# Patient Record
Sex: FEMALE | Race: WHITE | Employment: OTHER | URBAN - METROPOLITAN AREA
[De-identification: names, ages, dates, MRNs, and addresses within clinical notes are randomized per-mention and may not be internally consistent; named-entity substitution may affect disease eponyms.]

---

## 2017-02-24 ENCOUNTER — HOSPITAL ENCOUNTER (OUTPATIENT)
Dept: CT IMAGING | Age: 67
Discharge: HOME | End: 2017-02-24
Attending: ORTHOPAEDIC SURGERY
Payer: MEDICARE

## 2017-02-24 DIAGNOSIS — R26.2 DIFFICULTY WALKING: ICD-10-CM

## 2017-02-24 DIAGNOSIS — R26.9 GAIT DISTURBANCE: ICD-10-CM

## 2017-02-24 DIAGNOSIS — M25.561 PAIN IN RIGHT KNEE: ICD-10-CM

## 2017-02-24 PROCEDURE — 73700 CT LOWER EXTREMITY W/O DYE: CPT

## 2017-04-03 RX ORDER — TRAMADOL HYDROCHLORIDE 50 MG/1
50 TABLET ORAL 2 TIMES DAILY
Status: ON HOLD | COMMUNITY
End: 2017-04-13

## 2017-04-03 RX ORDER — HYDROCODONE BITARTRATE AND ACETAMINOPHEN 10; 325 MG/1; MG/1
1 TABLET ORAL EVERY 6 HOURS PRN
Status: ON HOLD | COMMUNITY
End: 2020-08-26

## 2017-04-03 RX ORDER — CELECOXIB 200 MG/1
200 CAPSULE ORAL DAILY
Status: ON HOLD | COMMUNITY
End: 2017-04-13

## 2017-04-03 RX ORDER — FLUOXETINE HYDROCHLORIDE 60 MG/1
1 TABLET, FILM COATED ORAL; ORAL DAILY
COMMUNITY

## 2017-04-05 ENCOUNTER — LAB ENCOUNTER (OUTPATIENT)
Dept: LAB | Age: 67
End: 2017-04-05
Attending: ORTHOPAEDIC SURGERY
Payer: MEDICARE

## 2017-04-05 DIAGNOSIS — M17.11 OSTEOARTHRITIS OF RIGHT KNEE: ICD-10-CM

## 2017-04-05 PROCEDURE — 86850 RBC ANTIBODY SCREEN: CPT

## 2017-04-05 PROCEDURE — 36415 COLL VENOUS BLD VENIPUNCTURE: CPT

## 2017-04-05 PROCEDURE — 86901 BLOOD TYPING SEROLOGIC RH(D): CPT

## 2017-04-05 PROCEDURE — 86900 BLOOD TYPING SEROLOGIC ABO: CPT

## 2017-04-09 PROBLEM — M17.11 OSTEOARTHRITIS OF RIGHT KNEE: Status: ACTIVE | Noted: 2017-04-09

## 2017-04-09 NOTE — H&P
PCP Physician: Luis Gramajo MD, [place]  Referral Source/Physician: Dr. Jaclyn Escobar       History of Present Illness    Yareli Roy is a pleasant 77year old female who presents today to schedule her knee replacement surgery.  She already met with Dr. Babs Bonilla Mallory Naik    Medication changes  Added new medication of NORCO  MG TABS (HYDROCODONE-ACETAMINOPHEN) 1 or 2 tablets by mouth every 4-6 hours as needed for pain.  not to exceed 8 tablets in 24 hours - Signed  Added new medication of ECOTRIN 325 M insomnia, confusion/memory loss, anxiety, substance abuse. Endocrine: Denies excessive thirst/urination, hormone problem. Allergic/Immunologic: Denies food allergies, aspirin allergies, antibiotic allergies. Seasonal allergies.  sulfadizine and contrast symmetric. Xray Impression:       4 views of both knees were ordered, performed, and interpreted today. The xrays reveal:  Severe lateral osteoarthritis with Joint space narrowing.  3 patella screws noted    Recommendations:    Sarah All would like

## 2017-04-10 ENCOUNTER — ANESTHESIA EVENT (OUTPATIENT)
Dept: SURGERY | Facility: HOSPITAL | Age: 67
DRG: 470 | End: 2017-04-10
Payer: MEDICARE

## 2017-04-10 ENCOUNTER — HOSPITAL ENCOUNTER (INPATIENT)
Facility: HOSPITAL | Age: 67
LOS: 3 days | Discharge: HOME HEALTH | DRG: 470 | End: 2017-04-13
Attending: ORTHOPAEDIC SURGERY | Admitting: ORTHOPAEDIC SURGERY
Payer: MEDICARE

## 2017-04-10 ENCOUNTER — SURGERY (OUTPATIENT)
Age: 67
End: 2017-04-10

## 2017-04-10 ENCOUNTER — ANESTHESIA (OUTPATIENT)
Dept: SURGERY | Facility: HOSPITAL | Age: 67
DRG: 470 | End: 2017-04-10
Payer: MEDICARE

## 2017-04-10 ENCOUNTER — APPOINTMENT (OUTPATIENT)
Dept: GENERAL RADIOLOGY | Facility: HOSPITAL | Age: 67
DRG: 470 | End: 2017-04-10
Attending: ORTHOPAEDIC SURGERY
Payer: MEDICARE

## 2017-04-10 DIAGNOSIS — M17.11 OSTEOARTHRITIS OF RIGHT KNEE: Primary | ICD-10-CM

## 2017-04-10 PROCEDURE — 36415 COLL VENOUS BLD VENIPUNCTURE: CPT | Performed by: ORTHOPAEDIC SURGERY

## 2017-04-10 PROCEDURE — C9290 INJ, BUPIVACAINE LIPOSOME: HCPCS | Performed by: ORTHOPAEDIC SURGERY

## 2017-04-10 PROCEDURE — 88305 TISSUE EXAM BY PATHOLOGIST: CPT | Performed by: ORTHOPAEDIC SURGERY

## 2017-04-10 PROCEDURE — 0SRC0J9 REPLACEMENT OF RIGHT KNEE JOINT WITH SYNTHETIC SUBSTITUTE, CEMENTED, OPEN APPROACH: ICD-10-PCS | Performed by: ORTHOPAEDIC SURGERY

## 2017-04-10 PROCEDURE — 73560 X-RAY EXAM OF KNEE 1 OR 2: CPT

## 2017-04-10 PROCEDURE — 88311 DECALCIFY TISSUE: CPT | Performed by: ORTHOPAEDIC SURGERY

## 2017-04-10 PROCEDURE — 85025 COMPLETE CBC W/AUTO DIFF WBC: CPT | Performed by: ORTHOPAEDIC SURGERY

## 2017-04-10 RX ORDER — MORPHINE SULFATE 10 MG/ML
6 INJECTION, SOLUTION INTRAMUSCULAR; INTRAVENOUS EVERY 10 MIN PRN
Status: DISCONTINUED | OUTPATIENT
Start: 2017-04-10 | End: 2017-04-10 | Stop reason: HOSPADM

## 2017-04-10 RX ORDER — METOCLOPRAMIDE 10 MG/1
10 TABLET ORAL ONCE
Status: DISCONTINUED | OUTPATIENT
Start: 2017-04-10 | End: 2017-04-10 | Stop reason: HOSPADM

## 2017-04-10 RX ORDER — ONDANSETRON 2 MG/ML
INJECTION INTRAMUSCULAR; INTRAVENOUS AS NEEDED
Status: DISCONTINUED | OUTPATIENT
Start: 2017-04-10 | End: 2017-04-10 | Stop reason: SURG

## 2017-04-10 RX ORDER — CYCLOBENZAPRINE HCL 10 MG
10 TABLET ORAL EVERY 8 HOURS PRN
Status: DISCONTINUED | OUTPATIENT
Start: 2017-04-10 | End: 2017-04-13

## 2017-04-10 RX ORDER — ACETAMINOPHEN 325 MG/1
650 TABLET ORAL ONCE
Status: DISCONTINUED | OUTPATIENT
Start: 2017-04-10 | End: 2017-04-10 | Stop reason: HOSPADM

## 2017-04-10 RX ORDER — SODIUM CHLORIDE, SODIUM LACTATE, POTASSIUM CHLORIDE, CALCIUM CHLORIDE 600; 310; 30; 20 MG/100ML; MG/100ML; MG/100ML; MG/100ML
INJECTION, SOLUTION INTRAVENOUS CONTINUOUS
Status: DISCONTINUED | OUTPATIENT
Start: 2017-04-10 | End: 2017-04-10 | Stop reason: HOSPADM

## 2017-04-10 RX ORDER — ACETAMINOPHEN 325 MG/1
650 TABLET ORAL EVERY 4 HOURS PRN
Status: DISCONTINUED | OUTPATIENT
Start: 2017-04-10 | End: 2017-04-13

## 2017-04-10 RX ORDER — EPHEDRINE SULFATE 50 MG/ML
INJECTION, SOLUTION INTRAVENOUS AS NEEDED
Status: DISCONTINUED | OUTPATIENT
Start: 2017-04-10 | End: 2017-04-10 | Stop reason: SURG

## 2017-04-10 RX ORDER — HYDROCODONE BITARTRATE AND ACETAMINOPHEN 5; 325 MG/1; MG/1
2 TABLET ORAL AS NEEDED
Status: DISCONTINUED | OUTPATIENT
Start: 2017-04-10 | End: 2017-04-10 | Stop reason: HOSPADM

## 2017-04-10 RX ORDER — CLONAZEPAM 0.5 MG/1
0.5 TABLET ORAL 3 TIMES DAILY PRN
COMMUNITY

## 2017-04-10 RX ORDER — ROCURONIUM BROMIDE 10 MG/ML
INJECTION, SOLUTION INTRAVENOUS AS NEEDED
Status: DISCONTINUED | OUTPATIENT
Start: 2017-04-10 | End: 2017-04-10 | Stop reason: SURG

## 2017-04-10 RX ORDER — HALOPERIDOL 5 MG/ML
0.25 INJECTION INTRAMUSCULAR ONCE AS NEEDED
Status: DISCONTINUED | OUTPATIENT
Start: 2017-04-10 | End: 2017-04-10 | Stop reason: HOSPADM

## 2017-04-10 RX ORDER — HYDROMORPHONE HYDROCHLORIDE 1 MG/ML
0.2 INJECTION, SOLUTION INTRAMUSCULAR; INTRAVENOUS; SUBCUTANEOUS EVERY 2 HOUR PRN
Status: DISCONTINUED | OUTPATIENT
Start: 2017-04-10 | End: 2017-04-13

## 2017-04-10 RX ORDER — TRAMADOL HYDROCHLORIDE 50 MG/1
50 TABLET ORAL 2 TIMES DAILY
Status: DISCONTINUED | OUTPATIENT
Start: 2017-04-10 | End: 2017-04-13

## 2017-04-10 RX ORDER — SODIUM CHLORIDE 0.9 % (FLUSH) 0.9 %
10 SYRINGE (ML) INJECTION AS NEEDED
Status: DISCONTINUED | OUTPATIENT
Start: 2017-04-10 | End: 2017-04-13

## 2017-04-10 RX ORDER — METOCLOPRAMIDE HYDROCHLORIDE 5 MG/ML
10 INJECTION INTRAMUSCULAR; INTRAVENOUS EVERY 6 HOURS PRN
Status: ACTIVE | OUTPATIENT
Start: 2017-04-10 | End: 2017-04-12

## 2017-04-10 RX ORDER — SODIUM CHLORIDE 9 MG/ML
INJECTION, SOLUTION INTRAVENOUS CONTINUOUS
Status: DISCONTINUED | OUTPATIENT
Start: 2017-04-10 | End: 2017-04-13

## 2017-04-10 RX ORDER — HYDROMORPHONE HYDROCHLORIDE 1 MG/ML
0.8 INJECTION, SOLUTION INTRAMUSCULAR; INTRAVENOUS; SUBCUTANEOUS EVERY 2 HOUR PRN
Status: DISCONTINUED | OUTPATIENT
Start: 2017-04-10 | End: 2017-04-13

## 2017-04-10 RX ORDER — DOCUSATE SODIUM 100 MG/1
100 CAPSULE, LIQUID FILLED ORAL 2 TIMES DAILY
Status: DISCONTINUED | OUTPATIENT
Start: 2017-04-10 | End: 2017-04-13

## 2017-04-10 RX ORDER — GLYCOPYRROLATE 0.2 MG/ML
INJECTION INTRAMUSCULAR; INTRAVENOUS AS NEEDED
Status: DISCONTINUED | OUTPATIENT
Start: 2017-04-10 | End: 2017-04-10 | Stop reason: SURG

## 2017-04-10 RX ORDER — MORPHINE SULFATE 2 MG/ML
2 INJECTION, SOLUTION INTRAMUSCULAR; INTRAVENOUS EVERY 10 MIN PRN
Status: DISCONTINUED | OUTPATIENT
Start: 2017-04-10 | End: 2017-04-10 | Stop reason: HOSPADM

## 2017-04-10 RX ORDER — LIDOCAINE HYDROCHLORIDE 10 MG/ML
INJECTION, SOLUTION EPIDURAL; INFILTRATION; INTRACAUDAL; PERINEURAL AS NEEDED
Status: DISCONTINUED | OUTPATIENT
Start: 2017-04-10 | End: 2017-04-10 | Stop reason: SURG

## 2017-04-10 RX ORDER — DEXAMETHASONE SODIUM PHOSPHATE 4 MG/ML
VIAL (ML) INJECTION AS NEEDED
Status: DISCONTINUED | OUTPATIENT
Start: 2017-04-10 | End: 2017-04-10 | Stop reason: SURG

## 2017-04-10 RX ORDER — DIPHENHYDRAMINE HYDROCHLORIDE 50 MG/ML
25 INJECTION INTRAMUSCULAR; INTRAVENOUS ONCE AS NEEDED
Status: ACTIVE | OUTPATIENT
Start: 2017-04-10 | End: 2017-04-10

## 2017-04-10 RX ORDER — HYDROCODONE BITARTRATE AND ACETAMINOPHEN 7.5; 325 MG/1; MG/1
2 TABLET ORAL EVERY 4 HOURS PRN
Status: DISCONTINUED | OUTPATIENT
Start: 2017-04-10 | End: 2017-04-12

## 2017-04-10 RX ORDER — HYDROMORPHONE HYDROCHLORIDE 1 MG/ML
0.2 INJECTION, SOLUTION INTRAMUSCULAR; INTRAVENOUS; SUBCUTANEOUS EVERY 5 MIN PRN
Status: DISCONTINUED | OUTPATIENT
Start: 2017-04-10 | End: 2017-04-10 | Stop reason: HOSPADM

## 2017-04-10 RX ORDER — NALOXONE HYDROCHLORIDE 0.4 MG/ML
80 INJECTION, SOLUTION INTRAMUSCULAR; INTRAVENOUS; SUBCUTANEOUS AS NEEDED
Status: DISCONTINUED | OUTPATIENT
Start: 2017-04-10 | End: 2017-04-10 | Stop reason: HOSPADM

## 2017-04-10 RX ORDER — HYDROMORPHONE HYDROCHLORIDE 1 MG/ML
0.4 INJECTION, SOLUTION INTRAMUSCULAR; INTRAVENOUS; SUBCUTANEOUS EVERY 5 MIN PRN
Status: DISCONTINUED | OUTPATIENT
Start: 2017-04-10 | End: 2017-04-10 | Stop reason: HOSPADM

## 2017-04-10 RX ORDER — MORPHINE SULFATE 4 MG/ML
4 INJECTION, SOLUTION INTRAMUSCULAR; INTRAVENOUS EVERY 10 MIN PRN
Status: DISCONTINUED | OUTPATIENT
Start: 2017-04-10 | End: 2017-04-10 | Stop reason: HOSPADM

## 2017-04-10 RX ORDER — HYDROMORPHONE HYDROCHLORIDE 1 MG/ML
0.6 INJECTION, SOLUTION INTRAMUSCULAR; INTRAVENOUS; SUBCUTANEOUS EVERY 5 MIN PRN
Status: DISCONTINUED | OUTPATIENT
Start: 2017-04-10 | End: 2017-04-10 | Stop reason: HOSPADM

## 2017-04-10 RX ORDER — FAMOTIDINE 20 MG/1
20 TABLET ORAL ONCE
Status: DISCONTINUED | OUTPATIENT
Start: 2017-04-10 | End: 2017-04-10 | Stop reason: HOSPADM

## 2017-04-10 RX ORDER — DIPHENHYDRAMINE HYDROCHLORIDE 50 MG/ML
12.5 INJECTION INTRAMUSCULAR; INTRAVENOUS EVERY 4 HOURS PRN
Status: DISCONTINUED | OUTPATIENT
Start: 2017-04-10 | End: 2017-04-13

## 2017-04-10 RX ORDER — POLYETHYLENE GLYCOL 3350 17 G/17G
17 POWDER, FOR SOLUTION ORAL DAILY PRN
Status: DISCONTINUED | OUTPATIENT
Start: 2017-04-10 | End: 2017-04-13

## 2017-04-10 RX ORDER — FAMOTIDINE 20 MG/1
20 TABLET ORAL 2 TIMES DAILY
Status: DISCONTINUED | OUTPATIENT
Start: 2017-04-10 | End: 2017-04-13

## 2017-04-10 RX ORDER — HYDROCODONE BITARTRATE AND ACETAMINOPHEN 5; 325 MG/1; MG/1
1 TABLET ORAL AS NEEDED
Status: DISCONTINUED | OUTPATIENT
Start: 2017-04-10 | End: 2017-04-10 | Stop reason: HOSPADM

## 2017-04-10 RX ORDER — ZOLPIDEM TARTRATE 10 MG/1
10 TABLET ORAL NIGHTLY PRN
Status: ON HOLD | COMMUNITY
End: 2020-08-26

## 2017-04-10 RX ORDER — ONDANSETRON 2 MG/ML
4 INJECTION INTRAMUSCULAR; INTRAVENOUS ONCE AS NEEDED
Status: DISCONTINUED | OUTPATIENT
Start: 2017-04-10 | End: 2017-04-10 | Stop reason: HOSPADM

## 2017-04-10 RX ORDER — SCOLOPAMINE TRANSDERMAL SYSTEM 1 MG/1
1 PATCH, EXTENDED RELEASE TRANSDERMAL ONCE
Status: COMPLETED | OUTPATIENT
Start: 2017-04-10 | End: 2017-04-13

## 2017-04-10 RX ORDER — NEOSTIGMINE METHYLSULFATE 0.5 MG/ML
INJECTION INTRAVENOUS AS NEEDED
Status: DISCONTINUED | OUTPATIENT
Start: 2017-04-10 | End: 2017-04-10 | Stop reason: SURG

## 2017-04-10 RX ORDER — FLUOXETINE HYDROCHLORIDE 20 MG/1
60 CAPSULE ORAL DAILY
Status: DISCONTINUED | OUTPATIENT
Start: 2017-04-11 | End: 2017-04-13

## 2017-04-10 RX ORDER — ONDANSETRON 2 MG/ML
4 INJECTION INTRAMUSCULAR; INTRAVENOUS EVERY 4 HOURS PRN
Status: DISCONTINUED | OUTPATIENT
Start: 2017-04-10 | End: 2017-04-13

## 2017-04-10 RX ORDER — BISACODYL 10 MG
10 SUPPOSITORY, RECTAL RECTAL
Status: DISCONTINUED | OUTPATIENT
Start: 2017-04-10 | End: 2017-04-13

## 2017-04-10 RX ORDER — SODIUM PHOSPHATE, DIBASIC AND SODIUM PHOSPHATE, MONOBASIC 7; 19 G/133ML; G/133ML
1 ENEMA RECTAL ONCE AS NEEDED
Status: ACTIVE | OUTPATIENT
Start: 2017-04-10 | End: 2017-04-10

## 2017-04-10 RX ORDER — GABAPENTIN 300 MG/1
600 CAPSULE ORAL ONCE
Status: COMPLETED | OUTPATIENT
Start: 2017-04-10 | End: 2017-04-10

## 2017-04-10 RX ORDER — ACETAMINOPHEN 500 MG
1000 TABLET ORAL ONCE
Status: COMPLETED | OUTPATIENT
Start: 2017-04-10 | End: 2017-04-10

## 2017-04-10 RX ORDER — FAMOTIDINE 10 MG/ML
20 INJECTION, SOLUTION INTRAVENOUS 2 TIMES DAILY
Status: DISCONTINUED | OUTPATIENT
Start: 2017-04-10 | End: 2017-04-13

## 2017-04-10 RX ORDER — HYDROCODONE BITARTRATE AND ACETAMINOPHEN 7.5; 325 MG/1; MG/1
1 TABLET ORAL EVERY 4 HOURS PRN
Status: DISCONTINUED | OUTPATIENT
Start: 2017-04-10 | End: 2017-04-12

## 2017-04-10 RX ORDER — HYDROMORPHONE HYDROCHLORIDE 1 MG/ML
0.4 INJECTION, SOLUTION INTRAMUSCULAR; INTRAVENOUS; SUBCUTANEOUS EVERY 2 HOUR PRN
Status: DISCONTINUED | OUTPATIENT
Start: 2017-04-10 | End: 2017-04-13

## 2017-04-10 RX ORDER — DIPHENHYDRAMINE HCL 25 MG
25 CAPSULE ORAL EVERY 4 HOURS PRN
Status: DISCONTINUED | OUTPATIENT
Start: 2017-04-10 | End: 2017-04-13

## 2017-04-10 RX ORDER — MIDAZOLAM HYDROCHLORIDE 1 MG/ML
INJECTION INTRAMUSCULAR; INTRAVENOUS AS NEEDED
Status: DISCONTINUED | OUTPATIENT
Start: 2017-04-10 | End: 2017-04-10 | Stop reason: SURG

## 2017-04-10 RX ORDER — MAGNESIUM HYDROXIDE 1200 MG/15ML
LIQUID ORAL CONTINUOUS PRN
Status: DISCONTINUED | OUTPATIENT
Start: 2017-04-10 | End: 2017-04-10

## 2017-04-10 RX ADMIN — EPHEDRINE SULFATE 10 MG: 50 INJECTION, SOLUTION INTRAVENOUS at 14:35:00

## 2017-04-10 RX ADMIN — ONDANSETRON 4 MG: 2 INJECTION INTRAMUSCULAR; INTRAVENOUS at 15:25:00

## 2017-04-10 RX ADMIN — NEOSTIGMINE METHYLSULFATE 3 MG: 0.5 INJECTION INTRAVENOUS at 15:30:00

## 2017-04-10 RX ADMIN — GLYCOPYRROLATE 0.6 MG: 0.2 INJECTION INTRAMUSCULAR; INTRAVENOUS at 15:30:00

## 2017-04-10 RX ADMIN — MIDAZOLAM HYDROCHLORIDE 2 MG: 1 INJECTION INTRAMUSCULAR; INTRAVENOUS at 14:07:00

## 2017-04-10 RX ADMIN — LIDOCAINE HYDROCHLORIDE 50 MG: 10 INJECTION, SOLUTION EPIDURAL; INFILTRATION; INTRACAUDAL; PERINEURAL at 14:11:00

## 2017-04-10 RX ADMIN — DEXAMETHASONE SODIUM PHOSPHATE 4 MG: 4 MG/ML VIAL (ML) INJECTION at 14:11:00

## 2017-04-10 RX ADMIN — ROCURONIUM BROMIDE 10 MG: 10 INJECTION, SOLUTION INTRAVENOUS at 14:45:00

## 2017-04-10 RX ADMIN — SODIUM CHLORIDE, SODIUM LACTATE, POTASSIUM CHLORIDE, CALCIUM CHLORIDE: 600; 310; 30; 20 INJECTION, SOLUTION INTRAVENOUS at 15:15:00

## 2017-04-10 RX ADMIN — ROCURONIUM BROMIDE 35 MG: 10 INJECTION, SOLUTION INTRAVENOUS at 14:11:00

## 2017-04-10 NOTE — ANESTHESIA POSTPROCEDURE EVALUATION
Patient: Jimmy Proper    Procedure Summary     Date Anesthesia Start Anesthesia Stop Room / Location    04/10/17 1407 1550 300 [facility] MAIN OR 11 / 300 [facility] MAIN OR       Procedure Diagnosis Surgeon Responsible Provider    KNEE TOTAL REPLACEMENT (Right Knee) (right kn

## 2017-04-10 NOTE — INTERVAL H&P NOTE
Pre-op Diagnosis: right knee osteoarthritis    The above referenced H&P was reviewed by Pravin Rhodes MD on 4/10/2017, the patient was examined and no significant changes have occurred in the patient's condition since the H&P was performed.   I discussed with

## 2017-04-10 NOTE — OPERATIVE REPORT
Date of Surgery:  04/10/2017  Surgeon: Tahira Kolb MD  Anesthesia Type:  General  Pre-Op Diagnosis:     (1) Unilateral primary osteoarthritis, right knee  Post-Op Diagnosis:     (1) Unilateral primary osteoarthritis, right knee  Procedure Performed  right T custom cutting guide, drilled it and pinned it in place and then resected the distal femur, sizing it to a 2. We then placed the four-in-one cutting guide and made our anterior cut, posterior cut, chamfer cut. We then turned our attention to the tibia.   Eli Asencio IMPLANTS:  DePuy knee system, size 2 femur, size 2 tibia with a 8 insert.     Post-Op Findings  OA of the right knee  Estimated Blood Loss:  50

## 2017-04-10 NOTE — ANESTHESIA PREPROCEDURE EVALUATION
Anesthesia PreOp Note    HPI:     Ishmael Montana is a 77year old female who presents for preoperative consultation requested by: Mary Ellen Carlin MD    Date of Surgery: 4/10/2017    Procedure(s):  KNEE TOTAL REPLACEMENT  Indication: right knee osteoarthritis 2 g 2 g Intravenous Once Andrew Sher MD   Povidone-Iodine 10 % 17.5 mL in sodium chloride 0.9 % 500 mL irrigation  Irrigation Once Andrew Sher MD   EPINEPHrine (ADRENALIN) 1 MG/ML 0.5 mg, ketorolac tromethamine (TORADOL) 30 MG/ML 30 mg, morphINE Sulfate Cardiovascular - negative ROS and normal exam  Exercise tolerance: good    Neuro/Psych - negative ROS     GI/Hepatic/Renal - negative ROS   (+) liver disease,     Endo/Other - negative ROS   Abdominal  - normal exam             Anesthesia Plan:   ASA:  2

## 2017-04-11 PROCEDURE — 97116 GAIT TRAINING THERAPY: CPT

## 2017-04-11 PROCEDURE — 83036 HEMOGLOBIN GLYCOSYLATED A1C: CPT | Performed by: FAMILY MEDICINE

## 2017-04-11 PROCEDURE — 85027 COMPLETE CBC AUTOMATED: CPT | Performed by: ORTHOPAEDIC SURGERY

## 2017-04-11 PROCEDURE — 80048 BASIC METABOLIC PNL TOTAL CA: CPT | Performed by: ORTHOPAEDIC SURGERY

## 2017-04-11 PROCEDURE — 97162 PT EVAL MOD COMPLEX 30 MIN: CPT

## 2017-04-11 PROCEDURE — 97165 OT EVAL LOW COMPLEX 30 MIN: CPT

## 2017-04-11 NOTE — H&P
[facility]    PATIENT'S NAME: Classie Scheuermann   ATTENDING PHYSICIAN: Clint Harrison.  Markel Joseph MD   PATIENT ACCOUNT#:   [id number]    LOCATION:  [address] [place] [address] RECORD #:   [id number]       YOB: 1950  ADMISSION DATE:       04/10/2017 reactive and round to light. Extraocular movements were intact. Mucosa was moist.  NECK:  No masses. LUNGS:  Clear to auscultation and percussion. HEART:  System regular rate and rhythm S1, S2. No murmurs. ABDOMEN:  Positive bowel sounds.   Soft, nont

## 2017-04-11 NOTE — PHYSICAL THERAPY NOTE
PHYSICAL THERAPY KNEE EVALUATION - INPATIENT     Room Number: 403/403-A  Evaluation Date: 4/11/2017  Type of Evaluation: Initial  Physician Order: PT Eval and Treat    Presenting Problem: OA right knee s/p TKA  Reason for Therapy: Mobility Dysfunction and Hematoma and contusion of liver      STATES HAS BEEN THERE FOR MANY YEARS   • Back problem      COMPRESSION FX   • History of fractured kneecap 9/2016     RIGHT   • Osteoarthritis    • Anxiety state    • Depression        Past Surgical History      Past [place] ACTIVITY TOLERANCE  No shortness of breath    AM-PAC '6-Clicks' INPATIENT SHORT FORM - BASIC MOBILITY  How much difficulty does the patient currently have. ..  -   Turning over in bed (including adjusting bedclothes, sheets and blankets)?: A Little   - assistive device at assistance level: modified independent    Goal #3   Current Status    Goal #4 Patient will negotiate 4 stairs/one curb w/ assistive device and supervision   Goal #4   Current Status    Goal #5  AROM 0 degrees extension to 95 degrees fle

## 2017-04-11 NOTE — OCCUPATIONAL THERAPY NOTE
Per post-op Xray, pt has chip fracture on Rt distal femur. RN, Makeda Stapleton, she is awaiting a call from Dr Selvin Knapp for further instructions.

## 2017-04-11 NOTE — PLAN OF CARE
Therapy reported that on x ray there is a small fracture on the Femoral cortex so try to call Dr Mills clarify about if it is ok or not to get her up and left message on his cell phone. No call back.  Try to call him again on his cell phone saying that mess

## 2017-04-11 NOTE — PLAN OF CARE
DISCHARGE PLANNING    • Discharge to home or other facility with appropriate resources Progressing    HERI PLANS TO D/C HOME WITH [facility] - ADULT    • Return mobility to safest level of function Progressing    • Maintain prop

## 2017-04-11 NOTE — CM/SW NOTE
Spoke with SW about pt wanting to go to [facility] for outpt rehab once home PT is completed. Informed pt's RN that pt will need a written prescription for outpt PT to take with her upon discharge.

## 2017-04-11 NOTE — OCCUPATIONAL THERAPY NOTE
..   OCCUPATIONAL THERAPY EVALUATION - INPATIENT      Room Number: 403/403-A  Evaluation Date: 4/11/2017  Type of Evaluation: Initial  Presenting Problem: S/P Rt TKA    Physician Order: IP Consult to Occupational Therapy  Reason for Therapy: ADL/IADL Dysfun knee      Past Medical History  Past Medical History   Diagnosis Date   • Hematoma and contusion of liver      STATES HAS BEEN THERE FOR MANY YEARS   • Back problem      COMPRESSION FX   • History of fractured kneecap 9/2016     RIGHT   • Osteoarthritis does the patient currently need…  -   Putting on and taking off regular lower body clothing?: A Little  -   Bathing (including washing, rinsing, drying)?: A Little  -   Toileting, which includes using toilet, bedpan or urinal? : A Little  -   Putting on an

## 2017-04-11 NOTE — PROGRESS NOTES
[facility]    Progress Note    Gayla White Patient Status:  Inpatient    11/3/1950 MRN [id number]   Location [facility] 4W/SW/SE Attending Hank Dorsey MD   Hosp Day # 1 PCP Sally Borrero       Subjective:   Gayla White is a

## 2017-04-11 NOTE — CM/SW NOTE
CTL met with pt and  at bedside. Prior to admission pt was independent with ADL's including driving and grocery shopping. Pt has 4 stairs to enter home and 1st floor is set up to accommodate pt post discharge.   Plan is for pt to go home with home

## 2017-04-11 NOTE — PHYSICAL THERAPY NOTE
Attempted physical therapy evaluation. Per post operative xray, patient has a chip fx on distal femur. Phone call placed to Dr. Pepe Edwards to clarify orders by therapist and by BABATUNDE Badillo. Will defer physical therapy at this time. BABATUNDE Badillo aware of attempt.

## 2017-04-11 NOTE — PHYSICAL THERAPY NOTE
Attempted physical therapy evaluation. Still awaiting weight bearing clarification per ortho sx at this time; call placed again to clarify. Will re-attempt as patient is available. RN Erin Mims aware of attempt.

## 2017-04-12 PROCEDURE — 85027 COMPLETE CBC AUTOMATED: CPT | Performed by: ORTHOPAEDIC SURGERY

## 2017-04-12 PROCEDURE — 97110 THERAPEUTIC EXERCISES: CPT

## 2017-04-12 PROCEDURE — 97116 GAIT TRAINING THERAPY: CPT

## 2017-04-12 PROCEDURE — 97535 SELF CARE MNGMENT TRAINING: CPT

## 2017-04-12 PROCEDURE — 97530 THERAPEUTIC ACTIVITIES: CPT

## 2017-04-12 RX ORDER — HYDROCODONE BITARTRATE AND ACETAMINOPHEN 10; 325 MG/1; MG/1
1 TABLET ORAL EVERY 4 HOURS PRN
Status: DISCONTINUED | OUTPATIENT
Start: 2017-04-12 | End: 2017-04-13

## 2017-04-12 RX ORDER — HYDROCODONE BITARTRATE AND ACETAMINOPHEN 10; 325 MG/1; MG/1
2 TABLET ORAL EVERY 4 HOURS PRN
Status: DISCONTINUED | OUTPATIENT
Start: 2017-04-12 | End: 2017-04-13

## 2017-04-12 NOTE — OCCUPATIONAL THERAPY NOTE
.. OCCUPATIONAL THERAPY TREATMENT NOTE - INPATIENT     Room Number: 403/403-A         Presenting Problem: S/P Rt TKA    Problem List  Active Problems:    Osteoarthritis of right knee      ASSESSMENT   Pt received supine in chair and was agreeable to partici TRANSFER ASSESSMENT  Supine to Sit :  (SBA)  Sit to Stand: Supervision    Toilet Transfer: NT  Shower Transfer: NT  Chair Transfer: NT  Car Transfer: Discussed    Bedroom Mobility: SBA      FUNCTIONAL ADL ASSESSMENT  Grooming: Set-up  Bathing: NT  Valentina Car

## 2017-04-12 NOTE — DISCHARGE PLANNING
The pt. Is agreeable to going home with [facility] then going to outpt. PT after [facility] ends. Referral made to [facility] per MD preference.       [facility] ext [phone]

## 2017-04-12 NOTE — PLAN OF CARE
DISCHARGE PLANNING    • Discharge to home or other facility with appropriate resources Donovan LIRIANO PLANS TO D/C HOME WITH HHC AND OUTPATIENT THERAPY, ORDER NEED FOR OUTPATIENT PT      [facility]LETAL - ADULT    • Return mobility to safest level of

## 2017-04-12 NOTE — PHYSICAL THERAPY NOTE
PHYSICAL THERAPY KNEE TREATMENT NOTE - INPATIENT     Room Number: 403/403-A             Presenting Problem: OA right knee s/p TKA    Problem List  Active Problems:    Osteoarthritis of right knee      ASSESSMENT   Pt seen BID. RN Kaleb vigil. Salvaodr sierra for (G-Code): CK    FUNCTIONAL ABILITY STATUS  Gait Assessment   Gait Assistance: Minimum assistance  Distance (ft): 80  Assistive Device: Rolling walker  Pattern: R Steppage  Stoop/Curb Assistance: Minimum assistance  Comment : 8 stairs    Additional Informat

## 2017-04-12 NOTE — PROGRESS NOTES
[facility] Patient Status:  Inpatient    11/3/1950 MRN [id number]   Location [facility] 4W/SW/SE Attending Derrick Gan MD   Hosp Day # 2 PCP Ed Lacey     Subjective:  Post-Operative Day: 2 Status Post right Total Kne

## 2017-04-12 NOTE — PROGRESS NOTES
[facility]    Progress Note    Gayla White Patient Status:  Inpatient    11/3/1950 MRN [id number]   Location [facility] 4W/SW/SE Attending Hank Dorsey MD   Hosp Day # 2 PCP Sally Borrero       Subjective:   Gayla White is a

## 2017-04-13 VITALS
WEIGHT: 123.81 LBS | RESPIRATION RATE: 18 BRPM | BODY MASS INDEX: 23.38 KG/M2 | OXYGEN SATURATION: 96 % | SYSTOLIC BLOOD PRESSURE: 130 MMHG | TEMPERATURE: 98 F | HEART RATE: 86 BPM | HEIGHT: 61 IN | DIASTOLIC BLOOD PRESSURE: 58 MMHG

## 2017-04-13 PROCEDURE — 97535 SELF CARE MNGMENT TRAINING: CPT

## 2017-04-13 PROCEDURE — 97116 GAIT TRAINING THERAPY: CPT

## 2017-04-13 PROCEDURE — 85027 COMPLETE CBC AUTOMATED: CPT | Performed by: ORTHOPAEDIC SURGERY

## 2017-04-13 PROCEDURE — 97110 THERAPEUTIC EXERCISES: CPT

## 2017-04-13 NOTE — PHYSICAL THERAPY NOTE
PHYSICAL THERAPY KNEE TREATMENT NOTE - INPATIENT     Room Number: 403/403-A             Presenting Problem: OA right knee s/p TKA    Problem List  Active Problems:    Osteoarthritis of right knee      ASSESSMENT   Pt seen BID. BABATUNDE Hernández approved pt parti Modifier (G-Code): CK    FUNCTIONAL ABILITY STATUS  Gait Assessment   Gait Assistance:  (CGA)  Distance (ft): 100  Assistive Device: Rolling walker  Pattern: R Steppage  Stoop/Curb Assistance: Minimum assistance  Comment : 12    Additional Information:

## 2017-04-13 NOTE — PLAN OF CARE
DISCHARGE PLANNING    • Discharge to home or other facility with appropriate resources Adequate for Discharge        MUSCULOSKELETAL - ADULT    • Return mobility to safest level of function Adequate for Discharge    • Maintain proper alignment of affected

## 2017-04-13 NOTE — OCCUPATIONAL THERAPY NOTE
.. OCCUPATIONAL THERAPY TREATMENT NOTE - INPATIENT     Room Number: 403/403-A         Presenting Problem: S/P Rt TKA    Problem List  Active Problems:    Osteoarthritis of right knee      ASSESSMENT   Pt received supine in bed and was adamant to participate ‘6-Clicks’ Inpatient Daily Activity Short Form  How much help from another person does the patient currently need…  -   Putting on and taking off regular lower body clothing?: A Little  -   Bathing (including washing, rinsing, drying)?: None  -   Toileting

## 2017-04-13 NOTE — DISCHARGE SUMMARY
[facility]    PATIENT'S NAME: Minal Bergeron   ATTENDING PHYSICIAN: Nathan Carrion.  Rebecca Belle MD   PATIENT ACCOUNT#:   [id number]    LOCATION:  [address] [place] [address] RECORD #:   [id number]       YOB: 1950  ADMISSION DATE:       04/10/2017 Fahrenheit. HEENT:  Pupils equally reactive round to light. Extraocular movements are intact. Mucosa was moist.  NECK:  No masses. LUNGS:  Clear to auscultation and percussion. HEART:  Regular rate and rhythm, S1 and S2. No murmurs.   ABDOMEN:  Positi

## 2017-04-13 NOTE — PROGRESS NOTES
[facility]    Progress Note    Bronwyn Mane Patient Status:  Inpatient    11/3/1950 MRN [id number]   Location [facility] 4W/SW/SE Attending Houston Gan MD   Hosp Day # 3 PCP Renaldo Corbin       Subjective:   Bronwyn Mane is a

## 2017-09-19 ENCOUNTER — HOSPITAL ENCOUNTER (OUTPATIENT)
Dept: MRI IMAGING | Facility: HOSPITAL | Age: 67
Discharge: HOME | End: 2017-09-19
Attending: ANESTHESIOLOGY
Payer: MEDICARE

## 2017-09-19 DIAGNOSIS — M54.16 LUMBAR RADICULOPATHY: ICD-10-CM

## 2017-09-19 PROCEDURE — 72148 MRI LUMBAR SPINE W/O DYE: CPT | Performed by: ANESTHESIOLOGY

## 2018-03-12 ENCOUNTER — HOSPITAL ENCOUNTER (OUTPATIENT)
Dept: MRI IMAGING | Facility: HOSPITAL | Age: 68
Discharge: HOME | End: 2018-03-12
Attending: PHYSICIAN ASSISTANT
Payer: MEDICARE

## 2018-03-12 DIAGNOSIS — M54.12 BRACHIAL NEURITIS: ICD-10-CM

## 2018-03-12 PROCEDURE — 72141 MRI NECK SPINE W/O DYE: CPT | Performed by: PHYSICIAN ASSISTANT

## 2018-08-07 ENCOUNTER — CHARTING TRANS (OUTPATIENT)
Dept: OTHER | Age: 68
End: 2018-08-07

## 2018-08-07 ENCOUNTER — IMAGING SERVICES (OUTPATIENT)
Dept: OTHER | Age: 68
End: 2018-08-07

## 2018-08-14 ENCOUNTER — CHARTING TRANS (OUTPATIENT)
Dept: OTHER | Age: 68
End: 2018-08-14

## 2018-08-14 ENCOUNTER — IMAGING SERVICES (OUTPATIENT)
Dept: OTHER | Age: 68
End: 2018-08-14

## 2018-10-31 VITALS
OXYGEN SATURATION: 95 % | WEIGHT: 126.99 LBS | HEART RATE: 71 BPM | DIASTOLIC BLOOD PRESSURE: 80 MMHG | SYSTOLIC BLOOD PRESSURE: 129 MMHG | RESPIRATION RATE: 14 BRPM | BODY MASS INDEX: 23.98 KG/M2 | HEIGHT: 61 IN

## 2018-11-27 VITALS
HEART RATE: 72 BPM | OXYGEN SATURATION: 97 % | HEIGHT: 61 IN | WEIGHT: 131.99 LBS | SYSTOLIC BLOOD PRESSURE: 129 MMHG | BODY MASS INDEX: 24.92 KG/M2 | DIASTOLIC BLOOD PRESSURE: 82 MMHG

## 2020-07-17 ENCOUNTER — HOSPITAL ENCOUNTER (EMERGENCY)
Age: 70
Discharge: HOME | End: 2020-07-17
Attending: EMERGENCY MEDICINE

## 2020-07-17 VITALS
TEMPERATURE: 98.3 F | RESPIRATION RATE: 16 BRPM | OXYGEN SATURATION: 98 % | SYSTOLIC BLOOD PRESSURE: 111 MMHG | WEIGHT: 104.94 LBS | HEART RATE: 74 BPM | BODY MASS INDEX: 19.83 KG/M2 | DIASTOLIC BLOOD PRESSURE: 68 MMHG

## 2020-07-17 DIAGNOSIS — F32.A ANXIETY AND DEPRESSION: Primary | ICD-10-CM

## 2020-07-17 DIAGNOSIS — F41.9 ANXIETY AND DEPRESSION: Primary | ICD-10-CM

## 2020-07-17 LAB
ALBUMIN SERPL-MCNC: 3.4 G/DL (ref 3.6–5.1)
ALP SERPL-CCNC: 80 UNITS/L (ref 45–117)
ALT SERPL-CCNC: 25 UNITS/L
AMPHETAMINES UR QL SCN>500 NG/ML: NEGATIVE
ANION GAP SERPL CALC-SCNC: 12 MMOL/L (ref 10–20)
APAP SERPL-MCNC: <2 MCG/ML (ref 10–30)
AST SERPL-CCNC: 21 UNITS/L
BARBITURATES UR QL SCN>200 NG/ML: NEGATIVE
BASOPHILS # BLD: 0 K/MCL (ref 0–0.3)
BASOPHILS NFR BLD: 0 %
BENZODIAZ UR QL SCN>200 NG/ML: NEGATIVE
BILIRUB CONJ SERPL-MCNC: <0.1 MG/DL (ref 0–0.2)
BILIRUB SERPL-MCNC: 0.2 MG/DL (ref 0.2–1)
BUN SERPL-MCNC: 15 MG/DL (ref 6–20)
BUN/CREAT SERPL: 17 (ref 7–25)
BZE UR QL SCN>150 NG/ML: NEGATIVE
CALCIUM SERPL-MCNC: 8.6 MG/DL (ref 8.4–10.2)
CANNABINOIDS UR QL SCN>50 NG/ML: POSITIVE
CHLORIDE SERPL-SCNC: 108 MMOL/L (ref 98–107)
CO2 SERPL-SCNC: 24 MMOL/L (ref 21–32)
CREAT SERPL-MCNC: 0.86 MG/DL (ref 0.51–0.95)
DIFFERENTIAL METHOD BLD: NORMAL
EOSINOPHIL # BLD: 0.1 K/MCL (ref 0.1–0.5)
EOSINOPHIL NFR BLD: 1 %
ERYTHROCYTE [DISTWIDTH] IN BLOOD: 12.9 % (ref 11–15)
ETHANOL SERPL-MCNC: NORMAL MG/DL
GLUCOSE SERPL-MCNC: 174 MG/DL (ref 65–99)
HCT VFR BLD CALC: 38.3 % (ref 36–46.5)
HGB BLD-MCNC: 12.3 G/DL (ref 12–15.5)
IMM GRANULOCYTES # BLD AUTO: 0 K/MCL (ref 0–0.2)
IMM GRANULOCYTES NFR BLD: 0 %
LYMPHOCYTES # BLD: 2.6 K/MCL (ref 1–4)
LYMPHOCYTES NFR BLD: 39 %
MCH RBC QN AUTO: 29.4 PG (ref 26–34)
MCHC RBC AUTO-ENTMCNC: 32.1 G/DL (ref 32–36.5)
MCV RBC AUTO: 91.4 FL (ref 78–100)
MONOCYTES # BLD: 0.6 K/MCL (ref 0.3–0.9)
MONOCYTES NFR BLD: 8 %
NEUTROPHILS # BLD: 3.5 K/MCL (ref 1.8–7.7)
NEUTROPHILS NFR BLD: 52 %
NRBC BLD MANUAL-RTO: 0 /100 WBC
OPIATES UR QL SCN>300 NG/ML: POSITIVE
PCP UR QL SCN>25 NG/ML: NEGATIVE
PLATELET # BLD: 233 K/MCL (ref 140–450)
POTASSIUM SERPL-SCNC: 3.6 MMOL/L (ref 3.4–5.1)
PROT SERPL-MCNC: 6.3 G/DL (ref 6.4–8.2)
RBC # BLD: 4.19 MIL/MCL (ref 4–5.2)
SALICYLATES SERPL-MCNC: <2.8 MG/DL
SARS-COV-2 RNA RESP QL NAA+PROBE: NOT DETECTED
SERVICE CMNT-IMP: NORMAL
SODIUM SERPL-SCNC: 140 MMOL/L (ref 135–145)
SPECIMEN SOURCE: NORMAL
TSH SERPL-ACNC: 0.65 MCUNITS/ML (ref 0.35–5)
WBC # BLD: 6.7 K/MCL (ref 4.2–11)

## 2020-07-17 PROCEDURE — 80320 DRUG SCREEN QUANTALCOHOLS: CPT

## 2020-07-17 PROCEDURE — C9803 HOPD COVID-19 SPEC COLLECT: HCPCS

## 2020-07-17 PROCEDURE — 80076 HEPATIC FUNCTION PANEL: CPT

## 2020-07-17 PROCEDURE — 80307 DRUG TEST PRSMV CHEM ANLYZR: CPT

## 2020-07-17 PROCEDURE — 99284 EMERGENCY DEPT VISIT MOD MDM: CPT

## 2020-07-17 PROCEDURE — 84443 ASSAY THYROID STIM HORMONE: CPT

## 2020-07-17 PROCEDURE — 85025 COMPLETE CBC W/AUTO DIFF WBC: CPT

## 2020-07-17 PROCEDURE — 80329 ANALGESICS NON-OPIOID 1 OR 2: CPT

## 2020-07-17 PROCEDURE — 87635 SARS-COV-2 COVID-19 AMP PRB: CPT

## 2020-07-17 PROCEDURE — 80048 BASIC METABOLIC PNL TOTAL CA: CPT

## 2020-07-17 PROCEDURE — 36415 COLL VENOUS BLD VENIPUNCTURE: CPT

## 2020-07-17 RX ORDER — TIZANIDINE 4 MG/1
4 TABLET ORAL EVERY 6 HOURS PRN
COMMUNITY

## 2020-07-17 RX ORDER — OXYCODONE AND ACETAMINOPHEN 10; 325 MG/1; MG/1
10 TABLET ORAL 3 TIMES DAILY
COMMUNITY
End: 2022-12-22 | Stop reason: CLARIF

## 2020-07-17 RX ORDER — ZOLPIDEM TARTRATE 10 MG/1
10 TABLET ORAL NIGHTLY PRN
Status: ON HOLD | COMMUNITY
End: 2020-08-06 | Stop reason: HOSPADM

## 2020-07-17 RX ORDER — FLUOXETINE HYDROCHLORIDE 40 MG/1
40 CAPSULE ORAL DAILY
Status: ON HOLD | COMMUNITY
End: 2020-08-06 | Stop reason: HOSPADM

## 2020-07-17 RX ORDER — LORAZEPAM 0.5 MG/1
0.5 TABLET ORAL EVERY 8 HOURS PRN
Qty: 15 TABLET | Refills: 0 | Status: ON HOLD | OUTPATIENT
Start: 2020-07-17 | End: 2020-08-06 | Stop reason: HOSPADM

## 2020-07-17 RX ORDER — PREGABALIN 100 MG/1
100 CAPSULE ORAL 2 TIMES DAILY
COMMUNITY

## 2020-07-17 RX ORDER — CELECOXIB 200 MG/1
200 CAPSULE ORAL DAILY
COMMUNITY
End: 2022-06-22 | Stop reason: CLARIF

## 2020-07-17 ASSESSMENT — LIFESTYLE VARIABLES
ALCOHOL_USE_STATUS: NO OR LOW RISK WITH VALIDATED TOOL
HOW OFTEN DO YOU HAVE A DRINK CONTAINING ALCOHOL: NEVER
HOW OFTEN DO YOU HAVE 6 OR MORE DRINKS ON ONE OCCASION: NEVER
ALCOHOL_USE: DENIES
HOW MANY STANDARD DRINKS CONTAINING ALCOHOL DO YOU HAVE ON A TYPICAL DAY: 0,1 OR 2
AUDIT-C TOTAL SCORE: 0

## 2020-07-17 ASSESSMENT — COGNITIVE AND FUNCTIONAL STATUS - GENERAL
MEMORY: INTACT
SPEECH: CLEAR/UNDERSTANDABLE
BEHAVIOR: CRYING
ORIENTATION: ORIENTED (PERSON/PLACE/TIME)
AFFECT: SAD;DEPRESSED
MOOD: DEPRESSED

## 2020-07-17 ASSESSMENT — PAIN SCALES - GENERAL: PAINLEVEL_OUTOF10: 10

## 2020-07-28 ENCOUNTER — HOSPITAL ENCOUNTER (OUTPATIENT)
Dept: BEHAVIORAL HEALTH | Age: 70
Discharge: STILL ADMITTED OR ADMINISTRATIVE | End: 2020-07-28

## 2020-07-28 ENCOUNTER — HOSPITAL ENCOUNTER (INPATIENT)
Age: 70
LOS: 9 days | Discharge: HOME | DRG: 881 | End: 2020-08-06
Attending: EMERGENCY MEDICINE | Admitting: PSYCHIATRY & NEUROLOGY

## 2020-07-28 DIAGNOSIS — F33.2 SEVERE EPISODE OF RECURRENT MAJOR DEPRESSIVE DISORDER, WITHOUT PSYCHOTIC FEATURES (CMD): Primary | ICD-10-CM

## 2020-07-28 DIAGNOSIS — F41.1 GAD (GENERALIZED ANXIETY DISORDER): ICD-10-CM

## 2020-07-28 PROBLEM — F41.9 ANXIETY AND DEPRESSION: Status: ACTIVE | Noted: 2020-07-28

## 2020-07-28 PROBLEM — M54.42 CHRONIC BILATERAL LOW BACK PAIN WITH SCIATICA: Status: ACTIVE | Noted: 2020-07-28

## 2020-07-28 PROBLEM — M54.40 CHRONIC BILATERAL LOW BACK PAIN WITH SCIATICA: Status: ACTIVE | Noted: 2020-07-28

## 2020-07-28 PROBLEM — G89.29 CHRONIC BILATERAL LOW BACK PAIN WITH SCIATICA: Status: ACTIVE | Noted: 2020-07-28

## 2020-07-28 PROBLEM — F32.A ANXIETY AND DEPRESSION: Status: ACTIVE | Noted: 2020-07-28

## 2020-07-28 PROBLEM — M54.41 CHRONIC BILATERAL LOW BACK PAIN WITH SCIATICA: Status: ACTIVE | Noted: 2020-07-28

## 2020-07-28 LAB
ALBUMIN SERPL-MCNC: 3.8 G/DL (ref 3.6–5.1)
ALP SERPL-CCNC: 81 UNITS/L (ref 45–117)
ALT SERPL-CCNC: 26 UNITS/L
AMPHETAMINES UR QL SCN>500 NG/ML: NEGATIVE
ANION GAP SERPL CALC-SCNC: 12 MMOL/L (ref 10–20)
APAP SERPL-MCNC: 5 MCG/ML (ref 10–30)
AST SERPL-CCNC: 20 UNITS/L
BARBITURATES UR QL SCN>200 NG/ML: NEGATIVE
BASOPHILS # BLD: 0 K/MCL (ref 0–0.3)
BASOPHILS NFR BLD: 0 %
BENZODIAZ UR QL SCN>200 NG/ML: NEGATIVE
BILIRUB CONJ SERPL-MCNC: <0.1 MG/DL (ref 0–0.2)
BILIRUB SERPL-MCNC: 0.2 MG/DL (ref 0.2–1)
BUN SERPL-MCNC: 11 MG/DL (ref 6–20)
BUN/CREAT SERPL: 18 (ref 7–25)
BZE UR QL SCN>150 NG/ML: NEGATIVE
CALCIUM SERPL-MCNC: 9.3 MG/DL (ref 8.4–10.2)
CANNABINOIDS UR QL SCN>50 NG/ML: NEGATIVE
CHLORIDE SERPL-SCNC: 110 MMOL/L (ref 98–107)
CO2 SERPL-SCNC: 23 MMOL/L (ref 21–32)
CREAT SERPL-MCNC: 0.62 MG/DL (ref 0.51–0.95)
DIFFERENTIAL METHOD BLD: NORMAL
EOSINOPHIL # BLD: 0.1 K/MCL (ref 0.1–0.5)
EOSINOPHIL NFR BLD: 1 %
ERYTHROCYTE [DISTWIDTH] IN BLOOD: 12.9 % (ref 11–15)
GLUCOSE SERPL-MCNC: 122 MG/DL (ref 65–99)
HCT VFR BLD CALC: 40.9 % (ref 36–46.5)
HGB BLD-MCNC: 13.2 G/DL (ref 12–15.5)
IMM GRANULOCYTES # BLD AUTO: 0 K/MCL (ref 0–0.2)
IMM GRANULOCYTES NFR BLD: 0 %
LYMPHOCYTES # BLD: 3 K/MCL (ref 1–4)
LYMPHOCYTES NFR BLD: 41 %
MAGNESIUM SERPL-MCNC: 2.5 MG/DL (ref 1.7–2.4)
MCH RBC QN AUTO: 29.9 PG (ref 26–34)
MCHC RBC AUTO-ENTMCNC: 32.3 G/DL (ref 32–36.5)
MCV RBC AUTO: 92.7 FL (ref 78–100)
MONOCYTES # BLD: 0.6 K/MCL (ref 0.3–0.9)
MONOCYTES NFR BLD: 8 %
NEUTROPHILS # BLD: 3.7 K/MCL (ref 1.8–7.7)
NEUTROPHILS NFR BLD: 50 %
NRBC BLD MANUAL-RTO: 0 /100 WBC
OPIATES UR QL SCN>300 NG/ML: POSITIVE
PCP UR QL SCN>25 NG/ML: NEGATIVE
PLATELET # BLD: 239 K/MCL (ref 140–450)
POTASSIUM SERPL-SCNC: 4.1 MMOL/L (ref 3.4–5.1)
PROT SERPL-MCNC: 6.9 G/DL (ref 6.4–8.2)
RBC # BLD: 4.41 MIL/MCL (ref 4–5.2)
SALICYLATES SERPL-MCNC: <2.8 MG/DL
SARS-COV-2 RNA RESP QL NAA+PROBE: NOT DETECTED
SERVICE CMNT-IMP: NORMAL
SODIUM SERPL-SCNC: 141 MMOL/L (ref 135–145)
SPECIMEN SOURCE: NORMAL
WBC # BLD: 7.5 K/MCL (ref 4.2–11)

## 2020-07-28 PROCEDURE — 36415 COLL VENOUS BLD VENIPUNCTURE: CPT

## 2020-07-28 PROCEDURE — 90792 PSYCH DIAG EVAL W/MED SRVCS: CPT | Performed by: NURSE PRACTITIONER

## 2020-07-28 PROCEDURE — 80329 ANALGESICS NON-OPIOID 1 OR 2: CPT

## 2020-07-28 PROCEDURE — 90839 PSYTX CRISIS INITIAL 60 MIN: CPT

## 2020-07-28 PROCEDURE — 87635 SARS-COV-2 COVID-19 AMP PRB: CPT

## 2020-07-28 PROCEDURE — 80307 DRUG TEST PRSMV CHEM ANLYZR: CPT

## 2020-07-28 PROCEDURE — 10004577 HB ROOM CHARGE PSYCH

## 2020-07-28 PROCEDURE — 99220 INITIAL OBSERVATION CARE,LEVL III: CPT | Performed by: INTERNAL MEDICINE

## 2020-07-28 PROCEDURE — 10002803 HB RX 637: Performed by: PSYCHIATRY & NEUROLOGY

## 2020-07-28 PROCEDURE — 80048 BASIC METABOLIC PNL TOTAL CA: CPT

## 2020-07-28 PROCEDURE — 80076 HEPATIC FUNCTION PANEL: CPT

## 2020-07-28 PROCEDURE — 83735 ASSAY OF MAGNESIUM: CPT

## 2020-07-28 PROCEDURE — 10002803 HB RX 637: Performed by: EMERGENCY MEDICINE

## 2020-07-28 PROCEDURE — 85025 COMPLETE CBC W/AUTO DIFF WBC: CPT

## 2020-07-28 PROCEDURE — C9803 HOPD COVID-19 SPEC COLLECT: HCPCS

## 2020-07-28 PROCEDURE — 10002803 HB RX 637: Performed by: INTERNAL MEDICINE

## 2020-07-28 PROCEDURE — 99285 EMERGENCY DEPT VISIT HI MDM: CPT

## 2020-07-28 RX ORDER — OXYCODONE AND ACETAMINOPHEN 10; 325 MG/1; MG/1
1 TABLET ORAL 3 TIMES DAILY PRN
Status: DISCONTINUED | OUTPATIENT
Start: 2020-07-28 | End: 2020-08-06 | Stop reason: HOSPADM

## 2020-07-28 RX ORDER — HYDROXYZINE HYDROCHLORIDE 25 MG/1
50 TABLET, FILM COATED ORAL EVERY 4 HOURS PRN
Status: DISCONTINUED | OUTPATIENT
Start: 2020-07-28 | End: 2020-08-03

## 2020-07-28 RX ORDER — HALOPERIDOL 5 MG/1
5 TABLET ORAL EVERY 6 HOURS PRN
Status: DISCONTINUED | OUTPATIENT
Start: 2020-07-28 | End: 2020-08-06 | Stop reason: HOSPADM

## 2020-07-28 RX ORDER — LORAZEPAM 1 MG/1
1 TABLET ORAL EVERY 6 HOURS PRN
Status: DISCONTINUED | OUTPATIENT
Start: 2020-07-28 | End: 2020-07-28 | Stop reason: SDUPTHER

## 2020-07-28 RX ORDER — FLUOXETINE HYDROCHLORIDE 20 MG/1
20 CAPSULE ORAL DAILY
Status: ON HOLD | COMMUNITY
End: 2020-08-06 | Stop reason: HOSPADM

## 2020-07-28 RX ORDER — BENZTROPINE MESYLATE 1 MG/ML
1 INJECTION INTRAMUSCULAR; INTRAVENOUS EVERY 6 HOURS PRN
Status: DISCONTINUED | OUTPATIENT
Start: 2020-07-28 | End: 2020-08-06 | Stop reason: HOSPADM

## 2020-07-28 RX ORDER — ACETAMINOPHEN 325 MG/1
650 TABLET ORAL EVERY 4 HOURS PRN
Status: DISCONTINUED | OUTPATIENT
Start: 2020-07-28 | End: 2020-08-06 | Stop reason: HOSPADM

## 2020-07-28 RX ORDER — CELECOXIB 200 MG/1
200 CAPSULE ORAL DAILY
Status: DISCONTINUED | OUTPATIENT
Start: 2020-07-29 | End: 2020-08-06 | Stop reason: HOSPADM

## 2020-07-28 RX ORDER — MAGNESIUM HYDROXIDE/ALUMINUM HYDROXICE/SIMETHICONE 120; 1200; 1200 MG/30ML; MG/30ML; MG/30ML
20 SUSPENSION ORAL EVERY 4 HOURS PRN
Status: DISCONTINUED | OUTPATIENT
Start: 2020-07-28 | End: 2020-08-06 | Stop reason: HOSPADM

## 2020-07-28 RX ORDER — BENZTROPINE MESYLATE 1 MG/1
1 TABLET ORAL EVERY 6 HOURS PRN
Status: DISCONTINUED | OUTPATIENT
Start: 2020-07-28 | End: 2020-08-06 | Stop reason: HOSPADM

## 2020-07-28 RX ORDER — HALOPERIDOL 5 MG/ML
5 INJECTION INTRAMUSCULAR EVERY 6 HOURS PRN
Status: DISCONTINUED | OUTPATIENT
Start: 2020-07-28 | End: 2020-08-06 | Stop reason: HOSPADM

## 2020-07-28 RX ORDER — LORAZEPAM 1 MG/1
1 TABLET ORAL EVERY 6 HOURS PRN
Status: DISCONTINUED | OUTPATIENT
Start: 2020-07-28 | End: 2020-08-06 | Stop reason: HOSPADM

## 2020-07-28 RX ORDER — PREGABALIN 100 MG/1
100 CAPSULE ORAL 2 TIMES DAILY
Status: DISCONTINUED | OUTPATIENT
Start: 2020-07-29 | End: 2020-07-28

## 2020-07-28 RX ORDER — FLUOXETINE HYDROCHLORIDE 20 MG/1
20 CAPSULE ORAL DAILY
Status: DISCONTINUED | OUTPATIENT
Start: 2020-07-29 | End: 2020-07-31

## 2020-07-28 RX ORDER — PREGABALIN 100 MG/1
100 CAPSULE ORAL 2 TIMES DAILY
Status: DISCONTINUED | OUTPATIENT
Start: 2020-07-28 | End: 2020-08-06 | Stop reason: HOSPADM

## 2020-07-28 RX ORDER — LORAZEPAM 2 MG/ML
1 INJECTION INTRAMUSCULAR EVERY 6 HOURS PRN
Status: DISCONTINUED | OUTPATIENT
Start: 2020-07-28 | End: 2020-08-06 | Stop reason: HOSPADM

## 2020-07-28 RX ORDER — ZOLPIDEM TARTRATE 5 MG/1
5 TABLET ORAL NIGHTLY PRN
Status: DISCONTINUED | OUTPATIENT
Start: 2020-07-28 | End: 2020-07-29

## 2020-07-28 RX ORDER — LORAZEPAM 1 MG/1
1 TABLET ORAL ONCE
Status: COMPLETED | OUTPATIENT
Start: 2020-07-28 | End: 2020-07-28

## 2020-07-28 RX ORDER — FLUOXETINE HYDROCHLORIDE 20 MG/1
40 CAPSULE ORAL DAILY
Status: DISCONTINUED | OUTPATIENT
Start: 2020-07-29 | End: 2020-07-31

## 2020-07-28 RX ORDER — TRAZODONE HYDROCHLORIDE 50 MG/1
50 TABLET ORAL NIGHTLY PRN
Status: DISCONTINUED | OUTPATIENT
Start: 2020-07-28 | End: 2020-07-28

## 2020-07-28 RX ADMIN — PREGABALIN 100 MG: 100 CAPSULE ORAL at 23:01

## 2020-07-28 RX ADMIN — LORAZEPAM 1 MG: 1 TABLET ORAL at 16:00

## 2020-07-28 RX ADMIN — ZOLPIDEM TARTRATE 5 MG: 5 TABLET, COATED ORAL at 21:09

## 2020-07-28 RX ADMIN — OXYCODONE HYDROCHLORIDE AND ACETAMINOPHEN 1 TABLET: 10; 325 TABLET ORAL at 23:00

## 2020-07-28 ASSESSMENT — COGNITIVE AND FUNCTIONAL STATUS - GENERAL
MOTOR_BEHAVIOR-AGITATION_CALCULATED: RESTLESS
SPEECH: CLEAR/UNDERSTANDABLE
MEMORY: INTACT
ATTENTION_CALCULATED: REDUCED ABILITY TO MAINTAIN ATTENTION TO STIMULI
ORIENTATION: ORIENTED (PERSON/PLACE/TIME)
BEHAVIOR: SUICIDAL/SUICIDAL IDEATION;CRYING;DIFFICULT TO CONSOLE

## 2020-07-28 ASSESSMENT — LIFESTYLE VARIABLES
TOBACCO_USE_STATUS_IN_THE_LAST_30_DAYS: NO TOBACCO USED IN THE LAST 30 DAYS
ALCOHOL_USE_STATUS: NO OR LOW RISK WITH VALIDATED TOOL
CHRONIC/CANCER PAIN PRESENT: NO
HOW OFTEN DO YOU HAVE 6 OR MORE DRINKS ON ONE OCCASION: NEVER
AMPHETAMINES/STIMULANTS USE: DENIES
HOW OFTEN DO YOU HAVE A DRINK CONTAINING ALCOHOL: NEVER
ALCOHOL_USE: DENIES
SHORT OF BREATH OR FATIGUE WITH ADLS: NO
HOW OFTEN DO YOU HAVE 6 OR MORE DRINKS ON ONE OCCASION: NEVER
HAVE YOU EVER BEEN VERBALLY, EMOTIONALLY, PHYSICALLY OR SEXUALLY ABUSED, OR ABUSED VIA SOCIAL MEDIA?: NO
RECENT DECLINE IN ADLS: NO
ALCOHOL_USE: DENIES
HOW MANY STANDARD DRINKS CONTAINING ALCOHOL DO YOU HAVE ON A TYPICAL DAY: 0,1 OR 2
ADL NEEDS ASSIST: NO
HAVE YOU EVER BEEN EXPOSED TO OTHER VERY DISTURBING, TRAUMATIC OR ANXIETY PROVOKING EVENTS IN YOUR LIFETIME?: NO
ARE YOU BLIND OR DO YOU HAVE SERIOUS DIFFICULTY SEEING, EVEN WHEN WEARING GLASSES: NO
CAFFEINE: YES
E-CIGARETTE_USE: NO E-CIGARETTES USE IN THE LAST 30 DAYS
COCAINE USE: DENIES
ADL BEFORE ADMISSION: INDEPENDENT
HOW MANY CIGARETTES HAVE YOU SMOKED PER DAY ON AVERAGE?: DENIES
VOLATILE SOLVENTS/INHALANTS USE: DENIES
OPIATES USE: DENIES
AUDIT-C TOTAL SCORE: 0
HOW MANY STANDARD DRINKS CONTAINING ALCOHOL DO YOU HAVE ON A TYPICAL DAY: 0,1 OR 2
AUDIT-C TOTAL SCORE: 0
HOW OFTEN DO YOU HAVE A DRINK CONTAINING ALCOHOL: NEVER
ALCOHOL_USE_STATUS: NO OR LOW RISK WITH VALIDATED TOOL
ARE YOU DEAF OR DO YOU HAVE SERIOUS DIFFICULTY  HEARING: NO
IS PATIENT ABLE TO COMPLETE ASSESSMENT AT THIS TIME: NO (T)

## 2020-07-28 ASSESSMENT — ENCOUNTER SYMPTOMS
POLYDIPSIA: 1
DIAPHORESIS: 1
BACK PAIN: 1
DIARRHEA: 1
LIGHT-HEADEDNESS: 1
DIZZINESS: 1
UNEXPECTED WEIGHT CHANGE: 1
NERVOUS/ANXIOUS: 1
SLEEP DISTURBANCE: 1
RESPIRATORY NEGATIVE: 1
HEMATOLOGIC/LYMPHATIC NEGATIVE: 1
ACTIVITY CHANGE: 0
EYE ITCHING: 1
CHILLS: 1
FEVER: 0
FATIGUE: 1
CONSTIPATION: 1
AGITATION: 1
RHINORRHEA: 1
ABDOMINAL PAIN: 1
APPETITE CHANGE: 1

## 2020-07-28 ASSESSMENT — ACTIVITIES OF DAILY LIVING (ADL): ADL_SCORE: 12

## 2020-07-28 ASSESSMENT — PAIN SCALES - GENERAL
PAINLEVEL_OUTOF10: 0
PAINLEVEL_OUTOF10: 10

## 2020-07-29 LAB
CHOLEST SERPL-MCNC: 241 MG/DL
CHOLEST/HDLC SERPL: 2.9 {RATIO}
HBA1C MFR BLD: 5.9 % (ref 4.5–5.6)
HDLC SERPL-MCNC: 83 MG/DL
LDLC SERPL CALC-MCNC: 135 MG/DL
NONHDLC SERPL-MCNC: 158 MG/DL
TRIGL SERPL-MCNC: 117 MG/DL

## 2020-07-29 PROCEDURE — 10002803 HB RX 637: Performed by: INTERNAL MEDICINE

## 2020-07-29 PROCEDURE — 80061 LIPID PANEL: CPT

## 2020-07-29 PROCEDURE — 36415 COLL VENOUS BLD VENIPUNCTURE: CPT

## 2020-07-29 PROCEDURE — 10004577 HB ROOM CHARGE PSYCH

## 2020-07-29 PROCEDURE — 10002803 HB RX 637: Performed by: PSYCHIATRY & NEUROLOGY

## 2020-07-29 PROCEDURE — 83036 HEMOGLOBIN GLYCOSYLATED A1C: CPT

## 2020-07-29 PROCEDURE — 90792 PSYCH DIAG EVAL W/MED SRVCS: CPT | Performed by: PSYCHIATRY & NEUROLOGY

## 2020-07-29 RX ORDER — TIZANIDINE 2 MG/1
2 TABLET ORAL EVERY 8 HOURS PRN
Status: DISCONTINUED | OUTPATIENT
Start: 2020-07-29 | End: 2020-08-06 | Stop reason: HOSPADM

## 2020-07-29 RX ORDER — BUPROPION HYDROCHLORIDE 150 MG/1
150 TABLET ORAL DAILY
Status: DISCONTINUED | OUTPATIENT
Start: 2020-07-30 | End: 2020-08-05

## 2020-07-29 RX ORDER — TRAZODONE HYDROCHLORIDE 50 MG/1
50 TABLET ORAL NIGHTLY
Status: DISCONTINUED | OUTPATIENT
Start: 2020-07-29 | End: 2020-07-30

## 2020-07-29 RX ADMIN — TRAZODONE HYDROCHLORIDE 50 MG: 50 TABLET ORAL at 21:06

## 2020-07-29 RX ADMIN — OXYCODONE HYDROCHLORIDE AND ACETAMINOPHEN 1 TABLET: 10; 325 TABLET ORAL at 06:06

## 2020-07-29 RX ADMIN — OXYCODONE HYDROCHLORIDE AND ACETAMINOPHEN 1 TABLET: 10; 325 TABLET ORAL at 17:21

## 2020-07-29 RX ADMIN — PREGABALIN 100 MG: 100 CAPSULE ORAL at 08:59

## 2020-07-29 RX ADMIN — FLUOXETINE 40 MG: 20 CAPSULE ORAL at 08:58

## 2020-07-29 RX ADMIN — PREGABALIN 100 MG: 100 CAPSULE ORAL at 21:06

## 2020-07-29 RX ADMIN — HYDROXYZINE HYDROCHLORIDE 50 MG: 25 TABLET ORAL at 15:16

## 2020-07-29 RX ADMIN — CELECOXIB 200 MG: 200 CAPSULE ORAL at 08:59

## 2020-07-29 RX ADMIN — TIZANIDINE 2 MG: 2 TABLET ORAL at 13:58

## 2020-07-29 RX ADMIN — OXYCODONE HYDROCHLORIDE AND ACETAMINOPHEN 1 TABLET: 10; 325 TABLET ORAL at 11:47

## 2020-07-29 RX ADMIN — FLUOXETINE 20 MG: 20 CAPSULE ORAL at 08:59

## 2020-07-29 ASSESSMENT — PAIN SCALES - GENERAL
PAINLEVEL_OUTOF10: 0
PAINLEVEL_OUTOF10: 0
PAINLEVEL_OUTOF10: 10
PAINLEVEL_OUTOF10: 3
PAINLEVEL_OUTOF10: 5
PAINLEVEL_OUTOF10: 0
PAINLEVEL_OUTOF10: 10
PAINLEVEL_OUTOF10: 0
PAINLEVEL_OUTOF10: 5

## 2020-07-30 PROCEDURE — 10002803 HB RX 637: Performed by: PSYCHIATRY & NEUROLOGY

## 2020-07-30 PROCEDURE — 99232 SBSQ HOSP IP/OBS MODERATE 35: CPT | Performed by: PSYCHIATRY & NEUROLOGY

## 2020-07-30 PROCEDURE — 10002803 HB RX 637: Performed by: INTERNAL MEDICINE

## 2020-07-30 PROCEDURE — 10004577 HB ROOM CHARGE PSYCH

## 2020-07-30 RX ORDER — TRAZODONE HYDROCHLORIDE 50 MG/1
25 TABLET ORAL NIGHTLY PRN
Status: DISCONTINUED | OUTPATIENT
Start: 2020-07-30 | End: 2020-08-04

## 2020-07-30 RX ORDER — TRAZODONE HYDROCHLORIDE 50 MG/1
25 TABLET ORAL NIGHTLY
Status: DISCONTINUED | OUTPATIENT
Start: 2020-07-30 | End: 2020-07-31

## 2020-07-30 RX ADMIN — TIZANIDINE 2 MG: 2 TABLET ORAL at 17:22

## 2020-07-30 RX ADMIN — TIZANIDINE 2 MG: 2 TABLET ORAL at 10:40

## 2020-07-30 RX ADMIN — FLUOXETINE 40 MG: 20 CAPSULE ORAL at 08:51

## 2020-07-30 RX ADMIN — HYDROXYZINE HYDROCHLORIDE 50 MG: 25 TABLET ORAL at 17:22

## 2020-07-30 RX ADMIN — OXYCODONE HYDROCHLORIDE AND ACETAMINOPHEN 1 TABLET: 10; 325 TABLET ORAL at 18:30

## 2020-07-30 RX ADMIN — PREGABALIN 100 MG: 100 CAPSULE ORAL at 08:51

## 2020-07-30 RX ADMIN — PREGABALIN 100 MG: 100 CAPSULE ORAL at 20:24

## 2020-07-30 RX ADMIN — TRAZODONE HYDROCHLORIDE 25 MG: 50 TABLET ORAL at 20:25

## 2020-07-30 RX ADMIN — LORAZEPAM 1 MG: 1 TABLET ORAL at 10:40

## 2020-07-30 RX ADMIN — OXYCODONE HYDROCHLORIDE AND ACETAMINOPHEN 1 TABLET: 10; 325 TABLET ORAL at 06:32

## 2020-07-30 RX ADMIN — BUPROPION HYDROCHLORIDE 150 MG: 150 TABLET, FILM COATED, EXTENDED RELEASE ORAL at 08:51

## 2020-07-30 RX ADMIN — FLUOXETINE 20 MG: 20 CAPSULE ORAL at 08:51

## 2020-07-30 RX ADMIN — CELECOXIB 200 MG: 200 CAPSULE ORAL at 08:51

## 2020-07-30 RX ADMIN — OXYCODONE HYDROCHLORIDE AND ACETAMINOPHEN 1 TABLET: 10; 325 TABLET ORAL at 12:49

## 2020-07-30 ASSESSMENT — PAIN SCALES - GENERAL
PAINLEVEL_OUTOF10: 9
PAINLEVEL_OUTOF10: 9
PAINLEVEL_OUTOF10: 8
PAINLEVEL_OUTOF10: 5
PAINLEVEL_OUTOF10: 6
PAINLEVEL_OUTOF10: 10

## 2020-07-31 PROCEDURE — 10004577 HB ROOM CHARGE PSYCH

## 2020-07-31 PROCEDURE — 99232 SBSQ HOSP IP/OBS MODERATE 35: CPT | Performed by: PSYCHIATRY & NEUROLOGY

## 2020-07-31 PROCEDURE — 10002803 HB RX 637: Performed by: INTERNAL MEDICINE

## 2020-07-31 PROCEDURE — 10002803 HB RX 637: Performed by: PSYCHIATRY & NEUROLOGY

## 2020-07-31 RX ORDER — TRAZODONE HYDROCHLORIDE 50 MG/1
50 TABLET ORAL NIGHTLY
Status: DISCONTINUED | OUTPATIENT
Start: 2020-07-31 | End: 2020-08-01

## 2020-07-31 RX ORDER — FLUOXETINE HYDROCHLORIDE 20 MG/1
80 CAPSULE ORAL DAILY
Status: DISCONTINUED | OUTPATIENT
Start: 2020-08-01 | End: 2020-08-06 | Stop reason: HOSPADM

## 2020-07-31 RX ADMIN — FLUOXETINE 40 MG: 20 CAPSULE ORAL at 07:58

## 2020-07-31 RX ADMIN — OXYCODONE HYDROCHLORIDE AND ACETAMINOPHEN 1 TABLET: 10; 325 TABLET ORAL at 14:54

## 2020-07-31 RX ADMIN — FLUOXETINE 20 MG: 20 CAPSULE ORAL at 08:00

## 2020-07-31 RX ADMIN — PREGABALIN 100 MG: 100 CAPSULE ORAL at 07:59

## 2020-07-31 RX ADMIN — ALUMINUM HYDROXIDE, MAGNESIUM HYDROXIDE, SIMETHICONE 20 ML: 400; 400; 40 SUSPENSION ORAL at 15:18

## 2020-07-31 RX ADMIN — HYDROXYZINE HYDROCHLORIDE 50 MG: 25 TABLET ORAL at 11:16

## 2020-07-31 RX ADMIN — OXYCODONE HYDROCHLORIDE AND ACETAMINOPHEN 1 TABLET: 10; 325 TABLET ORAL at 09:03

## 2020-07-31 RX ADMIN — PREGABALIN 100 MG: 100 CAPSULE ORAL at 20:25

## 2020-07-31 RX ADMIN — ALUMINUM HYDROXIDE, MAGNESIUM HYDROXIDE, SIMETHICONE 20 ML: 400; 400; 40 SUSPENSION ORAL at 09:34

## 2020-07-31 RX ADMIN — OXYCODONE HYDROCHLORIDE AND ACETAMINOPHEN 1 TABLET: 10; 325 TABLET ORAL at 02:45

## 2020-07-31 RX ADMIN — BUPROPION HYDROCHLORIDE 150 MG: 150 TABLET, FILM COATED, EXTENDED RELEASE ORAL at 08:02

## 2020-07-31 RX ADMIN — TIZANIDINE 2 MG: 2 TABLET ORAL at 13:26

## 2020-07-31 RX ADMIN — CELECOXIB 200 MG: 200 CAPSULE ORAL at 07:58

## 2020-07-31 RX ADMIN — TIZANIDINE 2 MG: 2 TABLET ORAL at 21:57

## 2020-07-31 RX ADMIN — TRAZODONE HYDROCHLORIDE 50 MG: 50 TABLET ORAL at 20:25

## 2020-07-31 ASSESSMENT — PAIN SCALES - GENERAL
PAINLEVEL_OUTOF10: 0
PAINLEVEL_OUTOF10: 0
PAINLEVEL_OUTOF10: 3
PAINLEVEL_OUTOF10: 10
PAINLEVEL_OUTOF10: 6
PAINLEVEL_OUTOF10: 0
PAINLEVEL_OUTOF10: 3
PAINLEVEL_OUTOF10: 7
PAINLEVEL_OUTOF10: 8
PAINLEVEL_OUTOF10: 7

## 2020-08-01 PROCEDURE — 10004577 HB ROOM CHARGE PSYCH

## 2020-08-01 PROCEDURE — 10002803 HB RX 637: Performed by: PSYCHIATRY & NEUROLOGY

## 2020-08-01 PROCEDURE — 10002803 HB RX 637: Performed by: INTERNAL MEDICINE

## 2020-08-01 PROCEDURE — 99232 SBSQ HOSP IP/OBS MODERATE 35: CPT | Performed by: PSYCHIATRY & NEUROLOGY

## 2020-08-01 RX ORDER — QUETIAPINE FUMARATE 25 MG/1
50 TABLET, FILM COATED ORAL NIGHTLY
Status: DISCONTINUED | OUTPATIENT
Start: 2020-08-01 | End: 2020-08-03

## 2020-08-01 RX ADMIN — QUETIAPINE 50 MG: 25 TABLET, FILM COATED ORAL at 20:19

## 2020-08-01 RX ADMIN — OXYCODONE HYDROCHLORIDE AND ACETAMINOPHEN 1 TABLET: 10; 325 TABLET ORAL at 05:49

## 2020-08-01 RX ADMIN — PREGABALIN 100 MG: 100 CAPSULE ORAL at 08:21

## 2020-08-01 RX ADMIN — PREGABALIN 100 MG: 100 CAPSULE ORAL at 20:19

## 2020-08-01 RX ADMIN — CELECOXIB 200 MG: 200 CAPSULE ORAL at 08:18

## 2020-08-01 RX ADMIN — BUPROPION HYDROCHLORIDE 150 MG: 150 TABLET, FILM COATED, EXTENDED RELEASE ORAL at 08:18

## 2020-08-01 RX ADMIN — ALUMINUM HYDROXIDE, MAGNESIUM HYDROXIDE, SIMETHICONE 20 ML: 400; 400; 40 SUSPENSION ORAL at 15:12

## 2020-08-01 RX ADMIN — BENZTROPINE MESYLATE 1 MG: 1 TABLET ORAL at 15:12

## 2020-08-01 RX ADMIN — OXYCODONE HYDROCHLORIDE AND ACETAMINOPHEN 1 TABLET: 10; 325 TABLET ORAL at 11:22

## 2020-08-01 RX ADMIN — TIZANIDINE 2 MG: 2 TABLET ORAL at 10:38

## 2020-08-01 RX ADMIN — OXYCODONE HYDROCHLORIDE AND ACETAMINOPHEN 1 TABLET: 10; 325 TABLET ORAL at 18:35

## 2020-08-01 RX ADMIN — FLUOXETINE 80 MG: 20 CAPSULE ORAL at 08:18

## 2020-08-01 RX ADMIN — TIZANIDINE 2 MG: 2 TABLET ORAL at 17:31

## 2020-08-01 ASSESSMENT — PAIN SCALES - GENERAL
PAINLEVEL_OUTOF10: 0
PAINLEVEL_OUTOF10: 10
PAINLEVEL_OUTOF10: 3
PAINLEVEL_OUTOF10: 10

## 2020-08-01 ASSESSMENT — PAIN DESCRIPTION - PAIN TYPE
TYPE: CHRONIC PAIN
TYPE: ACUTE PAIN

## 2020-08-02 PROCEDURE — 10002803 HB RX 637: Performed by: INTERNAL MEDICINE

## 2020-08-02 PROCEDURE — 99232 SBSQ HOSP IP/OBS MODERATE 35: CPT | Performed by: PSYCHIATRY & NEUROLOGY

## 2020-08-02 PROCEDURE — 10004577 HB ROOM CHARGE PSYCH

## 2020-08-02 PROCEDURE — 10002803 HB RX 637: Performed by: PSYCHIATRY & NEUROLOGY

## 2020-08-02 RX ADMIN — OXYCODONE HYDROCHLORIDE AND ACETAMINOPHEN 1 TABLET: 10; 325 TABLET ORAL at 15:32

## 2020-08-02 RX ADMIN — BUPROPION HYDROCHLORIDE 150 MG: 150 TABLET, FILM COATED, EXTENDED RELEASE ORAL at 08:58

## 2020-08-02 RX ADMIN — QUETIAPINE 50 MG: 25 TABLET, FILM COATED ORAL at 20:51

## 2020-08-02 RX ADMIN — CELECOXIB 200 MG: 200 CAPSULE ORAL at 08:58

## 2020-08-02 RX ADMIN — PREGABALIN 100 MG: 100 CAPSULE ORAL at 08:58

## 2020-08-02 RX ADMIN — FLUOXETINE 80 MG: 20 CAPSULE ORAL at 08:58

## 2020-08-02 RX ADMIN — PREGABALIN 100 MG: 100 CAPSULE ORAL at 20:51

## 2020-08-02 RX ADMIN — HYDROXYZINE HYDROCHLORIDE 50 MG: 25 TABLET ORAL at 11:26

## 2020-08-02 RX ADMIN — TIZANIDINE 2 MG: 2 TABLET ORAL at 13:33

## 2020-08-02 RX ADMIN — OXYCODONE HYDROCHLORIDE AND ACETAMINOPHEN 1 TABLET: 10; 325 TABLET ORAL at 07:20

## 2020-08-02 RX ADMIN — OXYCODONE HYDROCHLORIDE AND ACETAMINOPHEN 1 TABLET: 10; 325 TABLET ORAL at 23:37

## 2020-08-02 RX ADMIN — TIZANIDINE 2 MG: 2 TABLET ORAL at 22:32

## 2020-08-02 ASSESSMENT — PAIN SCALES - GENERAL
PAINLEVEL_OUTOF10: 7
PAINLEVEL_OUTOF10: 10
PAINLEVEL_OUTOF10: 3
PAINLEVEL_OUTOF10: 6
PAINLEVEL_OUTOF10: 10
PAINLEVEL_OUTOF10: 5
PAINLEVEL_OUTOF10: 10
PAINLEVEL_OUTOF10: 7

## 2020-08-03 PROCEDURE — 10004577 HB ROOM CHARGE PSYCH

## 2020-08-03 PROCEDURE — 10002803 HB RX 637: Performed by: INTERNAL MEDICINE

## 2020-08-03 PROCEDURE — 99232 SBSQ HOSP IP/OBS MODERATE 35: CPT | Performed by: PSYCHIATRY & NEUROLOGY

## 2020-08-03 PROCEDURE — 10002803 HB RX 637: Performed by: PSYCHIATRY & NEUROLOGY

## 2020-08-03 RX ORDER — QUETIAPINE FUMARATE 100 MG/1
100 TABLET, FILM COATED ORAL NIGHTLY
Status: DISCONTINUED | OUTPATIENT
Start: 2020-08-03 | End: 2020-08-06 | Stop reason: HOSPADM

## 2020-08-03 RX ORDER — HYDROXYZINE HYDROCHLORIDE 25 MG/1
50 TABLET, FILM COATED ORAL 3 TIMES DAILY
Status: DISCONTINUED | OUTPATIENT
Start: 2020-08-03 | End: 2020-08-06 | Stop reason: HOSPADM

## 2020-08-03 RX ADMIN — PREGABALIN 100 MG: 100 CAPSULE ORAL at 09:19

## 2020-08-03 RX ADMIN — FLUOXETINE 80 MG: 20 CAPSULE ORAL at 09:19

## 2020-08-03 RX ADMIN — TIZANIDINE 2 MG: 2 TABLET ORAL at 14:28

## 2020-08-03 RX ADMIN — OXYCODONE HYDROCHLORIDE AND ACETAMINOPHEN 1 TABLET: 10; 325 TABLET ORAL at 07:23

## 2020-08-03 RX ADMIN — OXYCODONE HYDROCHLORIDE AND ACETAMINOPHEN 1 TABLET: 10; 325 TABLET ORAL at 15:25

## 2020-08-03 RX ADMIN — BUPROPION HYDROCHLORIDE 150 MG: 150 TABLET, FILM COATED, EXTENDED RELEASE ORAL at 09:19

## 2020-08-03 RX ADMIN — PREGABALIN 100 MG: 100 CAPSULE ORAL at 21:45

## 2020-08-03 RX ADMIN — HYDROXYZINE HYDROCHLORIDE 50 MG: 25 TABLET ORAL at 15:24

## 2020-08-03 RX ADMIN — HYDROXYZINE HYDROCHLORIDE 50 MG: 25 TABLET ORAL at 21:45

## 2020-08-03 RX ADMIN — ALUMINUM HYDROXIDE, MAGNESIUM HYDROXIDE, SIMETHICONE 20 ML: 400; 400; 40 SUSPENSION ORAL at 18:52

## 2020-08-03 RX ADMIN — HYDROXYZINE HYDROCHLORIDE 50 MG: 25 TABLET ORAL at 10:14

## 2020-08-03 RX ADMIN — QUETIAPINE 100 MG: 100 TABLET, FILM COATED ORAL at 21:45

## 2020-08-03 RX ADMIN — CELECOXIB 200 MG: 200 CAPSULE ORAL at 09:19

## 2020-08-03 ASSESSMENT — PAIN SCALES - GENERAL
PAINLEVEL_OUTOF10: 8
PAINLEVEL_OUTOF10: 10
PAINLEVEL_OUTOF10: 0
PAINLEVEL_OUTOF10: 0
PAINLEVEL_OUTOF10: 8
PAINLEVEL_OUTOF10: 10
PAINLEVEL_OUTOF10: 8

## 2020-08-04 PROCEDURE — 10004577 HB ROOM CHARGE PSYCH

## 2020-08-04 PROCEDURE — 99232 SBSQ HOSP IP/OBS MODERATE 35: CPT | Performed by: PSYCHIATRY & NEUROLOGY

## 2020-08-04 PROCEDURE — 10002803 HB RX 637: Performed by: INTERNAL MEDICINE

## 2020-08-04 PROCEDURE — 10002803 HB RX 637: Performed by: PSYCHIATRY & NEUROLOGY

## 2020-08-04 RX ORDER — TRAZODONE HYDROCHLORIDE 50 MG/1
25 TABLET ORAL NIGHTLY
Status: DISCONTINUED | OUTPATIENT
Start: 2020-08-04 | End: 2020-08-06 | Stop reason: HOSPADM

## 2020-08-04 RX ADMIN — OXYCODONE HYDROCHLORIDE AND ACETAMINOPHEN 1 TABLET: 10; 325 TABLET ORAL at 15:00

## 2020-08-04 RX ADMIN — PREGABALIN 100 MG: 100 CAPSULE ORAL at 20:49

## 2020-08-04 RX ADMIN — FLUOXETINE 80 MG: 20 CAPSULE ORAL at 09:24

## 2020-08-04 RX ADMIN — OXYCODONE HYDROCHLORIDE AND ACETAMINOPHEN 1 TABLET: 10; 325 TABLET ORAL at 07:27

## 2020-08-04 RX ADMIN — TIZANIDINE 2 MG: 2 TABLET ORAL at 10:45

## 2020-08-04 RX ADMIN — TRAZODONE HYDROCHLORIDE 25 MG: 50 TABLET ORAL at 00:17

## 2020-08-04 RX ADMIN — QUETIAPINE 100 MG: 100 TABLET, FILM COATED ORAL at 20:48

## 2020-08-04 RX ADMIN — HYDROXYZINE HYDROCHLORIDE 50 MG: 25 TABLET ORAL at 13:21

## 2020-08-04 RX ADMIN — BUPROPION HYDROCHLORIDE 150 MG: 150 TABLET, FILM COATED, EXTENDED RELEASE ORAL at 09:24

## 2020-08-04 RX ADMIN — HYDROXYZINE HYDROCHLORIDE 50 MG: 25 TABLET ORAL at 09:24

## 2020-08-04 RX ADMIN — HYDROXYZINE HYDROCHLORIDE 50 MG: 25 TABLET ORAL at 20:48

## 2020-08-04 RX ADMIN — TRAZODONE HYDROCHLORIDE 25 MG: 50 TABLET ORAL at 20:48

## 2020-08-04 RX ADMIN — CELECOXIB 200 MG: 200 CAPSULE ORAL at 09:24

## 2020-08-04 RX ADMIN — PREGABALIN 100 MG: 100 CAPSULE ORAL at 09:24

## 2020-08-04 RX ADMIN — ALUMINUM HYDROXIDE, MAGNESIUM HYDROXIDE, SIMETHICONE 20 ML: 400; 400; 40 SUSPENSION ORAL at 16:25

## 2020-08-04 ASSESSMENT — PAIN SCALES - GENERAL
PAINLEVEL_OUTOF10: 0
PAINLEVEL_OUTOF10: 7
PAINLEVEL_OUTOF10: 10
PAINLEVEL_OUTOF10: 9

## 2020-08-05 PROCEDURE — 99232 SBSQ HOSP IP/OBS MODERATE 35: CPT | Performed by: PSYCHIATRY & NEUROLOGY

## 2020-08-05 PROCEDURE — 10004577 HB ROOM CHARGE PSYCH

## 2020-08-05 PROCEDURE — 10002803 HB RX 637: Performed by: PSYCHIATRY & NEUROLOGY

## 2020-08-05 PROCEDURE — 10002803 HB RX 637: Performed by: INTERNAL MEDICINE

## 2020-08-05 RX ADMIN — QUETIAPINE 100 MG: 100 TABLET, FILM COATED ORAL at 21:21

## 2020-08-05 RX ADMIN — FLUOXETINE 80 MG: 20 CAPSULE ORAL at 10:01

## 2020-08-05 RX ADMIN — CELECOXIB 200 MG: 200 CAPSULE ORAL at 10:01

## 2020-08-05 RX ADMIN — OXYCODONE HYDROCHLORIDE AND ACETAMINOPHEN 1 TABLET: 10; 325 TABLET ORAL at 16:26

## 2020-08-05 RX ADMIN — PREGABALIN 100 MG: 100 CAPSULE ORAL at 21:21

## 2020-08-05 RX ADMIN — HYDROXYZINE HYDROCHLORIDE 50 MG: 25 TABLET ORAL at 21:21

## 2020-08-05 RX ADMIN — PREGABALIN 100 MG: 100 CAPSULE ORAL at 10:01

## 2020-08-05 RX ADMIN — OXYCODONE HYDROCHLORIDE AND ACETAMINOPHEN 1 TABLET: 10; 325 TABLET ORAL at 05:47

## 2020-08-05 RX ADMIN — TRAZODONE HYDROCHLORIDE 25 MG: 50 TABLET ORAL at 21:21

## 2020-08-05 RX ADMIN — LORAZEPAM 1 MG: 1 TABLET ORAL at 11:31

## 2020-08-05 RX ADMIN — BUPROPION HYDROCHLORIDE 150 MG: 150 TABLET, FILM COATED, EXTENDED RELEASE ORAL at 10:01

## 2020-08-05 RX ADMIN — HYDROXYZINE HYDROCHLORIDE 50 MG: 25 TABLET ORAL at 10:05

## 2020-08-05 RX ADMIN — HYDROXYZINE HYDROCHLORIDE 50 MG: 25 TABLET ORAL at 16:26

## 2020-08-05 ASSESSMENT — PAIN SCALES - GENERAL
PAINLEVEL_OUTOF10: 0
PAINLEVEL_OUTOF10: 10
PAINLEVEL_OUTOF10: 0
PAINLEVEL_OUTOF10: 0
PAINLEVEL_OUTOF10: 10

## 2020-08-06 VITALS
HEIGHT: 60 IN | WEIGHT: 123.24 LBS | TEMPERATURE: 98.2 F | OXYGEN SATURATION: 92 % | RESPIRATION RATE: 16 BRPM | BODY MASS INDEX: 24.19 KG/M2 | SYSTOLIC BLOOD PRESSURE: 103 MMHG | DIASTOLIC BLOOD PRESSURE: 64 MMHG | HEART RATE: 77 BPM

## 2020-08-06 PROCEDURE — 99239 HOSP IP/OBS DSCHRG MGMT >30: CPT | Performed by: PSYCHIATRY & NEUROLOGY

## 2020-08-06 PROCEDURE — 10002803 HB RX 637: Performed by: PSYCHIATRY & NEUROLOGY

## 2020-08-06 PROCEDURE — 10002803 HB RX 637: Performed by: INTERNAL MEDICINE

## 2020-08-06 RX ORDER — TRAZODONE HYDROCHLORIDE 50 MG/1
25 TABLET ORAL NIGHTLY
Qty: 15 TABLET | Refills: 0 | Status: SHIPPED | OUTPATIENT
Start: 2020-08-06 | End: 2020-10-14

## 2020-08-06 RX ORDER — FLUOXETINE HYDROCHLORIDE 40 MG/1
80 CAPSULE ORAL DAILY
Qty: 60 CAPSULE | Refills: 0 | Status: SHIPPED | OUTPATIENT
Start: 2020-08-07 | End: 2020-10-14 | Stop reason: SDUPTHER

## 2020-08-06 RX ORDER — QUETIAPINE FUMARATE 100 MG/1
100 TABLET, FILM COATED ORAL NIGHTLY
Qty: 30 TABLET | Refills: 0 | Status: SHIPPED | OUTPATIENT
Start: 2020-08-06 | End: 2020-10-14

## 2020-08-06 RX ORDER — HYDROXYZINE 50 MG/1
50 TABLET, FILM COATED ORAL 3 TIMES DAILY
Qty: 90 TABLET | Refills: 0 | Status: SHIPPED | OUTPATIENT
Start: 2020-08-06 | End: 2020-09-28 | Stop reason: SDUPTHER

## 2020-08-06 RX ADMIN — LORAZEPAM 1 MG: 1 TABLET ORAL at 11:47

## 2020-08-06 RX ADMIN — HYDROXYZINE HYDROCHLORIDE 50 MG: 25 TABLET ORAL at 13:09

## 2020-08-06 RX ADMIN — TIZANIDINE 2 MG: 2 TABLET ORAL at 13:08

## 2020-08-06 RX ADMIN — OXYCODONE HYDROCHLORIDE AND ACETAMINOPHEN 1 TABLET: 10; 325 TABLET ORAL at 15:55

## 2020-08-06 RX ADMIN — OXYCODONE HYDROCHLORIDE AND ACETAMINOPHEN 1 TABLET: 10; 325 TABLET ORAL at 06:15

## 2020-08-06 RX ADMIN — FLUOXETINE 80 MG: 20 CAPSULE ORAL at 09:51

## 2020-08-06 RX ADMIN — CELECOXIB 200 MG: 200 CAPSULE ORAL at 09:50

## 2020-08-06 RX ADMIN — HYDROXYZINE HYDROCHLORIDE 50 MG: 25 TABLET ORAL at 09:51

## 2020-08-06 RX ADMIN — PREGABALIN 100 MG: 100 CAPSULE ORAL at 09:51

## 2020-08-06 ASSESSMENT — PAIN SCALES - GENERAL
PAINLEVEL_OUTOF10: 4
PAINLEVEL_OUTOF10: 10
PAINLEVEL_OUTOF10: 0
PAINLEVEL_OUTOF10: 7
PAINLEVEL_OUTOF10: 0
PAINLEVEL_OUTOF10: 9

## 2020-08-07 ENCOUNTER — HOSPITAL ENCOUNTER (OUTPATIENT)
Dept: BEHAVIORAL HEALTH | Age: 70
Discharge: STILL ADMITTED OR ADMINISTRATIVE | End: 2020-08-07
Attending: PSYCHIATRY & NEUROLOGY

## 2020-08-10 ENCOUNTER — HOSPITAL ENCOUNTER (OUTPATIENT)
Dept: BEHAVIORAL HEALTH | Age: 70
Discharge: STILL ADMITTED OR ADMINISTRATIVE | End: 2020-08-10
Attending: PSYCHIATRY & NEUROLOGY

## 2020-08-10 PROCEDURE — 90853 GROUP PSYCHOTHERAPY: CPT

## 2020-08-10 PROCEDURE — G0177 OPPS/PHP; TRAIN & EDUC SERV: HCPCS

## 2020-08-10 PROCEDURE — G0410 GRP PSYCH PARTIAL HOSP 45-50: HCPCS

## 2020-08-10 ASSESSMENT — PATIENT HEALTH QUESTIONNAIRE - PHQ9
CLINICAL INTERPRETATION OF PHQ9 SCORE: NO FURTHER SCREENING NEEDED
7. TROUBLE CONCENTRATING ON THINGS, SUCH AS READING THE NEWSPAPER OR WATCHING TELEVISION: NOT AT ALL
9. THOUGHTS THAT YOU WOULD BE BETTER OFF DEAD, OR OF HURTING YOURSELF: NOT AT ALL
SUM OF ALL RESPONSES TO PHQ9 QUESTIONS 1 AND 2: 0
SUM OF ALL RESPONSES TO PHQ9 QUESTIONS 1 TO 9: 6
2. FEELING DOWN, DEPRESSED OR HOPELESS: NOT AT ALL
8. MOVING OR SPEAKING SO SLOWLY THAT OTHER PEOPLE COULD HAVE NOTICED. OR THE OPPOSITE, BEING SO FIGETY OR RESTLESS THAT YOU HAVE BEEN MOVING AROUND A LOT MORE THAN USUAL: SEVERAL DAYS
5. POOR APPETITE OR OVEREATING: NOT AT ALL
4. FEELING TIRED OR HAVING LITTLE ENERGY: NEARLY EVERY DAY
SUM OF ALL RESPONSES TO PHQ9 QUESTIONS 1 AND 2: 0
CLINICAL INTERPRETATION OF PHQ2 SCORE: NO FURTHER SCREENING NEEDED
1. LITTLE INTEREST OR PLEASURE IN DOING THINGS: NOT AT ALL
6. FEELING BAD ABOUT YOURSELF - OR THAT YOU ARE A FAILURE OR HAVE LET YOURSELF OR YOUR FAMILY DOWN: MORE THAN HALF THE DAYS
3. TROUBLE FALLING OR STAYING ASLEEP OR SLEEPING TOO MUCH: NOT AT ALL
SUM OF ALL RESPONSES TO PHQ QUESTIONS 1-9: 6
CLINICAL INTERPRETATION OF PHQ9 SCORE: MILD DEPRESSION
CLINICAL INTERPRETATION OF PHQ2 SCORE: MILD DEPRESSION

## 2020-08-11 ENCOUNTER — HOSPITAL ENCOUNTER (OUTPATIENT)
Dept: BEHAVIORAL HEALTH | Age: 70
Discharge: STILL ADMITTED OR ADMINISTRATIVE | End: 2020-08-11
Attending: PSYCHIATRY & NEUROLOGY

## 2020-08-11 PROCEDURE — 90792 PSYCH DIAG EVAL W/MED SRVCS: CPT | Performed by: PSYCHIATRY & NEUROLOGY

## 2020-08-11 PROCEDURE — 90853 GROUP PSYCHOTHERAPY: CPT

## 2020-08-11 PROCEDURE — G0177 OPPS/PHP; TRAIN & EDUC SERV: HCPCS

## 2020-08-11 PROCEDURE — G0410 GRP PSYCH PARTIAL HOSP 45-50: HCPCS

## 2020-08-12 ENCOUNTER — APPOINTMENT (OUTPATIENT)
Dept: BEHAVIORAL HEALTH | Age: 70
End: 2020-08-12
Attending: PSYCHIATRY & NEUROLOGY

## 2020-08-13 ENCOUNTER — HOSPITAL ENCOUNTER (OUTPATIENT)
Dept: BEHAVIORAL HEALTH | Age: 70
Discharge: STILL ADMITTED OR ADMINISTRATIVE | End: 2020-08-13
Attending: PSYCHIATRY & NEUROLOGY

## 2020-08-13 PROCEDURE — 90853 GROUP PSYCHOTHERAPY: CPT

## 2020-08-13 PROCEDURE — G0177 OPPS/PHP; TRAIN & EDUC SERV: HCPCS

## 2020-08-13 PROCEDURE — G0410 GRP PSYCH PARTIAL HOSP 45-50: HCPCS

## 2020-08-13 PROCEDURE — G0176 OPPS/PHP;ACTIVITY THERAPY: HCPCS

## 2020-08-14 ENCOUNTER — HOSPITAL ENCOUNTER (OUTPATIENT)
Dept: BEHAVIORAL HEALTH | Age: 70
Discharge: STILL ADMITTED OR ADMINISTRATIVE | End: 2020-08-14
Attending: PSYCHIATRY & NEUROLOGY

## 2020-08-14 PROCEDURE — G0176 OPPS/PHP;ACTIVITY THERAPY: HCPCS

## 2020-08-14 PROCEDURE — G0410 GRP PSYCH PARTIAL HOSP 45-50: HCPCS

## 2020-08-14 PROCEDURE — G0177 OPPS/PHP; TRAIN & EDUC SERV: HCPCS

## 2020-08-14 PROCEDURE — 99231 SBSQ HOSP IP/OBS SF/LOW 25: CPT | Performed by: PSYCHIATRY & NEUROLOGY

## 2020-08-14 PROCEDURE — 90853 GROUP PSYCHOTHERAPY: CPT

## 2020-08-17 ENCOUNTER — HOSPITAL ENCOUNTER (OUTPATIENT)
Dept: BEHAVIORAL HEALTH | Age: 70
Discharge: STILL ADMITTED OR ADMINISTRATIVE | End: 2020-08-17
Attending: PSYCHIATRY & NEUROLOGY

## 2020-08-17 PROCEDURE — G0410 GRP PSYCH PARTIAL HOSP 45-50: HCPCS

## 2020-08-17 PROCEDURE — G0177 OPPS/PHP; TRAIN & EDUC SERV: HCPCS

## 2020-08-17 PROCEDURE — 90853 GROUP PSYCHOTHERAPY: CPT

## 2020-08-18 ENCOUNTER — HOSPITAL ENCOUNTER (OUTPATIENT)
Dept: BEHAVIORAL HEALTH | Age: 70
Discharge: STILL ADMITTED OR ADMINISTRATIVE | End: 2020-08-18
Attending: PSYCHIATRY & NEUROLOGY

## 2020-08-18 PROCEDURE — 90853 GROUP PSYCHOTHERAPY: CPT

## 2020-08-18 PROCEDURE — G0177 OPPS/PHP; TRAIN & EDUC SERV: HCPCS

## 2020-08-18 PROCEDURE — G0410 GRP PSYCH PARTIAL HOSP 45-50: HCPCS

## 2020-08-19 ENCOUNTER — HOSPITAL ENCOUNTER (OUTPATIENT)
Dept: BEHAVIORAL HEALTH | Age: 70
Discharge: STILL ADMITTED OR ADMINISTRATIVE | End: 2020-08-19
Attending: PSYCHIATRY & NEUROLOGY

## 2020-08-19 PROCEDURE — G0410 GRP PSYCH PARTIAL HOSP 45-50: HCPCS

## 2020-08-19 PROCEDURE — G0177 OPPS/PHP; TRAIN & EDUC SERV: HCPCS

## 2020-08-19 PROCEDURE — 90853 GROUP PSYCHOTHERAPY: CPT

## 2020-08-20 ENCOUNTER — HOSPITAL ENCOUNTER (OUTPATIENT)
Dept: BEHAVIORAL HEALTH | Age: 70
Discharge: STILL ADMITTED OR ADMINISTRATIVE | End: 2020-08-20
Attending: PSYCHIATRY & NEUROLOGY

## 2020-08-20 PROCEDURE — G0177 OPPS/PHP; TRAIN & EDUC SERV: HCPCS

## 2020-08-20 PROCEDURE — G0410 GRP PSYCH PARTIAL HOSP 45-50: HCPCS

## 2020-08-20 PROCEDURE — 90853 GROUP PSYCHOTHERAPY: CPT

## 2020-08-20 PROCEDURE — G0176 OPPS/PHP;ACTIVITY THERAPY: HCPCS

## 2020-08-21 ENCOUNTER — HOSPITAL ENCOUNTER (OUTPATIENT)
Dept: BEHAVIORAL HEALTH | Age: 70
Discharge: STILL ADMITTED OR ADMINISTRATIVE | End: 2020-08-21
Attending: PSYCHIATRY & NEUROLOGY

## 2020-08-21 PROCEDURE — 99232 SBSQ HOSP IP/OBS MODERATE 35: CPT | Performed by: PSYCHIATRY & NEUROLOGY

## 2020-08-21 PROCEDURE — G0177 OPPS/PHP; TRAIN & EDUC SERV: HCPCS

## 2020-08-21 PROCEDURE — G0410 GRP PSYCH PARTIAL HOSP 45-50: HCPCS

## 2020-08-21 PROCEDURE — 90853 GROUP PSYCHOTHERAPY: CPT

## 2020-08-21 PROCEDURE — G0176 OPPS/PHP;ACTIVITY THERAPY: HCPCS

## 2020-08-23 ENCOUNTER — TELEPHONE (OUTPATIENT)
Dept: SCHEDULING | Age: 70
End: 2020-08-23

## 2020-08-24 ENCOUNTER — HOSPITAL ENCOUNTER (OUTPATIENT)
Dept: BEHAVIORAL HEALTH | Age: 70
Discharge: STILL ADMITTED OR ADMINISTRATIVE | End: 2020-08-24
Attending: PSYCHIATRY & NEUROLOGY

## 2020-08-24 PROCEDURE — G0177 OPPS/PHP; TRAIN & EDUC SERV: HCPCS

## 2020-08-24 PROCEDURE — 90853 GROUP PSYCHOTHERAPY: CPT

## 2020-08-24 PROCEDURE — G0410 GRP PSYCH PARTIAL HOSP 45-50: HCPCS

## 2020-08-24 RX ORDER — TRAZODONE HYDROCHLORIDE 50 MG/1
50 TABLET ORAL NIGHTLY
Qty: 30 TABLET | Refills: 0 | Status: SHIPPED | OUTPATIENT
Start: 2020-08-24 | End: 2020-09-28

## 2020-08-24 RX ORDER — OLANZAPINE 5 MG/1
5 TABLET ORAL NIGHTLY
Qty: 30 TABLET | Refills: 0 | Status: SHIPPED | OUTPATIENT
Start: 2020-08-24 | End: 2020-09-28

## 2020-08-25 ENCOUNTER — HOSPITAL ENCOUNTER (OUTPATIENT)
Dept: BEHAVIORAL HEALTH | Age: 70
Discharge: STILL ADMITTED OR ADMINISTRATIVE | End: 2020-08-25

## 2020-08-25 PROCEDURE — 90853 GROUP PSYCHOTHERAPY: CPT

## 2020-08-25 PROCEDURE — G0177 OPPS/PHP; TRAIN & EDUC SERV: HCPCS

## 2020-08-25 PROCEDURE — 99225 SUBSEQUENT OBSERVATION CARE LEVEL II: CPT | Performed by: PSYCHIATRY & NEUROLOGY

## 2020-08-26 ENCOUNTER — APPOINTMENT (OUTPATIENT)
Dept: GENERAL RADIOLOGY | Facility: HOSPITAL | Age: 70
DRG: 193 | End: 2020-08-26
Attending: EMERGENCY MEDICINE
Payer: MEDICARE

## 2020-08-26 ENCOUNTER — HOSPITAL ENCOUNTER (OUTPATIENT)
Age: 70
Discharge: ANOTHER HOSPITAL | DRG: 193 | End: 2020-08-26
Attending: EMERGENCY MEDICINE
Payer: MEDICARE

## 2020-08-26 ENCOUNTER — APPOINTMENT (OUTPATIENT)
Dept: GENERAL RADIOLOGY | Age: 70
DRG: 193 | End: 2020-08-26
Attending: EMERGENCY MEDICINE
Payer: MEDICARE

## 2020-08-26 ENCOUNTER — HOSPITAL ENCOUNTER (INPATIENT)
Facility: HOSPITAL | Age: 70
LOS: 12 days | Discharge: HOME | DRG: 193 | End: 2020-09-07
Attending: EMERGENCY MEDICINE | Admitting: HOSPITALIST
Payer: MEDICARE

## 2020-08-26 VITALS
DIASTOLIC BLOOD PRESSURE: 72 MMHG | OXYGEN SATURATION: 89 % | HEART RATE: 81 BPM | SYSTOLIC BLOOD PRESSURE: 96 MMHG | TEMPERATURE: 99 F | RESPIRATION RATE: 20 BRPM

## 2020-08-26 DIAGNOSIS — R50.9 FEBRILE ILLNESS, ACUTE: ICD-10-CM

## 2020-08-26 DIAGNOSIS — R09.02 HYPOXIA: Primary | ICD-10-CM

## 2020-08-26 LAB
ANION GAP SERPL CALC-SCNC: 6 MMOL/L (ref 0–18)
BASOPHILS # BLD AUTO: 0.05 X10(3) UL (ref 0–0.2)
BASOPHILS NFR BLD AUTO: 0.5 %
BUN BLD-MCNC: 13 MG/DL (ref 7–18)
BUN/CREAT SERPL: 13.3 (ref 10–20)
CALCIUM BLD-MCNC: 8.9 MG/DL (ref 8.5–10.1)
CHLORIDE SERPL-SCNC: 110 MMOL/L (ref 98–112)
CO2 SERPL-SCNC: 26 MMOL/L (ref 21–32)
CREAT BLD-MCNC: 0.98 MG/DL (ref 0.55–1.02)
D DIMER PPP FEU-MCNC: 0.65 UG/ML FEU (ref ?–0.69)
DEPRECATED RDW RBC AUTO: 41 FL (ref 35.1–46.3)
EOSINOPHIL # BLD AUTO: 0.56 X10(3) UL (ref 0–0.7)
EOSINOPHIL NFR BLD AUTO: 5.4 %
ERYTHROCYTE [DISTWIDTH] IN BLOOD BY AUTOMATED COUNT: 12.2 % (ref 11–15)
GLUCOSE BLD-MCNC: 87 MG/DL (ref 70–99)
HCT VFR BLD AUTO: 39.7 % (ref 35–48)
HGB BLD-MCNC: 13.1 G/DL (ref 12–16)
IMM GRANULOCYTES # BLD AUTO: 0.02 X10(3) UL (ref 0–1)
IMM GRANULOCYTES NFR BLD: 0.2 %
LACTATE SERPL-SCNC: 0.5 MMOL/L (ref 0.4–2)
LDH SERPL L TO P-CCNC: 196 U/L
LYMPHOCYTES # BLD AUTO: 1.83 X10(3) UL (ref 1–4)
LYMPHOCYTES NFR BLD AUTO: 17.5 %
MCH RBC QN AUTO: 29.9 PG (ref 26–34)
MCHC RBC AUTO-ENTMCNC: 33 G/DL (ref 31–37)
MCV RBC AUTO: 90.6 FL (ref 80–100)
MONOCYTES # BLD AUTO: 1.4 X10(3) UL (ref 0.1–1)
MONOCYTES NFR BLD AUTO: 13.4 %
NEUTROPHILS # BLD AUTO: 6.58 X10 (3) UL (ref 1.5–7.7)
NEUTROPHILS # BLD AUTO: 6.58 X10(3) UL (ref 1.5–7.7)
NEUTROPHILS NFR BLD AUTO: 63 %
OSMOLALITY SERPL CALC.SUM OF ELEC: 293 MOSM/KG (ref 275–295)
PLATELET # BLD AUTO: 209 10(3)UL (ref 150–450)
POTASSIUM SERPL-SCNC: 4.2 MMOL/L (ref 3.5–5.1)
RBC # BLD AUTO: 4.38 X10(6)UL (ref 3.8–5.3)
SARS-COV-2 RNA RESP QL NAA+PROBE: NOT DETECTED
SODIUM SERPL-SCNC: 142 MMOL/L (ref 136–145)
TROPONIN I SERPL-MCNC: <0.045 NG/ML (ref ?–0.04)
WBC # BLD AUTO: 10.4 X10(3) UL (ref 4–11)

## 2020-08-26 PROCEDURE — 85379 FIBRIN DEGRADATION QUANT: CPT | Performed by: EMERGENCY MEDICINE

## 2020-08-26 PROCEDURE — 83605 ASSAY OF LACTIC ACID: CPT | Performed by: EMERGENCY MEDICINE

## 2020-08-26 PROCEDURE — 85025 COMPLETE CBC W/AUTO DIFF WBC: CPT | Performed by: EMERGENCY MEDICINE

## 2020-08-26 PROCEDURE — 96365 THER/PROPH/DIAG IV INF INIT: CPT

## 2020-08-26 PROCEDURE — 71046 X-RAY EXAM CHEST 2 VIEWS: CPT | Performed by: EMERGENCY MEDICINE

## 2020-08-26 PROCEDURE — 99203 OFFICE O/P NEW LOW 30 MIN: CPT

## 2020-08-26 PROCEDURE — 80048 BASIC METABOLIC PNL TOTAL CA: CPT | Performed by: EMERGENCY MEDICINE

## 2020-08-26 PROCEDURE — 99213 OFFICE O/P EST LOW 20 MIN: CPT

## 2020-08-26 PROCEDURE — 93010 ELECTROCARDIOGRAM REPORT: CPT | Performed by: EMERGENCY MEDICINE

## 2020-08-26 PROCEDURE — 84484 ASSAY OF TROPONIN QUANT: CPT | Performed by: EMERGENCY MEDICINE

## 2020-08-26 PROCEDURE — 93005 ELECTROCARDIOGRAM TRACING: CPT

## 2020-08-26 PROCEDURE — 99285 EMERGENCY DEPT VISIT HI MDM: CPT

## 2020-08-26 PROCEDURE — 83615 LACTATE (LD) (LDH) ENZYME: CPT | Performed by: EMERGENCY MEDICINE

## 2020-08-26 RX ORDER — BISACODYL 10 MG
10 SUPPOSITORY, RECTAL RECTAL
Status: DISCONTINUED | OUTPATIENT
Start: 2020-08-26 | End: 2020-09-07

## 2020-08-26 RX ORDER — CELECOXIB 50 MG/1
100 CAPSULE ORAL DAILY
COMMUNITY

## 2020-08-26 RX ORDER — PREGABALIN 25 MG/1
100 CAPSULE ORAL 2 TIMES DAILY
COMMUNITY

## 2020-08-26 RX ORDER — ZOLPIDEM TARTRATE 5 MG/1
5 TABLET ORAL NIGHTLY PRN
Status: DISCONTINUED | OUTPATIENT
Start: 2020-08-26 | End: 2020-09-07

## 2020-08-26 RX ORDER — FLUOXETINE HYDROCHLORIDE 20 MG/1
80 CAPSULE ORAL DAILY
Status: DISCONTINUED | OUTPATIENT
Start: 2020-08-27 | End: 2020-09-07

## 2020-08-26 RX ORDER — OLANZAPINE 5 MG/1
5 TABLET ORAL NIGHTLY
Status: DISCONTINUED | OUTPATIENT
Start: 2020-08-26 | End: 2020-09-07

## 2020-08-26 RX ORDER — ACETAMINOPHEN 325 MG/1
650 TABLET ORAL EVERY 6 HOURS PRN
Status: DISCONTINUED | OUTPATIENT
Start: 2020-08-26 | End: 2020-09-07

## 2020-08-26 RX ORDER — TRAZODONE HYDROCHLORIDE 50 MG/1
50 TABLET ORAL NIGHTLY
Status: DISCONTINUED | OUTPATIENT
Start: 2020-08-26 | End: 2020-09-07

## 2020-08-26 RX ORDER — ONDANSETRON 2 MG/ML
4 INJECTION INTRAMUSCULAR; INTRAVENOUS EVERY 6 HOURS PRN
Status: DISCONTINUED | OUTPATIENT
Start: 2020-08-26 | End: 2020-09-07

## 2020-08-26 RX ORDER — OXYCODONE AND ACETAMINOPHEN 10; 325 MG/1; MG/1
1 TABLET ORAL 3 TIMES DAILY PRN
Status: DISCONTINUED | OUTPATIENT
Start: 2020-08-26 | End: 2020-08-29

## 2020-08-26 RX ORDER — POLYETHYLENE GLYCOL 3350 17 G/17G
17 POWDER, FOR SOLUTION ORAL DAILY PRN
Status: DISCONTINUED | OUTPATIENT
Start: 2020-08-26 | End: 2020-09-07

## 2020-08-26 RX ORDER — OLANZAPINE 5 MG/1
5 TABLET ORAL NIGHTLY
COMMUNITY

## 2020-08-26 RX ORDER — HYDROXYZINE HYDROCHLORIDE 25 MG/1
50 TABLET, FILM COATED ORAL 3 TIMES DAILY
Status: DISCONTINUED | OUTPATIENT
Start: 2020-08-27 | End: 2020-09-07

## 2020-08-26 RX ORDER — SODIUM CHLORIDE 9 MG/ML
INJECTION, SOLUTION INTRAVENOUS CONTINUOUS
Status: DISCONTINUED | OUTPATIENT
Start: 2020-08-26 | End: 2020-08-27

## 2020-08-26 RX ORDER — METOCLOPRAMIDE HYDROCHLORIDE 5 MG/ML
5 INJECTION INTRAMUSCULAR; INTRAVENOUS EVERY 8 HOURS PRN
Status: DISCONTINUED | OUTPATIENT
Start: 2020-08-26 | End: 2020-09-07

## 2020-08-26 RX ORDER — SODIUM CHLORIDE 0.9 % (FLUSH) 0.9 %
3 SYRINGE (ML) INJECTION AS NEEDED
Status: DISCONTINUED | OUTPATIENT
Start: 2020-08-26 | End: 2020-09-07

## 2020-08-26 RX ORDER — FLUOXETINE HYDROCHLORIDE 20 MG/1
60 CAPSULE ORAL DAILY
Status: DISCONTINUED | OUTPATIENT
Start: 2020-08-27 | End: 2020-08-26

## 2020-08-26 RX ORDER — PREGABALIN 50 MG/1
100 CAPSULE ORAL 2 TIMES DAILY
Status: DISCONTINUED | OUTPATIENT
Start: 2020-08-26 | End: 2020-09-07

## 2020-08-26 RX ORDER — TIZANIDINE 4 MG/1
4 TABLET ORAL 2 TIMES DAILY
COMMUNITY

## 2020-08-26 RX ORDER — ENOXAPARIN SODIUM 100 MG/ML
40 INJECTION SUBCUTANEOUS NIGHTLY
Status: DISCONTINUED | OUTPATIENT
Start: 2020-08-26 | End: 2020-09-07

## 2020-08-26 RX ORDER — CLONAZEPAM 0.5 MG/1
0.5 TABLET ORAL 2 TIMES DAILY PRN
Status: DISCONTINUED | OUTPATIENT
Start: 2020-08-26 | End: 2020-09-07

## 2020-08-26 RX ORDER — HYDROCODONE BITARTRATE AND ACETAMINOPHEN 10; 325 MG/1; MG/1
1 TABLET ORAL EVERY 6 HOURS PRN
Status: DISCONTINUED | OUTPATIENT
Start: 2020-08-26 | End: 2020-08-26

## 2020-08-26 RX ORDER — OXYCODONE AND ACETAMINOPHEN 10; 325 MG/1; MG/1
1 TABLET ORAL 3 TIMES DAILY PRN
COMMUNITY

## 2020-08-26 RX ORDER — TRAZODONE HYDROCHLORIDE 50 MG/1
50 TABLET ORAL NIGHTLY
COMMUNITY

## 2020-08-26 RX ORDER — TIZANIDINE 4 MG/1
4 TABLET ORAL EVERY 12 HOURS PRN
Status: DISCONTINUED | OUTPATIENT
Start: 2020-08-26 | End: 2020-09-07

## 2020-08-26 RX ORDER — HYDROXYZINE 50 MG/1
50 TABLET, FILM COATED ORAL 3 TIMES DAILY
COMMUNITY

## 2020-08-26 RX ORDER — METOCLOPRAMIDE HYDROCHLORIDE 5 MG/ML
10 INJECTION INTRAMUSCULAR; INTRAVENOUS EVERY 8 HOURS PRN
Status: DISCONTINUED | OUTPATIENT
Start: 2020-08-26 | End: 2020-08-26

## 2020-08-26 NOTE — ED PROVIDER NOTES
Patient Seen in: [facility]      History   Patient presents with:  Fever  Body ache and/or chills    Stated Complaint: covid test    HPI    The patient is a 78-year-old female who presents with 2 days of fevers shaking ch Normal appearance. She is well-developed. She is ill-appearing. HENT:      Head: Normocephalic and atraumatic. Nose: Congestion present. Mouth/Throat:      Mouth: Mucous membranes are moist.      Pharynx: Oropharynx is clear.    Eyes:      Conju (cpt=[id number])    Result Date: 8/26/2020  CONCLUSION:  1. No acute findings.     Dictated by (CST): Nataly Parker MD on 8/26/2020 at 2:28 PM     Finalized by (CST): Nataly Parker MD on 8/26/2020 at 2:29 PM            Radiology exams  Viewed

## 2020-08-26 NOTE — H&P
[facility] Hospitalist H&P     CC: Patient presents with:  Fever  Dyspnea SPENCER SOB  Fall       PCP: Jone Moore Kenzie is a 71year old female with PMH sig for anxiety and depression who presented with fevers and SOB.   C CORRECT BUNION,SIMPLE      BILAT.     • DILATION/CURETTAGE,DIAGNOSTIC      FOR \"MOLE PREGNANCY\"   • KNEE TOTAL REPLACEMENT Right 4/10/2017    Performed by Roberth Garduno MD at [address] [place] [address] MAIN OR   • OTHER Right     ORIF RIGHT ANKLE        ALL:    Radiology Contras views.   FINDINGS: CARDIAC/VASC: Normal.  No cardiac silhouette abnormality or cardiomegaly. Unremarkable pulmonary vasculature. MEDIAST/FORTINO: No visible mass or adenopathy. LUNGS/PLEURA: There is some mild interstitial prominence.   No definite pulmonary

## 2020-08-26 NOTE — ED NOTES
Pt resting in bed, respirating well on 2L by NC. No acute distress noted, pt requests a cup of water, given a cup of water per MD allowance. Will continue to monitor.

## 2020-08-26 NOTE — ED PROVIDER NOTES
Patient Seen in: [facility] Emergency Department      History   Patient presents with:  Fever  Dyspnea SPENCER SOB  Fall    Stated Complaint: Flu Sym    HPI  Patient is a 42-year-old female history of depression with recent psych admission presenting Review of Systems    Positive for stated complaint: Flu Sym  Other systems are as noted in HPI. Constitutional and vital signs reviewed. All other systems reviewed and negative except as noted above.     Physical Exam     ED Triage Vitals [08/26 at baseline.    Psychiatric:         Mood and Affect: Mood normal.           ED Course     Labs Reviewed   BASIC METABOLIC PANEL (8) - Abnormal; Notable for the following components:       Result Value    GFR, Non- 59 (*)     All other compo recent psych admission presenting with fever and shortness of breath. Patient arrived in no acute distress. Vital signs with mild tachypnea w/hypoxia. Patient 88% on room air. No tachycardia, afebrile. BP mildly hypotensive.    Patient nontoxic-appear

## 2020-08-26 NOTE — ED INITIAL ASSESSMENT (HPI)
PATIENT C/O CHILLS AND BODY ACHES STARTING YESTERDAY EVENING. T  YESTERDAY EVENING AS WELL. PATIENT DENIES COUGH. DENIES RECENT ILL CONTACTS.   UPON ARRIVAL TO THE ROOM SPO2 89%, PATIENT ENCOURAGED TO BREATHE IN THROUGH HER NOSE AND OUT THROUGH HE

## 2020-08-26 NOTE — ED NOTES
Orders for admission, patient is aware of plan and ready to go upstairs. Any questions, please call ED RN Johnathon Muñoz  at [address]. Pt respirating well on 2L by NC. Pt ambulates with ease.  Pt unable to urinate while in ER, will need urine specimen col

## 2020-08-26 NOTE — ED INITIAL ASSESSMENT (HPI)
Fever, body aches, sob onset yesterday. Denies taking medications for fever today. No fever noted in triage. Sent from [address] [place] [address] in [place] due to low o2 sats. o2 sats 88% on room air. No resp distress, speaking in full sentences.  Also states she fell out of bed

## 2020-08-27 ENCOUNTER — APPOINTMENT (OUTPATIENT)
Dept: CT IMAGING | Facility: HOSPITAL | Age: 70
DRG: 193 | End: 2020-08-27
Attending: INTERNAL MEDICINE
Payer: MEDICARE

## 2020-08-27 ENCOUNTER — HOSPITAL ENCOUNTER (OUTPATIENT)
Dept: BEHAVIORAL HEALTH | Age: 70
Discharge: STILL ADMITTED OR ADMINISTRATIVE | End: 2020-08-27

## 2020-08-27 LAB
ALBUMIN SERPL-MCNC: 2.6 G/DL (ref 3.4–5)
ALBUMIN/GLOB SERPL: 0.8 {RATIO} (ref 1–2)
ALP LIVER SERPL-CCNC: 71 U/L (ref 55–142)
ALT SERPL-CCNC: 28 U/L (ref 13–56)
ANION GAP SERPL CALC-SCNC: 2 MMOL/L (ref 0–18)
AST SERPL-CCNC: 23 U/L (ref 15–37)
BASOPHILS # BLD AUTO: 0.04 X10(3) UL (ref 0–0.2)
BASOPHILS NFR BLD AUTO: 0.5 %
BILIRUB SERPL-MCNC: 0.3 MG/DL (ref 0.1–2)
BILIRUB UR QL: NEGATIVE
BUN BLD-MCNC: 11 MG/DL (ref 7–18)
BUN/CREAT SERPL: 18.6 (ref 10–20)
CALCIUM BLD-MCNC: 8.4 MG/DL (ref 8.5–10.1)
CHLORIDE SERPL-SCNC: 114 MMOL/L (ref 98–112)
CLARITY UR: CLEAR
CO2 SERPL-SCNC: 27 MMOL/L (ref 21–32)
COLOR UR: YELLOW
CREAT BLD-MCNC: 0.59 MG/DL (ref 0.55–1.02)
DEPRECATED RDW RBC AUTO: 42.2 FL (ref 35.1–46.3)
EOSINOPHIL # BLD AUTO: 0.56 X10(3) UL (ref 0–0.7)
EOSINOPHIL NFR BLD AUTO: 6.3 %
ERYTHROCYTE [DISTWIDTH] IN BLOOD BY AUTOMATED COUNT: 12.2 % (ref 11–15)
GLOBULIN PLAS-MCNC: 3.2 G/DL (ref 2.8–4.4)
GLUCOSE BLD-MCNC: 78 MG/DL (ref 70–99)
GLUCOSE UR-MCNC: NEGATIVE MG/DL
HCT VFR BLD AUTO: 37.6 % (ref 35–48)
HGB BLD-MCNC: 12 G/DL (ref 12–16)
HGB UR QL STRIP.AUTO: NEGATIVE
IMM GRANULOCYTES # BLD AUTO: 0.02 X10(3) UL (ref 0–1)
IMM GRANULOCYTES NFR BLD: 0.2 %
KETONES UR-MCNC: 20 MG/DL
LEUKOCYTE ESTERASE UR QL STRIP.AUTO: NEGATIVE
LYMPHOCYTES # BLD AUTO: 1.78 X10(3) UL (ref 1–4)
LYMPHOCYTES NFR BLD AUTO: 20.2 %
M PROTEIN MFR SERPL ELPH: 5.8 G/DL (ref 6.4–8.2)
MCH RBC QN AUTO: 29.9 PG (ref 26–34)
MCHC RBC AUTO-ENTMCNC: 31.9 G/DL (ref 31–37)
MCV RBC AUTO: 93.5 FL (ref 80–100)
MONOCYTES # BLD AUTO: 1.36 X10(3) UL (ref 0.1–1)
MONOCYTES NFR BLD AUTO: 15.4 %
NEUTROPHILS # BLD AUTO: 5.07 X10 (3) UL (ref 1.5–7.7)
NEUTROPHILS # BLD AUTO: 5.07 X10(3) UL (ref 1.5–7.7)
NEUTROPHILS NFR BLD AUTO: 57.4 %
NITRITE UR QL STRIP.AUTO: NEGATIVE
NT-PROBNP SERPL-MCNC: 447 PG/ML (ref ?–125)
OSMOLALITY SERPL CALC.SUM OF ELEC: 294 MOSM/KG (ref 275–295)
PH UR: 6 [PH] (ref 5–8)
PLATELET # BLD AUTO: 196 10(3)UL (ref 150–450)
POTASSIUM SERPL-SCNC: 4.2 MMOL/L (ref 3.5–5.1)
PROCALCITONIN SERPL-MCNC: 0.16 NG/ML (ref ?–0.16)
PROT UR-MCNC: NEGATIVE MG/DL
RBC # BLD AUTO: 4.02 X10(6)UL (ref 3.8–5.3)
SODIUM SERPL-SCNC: 143 MMOL/L (ref 136–145)
SP GR UR STRIP: 1.02 (ref 1–1.03)
UROBILINOGEN UR STRIP-ACNC: <2
WBC # BLD AUTO: 8.8 X10(3) UL (ref 4–11)

## 2020-08-27 PROCEDURE — 84145 PROCALCITONIN (PCT): CPT | Performed by: HOSPITALIST

## 2020-08-27 PROCEDURE — 71250 CT THORAX DX C-: CPT | Performed by: INTERNAL MEDICINE

## 2020-08-27 PROCEDURE — 85025 COMPLETE CBC W/AUTO DIFF WBC: CPT | Performed by: HOSPITALIST

## 2020-08-27 PROCEDURE — 83880 ASSAY OF NATRIURETIC PEPTIDE: CPT | Performed by: HOSPITALIST

## 2020-08-27 PROCEDURE — 81003 URINALYSIS AUTO W/O SCOPE: CPT | Performed by: EMERGENCY MEDICINE

## 2020-08-27 PROCEDURE — 80053 COMPREHEN METABOLIC PANEL: CPT | Performed by: HOSPITALIST

## 2020-08-27 NOTE — PROGRESS NOTES
[facility] Pharmacy Note:  Age Based Dose Adjustment    Mary Lee has been prescribed zolpidem (AMBIEN) 10 mg PO hs prn. Patient is >71 years old therefore the dose has been adjusted to 5 mg, may repeat x1 dose if needed.       Thank you,  Donna Marin, Ph

## 2020-08-27 NOTE — CONSULTS
Pulmonary H&P/Consult     NAME: Melany Johnson - ROOM: [address] [place] [address] - MRN: [id number] - Age: 71year old - :  11/3/1950    Date of Admission: 2020  3:15 PM  Admission Diagnosis: Hypoxia [R09.02]    Assessment/Plan:  1.  Acute hypoxic respiratory failu Family History   Problem Relation Age of Onset   • Heart Disorder Father    • Cancer Mother         ESOPHAGUS   Social History    Tobacco Use      Smoking status: Former Smoker        Packs/day: 2.00        Years: 15.00        Pack years: 27      Smoke 196.0     Recent Labs   Lab 08/26/20  1548 08/27/20  0532   GLU 87 78   BUN 13 11   CREATSERUM 0.98 0.59   GFRAA 68 108   GFRNAA 59* 94   CA 8.9 8.4*   ALB  --  2.6*    143   K 4.2 4.2    114*   CO2 26.0 27.0   ALKPHO  --  71   AST  --  23   AL

## 2020-08-27 NOTE — PLAN OF CARE
Problem: Patient Centered Care  Goal: Patient preferences are identified and integrated in the patient's plan of care  Description  Interventions:  - What would you like us to know as we care for you? From home with  and dogs.  Rod Garcia out of bed 8/26 Manage/alleviate anxiety  - Monitor for signs/symptoms of CO2 retention  Outcome: Not Progressing   New admission for hypoxia.  Patient on 2 L O2 via NC, states only slight shortness of breathe with ambulation to bathroom, and slightly SOB at rest, patient

## 2020-08-27 NOTE — PROGRESS NOTES
[facility] Pharmacy Note:  Renal Dose Adjustment for Metoclopramide (REGLAN)    Quyen Greer has been prescribed Metoclopramide (REGLAN) 10 mg every 8 hours as needed for n/v.    Estimated Creatinine Clearance: 38.9 mL/min (based on SCr of 0.98 mg/dL).     Calc

## 2020-08-27 NOTE — PAYOR COMM NOTE
--------------  Appropriate for inpatient status per guidelines for SOB and fever due to atypical PNA with hypoxia.       ADMISSION REVIEW     Payor: [address] [place] [address] #:  [id number]  Authorization Number: [id number]       ED Provider No Review of Systems    Positive for stated complaint: Flu Sym  Other systems are as noted in HPI. Constitutional and vital signs reviewed. All other systems reviewed and negative except as noted above.     Physical Exam     ED Triage Vitals [08/26 is at baseline.    Psychiatric:         Mood and Affect: Mood normal.           ED Course     Labs Reviewed   BASIC METABOLIC PANEL (8) - Abnormal; Notable for the following components:       Result Value    GFR, Non- 59 (*)     All other co diagnosis)    Disposition:  Admit  8/26/2020  5:54 pm                 H&P - H&P Note        [facility] Hospitalist H&P     CC: Patient presents with:  Fever  Dyspnea SPENCER SOB  Fall       PCP: Delia Negrete      Assessment and Plan     Ajith Bread is a 71 yea x3  Neck Supple, no JVD  Pulm: Lungs clear, normal respiratory effort, No wheezing or crackles  CV: Heart with regular rate and rhythm, No murmurs, rubs, gallops  Abd: Abdomen soft, nontender, nondistended, no organomegaly, bowel sounds present  MSK:   no HGB 13.1 12.0   MCV 90.6 93.5   .0 196.0              Recent Labs   Lab 08/26/20  1548 08/27/20  0532    143   K 4.2 4.2    114*   CO2 26.0 27.0   BUN 13 11   CREATSERUM 0.98 0.59   CA 8.9 8.4*   GLU 87 78         Recent Labs   Lab 08/

## 2020-08-27 NOTE — PROGRESS NOTES
[facility] Hospitalist Progress Note     PCP: Annette Gaytan    CC: Follow up       Assessment/Plan:     Mary Lee is a 71year old female with PMH sig for anxiety and depression who presented with fevers and SOB. CXR without PNA.    COVID neg.       Hyp cyanosis.   No Lower extremity edema  Skin: no rashes or lesions, well perfused  Psych: mood stable, cooperative  Neuro: no focal deficits    Medications     • cefTRIAXone  1 g Intravenous Q24H   • azithromycin  500 mg Intravenous Q24H   • enoxaparin  40 mg 2. Mild dependent atelectasis in the lower lobes. 3. Small pericardial effusion, most likely physiologic, and coronary artery calcifications.  4. Small lymph nodes throughout the bilateral axillae and mediastinum that are not pathologic by CT size criteria

## 2020-08-27 NOTE — PLAN OF CARE
Iv abx. PO pain meds Prn. 2L of O2. CT chest negative for PE. IVF dc'd. Plan for echo tomorrow.  Continue isolation per md.     Problem: Patient Centered Care  Goal: Patient preferences are identified and integrated in the patient's plan of care  Descriptio needed  - Assess and instruct to report SOB or any respiratory difficulty  - Respiratory Therapy support as indicated  - Manage/alleviate anxiety  - Monitor for signs/symptoms of CO2 retention  Outcome: Progressing

## 2020-08-27 NOTE — CM/SW NOTE
Case Management/ Progression of Care    MDO received for discharge planning, patient presented to urgent care for chills and fever and was noted to by Hypoxic with Oxygen  Saturation of 88% . Patient is currently on 2 liter of Oxygen no Home O2.    Prior t

## 2020-08-28 ENCOUNTER — APPOINTMENT (OUTPATIENT)
Dept: BEHAVIORAL HEALTH | Age: 70
End: 2020-08-28

## 2020-08-28 ENCOUNTER — APPOINTMENT (OUTPATIENT)
Dept: CV DIAGNOSTICS | Facility: HOSPITAL | Age: 70
DRG: 193 | End: 2020-08-28
Attending: INTERNAL MEDICINE
Payer: MEDICARE

## 2020-08-28 ENCOUNTER — APPOINTMENT (OUTPATIENT)
Dept: ULTRASOUND IMAGING | Facility: HOSPITAL | Age: 70
DRG: 193 | End: 2020-08-28
Attending: INTERNAL MEDICINE
Payer: MEDICARE

## 2020-08-28 ENCOUNTER — APPOINTMENT (OUTPATIENT)
Dept: NUCLEAR MEDICINE | Facility: HOSPITAL | Age: 70
DRG: 193 | End: 2020-08-28
Attending: INTERNAL MEDICINE
Payer: MEDICARE

## 2020-08-28 LAB
ADENOVIRUS PCR:: NEGATIVE
ANION GAP SERPL CALC-SCNC: 4 MMOL/L (ref 0–18)
B PERT DNA SPEC QL NAA+PROBE: NEGATIVE
BASOPHILS # BLD AUTO: 0.04 X10(3) UL (ref 0–0.2)
BASOPHILS NFR BLD AUTO: 0.4 %
BUN BLD-MCNC: 7 MG/DL (ref 7–18)
BUN/CREAT SERPL: 12.3 (ref 10–20)
C PNEUM DNA SPEC QL NAA+PROBE: NEGATIVE
CALCIUM BLD-MCNC: 8.6 MG/DL (ref 8.5–10.1)
CHLORIDE SERPL-SCNC: 111 MMOL/L (ref 98–112)
CO2 SERPL-SCNC: 26 MMOL/L (ref 21–32)
CORONAVIRUS 229E PCR:: NEGATIVE
CORONAVIRUS HKU1 PCR:: NEGATIVE
CORONAVIRUS NL63 PCR:: NEGATIVE
CORONAVIRUS OC43 PCR:: NEGATIVE
CREAT BLD-MCNC: 0.57 MG/DL (ref 0.55–1.02)
DEPRECATED RDW RBC AUTO: 38.3 FL (ref 35.1–46.3)
EOSINOPHIL # BLD AUTO: 0.42 X10(3) UL (ref 0–0.7)
EOSINOPHIL NFR BLD AUTO: 4.7 %
ERYTHROCYTE [DISTWIDTH] IN BLOOD BY AUTOMATED COUNT: 11.9 % (ref 11–15)
FLUAV RNA SPEC QL NAA+PROBE: NEGATIVE
FLUBV RNA SPEC QL NAA+PROBE: NEGATIVE
GLUCOSE BLD-MCNC: 120 MG/DL (ref 70–99)
HCT VFR BLD AUTO: 33.5 % (ref 35–48)
HGB BLD-MCNC: 11.3 G/DL (ref 12–16)
IMM GRANULOCYTES # BLD AUTO: 0.02 X10(3) UL (ref 0–1)
IMM GRANULOCYTES NFR BLD: 0.2 %
LYMPHOCYTES # BLD AUTO: 1.48 X10(3) UL (ref 1–4)
LYMPHOCYTES NFR BLD AUTO: 16.5 %
MCH RBC QN AUTO: 30.1 PG (ref 26–34)
MCHC RBC AUTO-ENTMCNC: 33.7 G/DL (ref 31–37)
MCV RBC AUTO: 89.1 FL (ref 80–100)
METAPNEUMOVIRUS PCR:: NEGATIVE
MONOCYTES # BLD AUTO: 1.09 X10(3) UL (ref 0.1–1)
MONOCYTES NFR BLD AUTO: 12.2 %
MYCOPLASMA PNEUMONIA PCR:: NEGATIVE
NEUTROPHILS # BLD AUTO: 5.92 X10 (3) UL (ref 1.5–7.7)
NEUTROPHILS # BLD AUTO: 5.92 X10(3) UL (ref 1.5–7.7)
NEUTROPHILS NFR BLD AUTO: 66 %
OSMOLALITY SERPL CALC.SUM OF ELEC: 291 MOSM/KG (ref 275–295)
PARAINFLUENZA 1 PCR:: NEGATIVE
PARAINFLUENZA 2 PCR:: NEGATIVE
PARAINFLUENZA 3 PCR:: NEGATIVE
PARAINFLUENZA 4 PCR:: NEGATIVE
PLATELET # BLD AUTO: 190 10(3)UL (ref 150–450)
POTASSIUM SERPL-SCNC: 3.7 MMOL/L (ref 3.5–5.1)
RBC # BLD AUTO: 3.76 X10(6)UL (ref 3.8–5.3)
RHINOVIRUS/ENTERO PCR:: NEGATIVE
RSV RNA SPEC QL NAA+PROBE: NEGATIVE
SODIUM SERPL-SCNC: 141 MMOL/L (ref 136–145)
WBC # BLD AUTO: 9 X10(3) UL (ref 4–11)

## 2020-08-28 PROCEDURE — 78580 LUNG PERFUSION IMAGING: CPT | Performed by: INTERNAL MEDICINE

## 2020-08-28 PROCEDURE — 93306 TTE W/DOPPLER COMPLETE: CPT | Performed by: INTERNAL MEDICINE

## 2020-08-28 PROCEDURE — 93970 EXTREMITY STUDY: CPT | Performed by: INTERNAL MEDICINE

## 2020-08-28 PROCEDURE — 85025 COMPLETE CBC W/AUTO DIFF WBC: CPT | Performed by: HOSPITALIST

## 2020-08-28 PROCEDURE — 87633 RESP VIRUS 12-25 TARGETS: CPT | Performed by: HOSPITALIST

## 2020-08-28 PROCEDURE — 87581 M.PNEUMON DNA AMP PROBE: CPT | Performed by: HOSPITALIST

## 2020-08-28 PROCEDURE — 87486 CHLMYD PNEUM DNA AMP PROBE: CPT | Performed by: HOSPITALIST

## 2020-08-28 PROCEDURE — 87798 DETECT AGENT NOS DNA AMP: CPT | Performed by: HOSPITALIST

## 2020-08-28 PROCEDURE — 80048 BASIC METABOLIC PNL TOTAL CA: CPT | Performed by: HOSPITALIST

## 2020-08-28 RX ORDER — POTASSIUM CHLORIDE 20 MEQ/1
40 TABLET, EXTENDED RELEASE ORAL ONCE
Status: COMPLETED | OUTPATIENT
Start: 2020-08-28 | End: 2020-08-28

## 2020-08-28 NOTE — PROGRESS NOTES
[facility] Hospitalist Progress Note     PCP: Kentrell Bean    CC: Follow up       Assessment/Plan:     Andrés Osorio is a 71year old female with PMH sig for anxiety and depression who presented with fevers and SOB. CXR without PNA. COVID rapid neg.   CT respiratory effort, No wheezing or crackles  CV: Heart with regular  Rhythm, mildly tachy , No murmurs, rubs, gallops  Abd: Abdomen soft, nontender, nondistended, no organomegaly, bowel sounds present  MSK:  no clubbing, no cyanosis.   No Lower extremity ed septal thickening is also seen at both lung bases with trace bilateral pleural effusions.   These findings may relate to pulmonary edema in the setting of mild CHF/fluid overload, although an atypical infectious/inflammatory process could have a similar nat

## 2020-08-28 NOTE — PLAN OF CARE
Aox4, chronic back pain controlled with PRN medication. Continues on 3L NC. 2D echo completed this morning. IV abx. Bed locked lowest position, nonskid socks in place, call light within reach.  Patient encouraged to use call light for assistance    Problem: specific interventions   Outcome: Progressing  Goal: Patient/Family Short Term Goal  Description  Patient's Short Term Goal: no more supplemental oxygen needs    Interventions:   - IV antibiotics  - wean oxygen as tolerated.  (2 L O2 NC, no home oxygen)

## 2020-08-28 NOTE — CM/SW NOTE
HOME OXYGEN EVALUATION  From Nursing Note:     OXYGEN SATURATION ON ROOM AIR AT REST:   XX%  OXYGEN SATURATION  WALKING ON ROOM AIR  XX%  OXYGEN SATURATION  WALKING ON  Denisse Drop     Referral sent to: [address] [place] [address] [facility]  Ph: [phone]

## 2020-08-28 NOTE — PLAN OF CARE
Patient complained of chronic lower back pain that is being controlled with percocet. Patient found with O2 tubing disconnected from the wall and SpO2 of 84% on RA; currently 94-96% on 3 L. T max of 102.2 F that was controlled with Tylenol.  Plan for echo t Encourage broncho-pulmonary hygiene including cough, deep breathe, Incentive Spirometry  - Assess the need for suctioning and perform as needed  - Assess and instruct to report SOB or any respiratory difficulty  - Respiratory Therapy support as indicated

## 2020-08-28 NOTE — PROGRESS NOTES
Pulmonary Progress Note     Assessment / Plan:  1. Acute hypoxic respiratory failure - likely due to PNA. CT chest with mild GGOs. Rapid SARS-CoV-2 negative. PCT negative.  R/o PE given concerns for hypoxia out of proportion to CT chest findings, however th

## 2020-08-28 NOTE — PROGRESS NOTES
HOME OXYGEN EVALUATION  From Nursing Note:      OXYGEN SATURATION ON ROOM AIR AT REST:   90%  OXYGEN SATURATION  WALKING ON ROOM AIR  86%  OXYGEN SATURATION  WALKING ON  3 Liters      94%

## 2020-08-29 LAB
ANION GAP SERPL CALC-SCNC: 8 MMOL/L (ref 0–18)
BUN BLD-MCNC: 5 MG/DL (ref 7–18)
BUN/CREAT SERPL: 7.1 (ref 10–20)
CALCIUM BLD-MCNC: 9.5 MG/DL (ref 8.5–10.1)
CHLORIDE SERPL-SCNC: 110 MMOL/L (ref 98–112)
CO2 SERPL-SCNC: 25 MMOL/L (ref 21–32)
CREAT BLD-MCNC: 0.7 MG/DL (ref 0.55–1.02)
GLUCOSE BLD-MCNC: 116 MG/DL (ref 70–99)
OSMOLALITY SERPL CALC.SUM OF ELEC: 294 MOSM/KG (ref 275–295)
POTASSIUM SERPL-SCNC: 4.1 MMOL/L (ref 3.5–5.1)
POTASSIUM SERPL-SCNC: 4.2 MMOL/L (ref 3.5–5.1)
PROCALCITONIN SERPL-MCNC: 0.13 NG/ML (ref ?–0.16)
SARS-COV-2 RNA RESP QL NAA+PROBE: NOT DETECTED
SODIUM SERPL-SCNC: 143 MMOL/L (ref 136–145)

## 2020-08-29 PROCEDURE — 84132 ASSAY OF SERUM POTASSIUM: CPT | Performed by: HOSPITALIST

## 2020-08-29 PROCEDURE — 84145 PROCALCITONIN (PCT): CPT | Performed by: HOSPITALIST

## 2020-08-29 PROCEDURE — 80048 BASIC METABOLIC PNL TOTAL CA: CPT | Performed by: HOSPITALIST

## 2020-08-29 RX ORDER — FUROSEMIDE 10 MG/ML
20 INJECTION INTRAMUSCULAR; INTRAVENOUS ONCE
Status: DISCONTINUED | OUTPATIENT
Start: 2020-08-29 | End: 2020-08-29

## 2020-08-29 RX ORDER — OXYCODONE AND ACETAMINOPHEN 10; 325 MG/1; MG/1
1 TABLET ORAL EVERY 6 HOURS PRN
Status: DISCONTINUED | OUTPATIENT
Start: 2020-08-29 | End: 2020-09-07

## 2020-08-29 NOTE — PLAN OF CARE
Patient resting in bed. Alert & orientedx4. On 3L O2 per nasal cannula. Shortness of breath with activity. Encouraging deep breathing and frequent repositioning. Complaining of headache, administering PRN pain meds as ordered.  Plan to continue IV antibioti Provide Smoking Cessation handout, if applicable  - Encourage broncho-pulmonary hygiene including cough, deep breathe, Incentive Spirometry  - Assess the need for suctioning and perform as needed  - Assess and instruct to report SOB or any respiratory diff

## 2020-08-29 NOTE — PLAN OF CARE
Patient complained of back pain; scheduled percocet given, Unable to wean O2; patient failed walking O2 evaluation may need home O2.                                                 Problem: Patient Centered Care  Goal: Patient preferences are identified and applicable  - Encourage broncho-pulmonary hygiene including cough, deep breathe, Incentive Spirometry  - Assess the need for suctioning and perform as needed  - Assess and instruct to report SOB or any respiratory difficulty  - Respiratory Therapy support

## 2020-08-29 NOTE — PROGRESS NOTES
Pulmonary Progress Note        NAME: Raffaele Contreras - ROOM: 299/110-F - MRN: [id number] - Age: 71year old - : 11/3/1950        Last 24hrs: Febrile overnight, still requiring O2, notes throat pain when she takes a deep breath    OBJECTIVE:   20 spikes again will broaden abx  -IS to bedside  2. PPx  - lovenox  3.  Dispo  - will follow     Katharyn Leyden McElligott  [facility] Pulmonary and Critical Care

## 2020-08-29 NOTE — PROGRESS NOTES
[facility] Hospitalist Progress Note     PCP: Justo Moscoso    CC: Follow up       Assessment/Plan:     Melanie Murphy is a 71year old female with PMH sig for anxiety and depression who presented with fevers and SOB. CXR without PNA. COVID rapid neg.   CT Abdomen soft, nontender, nondistended   MSK:  no clubbing, no cyanosis   Skin: no rashes or lesions, well perfused  Psych: mood stable, cooperative  Neuro: no focal deficits    Medications     • cefTRIAXone  1 g Intravenous Q24H   • azithromycin  500 mg In to pulmonary edema in the setting of mild CHF/fluid overload, although an atypical infectious/inflammatory process could have a similar appearance. 2. Mild dependent atelectasis in the lower lobes.  3. Small pericardial effusion, most likely physiologic, an

## 2020-08-30 LAB
ALBUMIN SERPL-MCNC: 2.6 G/DL (ref 3.4–5)
ALBUMIN/GLOB SERPL: 0.8 {RATIO} (ref 1–2)
ALP LIVER SERPL-CCNC: 68 U/L (ref 55–142)
ALT SERPL-CCNC: 17 U/L (ref 13–56)
ANION GAP SERPL CALC-SCNC: 4 MMOL/L (ref 0–18)
AST SERPL-CCNC: 9 U/L (ref 15–37)
BASOPHILS # BLD AUTO: 0.05 X10(3) UL (ref 0–0.2)
BASOPHILS NFR BLD AUTO: 0.5 %
BILIRUB SERPL-MCNC: 0.3 MG/DL (ref 0.1–2)
BUN BLD-MCNC: 8 MG/DL (ref 7–18)
BUN/CREAT SERPL: 13.1 (ref 10–20)
CALCIUM BLD-MCNC: 8.6 MG/DL (ref 8.5–10.1)
CHLORIDE SERPL-SCNC: 110 MMOL/L (ref 98–112)
CO2 SERPL-SCNC: 28 MMOL/L (ref 21–32)
CREAT BLD-MCNC: 0.61 MG/DL (ref 0.55–1.02)
CRP SERPL-MCNC: 16.9 MG/DL (ref ?–0.3)
DEPRECATED HBV CORE AB SER IA-ACNC: 109.7 NG/ML (ref 18–340)
DEPRECATED RDW RBC AUTO: 39.6 FL (ref 35.1–46.3)
EOSINOPHIL # BLD AUTO: 0.54 X10(3) UL (ref 0–0.7)
EOSINOPHIL NFR BLD AUTO: 5.3 %
ERYTHROCYTE [DISTWIDTH] IN BLOOD BY AUTOMATED COUNT: 11.9 % (ref 11–15)
GLOBULIN PLAS-MCNC: 3.3 G/DL (ref 2.8–4.4)
GLUCOSE BLD-MCNC: 107 MG/DL (ref 70–99)
HAV IGM SER QL: 2 MG/DL (ref 1.6–2.6)
HCT VFR BLD AUTO: 35 % (ref 35–48)
HGB BLD-MCNC: 11.5 G/DL (ref 12–16)
IMM GRANULOCYTES # BLD AUTO: 0.03 X10(3) UL (ref 0–1)
IMM GRANULOCYTES NFR BLD: 0.3 %
L PNEUMO AG UR QL: NEGATIVE
LYMPHOCYTES # BLD AUTO: 1.51 X10(3) UL (ref 1–4)
LYMPHOCYTES NFR BLD AUTO: 14.8 %
M PROTEIN MFR SERPL ELPH: 5.9 G/DL (ref 6.4–8.2)
MCH RBC QN AUTO: 29.9 PG (ref 26–34)
MCHC RBC AUTO-ENTMCNC: 32.9 G/DL (ref 31–37)
MCV RBC AUTO: 90.9 FL (ref 80–100)
MONOCYTES # BLD AUTO: 1.25 X10(3) UL (ref 0.1–1)
MONOCYTES NFR BLD AUTO: 12.2 %
NEUTROPHILS # BLD AUTO: 6.83 X10 (3) UL (ref 1.5–7.7)
NEUTROPHILS # BLD AUTO: 6.83 X10(3) UL (ref 1.5–7.7)
NEUTROPHILS NFR BLD AUTO: 66.9 %
NT-PROBNP SERPL-MCNC: 448 PG/ML (ref ?–125)
OSMOLALITY SERPL CALC.SUM OF ELEC: 293 MOSM/KG (ref 275–295)
PLATELET # BLD AUTO: 224 10(3)UL (ref 150–450)
POTASSIUM SERPL-SCNC: 3.8 MMOL/L (ref 3.5–5.1)
RBC # BLD AUTO: 3.85 X10(6)UL (ref 3.8–5.3)
SODIUM SERPL-SCNC: 142 MMOL/L (ref 136–145)
WBC # BLD AUTO: 10.2 X10(3) UL (ref 4–11)

## 2020-08-30 PROCEDURE — 85025 COMPLETE CBC W/AUTO DIFF WBC: CPT | Performed by: HOSPITALIST

## 2020-08-30 PROCEDURE — 86606 ASPERGILLUS ANTIBODY: CPT | Performed by: INTERNAL MEDICINE

## 2020-08-30 PROCEDURE — 86038 ANTINUCLEAR ANTIBODIES: CPT | Performed by: INTERNAL MEDICINE

## 2020-08-30 PROCEDURE — 86612 BLASTOMYCES ANTIBODY: CPT | Performed by: INTERNAL MEDICINE

## 2020-08-30 PROCEDURE — 80053 COMPREHEN METABOLIC PANEL: CPT | Performed by: HOSPITALIST

## 2020-08-30 PROCEDURE — 83735 ASSAY OF MAGNESIUM: CPT | Performed by: HOSPITALIST

## 2020-08-30 PROCEDURE — 86635 COCCIDIOIDES ANTIBODY: CPT | Performed by: INTERNAL MEDICINE

## 2020-08-30 PROCEDURE — 83876 ASSAY MYELOPEROXIDASE: CPT | Performed by: INTERNAL MEDICINE

## 2020-08-30 PROCEDURE — 86255 FLUORESCENT ANTIBODY SCREEN: CPT | Performed by: INTERNAL MEDICINE

## 2020-08-30 PROCEDURE — 87449 NOS EACH ORGANISM AG IA: CPT | Performed by: INTERNAL MEDICINE

## 2020-08-30 PROCEDURE — 86641 CRYPTOCOCCUS ANTIBODY: CPT | Performed by: INTERNAL MEDICINE

## 2020-08-30 PROCEDURE — 83880 ASSAY OF NATRIURETIC PEPTIDE: CPT | Performed by: HOSPITALIST

## 2020-08-30 PROCEDURE — 83516 IMMUNOASSAY NONANTIBODY: CPT | Performed by: INTERNAL MEDICINE

## 2020-08-30 PROCEDURE — 82728 ASSAY OF FERRITIN: CPT | Performed by: HOSPITALIST

## 2020-08-30 PROCEDURE — 86140 C-REACTIVE PROTEIN: CPT | Performed by: HOSPITALIST

## 2020-08-30 PROCEDURE — 86698 HISTOPLASMA ANTIBODY: CPT | Performed by: INTERNAL MEDICINE

## 2020-08-30 RX ORDER — AZITHROMYCIN 250 MG/1
500 TABLET, FILM COATED ORAL DAILY
Status: DISCONTINUED | OUTPATIENT
Start: 2020-08-30 | End: 2020-08-30

## 2020-08-30 RX ORDER — AZITHROMYCIN 250 MG/1
500 TABLET, FILM COATED ORAL DAILY
Status: COMPLETED | OUTPATIENT
Start: 2020-08-30 | End: 2020-09-03

## 2020-08-30 RX ORDER — POTASSIUM CHLORIDE 20 MEQ/1
40 TABLET, EXTENDED RELEASE ORAL ONCE
Status: COMPLETED | OUTPATIENT
Start: 2020-08-30 | End: 2020-08-30

## 2020-08-30 RX ORDER — GUAIFENESIN 100 MG/5ML
100 SOLUTION ORAL EVERY 4 HOURS PRN
Status: DISCONTINUED | OUTPATIENT
Start: 2020-08-30 | End: 2020-09-07

## 2020-08-30 NOTE — PROGRESS NOTES
Pulmonary Progress Note        NAME: Radha Gerardo - ROOM: 900/688-N - MRN: [id number] - Age: 71year old - : 11/3/1950        Last 24hrs: febrile again overnight, COVID negative, still needing O2    OBJECTIVE:   20  0141 20 pt today who is hopeful to avoid this  -check OMAR/ANCA and fungal ab survey  - IS at bedside- encouraged use  2. PPx  - lovenox  3.  Dispo  - will follow     Shayla Zamora  [facility] Pulmonary and Critical Care

## 2020-08-30 NOTE — PLAN OF CARE
Patient resting in bed. Alert & orientedx4. Short of breath with activity. Desaturating at rest, O2 increased to 5L per nasal cannula. Plan to continue IV antibiotics. ID consulted. Fever this AM- improved with tylenol. Continues to complain of headache.  I ABGs  - Provide Smoking Cessation handout, if applicable  - Encourage broncho-pulmonary hygiene including cough, deep breathe, Incentive Spirometry  - Assess the need for suctioning and perform as needed  - Assess and instruct to report SOB or any respirat

## 2020-08-30 NOTE — PROGRESS NOTES
[facility] Hospitalist Progress Note     PCP: Maira Reyna    CC: Follow up       Assessment/Plan:     Radha Gerardo is a 71year old female with PMH sig for anxiety and depression who presented with fevers and SOB. CXR without PNA. COVID rapid neg.   CT 9.6 oz (52.4 kg)  08/28/20 0455 : 123 lb 8 oz (56 kg)  08/26/20 1934 : 121 lb 1.6 oz (54.9 kg)  08/26/20 1511 : 118 lb (53.5 kg)  04/10/17 1248 : 123 lb 12.8 oz (56.2 kg)  04/03/17 1505 : 124 lb (56.2 kg)      Exam  Gen: No acute distress, alert and orient findings.     Dictated by (CST): Raffaele Parker MD on 8/26/2020 at 2:28 PM     Finalized by (CST): Raffaele Parker MD on 8/26/2020 at 2:29 PM          Ct Chest (wur=[id number])    Result Date: 8/27/2020  CONCLUSION:  1. Scattered patchy ground-g

## 2020-08-30 NOTE — CONSULTS
Tera Mathews [age] Patient Status:  Inpatient    11/3/1950 MRN [id number]   Location [facility] 3W/SW Attending Raquel Saleh MD   Hosp Day # 4 PCP Kentrell Bean     Reason for Consultation:   To he Right     ORIF RIGHT ANKLE   • SPINE SURGERY PROCEDURE UNLISTED      cervical spine february 2020   • TOTAL KNEE REPLACEMENT       Family History   Problem Relation Age of Onset   • Heart Disorder Father    • Cancer Mother         ESOPHAGUS      reports th HCl (DESYREL) tab 50 mg, 50 mg, Oral, Nightly  •  FLUoxetine HCl (PROZAC) cap 80 mg, 80 mg, Oral, Daily    Review of Systems:   Constitutional: Negative for anorexia, chills, fatigue, fevers, malaise, night sweats and weight loss.   Eyes: Negative for visua 1511 113/89 99.4 °F (37.4 °C) Oral 99 18 92 % —       Physical Exam:   General: alert, cooperative, oriented. No respiratory distress. Head: Normocephalic, without obvious abnormality, atraumatic. Eyes: Conjunctivae/corneas clear. No scleral icterus. of PE.   - No leukocytosis, No Neutrophilia,   - SARS COV 2 Rapid( [facility]bot ID Now) and PCR negative,   - RVP is negative,   - Procal 0.16 and then 0.13,   -CRP 16.90 today,   - Recent Flu Vaccine,   - Urine with no RBC, WBC,  - 2 D echo noted with no vegetat

## 2020-08-30 NOTE — PLAN OF CARE
Patient complained of a persistent headache over night that was relieved by percocet and minimally by Tylenol. Tmax of 101.1 and normalized after Tylenol.      Problem: Patient Centered Care  Goal: Patient preferences are identified and integrated in the pa suctioning and perform as needed  - Assess and instruct to report SOB or any respiratory difficulty  - Respiratory Therapy support as indicated  - Manage/alleviate anxiety  - Monitor for signs/symptoms of CO2 retention  Outcome: Progressing     Problem: PA

## 2020-08-31 ENCOUNTER — APPOINTMENT (OUTPATIENT)
Dept: GENERAL RADIOLOGY | Facility: HOSPITAL | Age: 70
DRG: 193 | End: 2020-08-31
Attending: INTERNAL MEDICINE
Payer: MEDICARE

## 2020-08-31 ENCOUNTER — APPOINTMENT (OUTPATIENT)
Dept: BEHAVIORAL HEALTH | Age: 70
End: 2020-08-31

## 2020-08-31 LAB
ALBUMIN SERPL-MCNC: 2.6 G/DL (ref 3.4–5)
ALBUMIN/GLOB SERPL: 0.7 {RATIO} (ref 1–2)
ALP LIVER SERPL-CCNC: 75 U/L (ref 55–142)
ALT SERPL-CCNC: 16 U/L (ref 13–56)
ANION GAP SERPL CALC-SCNC: 1 MMOL/L (ref 0–18)
AST SERPL-CCNC: 11 U/L (ref 15–37)
BASOPHILS # BLD AUTO: 0.06 X10(3) UL (ref 0–0.2)
BASOPHILS NFR BLD AUTO: 0.5 %
BILIRUB SERPL-MCNC: 0.3 MG/DL (ref 0.1–2)
BUN BLD-MCNC: 7 MG/DL (ref 7–18)
BUN/CREAT SERPL: 9.9 (ref 10–20)
CALCIUM BLD-MCNC: 9.1 MG/DL (ref 8.5–10.1)
CHLORIDE SERPL-SCNC: 110 MMOL/L (ref 98–112)
CO2 SERPL-SCNC: 30 MMOL/L (ref 21–32)
CREAT BLD-MCNC: 0.71 MG/DL (ref 0.55–1.02)
CRP SERPL-MCNC: 22.7 MG/DL (ref ?–0.3)
DEPRECATED HBV CORE AB SER IA-ACNC: 126.9 NG/ML (ref 18–340)
DEPRECATED RDW RBC AUTO: 39.9 FL (ref 35.1–46.3)
EBV NA IGG SER QL IA: POSITIVE
EBV VCA IGG SER QL IA: POSITIVE
EBV VCA IGM SER QL IA: NEGATIVE
EOSINOPHIL # BLD AUTO: 0.59 X10(3) UL (ref 0–0.7)
EOSINOPHIL NFR BLD AUTO: 5.4 %
ERYTHROCYTE [DISTWIDTH] IN BLOOD BY AUTOMATED COUNT: 11.8 % (ref 11–15)
GLOBULIN PLAS-MCNC: 4 G/DL (ref 2.8–4.4)
GLUCOSE BLD-MCNC: 125 MG/DL (ref 70–99)
HAV IGM SER QL: 2.3 MG/DL (ref 1.6–2.6)
HCT VFR BLD AUTO: 35.9 % (ref 35–48)
HGB BLD-MCNC: 11.7 G/DL (ref 12–16)
IMM GRANULOCYTES # BLD AUTO: 0.03 X10(3) UL (ref 0–1)
IMM GRANULOCYTES NFR BLD: 0.3 %
LDH SERPL L TO P-CCNC: 190 U/L
LYMPHOCYTES # BLD AUTO: 1.66 X10(3) UL (ref 1–4)
LYMPHOCYTES NFR BLD AUTO: 15.1 %
M PROTEIN MFR SERPL ELPH: 6.6 G/DL (ref 6.4–8.2)
MCH RBC QN AUTO: 29.7 PG (ref 26–34)
MCHC RBC AUTO-ENTMCNC: 32.6 G/DL (ref 31–37)
MCV RBC AUTO: 91.1 FL (ref 80–100)
MONOCYTES # BLD AUTO: 1.24 X10(3) UL (ref 0.1–1)
MONOCYTES NFR BLD AUTO: 11.3 %
NEUTROPHILS # BLD AUTO: 7.4 X10 (3) UL (ref 1.5–7.7)
NEUTROPHILS # BLD AUTO: 7.4 X10(3) UL (ref 1.5–7.7)
NEUTROPHILS NFR BLD AUTO: 67.4 %
NUCLEAR IGG TITR SER IF: NEGATIVE {TITER}
OSMOLALITY SERPL CALC.SUM OF ELEC: 291 MOSM/KG (ref 275–295)
PLATELET # BLD AUTO: 260 10(3)UL (ref 150–450)
POTASSIUM SERPL-SCNC: 4.6 MMOL/L (ref 3.5–5.1)
RBC # BLD AUTO: 3.94 X10(6)UL (ref 3.8–5.3)
SARS-COV-2 IGG SERPLBLD QL IA.RAPID: NEGATIVE
SODIUM SERPL-SCNC: 141 MMOL/L (ref 136–145)
WBC # BLD AUTO: 11 X10(3) UL (ref 4–11)

## 2020-08-31 PROCEDURE — 86645 CMV ANTIBODY IGM: CPT | Performed by: INTERNAL MEDICINE

## 2020-08-31 PROCEDURE — 86769 SARS-COV-2 COVID-19 ANTIBODY: CPT | Performed by: INTERNAL MEDICINE

## 2020-08-31 PROCEDURE — 85025 COMPLETE CBC W/AUTO DIFF WBC: CPT | Performed by: HOSPITALIST

## 2020-08-31 PROCEDURE — 86140 C-REACTIVE PROTEIN: CPT | Performed by: HOSPITALIST

## 2020-08-31 PROCEDURE — 83876 ASSAY MYELOPEROXIDASE: CPT | Performed by: INTERNAL MEDICINE

## 2020-08-31 PROCEDURE — 83516 IMMUNOASSAY NONANTIBODY: CPT | Performed by: INTERNAL MEDICINE

## 2020-08-31 PROCEDURE — 83735 ASSAY OF MAGNESIUM: CPT | Performed by: HOSPITALIST

## 2020-08-31 PROCEDURE — 86665 EPSTEIN-BARR CAPSID VCA: CPT | Performed by: INTERNAL MEDICINE

## 2020-08-31 PROCEDURE — 86664 EPSTEIN-BARR NUCLEAR ANTIGEN: CPT | Performed by: INTERNAL MEDICINE

## 2020-08-31 PROCEDURE — 86255 FLUORESCENT ANTIBODY SCREEN: CPT | Performed by: INTERNAL MEDICINE

## 2020-08-31 PROCEDURE — 71045 X-RAY EXAM CHEST 1 VIEW: CPT | Performed by: INTERNAL MEDICINE

## 2020-08-31 PROCEDURE — 86644 CMV ANTIBODY: CPT | Performed by: INTERNAL MEDICINE

## 2020-08-31 PROCEDURE — 83615 LACTATE (LD) (LDH) ENZYME: CPT | Performed by: INTERNAL MEDICINE

## 2020-08-31 PROCEDURE — 87798 DETECT AGENT NOS DNA AMP: CPT | Performed by: INTERNAL MEDICINE

## 2020-08-31 PROCEDURE — 82728 ASSAY OF FERRITIN: CPT | Performed by: HOSPITALIST

## 2020-08-31 PROCEDURE — 80053 COMPREHEN METABOLIC PANEL: CPT | Performed by: HOSPITALIST

## 2020-08-31 NOTE — PROGRESS NOTES
[facility] AND [facility] Infectious Disease  Progress Note    Melany Johnson Patient Status:  Inpatient    11/3/1950 MRN [id number]   Location [facility] 3W/SW Attending Adelita Davidson MD   Hosp Day # 5 PCP Luis Velarde     Subjective:  Jesús Bowman These findings may relate to pulmonary edema in the setting of mild CHF/fluid overload, although an atypical infectious/inflammatory process could have a similar appearance. 2. Mild dependent atelectasis in the lower lobes.   3. Small pericardial effusion,

## 2020-08-31 NOTE — PROGRESS NOTES
[facility] Hospitalist Progress Note     PCP: Kentrell Bean    CC: Follow up       Assessment/Plan:     Andrés Osorio is a 71year old female with PMH sig for anxiety and depression who presented with fevers and SOB. CXR without PNA. COVID rapid neg.   CT Last 3 Weights  08/31/20 0536 : 117 lb 8 oz (53.3 kg)  08/30/20 0500 : 111 lb 11.2 oz (50.7 kg)  08/29/20 0518 : 115 lb 9.6 oz (52.4 kg)  08/28/20 0455 : 123 lb 8 oz (56 kg)  08/26/20 1934 : 121 lb 1.6 oz (54.9 kg)  08/26/20 1511 : 118 lb (53.5 kg) results for input(s): PGLU in the last 168 hours. Recent Labs   Lab 08/26/20  1612   TROP <0.045       Imaging:Xr Chest Pa + Lat Chest (cpt=71046)    Result Date: 8/26/2020  CONCLUSION:  1. No acute findings.     Dictated by (CST): Nohemy Parker (cpt=71045)    Result Date: 8/31/2020  CONCLUSION:  1. There is now prominence of the pulmonary interstitium bilaterally which may suggest mild interstitial edema developing. Correlate clinically and follow-up studies are advised.   Mild blunting of both c

## 2020-08-31 NOTE — PROGRESS NOTES
Pulmonary Progress Note     Assessment / Plan:  1. Acute hypoxic respiratory failure - possibly due to PNA. CT chest with mild GGOs. Rapid and  SARS-CoV-2 PCR negative. PCT negative. Respiratory panel negative. OMAR negative.  V/Q and LE dopplers negati

## 2020-08-31 NOTE — PLAN OF CARE
Problem: Patient Centered Care  Goal: Patient preferences are identified and integrated in the patient's plan of care  Description  Interventions:  - What would you like us to know as we care for you? From home with  and dogs.  Sarthak Caba out of bed 8/26 Manage/alleviate anxiety  - Monitor for signs/symptoms of CO2 retention  Outcome: Progressing     Problem: PAIN - ADULT  Goal: Verbalizes/displays adequate comfort level or patient's stated pain goal  Description  INTERVENTIONS:  - Encourage pt to monitor

## 2020-09-01 ENCOUNTER — APPOINTMENT (OUTPATIENT)
Dept: CT IMAGING | Facility: HOSPITAL | Age: 70
DRG: 193 | End: 2020-09-01
Attending: HOSPITALIST
Payer: MEDICARE

## 2020-09-01 LAB
ANION GAP SERPL CALC-SCNC: 4 MMOL/L (ref 0–18)
BUN BLD-MCNC: 8 MG/DL (ref 7–18)
BUN/CREAT SERPL: 13.6 (ref 10–20)
CALCIUM BLD-MCNC: 8.8 MG/DL (ref 8.5–10.1)
CHLORIDE SERPL-SCNC: 109 MMOL/L (ref 98–112)
CO2 SERPL-SCNC: 28 MMOL/L (ref 21–32)
CREAT BLD-MCNC: 0.59 MG/DL (ref 0.55–1.02)
CRP SERPL-MCNC: 18.7 MG/DL (ref ?–0.3)
DEPRECATED RDW RBC AUTO: 39.6 FL (ref 35.1–46.3)
ERYTHROCYTE [DISTWIDTH] IN BLOOD BY AUTOMATED COUNT: 11.8 % (ref 11–15)
GLUCOSE BLD-MCNC: 107 MG/DL (ref 70–99)
HCT VFR BLD AUTO: 33.6 % (ref 35–48)
HGB BLD-MCNC: 11.1 G/DL (ref 12–16)
MCH RBC QN AUTO: 30.1 PG (ref 26–34)
MCHC RBC AUTO-ENTMCNC: 33 G/DL (ref 31–37)
MCV RBC AUTO: 91.1 FL (ref 80–100)
OSMOLALITY SERPL CALC.SUM OF ELEC: 291 MOSM/KG (ref 275–295)
PLATELET # BLD AUTO: 276 10(3)UL (ref 150–450)
POTASSIUM SERPL-SCNC: 4 MMOL/L (ref 3.5–5.1)
RBC # BLD AUTO: 3.69 X10(6)UL (ref 3.8–5.3)
SODIUM SERPL-SCNC: 141 MMOL/L (ref 136–145)
WBC # BLD AUTO: 11.2 X10(3) UL (ref 4–11)

## 2020-09-01 PROCEDURE — 85027 COMPLETE CBC AUTOMATED: CPT | Performed by: HOSPITALIST

## 2020-09-01 PROCEDURE — 70450 CT HEAD/BRAIN W/O DYE: CPT | Performed by: HOSPITALIST

## 2020-09-01 PROCEDURE — 86140 C-REACTIVE PROTEIN: CPT | Performed by: HOSPITALIST

## 2020-09-01 PROCEDURE — 80048 BASIC METABOLIC PNL TOTAL CA: CPT | Performed by: HOSPITALIST

## 2020-09-01 RX ORDER — BUTALBITAL, ACETAMINOPHEN AND CAFFEINE 50; 325; 40 MG/1; MG/1; MG/1
1 TABLET ORAL EVERY 4 HOURS PRN
Status: DISCONTINUED | OUTPATIENT
Start: 2020-09-01 | End: 2020-09-07

## 2020-09-01 RX ORDER — MAGNESIUM HYDROXIDE/ALUMINUM HYDROXICE/SIMETHICONE 120; 1200; 1200 MG/30ML; MG/30ML; MG/30ML
30 SUSPENSION ORAL 4 TIMES DAILY PRN
Status: DISCONTINUED | OUTPATIENT
Start: 2020-09-01 | End: 2020-09-07

## 2020-09-01 NOTE — PROGRESS NOTES
Mally Hernandez is a 71year old female. Patient presents with:  Fever  Dyspnea SPENCER SOB  Fall      HPI:    Still needs o2 at rest but has started to feel better    REVIEW OF SYSTEMS:   A comprehensive 11 point review of systems was completed.   Pertinent RRR O8/A6, no rubs, clicks, heaves, or murmurs. GI:  Soft NT/ND, BS present, No masses , rebound, no HSM. EXTREMITIES:  No edema, no clubbing, no cyanosis, phlebitis or cellulitis. NEURO:  No focal neurologic deficits. DERM:  Warm, dry, no rashes.   IV encounter of 09/17/13   BASIC METABOLIC PANEL (8)   Result Value Ref Range    Glucose 87 70 - 99 mg/dL    Sodium 142 136 - 145 mmol/L    Potassium 4.2 3.5 - 5.1 mmol/L    Chloride 110 98 - 112 mmol/L    CO2 26.0 21.0 - 32.0 mmol/L    Anion Gap 6 0 - 18 mmo Phosphatase 71 55 - 142 U/L    Bilirubin, Total 0.3 0.1 - 2.0 mg/dL    Total Protein 5.8 (L) 6.4 - 8.2 g/dL    Albumin 2.6 (L) 3.4 - 5.0 g/dL    Globulin  3.2 2.8 - 4.4 g/dL    A/G Ratio 0.8 (L) 1.0 - 2.0   PRO BETA NATRIURETIC PEPTIDE   Result Value Ref R ALT 17 13 - 56 U/L    AST 9 (L) 15 - 37 U/L    Alkaline Phosphatase 68 55 - 142 U/L    Bilirubin, Total 0.3 0.1 - 2.0 mg/dL    Total Protein 5.9 (L) 6.4 - 8.2 g/dL    Albumin 2.6 (L) 3.4 - 5.0 g/dL    Globulin  3.3 2.8 - 4.4 g/dL    A/G Ratio 0.8 (L) 1. AB IgM Negative Negative    Nia-Barr Virus AB IgG Positive (A) Negative    Nia-Barr Nuclear AG ABS Positive (A) Negative    Nia-Tyson Interpretation       Results suggest past infection.     In most populations, at least 90% of the adult populati SARS-CoV-2 (COVID-19) Not Detected Not Detected   RESPIRATORY PANEL FLU EXPANDED   Result Value Ref Range    Adenovirus PCR: Negative Negative    Coronavirus 229E PCR: Negative Negative    Coronavirus Hku1 PCR: Negative Negative    Coronavirus Nl63 PCR: Ne 93.5 80.0 - 100.0 fL    MCH 29.9 26.0 - 34.0 pg    MCHC 31.9 31.0 - 37.0 g/dL    RDW-SD 42.2 35.1 - 46.3 fL    RDW 12.2 11.0 - 15.0 %    .0 150.0 - 450.0 10(3)uL    Neutrophil Absolute Prelim 5.07 1.50 - 7.70 x10 (3) uL    Neutrophil Absolute 5.07 1 Neutrophil Absolute 6.83 1.50 - 7.70 x10(3) uL    Lymphocyte Absolute 1.51 1.00 - 4.00 x10(3) uL    Monocyte Absolute 1.25 (H) 0.10 - 1.00 x10(3) uL    Eosinophil Absolute 0.54 0.00 - 0.70 x10(3) uL    Basophil Absolute 0.05 0.00 - 0.20 x10(3) uL    Imm

## 2020-09-01 NOTE — PROGRESS NOTES
[facility] Hospitalist Progress Note     PCP: Darren Cohn    CC: Follow up       Assessment/Plan:     Jimmy Blanca is a 71year old female with PMH sig for anxiety and depression who presented with fevers and SOB. CXR without PNA. COVID rapid neg.   CT lb 11.2 oz (50.7 kg)  08/29/20 0518 : 115 lb 9.6 oz (52.4 kg)  08/28/20 0455 : 123 lb 8 oz (56 kg)  08/26/20 1934 : 121 lb 1.6 oz (54.9 kg)  08/26/20 1511 : 118 lb (53.5 kg)  04/10/17 1248 : 123 lb 12.8 oz (56.2 kg)  04/03/17 1505 : 124 lb (56.2 kg)      E 08/26/20  1612   TROP <0.045       Imaging:Xr Chest Pa + Lat Chest (cpt=71046)    Result Date: 8/26/2020  CONCLUSION:  1. No acute findings.     Dictated by (CST): Crispin Parker MD on 8/26/2020 at 2:28 PM     Finalized by (CST): Johanna Parker prominence of the pulmonary interstitium bilaterally which may suggest mild interstitial edema developing. Correlate clinically and follow-up studies are advised. Mild blunting of both costophrenic angles.   Findings could suggest early cardiac failure, f

## 2020-09-01 NOTE — PROGRESS NOTES
Pulmonary Progress Note     Assessment / Plan:  1. Acute hypoxic respiratory failure - possibly due to PNA. CT chest with mild GGOs. Rapid and  SARS-CoV-2 PCR negative. Antibody testing negative as well. PCT negative. Respiratory panel negative.  OMAR n

## 2020-09-01 NOTE — PLAN OF CARE
Pt vss overnight. C/o headache- PRN pain meds given. Negative COVID PCR and rapid. Antibody test negative. O2 @ 5L HFNC. On IV zosyn and PO azithromycin. Can go to med onc bed is available- remote tele.   Problem: Patient Centered Care  Goal: Patient prefer Incentive Spirometry  - Assess the need for suctioning and perform as needed  - Assess and instruct to report SOB or any respiratory difficulty  - Respiratory Therapy support as indicated  - Manage/alleviate anxiety  - Monitor for signs/symptoms of CO2 ret

## 2020-09-02 ENCOUNTER — APPOINTMENT (OUTPATIENT)
Dept: GENERAL RADIOLOGY | Facility: HOSPITAL | Age: 70
DRG: 193 | End: 2020-09-02
Attending: INTERNAL MEDICINE
Payer: MEDICARE

## 2020-09-02 LAB
ANION GAP SERPL CALC-SCNC: 4 MMOL/L (ref 0–18)
BUN BLD-MCNC: 7 MG/DL (ref 7–18)
BUN/CREAT SERPL: 10.9 (ref 10–20)
CALCIUM BLD-MCNC: 9.3 MG/DL (ref 8.5–10.1)
CHLORIDE SERPL-SCNC: 108 MMOL/L (ref 98–112)
CMV ANTIBODY IGG: <0.2 U/ML
CMV ANTIBODY IGM: <8 AU/ML
CO2 SERPL-SCNC: 28 MMOL/L (ref 21–32)
CREAT BLD-MCNC: 0.64 MG/DL (ref 0.55–1.02)
CRP SERPL-MCNC: 18.7 MG/DL (ref ?–0.3)
DEPRECATED RDW RBC AUTO: 37.3 FL (ref 35.1–46.3)
ERYTHROCYTE [DISTWIDTH] IN BLOOD BY AUTOMATED COUNT: 11.6 % (ref 11–15)
GLUCOSE BLD-MCNC: 110 MG/DL (ref 70–99)
HCT VFR BLD AUTO: 32.9 % (ref 35–48)
HGB BLD-MCNC: 11.1 G/DL (ref 12–16)
MCH RBC QN AUTO: 29.8 PG (ref 26–34)
MCHC RBC AUTO-ENTMCNC: 33.7 G/DL (ref 31–37)
MCV RBC AUTO: 88.2 FL (ref 80–100)
MYELOPEROX ANTIBODIES, IGG: 1 AU/ML
MYELOPEROX ANTIBODIES, IGG: 1 AU/ML
NT-PROBNP SERPL-MCNC: 117 PG/ML (ref ?–125)
OSMOLALITY SERPL CALC.SUM OF ELEC: 289 MOSM/KG (ref 275–295)
PLATELET # BLD AUTO: 321 10(3)UL (ref 150–450)
POTASSIUM SERPL-SCNC: 3.9 MMOL/L (ref 3.5–5.1)
RBC # BLD AUTO: 3.73 X10(6)UL (ref 3.8–5.3)
SERINE PROTEASE3, IGG: 1 AU/ML
SERINE PROTEASE3, IGG: 2 AU/ML
SODIUM SERPL-SCNC: 140 MMOL/L (ref 136–145)
WBC # BLD AUTO: 10.8 X10(3) UL (ref 4–11)

## 2020-09-02 PROCEDURE — 71045 X-RAY EXAM CHEST 1 VIEW: CPT | Performed by: INTERNAL MEDICINE

## 2020-09-02 PROCEDURE — 85027 COMPLETE CBC AUTOMATED: CPT | Performed by: HOSPITALIST

## 2020-09-02 PROCEDURE — 86140 C-REACTIVE PROTEIN: CPT | Performed by: HOSPITALIST

## 2020-09-02 PROCEDURE — 83880 ASSAY OF NATRIURETIC PEPTIDE: CPT | Performed by: INTERNAL MEDICINE

## 2020-09-02 PROCEDURE — 80048 BASIC METABOLIC PNL TOTAL CA: CPT | Performed by: HOSPITALIST

## 2020-09-02 NOTE — PLAN OF CARE
Problem: Patient Centered Care  Goal: Patient preferences are identified and integrated in the patient's plan of care  Description  Interventions:  - What would you like us to know as we care for you? From home with  and dogs.  Shelley Awkward out of bed 8/26 Manage/alleviate anxiety  - Monitor for signs/symptoms of CO2 retention  Outcome: Progressing  Note:   O2 sats 93% on 3L nc. Pt denies cp/sob. IV zosyn and PO zithro continued.       Problem: PAIN - ADULT  Goal: Verbalizes/displays adequate comfort level or

## 2020-09-02 NOTE — PLAN OF CARE
Slight fever overnight and now on 4L hiflow NC (sats 88%on 3L). Prn pain medication given for back pain, headache improved.    Problem: Patient Centered Care  Goal: Patient preferences are identified and integrated in the patient's plan of care  Description needed  - Assess and instruct to report SOB or any respiratory difficulty  - Respiratory Therapy support as indicated  - Manage/alleviate anxiety  - Monitor for signs/symptoms of CO2 retention  Outcome: Progressing     Problem: PAIN - ADULT  Goal: Serene Plum

## 2020-09-02 NOTE — PROGRESS NOTES
Shanta Nuñez is a 71year old female.    Patient presents with:  Fever  Dyspnea SPENCER SOB  Fall      HPI:    Had n.c. o2 off earlier and was not sob but was not exerting herself    REVIEW OF SYSTEMS:   A comprehensive 11 point review of systems was complet wheezes. CARDIO: RRR J2/W0, no rubs, clicks, heaves, or murmurs. GI:  Soft NT/ND, BS present, No masses , rebound, no HSM. EXTREMITIES:  No edema, no clubbing, no cyanosis, phlebitis or cellulitis. NEURO:  No focal neurologic deficits.   DERM:  Warm,  process           Results for orders placed or performed during the hospital encounter of 66/93/49   BASIC METABOLIC PANEL (8)   Result Value Ref Range    Glucose 87 70 - 99 mg/dL    Sodium 142 136 - 145 mmol/L    Potassium 4.2 3.5 - 5.1 mmol/L    Chloride -American 108 >=60    ALT 28 13 - 56 U/L    AST 23 15 - 37 U/L    Alkaline Phosphatase 71 55 - 142 U/L    Bilirubin, Total 0.3 0.1 - 2.0 mg/dL    Total Protein 5.8 (L) 6.4 - 8.2 g/dL    Albumin 2.6 (L) 3.4 - 5.0 g/dL    Globulin  3.2 2.8 - 4.4 g/dL 295 mOsm/kg    GFR, Non- 93 >=60    GFR, -American 107 >=60    ALT 17 13 - 56 U/L    AST 9 (L) 15 - 37 U/L    Alkaline Phosphatase 68 55 - 142 U/L    Bilirubin, Total 0.3 0.1 - 2.0 mg/dL    Total Protein 5.9 (L) 6.4 - 8.2 g/dL    Alb U/L   CMV ABS IGG/IGM   Result Value Ref Range    CMV Antibody IgG <0.20 U/mL    CMV Antibody IgM <8.0 <=29.9 AU/mL   EBV, CHRONIC/ACTIVE INFECTION   Result Value Ref Range    Nia-Barr Virus AB IgM Negative Negative    Nia-Barr Virus AB IgG Positiv 0. 64 0.55 - 1.02 mg/dL    BUN/CREA Ratio 10.9 10.0 - 20.0    Calcium, Total 9.3 8.5 - 10.1 mg/dL    Calculated Osmolality 289 275 - 295 mOsm/kg    GFR, Non- 91 >=60    GFR, -American 105 >=60   CBC, PLATELET; NO DIFFERENTIAL   Result Pertussis PCR Negative Negative    Chlamydia pneumonia PCR: Negative Negative    Mycoplasma pneumonia PCR: Negative Negative   SARS-COV-2 IGG ANTIBODY   Result Value Ref Range    SARS-CoV-2 IgG Antibody Negative Negative   CBC W/ DIFFERENTIAL   Result Valu Result Value Ref Range    WBC 9.0 4.0 - 11.0 x10(3) uL    RBC 3.76 (L) 3.80 - 5.30 x10(6)uL    HGB 11.3 (L) 12.0 - 16.0 g/dL    HCT 33.5 (L) 35.0 - 48.0 %    MCV 89.1 80.0 - 100.0 fL    MCH 30.1 26.0 - 34.0 pg    MCHC 33.7 31.0 - 37.0 g/dL    RDW-SD 38. 3 37.0 g/dL    RDW-SD 39.9 35.1 - 46.3 fL    RDW 11.8 11.0 - 15.0 %    .0 150.0 - 450.0 10(3)uL    Neutrophil Absolute Prelim 7.40 1.50 - 7.70 x10 (3) uL    Neutrophil Absolute 7.40 1.50 - 7.70 x10(3) uL    Lymphocyte Absolute 1.66 1.00 - 4.00 x10(3)

## 2020-09-02 NOTE — PLAN OF CARE
This morning patient is comfortable, requests Percocet every 6 hours exactly, and continues to receive IV zosyn and PO Zithromax. Plan is for possible bronchoscopy pending results of Chest x-ray and fevers.  PCR has been completed and sent, chest x-ray has Position to facilitate oxygenation and minimize respiratory effort  - Oxygen supplementation based on oxygen saturation or ABGs  - Provide Smoking Cessation handout, if applicable  - Encourage broncho-pulmonary hygiene including cough, deep breathe, Incent

## 2020-09-03 LAB
ANION GAP SERPL CALC-SCNC: 4 MMOL/L (ref 0–18)
BLASTOMYCES ANTIBODY BY EIA, SER: 0.1 IV
BUN BLD-MCNC: 9 MG/DL (ref 7–18)
BUN/CREAT SERPL: 12.5 (ref 10–20)
CALCIUM BLD-MCNC: 9.5 MG/DL (ref 8.5–10.1)
CHLORIDE SERPL-SCNC: 110 MMOL/L (ref 98–112)
CO2 SERPL-SCNC: 29 MMOL/L (ref 21–32)
CREAT BLD-MCNC: 0.72 MG/DL (ref 0.55–1.02)
CRP SERPL-MCNC: 12.5 MG/DL (ref ?–0.3)
DEPRECATED RDW RBC AUTO: 38.5 FL (ref 35.1–46.3)
EPSTEIN BARR VIRUS BY PCR: NOT DETECTED
ERYTHROCYTE [DISTWIDTH] IN BLOOD BY AUTOMATED COUNT: 11.7 % (ref 11–15)
GLUCOSE BLD-MCNC: 105 MG/DL (ref 70–99)
HCT VFR BLD AUTO: 36.3 % (ref 35–48)
HGB BLD-MCNC: 12 G/DL (ref 12–16)
MCH RBC QN AUTO: 29.7 PG (ref 26–34)
MCHC RBC AUTO-ENTMCNC: 33.1 G/DL (ref 31–37)
MCV RBC AUTO: 89.9 FL (ref 80–100)
OSMOLALITY SERPL CALC.SUM OF ELEC: 295 MOSM/KG (ref 275–295)
PLATELET # BLD AUTO: 381 10(3)UL (ref 150–450)
POTASSIUM SERPL-SCNC: 4.4 MMOL/L (ref 3.5–5.1)
RBC # BLD AUTO: 4.04 X10(6)UL (ref 3.8–5.3)
SARS-COV-2 RNA RESP QL NAA+PROBE: NOT DETECTED
SODIUM SERPL-SCNC: 143 MMOL/L (ref 136–145)
WBC # BLD AUTO: 11.7 X10(3) UL (ref 4–11)

## 2020-09-03 PROCEDURE — 85027 COMPLETE CBC AUTOMATED: CPT | Performed by: HOSPITALIST

## 2020-09-03 PROCEDURE — 80048 BASIC METABOLIC PNL TOTAL CA: CPT | Performed by: HOSPITALIST

## 2020-09-03 PROCEDURE — 86140 C-REACTIVE PROTEIN: CPT | Performed by: HOSPITALIST

## 2020-09-03 NOTE — PROGRESS NOTES
Pulmonary Progress Note     Assessment / Plan:  1. Acute hypoxic respiratory failure - possibly due to PNA. CT chest with mild GGOs. Rapid and  x2 SARS-CoV-2 PCR negative. Antibody testing negative as well. PCT negative. Respiratory panel negative.  AN

## 2020-09-03 NOTE — CM/SW NOTE
Care Progression Note:  Active Acute Medical Issue:   72 y/o female with fever,sob, and hypoxia, COVID-19 negative x2, RSV negative, ? PNA, weaning oxygen as tolerated  Autoimmune w/u negative  Fungal serologies pending    Other Contributing Medical Factors

## 2020-09-03 NOTE — PLAN OF CARE
Problem: Patient Centered Care  Goal: Patient preferences are identified and integrated in the patient's plan of care  Description  Interventions:  - What would you like us to know as we care for you? From home with  and dogs.  Elizabeth Badillo out of bed 8/26 Manage/alleviate anxiety  - Monitor for signs/symptoms of CO2 retention  Outcome: Progressing     Problem: PAIN - ADULT  Goal: Verbalizes/displays adequate comfort level or patient's stated pain goal  Description  INTERVENTIONS:  - Encourage pt to monitor

## 2020-09-03 NOTE — PROGRESS NOTES
[facility] Hospitalist Progress Note     PCP: Cady Medina    CC: Follow up       Assessment/Plan:     Albert Marx is a 71year old female with PMH sig for anxiety and depression who presented with fevers and SOB. CXR without PNA. COVID rapid neg.   CT 0519 : 117 lb 1.6 oz (53.1 kg)  08/31/20 0536 : 117 lb 8 oz (53.3 kg)  08/30/20 0500 : 111 lb 11.2 oz (50.7 kg)  08/29/20 0518 : 115 lb 9.6 oz (52.4 kg)  08/28/20 0455 : 123 lb 8 oz (56 kg)  08/26/20 1934 : 121 lb 1.6 oz (54.9 kg)  08/26/20 1511 : 118 lb ( results for input(s): PGLU in the last 168 hours. No results for input(s): TROP in the last 168 hours. Imaging:Xr Chest Pa + Lat Chest (cpt=71046)    Result Date: 8/26/2020  CONCLUSION:  1. No acute findings.     Dictated by (CST): Hunter Haynes Ian Blair MD on 8/28/2020 at 2:20 PM          Nm Lung Perfusion Scan Only  (cpt=78580)    Result Date: 8/28/2020  CONCLUSION: Perfusion lung scan demonstrates no evidence for pulmonary embolism    Dictated by (CST): Norm Head MD on 8/28/20

## 2020-09-03 NOTE — PROGRESS NOTES
[facility] AND [facility] Infectious Disease Progress Note    Artie Fajardo Patient Status:  Inpatient    11/3/1950 MRN [id number]   Location [facility] 5SW/SE Attending Milly Avila MD   Hosp Day # 8 PCP Jaime Purcell     Subjective:  Pt Oral, TID  •  OLANZapine (ZYPREXA) tab 5 mg, 5 mg, Oral, Nightly  •  pregabalin (LYRICA) cap 100 mg, 100 mg, Oral, BID  •  tiZANidine HCl (ZANAFLEX) tab 4 mg, 4 mg, Oral, Q12H PRN  •  traZODone HCl (DESYREL) tab 50 mg, 50 mg, Oral, Nightly  •  FLUoxetine H negative x 2, antibodies negative, RVP negative  -legionella ag negative  -PCT is WNL  -autoimmune workup negative  -fungal serologies pending  -significantly elevated CRP, trending down at 12.5  -leukocytosis, mildly elevated  -on IV zosyn and azithromyci

## 2020-09-03 NOTE — PLAN OF CARE
Problem: Patient Centered Care  Goal: Patient preferences are identified and integrated in the patient's plan of care  Description  Interventions:  - What would you like us to know as we care for you? From home with  and dogs.  Juliette Hanna out of bed 8/26 Manage/alleviate anxiety  - Monitor for signs/symptoms of CO2 retention  Outcome: Progressing     Problem: PAIN - ADULT  Goal: Verbalizes/displays adequate comfort level or patient's stated pain goal  Description  INTERVENTIONS:  - Encourage pt to monitor

## 2020-09-04 LAB
ANION GAP SERPL CALC-SCNC: 5 MMOL/L (ref 0–18)
BUN BLD-MCNC: 9 MG/DL (ref 7–18)
BUN/CREAT SERPL: 13.2 (ref 10–20)
CALCIUM BLD-MCNC: 9.6 MG/DL (ref 8.5–10.1)
CHLORIDE SERPL-SCNC: 109 MMOL/L (ref 98–112)
CO2 SERPL-SCNC: 28 MMOL/L (ref 21–32)
CREAT BLD-MCNC: 0.68 MG/DL (ref 0.55–1.02)
CRP SERPL-MCNC: 8.33 MG/DL (ref ?–0.3)
DEPRECATED RDW RBC AUTO: 39.1 FL (ref 35.1–46.3)
ERYTHROCYTE [DISTWIDTH] IN BLOOD BY AUTOMATED COUNT: 11.9 % (ref 11–15)
GLUCOSE BLD-MCNC: 103 MG/DL (ref 70–99)
HCT VFR BLD AUTO: 35.6 % (ref 35–48)
HGB BLD-MCNC: 11.7 G/DL (ref 12–16)
MCH RBC QN AUTO: 29.5 PG (ref 26–34)
MCHC RBC AUTO-ENTMCNC: 32.9 G/DL (ref 31–37)
MCV RBC AUTO: 89.9 FL (ref 80–100)
OSMOLALITY SERPL CALC.SUM OF ELEC: 293 MOSM/KG (ref 275–295)
PLATELET # BLD AUTO: 451 10(3)UL (ref 150–450)
POTASSIUM SERPL-SCNC: 4.1 MMOL/L (ref 3.5–5.1)
RBC # BLD AUTO: 3.96 X10(6)UL (ref 3.8–5.3)
SODIUM SERPL-SCNC: 142 MMOL/L (ref 136–145)
WBC # BLD AUTO: 12 X10(3) UL (ref 4–11)

## 2020-09-04 PROCEDURE — 86140 C-REACTIVE PROTEIN: CPT | Performed by: HOSPITALIST

## 2020-09-04 PROCEDURE — 80048 BASIC METABOLIC PNL TOTAL CA: CPT | Performed by: HOSPITALIST

## 2020-09-04 PROCEDURE — 85027 COMPLETE CBC AUTOMATED: CPT | Performed by: HOSPITALIST

## 2020-09-04 RX ORDER — AMOXICILLIN AND CLAVULANATE POTASSIUM 875; 125 MG/1; MG/1
1 TABLET, FILM COATED ORAL EVERY 12 HOURS SCHEDULED
Status: DISCONTINUED | OUTPATIENT
Start: 2020-09-04 | End: 2020-09-07

## 2020-09-04 RX ORDER — LOPERAMIDE HYDROCHLORIDE 2 MG/1
2 CAPSULE ORAL 4 TIMES DAILY PRN
Status: DISCONTINUED | OUTPATIENT
Start: 2020-09-04 | End: 2020-09-07

## 2020-09-04 NOTE — PLAN OF CARE
Problem: Patient Centered Care  Goal: Patient preferences are identified and integrated in the patient's plan of care  Description  Interventions:  - What would you like us to know as we care for you? From home with  and dogs.  Luan Clark out of bed 8/26 Manage/alleviate anxiety  - Monitor for signs/symptoms of CO2 retention  Outcome: Progressing     Problem: PAIN - ADULT  Goal: Verbalizes/displays adequate comfort level or patient's stated pain goal  Description  INTERVENTIONS:  - Encourage pt to monitor

## 2020-09-04 NOTE — CM/SW NOTE
[facility] self referral for O2 needs. Pt is currently on 4LO2, not at home. Pt does not have a qualifying dx for O2. Out of pocket costs are $125.00/month. If pt does require home O2, MD would need to sign HME order and RN to document pt sats.     Documentation

## 2020-09-04 NOTE — PROGRESS NOTES
[facility] Hospitalist Progress Note     PCP: Saritha Marina     Assessment/Plan:     Bobo Dumont is a 71year old female with PMH sig for anxiety and depression who presented with fevers and SOB. CXR without PNA. COVID rapid neg.  CT with GGO (mild) vs mi mg Subcutaneous Nightly   • hydrOXYzine HCl  50 mg Oral TID   • OLANZapine  5 mg Oral Nightly   • pregabalin  100 mg Oral BID   • traZODone HCl  50 mg Oral Nightly   • FLUoxetine HCl  80 mg Oral Daily       Butalbital-APAP-Caffeine, Alum & Mag Hydroxide-Si in the setting of mild CHF/fluid overload, although an atypical infectious/inflammatory process could have a similar appearance. 2. Mild dependent atelectasis in the lower lobes.  3. Small pericardial effusion, most likely physiologic, and coronary artery c fracture. Dictated by (CST): Lorrie Jaramillo MD on 9/02/2020 at 10:58 AM     Finalized by (CST): Lorrie Jaramillo MD on 9/02/2020 at 11:04 AM          Xr Chest Ap Portable  (cpt=71045)    Result Date: 8/31/2020  CONCLUSION:  1.  There is now prominence of t

## 2020-09-04 NOTE — PROGRESS NOTES
[facility] AND [facility] Infectious Disease  Progress Note    Francesco Green Patient Status:  Inpatient    11/3/1950 MRN [id number]   Location [facility] 5SW/SE Attending Renetta Minaya, DO   Hosp Day # 9 PCP Rosa Meehan     Subjective:  Patient calcifications. 4. Small lymph nodes throughout the bilateral axillae and mediastinum that are not pathologic by CT size criteria and are most likely reactive in nature.   5. Severe compression fracture in the T12 vertebral body is unchanged from 09/19/201

## 2020-09-04 NOTE — PLAN OF CARE
Problem: Patient Centered Care  Goal: Patient preferences are identified and integrated in the patient's plan of care  Description  Interventions:  - What would you like us to know as we care for you? From home with  and dogs.  Dexter Do out of bed 8/26 Manage/alleviate anxiety  - Monitor for signs/symptoms of CO2 retention  Outcome: Progressing     Problem: PAIN - ADULT  Goal: Verbalizes/displays adequate comfort level or patient's stated pain goal  Description  INTERVENTIONS:  - Encourage pt to monitor

## 2020-09-05 LAB
ANION GAP SERPL CALC-SCNC: 6 MMOL/L (ref 0–18)
BUN BLD-MCNC: 8 MG/DL (ref 7–18)
BUN/CREAT SERPL: 11.4 (ref 10–20)
CALCIUM BLD-MCNC: 9.5 MG/DL (ref 8.5–10.1)
CHLORIDE SERPL-SCNC: 110 MMOL/L (ref 98–112)
CMV QUANT BY PCR, CPY/ML: <390 CPY/ML
CMV QUANT BY PCR, INTERP: NOT DETECTED
CMV QUANT BY PCR, IU/ML: <227 IU/ML
CMV QUANT BY PCR, LOG CPY/ML: <2.6 LOG CPY/ML
CMV QUANT BY PCR, LOG IU/ML: <2.4 LOG IU/ML
CO2 SERPL-SCNC: 29 MMOL/L (ref 21–32)
CREAT BLD-MCNC: 0.7 MG/DL (ref 0.55–1.02)
DEPRECATED RDW RBC AUTO: 38.6 FL (ref 35.1–46.3)
ERYTHROCYTE [DISTWIDTH] IN BLOOD BY AUTOMATED COUNT: 11.9 % (ref 11–15)
GLUCOSE BLD-MCNC: 121 MG/DL (ref 70–99)
HCT VFR BLD AUTO: 35.8 % (ref 35–48)
HGB BLD-MCNC: 11.9 G/DL (ref 12–16)
MCH RBC QN AUTO: 29.8 PG (ref 26–34)
MCHC RBC AUTO-ENTMCNC: 33.2 G/DL (ref 31–37)
MCV RBC AUTO: 89.7 FL (ref 80–100)
NT-PROBNP SERPL-MCNC: 82 PG/ML (ref ?–125)
OSMOLALITY SERPL CALC.SUM OF ELEC: 300 MOSM/KG (ref 275–295)
PLATELET # BLD AUTO: 485 10(3)UL (ref 150–450)
POTASSIUM SERPL-SCNC: 4.4 MMOL/L (ref 3.5–5.1)
RBC # BLD AUTO: 3.99 X10(6)UL (ref 3.8–5.3)
SODIUM SERPL-SCNC: 145 MMOL/L (ref 136–145)
WBC # BLD AUTO: 11.7 X10(3) UL (ref 4–11)

## 2020-09-05 PROCEDURE — 80048 BASIC METABOLIC PNL TOTAL CA: CPT | Performed by: HOSPITALIST

## 2020-09-05 PROCEDURE — 85027 COMPLETE CBC AUTOMATED: CPT | Performed by: HOSPITALIST

## 2020-09-05 PROCEDURE — 83880 ASSAY OF NATRIURETIC PEPTIDE: CPT | Performed by: INTERNAL MEDICINE

## 2020-09-05 NOTE — PROGRESS NOTES
[facility] Hospitalist Progress Note     PCP: Annette Gaytan     Assessment/Plan:     Mary Lee is a 71year old female with PMH sig for anxiety and depression who presented with fevers and SOB. CXR without PNA. COVID rapid neg.  CT with GGO (mild) vs mi warm, dry  EXT: no edema      Medications     • Amoxicillin-Pot Clavulanate  1 tablet Oral 2 times per day   • enoxaparin  40 mg Subcutaneous Nightly   • hydrOXYzine HCl  50 mg Oral TID   • OLANZapine  5 mg Oral Nightly   • pregabalin  100 mg Oral BID   • greatest involvement of the lower lobes. Smooth interlobular septal thickening is also seen at both lung bases with trace bilateral pleural effusions.   These findings may relate to pulmonary edema in the setting of mild CHF/fluid overload, although an aty Linear regions of atelectasis and or scar in both lower lungs. No evidence of pneumonia. 3. Stable mild enlargement of cardiac silhouette with normal pulmonary vascularity. 4. L1 compression fracture.     Dictated by (CST): Nestor Wallace MD on 9/02/2020 a

## 2020-09-05 NOTE — PLAN OF CARE
Problem: Patient Centered Care  Goal: Patient preferences are identified and integrated in the patient's plan of care  Description  Interventions:  - What would you like us to know as we care for you? From home with  and dogs.  Katelin Zuniga out of bed 8/26 Manage/alleviate anxiety  - Monitor for signs/symptoms of CO2 retention  Outcome: Progressing     Problem: PAIN - ADULT  Goal: Verbalizes/displays adequate comfort level or patient's stated pain goal  Description  INTERVENTIONS:  - Encourage pt to monitor

## 2020-09-05 NOTE — PLAN OF CARE
Problem: Patient Centered Care  Goal: Patient preferences are identified and integrated in the patient's plan of care  Description  Interventions:  - What would you like us to know as we care for you? From home with  and dogs.  Atif Caruso out of bed 8/26 Manage/alleviate anxiety  - Monitor for signs/symptoms of CO2 retention  Outcome: Progressing     Problem: PAIN - ADULT  Goal: Verbalizes/displays adequate comfort level or patient's stated pain goal  Description  INTERVENTIONS:  - Encourage pt to monitor

## 2020-09-05 NOTE — PROGRESS NOTES
[facility] AND [facility] Infectious Disease  Progress Note    Juany Meza Patient Status:  Inpatient    11/3/1950 MRN [id number]   Location [facility] 5SW/SE Attending Carlee Sandoval, DO   Hosp Day # 10 PCP Marita Found Mary Crimes     Subjective:  Omayra and coronary artery calcifications. 4. Small lymph nodes throughout the bilateral axillae and mediastinum that are not pathologic by CT size criteria and are most likely reactive in nature.   5. Severe compression fracture in the T12 vertebral body is [place]

## 2020-09-06 LAB
ANION GAP SERPL CALC-SCNC: 7 MMOL/L (ref 0–18)
BUN BLD-MCNC: 10 MG/DL (ref 7–18)
BUN/CREAT SERPL: 15.6 (ref 10–20)
CALCIUM BLD-MCNC: 9.4 MG/DL (ref 8.5–10.1)
CHLORIDE SERPL-SCNC: 109 MMOL/L (ref 98–112)
CO2 SERPL-SCNC: 28 MMOL/L (ref 21–32)
CREAT BLD-MCNC: 0.64 MG/DL (ref 0.55–1.02)
DEPRECATED RDW RBC AUTO: 39.6 FL (ref 35.1–46.3)
ERYTHROCYTE [DISTWIDTH] IN BLOOD BY AUTOMATED COUNT: 11.9 % (ref 11–15)
GLUCOSE BLD-MCNC: 115 MG/DL (ref 70–99)
HCT VFR BLD AUTO: 39.1 % (ref 35–48)
HGB BLD-MCNC: 12.6 G/DL (ref 12–16)
MCH RBC QN AUTO: 29.3 PG (ref 26–34)
MCHC RBC AUTO-ENTMCNC: 32.2 G/DL (ref 31–37)
MCV RBC AUTO: 90.9 FL (ref 80–100)
OSMOLALITY SERPL CALC.SUM OF ELEC: 298 MOSM/KG (ref 275–295)
PLATELET # BLD AUTO: 566 10(3)UL (ref 150–450)
POTASSIUM SERPL-SCNC: 4 MMOL/L (ref 3.5–5.1)
RBC # BLD AUTO: 4.3 X10(6)UL (ref 3.8–5.3)
SODIUM SERPL-SCNC: 144 MMOL/L (ref 136–145)
WBC # BLD AUTO: 10.3 X10(3) UL (ref 4–11)

## 2020-09-06 PROCEDURE — 80048 BASIC METABOLIC PNL TOTAL CA: CPT | Performed by: HOSPITALIST

## 2020-09-06 PROCEDURE — 85027 COMPLETE CBC AUTOMATED: CPT | Performed by: HOSPITALIST

## 2020-09-06 NOTE — PLAN OF CARE
Problem: Patient Centered Care  Goal: Patient preferences are identified and integrated in the patient's plan of care  Description  Interventions:  - What would you like us to know as we care for you? From home with  and dogs.  Myke Andres out of bed 8/26 Manage/alleviate anxiety  - Monitor for signs/symptoms of CO2 retention  Outcome: Progressing     Problem: PAIN - ADULT  Goal: Verbalizes/displays adequate comfort level or patient's stated pain goal  Description  INTERVENTIONS:  - Encourage pt to monitor

## 2020-09-06 NOTE — PLAN OF CARE
Problem: Patient Centered Care  Goal: Patient preferences are identified and integrated in the patient's plan of care  Description  Interventions:  - What would you like us to know as we care for you? From home with  and dogs.  Dudley Vasquez out of bed 8/26 Manage/alleviate anxiety  - Monitor for signs/symptoms of CO2 retention  Outcome: Progressing     Problem: PAIN - ADULT  Goal: Verbalizes/displays adequate comfort level or patient's stated pain goal  Description  INTERVENTIONS:  - Encourage pt to monitor

## 2020-09-06 NOTE — PROGRESS NOTES
[facility] AND CLINICS  [facility] Infectious Disease  Progress Note    Ajith Bread Patient Status:  Inpatient    11/3/1950 MRN [id number]   Location [facility] 5SW/SE Attending Ta Jim,    Hosp Day # 11 PCP Reji Joseph     Subjective:  Omayra physiologic, and coronary artery calcifications. 4. Small lymph nodes throughout the bilateral axillae and mediastinum that are not pathologic by CT size criteria and are most likely reactive in nature.   5. Severe compression fracture in the T12 vertebral

## 2020-09-06 NOTE — PROGRESS NOTES
[facility] Hospitalist Progress Note     PCP: Aida Alfred     Assessment/Plan:     Philly Officer is a 71year old female with PMH sig for anxiety and depression who presented with fevers and SOB. CXR without PNA. COVID rapid neg.  CT with GGO (mild) vs mi OLANZapine  5 mg Oral Nightly   • pregabalin  100 mg Oral BID   • traZODone HCl  50 mg Oral Nightly   • FLUoxetine HCl  80 mg Oral Daily       Loperamide HCl, Butalbital-APAP-Caffeine, Alum & Mag Hydroxide-Simeth, guaiFENesin, benzocaine-menthol, oxyCODONE relate to pulmonary edema in the setting of mild CHF/fluid overload, although an atypical infectious/inflammatory process could have a similar appearance. 2. Mild dependent atelectasis in the lower lobes.  3. Small pericardial effusion, most likely physiolo vascularity. 4. L1 compression fracture.     Dictated by (CST): Bebe Najera MD on 9/02/2020 at 10:58 AM     Finalized by (CST): Bebe Najera MD on 9/02/2020 at 11:04 AM          Xr Chest Ap Portable  (cpt=71045)    Result Date: 8/31/2020  CONCLUSION:

## 2020-09-07 VITALS
DIASTOLIC BLOOD PRESSURE: 63 MMHG | HEIGHT: 61 IN | BODY MASS INDEX: 21.68 KG/M2 | RESPIRATION RATE: 18 BRPM | OXYGEN SATURATION: 91 % | TEMPERATURE: 98 F | HEART RATE: 84 BPM | SYSTOLIC BLOOD PRESSURE: 115 MMHG | WEIGHT: 114.81 LBS

## 2020-09-07 RX ORDER — AMOXICILLIN AND CLAVULANATE POTASSIUM 875; 125 MG/1; MG/1
1 TABLET, FILM COATED ORAL 2 TIMES DAILY
Qty: 10 TABLET | Refills: 0 | Status: SHIPPED | OUTPATIENT
Start: 2020-09-07 | End: 2020-09-12

## 2020-09-07 NOTE — DISCHARGE SUMMARY
General Medicine Discharge Summary     Patient ID:  Andrés Osorio  71year old  11/3/1950    Admit date: 8/26/2020    Discharge date and time: 9/7/20    Attending Physician: DO Mic Tom Tab  Take 1 tablet by mouth 3 (three) times daily as needed for Pain. traZODone HCl 50 MG Oral Tab  Take 50 mg by mouth nightly. pregabalin 25 MG Oral Cap  Take 100 mg by mouth 2 (two) times daily.       celecoxib 50 MG Oral Cap  Take 100 mg by mouth

## 2020-09-07 NOTE — PLAN OF CARE
Problem: Patient Centered Care  Goal: Patient preferences are identified and integrated in the patient's plan of care  Description  Interventions:  - What would you like us to know as we care for you? From home with  and dogs.  Atif Caruso out of bed 8/26 anxiety  - Monitor for signs/symptoms of CO2 retention  Outcome: Completed     Problem: PAIN - ADULT  Goal: Verbalizes/displays adequate comfort level or patient's stated pain goal  Description  INTERVENTIONS:  - Encourage pt to monitor pain and request as

## 2020-09-07 NOTE — PLAN OF CARE
Pt denies sob, chills, fever, n/v. Pain controled with medication. Medication reviewed. Safety maintained. Call light in reach. We will continue to monitor.   Problem: Patient Centered Care  Goal: Patient preferences are identified and integrated in the pat suctioning and perform as needed  - Assess and instruct to report SOB or any respiratory difficulty  - Respiratory Therapy support as indicated  - Manage/alleviate anxiety  - Monitor for signs/symptoms of CO2 retention  Outcome: Progressing     Problem: PA

## 2020-09-07 NOTE — PROGRESS NOTES
[facility] Infectious Disease Progress Note    Jasmeet Hankins Patient Status:  Inpatient    11/3/1950 MRN [id number]   Location [facility] 5SW/SE Attending Olvin Borrero, DO   Hosp Day # 12 PCP Porter Henderson Camera     Subjective:  Chart r (AMBIEN) tab 5 mg, 5 mg, Oral, Nightly PRN  •  Metoclopramide HCl (REGLAN) injection 5 mg, 5 mg, Intravenous, Q8H PRN  •  hydrOXYzine HCl (ATARAX) tab 50 mg, 50 mg, Oral, TID  •  OLANZapine (ZYPREXA) tab 5 mg, 5 mg, Oral, Nightly  •  pregabalin (LYRICA) ca due to above  -at 10.3 yesterday < 11.7  -no diarrhea, afebrile    3. Depression and anxiety  -recently admitted to [facility] AT [facility] for this  -is on medication and seeing outside treatment    DISPO:  Stable from ID standpoint for DC when cleared by other services.    C

## 2020-09-08 NOTE — PAYOR COMM NOTE
--------------  DISCHARGE REVIEW    Payor: [facility] [place] #:  [id number]  Authorization Number: [id number]    Admit date: 8/26/20  Admit time:  1935  Discharge Date: 9/7/2020  3:00 PM     Admitting Physician: Telma Cadet MD  At CONSULT TO PULMONOLOGY  IP CONSULT TO INFECTIOUS DISEASE  IP CONSULT TO SOCIAL WORK    Patient instructions:      Current Discharge Medication List    START taking these medications    Amoxicillin-Pot Clavulanate 875-125 MG Oral Tab  Take 1 tablet by mouth cyanosis. Skin: Normal texture and turgor. Neurologic: Cranial nerves are grossly intact. Motor strength and sensory examination is grossly normal.  No focal neurologic deficit.     Total time coordinating care for discharge: Greater than 30 minutes

## 2020-09-09 ENCOUNTER — APPOINTMENT (OUTPATIENT)
Dept: BEHAVIORAL HEALTH | Age: 70
End: 2020-09-09

## 2020-09-22 ENCOUNTER — E-ADVICE (OUTPATIENT)
Dept: BEHAVIORAL HEALTH | Age: 70
End: 2020-09-22

## 2020-09-28 ENCOUNTER — TELEPHONE (OUTPATIENT)
Dept: BEHAVIORAL HEALTH | Age: 70
End: 2020-09-28

## 2020-09-28 ENCOUNTER — V-VISIT (OUTPATIENT)
Dept: BEHAVIORAL HEALTH | Age: 70
End: 2020-09-28

## 2020-09-28 DIAGNOSIS — F33.2 SEVERE EPISODE OF RECURRENT MAJOR DEPRESSIVE DISORDER, WITHOUT PSYCHOTIC FEATURES (CMD): Primary | ICD-10-CM

## 2020-09-28 DIAGNOSIS — F41.1 GAD (GENERALIZED ANXIETY DISORDER): ICD-10-CM

## 2020-09-28 PROCEDURE — 90791 PSYCH DIAGNOSTIC EVALUATION: CPT | Performed by: SOCIAL WORKER

## 2020-09-28 RX ORDER — OLANZAPINE 5 MG/1
5 TABLET ORAL NIGHTLY
Qty: 30 TABLET | Refills: 0 | Status: SHIPPED | OUTPATIENT
Start: 2020-09-28 | End: 2020-10-14 | Stop reason: SDUPTHER

## 2020-09-28 RX ORDER — TRAZODONE HYDROCHLORIDE 50 MG/1
50 TABLET ORAL NIGHTLY
Qty: 30 TABLET | Refills: 0 | Status: SHIPPED | OUTPATIENT
Start: 2020-09-28 | End: 2020-10-14 | Stop reason: SDUPTHER

## 2020-09-28 RX ORDER — HYDROXYZINE 50 MG/1
50 TABLET, FILM COATED ORAL 3 TIMES DAILY
Qty: 90 TABLET | Refills: 0 | Status: SHIPPED | OUTPATIENT
Start: 2020-09-28 | End: 2020-10-25 | Stop reason: SDUPTHER

## 2020-09-28 RX ORDER — FLUOXETINE HYDROCHLORIDE 40 MG/1
80 CAPSULE ORAL DAILY
Qty: 60 CAPSULE | Refills: 0 | Status: SHIPPED | OUTPATIENT
Start: 2020-09-28 | End: 2020-10-21 | Stop reason: SDUPTHER

## 2020-09-28 RX ORDER — HYDROXYZINE HYDROCHLORIDE 25 MG/1
25 TABLET, FILM COATED ORAL 3 TIMES DAILY PRN
Qty: 90 TABLET | Refills: 0 | Status: SHIPPED | OUTPATIENT
Start: 2020-09-28 | End: 2020-10-14 | Stop reason: SDUPTHER

## 2020-09-28 RX ORDER — TRAZODONE HYDROCHLORIDE 50 MG/1
TABLET ORAL
Qty: 30 TABLET | Refills: 0 | Status: SHIPPED | OUTPATIENT
Start: 2020-09-28 | End: 2020-10-14 | Stop reason: SDUPTHER

## 2020-09-28 ASSESSMENT — ENCOUNTER SYMPTOMS
NERVOUS/ANXIOUS: 1
SLEEP DISTURBANCE: 1

## 2020-10-12 ENCOUNTER — TELEPHONIC VISIT (OUTPATIENT)
Dept: BEHAVIORAL HEALTH | Age: 70
End: 2020-10-12

## 2020-10-12 DIAGNOSIS — F41.1 GAD (GENERALIZED ANXIETY DISORDER): ICD-10-CM

## 2020-10-12 DIAGNOSIS — F33.2 SEVERE EPISODE OF RECURRENT MAJOR DEPRESSIVE DISORDER, WITHOUT PSYCHOTIC FEATURES (CMD): Primary | ICD-10-CM

## 2020-10-12 PROCEDURE — 90832 PSYTX W PT 30 MINUTES: CPT | Performed by: SOCIAL WORKER

## 2020-10-12 ASSESSMENT — ENCOUNTER SYMPTOMS
NERVOUS/ANXIOUS: 1
SLEEP DISTURBANCE: 1

## 2020-10-14 ENCOUNTER — V-VISIT (OUTPATIENT)
Dept: BEHAVIORAL HEALTH | Age: 70
End: 2020-10-14

## 2020-10-14 DIAGNOSIS — F41.1 GAD (GENERALIZED ANXIETY DISORDER): ICD-10-CM

## 2020-10-14 DIAGNOSIS — F33.2 SEVERE EPISODE OF RECURRENT MAJOR DEPRESSIVE DISORDER, WITHOUT PSYCHOTIC FEATURES (CMD): Primary | ICD-10-CM

## 2020-10-14 PROCEDURE — 90792 PSYCH DIAG EVAL W/MED SRVCS: CPT | Performed by: PSYCHIATRY & NEUROLOGY

## 2020-10-14 RX ORDER — TRAZODONE HYDROCHLORIDE 100 MG/1
50 TABLET ORAL NIGHTLY
Qty: 30 TABLET | Refills: 1 | Status: SHIPPED | OUTPATIENT
Start: 2020-10-14 | End: 2020-11-12 | Stop reason: SDUPTHER

## 2020-10-14 RX ORDER — OLANZAPINE 5 MG/1
5 TABLET ORAL NIGHTLY
Qty: 30 TABLET | Refills: 1 | Status: SHIPPED | OUTPATIENT
Start: 2020-10-14 | End: 2020-10-21 | Stop reason: SDUPTHER

## 2020-10-21 RX ORDER — OLANZAPINE 5 MG/1
5 TABLET ORAL NIGHTLY
Qty: 30 TABLET | Refills: 1 | Status: SHIPPED | OUTPATIENT
Start: 2020-10-21 | End: 2020-12-13 | Stop reason: SDUPTHER

## 2020-10-21 RX ORDER — FLUOXETINE HYDROCHLORIDE 40 MG/1
80 CAPSULE ORAL DAILY
Qty: 60 CAPSULE | Refills: 0 | Status: SHIPPED | OUTPATIENT
Start: 2020-10-21 | End: 2020-11-23

## 2020-10-25 ENCOUNTER — TELEPHONE (OUTPATIENT)
Dept: SCHEDULING | Age: 70
End: 2020-10-25

## 2020-10-26 RX ORDER — HYDROXYZINE 50 MG/1
50 TABLET, FILM COATED ORAL 3 TIMES DAILY
Qty: 90 TABLET | Refills: 0 | Status: SHIPPED | OUTPATIENT
Start: 2020-10-26 | End: 2020-12-13 | Stop reason: SDUPTHER

## 2020-10-26 RX ORDER — HYDROXYZINE 50 MG/1
50 TABLET, FILM COATED ORAL 3 TIMES DAILY
Qty: 90 TABLET | Refills: 0 | Status: SHIPPED | OUTPATIENT
Start: 2020-10-26 | End: 2021-02-02 | Stop reason: SDUPTHER

## 2020-10-27 ENCOUNTER — V-VISIT (OUTPATIENT)
Dept: BEHAVIORAL HEALTH | Age: 70
End: 2020-10-27

## 2020-10-27 DIAGNOSIS — F41.9 ANXIETY AND DEPRESSION: ICD-10-CM

## 2020-10-27 DIAGNOSIS — F32.A ANXIETY AND DEPRESSION: ICD-10-CM

## 2020-10-27 PROCEDURE — 90834 PSYTX W PT 45 MINUTES: CPT | Performed by: SOCIAL WORKER

## 2020-10-27 ASSESSMENT — ENCOUNTER SYMPTOMS: SLEEP DISTURBANCE: 1

## 2020-11-09 ENCOUNTER — TELEPHONIC VISIT (OUTPATIENT)
Dept: BEHAVIORAL HEALTH | Age: 70
End: 2020-11-09

## 2020-11-09 DIAGNOSIS — F41.9 ANXIETY AND DEPRESSION: Primary | ICD-10-CM

## 2020-11-09 DIAGNOSIS — F32.A ANXIETY AND DEPRESSION: Primary | ICD-10-CM

## 2020-11-09 PROCEDURE — 90832 PSYTX W PT 30 MINUTES: CPT | Performed by: SOCIAL WORKER

## 2020-11-09 ASSESSMENT — ENCOUNTER SYMPTOMS
SLEEP DISTURBANCE: 1
NERVOUS/ANXIOUS: 1

## 2020-11-12 RX ORDER — TRAZODONE HYDROCHLORIDE 100 MG/1
100 TABLET ORAL NIGHTLY
Qty: 30 TABLET | Refills: 1 | Status: SHIPPED | OUTPATIENT
Start: 2020-11-12 | End: 2021-02-02

## 2020-11-23 RX ORDER — FLUOXETINE HYDROCHLORIDE 40 MG/1
80 CAPSULE ORAL DAILY
Qty: 60 CAPSULE | Refills: 0 | Status: SHIPPED | OUTPATIENT
Start: 2020-11-23 | End: 2020-12-30 | Stop reason: SDUPTHER

## 2020-12-08 ENCOUNTER — V-VISIT (OUTPATIENT)
Dept: BEHAVIORAL HEALTH | Age: 70
End: 2020-12-08

## 2020-12-08 DIAGNOSIS — F33.2 SEVERE EPISODE OF RECURRENT MAJOR DEPRESSIVE DISORDER, WITHOUT PSYCHOTIC FEATURES (CMD): Primary | ICD-10-CM

## 2020-12-08 DIAGNOSIS — F41.1 GAD (GENERALIZED ANXIETY DISORDER): ICD-10-CM

## 2020-12-08 PROCEDURE — 90834 PSYTX W PT 45 MINUTES: CPT | Performed by: SOCIAL WORKER

## 2020-12-08 ASSESSMENT — ENCOUNTER SYMPTOMS: SLEEP DISTURBANCE: 1

## 2020-12-14 RX ORDER — OLANZAPINE 5 MG/1
5 TABLET ORAL NIGHTLY
Qty: 30 TABLET | Refills: 1 | Status: SHIPPED | OUTPATIENT
Start: 2020-12-14 | End: 2021-01-08 | Stop reason: SDUPTHER

## 2020-12-14 RX ORDER — HYDROXYZINE 50 MG/1
50 TABLET, FILM COATED ORAL 3 TIMES DAILY
Qty: 90 TABLET | Refills: 0 | Status: SHIPPED | OUTPATIENT
Start: 2020-12-14 | End: 2021-02-02 | Stop reason: SDUPTHER

## 2020-12-30 RX ORDER — FLUOXETINE HYDROCHLORIDE 40 MG/1
80 CAPSULE ORAL DAILY
Qty: 60 CAPSULE | Refills: 0 | Status: SHIPPED | OUTPATIENT
Start: 2020-12-30 | End: 2021-02-02 | Stop reason: SDUPTHER

## 2021-01-11 ENCOUNTER — TELEPHONIC VISIT (OUTPATIENT)
Dept: BEHAVIORAL HEALTH | Age: 71
End: 2021-01-11

## 2021-01-11 DIAGNOSIS — F33.2 SEVERE EPISODE OF RECURRENT MAJOR DEPRESSIVE DISORDER, WITHOUT PSYCHOTIC FEATURES (CMD): Primary | ICD-10-CM

## 2021-01-11 DIAGNOSIS — F41.1 GAD (GENERALIZED ANXIETY DISORDER): ICD-10-CM

## 2021-01-11 PROCEDURE — 90832 PSYTX W PT 30 MINUTES: CPT | Performed by: SOCIAL WORKER

## 2021-01-11 RX ORDER — OLANZAPINE 5 MG/1
5 TABLET ORAL NIGHTLY
Qty: 30 TABLET | Refills: 1 | Status: SHIPPED | OUTPATIENT
Start: 2021-01-11 | End: 2021-02-02 | Stop reason: SDUPTHER

## 2021-01-11 ASSESSMENT — ENCOUNTER SYMPTOMS
SLEEP DISTURBANCE: 1
NERVOUS/ANXIOUS: 1

## 2021-01-31 RX ORDER — FLUOXETINE HYDROCHLORIDE 40 MG/1
80 CAPSULE ORAL DAILY
Qty: 60 CAPSULE | Refills: 0 | Status: CANCELLED | OUTPATIENT
Start: 2021-01-31 | End: 2021-03-02

## 2021-02-01 ENCOUNTER — APPOINTMENT (OUTPATIENT)
Dept: BEHAVIORAL HEALTH | Age: 71
End: 2021-02-01

## 2021-02-01 ENCOUNTER — TELEPHONE (OUTPATIENT)
Dept: BEHAVIORAL HEALTH | Age: 71
End: 2021-02-01

## 2021-02-02 ENCOUNTER — V-VISIT (OUTPATIENT)
Dept: BEHAVIORAL HEALTH | Age: 71
End: 2021-02-02

## 2021-02-02 DIAGNOSIS — F33.2 SEVERE EPISODE OF RECURRENT MAJOR DEPRESSIVE DISORDER, WITHOUT PSYCHOTIC FEATURES (CMD): Primary | ICD-10-CM

## 2021-02-02 DIAGNOSIS — F41.1 GAD (GENERALIZED ANXIETY DISORDER): ICD-10-CM

## 2021-02-02 PROCEDURE — 99213 OFFICE O/P EST LOW 20 MIN: CPT | Performed by: PSYCHIATRY & NEUROLOGY

## 2021-02-02 RX ORDER — HYDROXYZINE 50 MG/1
50 TABLET, FILM COATED ORAL 3 TIMES DAILY
Qty: 90 TABLET | Refills: 0 | Status: SHIPPED | OUTPATIENT
Start: 2021-02-02 | End: 2021-04-13 | Stop reason: SDUPTHER

## 2021-02-02 RX ORDER — HYDROXYZINE 50 MG/1
50 TABLET, FILM COATED ORAL 3 TIMES DAILY
Qty: 90 TABLET | Refills: 0 | OUTPATIENT
Start: 2021-02-02 | End: 2021-03-04

## 2021-02-02 RX ORDER — BUPROPION HYDROCHLORIDE 150 MG/1
150 TABLET ORAL DAILY
Qty: 30 TABLET | Refills: 0 | Status: SHIPPED | OUTPATIENT
Start: 2021-02-02 | End: 2021-03-02 | Stop reason: SDUPTHER

## 2021-02-02 RX ORDER — FLUOXETINE HYDROCHLORIDE 40 MG/1
80 CAPSULE ORAL DAILY
Qty: 60 CAPSULE | Refills: 0 | Status: SHIPPED | OUTPATIENT
Start: 2021-02-02 | End: 2021-02-26 | Stop reason: SDUPTHER

## 2021-02-02 RX ORDER — OLANZAPINE 5 MG/1
5 TABLET ORAL NIGHTLY
Qty: 30 TABLET | Refills: 1 | Status: SHIPPED | OUTPATIENT
Start: 2021-02-02 | End: 2021-03-23 | Stop reason: SDUPTHER

## 2021-02-02 RX ORDER — MIRTAZAPINE 15 MG/1
7.5 TABLET, FILM COATED ORAL NIGHTLY
Qty: 15 TABLET | Refills: 0 | Status: SHIPPED | OUTPATIENT
Start: 2021-02-02 | End: 2021-02-26 | Stop reason: SDUPTHER

## 2021-02-15 ENCOUNTER — V-VISIT (OUTPATIENT)
Dept: BEHAVIORAL HEALTH | Age: 71
End: 2021-02-15

## 2021-02-15 DIAGNOSIS — F41.9 ANXIETY AND DEPRESSION: ICD-10-CM

## 2021-02-15 DIAGNOSIS — F32.A ANXIETY AND DEPRESSION: ICD-10-CM

## 2021-02-15 PROCEDURE — 90832 PSYTX W PT 30 MINUTES: CPT | Performed by: SOCIAL WORKER

## 2021-02-15 ASSESSMENT — ENCOUNTER SYMPTOMS: SLEEP DISTURBANCE: 1

## 2021-03-01 RX ORDER — MIRTAZAPINE 15 MG/1
7.5 TABLET, FILM COATED ORAL NIGHTLY
Qty: 15 TABLET | Refills: 0 | Status: SHIPPED | OUTPATIENT
Start: 2021-03-01 | End: 2021-03-23 | Stop reason: SDUPTHER

## 2021-03-01 RX ORDER — FLUOXETINE HYDROCHLORIDE 40 MG/1
80 CAPSULE ORAL DAILY
Qty: 60 CAPSULE | Refills: 0 | Status: SHIPPED | OUTPATIENT
Start: 2021-03-01 | End: 2021-03-23 | Stop reason: SDUPTHER

## 2021-03-02 RX ORDER — BUPROPION HYDROCHLORIDE 150 MG/1
150 TABLET ORAL DAILY
Qty: 30 TABLET | Refills: 0 | Status: SHIPPED | OUTPATIENT
Start: 2021-03-02 | End: 2021-03-23 | Stop reason: SDUPTHER

## 2021-03-16 ENCOUNTER — V-VISIT (OUTPATIENT)
Dept: BEHAVIORAL HEALTH | Age: 71
End: 2021-03-16

## 2021-03-16 DIAGNOSIS — F41.1 GAD (GENERALIZED ANXIETY DISORDER): ICD-10-CM

## 2021-03-16 DIAGNOSIS — F33.2 SEVERE EPISODE OF RECURRENT MAJOR DEPRESSIVE DISORDER, WITHOUT PSYCHOTIC FEATURES (CMD): Primary | ICD-10-CM

## 2021-03-16 PROCEDURE — 90834 PSYTX W PT 45 MINUTES: CPT | Performed by: SOCIAL WORKER

## 2021-03-18 ENCOUNTER — APPOINTMENT (OUTPATIENT)
Dept: BEHAVIORAL HEALTH | Age: 71
End: 2021-03-18

## 2021-03-18 ENCOUNTER — TELEPHONE (OUTPATIENT)
Dept: BEHAVIORAL HEALTH | Age: 71
End: 2021-03-18

## 2021-03-23 ENCOUNTER — V-VISIT (OUTPATIENT)
Dept: BEHAVIORAL HEALTH | Age: 71
End: 2021-03-23

## 2021-03-23 DIAGNOSIS — F41.1 GAD (GENERALIZED ANXIETY DISORDER): ICD-10-CM

## 2021-03-23 DIAGNOSIS — G47.9 SLEEP DIFFICULTIES: ICD-10-CM

## 2021-03-23 DIAGNOSIS — F33.2 SEVERE EPISODE OF RECURRENT MAJOR DEPRESSIVE DISORDER, WITHOUT PSYCHOTIC FEATURES (CMD): Primary | ICD-10-CM

## 2021-03-23 PROCEDURE — 99214 OFFICE O/P EST MOD 30 MIN: CPT | Performed by: NURSE PRACTITIONER

## 2021-03-23 RX ORDER — MIRTAZAPINE 15 MG/1
7.5 TABLET, FILM COATED ORAL NIGHTLY
Qty: 15 TABLET | Refills: 0 | Status: SHIPPED | OUTPATIENT
Start: 2021-03-23 | End: 2021-05-17 | Stop reason: SDUPTHER

## 2021-03-23 RX ORDER — BUPROPION HYDROCHLORIDE 300 MG/1
300 TABLET ORAL DAILY
Qty: 30 TABLET | Refills: 0 | Status: SHIPPED | OUTPATIENT
Start: 2021-03-23 | End: 2021-04-20 | Stop reason: SDUPTHER

## 2021-03-23 RX ORDER — FLUOXETINE HYDROCHLORIDE 20 MG/1
20 CAPSULE ORAL DAILY
Qty: 30 CAPSULE | Refills: 0 | Status: SHIPPED | OUTPATIENT
Start: 2021-03-23 | End: 2021-04-13 | Stop reason: SDUPTHER

## 2021-03-23 RX ORDER — FLUOXETINE HYDROCHLORIDE 40 MG/1
40 CAPSULE ORAL DAILY
Qty: 30 CAPSULE | Refills: 0 | Status: SHIPPED | OUTPATIENT
Start: 2021-03-23 | End: 2021-06-03

## 2021-03-23 RX ORDER — OLANZAPINE 5 MG/1
5 TABLET ORAL NIGHTLY
Qty: 30 TABLET | Refills: 0 | Status: SHIPPED | OUTPATIENT
Start: 2021-03-23 | End: 2021-04-07 | Stop reason: SDUPTHER

## 2021-03-23 ASSESSMENT — GERIATRIC DEPRESSION SCALE SHORT VERSION (GDS-SV)
GDS TOTAL SCORE: 11
DO YOU FEEL THAT YOUR LIFE IS EMPTY: YES
DO YOU THINK IT IS WONDERFUL TO BE ALIVE NOW: NO
IS IT HARD FOR YOU TO GET STARTED ON NEW PROJECTS?: YES
ARE YOU IN GOOD SPIRITS MOST OF THE TIME: NO
DO YOU FEEL YOU HAVE MORE PROBLEMS WITH MEMORY THAN MOST: NO
HAVE YOU DROPPED MANY OF YOUR ACTIVITIES AND INTERESTS?: YES
ARE YOU AFRAID THAT SOMETHING BAD IS GOING TO HAPPEN TO YOU: NO
DO YOU OFTEN FEEL HELPLESS: NO
DO YOU FEEL FULL OF ENERGY: NO
DO YOU FEEL PRETTY WORTHLESS THE WAY YOU ARE NOW: NO
DO YOU OFTEN GET BORED: YES
DO YOU FIND LIFE VERY EXCITING: NO
ARE YOU BASICALLY SATISFIED WITH YOUR LIFE: NO
DO YOU PREFER TO STAY AT HOME, RATHER THAN GOING OUT AND DOING NEW THINGS: YES
DO YOU FEEL HAPPY MOST OF THE TIME: NO

## 2021-03-23 ASSESSMENT — ANXIETY QUESTIONNAIRES
6. BECOMING EASILY ANNOYED OR IRRITABLE: SEVERAL DAYS
2. NOT BEING ABLE TO STOP OR CONTROL WORRYING: MORE THAN HALF THE DAYS
1. FEELING NERVOUS, ANXIOUS, OR ON EDGE: 2
7. FEELING AFRAID AS IF SOMETHING AWFUL MIGHT HAPPEN: SEVERAL DAYS
7. FEELING AFRAID AS IF SOMETHING AWFUL MIGHT HAPPEN: 1
3. WORRYING TOO MUCH ABOUT DIFFERENT THINGS: 2
3. WORRYING TOO MUCH ABOUT DIFFERENT THINGS: MORE THAN HALF THE DAYS
6. BECOMING EASILY ANNOYED OR IRRITABLE: 1
1. FEELING NERVOUS, ANXIOUS, OR ON EDGE: MORE THAN HALF THE DAYS
2. NOT BEING ABLE TO STOP OR CONTROL WORRYING: 2
IF YOU CHECKED OFF ANY PROBLEMS ON THIS QUESTIONNAIRE, HOW DIFFICULT HAVE THESE PROBLEMS MADE IT FOR YOU TO DO YOUR WORK, TAKE CARE OF THINGS AT HOME, OR GET ALONG WITH OTHER PEOPLE: VERY DIFFICULT
4. TROUBLE RELAXING: NEARLY EVERY DAY
4. TROUBLE RELAXING: 3
GAD7 TOTAL SCORE: 14
5. BEING SO RESTLESS THAT IT IS HARD TO SIT STILL: NEARLY EVERY DAY
5. BEING SO RESTLESS THAT IT IS HARD TO SIT STILL: 3

## 2021-03-23 ASSESSMENT — ENCOUNTER SYMPTOMS
LIGHT-HEADEDNESS: 0
COUGH: 0
SHORTNESS OF BREATH: 0
SEIZURES: 0
APPETITE CHANGE: 0
HEADACHES: 0
TREMORS: 0
ABDOMINAL PAIN: 0
FEVER: 0
DIZZINESS: 0

## 2021-03-26 RX ORDER — MIRTAZAPINE 15 MG/1
7.5 TABLET, FILM COATED ORAL NIGHTLY
Qty: 15 TABLET | Refills: 0 | OUTPATIENT
Start: 2021-03-26 | End: 2021-04-25

## 2021-04-07 RX ORDER — OLANZAPINE 5 MG/1
5 TABLET ORAL NIGHTLY
Qty: 30 TABLET | Refills: 0 | Status: SHIPPED | OUTPATIENT
Start: 2021-04-07 | End: 2021-05-06 | Stop reason: SDUPTHER

## 2021-04-13 RX ORDER — FLUOXETINE HYDROCHLORIDE 20 MG/1
20 CAPSULE ORAL DAILY
Qty: 30 CAPSULE | Refills: 0 | Status: SHIPPED | OUTPATIENT
Start: 2021-04-13 | End: 2021-06-03

## 2021-04-13 RX ORDER — HYDROXYZINE 50 MG/1
50 TABLET, FILM COATED ORAL 3 TIMES DAILY
Qty: 90 TABLET | Refills: 0 | Status: SHIPPED | OUTPATIENT
Start: 2021-04-13 | End: 2021-06-03 | Stop reason: SDUPTHER

## 2021-04-15 ENCOUNTER — TELEPHONE (OUTPATIENT)
Dept: BEHAVIORAL HEALTH | Age: 71
End: 2021-04-15

## 2021-04-21 RX ORDER — BUPROPION HYDROCHLORIDE 300 MG/1
300 TABLET ORAL DAILY
Qty: 30 TABLET | Refills: 0 | Status: SHIPPED | OUTPATIENT
Start: 2021-04-21 | End: 2021-05-24 | Stop reason: SDUPTHER

## 2021-04-27 ENCOUNTER — V-VISIT (OUTPATIENT)
Dept: BEHAVIORAL HEALTH | Age: 71
End: 2021-04-27

## 2021-04-27 ENCOUNTER — TELEPHONE (OUTPATIENT)
Dept: BEHAVIORAL HEALTH | Age: 71
End: 2021-04-27

## 2021-04-27 DIAGNOSIS — F33.2 SEVERE EPISODE OF RECURRENT MAJOR DEPRESSIVE DISORDER, WITHOUT PSYCHOTIC FEATURES (CMD): Primary | ICD-10-CM

## 2021-04-27 DIAGNOSIS — G47.9 SLEEP DIFFICULTIES: ICD-10-CM

## 2021-04-27 DIAGNOSIS — F41.1 GAD (GENERALIZED ANXIETY DISORDER): ICD-10-CM

## 2021-04-27 PROCEDURE — 99213 OFFICE O/P EST LOW 20 MIN: CPT | Performed by: PSYCHIATRY & NEUROLOGY

## 2021-05-03 ENCOUNTER — APPOINTMENT (OUTPATIENT)
Dept: BEHAVIORAL HEALTH | Age: 71
End: 2021-05-03

## 2021-05-06 RX ORDER — OLANZAPINE 5 MG/1
5 TABLET ORAL NIGHTLY
Qty: 30 TABLET | Refills: 0 | Status: SHIPPED | OUTPATIENT
Start: 2021-05-06 | End: 2021-06-03 | Stop reason: SDUPTHER

## 2021-05-11 ENCOUNTER — V-VISIT (OUTPATIENT)
Dept: BEHAVIORAL HEALTH | Age: 71
End: 2021-05-11

## 2021-05-11 DIAGNOSIS — F33.2 SEVERE EPISODE OF RECURRENT MAJOR DEPRESSIVE DISORDER, WITHOUT PSYCHOTIC FEATURES (CMD): Primary | ICD-10-CM

## 2021-05-11 DIAGNOSIS — F41.1 GAD (GENERALIZED ANXIETY DISORDER): ICD-10-CM

## 2021-05-11 DIAGNOSIS — G47.9 SLEEP DIFFICULTIES: ICD-10-CM

## 2021-05-11 PROCEDURE — 90834 PSYTX W PT 45 MINUTES: CPT | Performed by: SOCIAL WORKER

## 2021-05-17 RX ORDER — MIRTAZAPINE 15 MG/1
7.5 TABLET, FILM COATED ORAL NIGHTLY
Qty: 15 TABLET | Refills: 0 | Status: SHIPPED | OUTPATIENT
Start: 2021-05-17 | End: 2021-06-03 | Stop reason: SDUPTHER

## 2021-05-24 RX ORDER — BUPROPION HYDROCHLORIDE 300 MG/1
300 TABLET ORAL DAILY
Qty: 30 TABLET | Refills: 0 | Status: SHIPPED | OUTPATIENT
Start: 2021-05-24 | End: 2021-06-03 | Stop reason: SDUPTHER

## 2021-06-03 ENCOUNTER — TELEPHONE (OUTPATIENT)
Dept: BEHAVIORAL HEALTH | Age: 71
End: 2021-06-03

## 2021-06-03 ENCOUNTER — V-VISIT (OUTPATIENT)
Dept: BEHAVIORAL HEALTH | Age: 71
End: 2021-06-03

## 2021-06-03 DIAGNOSIS — F33.2 SEVERE EPISODE OF RECURRENT MAJOR DEPRESSIVE DISORDER, WITHOUT PSYCHOTIC FEATURES (CMD): Primary | ICD-10-CM

## 2021-06-03 DIAGNOSIS — G47.9 SLEEP DIFFICULTIES: ICD-10-CM

## 2021-06-03 DIAGNOSIS — F41.1 GAD (GENERALIZED ANXIETY DISORDER): ICD-10-CM

## 2021-06-03 PROCEDURE — 99212 OFFICE O/P EST SF 10 MIN: CPT | Performed by: PSYCHIATRY & NEUROLOGY

## 2021-06-03 RX ORDER — HYDROXYZINE 50 MG/1
50 TABLET, FILM COATED ORAL 3 TIMES DAILY
Qty: 90 TABLET | Refills: 2 | Status: SHIPPED | OUTPATIENT
Start: 2021-06-03 | End: 2021-08-26 | Stop reason: SDUPTHER

## 2021-06-03 RX ORDER — BUPROPION HYDROCHLORIDE 300 MG/1
300 TABLET ORAL DAILY
Qty: 30 TABLET | Refills: 0 | Status: SHIPPED | OUTPATIENT
Start: 2021-06-03 | End: 2021-07-26

## 2021-06-03 RX ORDER — OLANZAPINE 5 MG/1
5 TABLET ORAL NIGHTLY
Qty: 30 TABLET | Refills: 2 | Status: SHIPPED | OUTPATIENT
Start: 2021-06-03 | End: 2021-09-01 | Stop reason: SDUPTHER

## 2021-06-03 RX ORDER — MIRTAZAPINE 15 MG/1
7.5 TABLET, FILM COATED ORAL NIGHTLY
Qty: 15 TABLET | Refills: 2 | Status: SHIPPED | OUTPATIENT
Start: 2021-06-03 | End: 2021-09-01 | Stop reason: SDUPTHER

## 2021-06-03 RX ORDER — ESCITALOPRAM OXALATE 10 MG/1
10 TABLET ORAL DAILY
Qty: 30 TABLET | Refills: 1 | Status: SHIPPED | OUTPATIENT
Start: 2021-06-03 | End: 2021-07-29

## 2021-06-07 RX ORDER — MIRTAZAPINE 15 MG/1
7.5 TABLET, FILM COATED ORAL NIGHTLY
Qty: 15 TABLET | Refills: 2 | OUTPATIENT
Start: 2021-06-07 | End: 2021-07-07

## 2021-06-08 ENCOUNTER — V-VISIT (OUTPATIENT)
Dept: BEHAVIORAL HEALTH | Age: 71
End: 2021-06-08

## 2021-06-08 DIAGNOSIS — F33.2 SEVERE EPISODE OF RECURRENT MAJOR DEPRESSIVE DISORDER, WITHOUT PSYCHOTIC FEATURES (CMD): Primary | ICD-10-CM

## 2021-06-08 DIAGNOSIS — F41.1 GAD (GENERALIZED ANXIETY DISORDER): ICD-10-CM

## 2021-06-08 DIAGNOSIS — G47.9 SLEEP DIFFICULTIES: ICD-10-CM

## 2021-06-08 PROCEDURE — 90834 PSYTX W PT 45 MINUTES: CPT | Performed by: SOCIAL WORKER

## 2021-06-22 RX ORDER — ESCITALOPRAM OXALATE 10 MG/1
10 TABLET ORAL DAILY
Qty: 30 TABLET | Refills: 1 | OUTPATIENT
Start: 2021-06-22 | End: 2022-06-17

## 2021-06-22 RX ORDER — BUPROPION HYDROCHLORIDE 300 MG/1
300 TABLET ORAL DAILY
Qty: 30 TABLET | Refills: 0 | OUTPATIENT
Start: 2021-06-22 | End: 2021-07-22

## 2021-07-20 ENCOUNTER — APPOINTMENT (OUTPATIENT)
Dept: BEHAVIORAL HEALTH | Age: 71
End: 2021-07-20

## 2021-07-20 RX ORDER — ESCITALOPRAM OXALATE 10 MG/1
10 TABLET ORAL DAILY
Qty: 30 TABLET | Refills: 1 | OUTPATIENT
Start: 2021-07-20 | End: 2022-07-15

## 2021-07-20 RX ORDER — MIRTAZAPINE 15 MG/1
7.5 TABLET, FILM COATED ORAL NIGHTLY
Qty: 15 TABLET | Refills: 2 | OUTPATIENT
Start: 2021-07-20 | End: 2021-08-19

## 2021-07-20 RX ORDER — BUPROPION HYDROCHLORIDE 300 MG/1
300 TABLET ORAL DAILY
Qty: 30 TABLET | Refills: 0 | OUTPATIENT
Start: 2021-07-20 | End: 2021-08-19

## 2021-07-26 RX ORDER — BUPROPION HYDROCHLORIDE 300 MG/1
TABLET ORAL
Qty: 30 TABLET | Refills: 0 | Status: SHIPPED | OUTPATIENT
Start: 2021-07-26 | End: 2021-08-24 | Stop reason: SDUPTHER

## 2021-07-29 ENCOUNTER — APPOINTMENT (OUTPATIENT)
Dept: BEHAVIORAL HEALTH | Age: 71
End: 2021-07-29

## 2021-07-29 RX ORDER — ESCITALOPRAM OXALATE 10 MG/1
TABLET ORAL
Qty: 30 TABLET | Refills: 0 | Status: SHIPPED | OUTPATIENT
Start: 2021-07-29 | End: 2021-08-24 | Stop reason: SDUPTHER

## 2021-08-23 RX ORDER — ESCITALOPRAM OXALATE 10 MG/1
TABLET ORAL
Qty: 30 TABLET | Refills: 0 | OUTPATIENT
Start: 2021-08-23

## 2021-08-23 RX ORDER — MIRTAZAPINE 15 MG/1
TABLET, FILM COATED ORAL
Qty: 15 TABLET | Refills: 0 | OUTPATIENT
Start: 2021-08-23

## 2021-08-23 RX ORDER — BUPROPION HYDROCHLORIDE 300 MG/1
300 TABLET ORAL DAILY
Qty: 30 TABLET | Refills: 0 | OUTPATIENT
Start: 2021-08-23

## 2021-08-23 RX ORDER — ESCITALOPRAM OXALATE 10 MG/1
10 TABLET ORAL DAILY
Qty: 30 TABLET | Refills: 0 | OUTPATIENT
Start: 2021-08-23

## 2021-08-23 RX ORDER — MIRTAZAPINE 15 MG/1
7.5 TABLET, FILM COATED ORAL NIGHTLY
Qty: 15 TABLET | Refills: 2 | OUTPATIENT
Start: 2021-08-23 | End: 2021-09-22

## 2021-08-23 RX ORDER — BUPROPION HYDROCHLORIDE 300 MG/1
TABLET ORAL
Qty: 30 TABLET | Refills: 0 | OUTPATIENT
Start: 2021-08-23

## 2021-08-24 RX ORDER — BUPROPION HYDROCHLORIDE 300 MG/1
300 TABLET ORAL DAILY
Qty: 30 TABLET | Refills: 0 | Status: SHIPPED | OUTPATIENT
Start: 2021-08-24 | End: 2021-09-17 | Stop reason: SDUPTHER

## 2021-08-24 RX ORDER — ESCITALOPRAM OXALATE 10 MG/1
10 TABLET ORAL DAILY
Qty: 30 TABLET | Refills: 0 | Status: SHIPPED | OUTPATIENT
Start: 2021-08-24 | End: 2021-08-26 | Stop reason: SDUPTHER

## 2021-08-26 ENCOUNTER — V-VISIT (OUTPATIENT)
Dept: BEHAVIORAL HEALTH | Age: 71
End: 2021-08-26

## 2021-08-26 DIAGNOSIS — F33.2 SEVERE EPISODE OF RECURRENT MAJOR DEPRESSIVE DISORDER, WITHOUT PSYCHOTIC FEATURES (CMD): Primary | ICD-10-CM

## 2021-08-26 DIAGNOSIS — F41.1 GAD (GENERALIZED ANXIETY DISORDER): ICD-10-CM

## 2021-08-26 PROCEDURE — 99212 OFFICE O/P EST SF 10 MIN: CPT | Performed by: PSYCHIATRY & NEUROLOGY

## 2021-08-26 RX ORDER — ESCITALOPRAM OXALATE 20 MG/1
20 TABLET ORAL DAILY
Qty: 30 TABLET | Refills: 1 | Status: SHIPPED | OUTPATIENT
Start: 2021-08-26 | End: 2021-11-04 | Stop reason: SDUPTHER

## 2021-08-26 RX ORDER — HYDROXYZINE 50 MG/1
50 TABLET, FILM COATED ORAL 3 TIMES DAILY
Qty: 90 TABLET | Refills: 2 | Status: SHIPPED | OUTPATIENT
Start: 2021-08-26 | End: 2022-02-08 | Stop reason: SDUPTHER

## 2021-09-01 RX ORDER — OLANZAPINE 5 MG/1
5 TABLET ORAL NIGHTLY
Qty: 90 TABLET | Refills: 0 | Status: SHIPPED | OUTPATIENT
Start: 2021-09-01 | End: 2021-11-22

## 2021-09-01 RX ORDER — MIRTAZAPINE 15 MG/1
7.5 TABLET, FILM COATED ORAL NIGHTLY
Qty: 15 TABLET | Refills: 2 | Status: SHIPPED | OUTPATIENT
Start: 2021-09-01 | End: 2021-11-15 | Stop reason: SDUPTHER

## 2021-09-17 RX ORDER — BUPROPION HYDROCHLORIDE 300 MG/1
300 TABLET ORAL DAILY
Qty: 30 TABLET | Refills: 2 | Status: SHIPPED | OUTPATIENT
Start: 2021-09-17 | End: 2021-12-19 | Stop reason: SDUPTHER

## 2021-11-04 RX ORDER — ESCITALOPRAM OXALATE 20 MG/1
20 TABLET ORAL DAILY
Qty: 30 TABLET | Refills: 1 | Status: SHIPPED | OUTPATIENT
Start: 2021-11-04 | End: 2022-01-04 | Stop reason: SDUPTHER

## 2021-11-15 RX ORDER — MIRTAZAPINE 15 MG/1
7.5 TABLET, FILM COATED ORAL NIGHTLY
Qty: 15 TABLET | Refills: 2 | Status: SHIPPED | OUTPATIENT
Start: 2021-11-15 | End: 2022-01-31 | Stop reason: SDUPTHER

## 2021-11-22 ENCOUNTER — BEHAVIORAL HEALTH (OUTPATIENT)
Dept: BEHAVIORAL HEALTH | Age: 71
End: 2021-11-22

## 2021-11-22 DIAGNOSIS — F33.2 SEVERE EPISODE OF RECURRENT MAJOR DEPRESSIVE DISORDER, WITHOUT PSYCHOTIC FEATURES (CMD): Primary | ICD-10-CM

## 2021-11-22 DIAGNOSIS — G47.9 SLEEP DIFFICULTIES: ICD-10-CM

## 2021-11-22 DIAGNOSIS — F41.1 GAD (GENERALIZED ANXIETY DISORDER): ICD-10-CM

## 2021-11-22 PROCEDURE — 99212 OFFICE O/P EST SF 10 MIN: CPT | Performed by: PSYCHIATRY & NEUROLOGY

## 2021-11-22 RX ORDER — RISPERIDONE 1 MG/1
1 TABLET ORAL 2 TIMES DAILY
Qty: 60 TABLET | Refills: 2 | Status: SHIPPED | OUTPATIENT
Start: 2021-11-22 | End: 2022-01-31 | Stop reason: SDUPTHER

## 2021-11-30 ENCOUNTER — HOSPITAL ENCOUNTER (OUTPATIENT)
Age: 71
Discharge: HOME | End: 2021-11-30
Payer: MEDICARE

## 2021-11-30 VITALS
RESPIRATION RATE: 18 BRPM | DIASTOLIC BLOOD PRESSURE: 73 MMHG | TEMPERATURE: 98 F | HEART RATE: 80 BPM | SYSTOLIC BLOOD PRESSURE: 135 MMHG | OXYGEN SATURATION: 96 %

## 2021-11-30 DIAGNOSIS — R19.7 DIARRHEA, UNSPECIFIED TYPE: ICD-10-CM

## 2021-11-30 DIAGNOSIS — R53.83 FATIGUE, UNSPECIFIED TYPE: ICD-10-CM

## 2021-11-30 DIAGNOSIS — R11.0 NAUSEA: Primary | ICD-10-CM

## 2021-11-30 PROCEDURE — 99213 OFFICE O/P EST LOW 20 MIN: CPT

## 2021-11-30 RX ORDER — ONDANSETRON 4 MG/1
4 TABLET, ORALLY DISINTEGRATING ORAL EVERY 4 HOURS PRN
Qty: 10 TABLET | Refills: 0 | Status: SHIPPED | OUTPATIENT
Start: 2021-11-30

## 2021-11-30 NOTE — ED PROVIDER NOTES
Patient Seen in: [facility]      History   Patient presents with:  Fatigue    Stated Complaint: nausea diarrhea fatigue    Subjective:   HPI    Yareli Marin is a 70year old female here for nausea diarrhea and fatigue since fri.  She odom 96 %   O2 Device None (Room air)       Current:/73   Pulse 80   Temp 98 °F (36.7 °C) (Temporal)   Resp 18   SpO2 96%         Physical Exam  Vitals and nursing note reviewed. Constitutional:       Appearance: Normal appearance.  She is not ill-appear medications such as Dayquil and Theraflu have acetaminophen (Tylenol). .     Medical Record Review/reassessment: I independently  reviewed available prior medical records for any recent pertinent discharge summaries/testing.  This includes but not limited to

## 2021-12-20 ENCOUNTER — TELEPHONE (OUTPATIENT)
Dept: BEHAVIORAL HEALTH | Age: 71
End: 2021-12-20

## 2021-12-20 RX ORDER — BUPROPION HYDROCHLORIDE 300 MG/1
300 TABLET ORAL DAILY
Qty: 30 TABLET | Refills: 2 | Status: SHIPPED | OUTPATIENT
Start: 2021-12-20 | End: 2022-01-31 | Stop reason: SDUPTHER

## 2022-01-04 ENCOUNTER — TELEPHONE (OUTPATIENT)
Dept: BEHAVIORAL HEALTH | Age: 72
End: 2022-01-04

## 2022-01-04 RX ORDER — ESCITALOPRAM OXALATE 20 MG/1
20 TABLET ORAL DAILY
Qty: 30 TABLET | Refills: 0 | Status: SHIPPED | OUTPATIENT
Start: 2022-01-04 | End: 2022-01-31 | Stop reason: SDUPTHER

## 2022-01-19 ENCOUNTER — HOSPITAL ENCOUNTER (OUTPATIENT)
Age: 72
Discharge: HOME | End: 2022-01-19
Attending: EMERGENCY MEDICINE
Payer: MEDICARE

## 2022-01-19 ENCOUNTER — APPOINTMENT (OUTPATIENT)
Dept: CT IMAGING | Age: 72
End: 2022-01-19
Attending: EMERGENCY MEDICINE
Payer: MEDICARE

## 2022-01-19 VITALS
TEMPERATURE: 99 F | HEART RATE: 74 BPM | RESPIRATION RATE: 18 BRPM | DIASTOLIC BLOOD PRESSURE: 89 MMHG | OXYGEN SATURATION: 96 % | SYSTOLIC BLOOD PRESSURE: 138 MMHG

## 2022-01-19 DIAGNOSIS — R10.13 ABDOMINAL DISCOMFORT, EPIGASTRIC: Primary | ICD-10-CM

## 2022-01-19 LAB
ALBUMIN SERPL-MCNC: 3.6 G/DL (ref 3.4–5)
ALP LIVER SERPL-CCNC: 50 U/L
ALT SERPL-CCNC: 23 U/L
AST SERPL-CCNC: 21 U/L (ref 15–37)
BASOPHILS # BLD AUTO: 0.03 X10(3) UL (ref 0–0.2)
BASOPHILS NFR BLD AUTO: 0.3 %
BILIRUB DIRECT SERPL-MCNC: 0.1 MG/DL (ref 0–0.2)
BILIRUB SERPL-MCNC: 0.4 MG/DL (ref 0.1–2)
BILIRUB UR QL STRIP: NEGATIVE
BUN BLD-MCNC: 9 MG/DL (ref 7–18)
CHLORIDE BLD-SCNC: 103 MMOL/L (ref 98–112)
CO2 BLD-SCNC: 27 MMOL/L (ref 21–32)
COLOR UR: YELLOW
CREAT BLD-MCNC: 0.6 MG/DL
DEPRECATED RDW RBC AUTO: 40.3 FL (ref 35.1–46.3)
EOSINOPHIL # BLD AUTO: 0.02 X10(3) UL (ref 0–0.7)
EOSINOPHIL NFR BLD AUTO: 0.2 %
ERYTHROCYTE [DISTWIDTH] IN BLOOD BY AUTOMATED COUNT: 12.9 % (ref 11–15)
GLUCOSE BLD-MCNC: 135 MG/DL (ref 70–99)
GLUCOSE UR STRIP-MCNC: NEGATIVE MG/DL
HCT VFR BLD AUTO: 40.4 %
HCT VFR BLD AUTO: 40.5 %
HCT VFR BLD CALC: 44 %
HGB BLD-MCNC: 13.4 G/DL
HGB BLD-MCNC: 13.6 G/DL
HGB UR QL STRIP: NEGATIVE
IMM GRANULOCYTES # BLD AUTO: 0.03 X10(3) UL (ref 0–1)
IMM GRANULOCYTES NFR BLD: 0.3 %
ISTAT IONIZED CALCIUM FOR CHEM 8: 1.01 MMOL/L (ref 1.12–1.32)
KETONES UR STRIP-MCNC: NEGATIVE MG/DL
LEUKOCYTE ESTERASE UR QL STRIP: NEGATIVE
LIPASE SERPL-CCNC: 119 U/L (ref 73–393)
LYMPHOCYTES # BLD AUTO: 3.57 X10(3) UL (ref 1–4)
LYMPHOCYTES NFR BLD AUTO: 39 %
MCH RBC QN AUTO: 29.2 PG (ref 26–34)
MCH RBC QN AUTO: 29.3 PG (ref 26–34)
MCHC RBC AUTO-ENTMCNC: 33.1 G/DL (ref 31–37)
MCHC RBC AUTO-ENTMCNC: 33.7 G/DL (ref 31–37)
MCV RBC AUTO: 86.7 FL
MCV RBC AUTO: 88.4 FL (ref 80–100)
MONOCYTES # BLD AUTO: 0.7 X10(3) UL (ref 0.1–1)
MONOCYTES NFR BLD AUTO: 7.7 %
NEUTROPHILS # BLD AUTO: 4.8 X10 (3) UL (ref 1.5–7.7)
NEUTROPHILS # BLD AUTO: 4.8 X10(3) UL (ref 1.5–7.7)
NEUTROPHILS NFR BLD AUTO: 52.5 %
NITRITE UR QL STRIP: NEGATIVE
PH UR STRIP: 6.5 [PH]
PLATELET # BLD AUTO: 288 10(3)UL (ref 150–450)
PLATELET # BLD AUTO: 296 X10ˆ3/UL (ref 150–450)
POTASSIUM BLD-SCNC: 4.1 MMOL/L (ref 3.6–5.1)
PROT SERPL-MCNC: 6.6 G/DL (ref 6.4–8.2)
PROT UR STRIP-MCNC: NEGATIVE MG/DL
RBC # BLD AUTO: 4.58 X10ˆ6/UL
RBC # BLD AUTO: 4.66 X10(6)UL
SARS-COV-2 RNA RESP QL NAA+PROBE: NOT DETECTED
SODIUM BLD-SCNC: 138 MMOL/L (ref 136–145)
SP GR UR STRIP: 1.01
TROPONIN I BLD-MCNC: <0.02 NG/ML
UROBILINOGEN UR STRIP-ACNC: <2 MG/DL
WBC # BLD AUTO: 9.2 X10(3) UL (ref 4–11)
WBC # BLD AUTO: 9.2 X10ˆ3/UL (ref 4–11)

## 2022-01-19 PROCEDURE — 96361 HYDRATE IV INFUSION ADD-ON: CPT

## 2022-01-19 PROCEDURE — 83690 ASSAY OF LIPASE: CPT | Performed by: EMERGENCY MEDICINE

## 2022-01-19 PROCEDURE — 74176 CT ABD & PELVIS W/O CONTRAST: CPT | Performed by: EMERGENCY MEDICINE

## 2022-01-19 PROCEDURE — 84484 ASSAY OF TROPONIN QUANT: CPT

## 2022-01-19 PROCEDURE — 93010 ELECTROCARDIOGRAM REPORT: CPT | Performed by: EMERGENCY MEDICINE

## 2022-01-19 PROCEDURE — 85025 COMPLETE CBC W/AUTO DIFF WBC: CPT | Performed by: EMERGENCY MEDICINE

## 2022-01-19 PROCEDURE — 99215 OFFICE O/P EST HI 40 MIN: CPT

## 2022-01-19 PROCEDURE — 80076 HEPATIC FUNCTION PANEL: CPT | Performed by: EMERGENCY MEDICINE

## 2022-01-19 PROCEDURE — 93010 ELECTROCARDIOGRAM REPORT: CPT

## 2022-01-19 PROCEDURE — 93005 ELECTROCARDIOGRAM TRACING: CPT

## 2022-01-19 PROCEDURE — 80047 BASIC METABLC PNL IONIZED CA: CPT

## 2022-01-19 PROCEDURE — 96360 HYDRATION IV INFUSION INIT: CPT

## 2022-01-19 PROCEDURE — 81002 URINALYSIS NONAUTO W/O SCOPE: CPT

## 2022-01-19 PROCEDURE — 99214 OFFICE O/P EST MOD 30 MIN: CPT

## 2022-01-19 RX ORDER — ONDANSETRON 4 MG/1
4 TABLET, ORALLY DISINTEGRATING ORAL EVERY 4 HOURS PRN
Qty: 10 TABLET | Refills: 0 | Status: SHIPPED | OUTPATIENT
Start: 2022-01-19 | End: 2022-01-26

## 2022-01-19 RX ORDER — SODIUM CHLORIDE 9 MG/ML
1000 INJECTION, SOLUTION INTRAVENOUS ONCE
Status: COMPLETED | OUTPATIENT
Start: 2022-01-19 | End: 2022-01-19

## 2022-01-19 NOTE — ED INITIAL ASSESSMENT (HPI)
Patient reports having decreased appetite, stomach pain, diarrhea and nausea since Sunday. Patient has a PMH of IBS and says she has been taking her probiotic and Immodium PRN.

## 2022-01-19 NOTE — ED PROVIDER NOTES
Patient Seen in: [facility]      History   Patient presents with:  Stomach Pain  Nausea/Vomiting/Diarrhea    Stated Complaint: covid/flu like sympt(4th day)    Subjective:   HPI    Patient is a 29-year-old female with past history of depressi Device None (Room air)       Current:/89   Pulse 74   Temp 98.7 °F (37.1 °C) (Temporal)   Resp 18   SpO2 96%         Physical Exam    Constitutional: Well-developed well-nourished in no acute distress  Head: Normocephalic, no swelling or tenderness the patient. Patient's EKG and comparison to prior EKG with negative troponin was also discussed. Probable viral etiology of this patient's stomach upset. Will provide with Zofran at home for any recurrent nausea.     MDM       abdominal pain including bu

## 2022-01-31 RX ORDER — RISPERIDONE 1 MG/1
1 TABLET ORAL 2 TIMES DAILY
Qty: 60 TABLET | Refills: 2 | Status: SHIPPED | OUTPATIENT
Start: 2022-01-31 | End: 2022-04-29 | Stop reason: SDUPTHER

## 2022-01-31 RX ORDER — MIRTAZAPINE 15 MG/1
7.5 TABLET, FILM COATED ORAL NIGHTLY
Qty: 15 TABLET | Refills: 2 | Status: SHIPPED | OUTPATIENT
Start: 2022-01-31 | End: 2022-04-20 | Stop reason: SDUPTHER

## 2022-01-31 RX ORDER — BUPROPION HYDROCHLORIDE 300 MG/1
300 TABLET ORAL DAILY
Qty: 30 TABLET | Refills: 2 | Status: SHIPPED | OUTPATIENT
Start: 2022-01-31 | End: 2022-03-27 | Stop reason: SDUPTHER

## 2022-01-31 RX ORDER — ESCITALOPRAM OXALATE 20 MG/1
20 TABLET ORAL DAILY
Qty: 30 TABLET | Refills: 0 | Status: SHIPPED | OUTPATIENT
Start: 2022-01-31 | End: 2022-02-08 | Stop reason: SDUPTHER

## 2022-02-08 ENCOUNTER — BEHAVIORAL HEALTH (OUTPATIENT)
Dept: BEHAVIORAL HEALTH | Age: 72
End: 2022-02-08

## 2022-02-08 DIAGNOSIS — F41.1 GAD (GENERALIZED ANXIETY DISORDER): ICD-10-CM

## 2022-02-08 DIAGNOSIS — G47.9 SLEEP DIFFICULTIES: ICD-10-CM

## 2022-02-08 DIAGNOSIS — F33.2 SEVERE EPISODE OF RECURRENT MAJOR DEPRESSIVE DISORDER, WITHOUT PSYCHOTIC FEATURES (CMD): Primary | ICD-10-CM

## 2022-02-08 PROCEDURE — 99212 OFFICE O/P EST SF 10 MIN: CPT | Performed by: PSYCHIATRY & NEUROLOGY

## 2022-02-08 RX ORDER — ESCITALOPRAM OXALATE 20 MG/1
30 TABLET ORAL DAILY
Qty: 45 TABLET | Refills: 2 | Status: SHIPPED | OUTPATIENT
Start: 2022-02-08 | End: 2022-05-22 | Stop reason: SDUPTHER

## 2022-02-08 RX ORDER — HYDROXYZINE 50 MG/1
50 TABLET, FILM COATED ORAL 3 TIMES DAILY
Qty: 90 TABLET | Refills: 2 | Status: SHIPPED | OUTPATIENT
Start: 2022-02-08 | End: 2022-07-05 | Stop reason: SDUPTHER

## 2022-03-15 RX ORDER — BUPROPION HYDROCHLORIDE 300 MG/1
300 TABLET ORAL DAILY
Qty: 30 TABLET | Refills: 2 | OUTPATIENT
Start: 2022-03-15

## 2022-03-15 RX ORDER — ESCITALOPRAM OXALATE 20 MG/1
30 TABLET ORAL DAILY
Qty: 45 TABLET | Refills: 2 | OUTPATIENT
Start: 2022-03-15 | End: 2022-04-14

## 2022-03-28 RX ORDER — BUPROPION HYDROCHLORIDE 300 MG/1
300 TABLET ORAL DAILY
Qty: 30 TABLET | Refills: 2 | Status: SHIPPED | OUTPATIENT
Start: 2022-03-28 | End: 2022-06-20 | Stop reason: SDUPTHER

## 2022-04-20 ENCOUNTER — TELEPHONE (OUTPATIENT)
Dept: BEHAVIORAL HEALTH | Age: 72
End: 2022-04-20

## 2022-04-20 RX ORDER — MIRTAZAPINE 15 MG/1
7.5 TABLET, FILM COATED ORAL NIGHTLY
Qty: 15 TABLET | Refills: 2 | Status: SHIPPED | OUTPATIENT
Start: 2022-04-20 | End: 2022-07-05 | Stop reason: SDUPTHER

## 2022-04-29 RX ORDER — RISPERIDONE 1 MG/1
1 TABLET ORAL 2 TIMES DAILY
Qty: 60 TABLET | Refills: 2 | Status: SHIPPED | OUTPATIENT
Start: 2022-04-29 | End: 2022-08-04 | Stop reason: SDUPTHER

## 2022-05-03 ENCOUNTER — APPOINTMENT (OUTPATIENT)
Dept: BEHAVIORAL HEALTH | Age: 72
End: 2022-05-03

## 2022-05-23 RX ORDER — ESCITALOPRAM OXALATE 20 MG/1
30 TABLET ORAL DAILY
Qty: 45 TABLET | Refills: 2 | Status: SHIPPED | OUTPATIENT
Start: 2022-05-23 | End: 2022-08-28 | Stop reason: SDUPTHER

## 2022-06-20 RX ORDER — BUPROPION HYDROCHLORIDE 300 MG/1
300 TABLET ORAL DAILY
Qty: 30 TABLET | Refills: 2 | Status: SHIPPED | OUTPATIENT
Start: 2022-06-20 | End: 2022-08-28 | Stop reason: SDUPTHER

## 2022-06-22 ENCOUNTER — BEHAVIORAL HEALTH (OUTPATIENT)
Dept: BEHAVIORAL HEALTH | Age: 72
End: 2022-06-22

## 2022-06-22 DIAGNOSIS — F41.1 GAD (GENERALIZED ANXIETY DISORDER): ICD-10-CM

## 2022-06-22 DIAGNOSIS — F33.2 SEVERE EPISODE OF RECURRENT MAJOR DEPRESSIVE DISORDER, WITHOUT PSYCHOTIC FEATURES (CMD): Primary | ICD-10-CM

## 2022-06-22 PROCEDURE — 99212 OFFICE O/P EST SF 10 MIN: CPT | Performed by: PSYCHIATRY & NEUROLOGY

## 2022-07-05 RX ORDER — MIRTAZAPINE 15 MG/1
7.5 TABLET, FILM COATED ORAL NIGHTLY
Qty: 15 TABLET | Refills: 2 | Status: SHIPPED | OUTPATIENT
Start: 2022-07-05 | End: 2022-09-12 | Stop reason: SDUPTHER

## 2022-07-05 RX ORDER — HYDROXYZINE 50 MG/1
50 TABLET, FILM COATED ORAL 3 TIMES DAILY
Qty: 90 TABLET | Refills: 2 | Status: SHIPPED | OUTPATIENT
Start: 2022-07-05 | End: 2022-11-10 | Stop reason: SDUPTHER

## 2022-08-04 RX ORDER — RISPERIDONE 1 MG/1
1 TABLET ORAL 2 TIMES DAILY
Qty: 60 TABLET | Refills: 0 | Status: SHIPPED | OUTPATIENT
Start: 2022-08-04 | End: 2022-09-09 | Stop reason: SDUPTHER

## 2022-08-09 ENCOUNTER — HOSPITAL ENCOUNTER (OUTPATIENT)
Age: 72
Discharge: HOME | End: 2022-08-09
Attending: EMERGENCY MEDICINE
Payer: MEDICARE

## 2022-08-09 ENCOUNTER — APPOINTMENT (OUTPATIENT)
Dept: CT IMAGING | Age: 72
End: 2022-08-09
Attending: EMERGENCY MEDICINE
Payer: MEDICARE

## 2022-08-09 VITALS
DIASTOLIC BLOOD PRESSURE: 79 MMHG | TEMPERATURE: 98 F | OXYGEN SATURATION: 95 % | RESPIRATION RATE: 18 BRPM | HEART RATE: 72 BPM | SYSTOLIC BLOOD PRESSURE: 131 MMHG

## 2022-08-09 DIAGNOSIS — S00.83XA FACIAL CONTUSION, INITIAL ENCOUNTER: Primary | ICD-10-CM

## 2022-08-09 PROCEDURE — 70450 CT HEAD/BRAIN W/O DYE: CPT | Performed by: EMERGENCY MEDICINE

## 2022-08-09 PROCEDURE — 99214 OFFICE O/P EST MOD 30 MIN: CPT

## 2022-08-09 PROCEDURE — 99213 OFFICE O/P EST LOW 20 MIN: CPT

## 2022-08-09 PROCEDURE — 70486 CT MAXILLOFACIAL W/O DYE: CPT | Performed by: EMERGENCY MEDICINE

## 2022-08-09 NOTE — ED INITIAL ASSESSMENT (HPI)
Pt presents with left eye and left side of face swelling due to a fall x 2 hours ago. States she fell [place] [employer] [place], denies LOC, denies vision change or dizziness at the moment.

## 2022-08-17 ENCOUNTER — TELEPHONE (OUTPATIENT)
Dept: BEHAVIORAL HEALTH | Age: 72
End: 2022-08-17

## 2022-08-17 ENCOUNTER — APPOINTMENT (OUTPATIENT)
Dept: BEHAVIORAL HEALTH | Age: 72
End: 2022-08-17

## 2022-08-18 ENCOUNTER — HOSPITAL ENCOUNTER (OUTPATIENT)
Age: 72
Discharge: HOME | End: 2022-08-18
Attending: EMERGENCY MEDICINE
Payer: MEDICARE

## 2022-08-18 VITALS
DIASTOLIC BLOOD PRESSURE: 63 MMHG | OXYGEN SATURATION: 97 % | RESPIRATION RATE: 18 BRPM | TEMPERATURE: 98 F | SYSTOLIC BLOOD PRESSURE: 115 MMHG | HEART RATE: 65 BPM

## 2022-08-18 DIAGNOSIS — U07.1 COVID-19: Primary | ICD-10-CM

## 2022-08-18 LAB
S PYO AG THROAT QL: NEGATIVE
SARS-COV-2 RNA RESP QL NAA+PROBE: DETECTED

## 2022-08-18 PROCEDURE — 87880 STREP A ASSAY W/OPTIC: CPT

## 2022-08-18 PROCEDURE — 99214 OFFICE O/P EST MOD 30 MIN: CPT

## 2022-08-18 RX ORDER — BEBTELOVIMAB 87.5 MG/ML
175 INJECTION, SOLUTION INTRAVENOUS ONCE
Status: COMPLETED | OUTPATIENT
Start: 2022-08-18 | End: 2022-08-18

## 2022-08-18 NOTE — ED INITIAL ASSESSMENT (HPI)
Sorethroat, headache, body aches, chills started last night, mild cough, no sob, h/o fall 8/9 ,was seen and evaluated in the ED, bruising with subconjunctival hemorrhage to left side of face

## 2022-08-29 RX ORDER — BUPROPION HYDROCHLORIDE 300 MG/1
300 TABLET ORAL DAILY
Qty: 30 TABLET | Refills: 2 | Status: SHIPPED | OUTPATIENT
Start: 2022-08-29 | End: 2023-02-27 | Stop reason: SDUPTHER

## 2022-08-29 RX ORDER — ESCITALOPRAM OXALATE 20 MG/1
30 TABLET ORAL DAILY
Qty: 45 TABLET | Refills: 2 | Status: SHIPPED | OUTPATIENT
Start: 2022-08-29 | End: 2022-11-28 | Stop reason: SDUPTHER

## 2022-09-09 RX ORDER — RISPERIDONE 1 MG/1
1 TABLET ORAL 2 TIMES DAILY
Qty: 60 TABLET | Refills: 0 | Status: SHIPPED | OUTPATIENT
Start: 2022-09-09 | End: 2022-11-10 | Stop reason: SDUPTHER

## 2022-09-12 RX ORDER — MIRTAZAPINE 15 MG/1
7.5 TABLET, FILM COATED ORAL NIGHTLY
Qty: 15 TABLET | Refills: 2 | Status: SHIPPED | OUTPATIENT
Start: 2022-09-12 | End: 2022-12-09 | Stop reason: SDUPTHER

## 2022-09-16 RX ORDER — BUPROPION HYDROCHLORIDE 300 MG/1
300 TABLET ORAL DAILY
Qty: 30 TABLET | Refills: 2 | OUTPATIENT
Start: 2022-09-16

## 2022-09-20 ENCOUNTER — APPOINTMENT (OUTPATIENT)
Dept: BEHAVIORAL HEALTH | Age: 72
End: 2022-09-20

## 2022-09-22 ENCOUNTER — BEHAVIORAL HEALTH (OUTPATIENT)
Dept: BEHAVIORAL HEALTH | Age: 72
End: 2022-09-22

## 2022-09-22 ENCOUNTER — APPOINTMENT (OUTPATIENT)
Dept: BEHAVIORAL HEALTH | Age: 72
End: 2022-09-22

## 2022-09-22 ENCOUNTER — TELEPHONE (OUTPATIENT)
Dept: BEHAVIORAL HEALTH | Age: 72
End: 2022-09-22

## 2022-09-22 DIAGNOSIS — F33.2 SEVERE EPISODE OF RECURRENT MAJOR DEPRESSIVE DISORDER, WITHOUT PSYCHOTIC FEATURES (CMD): Primary | ICD-10-CM

## 2022-09-22 DIAGNOSIS — G47.9 SLEEP DIFFICULTIES: ICD-10-CM

## 2022-09-22 DIAGNOSIS — F11.10 OPIOID ABUSE, CONTINUOUS (CMD): ICD-10-CM

## 2022-09-22 DIAGNOSIS — F41.1 GAD (GENERALIZED ANXIETY DISORDER): ICD-10-CM

## 2022-09-22 PROCEDURE — 99442 TELEPHONE E&M BY PHYSICIAN EST PT NOT ORIG PREV 7 DAYS 11-20 MIN: CPT | Performed by: PSYCHIATRY & NEUROLOGY

## 2022-09-26 RX ORDER — MIRTAZAPINE 15 MG/1
7.5 TABLET, FILM COATED ORAL NIGHTLY
Qty: 15 TABLET | Refills: 2 | OUTPATIENT
Start: 2022-09-26

## 2022-10-06 ENCOUNTER — TELEPHONE (OUTPATIENT)
Dept: BEHAVIORAL HEALTH | Age: 72
End: 2022-10-06

## 2022-11-10 RX ORDER — HYDROXYZINE 50 MG/1
50 TABLET, FILM COATED ORAL 3 TIMES DAILY
Qty: 90 TABLET | Refills: 1 | Status: SHIPPED | OUTPATIENT
Start: 2022-11-10 | End: 2023-01-16

## 2022-11-10 RX ORDER — RISPERIDONE 1 MG/1
1 TABLET ORAL 2 TIMES DAILY
Qty: 60 TABLET | Refills: 1 | Status: SHIPPED | OUTPATIENT
Start: 2022-11-10 | End: 2022-12-22

## 2022-11-29 RX ORDER — ESCITALOPRAM OXALATE 20 MG/1
30 TABLET ORAL DAILY
Qty: 45 TABLET | Refills: 0 | Status: SHIPPED | OUTPATIENT
Start: 2022-11-29 | End: 2022-12-28

## 2022-12-12 RX ORDER — MIRTAZAPINE 15 MG/1
7.5 TABLET, FILM COATED ORAL NIGHTLY
Qty: 15 TABLET | Refills: 2 | Status: SHIPPED | OUTPATIENT
Start: 2022-12-12 | End: 2023-02-14 | Stop reason: SDUPTHER

## 2022-12-12 RX ORDER — MIRTAZAPINE 15 MG/1
TABLET, FILM COATED ORAL
Qty: 15 TABLET | Refills: 0 | OUTPATIENT
Start: 2022-12-12

## 2022-12-22 ENCOUNTER — BEHAVIORAL HEALTH (OUTPATIENT)
Dept: BEHAVIORAL HEALTH | Age: 72
End: 2022-12-22

## 2022-12-22 DIAGNOSIS — F11.10 OPIOID ABUSE, CONTINUOUS (CMD): ICD-10-CM

## 2022-12-22 DIAGNOSIS — F41.1 GAD (GENERALIZED ANXIETY DISORDER): ICD-10-CM

## 2022-12-22 DIAGNOSIS — F33.2 SEVERE EPISODE OF RECURRENT MAJOR DEPRESSIVE DISORDER, WITHOUT PSYCHOTIC FEATURES (CMD): Primary | ICD-10-CM

## 2022-12-22 PROCEDURE — 99442 TELEPHONE E&M BY PHYSICIAN EST PT NOT ORIG PREV 7 DAYS 11-20 MIN: CPT | Performed by: PSYCHIATRY & NEUROLOGY

## 2022-12-28 RX ORDER — ESCITALOPRAM OXALATE 20 MG/1
TABLET ORAL
Qty: 45 TABLET | Refills: 0 | Status: SHIPPED | OUTPATIENT
Start: 2022-12-28 | End: 2023-01-29 | Stop reason: SDUPTHER

## 2023-01-11 ENCOUNTER — TELEPHONE (OUTPATIENT)
Dept: BEHAVIORAL HEALTH | Age: 73
End: 2023-01-11

## 2023-01-11 RX ORDER — ARIPIPRAZOLE 2 MG/1
2 TABLET ORAL
Qty: 30 TABLET | Refills: 2 | Status: SHIPPED | OUTPATIENT
Start: 2023-01-11 | End: 2023-03-28 | Stop reason: SDUPTHER

## 2023-01-12 ENCOUNTER — APPOINTMENT (OUTPATIENT)
Dept: GENERAL RADIOLOGY | Facility: HOSPITAL | Age: 73
End: 2023-01-12
Attending: EMERGENCY MEDICINE
Payer: MEDICARE

## 2023-01-12 ENCOUNTER — HOSPITAL ENCOUNTER (EMERGENCY)
Facility: HOSPITAL | Age: 73
Discharge: HOME | End: 2023-01-12
Attending: EMERGENCY MEDICINE
Payer: MEDICARE

## 2023-01-12 VITALS
HEART RATE: 99 BPM | OXYGEN SATURATION: 96 % | BODY MASS INDEX: 22.58 KG/M2 | SYSTOLIC BLOOD PRESSURE: 109 MMHG | RESPIRATION RATE: 12 BRPM | HEIGHT: 60 IN | WEIGHT: 115 LBS | DIASTOLIC BLOOD PRESSURE: 81 MMHG | TEMPERATURE: 99 F

## 2023-01-12 DIAGNOSIS — R00.2 PALPITATIONS: Primary | ICD-10-CM

## 2023-01-12 LAB
ALBUMIN SERPL-MCNC: 3.6 G/DL (ref 3.4–5)
ALBUMIN/GLOB SERPL: 1.2 {RATIO} (ref 1–2)
ALP LIVER SERPL-CCNC: 67 U/L
ALT SERPL-CCNC: 26 U/L
ANION GAP SERPL CALC-SCNC: 9 MMOL/L (ref 0–18)
AST SERPL-CCNC: 14 U/L (ref 15–37)
ATRIAL RATE: 87 BPM
BASOPHILS # BLD AUTO: 0.04 X10(3) UL (ref 0–0.2)
BASOPHILS NFR BLD AUTO: 0.3 %
BILIRUB SERPL-MCNC: 0.5 MG/DL (ref 0.1–2)
BUN BLD-MCNC: 15 MG/DL (ref 7–18)
BUN/CREAT SERPL: 14.6 (ref 10–20)
CALCIUM BLD-MCNC: 9.2 MG/DL (ref 8.5–10.1)
CHLORIDE SERPL-SCNC: 108 MMOL/L (ref 98–112)
CO2 SERPL-SCNC: 23 MMOL/L (ref 21–32)
CREAT BLD-MCNC: 1.03 MG/DL
D DIMER PPP FEU-MCNC: <0.27 UG/ML FEU (ref ?–0.72)
DEPRECATED RDW RBC AUTO: 38 FL (ref 35.1–46.3)
EOSINOPHIL # BLD AUTO: 0.01 X10(3) UL (ref 0–0.7)
EOSINOPHIL NFR BLD AUTO: 0.1 %
ERYTHROCYTE [DISTWIDTH] IN BLOOD BY AUTOMATED COUNT: 11.8 % (ref 11–15)
FLUAV + FLUBV RNA SPEC NAA+PROBE: NEGATIVE
FLUAV + FLUBV RNA SPEC NAA+PROBE: NEGATIVE
GFR SERPLBLD BASED ON 1.73 SQ M-ARVRAT: 58 ML/MIN/1.73M2 (ref 60–?)
GLOBULIN PLAS-MCNC: 3 G/DL (ref 2.8–4.4)
GLUCOSE BLD-MCNC: 250 MG/DL (ref 70–99)
HCT VFR BLD AUTO: 45.6 %
HGB BLD-MCNC: 15.2 G/DL
IMM GRANULOCYTES # BLD AUTO: 0.04 X10(3) UL (ref 0–1)
IMM GRANULOCYTES NFR BLD: 0.3 %
LYMPHOCYTES # BLD AUTO: 4.92 X10(3) UL (ref 1–4)
LYMPHOCYTES NFR BLD AUTO: 41 %
MCH RBC QN AUTO: 29.3 PG (ref 26–34)
MCHC RBC AUTO-ENTMCNC: 33.3 G/DL (ref 31–37)
MCV RBC AUTO: 87.9 FL
MONOCYTES # BLD AUTO: 0.57 X10(3) UL (ref 0.1–1)
MONOCYTES NFR BLD AUTO: 4.7 %
NEUTROPHILS # BLD AUTO: 6.43 X10 (3) UL (ref 1.5–7.7)
NEUTROPHILS # BLD AUTO: 6.43 X10(3) UL (ref 1.5–7.7)
NEUTROPHILS NFR BLD AUTO: 53.6 %
OSMOLALITY SERPL CALC.SUM OF ELEC: 299 MOSM/KG (ref 275–295)
P AXIS: 71 DEGREES
P-R INTERVAL: 136 MS
PLATELET # BLD AUTO: 290 10(3)UL (ref 150–450)
POTASSIUM SERPL-SCNC: 4 MMOL/L (ref 3.5–5.1)
PROT SERPL-MCNC: 6.6 G/DL (ref 6.4–8.2)
Q-T INTERVAL: 270 MS
QRS DURATION: 56 MS
QTC CALCULATION (BEZET): 324 MS
R AXIS: 21 DEGREES
RBC # BLD AUTO: 5.19 X10(6)UL
RSV RNA SPEC NAA+PROBE: NEGATIVE
SARS-COV-2 RNA RESP QL NAA+PROBE: NOT DETECTED
SODIUM SERPL-SCNC: 140 MMOL/L (ref 136–145)
T AXIS: 72 DEGREES
TROPONIN I HIGH SENSITIVITY: 5 NG/L
VENTRICULAR RATE: 87 BPM
WBC # BLD AUTO: 12 X10(3) UL (ref 4–11)

## 2023-01-12 PROCEDURE — 96374 THER/PROPH/DIAG INJ IV PUSH: CPT

## 2023-01-12 PROCEDURE — 80053 COMPREHEN METABOLIC PANEL: CPT | Performed by: EMERGENCY MEDICINE

## 2023-01-12 PROCEDURE — 0241U SARS-COV-2/FLU A AND B/RSV BY PCR (GENEXPERT): CPT | Performed by: EMERGENCY MEDICINE

## 2023-01-12 PROCEDURE — 84484 ASSAY OF TROPONIN QUANT: CPT

## 2023-01-12 PROCEDURE — 84484 ASSAY OF TROPONIN QUANT: CPT | Performed by: EMERGENCY MEDICINE

## 2023-01-12 PROCEDURE — 85025 COMPLETE CBC W/AUTO DIFF WBC: CPT | Performed by: EMERGENCY MEDICINE

## 2023-01-12 PROCEDURE — 93005 ELECTROCARDIOGRAM TRACING: CPT

## 2023-01-12 PROCEDURE — 93010 ELECTROCARDIOGRAM REPORT: CPT

## 2023-01-12 PROCEDURE — 85025 COMPLETE CBC W/AUTO DIFF WBC: CPT

## 2023-01-12 PROCEDURE — 99284 EMERGENCY DEPT VISIT MOD MDM: CPT

## 2023-01-12 PROCEDURE — 80053 COMPREHEN METABOLIC PANEL: CPT

## 2023-01-12 PROCEDURE — 71045 X-RAY EXAM CHEST 1 VIEW: CPT | Performed by: EMERGENCY MEDICINE

## 2023-01-12 PROCEDURE — 85379 FIBRIN DEGRADATION QUANT: CPT | Performed by: EMERGENCY MEDICINE

## 2023-01-12 RX ORDER — LORAZEPAM 2 MG/ML
0.5 INJECTION INTRAMUSCULAR ONCE
Status: COMPLETED | OUTPATIENT
Start: 2023-01-12 | End: 2023-01-12

## 2023-01-12 NOTE — ED QUICK NOTES
Pt. Came into the ED with palpitations and hot flashes. Anxious about what's going on with her. No CP. Per  tremors have been present for a long time. Feels SOB.

## 2023-01-12 NOTE — ED INITIAL ASSESSMENT (HPI)
Patient ambulatory to triage, c/o palpitations with tremors and SOB that started yesterday. Patient stated palpitations has been intermittent.  Patient stated she has a hx of mitral prolapse

## 2023-01-13 NOTE — DISCHARGE INSTRUCTIONS
See your primary care doctor for follow-up. Return to the ER if you develop worsening symptoms, chest pain or pressure, difficulty breathing, fainting, or any emergent concerns.

## 2023-01-16 RX ORDER — HYDROXYZINE 50 MG/1
50 TABLET, FILM COATED ORAL 3 TIMES DAILY
Qty: 90 TABLET | Refills: 0 | Status: SHIPPED | OUTPATIENT
Start: 2023-01-16 | End: 2023-02-14 | Stop reason: SDUPTHER

## 2023-01-17 ENCOUNTER — TELEPHONE (OUTPATIENT)
Dept: BEHAVIORAL HEALTH | Age: 73
End: 2023-01-17

## 2023-01-30 RX ORDER — ESCITALOPRAM OXALATE 20 MG/1
30 TABLET ORAL DAILY
Qty: 45 TABLET | Refills: 1 | Status: SHIPPED | OUTPATIENT
Start: 2023-01-30 | End: 2023-03-28 | Stop reason: SDUPTHER

## 2023-01-31 ENCOUNTER — TELEPHONE (OUTPATIENT)
Dept: BEHAVIORAL HEALTH | Age: 73
End: 2023-01-31

## 2023-02-14 ENCOUNTER — TELEPHONE (OUTPATIENT)
Dept: BEHAVIORAL HEALTH | Age: 73
End: 2023-02-14

## 2023-02-14 RX ORDER — MIRTAZAPINE 15 MG/1
7.5 TABLET, FILM COATED ORAL NIGHTLY
Qty: 15 TABLET | Refills: 2 | Status: SHIPPED | OUTPATIENT
Start: 2023-02-14 | End: 2023-03-28 | Stop reason: SDUPTHER

## 2023-02-14 RX ORDER — HYDROXYZINE 50 MG/1
50 TABLET, FILM COATED ORAL 3 TIMES DAILY
Qty: 90 TABLET | Refills: 0 | Status: SHIPPED | OUTPATIENT
Start: 2023-02-14 | End: 2023-03-13

## 2023-02-27 RX ORDER — BUPROPION HYDROCHLORIDE 300 MG/1
300 TABLET ORAL DAILY
Qty: 30 TABLET | Refills: 0 | Status: SHIPPED | OUTPATIENT
Start: 2023-02-27 | End: 2023-03-28 | Stop reason: SDUPTHER

## 2023-03-13 RX ORDER — HYDROXYZINE 50 MG/1
50 TABLET, FILM COATED ORAL 3 TIMES DAILY
Qty: 90 TABLET | Refills: 0 | Status: SHIPPED | OUTPATIENT
Start: 2023-03-13 | End: 2023-03-28 | Stop reason: SDUPTHER

## 2023-03-14 ENCOUNTER — TELEPHONE (OUTPATIENT)
Dept: BEHAVIORAL HEALTH | Age: 73
End: 2023-03-14

## 2023-03-28 ENCOUNTER — BEHAVIORAL HEALTH (OUTPATIENT)
Dept: BEHAVIORAL HEALTH | Age: 73
End: 2023-03-28

## 2023-03-28 DIAGNOSIS — F33.2 SEVERE EPISODE OF RECURRENT MAJOR DEPRESSIVE DISORDER, WITHOUT PSYCHOTIC FEATURES (CMD): Primary | ICD-10-CM

## 2023-03-28 DIAGNOSIS — F41.1 GAD (GENERALIZED ANXIETY DISORDER): ICD-10-CM

## 2023-03-28 DIAGNOSIS — F11.10 OPIOID ABUSE, CONTINUOUS (CMD): ICD-10-CM

## 2023-03-28 DIAGNOSIS — G47.9 SLEEP DIFFICULTIES: ICD-10-CM

## 2023-03-28 PROCEDURE — 99212 OFFICE O/P EST SF 10 MIN: CPT | Performed by: PSYCHIATRY & NEUROLOGY

## 2023-03-28 RX ORDER — HYDROXYZINE 50 MG/1
50 TABLET, FILM COATED ORAL 3 TIMES DAILY
Qty: 90 TABLET | Refills: 2 | Status: SHIPPED | OUTPATIENT
Start: 2023-03-28 | End: 2023-06-05 | Stop reason: SDUPTHER

## 2023-03-28 RX ORDER — ESCITALOPRAM OXALATE 20 MG/1
30 TABLET ORAL DAILY
Qty: 45 TABLET | Refills: 2 | Status: SHIPPED | OUTPATIENT
Start: 2023-03-28 | End: 2023-06-05 | Stop reason: SDUPTHER

## 2023-03-28 RX ORDER — MIRTAZAPINE 15 MG/1
7.5 TABLET, FILM COATED ORAL NIGHTLY
Qty: 15 TABLET | Refills: 2 | Status: SHIPPED | OUTPATIENT
Start: 2023-03-28 | End: 2023-06-05 | Stop reason: SDUPTHER

## 2023-03-28 RX ORDER — BUPROPION HYDROCHLORIDE 300 MG/1
300 TABLET ORAL DAILY
Qty: 30 TABLET | Refills: 2 | Status: SHIPPED | OUTPATIENT
Start: 2023-03-28 | End: 2023-06-05 | Stop reason: SDUPTHER

## 2023-03-28 RX ORDER — PROPRANOLOL HYDROCHLORIDE 10 MG/1
10 TABLET ORAL 2 TIMES DAILY
Qty: 60 TABLET | Refills: 2 | Status: SHIPPED | OUTPATIENT
Start: 2023-03-28 | End: 2023-06-05 | Stop reason: SDUPTHER

## 2023-03-28 RX ORDER — ARIPIPRAZOLE 2 MG/1
2 TABLET ORAL
Qty: 30 TABLET | Refills: 2 | Status: SHIPPED | OUTPATIENT
Start: 2023-03-28 | End: 2023-06-05 | Stop reason: SDUPTHER

## 2023-06-05 ENCOUNTER — BEHAVIORAL HEALTH (OUTPATIENT)
Dept: BEHAVIORAL HEALTH | Age: 73
End: 2023-06-05

## 2023-06-05 DIAGNOSIS — F11.10 OPIOID ABUSE, CONTINUOUS (CMD): ICD-10-CM

## 2023-06-05 DIAGNOSIS — G47.9 SLEEP DIFFICULTIES: ICD-10-CM

## 2023-06-05 DIAGNOSIS — F41.1 GAD (GENERALIZED ANXIETY DISORDER): ICD-10-CM

## 2023-06-05 DIAGNOSIS — F33.2 SEVERE EPISODE OF RECURRENT MAJOR DEPRESSIVE DISORDER, WITHOUT PSYCHOTIC FEATURES (CMD): Primary | ICD-10-CM

## 2023-06-05 PROCEDURE — 99212 OFFICE O/P EST SF 10 MIN: CPT | Performed by: PSYCHIATRY & NEUROLOGY

## 2023-06-05 RX ORDER — ARIPIPRAZOLE 5 MG/1
5 TABLET ORAL
Qty: 30 TABLET | Refills: 2 | Status: SHIPPED | OUTPATIENT
Start: 2023-06-05 | End: 2023-09-05

## 2023-06-05 RX ORDER — PROPRANOLOL HYDROCHLORIDE 10 MG/1
10 TABLET ORAL 2 TIMES DAILY
Qty: 60 TABLET | Refills: 2 | Status: SHIPPED | OUTPATIENT
Start: 2023-06-05 | End: 2023-09-18

## 2023-06-05 RX ORDER — BUPROPION HYDROCHLORIDE 300 MG/1
300 TABLET ORAL DAILY
Qty: 30 TABLET | Refills: 2 | Status: SHIPPED | OUTPATIENT
Start: 2023-06-05 | End: 2023-09-27

## 2023-06-05 RX ORDER — HYDROXYZINE 50 MG/1
50 TABLET, FILM COATED ORAL 3 TIMES DAILY
Qty: 90 TABLET | Refills: 2 | Status: SHIPPED | OUTPATIENT
Start: 2023-06-05 | End: 2023-10-05

## 2023-06-05 RX ORDER — ESCITALOPRAM OXALATE 20 MG/1
30 TABLET ORAL DAILY
Qty: 45 TABLET | Refills: 2 | Status: SHIPPED | OUTPATIENT
Start: 2023-06-05 | End: 2023-09-13 | Stop reason: SDUPTHER

## 2023-06-05 RX ORDER — MIRTAZAPINE 15 MG/1
7.5 TABLET, FILM COATED ORAL NIGHTLY
Qty: 15 TABLET | Refills: 2 | Status: SHIPPED | OUTPATIENT
Start: 2023-06-05 | End: 2023-08-23

## 2023-06-22 RX ORDER — MIRTAZAPINE 15 MG/1
7.5 TABLET, FILM COATED ORAL NIGHTLY
Qty: 15 TABLET | Refills: 2 | OUTPATIENT
Start: 2023-06-22

## 2023-06-22 RX ORDER — ESCITALOPRAM OXALATE 20 MG/1
30 TABLET ORAL DAILY
Qty: 45 TABLET | Refills: 2 | OUTPATIENT
Start: 2023-06-22

## 2023-08-23 RX ORDER — MIRTAZAPINE 15 MG/1
7.5 TABLET, FILM COATED ORAL NIGHTLY
Qty: 15 TABLET | Refills: 0 | Status: SHIPPED | OUTPATIENT
Start: 2023-08-23 | End: 2023-09-13 | Stop reason: SDUPTHER

## 2023-09-05 RX ORDER — ARIPIPRAZOLE 5 MG/1
5 TABLET ORAL
Qty: 30 TABLET | Refills: 0 | Status: SHIPPED | OUTPATIENT
Start: 2023-09-05 | End: 2023-10-05

## 2023-09-12 ENCOUNTER — BEHAVIORAL HEALTH (OUTPATIENT)
Dept: BEHAVIORAL HEALTH | Age: 73
End: 2023-09-12

## 2023-09-12 DIAGNOSIS — F33.2 SEVERE EPISODE OF RECURRENT MAJOR DEPRESSIVE DISORDER, WITHOUT PSYCHOTIC FEATURES (CMD): Primary | ICD-10-CM

## 2023-09-12 DIAGNOSIS — F11.10 OPIOID ABUSE, CONTINUOUS (CMD): ICD-10-CM

## 2023-09-12 DIAGNOSIS — G47.9 SLEEP DIFFICULTIES: ICD-10-CM

## 2023-09-12 DIAGNOSIS — F41.1 GAD (GENERALIZED ANXIETY DISORDER): ICD-10-CM

## 2023-09-12 PROCEDURE — 99212 OFFICE O/P EST SF 10 MIN: CPT | Performed by: PSYCHIATRY & NEUROLOGY

## 2023-09-13 RX ORDER — ESCITALOPRAM OXALATE 20 MG/1
30 TABLET ORAL DAILY
Qty: 45 TABLET | Refills: 0 | Status: SHIPPED | OUTPATIENT
Start: 2023-09-13 | End: 2023-10-13

## 2023-09-13 RX ORDER — MIRTAZAPINE 15 MG/1
7.5 TABLET, FILM COATED ORAL NIGHTLY
Qty: 45 TABLET | Refills: 0 | Status: SHIPPED | OUTPATIENT
Start: 2023-09-13

## 2023-09-18 RX ORDER — PROPRANOLOL HYDROCHLORIDE 10 MG/1
10 TABLET ORAL 2 TIMES DAILY
Qty: 60 TABLET | Refills: 0 | Status: SHIPPED | OUTPATIENT
Start: 2023-09-18 | End: 2023-10-29 | Stop reason: SDUPTHER

## 2023-09-20 RX ORDER — MIRTAZAPINE 15 MG/1
TABLET, FILM COATED ORAL
Qty: 15 TABLET | Refills: 0 | OUTPATIENT
Start: 2023-09-20

## 2023-09-27 RX ORDER — BUPROPION HYDROCHLORIDE 300 MG/1
300 TABLET ORAL DAILY
Qty: 30 TABLET | Refills: 0 | Status: SHIPPED | OUTPATIENT
Start: 2023-09-27 | End: 2023-10-29 | Stop reason: SDUPTHER

## 2023-10-02 RX ORDER — BUPROPION HYDROCHLORIDE 300 MG/1
300 TABLET ORAL DAILY
Qty: 30 TABLET | Refills: 0 | OUTPATIENT
Start: 2023-10-02

## 2023-10-05 RX ORDER — HYDROXYZINE 50 MG/1
50 TABLET, FILM COATED ORAL 3 TIMES DAILY
Qty: 90 TABLET | Refills: 2 | OUTPATIENT
Start: 2023-10-05

## 2023-10-05 RX ORDER — ARIPIPRAZOLE 5 MG/1
5 TABLET ORAL
Qty: 30 TABLET | Refills: 0 | OUTPATIENT
Start: 2023-10-05 | End: 2023-11-04

## 2023-10-05 RX ORDER — ARIPIPRAZOLE 5 MG/1
5 TABLET ORAL
Qty: 30 TABLET | Refills: 0 | Status: SHIPPED | OUTPATIENT
Start: 2023-10-05 | End: 2023-11-07 | Stop reason: SDUPTHER

## 2023-10-05 RX ORDER — HYDROXYZINE 50 MG/1
50 TABLET, FILM COATED ORAL 3 TIMES DAILY
Qty: 90 TABLET | Refills: 0 | Status: SHIPPED | OUTPATIENT
Start: 2023-10-05 | End: 2023-11-07 | Stop reason: SDUPTHER

## 2023-10-13 RX ORDER — ESCITALOPRAM OXALATE 20 MG/1
TABLET ORAL
Qty: 45 TABLET | Refills: 2 | Status: SHIPPED | OUTPATIENT
Start: 2023-10-13 | End: 2023-10-29 | Stop reason: SDUPTHER

## 2023-10-30 RX ORDER — ESCITALOPRAM OXALATE 20 MG/1
TABLET ORAL
Qty: 45 TABLET | Refills: 0 | Status: SHIPPED | OUTPATIENT
Start: 2023-10-30 | End: 2023-12-13 | Stop reason: SDUPTHER

## 2023-10-30 RX ORDER — PROPRANOLOL HYDROCHLORIDE 10 MG/1
10 TABLET ORAL 2 TIMES DAILY
Qty: 60 TABLET | Refills: 0 | Status: SHIPPED | OUTPATIENT
Start: 2023-10-30 | End: 2023-11-29 | Stop reason: SDUPTHER

## 2023-10-30 RX ORDER — BUPROPION HYDROCHLORIDE 300 MG/1
300 TABLET ORAL DAILY
Qty: 30 TABLET | Refills: 0 | Status: SHIPPED | OUTPATIENT
Start: 2023-10-30 | End: 2023-12-04

## 2023-11-07 RX ORDER — ARIPIPRAZOLE 5 MG/1
5 TABLET ORAL
Qty: 30 TABLET | Refills: 0 | Status: SHIPPED | OUTPATIENT
Start: 2023-11-07 | End: 2023-12-07

## 2023-11-07 RX ORDER — HYDROXYZINE 50 MG/1
50 TABLET, FILM COATED ORAL 3 TIMES DAILY
Qty: 90 TABLET | Refills: 0 | Status: SHIPPED | OUTPATIENT
Start: 2023-11-07

## 2023-11-29 RX ORDER — PROPRANOLOL HYDROCHLORIDE 10 MG/1
10 TABLET ORAL 2 TIMES DAILY
Qty: 60 TABLET | Refills: 0 | Status: SHIPPED | OUTPATIENT
Start: 2023-11-29 | End: 2023-12-29

## 2023-11-29 RX ORDER — MIRTAZAPINE 15 MG/1
7.5 TABLET, FILM COATED ORAL NIGHTLY
Qty: 45 TABLET | Refills: 0 | Status: SHIPPED | OUTPATIENT
Start: 2023-11-29

## 2023-12-04 RX ORDER — BUPROPION HYDROCHLORIDE 300 MG/1
300 TABLET ORAL DAILY
Qty: 30 TABLET | Refills: 0 | OUTPATIENT
Start: 2023-12-04

## 2023-12-04 RX ORDER — ARIPIPRAZOLE 5 MG/1
5 TABLET ORAL
Qty: 30 TABLET | Refills: 0 | OUTPATIENT
Start: 2023-12-04 | End: 2024-01-03

## 2023-12-04 RX ORDER — BUPROPION HYDROCHLORIDE 300 MG/1
300 TABLET ORAL DAILY
Qty: 30 TABLET | Refills: 0 | Status: SHIPPED | OUTPATIENT
Start: 2023-12-04

## 2023-12-06 RX ORDER — ARIPIPRAZOLE 5 MG/1
5 TABLET ORAL
Qty: 30 TABLET | Refills: 0 | OUTPATIENT
Start: 2023-12-06 | End: 2024-01-05

## 2023-12-06 RX ORDER — HYDROXYZINE 50 MG/1
50 TABLET, FILM COATED ORAL 3 TIMES DAILY
Qty: 90 TABLET | Refills: 0 | Status: SHIPPED | OUTPATIENT
Start: 2023-12-06

## 2023-12-13 ENCOUNTER — APPOINTMENT (OUTPATIENT)
Dept: BEHAVIORAL HEALTH | Age: 73
End: 2023-12-13

## 2023-12-13 DIAGNOSIS — F41.1 GAD (GENERALIZED ANXIETY DISORDER): ICD-10-CM

## 2023-12-13 DIAGNOSIS — F11.10 OPIOID ABUSE, CONTINUOUS (CMD): ICD-10-CM

## 2023-12-13 DIAGNOSIS — G47.9 SLEEP DIFFICULTIES: ICD-10-CM

## 2023-12-13 DIAGNOSIS — F33.2 SEVERE EPISODE OF RECURRENT MAJOR DEPRESSIVE DISORDER, WITHOUT PSYCHOTIC FEATURES (CMD): Primary | ICD-10-CM

## 2023-12-13 PROCEDURE — 99214 OFFICE O/P EST MOD 30 MIN: CPT | Performed by: PSYCHIATRY & NEUROLOGY

## 2023-12-13 RX ORDER — MIRTAZAPINE 15 MG/1
7.5 TABLET, FILM COATED ORAL NIGHTLY
Qty: 45 TABLET | Refills: 1 | Status: SHIPPED | OUTPATIENT
Start: 2023-12-13

## 2023-12-13 RX ORDER — ESCITALOPRAM OXALATE 20 MG/1
TABLET ORAL
Qty: 45 TABLET | Refills: 2 | Status: SHIPPED | OUTPATIENT
Start: 2023-12-13

## 2023-12-13 RX ORDER — HYDROXYZINE 50 MG/1
50 TABLET, FILM COATED ORAL 3 TIMES DAILY
Qty: 90 TABLET | Refills: 1 | Status: SHIPPED | OUTPATIENT
Start: 2023-12-13

## 2023-12-13 RX ORDER — PROPRANOLOL HYDROCHLORIDE 10 MG/1
10 TABLET ORAL 2 TIMES DAILY
Qty: 60 TABLET | Refills: 2 | Status: SHIPPED | OUTPATIENT
Start: 2023-12-13 | End: 2024-01-12

## 2023-12-13 RX ORDER — BUPROPION HYDROCHLORIDE 300 MG/1
300 TABLET ORAL DAILY
Qty: 30 TABLET | Refills: 1 | Status: SHIPPED | OUTPATIENT
Start: 2023-12-13

## 2023-12-20 RX ORDER — ESCITALOPRAM OXALATE 20 MG/1
TABLET ORAL
Qty: 45 TABLET | Refills: 2 | OUTPATIENT
Start: 2023-12-20

## 2024-01-02 ENCOUNTER — TELEPHONE (OUTPATIENT)
Dept: BEHAVIORAL HEALTH | Age: 74
End: 2024-01-02

## 2024-01-02 RX ORDER — ARIPIPRAZOLE 5 MG/1
5 TABLET ORAL DAILY
COMMUNITY
End: 2024-01-02 | Stop reason: SDUPTHER

## 2024-01-02 RX ORDER — ARIPIPRAZOLE 5 MG/1
5 TABLET ORAL DAILY
Qty: 30 TABLET | Refills: 0 | Status: SHIPPED | OUTPATIENT
Start: 2024-01-02

## 2024-01-30 ENCOUNTER — HOSPITAL ENCOUNTER (OUTPATIENT)
Dept: PHYSICAL MEDICINE AND REHAB | Age: 74
Discharge: STILL ADMITTED OR ADMINISTRATIVE | End: 2024-01-31

## 2024-01-30 PROCEDURE — 97161 PT EVAL LOW COMPLEX 20 MIN: CPT | Performed by: PHYSICAL THERAPIST

## 2024-01-30 PROCEDURE — 97110 THERAPEUTIC EXERCISES: CPT | Performed by: PHYSICAL THERAPIST

## 2024-01-30 RX ORDER — ARIPIPRAZOLE 5 MG/1
5 TABLET ORAL DAILY
Qty: 30 TABLET | Refills: 1 | Status: SHIPPED | OUTPATIENT
Start: 2024-01-30 | End: 2024-02-01 | Stop reason: ALTCHOICE

## 2024-01-31 ENCOUNTER — TELEPHONE (OUTPATIENT)
Dept: BEHAVIORAL HEALTH | Age: 74
End: 2024-01-31

## 2024-01-31 ASSESSMENT — ENCOUNTER SYMPTOMS
PAIN SCALE AT HIGHEST: 2
QUALITY: DISCOMFORT
QUALITY: ACHE
PAIN SEVERITY NOW: 5
ALLEVIATING FACTORS: CHANGE IN POSITION
QUALITY: SHARP
ALLEVIATING FACTORS: AVOIDING MOVEMENT IN INVOLVED AREA
PAIN SCALE AT LOWEST: 9

## 2024-02-05 RX ORDER — HYDROXYZINE 50 MG/1
50 TABLET, FILM COATED ORAL 3 TIMES DAILY
Qty: 90 TABLET | Refills: 0 | Status: SHIPPED | OUTPATIENT
Start: 2024-02-05

## 2024-02-05 RX ORDER — PROPRANOLOL HYDROCHLORIDE 10 MG/1
10 TABLET ORAL 2 TIMES DAILY
Qty: 60 TABLET | Refills: 0 | Status: SHIPPED | OUTPATIENT
Start: 2024-02-05 | End: 2024-03-06

## 2024-02-08 ENCOUNTER — HOSPITAL ENCOUNTER (OUTPATIENT)
Dept: PHYSICAL MEDICINE AND REHAB | Age: 74
Discharge: STILL ADMITTED OR ADMINISTRATIVE | End: 2024-02-08

## 2024-02-08 DIAGNOSIS — M54.16 LUMBAR RADICULITIS: Primary | ICD-10-CM

## 2024-02-08 PROCEDURE — 97110 THERAPEUTIC EXERCISES: CPT | Performed by: PHYSICAL THERAPIST

## 2024-02-08 PROCEDURE — 97140 MANUAL THERAPY 1/> REGIONS: CPT | Performed by: PHYSICAL THERAPIST

## 2024-02-15 ENCOUNTER — APPOINTMENT (OUTPATIENT)
Dept: PHYSICAL MEDICINE AND REHAB | Age: 74
End: 2024-02-15

## 2024-02-22 ENCOUNTER — APPOINTMENT (OUTPATIENT)
Dept: PHYSICAL MEDICINE AND REHAB | Age: 74
End: 2024-02-22

## 2024-02-23 ENCOUNTER — PATIENT OUTREACH (OUTPATIENT)
Dept: INTERNAL MEDICINE CLINIC | Facility: CLINIC | Age: 74
End: 2024-02-23

## 2024-02-23 ENCOUNTER — TELEPHONE (OUTPATIENT)
Dept: BEHAVIORAL HEALTH | Age: 74
End: 2024-02-23

## 2024-02-23 NOTE — PROGRESS NOTES
Returned call to pt and provided the correct contact information for Central Scheduling for scheduling an outpatient test.    No further assistance needed.    Closing encounter

## 2024-02-29 ENCOUNTER — HOSPITAL ENCOUNTER (OUTPATIENT)
Dept: PHYSICAL MEDICINE AND REHAB | Age: 74
Discharge: STILL ADMITTED OR ADMINISTRATIVE | End: 2024-02-29

## 2024-02-29 PROCEDURE — 97140 MANUAL THERAPY 1/> REGIONS: CPT | Performed by: PHYSICAL THERAPIST

## 2024-02-29 PROCEDURE — 97110 THERAPEUTIC EXERCISES: CPT | Performed by: PHYSICAL THERAPIST

## 2024-03-01 RX ORDER — BUPROPION HYDROCHLORIDE 300 MG/1
300 TABLET ORAL DAILY
Qty: 30 TABLET | Refills: 0 | Status: SHIPPED | OUTPATIENT
Start: 2024-03-01

## 2024-03-04 RX ORDER — HYDROXYZINE 50 MG/1
50 TABLET, FILM COATED ORAL 3 TIMES DAILY
Qty: 90 TABLET | Refills: 0 | Status: SHIPPED | OUTPATIENT
Start: 2024-03-04

## 2024-03-04 RX ORDER — HYDROXYZINE 50 MG/1
50 TABLET, FILM COATED ORAL 3 TIMES DAILY
Qty: 90 TABLET | Refills: 0 | OUTPATIENT
Start: 2024-03-04

## 2024-03-04 RX ORDER — MIRTAZAPINE 15 MG/1
7.5 TABLET, FILM COATED ORAL NIGHTLY
Qty: 45 TABLET | Refills: 1 | Status: SHIPPED | OUTPATIENT
Start: 2024-03-04

## 2024-03-04 RX ORDER — MIRTAZAPINE 15 MG/1
7.5 TABLET, FILM COATED ORAL NIGHTLY
Qty: 45 TABLET | Refills: 1 | OUTPATIENT
Start: 2024-03-04

## 2024-03-05 ENCOUNTER — APPOINTMENT (OUTPATIENT)
Dept: BEHAVIORAL HEALTH | Age: 74
End: 2024-03-05

## 2024-03-06 ENCOUNTER — TELEPHONE (OUTPATIENT)
Dept: BEHAVIORAL HEALTH | Age: 74
End: 2024-03-06

## 2024-03-07 ENCOUNTER — HOSPITAL ENCOUNTER (OUTPATIENT)
Dept: PHYSICAL MEDICINE AND REHAB | Age: 74
Discharge: STILL ADMITTED OR ADMINISTRATIVE | End: 2024-03-07

## 2024-03-07 PROCEDURE — 97110 THERAPEUTIC EXERCISES: CPT | Performed by: PHYSICAL THERAPIST

## 2024-03-07 PROCEDURE — 97140 MANUAL THERAPY 1/> REGIONS: CPT | Performed by: PHYSICAL THERAPIST

## 2024-03-18 RX ORDER — ESCITALOPRAM OXALATE 20 MG/1
TABLET ORAL
Qty: 45 TABLET | Refills: 0 | Status: SHIPPED | OUTPATIENT
Start: 2024-03-18

## 2024-03-23 ENCOUNTER — HOSPITAL ENCOUNTER (OUTPATIENT)
Age: 74
Discharge: HOME | End: 2024-03-23
Payer: MEDICARE

## 2024-03-23 ENCOUNTER — APPOINTMENT (OUTPATIENT)
Dept: GENERAL RADIOLOGY | Age: 74
End: 2024-03-23
Attending: EMERGENCY MEDICINE
Payer: MEDICARE

## 2024-03-23 VITALS
OXYGEN SATURATION: 97 % | TEMPERATURE: 98 F | HEART RATE: 83 BPM | DIASTOLIC BLOOD PRESSURE: 65 MMHG | SYSTOLIC BLOOD PRESSURE: 140 MMHG | RESPIRATION RATE: 20 BRPM

## 2024-03-23 DIAGNOSIS — J40 BRONCHITIS: Primary | ICD-10-CM

## 2024-03-23 PROCEDURE — 99213 OFFICE O/P EST LOW 20 MIN: CPT

## 2024-03-23 PROCEDURE — 71046 X-RAY EXAM CHEST 2 VIEWS: CPT | Performed by: EMERGENCY MEDICINE

## 2024-03-23 RX ORDER — ALBUTEROL SULFATE 90 UG/1
2 AEROSOL, METERED RESPIRATORY (INHALATION) EVERY 4 HOURS PRN
Qty: 1 EACH | Refills: 0 | Status: SHIPPED | OUTPATIENT
Start: 2024-03-23 | End: 2024-04-02

## 2024-03-23 RX ORDER — PREDNISONE 20 MG/1
40 TABLET ORAL DAILY
Qty: 10 TABLET | Refills: 0 | Status: SHIPPED | OUTPATIENT
Start: 2024-03-23 | End: 2024-03-28

## 2024-03-23 RX ORDER — BENZONATATE 100 MG/1
100 CAPSULE ORAL 3 TIMES DAILY PRN
Qty: 21 CAPSULE | Refills: 0 | Status: SHIPPED | OUTPATIENT
Start: 2024-03-23 | End: 2024-03-30

## 2024-03-23 NOTE — ED PROVIDER NOTES
Chief Complaint   Patient presents with    Cough/URI       HPI:     Meli Antonio is a 73 year old female who presents for evaluation of chest congestion cough over the last 7 days.  Denies associated fever or antipyretic use, notes previous history of pneumonia few years ago with hospitalization without conclusive etiology based on patient's objective narrative.  Non-smoker nondiabetic by history provided.  Notes mild intermittent diarrhea.  Denies associated headache dizziness ear pain sore throat dysphagia neck pain chest pain shortness of breath abdominal pain vomiting dysuria or rash.  Tolerating p.o. well.      PFSH    PFSH asessment screens reviewed and agree.  Nurses notes reviewed I agree with documentation.    Family History   Problem Relation Age of Onset    Heart Disorder Father     Cancer Mother         ESOPHAGUS     Family history reviewed with patient/caregiver and is not pertinent to presenting problem.  Social History     Socioeconomic History    Marital status:      Spouse name: Not on file    Number of children: Not on file    Years of education: Not on file    Highest education level: Not on file   Occupational History    Not on file   Tobacco Use    Smoking status: Former     Packs/day: 2.00     Years: 15.00     Additional pack years: 0.00     Total pack years: 30.00     Types: Cigarettes    Smokeless tobacco: Never    Tobacco comments:     QUIT IN 1983   Vaping Use    Vaping Use: Never used   Substance and Sexual Activity    Alcohol use: No    Drug use: No    Sexual activity: Not on file   Other Topics Concern    Not on file   Social History Narrative    Not on file     Social Determinants of Health     Financial Resource Strain: Not on file   Food Insecurity: Not on file   Transportation Needs: Not on file   Physical Activity: Not on file   Stress: Not on file   Social Connections: Not on file   Housing Stability: Not on file         ROS:   Positive for stated complaint: Cough  congestion diarrhea.  All other systems reviewed and negative except as noted above.  Constitutional and Vital Signs Reviewed.      Physical Exam:     Findings:    /65   Pulse 83   Temp 98 °F (36.7 °C) (Temporal)   Resp 20   SpO2 97%   GENERAL: well developed, well nourished, well hydrated, no distress  SKIN: good skin turgor, no obvious rashes  NECK: No JVD or emphysema.  Supple, no adenopathy  CARDIO: RRR without murmur  EXTREMITIES: no cyanosis or edema. BENITEZ without difficulty  GI: No left lower quadrant tenderness.  No rebound.  Soft, non-tender, normal bowel sounds  HEAD: normocephalic, atraumatic  EYES: sclera non icteric bilateral, conjunctiva clear  EARS: TMs clear bilaterally. Canals clear.  NOSE: nasal turbinates: pink, normal mucosa  THROAT: clear, without exudates, uvula midline, and airway patent  LUNGS: Coarse lung sounds mid lung with expiratory wheeze right base.  No retractions. no rales, rhonchI  NEURO: No focal deficits  PSYCH: Alert and oriented x3.  Answering questions appropriately.  Mood appropriate.    MDM/Assessment/Plan:   Orders for this encounter:    Orders Placed This Encounter    XR CHEST PA + LAT CHEST (LGE=08066)     Order Specific Question:   What is the Relevant Clinical Indication / Reason for Exam?     Answer:   congestion     Order Specific Question:   Release to patient     Answer:   Immediate    predniSONE 20 MG Oral Tab     Sig: Take 2 tablets (40 mg total) by mouth daily for 5 days.     Dispense:  10 tablet     Refill:  0    albuterol 108 (90 Base) MCG/ACT Inhalation Aero Soln     Sig: Inhale 2 puffs into the lungs every 4 (four) hours as needed for Wheezing.     Dispense:  1 each     Refill:  0    benzonatate 100 MG Oral Cap     Sig: Take 1 capsule (100 mg total) by mouth 3 (three) times daily as needed for cough.     Dispense:  21 capsule     Refill:  0       Labs performed this visit:  No results found for this or any previous visit (from the past 10  hour(s)).    MDM:  Patient x-ray without infiltrate, patient not tachypneic saturating over 95% during my evaluation.  No distress.  Instructed on precautions for home as well as indications to go to the ER, recommending pulse oximeter for regular check at home including inhaler and corticosteroid by recommendation in combination with cough suppressant.  Agrees to readdress outpatient over the days ahead if lingering symptoms otherwise.  Alert nontoxic    Diagnosis:    ICD-10-CM    1. Bronchitis  J40           All results reviewed and discussed with patient.  See AVS for detailed discharge instructions for your condition today.    Follow Up with:  Birdie Valverde MD  [address] [address] [phone]    Schedule an appointment as soon as possible for a visit in 3 days  FOLLOW UP; GO TO ER FOR BREAKTHROUGH CHANGES AS INSTRUCTED.

## 2024-03-23 NOTE — DISCHARGE INSTRUCTIONS
Recommend purchasing a pulse oximeter by recommendation and to reevaluate sooner including the emergency department for breakthrough chest pain or shortness of breath or oxygen levels falling below 90% by recommendation.

## 2024-03-23 NOTE — ED INITIAL ASSESSMENT (HPI)
Patient reports nasal congestion, chest congestion and cough x 1 week.  States cough is dry in nature.  Denies fevers, chills.  Taking robitussin and benadryl at home without relief in symptoms.

## 2024-03-27 ENCOUNTER — APPOINTMENT (OUTPATIENT)
Dept: BEHAVIORAL HEALTH | Age: 74
End: 2024-03-27

## 2024-03-29 ENCOUNTER — HOSPITAL ENCOUNTER (OUTPATIENT)
Age: 74
Discharge: HOME | End: 2024-03-29
Payer: MEDICARE

## 2024-03-29 VITALS
DIASTOLIC BLOOD PRESSURE: 68 MMHG | OXYGEN SATURATION: 97 % | SYSTOLIC BLOOD PRESSURE: 112 MMHG | TEMPERATURE: 98 F | HEART RATE: 88 BPM | RESPIRATION RATE: 16 BRPM

## 2024-03-29 DIAGNOSIS — J40 BRONCHITIS: Primary | ICD-10-CM

## 2024-03-29 PROCEDURE — 99213 OFFICE O/P EST LOW 20 MIN: CPT

## 2024-03-29 RX ORDER — CODEINE PHOSPHATE AND GUAIFENESIN 10; 100 MG/5ML; MG/5ML
5 SOLUTION ORAL EVERY 6 HOURS PRN
Qty: 100 ML | Refills: 0 | Status: SHIPPED | OUTPATIENT
Start: 2024-03-29 | End: 2024-04-03

## 2024-03-29 NOTE — ED PROVIDER NOTES
Chief Complaint   Patient presents with    Cough/URI       HPI:     Meli Antonio is a 73 year old female who presents for evaluation of 2 weeks of nasal congestion developing the chest congestion over the last 10 days.  Was seen through our facility 6 days ago with normal radiograph diagnosed with bronchitis put on bronchodilators cough suppressant and corticosteroid.  Patient states mild improvement in congestion with ongoing cough.  Patient denies associated fever or antipyretic use last few days.  Denies previous history of underlying lung disease including COPD asthma or pneumonia.  Denies recent antibiotics or travel history, denies self or family history of ACS or hypercoagulable state.  Denies associated headache ear pain dysphagia neck pain chest pain shortness of breath vomiting diarrhea dysuria or rash.      PFSH    PFSH asessment screens reviewed and agree.  Nurses notes reviewed I agree with documentation.    Family History   Problem Relation Age of Onset    Heart Disorder Father     Cancer Mother         ESOPHAGUS     Family history reviewed with patient/caregiver and is not pertinent to presenting problem.  Social History     Socioeconomic History    Marital status:      Spouse name: Not on file    Number of children: Not on file    Years of education: Not on file    Highest education level: Not on file   Occupational History    Not on file   Tobacco Use    Smoking status: Former     Packs/day: 2.00     Years: 15.00     Additional pack years: 0.00     Total pack years: 30.00     Types: Cigarettes    Smokeless tobacco: Never    Tobacco comments:     QUIT IN 1983   Vaping Use    Vaping Use: Never used   Substance and Sexual Activity    Alcohol use: No    Drug use: No    Sexual activity: Not on file   Other Topics Concern    Not on file   Social History Narrative    Not on file     Social Determinants of Health     Financial Resource Strain: Not on file   Food Insecurity: Not on file    Transportation Needs: Not on file   Physical Activity: Not on file   Stress: Not on file   Social Connections: Not on file   Housing Stability: Not on file         ROS:   Positive for stated complaint: Nasal congestion chest congestion cough  All other systems reviewed and negative except as noted above.  Constitutional and Vital Signs Reviewed.      Physical Exam:     Findings:    /68   Pulse 88   Temp 98 °F (36.7 °C) (Temporal)   Resp 16   SpO2 97%   GENERAL: well developed, well nourished, well hydrated, no distress  SKIN: good skin turgor, no obvious rashes  NECK: No JVD.  Supple, no adenopathy  CARDIO: RRR without murmur  EXTREMITIES: no cyanosis or edema. BENITEZ without difficulty  GI: soft, non-tender, normal bowel sounds  HEAD: normocephalic, atraumatic  EYES: sclera non icteric bilateral, conjunctiva clear  EARS: TMs clear bilaterally. Canals clear.  NOSE: Positive rhinorrhea.  MMM.  Nasal turbinates: pink, normal mucosa  THROAT: clear, without exudates, uvula midline, and airway patent  LUNGS: Decreased lung sounds bases.  No retractions.; no rales, rhonchi, or wheezes  NEURO: No focal deficits  PSYCH: Alert and oriented x3.  Answering questions appropriately.  Mood appropriate.    MDM/Assessment/Plan:   Orders for this encounter:    Orders Placed This Encounter    guaiFENesin-codeine 100-10 MG/5ML Oral Solution     Sig: Take 5 mL by mouth every 6 (six) hours as needed for cough. DO NOT TAKE WITH ORAL NARCOTICS as INSTRUCTED.     Dispense:  100 mL     Refill:  0       Labs performed this visit:  No results found for this or any previous visit (from the past 10 hour(s)).    MDM:  Patient exam and history reflective of bronchitis as previously manage, patient requesting further cough suppressant alternatively will provide Robitussin codeine yet instructed patient as well as family member to avoid oral narcotic for baseline chronic pain in conjunction with medication for concerns of respiratory  failure.  Agrees with plan and will hold on oral medications as discussed.  Will provide empiric antibiotics without further radiographs from previous encounter 6 days ago based on exam and vital signs, patient will be covered empirically for CAP based on patient's concerns as well will readdress primary in the days ahead versus go to the ER for breakthrough changes.  Patient not tachypneic hypoxic or tachycardic during encounter.    Diagnosis:    ICD-10-CM    1. Bronchitis  J40 guaiFENesin-codeine 100-10 MG/5ML Oral Solution          All results reviewed and discussed with patient.  See AVS for detailed discharge instructions for your condition today.    Follow Up with:  Birdie Valverde MD  [address] [place] [address] [phone]    Schedule an appointment as soon as possible for a visit in 3 days  Follow-up as recommended.  Go to the ER for breakthrough chest pain or shortness of breath as instructed.  Check regular pulse oximeter as advised

## 2024-04-01 ENCOUNTER — HOSPITAL ENCOUNTER (OUTPATIENT)
Age: 74
Discharge: HOME | End: 2024-04-01
Attending: EMERGENCY MEDICINE
Payer: MEDICARE

## 2024-04-01 VITALS
TEMPERATURE: 97 F | HEART RATE: 103 BPM | DIASTOLIC BLOOD PRESSURE: 76 MMHG | OXYGEN SATURATION: 96 % | SYSTOLIC BLOOD PRESSURE: 121 MMHG | RESPIRATION RATE: 18 BRPM

## 2024-04-01 DIAGNOSIS — J41.8 MIXED SIMPLE AND MUCOPURULENT CHRONIC BRONCHITIS (HCC): Primary | ICD-10-CM

## 2024-04-01 PROCEDURE — 99213 OFFICE O/P EST LOW 20 MIN: CPT

## 2024-04-01 PROCEDURE — 99214 OFFICE O/P EST MOD 30 MIN: CPT

## 2024-04-01 RX ORDER — AZITHROMYCIN 250 MG/1
TABLET, FILM COATED ORAL
Qty: 6 TABLET | Refills: 0 | Status: SHIPPED | OUTPATIENT
Start: 2024-04-01 | End: 2024-04-06

## 2024-04-01 NOTE — ED PROVIDER NOTES
Patient Seen in: [facility]      History     Chief Complaint   Patient presents with    Cough/URI     Stated Complaint: sore throat    Subjective:   HPI    This is a 73-year-old female who presents to the immediate care with reported cough for over 2 weeks.  Patient has 2 prior visits to the immediate care on  and .  Patient had a unremarkable chest x-ray on the .  She was reevaluated in  for persistent cough and subjective feeling of feeling short of breath.  Patient was given guaifenesin with codeine for cough suppressant at that time.  Patient continues to feel unwell.  Patient does report a history of chronic depression and low back pain.    Objective:   Past Medical History:   Diagnosis Date    Anxiety state     Back problem     COMPRESSION FX    Cataract     Depression     Esophageal reflux     Hematoma and contusion of liver     STATES HAS BEEN THERE FOR MANY YEARS    History of fractured kneecap 2016    RIGHT    IBS (irritable bowel syndrome)     Mitral prolapse     Osteoarthritis               Past Surgical History:   Procedure Laterality Date          CORRECT BUNION,OTHR METHODS      CORRECT BUNION,SIMPLE      BILAT.     DILATION/CURETTAGE,DIAGNOSTIC      FOR \"MOLE PREGNANCY\"    OTHER Right     ORIF RIGHT ANKLE    SPINE SURGERY PROCEDURE UNLISTED      cervical spine 2020    TOTAL KNEE REPLACEMENT                  No pertinent social history.            Review of Systems   Constitutional: Negative.    Respiratory:  Positive for cough, shortness of breath and wheezing.    Cardiovascular: Negative.    Gastrointestinal: Negative.    Skin: Negative.    Neurological: Negative.    All other systems reviewed and are negative.      Positive for stated complaint: sore throat  Other systems are as noted in HPI.  Constitutional and vital signs reviewed.      All other systems reviewed and negative except as noted above.    Physical Exam     ED Triage Vitals  [04/01/24 1119]   /76   Pulse 103   Resp 18   Temp 97.2 °F (36.2 °C)   Temp src Temporal   SpO2 96 %   O2 Device None (Room air)       Current:/76   Pulse 103   Temp 97.2 °F (36.2 °C) (Temporal)   Resp 18   SpO2 96%         Physical Exam  Constitutional:       General: She is not in acute distress.     Appearance: Normal appearance. She is normal weight. She is not ill-appearing, toxic-appearing or diaphoretic.   HENT:      Head: Normocephalic and atraumatic.      Mouth/Throat:      Mouth: Mucous membranes are moist.   Cardiovascular:      Rate and Rhythm: Normal rate and regular rhythm.      Pulses: Normal pulses.      Heart sounds: Normal heart sounds.   Pulmonary:      Effort: Pulmonary effort is normal.      Breath sounds: Normal breath sounds.   Abdominal:      General: Abdomen is flat.      Palpations: Abdomen is soft.   Musculoskeletal:         General: Normal range of motion.      Cervical back: Normal range of motion.   Skin:     General: Skin is warm.   Neurological:      General: No focal deficit present.      Mental Status: She is alert and oriented to person, place, and time. Mental status is at baseline.   Psychiatric:         Mood and Affect: Mood normal.         Behavior: Behavior normal.         Thought Content: Thought content normal.         Judgment: Judgment normal.                    MDM                                 Medical Decision Making  Medical decision making  Multiple diagnoses considered in the evaluation of this patient.  Vital signs reviewed and are stable. Differential diagnosis considered include, but not limited to:     Chronic bronchitis    Diagnosis: Azithromycin      Treatment Plan: Continue previously prescribed cough suppressant elixir    Previous records were reviewed        X-ray from 3/23/2024 was reviewed with no radiographic evidence of acute cardiopulmonary process  Disposition and Plan     Clinical Impression:  1. Mixed simple and mucopurulent chronic  bronchitis (HCC)         Disposition:  There is no disposition on file for this visit.  There is no disposition time on file for this visit.    Follow-up:  Birdie Valverde MD  [address] [place] [id number] [phone]    In 1 week  As needed, If symptoms worsen          Medications Prescribed:  Current Discharge Medication List        START taking these medications    Details   azithromycin (ZITHROMAX Z-MARCOS) 250 MG Oral Tab 500 mg once followed by 250 mg daily x 4 days  Qty: 6 tablet, Refills: 0

## 2024-04-01 NOTE — ED INITIAL ASSESSMENT (HPI)
Pt c/o cough x 2 weeks, seen here before, negative chest xray, taken benzonatate, prednisone, guaifenesin-codeine without relief.

## 2024-04-02 RX ORDER — BUPROPION HYDROCHLORIDE 300 MG/1
300 TABLET ORAL DAILY
Qty: 30 TABLET | Refills: 0 | Status: SHIPPED | OUTPATIENT
Start: 2024-04-02 | End: 2024-04-03 | Stop reason: SDUPTHER

## 2024-04-03 RX ORDER — BUPROPION HYDROCHLORIDE 300 MG/1
300 TABLET ORAL DAILY
Qty: 30 TABLET | Refills: 0 | Status: SHIPPED | OUTPATIENT
Start: 2024-04-03

## 2024-04-08 ENCOUNTER — APPOINTMENT (OUTPATIENT)
Dept: CT IMAGING | Facility: HOSPITAL | Age: 74
End: 2024-04-08
Attending: STUDENT IN AN ORGANIZED HEALTH CARE EDUCATION/TRAINING PROGRAM
Payer: MEDICARE

## 2024-04-08 ENCOUNTER — APPOINTMENT (OUTPATIENT)
Dept: GENERAL RADIOLOGY | Facility: HOSPITAL | Age: 74
End: 2024-04-08
Attending: STUDENT IN AN ORGANIZED HEALTH CARE EDUCATION/TRAINING PROGRAM
Payer: MEDICARE

## 2024-04-08 ENCOUNTER — HOSPITAL ENCOUNTER (EMERGENCY)
Facility: HOSPITAL | Age: 74
Discharge: HOME | End: 2024-04-08
Attending: STUDENT IN AN ORGANIZED HEALTH CARE EDUCATION/TRAINING PROGRAM
Payer: MEDICARE

## 2024-04-08 VITALS
SYSTOLIC BLOOD PRESSURE: 154 MMHG | WEIGHT: 122 LBS | DIASTOLIC BLOOD PRESSURE: 92 MMHG | TEMPERATURE: 97 F | OXYGEN SATURATION: 94 % | HEART RATE: 95 BPM | RESPIRATION RATE: 24 BRPM | BODY MASS INDEX: 24 KG/M2

## 2024-04-08 DIAGNOSIS — R05.2 SUBACUTE COUGH: Primary | ICD-10-CM

## 2024-04-08 DIAGNOSIS — J40 BRONCHITIS: ICD-10-CM

## 2024-04-08 LAB
ANION GAP SERPL CALC-SCNC: 10 MMOL/L (ref 0–18)
ATRIAL RATE: 74 BPM
BASOPHILS # BLD AUTO: 0.02 X10(3) UL (ref 0–0.2)
BASOPHILS NFR BLD AUTO: 0.3 %
BUN BLD-MCNC: 18 MG/DL (ref 9–23)
BUN/CREAT SERPL: 18.8 (ref 10–20)
CALCIUM BLD-MCNC: 9.7 MG/DL (ref 8.7–10.4)
CHLORIDE SERPL-SCNC: 113 MMOL/L (ref 98–112)
CO2 SERPL-SCNC: 20 MMOL/L (ref 21–32)
CREAT BLD-MCNC: 0.96 MG/DL
DEPRECATED RDW RBC AUTO: 39.3 FL (ref 35.1–46.3)
EGFRCR SERPLBLD CKD-EPI 2021: 62 ML/MIN/1.73M2 (ref 60–?)
EOSINOPHIL # BLD AUTO: 0 X10(3) UL (ref 0–0.7)
EOSINOPHIL NFR BLD AUTO: 0 %
ERYTHROCYTE [DISTWIDTH] IN BLOOD BY AUTOMATED COUNT: 13.2 % (ref 11–15)
GLUCOSE BLD-MCNC: 155 MG/DL (ref 70–99)
HCT VFR BLD AUTO: 40.8 %
HGB BLD-MCNC: 14.2 G/DL
IMM GRANULOCYTES # BLD AUTO: 0.02 X10(3) UL (ref 0–1)
IMM GRANULOCYTES NFR BLD: 0.3 %
LYMPHOCYTES # BLD AUTO: 2.24 X10(3) UL (ref 1–4)
LYMPHOCYTES NFR BLD AUTO: 28.1 %
MCH RBC QN AUTO: 28.4 PG (ref 26–34)
MCHC RBC AUTO-ENTMCNC: 34.8 G/DL (ref 31–37)
MCV RBC AUTO: 81.6 FL
MONOCYTES # BLD AUTO: 0.51 X10(3) UL (ref 0.1–1)
MONOCYTES NFR BLD AUTO: 6.4 %
NEUTROPHILS # BLD AUTO: 5.17 X10 (3) UL (ref 1.5–7.7)
NEUTROPHILS # BLD AUTO: 5.17 X10(3) UL (ref 1.5–7.7)
NEUTROPHILS NFR BLD AUTO: 64.9 %
OSMOLALITY SERPL CALC.SUM OF ELEC: 301 MOSM/KG (ref 275–295)
P AXIS: 75 DEGREES
P-R INTERVAL: 150 MS
PLATELET # BLD AUTO: 246 10(3)UL (ref 150–450)
POTASSIUM SERPL-SCNC: 3.7 MMOL/L (ref 3.5–5.1)
Q-T INTERVAL: 428 MS
QRS DURATION: 62 MS
QTC CALCULATION (BEZET): 475 MS
R AXIS: 36 DEGREES
RBC # BLD AUTO: 5 X10(6)UL
SODIUM SERPL-SCNC: 143 MMOL/L (ref 136–145)
T AXIS: 52 DEGREES
TROPONIN I SERPL HS-MCNC: 6 NG/L
TSI SER-ACNC: 0.68 MIU/ML (ref 0.55–4.78)
VENTRICULAR RATE: 74 BPM
WBC # BLD AUTO: 8 X10(3) UL (ref 4–11)

## 2024-04-08 PROCEDURE — 36415 COLL VENOUS BLD VENIPUNCTURE: CPT

## 2024-04-08 PROCEDURE — 93010 ELECTROCARDIOGRAM REPORT: CPT

## 2024-04-08 PROCEDURE — 99285 EMERGENCY DEPT VISIT HI MDM: CPT

## 2024-04-08 PROCEDURE — 71045 X-RAY EXAM CHEST 1 VIEW: CPT | Performed by: STUDENT IN AN ORGANIZED HEALTH CARE EDUCATION/TRAINING PROGRAM

## 2024-04-08 PROCEDURE — 85025 COMPLETE CBC W/AUTO DIFF WBC: CPT | Performed by: STUDENT IN AN ORGANIZED HEALTH CARE EDUCATION/TRAINING PROGRAM

## 2024-04-08 PROCEDURE — 70450 CT HEAD/BRAIN W/O DYE: CPT | Performed by: STUDENT IN AN ORGANIZED HEALTH CARE EDUCATION/TRAINING PROGRAM

## 2024-04-08 PROCEDURE — 84484 ASSAY OF TROPONIN QUANT: CPT | Performed by: STUDENT IN AN ORGANIZED HEALTH CARE EDUCATION/TRAINING PROGRAM

## 2024-04-08 PROCEDURE — 84443 ASSAY THYROID STIM HORMONE: CPT | Performed by: STUDENT IN AN ORGANIZED HEALTH CARE EDUCATION/TRAINING PROGRAM

## 2024-04-08 PROCEDURE — 93005 ELECTROCARDIOGRAM TRACING: CPT

## 2024-04-08 PROCEDURE — 80048 BASIC METABOLIC PNL TOTAL CA: CPT | Performed by: STUDENT IN AN ORGANIZED HEALTH CARE EDUCATION/TRAINING PROGRAM

## 2024-04-08 RX ORDER — ALPRAZOLAM 0.25 MG/1
0.25 TABLET ORAL ONCE
Status: COMPLETED | OUTPATIENT
Start: 2024-04-08 | End: 2024-04-08

## 2024-04-08 RX ORDER — GUAIFENESIN 600 MG/1
1200 TABLET, EXTENDED RELEASE ORAL 2 TIMES DAILY
Qty: 28 TABLET | Refills: 0 | Status: ON HOLD | OUTPATIENT
Start: 2024-04-08 | End: 2024-04-11

## 2024-04-08 RX ORDER — PREDNISONE 50 MG/1
50 TABLET ORAL DAILY
Qty: 5 TABLET | Refills: 0 | Status: ON HOLD | OUTPATIENT
Start: 2024-04-08 | End: 2024-04-11

## 2024-04-08 NOTE — ED PROVIDER NOTES
[facility] Note  Patient: Meli Antonio Age: 73 year old Sex: female      MRN: [id number]  : 11/3/1950    Patient Seen in: [facility] Emergency Department    History     Chief Complaint   Patient presents with    Cough     Stated Complaint: cough    History obtained from: Patient    Patient 73-year-old female with past medical history of anxiety, depression, IBS presents today for evaluation of cough.  Patient states that she has been having a productive cough over the past 2 weeks.  States that she feels mildly short of breath.  No chest pain.  States that she feels significantly anxious over her symptoms.  She states that starting last night, she began taking cough medication with codeine.  States that she woke up this morning confused and feeling fatigued.    Review of Systems:  Review of Systems  Positive for stated complaint: cough. Constitutional and vital signs reviewed. All other systems reviewed and negative except as noted above.    Patient History:  Past Medical History:   Diagnosis Date    Anxiety state     Back problem     COMPRESSION FX    Cataract     Depression     Esophageal reflux     Hematoma and contusion of liver     STATES HAS BEEN THERE FOR MANY YEARS    History of fractured kneecap 2016    RIGHT    IBS (irritable bowel syndrome)     Mitral prolapse     Osteoarthritis        Past Surgical History:   Procedure Laterality Date          CORRECT BUNION,OTHR METHODS      CORRECT BUNION,SIMPLE      BILAT.     DILATION/CURETTAGE,DIAGNOSTIC      FOR \"MOLE PREGNANCY\"    OTHER Right     ORIF RIGHT ANKLE    SPINE SURGERY PROCEDURE UNLISTED      cervical spine 2020    TOTAL KNEE REPLACEMENT          Family History   Problem Relation Age of Onset    Heart Disorder Father     Cancer Mother         ESOPHAGUS       Specific Social Determinants of Health:   Social History     Socioeconomic History    Marital status:    Tobacco Use    Smoking status:  Former     Packs/day: 2.00     Years: 15.00     Additional pack years: 0.00     Total pack years: 30.00     Types: Cigarettes    Smokeless tobacco: Never    Tobacco comments:     QUIT IN 1983   Vaping Use    Vaping Use: Never used   Substance and Sexual Activity    Alcohol use: No    Drug use: No           PSFH elements reviewed from today and agreed except as otherwise stated in HPI.    Physical Exam     ED Triage Vitals [04/08/24 1116]   BP (!) 154/92   Pulse 82   Resp (!) 28   Temp 97 °F (36.1 °C)   Temp src Temporal   SpO2 97 %   O2 Device None (Room air)       Current:BP (!) 154/92   Pulse 95   Temp 97 °F (36.1 °C) (Temporal)   Resp 24   Wt 55.3 kg   SpO2 94%   BMI 23.83 kg/m²         Physical Exam  Constitutional:       General: She is not in acute distress.  HENT:      Head: Normocephalic and atraumatic.      Mouth/Throat:      Mouth: Mucous membranes are moist.   Eyes:      Extraocular Movements: Extraocular movements intact.   Cardiovascular:      Rate and Rhythm: Normal rate and regular rhythm.      Heart sounds: Normal heart sounds.   Pulmonary:      Effort: No respiratory distress.      Breath sounds: Normal breath sounds. No stridor. No wheezing, rhonchi or rales.   Chest:      Chest wall: No tenderness.   Abdominal:      Palpations: Abdomen is soft.      Tenderness: There is no abdominal tenderness.   Skin:     General: Skin is warm and dry.      Capillary Refill: Capillary refill takes less than 2 seconds.      Findings: No rash.   Neurological:      General: No focal deficit present.      Mental Status: She is alert and oriented to person, place, and time.   Psychiatric:         Mood and Affect: Mood normal.         Behavior: Behavior normal.      Comments: Significantly anxious, hyperventilating         ED Course   Labs:   Labs Reviewed   BASIC METABOLIC PANEL (8) - Abnormal; Notable for the following components:       Result Value    Glucose 155 (*)     Chloride 113 (*)     CO2 20.0 (*)      Calculated Osmolality 301 (*)     All other components within normal limits   TROPONIN I HIGH SENSITIVITY - Normal   TSH W REFLEX TO FREE T4 - Normal   CBC WITH DIFFERENTIAL WITH PLATELET    Narrative:     The following orders were created for panel order CBC With Differential With Platelet.  Procedure                               Abnormality         Status                     ---------                               -----------         ------                     CBC W/ DIFFERENTIAL[[id number]]                              Final result                 Please view results for these tests on the individual orders.   CBC W/ DIFFERENTIAL     Radiology findings:  I personally reviewed the images.   CT BRAIN OR HEAD (11993)    Result Date: 4/8/2024  CONCLUSION:  1. No acute intracranial finding. 2. Mild changes of chronic small vessel disease in cerebral white matter. 3. Large vessel intracranial atherosclerosis.    Dictated by (CST): Piotr Delgado MD on 4/08/2024 at 1:02 PM     Finalized by (CST): Piotr Delgado MD on 4/08/2024 at 1:04 PM          XR CHEST AP PORTABLE  (CPT=71045)    Result Date: 4/8/2024  CONCLUSION:  1. No acute disease in the chest.    Dictated by (CST): Sy Gauthier MD on 4/08/2024 at 12:52 PM     Finalized by (CST): Sy Gauthier MD on 4/08/2024 at 12:53 PM           EKG as interpreted by me: Ventricular rate 74, normal sinus rhythm, normal axis, no parable prolongation, narrow QRS, QTc 475 ms, no ST segment elevations or depressions, no abnormal T wave inversions  Cardiac Monitor: Interpreted by me.   Pulse Readings from Last 1 Encounters:   04/08/24 95   , sinus,     External non-ED records reviewed independently by me: Chest x-ray from 3/23/2024 reviewed showing no evidence of acute pulmonary abnormality.    MDM   73-year-old female with a past medical history of anxiety, depression, IBS presenting for evaluation of cough over the past 2 weeks.  Now with confusion after taking cough medication  with codeine.  Upon arrival to emergency department, she is oriented x 3.  Tachypneic but hyperventilating and significantly anxious.  Otherwise lungs clear to auscultation bilaterally.  Saturating well on room air.    Differential diagnoses considered includes, but is not limited to: Anxiety, bronchitis, pneumonia, pleural effusions, ACS, hyperthyroidism, electrolyte malic derangement, adverse side effect of Medication with opiate meds    Will obtain the following tests: CBC, BMP, troponin, TSH, chest x-ray, CT brain.  Please see ED course for my independent review of these tests/imaging results.    Initial Medications/Therapeutics administered: Xanax    Chronic conditions affecting care: Anxiety, depression, IBS    Workup and medications considered but not ordered: None    Social Determinants of Health that impacted care: None     ED course: Labs reassuring with negative troponin.  TSH within normal limits.  I independently reviewed the chest x-ray images that show no evidence of focal opacity to suggest pneumonia.  Agree with radiology read above.  CT brain also with no evidence of acute intracranial abnormality.  Upon reevaluation, notes of have improvement of her breathing.  Will trial course of Xanax for likely component of anxiety.  Discussed avoiding cough medications with codeine given likely adverse side effect of cough medication.  Instead switch to Mucinex.  Will also trial course of prednisone for bronchitis.  Patient stable and eager for discharge home.  Return precautions were discussed and all questions answered.  Patient and patient's  expressed understanding and agreement with this plan.      Disposition and Plan     Clinical Impression:  1. Subacute cough    2. Bronchitis        Disposition:  Discharge    Follow-up:  Birdie Valverde MD  [address] [address] [phone]    Schedule an appointment as soon as possible for a visit in 2 day(s)  As needed,  If symptoms worsen      Medications Prescribed:  Discharge Medication List as of 4/8/2024  2:30 PM        START taking these medications    Details   guaiFENesin  MG Oral Tablet 12 Hr Take 2 tablets (1,200 mg total) by mouth 2 (two) times daily for 7 days., Normal, Disp-28 tablet, R-0               This note may have been created using voice dictation technology and may include inadvertent errors.      Sandy Chopra MD  Emergency Medicine

## 2024-04-08 NOTE — ED INITIAL ASSESSMENT (HPI)
Patient complains of cough for about a fortnight, hyperventilating mid-triage, complains of headache

## 2024-04-09 ENCOUNTER — APPOINTMENT (OUTPATIENT)
Dept: BEHAVIORAL HEALTH | Age: 74
End: 2024-04-09

## 2024-04-10 ENCOUNTER — TELEPHONE (OUTPATIENT)
Dept: BEHAVIORAL HEALTH | Age: 74
End: 2024-04-10

## 2024-04-10 ENCOUNTER — HOSPITAL ENCOUNTER (INPATIENT)
Facility: HOSPITAL | Age: 74
LOS: 8 days | Discharge: NURSING, REHAB OR LONG-TERM CARE | End: 2024-04-18
Attending: EMERGENCY MEDICINE | Admitting: HOSPITALIST
Payer: MEDICARE

## 2024-04-10 DIAGNOSIS — F33.2 MAJOR DEPRESSIVE DISORDER, RECURRENT EPISODE, SEVERE WITH CATATONIA (HCC): ICD-10-CM

## 2024-04-10 DIAGNOSIS — R41.82 ALTERED MENTAL STATUS, UNSPECIFIED ALTERED MENTAL STATUS TYPE: Primary | ICD-10-CM

## 2024-04-10 DIAGNOSIS — F06.1 MAJOR DEPRESSIVE DISORDER, RECURRENT EPISODE, SEVERE WITH CATATONIA (HCC): ICD-10-CM

## 2024-04-10 DIAGNOSIS — F06.1 CATATONIC EXCITED TYPE: ICD-10-CM

## 2024-04-10 DIAGNOSIS — F32.A DEPRESSION, UNSPECIFIED DEPRESSION TYPE: ICD-10-CM

## 2024-04-10 LAB
ALBUMIN SERPL-MCNC: 4.8 G/DL (ref 3.2–4.8)
ALBUMIN/GLOB SERPL: 1.7 {RATIO} (ref 1–2)
ALP LIVER SERPL-CCNC: 91 U/L
ALT SERPL-CCNC: 68 U/L
AMPHET UR QL SCN: NEGATIVE
ANION GAP SERPL CALC-SCNC: 13 MMOL/L (ref 0–18)
APAP SERPL-MCNC: <2 UG/ML (ref 10–20)
AST SERPL-CCNC: 93 U/L (ref ?–34)
BASOPHILS # BLD AUTO: 0.03 X10(3) UL (ref 0–0.2)
BASOPHILS NFR BLD AUTO: 0.2 %
BENZODIAZ UR QL SCN: NEGATIVE
BILIRUB SERPL-MCNC: 0.5 MG/DL (ref 0.2–1.1)
BILIRUB UR QL: NEGATIVE
BUN BLD-MCNC: 33 MG/DL (ref 9–23)
BUN/CREAT SERPL: 32 (ref 10–20)
CALCIUM BLD-MCNC: 10.2 MG/DL (ref 8.7–10.4)
CHLORIDE SERPL-SCNC: 106 MMOL/L (ref 98–112)
CLARITY UR: CLEAR
CO2 SERPL-SCNC: 19 MMOL/L (ref 21–32)
COCAINE UR QL: NEGATIVE
CREAT BLD-MCNC: 1.03 MG/DL
CREAT UR-SCNC: 129.7 MG/DL
DEPRECATED RDW RBC AUTO: 40 FL (ref 35.1–46.3)
EGFRCR SERPLBLD CKD-EPI 2021: 57 ML/MIN/1.73M2 (ref 60–?)
EOSINOPHIL # BLD AUTO: 0 X10(3) UL (ref 0–0.7)
EOSINOPHIL NFR BLD AUTO: 0 %
ERYTHROCYTE [DISTWIDTH] IN BLOOD BY AUTOMATED COUNT: 13.2 % (ref 11–15)
ETHANOL SERPL-MCNC: <3 MG/DL (ref ?–3)
GLOBULIN PLAS-MCNC: 2.8 G/DL (ref 2.8–4.4)
GLUCOSE BLD-MCNC: 124 MG/DL (ref 70–99)
GLUCOSE UR-MCNC: NORMAL MG/DL
HCT VFR BLD AUTO: 42.1 %
HGB BLD-MCNC: 13.8 G/DL
IMM GRANULOCYTES # BLD AUTO: 0.11 X10(3) UL (ref 0–1)
IMM GRANULOCYTES NFR BLD: 0.7 %
KETONES UR-MCNC: 80 MG/DL
LEUKOCYTE ESTERASE UR QL STRIP.AUTO: NEGATIVE
LYMPHOCYTES # BLD AUTO: 5.12 X10(3) UL (ref 1–4)
LYMPHOCYTES NFR BLD AUTO: 31.2 %
MCH RBC QN AUTO: 27.2 PG (ref 26–34)
MCHC RBC AUTO-ENTMCNC: 32.8 G/DL (ref 31–37)
MCV RBC AUTO: 82.9 FL
MONOCYTES # BLD AUTO: 1.37 X10(3) UL (ref 0.1–1)
MONOCYTES NFR BLD AUTO: 8.3 %
NEUTROPHILS # BLD AUTO: 9.79 X10 (3) UL (ref 1.5–7.7)
NEUTROPHILS # BLD AUTO: 9.79 X10(3) UL (ref 1.5–7.7)
NEUTROPHILS NFR BLD AUTO: 59.6 %
NITRITE UR QL STRIP.AUTO: NEGATIVE
OPIATES UR QL SCN: NEGATIVE
OSMOLALITY SERPL CALC.SUM OF ELEC: 295 MOSM/KG (ref 275–295)
OXYCODONE UR QL SCN: NEGATIVE
PH UR: 5.5 [PH] (ref 5–8)
PLATELET # BLD AUTO: 334 10(3)UL (ref 150–450)
POTASSIUM SERPL-SCNC: 3.9 MMOL/L (ref 3.5–5.1)
PROT SERPL-MCNC: 7.6 G/DL (ref 5.7–8.2)
PROT UR-MCNC: 70 MG/DL
RBC # BLD AUTO: 5.08 X10(6)UL
SALICYLATES SERPL-MCNC: <3 MG/DL (ref 3–20)
SODIUM SERPL-SCNC: 138 MMOL/L (ref 136–145)
SP GR UR STRIP: 1.03 (ref 1–1.03)
UROBILINOGEN UR STRIP-ACNC: NORMAL
WBC # BLD AUTO: 16.4 X10(3) UL (ref 4–11)

## 2024-04-10 RX ORDER — DROPERIDOL 2.5 MG/ML
2.5 INJECTION, SOLUTION INTRAMUSCULAR; INTRAVENOUS ONCE
Status: COMPLETED | OUTPATIENT
Start: 2024-04-10 | End: 2024-04-10

## 2024-04-10 RX ORDER — ARIPIPRAZOLE 5 MG/1
10 TABLET ORAL ONCE
Status: COMPLETED | OUTPATIENT
Start: 2024-04-10 | End: 2024-04-10

## 2024-04-10 NOTE — ED INITIAL ASSESSMENT (HPI)
Pt presents to the Ed with .  reports since Tuesday morning wife has been agitated, and confused.  is concerned that she may be having a reaction to new medications she was prescribed on Monday. Pt not answering questions in triage +agitated. Pt keeps stating \"I can't do this.\"

## 2024-04-11 PROBLEM — F06.1: Status: ACTIVE | Noted: 2024-04-11

## 2024-04-11 PROBLEM — G90.81 SEROTONIN SYNDROME: Status: ACTIVE | Noted: 2024-04-11

## 2024-04-11 PROBLEM — G25.79 SEROTONIN SYNDROME: Status: ACTIVE | Noted: 2024-04-11

## 2024-04-11 PROBLEM — F33.2 SEVERE EPISODE OF RECURRENT MAJOR DEPRESSIVE DISORDER, WITHOUT PSYCHOTIC FEATURES (HCC): Status: ACTIVE | Noted: 2024-04-11

## 2024-04-11 LAB
ATRIAL RATE: 84 BPM
P AXIS: 67 DEGREES
P-R INTERVAL: 144 MS
Q-T INTERVAL: 398 MS
QRS DURATION: 62 MS
QTC CALCULATION (BEZET): 470 MS
R AXIS: 44 DEGREES
T AXIS: 55 DEGREES
VENTRICULAR RATE: 84 BPM

## 2024-04-11 PROCEDURE — 90792 PSYCH DIAG EVAL W/MED SRVCS: CPT | Performed by: OTHER

## 2024-04-11 RX ORDER — LORAZEPAM 1 MG/1
1 TABLET ORAL ONCE
Status: COMPLETED | OUTPATIENT
Start: 2024-04-11 | End: 2024-04-11

## 2024-04-11 RX ORDER — MELATONIN
1000 DAILY
Status: DISCONTINUED | OUTPATIENT
Start: 2024-04-11 | End: 2024-04-18

## 2024-04-11 RX ORDER — ENEMA 19; 7 G/133ML; G/133ML
1 ENEMA RECTAL ONCE AS NEEDED
Status: DISCONTINUED | OUTPATIENT
Start: 2024-04-11 | End: 2024-04-18

## 2024-04-11 RX ORDER — BUPROPION HYDROCHLORIDE 300 MG/1
300 TABLET ORAL DAILY
Status: ON HOLD | COMMUNITY
End: 2024-04-11

## 2024-04-11 RX ORDER — ONDANSETRON 2 MG/ML
4 INJECTION INTRAMUSCULAR; INTRAVENOUS EVERY 6 HOURS PRN
Status: DISCONTINUED | OUTPATIENT
Start: 2024-04-11 | End: 2024-04-18

## 2024-04-11 RX ORDER — DICLOFENAC SODIUM 75 MG/1
75 TABLET, DELAYED RELEASE ORAL 2 TIMES DAILY
Status: ON HOLD | COMMUNITY
End: 2024-04-11

## 2024-04-11 RX ORDER — BISACODYL 10 MG
10 SUPPOSITORY, RECTAL RECTAL
Status: DISCONTINUED | OUTPATIENT
Start: 2024-04-11 | End: 2024-04-18

## 2024-04-11 RX ORDER — CETIRIZINE HYDROCHLORIDE 5 MG/1
5 TABLET ORAL DAILY
Status: DISCONTINUED | OUTPATIENT
Start: 2024-04-11 | End: 2024-04-15

## 2024-04-11 RX ORDER — LORAZEPAM 2 MG/ML
2 INJECTION INTRAMUSCULAR EVERY 6 HOURS PRN
Status: DISCONTINUED | OUTPATIENT
Start: 2024-04-11 | End: 2024-04-18

## 2024-04-11 RX ORDER — MELATONIN
3 NIGHTLY PRN
Status: DISCONTINUED | OUTPATIENT
Start: 2024-04-11 | End: 2024-04-18

## 2024-04-11 RX ORDER — POLYETHYLENE GLYCOL 3350 17 G/17G
17 POWDER, FOR SOLUTION ORAL DAILY PRN
Status: DISCONTINUED | OUTPATIENT
Start: 2024-04-11 | End: 2024-04-18

## 2024-04-11 RX ORDER — MELOXICAM 15 MG/1
7.5 TABLET ORAL 2 TIMES DAILY
Status: ON HOLD | COMMUNITY
End: 2024-04-11

## 2024-04-11 RX ORDER — MIRTAZAPINE 15 MG/1
7.5 TABLET, FILM COATED ORAL NIGHTLY
Status: ON HOLD | COMMUNITY
End: 2024-04-11

## 2024-04-11 RX ORDER — ARIPIPRAZOLE 2 MG/1
2 TABLET ORAL NIGHTLY
Status: ON HOLD | COMMUNITY
End: 2024-04-11

## 2024-04-11 RX ORDER — CLONAZEPAM 0.5 MG/1
0.5 TABLET ORAL 2 TIMES DAILY
Status: DISCONTINUED | OUTPATIENT
Start: 2024-04-11 | End: 2024-04-14

## 2024-04-11 RX ORDER — METOCLOPRAMIDE HYDROCHLORIDE 5 MG/ML
5 INJECTION INTRAMUSCULAR; INTRAVENOUS EVERY 8 HOURS PRN
Status: DISCONTINUED | OUTPATIENT
Start: 2024-04-11 | End: 2024-04-17

## 2024-04-11 RX ORDER — ESCITALOPRAM OXALATE 10 MG/1
10 TABLET ORAL NIGHTLY
Status: DISCONTINUED | OUTPATIENT
Start: 2024-04-11 | End: 2024-04-16

## 2024-04-11 RX ORDER — FLUTICASONE PROPIONATE 50 MCG
1 SPRAY, SUSPENSION (ML) NASAL DAILY
Status: DISCONTINUED | OUTPATIENT
Start: 2024-04-11 | End: 2024-04-18

## 2024-04-11 RX ORDER — ACETAMINOPHEN 500 MG
500 TABLET ORAL EVERY 4 HOURS PRN
Status: DISCONTINUED | OUTPATIENT
Start: 2024-04-11 | End: 2024-04-13

## 2024-04-11 RX ORDER — ENOXAPARIN SODIUM 100 MG/ML
30 INJECTION SUBCUTANEOUS DAILY
Status: DISCONTINUED | OUTPATIENT
Start: 2024-04-11 | End: 2024-04-15

## 2024-04-11 RX ORDER — ESCITALOPRAM OXALATE 10 MG/1
10 TABLET ORAL DAILY
Status: ON HOLD | COMMUNITY
End: 2024-04-11

## 2024-04-11 RX ORDER — SENNOSIDES 8.6 MG
17.2 TABLET ORAL NIGHTLY PRN
Status: DISCONTINUED | OUTPATIENT
Start: 2024-04-11 | End: 2024-04-18

## 2024-04-11 RX ORDER — ALBUTEROL SULFATE 90 UG/1
2 AEROSOL, METERED RESPIRATORY (INHALATION) EVERY 4 HOURS PRN
Status: DISCONTINUED | OUTPATIENT
Start: 2024-04-11 | End: 2024-04-18

## 2024-04-11 RX ORDER — PREGABALIN 100 MG/1
100 CAPSULE ORAL 3 TIMES DAILY
Status: ON HOLD | COMMUNITY
End: 2024-04-11

## 2024-04-11 RX ORDER — LORAZEPAM 1 MG/1
1 TABLET ORAL EVERY 6 HOURS PRN
Status: DISCONTINUED | OUTPATIENT
Start: 2024-04-11 | End: 2024-04-18

## 2024-04-11 RX ORDER — HALOPERIDOL 5 MG/ML
2 INJECTION INTRAMUSCULAR EVERY 6 HOURS PRN
Status: DISCONTINUED | OUTPATIENT
Start: 2024-04-11 | End: 2024-04-18

## 2024-04-11 NOTE — ED NOTES
SCANNED DOCUMENTS PET/CERT:    Met with pt to review clt rights statement, process of admission and to explain psychiatry will see her in the morning on the medical floor to determine final disposition.  Advised , who says he is POA to bring the paperwork tomorrow so that he could be updated on her care. Pt was unable to sign the paperwork due to her significant impairment in mental status. Copies of the petition, the CERT and the client rights statement were given to .    Originals given to RN to take to the medical floor.    Scanned the following documents into client chart:    Petition/CERT  Client rights statement

## 2024-04-11 NOTE — ED QUICK NOTES
Orders for admission, patient is aware of plan and ready to go upstairs. Any questions, please call ED RN Devika at extension [phone].     Patient Covid vaccination status: Unvaccinated     COVID Test Ordered in ED: None    COVID Suspicion at Admission: N/A    Running Infusions:  None    Mental Status/LOC at time of transport: A&Ox1    Other pertinent information:   CIWA score: N/A   NIH score:  N/A

## 2024-04-11 NOTE — PLAN OF CARE
Patient with sitter at bedside, given ativan and haldol per Dr. Mckenzie and patient became much more calm, sleeping most of shift. Up ambulating to bathroom with staff assist. MRI brain ordered, not yet complete. Call light within reach.   Problem: Patient Centered Care  Goal: Patient preferences are identified and integrated in the patient's plan of care  Description: Interventions:  - What would you like us to know as we care for you? From home with spouse    - Provide timely, complete, and accurate information to patient/family  - Incorporate patient and family knowledge, values, beliefs, and cultural backgrounds into the planning and delivery of care  - Encourage patient/family to participate in care and decision-making at the level they choose  - Honor patient and family perspectives and choices  Outcome: Progressing     Problem: Patient/Family Goals  Goal: Patient/Family Long Term Goal  Description: Patient's Long Term Goal: to go home     Interventions:  -   - See additional Care Plan goals for specific interventions  Outcome: Progressing  Goal: Patient/Family Short Term Goal  Description: Patient's Short Term Goal: feel better     Interventions:   - See additional Care Plan goals for specific interventions  Outcome: Progressing     Problem: SAFETY ADULT - FALL  Goal: Free from fall injury  Description: INTERVENTIONS:  - Assess pt frequently for physical needs  - Identify cognitive and physical deficits and behaviors that affect risk of falls.  - [place] fall precautions as indicated by assessment.  - Educate pt/family on patient safety including physical limitations  - Instruct pt to call for assistance with activity based on assessment  - Modify environment to reduce risk of injury  - Provide assistive devices as appropriate  - Consider OT/PT consult to assist with strengthening/mobility  - Encourage toileting schedule  Outcome: Progressing     Problem: RESPIRATORY - ADULT  Goal: Achieves optimal  ventilation and oxygenation  Description: INTERVENTIONS:  - Assess for changes in respiratory status  - Assess for changes in mentation and behavior  - Position to facilitate oxygenation and minimize respiratory effort  - Oxygen supplementation based on oxygen saturation or ABGs  - Provide Smoking Cessation handout, if applicable  - Encourage broncho-pulmonary hygiene including cough, deep breathe, Incentive Spirometry  - Assess the need for suctioning and perform as needed  - Assess and instruct to report SOB or any respiratory difficulty  - Respiratory Therapy support as indicated  - Manage/alleviate anxiety  - Monitor for signs/symptoms of CO2 retention  Outcome: Progressing     Problem: NEUROLOGICAL - ADULT  Goal: Achieves stable or improved neurological status  Description: INTERVENTIONS  - Assess for and report changes in neurological status  - Initiate measures to prevent increased intracranial pressure  - Maintain blood pressure and fluid volume within ordered parameters to optimize cerebral perfusion and minimize risk of hemorrhage  - Monitor temperature, glucose, and sodium. Initiate appropriate interventions as ordered  Outcome: Progressing     Problem: Impaired Cognition  Goal: Patient will exhibit improved attention, thought processing and/or memory  Description: Interventions:    Outcome: Progressing
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
## 2024-04-11 NOTE — CDS QUERY
Altered Mental Status/Mental Status Change Without History of Injury  CLINICAL DOCUMENTATION CLARIFICATION FORM  Dear Doctor Fabiana:  Clinical information (provided below) indicates altered mental status and/or mental status change without history of injury.   PLEASE (X) ALL DIAGNOSES THAT APPLY.    (  )  Metabolic Encephalopathy  (  )  Toxic Metabolic Encephalopathy (please specify toxic substance):   ( x )  Other (please specify) mental breakdown       Clinical Indicators:   4/10 ED Provider note: Patient presents with altered mental status   Patient's  states she was “acting different”   Confusion  Behavioral changes with nervous breakdown   WBC 16.4    Risk Factors:  Recent medication Change  Urine Positive for cannabis and ecstasy  Depression    Treatments:    CT-brain  MRI -Brain  Psych consult          If you have any questions, please contact Clinical  : Ammy Ayala RN CDS  at [email or web address] [phone]    Thank You!    THIS FORM IS A PERMANENT PART OF THE MEDICAL RECORD

## 2024-04-11 NOTE — ED PROVIDER NOTES
Patient Seen in: [facility] Emergency Department    History     Chief Complaint   Patient presents with    Altered Mental Status       HPI    73-year-old female with a history of depression who has had a cough over the last week but was brought by the  given that he has been concerned about her acting differently which he attributes to her chronic depression but is concerned about possibly a medication reaction.  The patient denies any worsening of her cough or dyspnea for which she presented 2 days ago to the emergency department, she denies any suicidal ideation or any homicidal ideation or any visual or auditory hallucinations.  She is on multiple antidepressant medications.     History reviewed.   Past Medical History:    Anxiety state    Back problem    COMPRESSION FX    Cataract    Depression    Esophageal reflux    Hematoma and contusion of liver    STATES HAS BEEN THERE FOR MANY YEARS    History of fractured kneecap    RIGHT    IBS (irritable bowel syndrome)    Mitral prolapse    Osteoarthritis       History reviewed.   Past Surgical History:   Procedure Laterality Date          Correct bunion,othr methods      Correct bunion,simple      BILAT.     Dilation/curettage,diagnostic      FOR \"MOLE PREGNANCY\"    Other Right     ORIF RIGHT ANKLE    Spine surgery procedure unlisted      cervical spine 2020    Total knee replacement           Medications :  Medications Prior to Admission   Medication Sig Dispense Refill Last Dose    cholecalciferol 1000 UNITS Oral Cap Take 1 capsule (1,000 Units total) by mouth daily.       VITAMIN E COMPLEX OR Take 1 capsule by mouth daily.       predniSONE 50 MG Oral Tab Take 1 tablet (50 mg total) by mouth daily for 5 days. 5 tablet 0     guaiFENesin  MG Oral Tablet 12 Hr Take 2 tablets (1,200 mg total) by mouth 2 (two) times daily for 7 days. 28 tablet 0     ondansetron 4 MG Oral Tablet Dispersible Take 1 tablet (4 mg total) by mouth every  4 (four) hours as needed for Nausea. 10 tablet 0     oxyCODONE-acetaminophen  MG Oral Tab Take 1 tablet by mouth 3 (three) times daily as needed for Pain.       traZODone HCl 50 MG Oral Tab Take 50 mg by mouth nightly.       pregabalin 25 MG Oral Cap Take 100 mg by mouth 2 (two) times daily.         celecoxib 50 MG Oral Cap Take 100 mg by mouth daily. *does not know dosage         tiZANidine HCl 4 MG Oral Tab Take 4 mg by mouth 2 (two) times a day.       OLANZapine 5 MG Oral Tab Take 5 mg by mouth nightly. New medication       hydrOXYzine HCl 50 MG Oral Tab Take 50 mg by mouth 3 (three) times daily.       ClonazePAM 0.5 MG Oral Tab Take 0.5 mg by mouth 3 (three) times daily as needed for Anxiety.         FLUoxetine HCl 60 MG Oral Tab Take 1 tablet by mouth daily.           Family History   Problem Relation Age of Onset    Heart Disorder Father     Cancer Mother         ESOPHAGUS       Smoking Status:   Social History     Socioeconomic History    Marital status:    Tobacco Use    Smoking status: Former     Current packs/day: 2.00     Average packs/day: 2.0 packs/day for 15.0 years (30.0 ttl pk-yrs)     Types: Cigarettes    Smokeless tobacco: Never    Tobacco comments:     QUIT IN 1983   Vaping Use    Vaping status: Never Used   Substance and Sexual Activity    Alcohol use: No    Drug use: No       Constitutional and vital signs reviewed.      Social History and Family History elements reviewed from today, pertinent positives to the presenting problem noted.    Physical Exam     ED Triage Vitals [04/10/24 1710]   BP (!) 150/96   Pulse 98   Resp 18   Temp 97.7 °F (36.5 °C)   Temp src Temporal   SpO2 97 %   O2 Device None (Room air)       All measures to prevent infection transmission during my interaction with the patient were taken. The patient was already wearing a droplet mask on my arrival to the room. Personal protective equipment was worn throughout the duration of the exam.  Handwashing was  performed prior to and after the exam.  Stethoscope and any equipment used during my examination was cleaned with super sani-cloth germicidal wipes following the exam.     Physical Exam    General: Unable to lie still, but no severe distress  Head: Normocephalic and atraumatic.  Eyes: Conjunctivae and EOM are normal.   Neck: Normal range of motion. Supple.   Cardiovascular: Normal rate, regular rhythm, normal heart sounds.  Respiratory/Chest: Clear and equal bilaterally. Exhibits no tenderness.  Gastrointestinal: Soft, non-tender, non-distended. Bowel sounds are normal.   Musculoskeletal:No swelling or deformity.   Neurological: Alert and appropriate to self and place, not cooperative with portions of most of the exam, sensation in tact to light touch throughout the upper and lower extremities. No focal deficits.   Skin: Skin is warm and dry. No pallor.  Psychiatric: Flat affect, cooperative      ED Course        Labs Reviewed   COMP METABOLIC PANEL (14) - Abnormal; Notable for the following components:       Result Value    Glucose 124 (*)     CO2 19.0 (*)     BUN 33 (*)     Creatinine 1.03 (*)     BUN/CREA Ratio 32.0 (*)     eGFR-Cr 57 (*)     ALT 68 (*)     AST 93 (*)     All other components within normal limits   URINALYSIS WITH CULTURE REFLEX - Abnormal; Notable for the following components:    Ketones Urine 80 (*)     Blood Urine 2+ (*)     Protein Urine 70 (*)     All other components within normal limits   DRUG SCREEN 7 W/OUT CONFIRMATION, URINE - Abnormal; Notable for the following components:    Cannabinoid Urine Presumed Positive (*)     Ecstasy Urine Presumed Positive (*)     All other components within normal limits   ACETAMINOPHEN (TYLENOL), S - Abnormal; Notable for the following components:    Acetaminophen <2.0 (*)     All other components within normal limits   SALICYLATE, SERUM - Abnormal; Notable for the following components:    Salicylate <3.0 (*)     All other components within normal limits    CBC W/ DIFFERENTIAL - Abnormal; Notable for the following components:    WBC 16.4 (*)     Neutrophil Absolute Prelim 9.79 (*)     Neutrophil Absolute 9.79 (*)     Lymphocyte Absolute 5.12 (*)     Monocyte Absolute 1.37 (*)     All other components within normal limits   ETHYL ALCOHOL - Normal   CBC WITH DIFFERENTIAL WITH PLATELET    Narrative:     The following orders were created for panel order CBC With Differential With Platelet.                  Procedure                               Abnormality         Status                                     ---------                               -----------         ------                                     CBC W/ DIFFERENTIAL[[id number]]          Abnormal            Final result                                                 Please view results for these tests on the individual orders.   SCAN SLIDE   RAINBOW DRAW LAVENDER   RAINBOW DRAW LIGHT GREEN   RAINBOW DRAW BLUE   RAINBOW DRAW GOLD     EKG    Rate, intervals and axes as noted on EKG Report.  Rate: 84  Rhythm: Sinus Rhythm  Reading: No STEMI.  This is my interpretation.           As Interpreted by me    Imaging Results Available and Reviewed while in ED: No results found.  ED Medications Administered:   Medications   droperidol (Inapsine) 2.5 mg/mL injection 2.5 mg (2.5 mg Intravenous Given 4/10/24 1807)   ARIPiprazole (Abilify) tab 10 mg (10 mg Oral Given 4/10/24 2048)         MDM     Vitals:    04/10/24 1710 04/10/24 1900 04/10/24 2257   BP: (!) 150/96 152/90 142/89   BP Location:   Right arm   Pulse: 98 90 95   Resp: 18  24   Temp: 97.7 °F (36.5 °C)  98.2 °F (36.8 °C)   TempSrc: Temporal  Axillary   SpO2: 97% 94% 95%     *I personally reviewed and interpreted all ED vitals.    Pulse Ox: 94%, Room air, Normal     Monitor Interpretation:   normal sinus rhythm as interpreted by me.  The cardiac monitor was ordered given concern for electrolyte derangements.      Medical Decision Making      Differential Diagnosis/  Diagnostic Considerations: Depression, steroid-induced psychosis, electrolyte derangements, UTI    Complicating Factors: The patient already has does not have any pertinent problems on file. to contribute to the complexity of this ED evaluation.    I reviewed prior chart records including emergency department visit note from 2 days ago when patient was prescribed prednisone and since then her already existing anxiety and depression had worsened.  The  does not appear comfortable having the patient return home and she is currently unable to care for herself, psychiatric certificate completed.  Possibility of depression versus steroid-induced psychosis versus anxiety contributing to her symptoms.  I discussed with psychiatric liaison in place psychiatric inpatient order.  Admitted to and discussed with  given patient's admission for altered mental status.    Admitted in stable condition.  Patient is comfortable with the plan.    Disposition and Plan     Clinical Impression:  1. Altered mental status, unspecified altered mental status type    2. Depression, unspecified depression type        Disposition:  Admit    Follow-up:  No follow-up provider specified.    Medications Prescribed:  Current Discharge Medication List          Hospital Problems       Present on Admission  Date Reviewed: 4/1/2024            ICD-10-CM Noted POA    * (Principal) Altered mental status, unspecified altered mental status type R41.82 4/10/2024 Unknown    Altered mental status R41.82 4/10/2024 Unknown    Depression, unspecified depression type F32.A 4/10/2024 Unknown

## 2024-04-11 NOTE — PROGRESS NOTES
2nd certificate sent to [facility] [place] per protocol.    Meeta ALBARRAN, KHUSHBOO  [id number]

## 2024-04-11 NOTE — CERTIFICATION
Ref: [facility] 5/3-403, 5/3-602, 5/3-607, 5/3-610    5/3-702, 5/3-813, 5/4-306, 5/4-402, 5/4-403    5/4-405, 5/4-501, 5/4-611, 5/4-705   Inpatient Certificate  Re: Meli Antonio    (name)     I personally informed the above-named individual of the purpose of this examination and that he or she did not have to speak to me, and that any statements made might be related in court as to the individual's clinical condition or need for services.  Additionally, if this examination was for the purpose of determining that the above-named individual is developmentally disabled and dangerous, I informed the individual of his or her right to speak with a relative, friend or  before the examination, and of his or her right to have an  appointed for him or her if he or she so desired.     Electronically signed by Jason Mckenzie MD    Signature of Examiner     On 4/11 , 2024 , at 11: 30  [x] a.m. [] p.m.,  I personally examined the    (date)  (year)  (time)    above-named individual. The examination was conducted in person at [facility].  Or   [] Via Interactive Communication System (telepsychiatry)      Based on the foregoing examination, it is my opinion that he or she is:  []  A person with mental illness who, because of his or her illness is reasonably expected, unless treated on an inpatient basis, to engage in conduct placing such person or another in physical harm or in reasonable expectation of being physically harmed;   [x]  A person with mental illness who, because of his or her illness is unable to provide for his or her basic physical needs so as to guard himself or herself from serious harm, without the assistance of family or others, unless treated on an inpatient basis;   []  A person with mental illness who: refuses treatment or is not adhering adequately to prescribed treatment; because of the nature of his or her illness is unable to understand his or her need for treatment; and if not  treated on an inpatient basis, is reasonably expected based on his or her behavioral history, to suffer mental or emotional deterioration and is reasonably expected, after such deterioration, to meet the criteria of either paragraph one or paragraph two above;   []  An individual who is developmentally disabled and unless treated on an in-patient basis is reasonably expected to inflict serious physical harm upon himself or herself or others in the near future, and/or   [x]  Is in need of immediate hospitalization for the prevention of such harm.   I base my opinion on the following (including clinical observations, factual information):  The patient with history of depression and anxiety who has been demonstrating nervous breakdown with increased psychotic behavior, delusional ideation and bizarre change in her demeanor.  Patient demonstrating inability to care for self indicating the need for inpatient admission for safety and stabilization  I believe that the individual is subject to: []  Involuntary inpatient admission and is not in need of immediate hospitalization   (check one) [x]  Involuntary inpatient admission and is in need of immediate hospitalization    []  Judicial inpatient admission and is not in need of immediate hospitalization    []  Judicial inpatient admission and is in need of immediate hospitalization     Date: 4/11/2024 Signature: Electronically signed by Jason Mckenzie MD   Title: MD Printed Name: Jason Mckenzie MD     -2006 (R-12-22) Inpatient Certificate  Printed by Authority of the [facility] -0- Copies Page 1 of 1

## 2024-04-11 NOTE — ED NOTES
[facility] NOTIFICATION:    Facesheet, petition/CERT and client rights statement were emailed to NUPUR.

## 2024-04-11 NOTE — H&P
[facility] Hospitalist H&P       CC:   Chief Complaint   Patient presents with    Altered Mental Status        PCP: SIRI TABOR    History of Present Illness: Patient is a 73 year old female with PMH sig for MVP, EDEL/MDD, GERD, IBS, who presents with AMS.  Patient's  who is the POA brought her in because she was acting different.  She has a history of depression and mood changes but this seemed a little more concerning to him.  2 days ago patient did have a cough which seems to be better at this time.  Patient was put on abx and steroids.   at bedside states one of their adopted sons  moved away and another son is going through a divorce and this has put a lot of stress on her.  She had some lose stools 1-2 times.  Patient denies CP, SOB, abd pain, n/v, f/c, dysuria.       In the ER she was afebrile and normotensive.  Leukocytosis with bicarb 19, elevated AST ALT  Urine positive for cannabis and ecstasy    PMH  Past Medical History:    Anxiety state    Back problem    COMPRESSION FX    Cataract    Depression    Esophageal reflux    Hematoma and contusion of liver    STATES HAS BEEN THERE FOR MANY YEARS    History of fractured kneecap    RIGHT    IBS (irritable bowel syndrome)    Mitral prolapse    Osteoarthritis        PSH  Past Surgical History:   Procedure Laterality Date          Correct bunion,othr methods      Correct bunion,simple      BILAT.     Dilation/curettage,diagnostic      FOR \"MOLE PREGNANCY\"    Other Right     ORIF RIGHT ANKLE    Spine surgery procedure unlisted      cervical spine 2020    Total knee replacement          ALL:  Allergies   Allergen Reactions    Radiology Contrast Iodinated Dyes HIVES     HIVES AND THROAT TIGHTENED WHILE HAVING AN MRI    Sulfa Antibiotics HIVES     \"got sicker\"    Seroquel [Quetiapine] UNKNOWN    Cinnamon RASH        Home Medications:  No outpatient medications have been marked as taking for the 4/10/24 encounter (Hospital Encounter).          Soc Hx  Social History     Tobacco Use    Smoking status: Former     Current packs/day: 2.00     Average packs/day: 2.0 packs/day for 15.0 years (30.0 ttl pk-yrs)     Types: Cigarettes    Smokeless tobacco: Never    Tobacco comments:     QUIT IN 1983   Substance Use Topics    Alcohol use: No        Fam Hx  Family History   Problem Relation Age of Onset    Heart Disorder Father     Cancer Mother         ESOPHAGUS       Review of Systems  Comprehensive ROS reviewed and negative except for what's stated above.     OBJECTIVE:  BP (!) 124/104 (BP Location: Right arm)   Pulse 109   Temp 97.5 °F (36.4 °C) (Axillary)   Resp 20   Ht 5' (1.524 m)   Wt 108 lb 3.2 oz (49.1 kg)   SpO2 94%   BMI 21.13 kg/m²   General: Alert, no acute distress, restless   HEENT: oral mucosa normal   Neck: non tender, no adenopathy   Lungs: clear to ausculation bilaterally  Heart: Regular rate and rhythm  Abdomen: soft, non tender, non distended   Extremities: No edema  Skin: no new rash, normal color  Neuro: 5/5 strength in bilateral extremities, normal sensations  Psych: appropriate affect   Diagnostic Data:    CBC/Chem  Recent Labs   Lab 04/08/24  1217 04/10/24  1726   WBC 8.0 16.4*   HGB 14.2 13.8   MCV 81.6 82.9   .0 334.0       Recent Labs   Lab 04/08/24  1217 04/10/24  1726    138   K 3.7 3.9   * 106   CO2 20.0* 19.0*   BUN 18 33*   CREATSERUM 0.96 1.03*   * 124*   CA 9.7 10.2       Recent Labs   Lab 04/10/24  1726   ALT 68*   AST 93*   ALB 4.8       No results for input(s): \"TROP\" in the last 168 hours.    Additional Diagnostics: ECG: NSR    Radiology: CT BRAIN OR HEAD (31439)    Result Date: 4/8/2024  CONCLUSION:  1. No acute intracranial finding. 2. Mild changes of chronic small vessel disease in cerebral white matter. 3. Large vessel intracranial atherosclerosis.    Dictated by (CST): Piotr Delgado MD on 4/08/2024 at 1:02 PM     Finalized by (CST): Piotr Delgado MD on 4/08/2024 at 1:04 PM           XR CHEST AP PORTABLE  (CPT=71045)    Result Date: 4/8/2024  CONCLUSION:  1. No acute disease in the chest.    Dictated by (CST): Sy Gauthier MD on 4/08/2024 at 12:52 PM     Finalized by (CST): Sy Gauthier MD on 4/08/2024 at 12:53 PM             ASSESSMENT / PLAN:   Patient is a 73 year old female with PMH sig for MVP, EDEL/MDD, GERD, IBS, who presents with AMS.    AMS  EDEL/MDD  Behavioral changes with nervous breakdown   - afebrile and normotensive  - Leukocytosis with bicarb 19, elevated AST ALT  - Urine positive for cannabis and ecstasy  - will check b12, Folate  - Imaging was done 2 days ago for ER visit due to cough  - CT head with no acute process, chest x-ray with no acute process  - Recent steroids use for URI symptoms   - will check MRI brain   - Psych consulted   - polypharmacy  - will try to reset medication   - Certification for inpatient psych admit     FN:  - IVF: none  - Diet: general     DVT Prophy: SCDs lovenox   Atrophy: Ambulate   Lines: Piv    Dispo: pending clinical course    Outpatient records or previous hospital records reviewed.     Further recommendations pending patient's clinical course.  [facility] hospitalist to continue to follow patient while in house    Patient and/or patient's family given opportunity to ask questions and note understanding and agreeing with therapeutic plan as outlined    Thank You,  Antonino Jung MD    [facility] Hospitalist  Answering Service number: [phone]

## 2024-04-11 NOTE — PLAN OF CARE
Pt admitted from ED, confused, very anxious and restless, MD notified. PRN ativan given, Sitter at bedside. Pt's spouse contacted to help with med rec and admissions , spouse not available overnight. Voicemail left. Safety precuations in place, call light within reach  Problem: Patient Centered Care  Goal: Patient preferences are identified and integrated in the patient's plan of care  Description: Interventions:  - What would you like us to know as we care for you? From home with spouse    - Provide timely, complete, and accurate information to patient/family  - Incorporate patient and family knowledge, values, beliefs, and cultural backgrounds into the planning and delivery of care  - Encourage patient/family to participate in care and decision-making at the level they choose  - Honor patient and family perspectives and choices  Outcome: Progressing     Problem: Patient/Family Goals  Goal: Patient/Family Long Term Goal  Description: Patient's Long Term Goal:     Interventions:  -   - See additional Care Plan goals for specific interventions  Outcome: Progressing  Goal: Patient/Family Short Term Goal  Description: Patient's Short Term Goal:     Interventions:   -   - See additional Care Plan goals for specific interventions  Outcome: Progressing     Problem: SAFETY ADULT - FALL  Goal: Free from fall injury  Description: INTERVENTIONS:  - Assess pt frequently for physical needs  - Identify cognitive and physical deficits and behaviors that affect risk of falls.  - [place] fall precautions as indicated by assessment.  - Educate pt/family on patient safety including physical limitations  - Instruct pt to call for assistance with activity based on assessment  - Modify environment to reduce risk of injury  - Provide assistive devices as appropriate  - Consider OT/PT consult to assist with strengthening/mobility  - Encourage toileting schedule  Outcome: Progressing     Problem: RESPIRATORY - ADULT  Goal: Achieves  optimal ventilation and oxygenation  Description: INTERVENTIONS:  - Assess for changes in respiratory status  - Assess for changes in mentation and behavior  - Position to facilitate oxygenation and minimize respiratory effort  - Oxygen supplementation based on oxygen saturation or ABGs  - Provide Smoking Cessation handout, if applicable  - Encourage broncho-pulmonary hygiene including cough, deep breathe, Incentive Spirometry  - Assess the need for suctioning and perform as needed  - Assess and instruct to report SOB or any respiratory difficulty  - Respiratory Therapy support as indicated  - Manage/alleviate anxiety  - Monitor for signs/symptoms of CO2 retention  Outcome: Progressing     Problem: NEUROLOGICAL - ADULT  Goal: Achieves stable or improved neurological status  Description: INTERVENTIONS  - Assess for and report changes in neurological status  - Initiate measures to prevent increased intracranial pressure  - Maintain blood pressure and fluid volume within ordered parameters to optimize cerebral perfusion and minimize risk of hemorrhage  - Monitor temperature, glucose, and sodium. Initiate appropriate interventions as ordered  Outcome: Progressing     Problem: Impaired Cognition  Goal: Patient will exhibit improved attention, thought processing and/or memory  Description: Interventions:    Outcome: Progressing

## 2024-04-11 NOTE — CONSULTS
[facility]  part of [facility]    Report of Consultation    Meli Antonio Patient Status:  Inpatient    11/3/1950 MRN [id number]   Location [facility] 5SW/SE Attending Antonino Jung MD   Hosp Day # 1 PCP SIRI TABOR     Date of Admission:  4/10/2024  Date of Consult:  2024   Reason for Consultation:   Patient presented with increased confusion, restlessness, agitation, Antonino Merritt MD requested psychiatric consult for evaluation and advice.    Consult Duration     The patient seen for initial psychiatric consult evaluation.   Record reviewed, communication with attending, communication with RN and patient seen face to face evaluation.    History of Present Illness:   Patient is a 73 year old , , female with past medical history of anxiety, depression, GERD and back pain who was admitted to the hospital for altered mental status and confusion. The patient has been demonstrating confusion, restlessness, agitation. Patient indicated for psych consult for evaluation and advise.    Per chart review, the patient presented to the hospital with  who reported that since Tuesday the patient has been agitated and confused.     Labs and imaging reviewed: BAL negative and UDS positive for cannabis and ecstasy. TSH 0.683,   CT head demonstrated     The patient seen today laying in hospital bed. Patient presents restless and anxious.  Patient is awake and alert otherwise very restless and having difficulty to communicate.  Patient presented with expressive aphasia otherwise demonstrating significant response to internal stimuli.       at bedside reporting that patient with a chronic history of depression and anxiety but she is a private about her treatment.  He reported that she has been taking multiple medication.  Has been reporting that patient has been exhibiting some cough recently and take cough medicine with codeine.  He indicated that she  has been acting bizarre sitting up and laying back down and addressing herself and has been very restless and disheveled.    Has been reporting that patient never had such an episode before.    Otherwise looking at her medication indicated the patient on multiple psychiatric medication including Wellbutrin, Abilify, Prozac, Lexapro, lorazepam and Klonopin.    The patient is alert and oriented to person. The patient has difficulty answering questions and engaging in appropriate conversation due to confused and disorganized thought process.    Patient is not able to cooperate with interview to due confusion, restlessness, agitation and response to internal stimuli.     Report of suicidal or homicidal ideation.    The patient has been demonstrating alternation in mood and cognition with episodes of  increased confusion, restlessness, agitation and response to internal stimuli.     Collateral obtained from psychiatric liaison from patients .     reports that patient has had uncontrollable movements, is taking her clothes off, has been confused and does not know the names of anybody.     says that since Friday she has had these difficulties and it seemed to have gotten worse the last 2 days where now, in addition to those difficulties, she also cannot control her bowels.  She says she has been soiling her clothes and her bed.   said he wonders if it is could be attributed to the recent prescribing of meds with codeine that were meant to manage a cough that she had.   also reports that patient has \"been in bed over a year\".  He says that she does not say much and sleeps a lot.  He says she is grieving the loss of 2 dogs that  5 years ago as well as grieving the loss of their adopted son, who 7 yrs ago, at 33 y/o walked out of their lives.   He said that pt has friends and she does occasionally go to lunch with them and does attend AA meetings once a week.  He said that she will eat  the food that he cooks for her.    states that he is power of  for his wife.  Counselor advised him to bring copies of that paperwork to the hospital so that staff can scan it into the clinic record.      Past Psychiatric/Medication History:  1. Prior diagnoses: anxiety, depression  2. Past psychiatric inpatient: Denied any  3. Past outpatient history: Patient seen by psychiatrist.  4. Past suicide history: Denied any  5. Medication history: Abilify, bupropion, Lexapro, hydroxyzine.    Social History:   Patient lives with her  and adult son. She is a retired teacher.    Patient has been sober from alcohol for 9 years. Patient uses marijuana gummies at night.     Family History:  Unknown family history  Medical History:   Past Medical History  Past Medical History:    Anxiety state    Back problem    COMPRESSION FX    Cataract    Depression    Esophageal reflux    Hematoma and contusion of liver    STATES HAS BEEN THERE FOR MANY YEARS    History of fractured kneecap    RIGHT    IBS (irritable bowel syndrome)    Mitral prolapse    Osteoarthritis       Past Surgical History  Past Surgical History:   Procedure Laterality Date          Correct bunion,othr methods      Correct bunion,simple      BILAT.     Dilation/curettage,diagnostic      FOR \"MOLE PREGNANCY\"    Other Right     ORIF RIGHT ANKLE    Spine surgery procedure unlisted      cervical spine 2020    Total knee replacement         Family History  Family History   Problem Relation Age of Onset    Heart Disorder Father     Cancer Mother         ESOPHAGUS       Social History  Social History     Socioeconomic History    Marital status:    Tobacco Use    Smoking status: Former     Current packs/day: 2.00     Average packs/day: 2.0 packs/day for 15.0 years (30.0 ttl pk-yrs)     Types: Cigarettes    Smokeless tobacco: Never    Tobacco comments:     QUIT IN    Vaping Use    Vaping status: Never Used   Substance and  Sexual Activity    Alcohol use: No    Drug use: No     Social Determinants of Health     Financial Resource Strain: Low Risk  (12/12/2022)    [facility]    Overall Financial Resource Strain (CARDIA)     Difficulty of Paying Living Expenses: Not hard at all   Food Insecurity: No Food Insecurity (4/11/2024)    Food Insecurity     Food Insecurity: Never true   Transportation Needs: No Transportation Needs (4/11/2024)    Transportation Needs     Lack of Transportation: No    [facility] from [facility]    Social Connections   Housing Stability: Low Risk  (4/11/2024)    Housing Stability     Housing Instability: No           Current Medications:  Current Facility-Administered Medications   Medication Dose Route Frequency    enoxaparin (Lovenox) 30 MG/0.3ML SUBQ injection 30 mg  30 mg Subcutaneous Daily    acetaminophen (Tylenol Extra Strength) tab 500 mg  500 mg Oral Q4H PRN    melatonin tab 3 mg  3 mg Oral Nightly PRN    ondansetron (Zofran) 4 MG/2ML injection 4 mg  4 mg Intravenous Q6H PRN    metoclopramide (Reglan) 5 mg/mL injection 5 mg  5 mg Intravenous Q8H PRN    polyethylene glycol (PEG 3350) (Miralax) 17 g oral packet 17 g  17 g Oral Daily PRN    sennosides (Senokot) tab 17.2 mg  17.2 mg Oral Nightly PRN    bisacodyl (Dulcolax) 10 MG rectal suppository 10 mg  10 mg Rectal Daily PRN    fleet enema (Fleet) 7-19 GM/118ML rectal enema 133 mL  1 enema Rectal Once PRN    LORazepam (Ativan) tab 1 mg  1 mg Oral Q6H PRN    fluticasone propionate (Flonase) 50 MCG/ACT nasal suspension 1 spray  1 spray Each Nare Daily    cetirizine (ZyrTEC) tab 5 mg  5 mg Oral Daily    albuterol (Ventolin HFA) 108 (90 Base) MCG/ACT inhaler 2 puff  2 puff Inhalation Q4H PRN    cholecalciferol (Vitamin D3) tab 1,000 Units  1,000 Units Oral Daily     Medications Prior to Admission   Medication Sig    ARIPiprazole 2 MG Oral Tab Take 1 tablet (2 mg total) by  mouth at bedtime.    buPROPion  MG Oral Tablet 24 Hr Take 1 tablet (300 mg total) by mouth daily.    diclofenac 75 MG Oral Tab EC Take 1 tablet (75 mg total) by mouth 2 (two) times daily.    escitalopram 10 MG Oral Tab Take 1 tablet (10 mg total) by mouth daily.    pregabalin 100 MG Oral Cap Take 1 capsule (100 mg total) by mouth in the morning, at noon, and at bedtime.    Meloxicam 15 MG Oral Tab Take 0.5 tablets (7.5 mg total) by mouth in the morning and 0.5 tablets (7.5 mg total) before bedtime.    mirtazapine 15 MG Oral Tab Take 0.5 tablets (7.5 mg total) by mouth nightly.    cholecalciferol 1000 UNITS Oral Cap Take 1 capsule (1,000 Units total) by mouth daily.    guaiFENesin  MG Oral Tablet 12 Hr Take 2 tablets (1,200 mg total) by mouth 2 (two) times daily for 7 days.    oxyCODONE-acetaminophen  MG Oral Tab Take 1 tablet by mouth 3 (three) times daily as needed for Pain.    tiZANidine HCl 4 MG Oral Tab Take 1 tablet (4 mg total) by mouth in the morning and 1 tablet (4 mg total) before bedtime.    hydrOXYzine HCl 50 MG Oral Tab Take 1 tablet (50 mg total) by mouth 3 (three) times daily.    traZODone HCl 50 MG Oral Tab Take 50 mg by mouth nightly. (Patient not taking: Reported on 4/11/2024)    pregabalin 25 MG Oral Cap Take 100 mg by mouth 2 (two) times daily.   (Patient not taking: Reported on 4/11/2024)    celecoxib 50 MG Oral Cap Take 100 mg by mouth daily. *does not know dosage   (Patient not taking: Reported on 4/11/2024)    OLANZapine 5 MG Oral Tab Take 5 mg by mouth nightly. New medication (Patient not taking: Reported on 4/11/2024)    ClonazePAM 0.5 MG Oral Tab Take 0.5 mg by mouth 3 (three) times daily as needed for Anxiety.   (Patient not taking: Reported on 4/11/2024)    FLUoxetine HCl 60 MG Oral Tab Take 1 tablet by mouth daily. (Patient not taking: Reported on 4/11/2024)       Allergies  Allergies   Allergen Reactions    Radiology Contrast Iodinated Dyes HIVES     HIVES AND THROAT  TIGHTENED WHILE HAVING AN MRI    Sulfa Antibiotics HIVES     \"got sicker\"    Seroquel [Quetiapine] UNKNOWN    Cinnamon RASH       Review of Systems:   As by Admitting/Attending    Results:   Laboratory Data:  Lab Results   Component Value Date    WBC 16.4 (H) 04/10/2024    HGB 13.8 04/10/2024    HCT 42.1 04/10/2024    .0 04/10/2024    CREATSERUM 1.03 (H) 04/10/2024    BUN 33 (H) 04/10/2024     04/10/2024    K 3.9 04/10/2024     04/10/2024    CO2 19.0 (L) 04/10/2024     (H) 04/10/2024    CA 10.2 04/10/2024    ALB 4.8 04/10/2024    ALKPHO 91 04/10/2024    TP 7.6 04/10/2024    AST 93 (H) 04/10/2024    ALT 68 (H) 04/10/2024    TSH 0.683 04/08/2024     01/19/2022    DDIMER <0.27 01/12/2023    CRP 8.33 (H) 09/04/2020    MG 2.3 08/31/2020    TROP <0.045 08/26/2020    ETOH <3 04/10/2024         Imaging:  CT BRAIN OR HEAD (22149)    Result Date: 4/8/2024  CONCLUSION:  1. No acute intracranial finding. 2. Mild changes of chronic small vessel disease in cerebral white matter. 3. Large vessel intracranial atherosclerosis.    Dictated by (CST): Piotr Delgado MD on 4/08/2024 at 1:02 PM     Finalized by (CST): Piotr Delgado MD on 4/08/2024 at 1:04 PM          XR CHEST AP PORTABLE  (CPT=71045)    Result Date: 4/8/2024  CONCLUSION:  1. No acute disease in the chest.    Dictated by (CST): Sy Gauthier MD on 4/08/2024 at 12:52 PM     Finalized by (CST): Sy Gauthier MD on 4/08/2024 at 12:53 PM           Vital Signs:   Blood pressure (!) 125/100, pulse 81, temperature 99.1 °F (37.3 °C), temperature source Oral, resp. rate 20, height 60\", weight 49.1 kg (108 lb 3.2 oz), SpO2 93%.    Mental Status Exam:   Appearance: Stated age female, in hospital gown, laying down in hospital bed.  Psychomotor: Patient has been demonstrating restlessness and agitation.  Orientation: Alert and oriented to person. Patient confused  Gait: Not evaluated.  Attitude/Coorperation: limited cooperation due to confusion,  restlessness, agitation  Behavior: episodes of restlessness and agitation.  Speech: Patient demonstrating expressive aphasia and difficulty expressing her emotion  Mood: Apathy  Affect: Irritable and restless affect  Thought process: Confused, disorganized  Thought content: No reports of  suicidal or homicidal ideation.  Perceptions: Patient has been demonstrating response to internal stimuli.  Concentration: Grossly impaired  Memory: Grossly impaired  Intellect: Average.  Judgment and Insight: Questionable.     Impression:     Catatonic excitable type.  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  Delirium due to another medical condition.        Patient is a 73 year old , , female with past medical history of anxiety, depression who was admitted to the hospital for Altered mental status, unspecified altered mental status type: The patient has been demonstrating confusion, restlessness, agitation.    The patient is restless, irritable, unable to communicate, keep moving back-and-forth and aimless movement with inability to express her emotion and not communicating verbally.  Patient has been taking multiple antidepressant medication with recent cough medicine with codeine indicate the high possibility for serotonin syndrome versus excited catatonia.    Discussed risk and benefit, acknowledging the current symptom and severity.  At this point, I would recommend the following approach:     Focus on safety  Focus on education and support.  Focus on insight orientation helping the patient understand diagnosis and treatment plan.  Utilize 2 mg of Haldol with 2 mg of Ativan IM.  Discontinue Wellbutrin and Prozac.  Continue Abilify 15 mg daily.  Continue Lexapro 10 mg nightly.  Restart the Klonopin 0.5 mg twice daily.  Utilize lorazepam 1 mg IV every 6 hours as needed for agitation and anxiety.  Processed with patient at length, the initiation of the above  psychotropic medications I advised the patient of the risks, benefits, alternatives and potential side effects. The patient consents to administration of the medications and understands the right to refuse medications at any time. The patient verbalized understanding.   Coordinate plan with team    Orders This Visit:  Orders Placed This Encounter   Procedures    CBC With Differential With Platelet    Comp Metabolic Panel (14)    Urinalysis with Culture Reflex    Scan slide    Drug screen 7 w/out confirmation, urine    Ethyl Alcohol    Acetaminophen (Tylenol), S    Salicylate, Serum    CBC With Differential With Platelet    Comp Metabolic Panel (14)    Magnesium       Meds This Visit:  Requested Prescriptions      No prescriptions requested or ordered in this encounter       Jason Mckenzie MD  4/11/2024    Note to Patient: The 21st Century Cures Act makes medical notes like these available to patients in the interest of transparency. However, be advised this is a medical document. It is intended as peer to peer communication. It is written in medical language and may contain abbreviations or verbiage that are unfamiliar. It may appear blunt or direct. Medical documents are intended to carry relevant information, facts as evident, and the clinical opinion of the practitioner. This note may have been transcribed using a voice dictation system. Voice recognition errors may occur. This should not be taken to alter the content or meaning of this note.

## 2024-04-12 LAB
ALBUMIN SERPL-MCNC: 4.1 G/DL (ref 3.2–4.8)
ALBUMIN/GLOB SERPL: 1.7 {RATIO} (ref 1–2)
ALP LIVER SERPL-CCNC: 78 U/L
ALT SERPL-CCNC: 53 U/L
ANION GAP SERPL CALC-SCNC: 6 MMOL/L (ref 0–18)
AST SERPL-CCNC: 35 U/L (ref ?–34)
BASOPHILS # BLD AUTO: 0.03 X10(3) UL (ref 0–0.2)
BASOPHILS NFR BLD AUTO: 0.2 %
BILIRUB SERPL-MCNC: 0.7 MG/DL (ref 0.2–1.1)
BUN BLD-MCNC: 22 MG/DL (ref 9–23)
BUN/CREAT SERPL: 25.6 (ref 10–20)
CALCIUM BLD-MCNC: 10 MG/DL (ref 8.7–10.4)
CHLORIDE SERPL-SCNC: 108 MMOL/L (ref 98–112)
CO2 SERPL-SCNC: 24 MMOL/L (ref 21–32)
CREAT BLD-MCNC: 0.86 MG/DL
DEPRECATED RDW RBC AUTO: 38.1 FL (ref 35.1–46.3)
EGFRCR SERPLBLD CKD-EPI 2021: 71 ML/MIN/1.73M2 (ref 60–?)
EOSINOPHIL # BLD AUTO: 0.01 X10(3) UL (ref 0–0.7)
EOSINOPHIL NFR BLD AUTO: 0.1 %
ERYTHROCYTE [DISTWIDTH] IN BLOOD BY AUTOMATED COUNT: 12.8 % (ref 11–15)
GLOBULIN PLAS-MCNC: 2.4 G/DL (ref 2.8–4.4)
GLUCOSE BLD-MCNC: 150 MG/DL (ref 70–99)
HCT VFR BLD AUTO: 38.7 %
HGB BLD-MCNC: 13 G/DL
IMM GRANULOCYTES # BLD AUTO: 0.08 X10(3) UL (ref 0–1)
IMM GRANULOCYTES NFR BLD: 0.4 %
LYMPHOCYTES # BLD AUTO: 4.4 X10(3) UL (ref 1–4)
LYMPHOCYTES NFR BLD AUTO: 24.3 %
MAGNESIUM SERPL-MCNC: 2.4 MG/DL (ref 1.6–2.6)
MCH RBC QN AUTO: 27.5 PG (ref 26–34)
MCHC RBC AUTO-ENTMCNC: 33.6 G/DL (ref 31–37)
MCV RBC AUTO: 81.8 FL
MONOCYTES # BLD AUTO: 1.66 X10(3) UL (ref 0.1–1)
MONOCYTES NFR BLD AUTO: 9.2 %
NEUTROPHILS # BLD AUTO: 11.95 X10 (3) UL (ref 1.5–7.7)
NEUTROPHILS # BLD AUTO: 11.95 X10(3) UL (ref 1.5–7.7)
NEUTROPHILS NFR BLD AUTO: 65.8 %
OSMOLALITY SERPL CALC.SUM OF ELEC: 292 MOSM/KG (ref 275–295)
PLATELET # BLD AUTO: 275 10(3)UL (ref 150–450)
POTASSIUM SERPL-SCNC: 3.4 MMOL/L (ref 3.5–5.1)
PROT SERPL-MCNC: 6.5 G/DL (ref 5.7–8.2)
RBC # BLD AUTO: 4.73 X10(6)UL
SODIUM SERPL-SCNC: 138 MMOL/L (ref 136–145)
T3FREE SERPL-MCNC: 2.42 PG/ML (ref 2.4–4.2)
T4 FREE SERPL-MCNC: 1.7 NG/DL (ref 0.8–1.7)
TSI SER-ACNC: 0.53 MIU/ML (ref 0.55–4.78)
VIT B12 SERPL-MCNC: 484 PG/ML (ref 211–911)
WBC # BLD AUTO: 18.1 X10(3) UL (ref 4–11)

## 2024-04-12 PROCEDURE — 99233 SBSQ HOSP IP/OBS HIGH 50: CPT | Performed by: OTHER

## 2024-04-12 RX ORDER — POTASSIUM CHLORIDE 20 MEQ/1
40 TABLET, EXTENDED RELEASE ORAL ONCE
Status: COMPLETED | OUTPATIENT
Start: 2024-04-12 | End: 2024-04-12

## 2024-04-12 NOTE — PLAN OF CARE
Sitter discontinued per Dr. Mckenzie, call light within reach. No acute changes.   Problem: Patient Centered Care  Goal: Patient preferences are identified and integrated in the patient's plan of care  Description: Interventions:  - What would you like us to know as we care for you? From home with spouse    - Provide timely, complete, and accurate information to patient/family  - Incorporate patient and family knowledge, values, beliefs, and cultural backgrounds into the planning and delivery of care  - Encourage patient/family to participate in care and decision-making at the level they choose  - Honor patient and family perspectives and choices  Outcome: Progressing     Problem: Patient/Family Goals  Goal: Patient/Family Long Term Goal  Description: Patient's Long Term Goal: to go home     Interventions:  -   - See additional Care Plan goals for specific interventions  Outcome: Progressing  Goal: Patient/Family Short Term Goal  Description: Patient's Short Term Goal: feel better     Interventions:   - See additional Care Plan goals for specific interventions  Outcome: Progressing     Problem: SAFETY ADULT - FALL  Goal: Free from fall injury  Description: INTERVENTIONS:  - Assess pt frequently for physical needs  - Identify cognitive and physical deficits and behaviors that affect risk of falls.  - [place] fall precautions as indicated by assessment.  - Educate pt/family on patient safety including physical limitations  - Instruct pt to call for assistance with activity based on assessment  - Modify environment to reduce risk of injury  - Provide assistive devices as appropriate  - Consider OT/PT consult to assist with strengthening/mobility  - Encourage toileting schedule  Outcome: Progressing     Problem: RESPIRATORY - ADULT  Goal: Achieves optimal ventilation and oxygenation  Description: INTERVENTIONS:  - Assess for changes in respiratory status  - Assess for changes in mentation and behavior  - Position to  facilitate oxygenation and minimize respiratory effort  - Oxygen supplementation based on oxygen saturation or ABGs  - Provide Smoking Cessation handout, if applicable  - Encourage broncho-pulmonary hygiene including cough, deep breathe, Incentive Spirometry  - Assess the need for suctioning and perform as needed  - Assess and instruct to report SOB or any respiratory difficulty  - Respiratory Therapy support as indicated  - Manage/alleviate anxiety  - Monitor for signs/symptoms of CO2 retention  Outcome: Progressing     Problem: NEUROLOGICAL - ADULT  Goal: Achieves stable or improved neurological status  Description: INTERVENTIONS  - Assess for and report changes in neurological status  - Initiate measures to prevent increased intracranial pressure  - Maintain blood pressure and fluid volume within ordered parameters to optimize cerebral perfusion and minimize risk of hemorrhage  - Monitor temperature, glucose, and sodium. Initiate appropriate interventions as ordered  Outcome: Progressing     Problem: Impaired Cognition  Goal: Patient will exhibit improved attention, thought processing and/or memory  Description: Interventions:    Outcome: Progressing

## 2024-04-12 NOTE — PLAN OF CARE
No acute changes overnight, Pt slept throughout the night after taking her PM meds. 1:1 sitter. Safety precautions in place.    Problem: Patient Centered Care  Goal: Patient preferences are identified and integrated in the patient's plan of care  Description: Interventions:  - What would you like us to know as we care for you? From home with spouse    - Provide timely, complete, and accurate information to patient/family  - Incorporate patient and family knowledge, values, beliefs, and cultural backgrounds into the planning and delivery of care  - Encourage patient/family to participate in care and decision-making at the level they choose  - Honor patient and family perspectives and choices  Outcome: Progressing     Problem: Patient/Family Goals  Goal: Patient/Family Long Term Goal  Description: Patient's Long Term Goal:    Interventions:  -   - See additional Care Plan goals for specific interventions  Outcome: Progressing  Goal: Patient/Family Short Term Goal  Description: Patient's Short Term Goal:     Interventions:   -   - See additional Care Plan goals for specific interventions  Outcome: Progressing     Problem: SAFETY ADULT - FALL  Goal: Free from fall injury  Description: INTERVENTIONS:  - Assess pt frequently for physical needs  - Identify cognitive and physical deficits and behaviors that affect risk of falls.  - [place] fall precautions as indicated by assessment.  - Educate pt/family on patient safety including physical limitations  - Instruct pt to call for assistance with activity based on assessment  - Modify environment to reduce risk of injury  - Provide assistive devices as appropriate  - Consider OT/PT consult to assist with strengthening/mobility  - Encourage toileting schedule  Outcome: Progressing     Problem: RESPIRATORY - ADULT  Goal: Achieves optimal ventilation and oxygenation  Description: INTERVENTIONS:  - Assess for changes in respiratory status  - Assess for changes in mentation and  behavior  - Position to facilitate oxygenation and minimize respiratory effort  - Oxygen supplementation based on oxygen saturation or ABGs  - Provide Smoking Cessation handout, if applicable  - Encourage broncho-pulmonary hygiene including cough, deep breathe, Incentive Spirometry  - Assess the need for suctioning and perform as needed  - Assess and instruct to report SOB or any respiratory difficulty  - Respiratory Therapy support as indicated  - Manage/alleviate anxiety  - Monitor for signs/symptoms of CO2 retention  Outcome: Progressing     Problem: NEUROLOGICAL - ADULT  Goal: Achieves stable or improved neurological status  Description: INTERVENTIONS  - Assess for and report changes in neurological status  - Initiate measures to prevent increased intracranial pressure  - Maintain blood pressure and fluid volume within ordered parameters to optimize cerebral perfusion and minimize risk of hemorrhage  - Monitor temperature, glucose, and sodium. Initiate appropriate interventions as ordered  Outcome: Progressing     Problem: Impaired Cognition  Goal: Patient will exhibit improved attention, thought processing and/or memory  Description: Interventions:  Outcome: Progressing

## 2024-04-12 NOTE — PROGRESS NOTES
[facility] Hospitalist Progress Note     CC: Hospital Follow up    PCP: SIRI TABOR       Assessment/Plan:     Principal Problem:    Altered mental status, unspecified altered mental status type  Active Problems:    Altered mental status    Depression, unspecified depression type    Catatonic excited type    Serotonin syndrome    Severe episode of recurrent major depressive disorder, without psychotic features (HCC)    Patient is a 73 year old female with PMH sig for MVP, EDEL/MDD, GERD, IBS, who presents with AMS.     AMS  EDEL/MDD  Behavioral changes with nervous breakdown   - afebrile and normotensive  - Leukocytosis with bicarb 19, elevated AST ALT  - Urine positive for cannabis and ecstasy  - b12 normal, TSH borderline low   - Imaging was done 2 days ago for ER visit due to cough  - CT head with no acute process, chest x-ray with no acute process  - Recent steroids use for URI symptoms   - will check MRI brain   - Psych consulted   - polypharmacy  - will try to reset medication   - Certification for inpatient psych admit      FN:  - IVF: none  - Diet: general      DVT Prophy: SCDs lovenox   Atrophy: Ambulate   Lines: Piv     Dispo: pending clinical course     Outpatient records or previous hospital records reviewed.      Further recommendations pending patient's clinical course.  [facility] hospitalist to continue to follow patient while in house     Patient and/or patient's family given opportunity to ask questions and note understanding and agreeing with therapeutic plan as outlined     Thank You,  Antonino Jung MD     [facility] Hospitalist  Answering Service number: [phone]     Subjective:     Sleepy but more calm.  Answers questions with on complaints.    at bedside.     OBJECTIVE:    Blood pressure 125/86, pulse 100, temperature 97.6 °F (36.4 °C), temperature source Oral, resp. rate 18, height 5' (1.524 m), weight 108 lb 3.2 oz (49.1 kg), SpO2 93%.    Temp:  [97.6 °F (36.4 °C)-98.8 °F (37.1  °C)] 97.6 °F (36.4 °C)  Pulse:  [] 100  Resp:  [18-20] 18  BP: (114-126)/(84-86) 125/86  SpO2:  [92 %-96 %] 93 %      Intake/Output:    Intake/Output Summary (Last 24 hours) at 4/12/2024 1258  Last data filed at 4/12/2024 0815  Gross per 24 hour   Intake 240 ml   Output --   Net 240 ml       Last 3 Weights   04/11/24 0110 108 lb 3.2 oz (49.1 kg)   04/08/24 1116 122 lb (55.3 kg)   01/12/23 1210 115 lb (52.2 kg)       Exam   General: Alert, no acute distress, restless   HEENT: oral mucosa normal   Neck: non tender, no adenopathy   Lungs: clear to ausculation bilaterally  Heart: Regular rate and rhythm  Abdomen: soft, non tender, non distended   Extremities: No edema  Skin: no new rash, normal color  Neuro: 5/5 strength in bilateral extremities, normal sensations  Psych: appropriate affect     Data Review:       Labs:     Recent Labs   Lab 04/08/24  1217 04/10/24  1726 04/12/24  0621   RBC 5.00 5.08 4.73   HGB 14.2 13.8 13.0   HCT 40.8 42.1 38.7   MCV 81.6 82.9 81.8   MCH 28.4 27.2 27.5   MCHC 34.8 32.8 33.6   RDW 13.2 13.2 12.8   NEPRELIM 5.17 9.79* 11.95*   WBC 8.0 16.4* 18.1*   .0 334.0 275.0         Recent Labs   Lab 04/08/24  1217 04/10/24  1726 04/12/24  0621   * 124* 150*   BUN 18 33* 22   CREATSERUM 0.96 1.03* 0.86   EGFRCR 62 57* 71   CA 9.7 10.2 10.0    138 138   K 3.7 3.9 3.4*   * 106 108   CO2 20.0* 19.0* 24.0       Recent Labs   Lab 04/10/24  1726 04/12/24  0621   ALT 68* 53*   AST 93* 35*   ALB 4.8 4.1         Imaging:  No results found.      Meds:      enoxaparin  30 mg Subcutaneous Daily    fluticasone propionate  1 spray Each Nare Daily    cetirizine  5 mg Oral Daily    cholecalciferol  1,000 Units Oral Daily    escitalopram  10 mg Oral Nightly    clonazePAM  0.5 mg Oral BID    ARIPiprazole  15 mg Oral Daily         acetaminophen    melatonin    ondansetron    metoclopramide    polyethylene glycol (PEG 3350)    sennosides    bisacodyl    fleet enema    LORazepam     albuterol    LORazepam    haloperidol lactate

## 2024-04-12 NOTE — PAYOR COMM NOTE
--------------  ADMISSION REVIEW     Payor: [employer]  Subscriber #:  [id number]  Authorization Number: [id number]    Admit date: 4/10/24  Admit time:  9:59 PM       REVIEW DOCUMENTATION:     ED Provider Notes          HPI    73-year-old female with a history of depression who has had a cough over the last week but was brought by the  given that he has been concerned about her acting differently which he attributes to her chronic depression but is concerned about possibly a medication reaction.  The patient denies any worsening of her cough or dyspnea for which she presented 2 days ago to the emergency department, she denies any suicidal ideation or any homicidal ideation or any visual or auditory hallucinations.  She is on multiple antidepressant medications.       Physical Exam     ED Triage Vitals [04/10/24 1710]   BP (!) 150/96   Pulse 98   Resp 18   Temp 97.7 °F (36.5 °C)   Temp src Temporal   SpO2 97 %   O2 Device None (Room air)       General: Unable to lie still, but no severe distress  Head: Normocephalic and atraumatic.  Eyes: Conjunctivae and EOM are normal.   Neck: Normal range of motion. Supple.   Cardiovascular: Normal rate, regular rhythm, normal heart sounds.  Respiratory/Chest: Clear and equal bilaterally. Exhibits no tenderness.  Gastrointestinal: Soft, non-tender, non-distended. Bowel sounds are normal.   Musculoskeletal:No swelling or deformity.   Neurological: Alert and appropriate to self and place, not cooperative with portions of most of the exam, sensation in tact to light touch throughout the upper and lower extremities. No focal deficits.   Skin: Skin is warm and dry. No pallor.  Psychiatric: Flat affect, cooperative      ED Course        Labs Reviewed   COMP METABOLIC PANEL (14) - Abnormal; Notable for the following components:       Result Value    Glucose 124 (*)     CO2 19.0 (*)     BUN 33 (*)     Creatinine 1.03 (*)     BUN/CREA Ratio 32.0 (*)     eGFR-Cr 57 (*)      ALT 68 (*)     AST 93 (*)     All other components within normal limits   URINALYSIS WITH CULTURE REFLEX - Abnormal; Notable for the following components:    Ketones Urine 80 (*)     Blood Urine 2+ (*)     Protein Urine 70 (*)     All other components within normal limits   DRUG SCREEN 7 W/OUT CONFIRMATION, URINE - Abnormal; Notable for the following components:    Cannabinoid Urine Presumed Positive (*)     Ecstasy Urine Presumed Positive (*)     All other components within normal limits   ACETAMINOPHEN (TYLENOL), S - Abnormal; Notable for the following components:    Acetaminophen <2.0 (*)     All other components within normal limits   SALICYLATE, SERUM - Abnormal; Notable for the following components:    Salicylate <3.0 (*)     All other components within normal limits   CBC W/ DIFFERENTIAL - Abnormal; Notable for the following components:    WBC 16.4 (*)     Neutrophil Absolute Prelim 9.79 (*)     Neutrophil Absolute 9.79 (*)     Lymphocyte Absolute 5.12 (*)     Monocyte Absolute 1.37 (*)     All other components within normal limits   ETHYL ALCOHOL - Normal   CBC WITH DIFFERENTIAL WITH PLATELET   EKG    Rate, intervals and axes as noted on EKG Report.  Rate: 84  Rhythm: Sinus Rhythm  Reading: No STEMI.  This is my interpretation.            MDM     Vitals:    04/10/24 1710 04/10/24 1900 04/10/24 2257   BP: (!) 150/96 152/90 142/89   BP Location:   Right arm   Pulse: 98 90 95   Resp: 18  24   Temp: 97.7 °F (36.5 °C)  98.2 °F (36.8 °C)   TempSrc: Temporal  Axillary   SpO2: 97% 94% 95%     *I personally reviewed and interpreted all ED vitals.    Pulse Ox: 94%, Room air, Normal     Monitor Interpretation:   normal sinus rhythm as interpreted by me.  The cardiac monitor was ordered given concern for electrolyte derangements.      Medical Decision Making      Differential Diagnosis/ Diagnostic Considerations: Depression, steroid-induced psychosis, electrolyte derangements, UTI    Complicating Factors: The patient  already has does not have any pertinent problems on file. to contribute to the complexity of this ED evaluation.      Disposition and Plan     Clinical Impression:  1. Altered mental status, unspecified altered mental status type    2. Depression, unspecified depression type        Disposition:  Admit        [facility] Hospitalist H&P         CC:       Chief Complaint   Patient presents with    Altered Mental Status         PCP: SIRI TABOR     History of Present Illness: Patient is a 73 year old female with PMH sig for MVP, EDEL/MDD, GERD, IBS, who presents with AMS.  Patient's  who is the POA brought her in because she was acting different.  She has a history of depression and mood changes but this seemed a little more concerning to him.  2 days ago patient did have a cough which seems to be better at this time.  Patient was put on abx and steroids.   at bedside states one of their adopted sons  moved away and another son is going through a divorce and this has put a lot of stress on her.  She had some lose stools 1-2 times.  Patient denies CP, SOB, abd pain, n/v, f/c, dysuria.        In the ER she was afebrile and normotensive.  Leukocytosis with bicarb 19, elevated AST ALT  Urine positive for cannabis and ecstasy    ASSESSMENT / PLAN:   Patient is a 73 year old female with PMH sig for MVP, EDEL/MDD, GERD, IBS, who presents with AMS.     AMS  EDEL/MDD  Behavioral changes with nervous breakdown   - afebrile and normotensive  - Leukocytosis with bicarb 19, elevated AST ALT  - Urine positive for cannabis and ecstasy  - will check b12, Folate  - Imaging was done 2 days ago for ER visit due to cough  - CT head with no acute process, chest x-ray with no acute process  - Recent steroids use for URI symptoms   - will check MRI brain   - Psych consulted   - polypharmacy  - will try to reset medication   - Certification for inpatient psych admit      FN:  - IVF: none  - Diet: general      DVT Prophy: SCDs lovenox    Atrophy: Ambulate   Lines: Piv     Dispo: pending clinical course     Outpatient records or previous hospital records reviewed.      Further recommendations pending patient's clinical course.  [facility] hospitalist to continue to follow patient while in house     Patient and/or patient's family given opportunity to ask questions and note understanding and agreeing with therapeutic plan as outlined     Thank You,  Antonino Jung MD     [facility] Hospitalist  Answering Service number: [phone]      4/11 PSYCHIATRY:    Date of Admission:  4/10/2024  Date of Consult:  04/11/2024   Reason for Consultation:   Patient presented with increased confusion, restlessness, agitation, Antonino Merritt MD requested psychiatric consult for evaluation and advice.     Consult Duration      The patient seen for initial psychiatric consult evaluation.   Record reviewed, communication with attending, communication with RN and patient seen face to face evaluation.     History of Present Illness:   Patient is a 73 year old , , female with past medical history of anxiety, depression, GERD and back pain who was admitted to the hospital for altered mental status and confusion. The patient has been demonstrating confusion, restlessness, agitation. Patient indicated for psych consult for evaluation and advise.     Per chart review, the patient presented to the hospital with  who reported that since Tuesday the patient has been agitated and confused.      Labs and imaging reviewed: BAL negative and UDS positive for cannabis and ecstasy. TSH 0.683,   CT head demonstrated      The patient seen today laying in hospital bed. Patient presents restless and anxious.  Patient is awake and alert otherwise very restless and having difficulty to communicate.  Patient presented with expressive aphasia otherwise demonstrating significant response to internal stimuli.         at bedside reporting that patient with  a chronic history of depression and anxiety but she is a private about her treatment.  He reported that she has been taking multiple medication.  Has been reporting that patient has been exhibiting some cough recently and take cough medicine with codeine.  He indicated that she has been acting bizarre sitting up and laying back down and addressing herself and has been very restless and disheveled.     Has been reporting that patient never had such an episode before.     Otherwise looking at her medication indicated the patient on multiple psychiatric medication including Wellbutrin, Abilify, Prozac, Lexapro, lorazepam and Klonopin.     The patient is alert and oriented to person. The patient has difficulty answering questions and engaging in appropriate conversation due to confused and disorganized thought process.     Patient is not able to cooperate with interview to due confusion, restlessness, agitation and response to internal stimuli.      Report of suicidal or homicidal ideation.     The patient has been demonstrating alternation in mood and cognition with episodes of  increased confusion, restlessness, agitation and response to internal stimuli.      Collateral obtained from psychiatric liaison from patients .      reports that patient has had uncontrollable movements, is taking her clothes off, has been confused and does not know the names of anybody.     says that since Friday she has had these difficulties and it seemed to have gotten worse the last 2 days where now, in addition to those difficulties, she also cannot control her bowels.  She says she has been soiling her clothes and her bed.   said he wonders if it is could be attributed to the recent prescribing of meds with codeine that were meant to manage a cough that she had.   also reports that patient has \"been in bed over a year\".  He says that she does not say much and sleeps a lot.  He says she is grieving the  loss of 2 dogs that  5 years ago as well as grieving the loss of their adopted son, who 7 yrs ago, at 33 y/o walked out of their lives.   He said that pt has friends and she does occasionally go to lunch with them and does attend AA meetings once a week.  He said that she will eat the food that he cooks for her.    states that he is power of  for his wife.  Counselor advised him to bring copies of that paperwork to the hospital so that staff can scan it into the clinic record.        Past Psychiatric/Medication History:  1. Prior diagnoses: anxiety, depression  2. Past psychiatric inpatient: Denied any  3. Past outpatient history: Patient seen by psychiatrist.  4. Past suicide history: Denied any  5. Medication history: Abilify, bupropion, Lexapro, hydroxyzine.     Social History:   Patient lives with her  and adult son. She is a retired teacher.     Patient has been sober from alcohol for 9 years. Patient uses marijuana gummies at night.          Impression:      Catatonic excitable type.  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  Delirium due to another medical condition.           Patient is a 73 year old , , female with past medical history of anxiety, depression who was admitted to the hospital for Altered mental status, unspecified altered mental status type: The patient has been demonstrating confusion, restlessness, agitation.     The patient is restless, irritable, unable to communicate, keep moving back-and-forth and aimless movement with inability to express her emotion and not communicating verbally.  Patient has been taking multiple antidepressant medication with recent cough medicine with codeine indicate the high possibility for serotonin syndrome versus excited catatonia.     Discussed risk and benefit, acknowledging the current symptom and severity.  At this point, I would recommend the following approach:       Focus on safety  Focus on education and support.  Focus on insight orientation helping the patient understand diagnosis and treatment plan.  Utilize 2 mg of Haldol with 2 mg of Ativan IM.  Discontinue Wellbutrin and Prozac.  Continue Abilify 15 mg daily.  Continue Lexapro 10 mg nightly.  Restart the Klonopin 0.5 mg twice daily.  Utilize lorazepam 1 mg IV every 6 hours as needed for agitation and anxiety.  Processed with patient at length, the initiation of the above psychotropic medications I advised the patient of the risks, benefits, alternatives and potential side effects. The patient consents to administration of the medications and understands the right to refuse medications at any time. The patient verbalized understanding.   Coordinate plan with team     Orders This Visit:      Orders Placed This Encounter   Procedures    CBC With Differential With Platelet    Comp Metabolic Panel (14)    Urinalysis with Culture Reflex    Scan slide    Drug screen 7 w/out confirmation, urine    Ethyl Alcohol    Acetaminophen (Tylenol), S    Salicylate, Serum    CBC With Differential With Platelet    Comp Metabolic Panel (14)    Magnesium        Jason Mckenzie MD            MEDICATIONS ADMINISTERED IN LAST 1 DAY:  cetirizine (ZyrTEC) tab 5 mg       Date Action Dose Route User    4/12/2024 0818 Given 5 mg Oral Donna Swartz RN          clonazePAM (KlonoPIN) tab 0.5 mg       Date Action Dose Route User    4/12/2024 0818 Given 0.5 mg Oral Donna Swartz RN    4/11/2024 2152 Given 0.5 mg Oral Gretchen Lopez RN    4/11/2024 1156 Given 0.5 mg Oral Donna Swartz RN          enoxaparin (Lovenox) 30 MG/0.3ML SUBQ injection 30 mg       Date Action Dose Route User    4/12/2024 0818 Given 30 mg Subcutaneous (Left Lower Abdomen) Donna Swartz RN          escitalopram (Lexapro) tab 10 mg       Date Action Dose Route User    4/11/2024 2152 Given 10 mg Oral Gretchen Lopez RN          fluticasone propionate (Flonase) 50  MCG/ACT nasal suspension 1 spray       Date Action Dose Route User    4/12/2024 0827 Given 1 spray Each Nare Donna Swartz RN    4/11/2024 1047 Given 1 spray Each Nare Donna Swartz RN          haloperidol lactate (Haldol) 5 MG/ML injection 2 mg       Date Action Dose Route User    4/11/2024 1248 Given 2 mg Intramuscular (Right Deltoid) Donna Swartz RN          LORazepam (Ativan) 2 mg/mL injection 2 mg       Date Action Dose Route User    4/11/2024 1248 Given 2 mg Intramuscular (Right Deltoid) Donna Swartz RN          potassium chloride (K-Dur) tab 40 mEq       Date Action Dose Route User    4/12/2024 0818 Given 40 mEq Oral Donna Swartz RN          cholecalciferol (Vitamin D3) tab 1,000 Units       Date Action Dose Route User    4/12/2024 0818 Given 1,000 Units Oral Donna Swartz RN          ARIPiprazole (Abilify) tab 15 mg       Date Action Dose Route User    4/11/2024 1155 Given 15 mg Oral Donna Swartz RN          ARIPiprazole (Abilify) tab 15 mg       Date Action Dose Route User    4/12/2024 0818 Given 15 mg Oral Donna Swartz RN            Vitals (last day)       Date/Time Temp Pulse Resp BP SpO2 Weight O2 Device O2 Flow Rate (L/min) [facility]    04/12/24 0815 97.6 °F (36.4 °C) 100 18 125/86 93 % -- None (Room air) -- JN    04/11/24 2154 98.8 °F (37.1 °C) 98 20 114/84 96 % -- None (Room air) -- PK    04/11/24 1554 98.5 °F (36.9 °C) 96 18 126/84 92 % -- None (Room air) -- JN    04/11/24 0951 99.1 °F (37.3 °C) 81 20 125/100 93 % -- None (Room air) -- JN    04/11/24 0504 97.5 °F (36.4 °C) 109 20 124/104 94 % -- -- -- DJ    04/11/24 0110 -- -- -- -- -- 108 lb 3.2 oz -- -- PK          CIWA Scores (since admission)       None

## 2024-04-12 NOTE — PROGRESS NOTES
Patient is a 73 year old , , female with past medical history of anxiety, depression, GERD and back pain who was admitted to the hospital for altered mental status and confusion. The patient has been demonstrating confusion, restlessness, agitation. Patient indicated for psych consult for evaluation and advise.  Consult Duration   The patient seen for over 50-minute, follow-up evaluation, over 50% counseling and coordinating care addressing  confusion, restlessness, agitation.    Record reviewed, communication with attending, communication with RN and patient seen face to face evaluation.  History of Present Illness:     Communicating with the team, no issues reported overnight.      Labs and imaging reviewed: Glucose 150, potassium 3.4, ASt 35, ALT 53    The patient seen today laying in recliner and  at bedside.  Patient able to communicate and the patient seen individually.    The patient today continue demonstrating some restricted affect and questionable assertiveness otherwise reporting that she has been very depressed recently and feeling her medication her not been working.    The patient repeatedly denying any overdose or any suicidal ideation recently.  Patient reporting that she is taking cough medicine and she has been compliant with medication.    Patient reported that she is taking too many medication mentioning Lexapro and Prozac.    Patient presents somewhat restless and anxious but no tremors and able to communicate.  Patient reported that she slept fairly yesterday.    Patient admitted that she has been feeling hopeless and helpless recently with limited elaboration.  Patient was not able to elaborate on her major stresses in her life currently.     seen outside the room who was thankful for improving her condition.  Otherwise, he asking to change her psychiatrist?  Has been reporting that patient history of admission to [facility] for 2-week many years ago.  He also  affirms that he does not believe there is any suicidal or misuse of medication.      Collateral obtained from psychiatric liaison from patients .     reports that patient has had uncontrollable movements, is taking her clothes off, has been confused and does not know the names of anybody.     says that since Friday she has had these difficulties and it seemed to have gotten worse the last 2 days where now, in addition to those difficulties, she also cannot control her bowels.  She says she has been soiling her clothes and her bed.   said he wonders if it is could be attributed to the recent prescribing of meds with codeine that were meant to manage a cough that she had.   also reports that patient has \"been in bed over a year\".  He says that she does not say much and sleeps a lot.  He says she is grieving the loss of 2 dogs that  5 years ago as well as grieving the loss of their adopted son, who 7 yrs ago, at 31 y/o walked out of their lives.   He said that pt has friends and she does occasionally go to lunch with them and does attend AA meetings once a week.  He said that she will eat the food that he cooks for her.    states that he is power of  for his wife.  Counselor advised him to bring copies of that paperwork to the hospital so that staff can scan it into the clinic record.      Past Psychiatric/Medication History:  1. Prior diagnoses: anxiety, depression  2. Past psychiatric inpatient: 1 psychiatric admission for severe depression few years ago with [facility].  3. Past outpatient history: Patient seen by psychiatrist.  4. Past suicide history: Denied any  5. Medication history: Abilify, bupropion, Lexapro, hydroxyzine.    Social History:   Patient lives with her  and adult son. She is a retired teacher.    Patient has been sober from alcohol for 9 years. Patient uses marijuana gummies at night.     Family History:  Unknown family  history  Medical History:   Past Medical History  Past Medical History:    Anxiety state    Back problem    COMPRESSION FX    Cataract    Depression    Esophageal reflux    Hematoma and contusion of liver    STATES HAS BEEN THERE FOR MANY YEARS    History of fractured kneecap    RIGHT    IBS (irritable bowel syndrome)    Mitral prolapse    Osteoarthritis       Past Surgical History  Past Surgical History:   Procedure Laterality Date          Correct bunion,othr methods      Correct bunion,simple      BILAT.     Dilation/curettage,diagnostic      FOR \"MOLE PREGNANCY\"    Other Right     ORIF RIGHT ANKLE    Spine surgery procedure unlisted      cervical spine 2020    Total knee replacement         Family History  Family History   Problem Relation Age of Onset    Heart Disorder Father     Cancer Mother         ESOPHAGUS       Social History  Social History     Socioeconomic History    Marital status:    Tobacco Use    Smoking status: Former     Current packs/day: 2.00     Average packs/day: 2.0 packs/day for 15.0 years (30.0 ttl pk-yrs)     Types: Cigarettes    Smokeless tobacco: Never    Tobacco comments:     QUIT IN    Vaping Use    Vaping status: Never Used   Substance and Sexual Activity    Alcohol use: No    Drug use: No     Social Determinants of Health     Financial Resource Strain: Low Risk  (2022)    [facility]    Overall Financial Resource Strain (CARDIA)     Difficulty of Paying Living Expenses: Not hard at all   Food Insecurity: No Food Insecurity (2024)    Food Insecurity     Food Insecurity: Never true   Transportation Needs: No Transportation Needs (2024)    Transportation Needs     Lack of Transportation: No    Received from [facility]    Social Connections   Housing Stability: Low Risk  (2024)    Housing Stability     Housing Instability: No           Current  Medications:  Current Facility-Administered Medications   Medication Dose Route Frequency    enoxaparin (Lovenox) 30 MG/0.3ML SUBQ injection 30 mg  30 mg Subcutaneous Daily    acetaminophen (Tylenol Extra Strength) tab 500 mg  500 mg Oral Q4H PRN    melatonin tab 3 mg  3 mg Oral Nightly PRN    ondansetron (Zofran) 4 MG/2ML injection 4 mg  4 mg Intravenous Q6H PRN    metoclopramide (Reglan) 5 mg/mL injection 5 mg  5 mg Intravenous Q8H PRN    polyethylene glycol (PEG 3350) (Miralax) 17 g oral packet 17 g  17 g Oral Daily PRN    sennosides (Senokot) tab 17.2 mg  17.2 mg Oral Nightly PRN    bisacodyl (Dulcolax) 10 MG rectal suppository 10 mg  10 mg Rectal Daily PRN    fleet enema (Fleet) 7-19 GM/118ML rectal enema 133 mL  1 enema Rectal Once PRN    LORazepam (Ativan) tab 1 mg  1 mg Oral Q6H PRN    fluticasone propionate (Flonase) 50 MCG/ACT nasal suspension 1 spray  1 spray Each Nare Daily    cetirizine (ZyrTEC) tab 5 mg  5 mg Oral Daily    albuterol (Ventolin HFA) 108 (90 Base) MCG/ACT inhaler 2 puff  2 puff Inhalation Q4H PRN    cholecalciferol (Vitamin D3) tab 1,000 Units  1,000 Units Oral Daily    escitalopram (Lexapro) tab 10 mg  10 mg Oral Nightly    clonazePAM (KlonoPIN) tab 0.5 mg  0.5 mg Oral BID    LORazepam (Ativan) 2 mg/mL injection 2 mg  2 mg Intramuscular Q6H PRN    haloperidol lactate (Haldol) 5 MG/ML injection 2 mg  2 mg Intramuscular Q6H PRN    ARIPiprazole (Abilify) tab 15 mg  15 mg Oral Daily     Medications Prior to Admission   Medication Sig    cholecalciferol 1000 UNITS Oral Cap Take 1 capsule (1,000 Units total) by mouth daily.       Allergies  Allergies   Allergen Reactions    Radiology Contrast Iodinated Dyes HIVES     HIVES AND THROAT TIGHTENED WHILE HAVING AN MRI    Sulfa Antibiotics HIVES     \"got sicker\"    Seroquel [Quetiapine] UNKNOWN    Cinnamon RASH       Review of Systems:   As by Admitting/Attending    Results:   Laboratory Data:  Lab Results   Component Value Date    WBC 18.1 (H)  04/12/2024    HGB 13.0 04/12/2024    HCT 38.7 04/12/2024    .0 04/12/2024    CREATSERUM 0.86 04/12/2024    BUN 22 04/12/2024     04/12/2024    K 3.4 (L) 04/12/2024     04/12/2024    CO2 24.0 04/12/2024     (H) 04/12/2024    CA 10.0 04/12/2024    ALB 4.1 04/12/2024    ALKPHO 78 04/12/2024    TP 6.5 04/12/2024    AST 35 (H) 04/12/2024    ALT 53 (H) 04/12/2024    T4F 1.7 04/12/2024    TSH 0.528 (L) 04/12/2024     01/19/2022    DDIMER <0.27 01/12/2023    CRP 8.33 (H) 09/04/2020    MG 2.4 04/12/2024    TROP <0.045 08/26/2020    B12 484 04/12/2024    ETOH <3 04/10/2024         Imaging:  No results found.    Vital Signs:   Blood pressure 125/86, pulse 100, temperature 97.6 °F (36.4 °C), temperature source Oral, resp. rate 18, height 60\", weight 49.1 kg (108 lb 3.2 oz), SpO2 93%.    Mental Status Exam:   Appearance: Stated age female, in hospital gown, laying down in her recliner  Psychomotor: Patient has been demonstrating improvement in her restlessness and no agitation.  Orientation: Alert and oriented to self, date and location but not to exact condition.  Gait: Not evaluated.  Attitude/Coorperation: Patient made an effort to be cooperative with some restriction.  Behavior: Guarded behavior  Speech: Clear speech with no dysarthria.  Mood: Patient hesitantly admitting feeling severely depressed and hopeless  Affect: Dysphoric affect, restricted congruent with mood.  Thought process: Blocking thought process.  Thought content: Patient repeatedly denied any suicidal or homicidal ideation.  Perceptions: Patient denying any auditory visual hallucination  Concentration: Grossly distracted  Memory: Grossly limited  Intellect: Average.  Judgment and Insight: Questionable.     Impression:     Catatonic excitable type.  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  Delirium due to another medical condition.        Patient is a 73 year old ,  , female with past medical history of anxiety, depression who was admitted to the hospital for Altered mental status, unspecified altered mental status type: The patient has been demonstrating confusion, restlessness, agitation.    The patient is restless, irritable, unable to communicate, keep moving back-and-forth and aimless movement with inability to express her emotion and not communicating verbally.  Patient has been taking multiple antidepressant medication with recent cough medicine with codeine indicate the high possibility for serotonin syndrome versus excited catatonia.    4/12/2024: The patient who presented with catatonic anxiety type mixed with serotonin syndrome due to excessive antidepressant medication and mixed with codeine.  The patient has been demonstrating improvement in her cognition, ability to communicate verbally otherwise demonstrating severe depression with no safety concern and no agitation.      Discussed risk and benefit, acknowledging the current symptom and severity.  At this point, I would recommend the following approach:     Focus on safety.  Discontinue certificate, petition and sitter  Focus on education and support.  Focus on insight orientation helping the patient understand diagnosis and treatment plan.  Continue Abilify 15 mg daily.  Continue Lexapro 10 mg nightly.  Continue Klonopin 0.5 mg twice daily.  Utilize lorazepam 1 mg IV every 6 hours as needed for agitation and anxiety.  Coordinate plan with team    Orders This Visit:  Orders Placed This Encounter   Procedures    CBC With Differential With Platelet    Comp Metabolic Panel (14)    Urinalysis with Culture Reflex    Scan slide    Drug screen 7 w/out confirmation, urine    Ethyl Alcohol    Acetaminophen (Tylenol), S    Salicylate, Serum    CBC With Differential With Platelet    Comp Metabolic Panel (14)    Magnesium    Vitamin B12    TSH W Reflex To Free T4    T4, FREE (S)    Potassium    Free T3  (Triiodothryronine)       Meds This Visit:  Requested Prescriptions      No prescriptions requested or ordered in this encounter       Jason Mckenzie MD  4/12/2024    Note to Patient: The 21st Century Cures Act makes medical notes like these available to patients in the interest of transparency. However, be advised this is a medical document. It is intended as peer to peer communication. It is written in medical language and may contain abbreviations or verbiage that are unfamiliar. It may appear blunt or direct. Medical documents are intended to carry relevant information, facts as evident, and the clinical opinion of the practitioner. This note may have been transcribed using a voice dictation system. Voice recognition errors may occur. This should not be taken to alter the content or meaning of this note.

## 2024-04-13 LAB
ALBUMIN SERPL-MCNC: 4.1 G/DL (ref 3.2–4.8)
ALBUMIN/GLOB SERPL: 1.8 {RATIO} (ref 1–2)
ALP LIVER SERPL-CCNC: 78 U/L
ALT SERPL-CCNC: 45 U/L
ANION GAP SERPL CALC-SCNC: 6 MMOL/L (ref 0–18)
AST SERPL-CCNC: 22 U/L (ref ?–34)
BASOPHILS # BLD AUTO: 0.04 X10(3) UL (ref 0–0.2)
BASOPHILS NFR BLD AUTO: 0.3 %
BILIRUB SERPL-MCNC: 0.5 MG/DL (ref 0.2–1.1)
BUN BLD-MCNC: 18 MG/DL (ref 9–23)
BUN/CREAT SERPL: 22.8 (ref 10–20)
CALCIUM BLD-MCNC: 9.6 MG/DL (ref 8.7–10.4)
CHLORIDE SERPL-SCNC: 109 MMOL/L (ref 98–112)
CO2 SERPL-SCNC: 23 MMOL/L (ref 21–32)
CREAT BLD-MCNC: 0.79 MG/DL
DEPRECATED RDW RBC AUTO: 38.4 FL (ref 35.1–46.3)
EGFRCR SERPLBLD CKD-EPI 2021: 79 ML/MIN/1.73M2 (ref 60–?)
EOSINOPHIL # BLD AUTO: 0.11 X10(3) UL (ref 0–0.7)
EOSINOPHIL NFR BLD AUTO: 0.9 %
ERYTHROCYTE [DISTWIDTH] IN BLOOD BY AUTOMATED COUNT: 12.9 % (ref 11–15)
GLOBULIN PLAS-MCNC: 2.3 G/DL (ref 2.8–4.4)
GLUCOSE BLD-MCNC: 142 MG/DL (ref 70–99)
HCT VFR BLD AUTO: 40.6 %
HGB BLD-MCNC: 13.7 G/DL
IMM GRANULOCYTES # BLD AUTO: 0.1 X10(3) UL (ref 0–1)
IMM GRANULOCYTES NFR BLD: 0.8 %
LYMPHOCYTES # BLD AUTO: 4.05 X10(3) UL (ref 1–4)
LYMPHOCYTES NFR BLD AUTO: 33.6 %
MAGNESIUM SERPL-MCNC: 2.3 MG/DL (ref 1.6–2.6)
MCH RBC QN AUTO: 27.7 PG (ref 26–34)
MCHC RBC AUTO-ENTMCNC: 33.7 G/DL (ref 31–37)
MCV RBC AUTO: 82 FL
MONOCYTES # BLD AUTO: 1.27 X10(3) UL (ref 0.1–1)
MONOCYTES NFR BLD AUTO: 10.5 %
NEUTROPHILS # BLD AUTO: 6.49 X10 (3) UL (ref 1.5–7.7)
NEUTROPHILS # BLD AUTO: 6.49 X10(3) UL (ref 1.5–7.7)
NEUTROPHILS NFR BLD AUTO: 53.9 %
OSMOLALITY SERPL CALC.SUM OF ELEC: 290 MOSM/KG (ref 275–295)
PLATELET # BLD AUTO: 319 10(3)UL (ref 150–450)
POTASSIUM SERPL-SCNC: 3.3 MMOL/L (ref 3.5–5.1)
POTASSIUM SERPL-SCNC: 3.3 MMOL/L (ref 3.5–5.1)
PROT SERPL-MCNC: 6.4 G/DL (ref 5.7–8.2)
RBC # BLD AUTO: 4.95 X10(6)UL
SODIUM SERPL-SCNC: 138 MMOL/L (ref 136–145)
WBC # BLD AUTO: 12.1 X10(3) UL (ref 4–11)

## 2024-04-13 PROCEDURE — 99232 SBSQ HOSP IP/OBS MODERATE 35: CPT | Performed by: NURSE PRACTITIONER

## 2024-04-13 RX ORDER — POTASSIUM CHLORIDE 1.5 G/1.58G
40 POWDER, FOR SOLUTION ORAL ONCE
Status: COMPLETED | OUTPATIENT
Start: 2024-04-13 | End: 2024-04-13

## 2024-04-13 RX ORDER — ACETAMINOPHEN 500 MG
500 TABLET ORAL EVERY 4 HOURS PRN
Status: DISCONTINUED | OUTPATIENT
Start: 2024-04-13 | End: 2024-04-18

## 2024-04-13 NOTE — PLAN OF CARE
Pt confused, restless and anxious, tachypnic and crying in the evening, wants to leave.  a&o x1. pt pulled off her depends while in bed and defecated on the side of the bed. Pt repeatedly  asks to go home, pt reoriented.  PRN ativan and haldol given for agitation and anxiety.    Problem: Patient Centered Care  Goal: Patient preferences are identified and integrated in the patient's plan of care  Description: Interventions:  - What would you like us to know as we care for you? From home with spouse    - Provide timely, complete, and accurate information to patient/family  - Incorporate patient and family knowledge, values, beliefs, and cultural backgrounds into the planning and delivery of care  - Encourage patient/family to participate in care and decision-making at the level they choose  - Honor patient and family perspectives and choices  Outcome: Progressing     Problem: Patient/Family Goals  Goal: Patient/Family Long Term Goal  Description: Patient's Long Term Goal:     Interventions:  -   - See additional Care Plan goals for specific interventions  Outcome: Progressing  Goal: Patient/Family Short Term Goal  Description: Patient's Short Term Goal:     Interventions:   -   - See additional Care Plan goals for specific interventions  Outcome: Progressing     Problem: SAFETY ADULT - FALL  Goal: Free from fall injury  Description: INTERVENTIONS:  - Assess pt frequently for physical needs  - Identify cognitive and physical deficits and behaviors that affect risk of falls.  - [place] fall precautions as indicated by assessment.  - Educate pt/family on patient safety including physical limitations  - Instruct pt to call for assistance with activity based on assessment  - Modify environment to reduce risk of injury  - Provide assistive devices as appropriate  - Consider OT/PT consult to assist with strengthening/mobility  - Encourage toileting schedule  Outcome: Progressing     Problem: RESPIRATORY - ADULT  Goal:  Achieves optimal ventilation and oxygenation  Description: INTERVENTIONS:  - Assess for changes in respiratory status  - Assess for changes in mentation and behavior  - Position to facilitate oxygenation and minimize respiratory effort  - Oxygen supplementation based on oxygen saturation or ABGs  - Provide Smoking Cessation handout, if applicable  - Encourage broncho-pulmonary hygiene including cough, deep breathe, Incentive Spirometry  - Assess the need for suctioning and perform as needed  - Assess and instruct to report SOB or any respiratory difficulty  - Respiratory Therapy support as indicated  - Manage/alleviate anxiety  - Monitor for signs/symptoms of CO2 retention  Outcome: Progressing     Problem: NEUROLOGICAL - ADULT  Goal: Achieves stable or improved neurological status  Description: INTERVENTIONS  - Assess for and report changes in neurological status  - Initiate measures to prevent increased intracranial pressure  - Maintain blood pressure and fluid volume within ordered parameters to optimize cerebral perfusion and minimize risk of hemorrhage  - Monitor temperature, glucose, and sodium. Initiate appropriate interventions as ordered  Outcome: Not Progressing     Problem: Impaired Cognition  Goal: Patient will exhibit improved attention, thought processing and/or memory  Description: Interventions:    Outcome: Not Progressing

## 2024-04-13 NOTE — PLAN OF CARE
Problem: Patient Centered Care  Goal: Patient preferences are identified and integrated in the patient's plan of care  Description: Interventions:  - What would you like us to know as we care for you? From home with spouse    - Provide timely, complete, and accurate information to patient/family  - Incorporate patient and family knowledge, values, beliefs, and cultural backgrounds into the planning and delivery of care  - Encourage patient/family to participate in care and decision-making at the level they choose  - Honor patient and family perspectives and choices  Outcome: Progressing       Problem: SAFETY ADULT - FALL  Goal: Free from fall injury  Description: INTERVENTIONS:  - Assess pt frequently for physical needs  - Identify cognitive and physical deficits and behaviors that affect risk of falls.  - [place] fall precautions as indicated by assessment.  - Educate pt/family on patient safety including physical limitations  - Instruct pt to call for assistance with activity based on assessment  - Modify environment to reduce risk of injury  - Provide assistive devices as appropriate  - Consider OT/PT consult to assist with strengthening/mobility  - Encourage toileting schedule  Outcome: Progressing     Problem: RESPIRATORY - ADULT  Goal: Achieves optimal ventilation and oxygenation  Description: INTERVENTIONS:  - Assess for changes in respiratory status  - Assess for changes in mentation and behavior  - Position to facilitate oxygenation and minimize respiratory effort  - Oxygen supplementation based on oxygen saturation or ABGs  - Provide Smoking Cessation handout, if applicable  - Encourage broncho-pulmonary hygiene including cough, deep breathe, Incentive Spirometry  - Assess the need for suctioning and perform as needed  - Assess and instruct to report SOB or any respiratory difficulty  - Respiratory Therapy support as indicated  - Manage/alleviate anxiety  - Monitor for signs/symptoms of CO2  retention  Outcome: Progressing     Problem: NEUROLOGICAL - ADULT  Goal: Achieves stable or improved neurological status  Description: INTERVENTIONS  - Assess for and report changes in neurological status  - Initiate measures to prevent increased intracranial pressure  - Maintain blood pressure and fluid volume within ordered parameters to optimize cerebral perfusion and minimize risk of hemorrhage  - Monitor temperature, glucose, and sodium. Initiate appropriate interventions as ordered  Outcome: Progressing     Problem: Impaired Cognition  Goal: Patient will exhibit improved attention, thought processing and/or memory  Description: Interventions:  - Minimize distractions in the room when full attention is required  Outcome: Progressing       Patient follows commands.  visited, updated on plan. Safety precautions in place, calls appropriately.

## 2024-04-13 NOTE — PROGRESS NOTES
[facility] Hospitalist Progress Note     CC: Hospital Follow up    PCP: SIRI TABOR       Assessment/Plan:     Principal Problem:    Altered mental status, unspecified altered mental status type  Active Problems:    Altered mental status    Depression, unspecified depression type    Catatonic excited type    Serotonin syndrome    Severe episode of recurrent major depressive disorder, without psychotic features (HCC)    Patient is a 73 year old female with PMH sig for MVP, EDEL/MDD, GERD, IBS, who presents with AMS.     AMS  EEDL/MDD  Behavioral changes with nervous breakdown   - afebrile and normotensive  - Leukocytosis with bicarb 19, elevated AST ALT  - Urine positive for cannabis and ecstasy  - b12 normal, TSH borderline low   - Imaging was done 2 days ago for ER visit due to cough  - CT head with no acute process, chest x-ray with no acute process  - Recent steroids use for URI symptoms   - will check MRI brain   - Psych consulted   - polypharmacy  - will try to reset medication   - Certificate, petition, sitter dc'd      FN:  - IVF: none  - Diet: general      DVT Prophy: SCDs lovenox   Atrophy: Ambulate   Lines: Piv     Dispo: pending clinical course     Outpatient records or previous hospital records reviewed.      Further recommendations pending patient's clinical course.  Critical access hospital hospitalist to continue to follow patient while in house     Patient and/or patient's family given opportunity to ask questions and note understanding and agreeing with therapeutic plan as outlined     Thank You,  Antonino Jung MD     [facility] Hospitalist  Answering Service number: [phone]     Subjective:   Awake pleasant and calm   Answers questions with on complaints.     OBJECTIVE:    Blood pressure (!) 113/100, pulse 106, temperature 97.5 °F (36.4 °C), temperature source Axillary, resp. rate 24, height 5' (1.524 m), weight 108 lb 3.2 oz (49.1 kg), SpO2 95%.    Temp:  [97.5 °F (36.4 °C)-98.4 °F (36.9 °C)] 97.5 °F  (36.4 °C)  Pulse:  [106-108] 106  Resp:  [16-24] 24  BP: (113-132)/() 113/100  SpO2:  [92 %-96 %] 95 %      Intake/Output:    Intake/Output Summary (Last 24 hours) at 4/13/2024 1154  Last data filed at 4/12/2024 1435  Gross per 24 hour   Intake 120 ml   Output --   Net 120 ml       Last 3 Weights   04/11/24 0110 108 lb 3.2 oz (49.1 kg)   04/08/24 1116 122 lb (55.3 kg)   01/12/23 1210 115 lb (52.2 kg)       Exam   General: Alert, no acute distress, restless   HEENT: oral mucosa normal   Neck: non tender, no adenopathy   Lungs: clear to ausculation bilaterally  Heart: Regular rate and rhythm  Abdomen: soft, non tender, non distended   Extremities: No edema  Skin: no new rash, normal color  Neuro: 5/5 strength in bilateral extremities, normal sensations  Psych: appropriate affect     Data Review:       Labs:     Recent Labs   Lab 04/10/24  1726 04/12/24  0621 04/13/24  0604   RBC 5.08 4.73 4.95   HGB 13.8 13.0 13.7   HCT 42.1 38.7 40.6   MCV 82.9 81.8 82.0   MCH 27.2 27.5 27.7   MCHC 32.8 33.6 33.7   RDW 13.2 12.8 12.9   NEPRELIM 9.79* 11.95* 6.49   WBC 16.4* 18.1* 12.1*   .0 275.0 319.0         Recent Labs   Lab 04/10/24  1726 04/12/24  0621 04/13/24  0604   * 150* 142*   BUN 33* 22 18   CREATSERUM 1.03* 0.86 0.79   EGFRCR 57* 71 79   CA 10.2 10.0 9.6    138 138   K 3.9 3.4* 3.3*  3.3*    108 109   CO2 19.0* 24.0 23.0       Recent Labs   Lab 04/10/24  1726 04/12/24  0621 04/13/24  0604   ALT 68* 53* 45   AST 93* 35* 22   ALB 4.8 4.1 4.1         Imaging:  No results found.      Meds:      enoxaparin  30 mg Subcutaneous Daily    fluticasone propionate  1 spray Each Nare Daily    cetirizine  5 mg Oral Daily    cholecalciferol  1,000 Units Oral Daily    escitalopram  10 mg Oral Nightly    clonazePAM  0.5 mg Oral BID    ARIPiprazole  15 mg Oral Daily         acetaminophen    melatonin    ondansetron    metoclopramide    polyethylene glycol (PEG 3350)    sennosides    bisacodyl    fleet  enema    LORazepam    albuterol    LORazepam    haloperidol lactate

## 2024-04-13 NOTE — PROGRESS NOTES
Patient is a 73 year old , , female with past medical history of anxiety, depression, GERD and back pain who was admitted to the hospital for altered mental status and confusion. The patient has been demonstrating confusion, restlessness, agitation. Patient indicated for psych consult for evaluation and advise.  Consult Duration   The patient seen for over 50-minute, follow-up evaluation, over 50% counseling and coordinating care addressing  confusion, restlessness, agitation.    Record reviewed, communication with attending, communication with RN and patient seen face to face evaluation.  History of Present Illness:   Communicating with the team, patient continues to demonstrate alternation in mood and cognition. Patient confused and restless overnight.   Labs and imaging reviewed    Patient receiving Abilify 15 mg, klonopin 0.5 mg, lexapro 10 mg, patient received PRN ativan and haldol last night.     The patient seen today laying in hospital bed. The patient is seen in the presence of her .    The patient presents drowsy and somewhat lethargic this morning. Patient opens her eyes to the sound of her name otherwise communication and interaction is limited.    The patient shaking her head to some \"yes\" and \"no\" questions.      notes that patient has been answering minimal questions this morning.     The patient otherwise denies being in pain. She denies feeling sad. She reports she slept well last night.     She denies suicidal ideations    Patient continues to demonstrate alternation in her mood and cognition with episodes of increased anxiety and restlessness.    Collateral obtained from psychiatric liaison from patients .     reports that patient has had uncontrollable movements, is taking her clothes off, has been confused and does not know the names of anybody.     says that since Friday she has had these difficulties and it seemed to have gotten worse the last  2 days where now, in addition to those difficulties, she also cannot control her bowels.  She says she has been soiling her clothes and her bed.   said he wonders if it is could be attributed to the recent prescribing of meds with codeine that were meant to manage a cough that she had.   also reports that patient has \"been in bed over a year\".  He says that she does not say much and sleeps a lot.  He says she is grieving the loss of 2 dogs that  5 years ago as well as grieving the loss of their adopted son, who 7 yrs ago, at 33 y/o walked out of their lives.   He said that pt has friends and she does occasionally go to lunch with them and does attend AA meetings once a week.  He said that she will eat the food that he cooks for her.    states that he is power of  for his wife.  Counselor advised him to bring copies of that paperwork to the hospital so that staff can scan it into the clinic record.      Past Psychiatric/Medication History:  1. Prior diagnoses: anxiety, depression  2. Past psychiatric inpatient: 1 psychiatric admission for severe depression few years ago with [facility].  3. Past outpatient history: Patient seen by psychiatrist.  4. Past suicide history: Denied any  5. Medication history: Abilify, bupropion, Lexapro, hydroxyzine.    Social History:   Patient lives with her  and adult son. She is a retired teacher.    Patient has been sober from alcohol for 9 years. Patient uses marijuana gummies at night.     Family History:  Unknown family history  Medical History:   Past Medical History  Past Medical History:    Anxiety state    Back problem    COMPRESSION FX    Cataract    Depression    Esophageal reflux    Hematoma and contusion of liver    STATES HAS BEEN THERE FOR MANY YEARS    History of fractured kneecap    RIGHT    IBS (irritable bowel syndrome)    Mitral prolapse    Osteoarthritis       Past Surgical History  Past Surgical History:   Procedure  Laterality Date          Correct bunion,othr methods      Correct bunion,simple      BILAT.     Dilation/curettage,diagnostic      FOR \"MOLE PREGNANCY\"    Other Right     ORIF RIGHT ANKLE    Spine surgery procedure unlisted      cervical spine 2020    Total knee replacement         Family History  Family History   Problem Relation Age of Onset    Heart Disorder Father     Cancer Mother         ESOPHAGUS       Social History  Social History     Socioeconomic History    Marital status:    Tobacco Use    Smoking status: Former     Current packs/day: 2.00     Average packs/day: 2.0 packs/day for 15.0 years (30.0 ttl pk-yrs)     Types: Cigarettes    Smokeless tobacco: Never    Tobacco comments:     QUIT IN    Vaping Use    Vaping status: Never Used   Substance and Sexual Activity    Alcohol use: No    Drug use: No     Social Determinants of Health     Financial Resource Strain: Low Risk  (2022)    [facility]    Overall Financial Resource Strain (CARDIA)     Difficulty of Paying Living Expenses: Not hard at all   Food Insecurity: No Food Insecurity (2024)    Food Insecurity     Food Insecurity: Never true   Transportation Needs: No Transportation Needs (2024)    Transportation Needs     Lack of Transportation: No    [facility] from [facility]    Social Connections   Housing Stability: Low Risk  (2024)    Housing Stability     Housing Instability: No           Current Medications:  Current Facility-Administered Medications   Medication Dose Route Frequency    enoxaparin (Lovenox) 30 MG/0.3ML SUBQ injection 30 mg  30 mg Subcutaneous Daily    acetaminophen (Tylenol Extra Strength) tab 500 mg  500 mg Oral Q4H PRN    melatonin tab 3 mg  3 mg Oral Nightly PRN    ondansetron (Zofran) 4 MG/2ML injection 4 mg  4 mg Intravenous Q6H PRN    metoclopramide (Reglan) 5 mg/mL injection 5 mg  5 mg  Intravenous Q8H PRN    polyethylene glycol (PEG 3350) (Miralax) 17 g oral packet 17 g  17 g Oral Daily PRN    sennosides (Senokot) tab 17.2 mg  17.2 mg Oral Nightly PRN    bisacodyl (Dulcolax) 10 MG rectal suppository 10 mg  10 mg Rectal Daily PRN    fleet enema (Fleet) 7-19 GM/118ML rectal enema 133 mL  1 enema Rectal Once PRN    LORazepam (Ativan) tab 1 mg  1 mg Oral Q6H PRN    fluticasone propionate (Flonase) 50 MCG/ACT nasal suspension 1 spray  1 spray Each Nare Daily    cetirizine (ZyrTEC) tab 5 mg  5 mg Oral Daily    albuterol (Ventolin HFA) 108 (90 Base) MCG/ACT inhaler 2 puff  2 puff Inhalation Q4H PRN    cholecalciferol (Vitamin D3) tab 1,000 Units  1,000 Units Oral Daily    escitalopram (Lexapro) tab 10 mg  10 mg Oral Nightly    clonazePAM (KlonoPIN) tab 0.5 mg  0.5 mg Oral BID    LORazepam (Ativan) 2 mg/mL injection 2 mg  2 mg Intramuscular Q6H PRN    haloperidol lactate (Haldol) 5 MG/ML injection 2 mg  2 mg Intramuscular Q6H PRN    ARIPiprazole (Abilify) tab 15 mg  15 mg Oral Daily     Medications Prior to Admission   Medication Sig    cholecalciferol 1000 UNITS Oral Cap Take 1 capsule (1,000 Units total) by mouth daily.       Allergies  Allergies   Allergen Reactions    Radiology Contrast Iodinated Dyes HIVES     HIVES AND THROAT TIGHTENED WHILE HAVING AN MRI    Sulfa Antibiotics HIVES     \"got sicker\"    Seroquel [Quetiapine] UNKNOWN    Cinnamon RASH       Review of Systems:   As by Admitting/Attending    Results:   Laboratory Data:  Lab Results   Component Value Date    WBC 12.1 (H) 04/13/2024    HGB 13.7 04/13/2024    HCT 40.6 04/13/2024    .0 04/13/2024    CREATSERUM 0.79 04/13/2024    BUN 18 04/13/2024     04/13/2024    K 3.3 (L) 04/13/2024    K 3.3 (L) 04/13/2024     04/13/2024    CO2 23.0 04/13/2024     (H) 04/13/2024    CA 9.6 04/13/2024    ALB 4.1 04/13/2024    ALKPHO 78 04/13/2024    TP 6.4 04/13/2024    AST 22 04/13/2024    ALT 45 04/13/2024    T4F 1.7 04/12/2024     TSH 0.528 (L) 04/12/2024     01/19/2022    DDIMER <0.27 01/12/2023    CRP 8.33 (H) 09/04/2020    MG 2.3 04/13/2024    TROP <0.045 08/26/2020    B12 484 04/12/2024    ETOH <3 04/10/2024         Imaging:  No results found.    Vital Signs:   Blood pressure (!) 113/100, pulse 106, temperature 97.5 °F (36.4 °C), temperature source Axillary, resp. rate 24, height 5' (1.524 m), weight 49.1 kg (108 lb 3.2 oz), SpO2 95%.    Mental Status Exam:   Appearance: Stated age female, in hospital gown, laying down in her recliner  Psychomotor: Patient has been demonstrating improvement in her restlessness and no agitation.  Orientation: Alert and oriented to self, date and location but not to exact condition.  Gait: Not evaluated.  Attitude/Coorperation: Patient made an effort to be cooperative with some restriction.  Behavior: Guarded behavior  Speech: Clear speech with no dysarthria.  Mood: Patient hesitantly admitting feeling severely depressed and hopeless  Affect: Dysphoric affect, restricted congruent with mood.  Thought process: Blocking thought process.  Thought content: Patient repeatedly denied any suicidal or homicidal ideation.  Perceptions: Patient denying any auditory visual hallucination  Concentration: Grossly distracted  Memory: Grossly limited  Intellect: Average.  Judgment and Insight: Questionable.     Impression:     Catatonic excitable type.  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  Delirium due to another medical condition.        Patient is a 73 year old , , female with past medical history of anxiety, depression who was admitted to the hospital for Altered mental status, unspecified altered mental status type: The patient has been demonstrating confusion, restlessness, agitation.    The patient is restless, irritable, unable to communicate, keep moving back-and-forth and aimless movement with inability to express her emotion and not  communicating verbally.  Patient has been taking multiple antidepressant medication with recent cough medicine with codeine indicate the high possibility for serotonin syndrome versus excited catatonia.    4/12/2024: The patient who presented with catatonic anxiety type mixed with serotonin syndrome due to excessive antidepressant medication and mixed with codeine.  The patient has been demonstrating improvement in her cognition, ability to communicate verbally otherwise demonstrating severe depression with no safety concern and no agitation.    4/12/2024: Patient continues to demonstrate alternation in her mood and cognition with episodes of increased anxiety and restlessness.    Discussed risk and benefit, acknowledging the current symptom and severity.  At this point, I would recommend the following approach:     Focus on safety.  Discontinue certificate, petition and sitter  Focus on education and support.  Focus on insight orientation helping the patient understand diagnosis and treatment plan.  Continue Abilify 15 mg daily.  Continue Lexapro 10 mg nightly.  Continue Klonopin 0.5 mg twice daily.  Utilize lorazepam 1 mg IV every 6 hours as needed for agitation and anxiety.  Coordinate plan with team    Orders This Visit:  Orders Placed This Encounter   Procedures    CBC With Differential With Platelet    Comp Metabolic Panel (14)    Urinalysis with Culture Reflex    Scan slide    Drug screen 7 w/out confirmation, urine    Ethyl Alcohol    Acetaminophen (Tylenol), S    Salicylate, Serum    Magnesium    Vitamin B12    TSH W Reflex To Free T4    T4, FREE (S)    Potassium    Free T3 (Triiodothryronine)    Potassium    Basic Metabolic Panel (8)       Meds This Visit:  Requested Prescriptions      No prescriptions requested or ordered in this encounter       ROXIE Gray  4/13/2024    Note to Patient: The 21st Century Cures Act makes medical notes like these available to patients in the interest of transparency.  However, be advised this is a medical document. It is intended as peer to peer communication. It is written in medical language and may contain abbreviations or verbiage that are unfamiliar. It may appear blunt or direct. Medical documents are intended to carry relevant information, facts as evident, and the clinical opinion of the practitioner. This note may have been transcribed using a voice dictation system. Voice recognition errors may occur. This should not be taken to alter the content or meaning of this note.

## 2024-04-14 ENCOUNTER — APPOINTMENT (OUTPATIENT)
Dept: MRI IMAGING | Facility: HOSPITAL | Age: 74
End: 2024-04-14
Attending: Other
Payer: MEDICARE

## 2024-04-14 LAB
ANION GAP SERPL CALC-SCNC: 9 MMOL/L (ref 0–18)
BUN BLD-MCNC: 16 MG/DL (ref 9–23)
BUN/CREAT SERPL: 21.3 (ref 10–20)
CALCIUM BLD-MCNC: 9.4 MG/DL (ref 8.7–10.4)
CHLORIDE SERPL-SCNC: 107 MMOL/L (ref 98–112)
CO2 SERPL-SCNC: 22 MMOL/L (ref 21–32)
CREAT BLD-MCNC: 0.75 MG/DL
EGFRCR SERPLBLD CKD-EPI 2021: 84 ML/MIN/1.73M2 (ref 60–?)
GLUCOSE BLD-MCNC: 141 MG/DL (ref 70–99)
MAGNESIUM SERPL-MCNC: 2.2 MG/DL (ref 1.6–2.6)
OSMOLALITY SERPL CALC.SUM OF ELEC: 290 MOSM/KG (ref 275–295)
POTASSIUM SERPL-SCNC: 3.8 MMOL/L (ref 3.5–5.1)
POTASSIUM SERPL-SCNC: 3.8 MMOL/L (ref 3.5–5.1)
SODIUM SERPL-SCNC: 138 MMOL/L (ref 136–145)

## 2024-04-14 RX ORDER — CLONAZEPAM 0.5 MG/1
0.5 TABLET ORAL 3 TIMES DAILY
Status: DISCONTINUED | OUTPATIENT
Start: 2024-04-14 | End: 2024-04-16

## 2024-04-14 RX ORDER — ARIPIPRAZOLE 10 MG/1
10 TABLET ORAL DAILY
Status: DISCONTINUED | OUTPATIENT
Start: 2024-04-15 | End: 2024-04-16

## 2024-04-14 NOTE — PLAN OF CARE
Agitated and anxious overnight. PO/IM meds given.Pt  Wants to leave and go home, AMS, A&Ox 0-1. When reorienting pt, she get frustrated and more anxious, continues to defecate on the floor overnight. Safety precautionsin place  Problem: Patient Centered Care  Goal: Patient preferences are identified and integrated in the patient's plan of care  Description: Interventions:  - What would you like us to know as we care for you? From home with spouse    - Provide timely, complete, and accurate information to patient/family  - Incorporate patient and family knowledge, values, beliefs, and cultural backgrounds into the planning and delivery of care  - Encourage patient/family to participate in care and decision-making at the level they choose  - Honor patient and family perspectives and choices  Outcome: Progressing     Problem: Patient/Family Goals  Goal: Patient/Family Long Term Goal  Description: Patient's Long Term Goal:     Interventions:  -   - See additional Care Plan goals for specific interventions  Outcome: Progressing  Goal: Patient/Family Short Term Goal  Description: Patient's Short Term Goal:     Interventions:   -   - See additional Care Plan goals for specific interventions  Outcome: Progressing     Problem: SAFETY ADULT - FALL  Goal: Free from fall injury  Description: INTERVENTIONS:  - Assess pt frequently for physical needs  - Identify cognitive and physical deficits and behaviors that affect risk of falls.  - [place] fall precautions as indicated by assessment.  - Educate pt/family on patient safety including physical limitations  - Instruct pt to call for assistance with activity based on assessment  - Modify environment to reduce risk of injury  - Provide assistive devices as appropriate  - Consider OT/PT consult to assist with strengthening/mobility  - Encourage toileting schedule  Outcome: Progressing     Problem: RESPIRATORY - ADULT  Goal: Achieves optimal ventilation and  oxygenation  Description: INTERVENTIONS:  - Assess for changes in respiratory status  - Assess for changes in mentation and behavior  - Position to facilitate oxygenation and minimize respiratory effort  - Oxygen supplementation based on oxygen saturation or ABGs  - Provide Smoking Cessation handout, if applicable  - Encourage broncho-pulmonary hygiene including cough, deep breathe, Incentive Spirometry  - Assess the need for suctioning and perform as needed  - Assess and instruct to report SOB or any respiratory difficulty  - Respiratory Therapy support as indicated  - Manage/alleviate anxiety  - Monitor for signs/symptoms of CO2 retention  Outcome: Progressing     Problem: NEUROLOGICAL - ADULT  Goal: Achieves stable or improved neurological status  Description: INTERVENTIONS  - Assess for and report changes in neurological status  - Initiate measures to prevent increased intracranial pressure  - Maintain blood pressure and fluid volume within ordered parameters to optimize cerebral perfusion and minimize risk of hemorrhage  - Monitor temperature, glucose, and sodium. Initiate appropriate interventions as ordered  Outcome: Progressing     Problem: Impaired Cognition  Goal: Patient will exhibit improved attention, thought processing and/or memory  Description: Interventions:    Outcome: Progressing

## 2024-04-14 NOTE — PROGRESS NOTES
[facility] Hospitalist Progress Note     CC: Hospital Follow up    PCP: SIRI TABOR       Assessment/Plan:     Principal Problem:    Altered mental status, unspecified altered mental status type  Active Problems:    Altered mental status    Depression, unspecified depression type    Catatonic excited type    Serotonin syndrome    Severe episode of recurrent major depressive disorder, without psychotic features (HCC)    Patient is a 73 year old female with PMH sig for MVP, EDEL/MDD, GERD, IBS, who presents with AMS.     AMS  EDEL/MDD  Behavioral changes with nervous breakdown   - afebrile and normotensive  - Leukocytosis with bicarb 19, elevated AST ALT  - Urine positive for cannabis and ecstasy  - b12 normal, TSH borderline low   - Imaging was done 2 days ago for ER visit due to cough  - CT head with no acute process, chest x-ray with no acute process  - Recent steroids use for URI symptoms   - Psych consulted   - polypharmacy  - will try to reset medication   - Certificate, petition, sitter dc'd   - failed MRI brain attempt on 4/14/24, may attempt again      FN:  - IVF: none  - Diet: general      DVT Prophy: SCDs lovenox   Atrophy: Ambulate   Lines: Piv     Dispo: pending clinical course     Outpatient records or previous hospital records reviewed.      Further recommendations pending patient's clinical course.  [facility] hospitalist to continue to follow patient while in house     Patient and/or patient's family given opportunity to ask questions and note understanding and agreeing with therapeutic plan as outlined     Thank You,  Antonino Jung MD     [facility] Hospitalist  Answering Service number: [phone]     Subjective:   Awake pleasant and calm.   Answers questions with on complaints.   Has not eaten her meal.   in the [place].     OBJECTIVE:    Blood pressure 119/81, pulse 106, temperature 98.4 °F (36.9 °C), temperature source Oral, resp. rate 20, height 5' (1.524 m), weight 108 lb 3.2 oz (49.1  kg), SpO2 94%.    Temp:  [98.4 °F (36.9 °C)-98.5 °F (36.9 °C)] 98.4 °F (36.9 °C)  Pulse:  [100-106] 106  Resp:  [20-24] 20  BP: (119-131)/(72-85) 119/81  SpO2:  [94 %-98 %] 94 %      Intake/Output:    Intake/Output Summary (Last 24 hours) at 4/14/2024 1150  Last data filed at 4/13/2024 1500  Gross per 24 hour   Intake 120 ml   Output --   Net 120 ml       Last 3 Weights   04/11/24 0110 108 lb 3.2 oz (49.1 kg)   04/08/24 1116 122 lb (55.3 kg)   01/12/23 1210 115 lb (52.2 kg)       Exam   General: Alert, no acute distress, restless   HEENT: oral mucosa normal   Neck: non tender, no adenopathy   Lungs: clear to ausculation bilaterally  Heart: Regular rate and rhythm  Abdomen: soft, non tender, non distended   Extremities: No edema  Skin: no new rash, normal color  Neuro: 5/5 strength in bilateral extremities, normal sensations  Psych: appropriate affect     Data Review:       Labs:     Recent Labs   Lab 04/10/24  1726 04/12/24  0621 04/13/24  0604   RBC 5.08 4.73 4.95   HGB 13.8 13.0 13.7   HCT 42.1 38.7 40.6   MCV 82.9 81.8 82.0   MCH 27.2 27.5 27.7   MCHC 32.8 33.6 33.7   RDW 13.2 12.8 12.9   NEPRELIM 9.79* 11.95* 6.49   WBC 16.4* 18.1* 12.1*   .0 275.0 319.0         Recent Labs   Lab 04/12/24  0621 04/13/24  0604 04/14/24  0710   * 142* 141*   BUN 22 18 16   CREATSERUM 0.86 0.79 0.75   EGFRCR 71 79 84   CA 10.0 9.6 9.4    138 138   K 3.4* 3.3*  3.3* 3.8  3.8    109 107   CO2 24.0 23.0 22.0       Recent Labs   Lab 04/10/24  1726 04/12/24  0621 04/13/24  0604   ALT 68* 53* 45   AST 93* 35* 22   ALB 4.8 4.1 4.1         Imaging:  No results found.      Meds:      enoxaparin  30 mg Subcutaneous Daily    fluticasone propionate  1 spray Each Nare Daily    cetirizine  5 mg Oral Daily    cholecalciferol  1,000 Units Oral Daily    escitalopram  10 mg Oral Nightly    clonazePAM  0.5 mg Oral BID    ARIPiprazole  15 mg Oral Daily         acetaminophen    melatonin    ondansetron    metoclopramide     polyethylene glycol (PEG 3350)    sennosides    bisacodyl    fleet enema    LORazepam    albuterol    LORazepam    haloperidol lactate

## 2024-04-15 LAB
ANION GAP SERPL CALC-SCNC: 6 MMOL/L (ref 0–18)
BUN BLD-MCNC: 15 MG/DL (ref 9–23)
BUN/CREAT SERPL: 18.3 (ref 10–20)
CALCIUM BLD-MCNC: 9.7 MG/DL (ref 8.7–10.4)
CHLORIDE SERPL-SCNC: 109 MMOL/L (ref 98–112)
CO2 SERPL-SCNC: 24 MMOL/L (ref 21–32)
CREAT BLD-MCNC: 0.82 MG/DL
EGFRCR SERPLBLD CKD-EPI 2021: 75 ML/MIN/1.73M2 (ref 60–?)
GLUCOSE BLD-MCNC: 148 MG/DL (ref 70–99)
MAGNESIUM SERPL-MCNC: 2.2 MG/DL (ref 1.6–2.6)
OSMOLALITY SERPL CALC.SUM OF ELEC: 292 MOSM/KG (ref 275–295)
POTASSIUM SERPL-SCNC: 3.6 MMOL/L (ref 3.5–5.1)
SODIUM SERPL-SCNC: 139 MMOL/L (ref 136–145)

## 2024-04-15 PROCEDURE — 99233 SBSQ HOSP IP/OBS HIGH 50: CPT | Performed by: OTHER

## 2024-04-15 RX ORDER — CETIRIZINE HYDROCHLORIDE 5 MG/1
5 TABLET ORAL NIGHTLY
Status: DISCONTINUED | OUTPATIENT
Start: 2024-04-15 | End: 2024-04-18

## 2024-04-15 RX ORDER — ASCORBIC ACID 500 MG
500 TABLET ORAL DAILY
Status: DISCONTINUED | OUTPATIENT
Start: 2024-04-15 | End: 2024-04-18

## 2024-04-15 RX ORDER — ENOXAPARIN SODIUM 100 MG/ML
40 INJECTION SUBCUTANEOUS DAILY
Status: DISCONTINUED | OUTPATIENT
Start: 2024-04-16 | End: 2024-04-18

## 2024-04-15 NOTE — PLAN OF CARE
Problem: Patient Centered Care  Goal: Patient preferences are identified and integrated in the patient's plan of care  Description: Interventions:  - What would you like us to know as we care for you? From home with spouse    - Provide timely, complete, and accurate information to patient/family  - Incorporate patient and family knowledge, values, beliefs, and cultural backgrounds into the planning and delivery of care  - Encourage patient/family to participate in care and decision-making at the level they choose  - Honor patient and family perspectives and choices  Outcome: Progressing     Problem: Patient/Family Goals  Goal: Patient/Family Long Term Goal  Description: Patient's Long Term Goal: Home/rehab  Interventions:  - See additional Care Plan goals for specific interventions  Outcome: Progressing  Goal: Patient/Family Short Term Goal  Description: Patient's Short Term Goal:     Interventions:   - See additional Care Plan goals for specific interventions  Outcome: Progressing     Problem: SAFETY ADULT - FALL  Goal: Free from fall injury  Description: INTERVENTIONS:  - Assess pt frequently for physical needs  - Identify cognitive and physical deficits and behaviors that affect risk of falls.  - [place] fall precautions as indicated by assessment.  - Educate pt/family on patient safety including physical limitations  - Instruct pt to call for assistance with activity based on assessment  - Modify environment to reduce risk of injury  - Provide assistive devices as appropriate  - Consider OT/PT consult to assist with strengthening/mobility  - Encourage toileting schedule  Outcome: Progressing     Problem: RESPIRATORY - ADULT  Goal: Achieves optimal ventilation and oxygenation  Description: INTERVENTIONS:  - Assess for changes in respiratory status  - Assess for changes in mentation and behavior  - Position to facilitate oxygenation and minimize respiratory effort  - Oxygen supplementation based on oxygen  saturation or ABGs  - Provide Smoking Cessation handout, if applicable  - Encourage broncho-pulmonary hygiene including cough, deep breathe, Incentive Spirometry  - Assess the need for suctioning and perform as needed  - Assess and instruct to report SOB or any respiratory difficulty  - Respiratory Therapy support as indicated  - Manage/alleviate anxiety  - Monitor for signs/symptoms of CO2 retention  Outcome: Progressing     Problem: NEUROLOGICAL - ADULT  Goal: Achieves stable or improved neurological status  Description: INTERVENTIONS  - Assess for and report changes in neurological status  - Initiate measures to prevent increased intracranial pressure  - Maintain blood pressure and fluid volume within ordered parameters to optimize cerebral perfusion and minimize risk of hemorrhage  - Monitor temperature, glucose, and sodium. Initiate appropriate interventions as ordered  Outcome: Progressing     Problem: Impaired Cognition  Goal: Patient will exhibit improved attention, thought processing and/or memory  Description: Interventions:    Outcome: Progressing     Problem: METABOLIC/FLUID AND ELECTROLYTES - ADULT  Goal: Glucose maintained within prescribed range  Description: INTERVENTIONS:  - Monitor Blood Glucose as ordered  - Assess for signs and symptoms of hyperglycemia and hypoglycemia  - Administer ordered medications to maintain glucose within target range  - Assess barriers to adequate nutritional intake and initiate nutrition consult as needed  - Instruct patient on self management of diabetes  Outcome: Progressing  Goal: Electrolytes maintained within normal limits  Description: INTERVENTIONS:  - Monitor labs and rhythm and assess patient for signs and symptoms of electrolyte imbalances  - Administer electrolyte replacement as ordered  - Monitor response to electrolyte replacements, including rhythm and repeat lab results as appropriate  - Fluid restriction as ordered  - Instruct patient on fluid and  nutrition restrictions as appropriate  Outcome: Progressing  Goal: Hemodynamic stability and optimal renal function maintained  Description: INTERVENTIONS:  - Monitor labs and assess for signs and symptoms of volume excess or deficit  - Monitor intake, output and patient weight  - Monitor urine specific gravity, serum osmolarity and serum sodium as indicated or ordered  - Monitor response to interventions for patient's volume status, including labs, urine output, blood pressure (other measures as available)  - Encourage oral intake as appropriate  - Instruct patient on fluid and nutrition restrictions as appropriate  Outcome: Progressing     Problem: PAIN - ADULT  Goal: Verbalizes/displays adequate comfort level or patient's stated pain goal  Description: INTERVENTIONS:  - Encourage pt to monitor pain and request assistance  - Assess pain using appropriate pain scale  - Administer analgesics based on type and severity of pain and evaluate response  - Implement non-pharmacological measures as appropriate and evaluate response  - Consider cultural and social influences on pain and pain management  - Manage/alleviate anxiety  - Utilize distraction and/or relaxation techniques  - Monitor for opioid side effects  - Notify MD/LIP if interventions unsuccessful or patient reports new pain  - Anticipate increased pain with activity and pre-medicate as appropriate  Outcome: Progressing     Problem: DISCHARGE PLANNING  Goal: Discharge to home or other facility with appropriate resources  Description: INTERVENTIONS:  - Identify barriers to discharge w/pt and caregiver  - Include patient/family/discharge partner in discharge planning  - Arrange for needed discharge resources and transportation as appropriate  - Identify discharge learning needs (meds, wound care, etc)  - Arrange for interpreters to assist at discharge as needed  - Consider post-discharge preferences of patient/family/discharge partner  - Complete POLST form as  appropriate  - Assess patient's ability to be responsible for managing their own health  - Refer to Case Management Department for coordinating discharge planning if the patient needs post-hospital services based on physician/LIP order or complex needs related to functional status, cognitive ability or social support system  Outcome: Progressing

## 2024-04-15 NOTE — PROGRESS NOTES
[facility] Hospitalist Progress Note     CC: Hospital Follow up    PCP: SIRI TABOR       Assessment/Plan:     Principal Problem:    Altered mental status, unspecified altered mental status type  Active Problems:    Altered mental status    Depression, unspecified depression type    Catatonic excited type    Serotonin syndrome    Severe episode of recurrent major depressive disorder, without psychotic features (HCC)    Patient is a 73 year old female with PMH sig for MVP, EDEL/MDD, GERD, IBS, who presents with AMS.     AMS  EDEL/MDD  Behavioral changes with nervous breakdown   - afebrile and normotensive  - Leukocytosis with bicarb 19, elevated AST ALT  - Urine positive for cannabis and ecstasy  - b12 normal, TSH borderline low   - Imaging was done 2 days before admit at ER visit due to cough  - CT head with no acute process, chest x-ray with no acute process  - Recent steroids use for URI symptoms   - Psych consulted   - polypharmacy  - will try to reset medication   - Certificate, petition, sitter dc'd   - failed MRI brain attempt on 4/14/24, may attempt again if able       FN:  - IVF: none  - Diet: general      DVT Prophy: SCDs lovenox   Atrophy: Ambulate   Lines: Piv     Dispo: pending clinical course     Outpatient records or previous hospital records reviewed.      Further recommendations pending patient's clinical course.  [facility] hospitalist to continue to follow patient while in house     Patient and/or patient's family given opportunity to ask questions and note understanding and agreeing with therapeutic plan as outlined     Thank You,  Antonino Jung MD     [facility] Hospitalist  Answering Service number: [phone]     Subjective:   Awake pleasant and calm.   Answers questions with on complaints.   Chooses which questions she wants to answer.     OBJECTIVE:    Blood pressure 111/81, pulse 86, temperature 97.9 °F (36.6 °C), temperature source Oral, resp. rate 20, height 5' (1.524 m), weight 108 lb  3.2 oz (49.1 kg), SpO2 92%.    Temp:  [97.9 °F (36.6 °C)-98.2 °F (36.8 °C)] 97.9 °F (36.6 °C)  Pulse:  [86-95] 86  Resp:  [18-20] 20  BP: (111-136)/(75-96) 111/81  SpO2:  [92 %-97 %] 92 %      Intake/Output:    Intake/Output Summary (Last 24 hours) at 4/15/2024 1126  Last data filed at 4/15/2024 0908  Gross per 24 hour   Intake 420 ml   Output --   Net 420 ml       Last 3 Weights   04/11/24 0110 108 lb 3.2 oz (49.1 kg)   04/08/24 1116 122 lb (55.3 kg)   01/12/23 1210 115 lb (52.2 kg)       Exam   General: Alert, no acute distress, calm   HEENT: oral mucosa normal   Neck: non tender, no adenopathy   Lungs: clear to ausculation bilaterally  Heart: Regular rate and rhythm  Abdomen: soft, non tender, non distended   Extremities: No edema  Skin: no new rash, normal color  Neuro: 5/5 strength in bilateral extremities, normal sensations  Psych: appropriate affect     Data Review:       Labs:     Recent Labs   Lab 04/10/24  1726 04/12/24  0621 04/13/24  0604   RBC 5.08 4.73 4.95   HGB 13.8 13.0 13.7   HCT 42.1 38.7 40.6   MCV 82.9 81.8 82.0   MCH 27.2 27.5 27.7   MCHC 32.8 33.6 33.7   RDW 13.2 12.8 12.9   NEPRELIM 9.79* 11.95* 6.49   WBC 16.4* 18.1* 12.1*   .0 275.0 319.0         Recent Labs   Lab 04/13/24  0604 04/14/24  0710 04/15/24  0632   * 141* 148*   BUN 18 16 15   CREATSERUM 0.79 0.75 0.82   EGFRCR 79 84 75   CA 9.6 9.4 9.7    138 139   K 3.3*  3.3* 3.8  3.8 3.6    107 109   CO2 23.0 22.0 24.0       Recent Labs   Lab 04/10/24  1726 04/12/24  0621 04/13/24  0604   ALT 68* 53* 45   AST 93* 35* 22   ALB 4.8 4.1 4.1         Imaging:  No results found.      Meds:      [START ON 4/16/2024] enoxaparin  40 mg Subcutaneous Daily    ascorbic acid  500 mg Oral Daily    cetirizine  5 mg Oral Nightly    clonazePAM  0.5 mg Oral TID    ARIPiprazole  10 mg Oral Daily    fluticasone propionate  1 spray Each Nare Daily    cholecalciferol  1,000 Units Oral Daily    escitalopram  10 mg Oral Nightly          acetaminophen    melatonin    ondansetron    metoclopramide    polyethylene glycol (PEG 3350)    sennosides    bisacodyl    fleet enema    LORazepam    albuterol    LORazepam    haloperidol lactate

## 2024-04-15 NOTE — PROGRESS NOTES
Patient is a 73 year old , , female with past medical history of anxiety, depression, GERD and back pain who was admitted to the hospital for altered mental status and confusion. The patient has been demonstrating confusion, restlessness, agitation. Patient indicated for psych consult for evaluation and advise.  Consult Duration   The patient seen for over 50-minute, follow-up evaluation, over 50% counseling and coordinating care addressing  confusion, restlessness, agitation.    Record reviewed, communication with attending, communication with RN and patient seen face to face evaluation.  History of Present Illness:   Communicating with the team, indicating the patient has been laying down in her bed for the most time.  No tremors or restlessness has been observed or reported.    Patient receiving Abilify 15 mg, klonopin 0.5 mg, lexapro 10 mg,    The patient seen today laying in hospital bed.   left the room to provide some confidentiality.    The patient laying down in bed with notice tiredness.  Patient will open her eyes for low both and to close them again.  Patient acknowledging that she has been going through depressive stage.  Otherwise the patient has not been communicating verbally but nodding her head at time.    The patient according to the  has not been communicating much with him.  Otherwise he seen her much calm than before and he is happy with the progress.    Concerned that the patient depression has becoming melancholic with intense social isolation, disconnecting and hopelessness.    The patient presents drowsy and somewhat lethargic today. Patient opens her eyes to the sound of her name otherwise communication and interaction is limited.    The patient otherwise denies being in pain. She reports she slept well last night.     She denies suicidal ideations    Patient continues to demonstrate alternation in her mood and cognition with episodes of increased anxiety and  restlessness.    Collateral obtained from psychiatric liaison from patients .     reports that patient has had uncontrollable movements, is taking her clothes off, has been confused and does not know the names of anybody.     says that since Friday she has had these difficulties and it seemed to have gotten worse the last 2 days where now, in addition to those difficulties, she also cannot control her bowels.  She says she has been soiling her clothes and her bed.   said he wonders if it is could be attributed to the recent prescribing of meds with codeine that were meant to manage a cough that she had.   also reports that patient has \"been in bed over a year\".  He says that she does not say much and sleeps a lot.  He says she is grieving the loss of 2 dogs that  5 years ago as well as grieving the loss of their adopted son, who 7 yrs ago, at 33 y/o walked out of their lives.   He said that pt has friends and she does occasionally go to lunch with them and does attend AA meetings once a week.  He said that she will eat the food that he cooks for her.    states that he is power of  for his wife.  Counselor advised him to bring copies of that paperwork to the hospital so that staff can scan it into the clinic record.      Past Psychiatric/Medication History:  1. Prior diagnoses: anxiety, depression  2. Past psychiatric inpatient: 1 psychiatric admission for severe depression few years ago with [facility].  3. Past outpatient history: Patient seen by psychiatrist.  4. Past suicide history: Denied any  5. Medication history: Abilify, bupropion, Lexapro, hydroxyzine.    Social History:   Patient lives with her  and adult son. She is a retired teacher.    Patient has been sober from alcohol for 9 years. Patient uses marijuana gummies at night.     Family History:  Unknown family history  Medical History:   Past Medical History  Past Medical History:    Anxiety  state    Back problem    COMPRESSION FX    Cataract    Depression    Esophageal reflux    Hematoma and contusion of liver    STATES HAS BEEN THERE FOR MANY YEARS    History of fractured kneecap    RIGHT    IBS (irritable bowel syndrome)    Mitral prolapse    Osteoarthritis       Past Surgical History  Past Surgical History:   Procedure Laterality Date          Correct bunion,othr methods      Correct bunion,simple      BILAT.     Dilation/curettage,diagnostic      FOR \"MOLE PREGNANCY\"    Other Right     ORIF RIGHT ANKLE    Spine surgery procedure unlisted      cervical spine 2020    Total knee replacement         Family History  Family History   Problem Relation Age of Onset    Heart Disorder Father     Cancer Mother         ESOPHAGUS       Social History  Social History     Socioeconomic History    Marital status:    Tobacco Use    Smoking status: Former     Current packs/day: 2.00     Average packs/day: 2.0 packs/day for 15.0 years (30.0 ttl pk-yrs)     Types: Cigarettes    Smokeless tobacco: Never    Tobacco comments:     QUIT IN    Vaping Use    Vaping status: Never Used   Substance and Sexual Activity    Alcohol use: No    Drug use: No     Social Determinants of Health     Financial Resource Strain: Low Risk  (2022)    [facility]    Overall Financial Resource Strain (CARDIA)     Difficulty of Paying Living Expenses: Not hard at all   Food Insecurity: No Food Insecurity (2024)    Food Insecurity     Food Insecurity: Never true   Transportation Needs: No Transportation Needs (2024)    Transportation Needs     Lack of Transportation: No    [facility]    Social Connections   Housing Stability: Low Risk  (2024)    Housing Stability     Housing Instability: No           Current Medications:  Current Facility-Administered Medications    Medication Dose Route Frequency    clonazePAM (KlonoPIN) tab 0.5 mg  0.5 mg Oral TID    ARIPiprazole (Abilify) tab 10 mg  10 mg Oral Daily    acetaminophen (Tylenol Extra Strength) tab 500 mg  500 mg Oral Q4H PRN    enoxaparin (Lovenox) 30 MG/0.3ML SUBQ injection 30 mg  30 mg Subcutaneous Daily    melatonin tab 3 mg  3 mg Oral Nightly PRN    ondansetron (Zofran) 4 MG/2ML injection 4 mg  4 mg Intravenous Q6H PRN    metoclopramide (Reglan) 5 mg/mL injection 5 mg  5 mg Intravenous Q8H PRN    polyethylene glycol (PEG 3350) (Miralax) 17 g oral packet 17 g  17 g Oral Daily PRN    sennosides (Senokot) tab 17.2 mg  17.2 mg Oral Nightly PRN    bisacodyl (Dulcolax) 10 MG rectal suppository 10 mg  10 mg Rectal Daily PRN    fleet enema (Fleet) 7-19 GM/118ML rectal enema 133 mL  1 enema Rectal Once PRN    LORazepam (Ativan) tab 1 mg  1 mg Oral Q6H PRN    fluticasone propionate (Flonase) 50 MCG/ACT nasal suspension 1 spray  1 spray Each Nare Daily    cetirizine (ZyrTEC) tab 5 mg  5 mg Oral Daily    albuterol (Ventolin HFA) 108 (90 Base) MCG/ACT inhaler 2 puff  2 puff Inhalation Q4H PRN    cholecalciferol (Vitamin D3) tab 1,000 Units  1,000 Units Oral Daily    escitalopram (Lexapro) tab 10 mg  10 mg Oral Nightly    LORazepam (Ativan) 2 mg/mL injection 2 mg  2 mg Intramuscular Q6H PRN    haloperidol lactate (Haldol) 5 MG/ML injection 2 mg  2 mg Intramuscular Q6H PRN     Medications Prior to Admission   Medication Sig    cholecalciferol 1000 UNITS Oral Cap Take 1 capsule (1,000 Units total) by mouth daily.       Allergies  Allergies   Allergen Reactions    Radiology Contrast Iodinated Dyes HIVES     HIVES AND THROAT TIGHTENED WHILE HAVING AN MRI    Sulfa Antibiotics HIVES     \"got sicker\"    Seroquel [Quetiapine] UNKNOWN    Cinnamon RASH       Review of Systems:   As by Admitting/Attending    Results:   Laboratory Data:  Lab Results   Component Value Date    WBC 12.1 (H) 04/13/2024    HGB 13.7 04/13/2024    HCT 40.6 04/13/2024     .0 04/13/2024    CREATSERUM 0.82 04/15/2024    BUN 15 04/15/2024     04/15/2024    K 3.6 04/15/2024     04/15/2024    CO2 24.0 04/15/2024     (H) 04/15/2024    CA 9.7 04/15/2024    ALB 4.1 04/13/2024    ALKPHO 78 04/13/2024    TP 6.4 04/13/2024    AST 22 04/13/2024    ALT 45 04/13/2024    T4F 1.7 04/12/2024    TSH 0.528 (L) 04/12/2024     01/19/2022    DDIMER <0.27 01/12/2023    CRP 8.33 (H) 09/04/2020    MG 2.2 04/15/2024    TROP <0.045 08/26/2020    B12 484 04/12/2024    ETOH <3 04/10/2024         Imaging:  No results found.    Vital Signs:   Blood pressure 111/81, pulse 86, temperature 97.9 °F (36.6 °C), temperature source Oral, resp. rate 20, height 60\", weight 49.1 kg (108 lb 3.2 oz), SpO2 92%.    Mental Status Exam:   Appearance: Stated age female, in hospital gown, laying down in her recliner  Psychomotor: Patient not been demonstrating any restlessness or agitation but mostly sleepy, calm and withdrawn.  Orientation: Alert and oriented to self, date and location but not to exact condition.  Gait: Not evaluated.  Attitude/Coorperation: Patient has limited cooperation and attentiveness today due to her lethargy.  Behavior: Guarded behavior  Speech: Speech today.  Mood: Patient hesitantly admitting feeling severely depressed and hopeless  Affect: Dysphoric but restricted affect.  Thought process: Blocking thought process.  Thought content: Patient repeatedly denied any suicidal or homicidal ideation.  Perceptions: Patient denying any auditory visual hallucination  Concentration: Grossly distracted  Memory: Grossly limited  Intellect: Average.  Judgment and Insight: Questionable.     Impression:     Catatonic excitable type.  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  Delirium due to another medical condition.        Patient is a 73 year old , , female with past medical history of anxiety, depression who was  admitted to the hospital for Altered mental status, unspecified altered mental status type: The patient has been demonstrating confusion, restlessness, agitation.    The patient is restless, irritable, unable to communicate, keep moving back-and-forth and aimless movement with inability to express her emotion and not communicating verbally.  Patient has been taking multiple antidepressant medication with recent cough medicine with codeine indicate the high possibility for serotonin syndrome versus excited catatonia.    4/12/2024: The patient who presented with catatonic anxiety type mixed with serotonin syndrome due to excessive antidepressant medication and mixed with codeine.  The patient has been demonstrating improvement in her cognition, ability to communicate verbally otherwise demonstrating severe depression with no safety concern and no agitation.    4/12/2024: Patient continues to demonstrate alternation in her mood and cognition with episodes of increased anxiety and restlessness.    4/15/2024: The patient continued demonstrating depression process and found withdrawn.  No restlessness or agitation has been observed or reported.    Discussed risk and benefit, acknowledging the current symptom and severity.  At this point, I would recommend the following approach:     Focus on safety.    Focus on education and support.  Encourage patient to be in her recliner.  Continue Abilify 15 mg daily.  Continue Lexapro 10 mg nightly.  Continue Klonopin 0.5 mg twice daily.  Utilize lorazepam 1 mg IV every 6 hours as needed for agitation and anxiety.  Coordinate plan with team    Orders This Visit:  Orders Placed This Encounter   Procedures    CBC With Differential With Platelet    Comp Metabolic Panel (14)    Urinalysis with Culture Reflex    Scan slide    Drug screen 7 w/out confirmation, urine    Ethyl Alcohol    Acetaminophen (Tylenol), S    Salicylate, Serum    Magnesium    Vitamin B12    TSH W Reflex To Free T4     T4, FREE (S)    Potassium    Free T3 (Triiodothryronine)    Potassium    Basic Metabolic Panel (8)       Meds This Visit:  Requested Prescriptions      No prescriptions requested or ordered in this encounter     Jason Mckenzie MD  4/15/2024    Note to Patient: The 21st Century Cures Act makes medical notes like these available to patients in the interest of transparency. However, be advised this is a medical document. It is intended as peer to peer communication. It is written in medical language and may contain abbreviations or verbiage that are unfamiliar. It may appear blunt or direct. Medical documents are intended to carry relevant information, facts as evident, and the clinical opinion of the practitioner. This note may have been transcribed using a voice dictation system. Voice recognition errors may occur. This should not be taken to alter the content or meaning of this note.

## 2024-04-15 NOTE — PLAN OF CARE
Problem: Patient Centered Care  Goal: Patient preferences are identified and integrated in the patient's plan of care  Description: Interventions:  - What would you like us to know as we care for you? From home with spouse  - Provide timely, complete, and accurate information to patient/family  - Incorporate patient and family knowledge, values, beliefs, and cultural backgrounds into the planning and delivery of care  - Encourage patient/family to participate in care and decision-making at the level they choose  - Honor patient and family perspectives and choices  Outcome: Progressing     Problem: SAFETY ADULT - FALL  Goal: Free from fall injury  Description: INTERVENTIONS:  - Assess pt frequently for physical needs  - Identify cognitive and physical deficits and behaviors that affect risk of falls.  - [place] fall precautions as indicated by assessment.  - Educate pt/family on patient safety including physical limitations  - Instruct pt to call for assistance with activity based on assessment  - Modify environment to reduce risk of injury  - Provide assistive devices as appropriate  - Consider OT/PT consult to assist with strengthening/mobility  - Encourage toileting schedule  Outcome: Progressing     Problem: RESPIRATORY - ADULT  Goal: Achieves optimal ventilation and oxygenation  Description: INTERVENTIONS:  - Assess for changes in respiratory status  - Assess for changes in mentation and behavior  - Position to facilitate oxygenation and minimize respiratory effort  - Oxygen supplementation based on oxygen saturation or ABGs  - Provide Smoking Cessation handout, if applicable  - Encourage broncho-pulmonary hygiene including cough, deep breathe, Incentive Spirometry  - Assess the need for suctioning and perform as needed  - Assess and instruct to report SOB or any respiratory difficulty  - Respiratory Therapy support as indicated  - Manage/alleviate anxiety  - Monitor for signs/symptoms of CO2  retention  Outcome: Progressing     Problem: NEUROLOGICAL - ADULT  Goal: Achieves stable or improved neurological status  Description: INTERVENTIONS  - Assess for and report changes in neurological status  - Initiate measures to prevent increased intracranial pressure  - Maintain blood pressure and fluid volume within ordered parameters to optimize cerebral perfusion and minimize risk of hemorrhage  - Monitor temperature, glucose, and sodium. Initiate appropriate interventions as ordered  Outcome: Progressing     Problem: Impaired Cognition  Goal: Patient will exhibit improved attention, thought processing and/or memory  Description: Interventions:  - Minimize distractions in the room when full attention is required  Outcome: Progressing     Problem: METABOLIC/FLUID AND ELECTROLYTES - ADULT  Goal: Glucose maintained within prescribed range  Description: INTERVENTIONS:  - Monitor Blood Glucose as ordered  - Assess for signs and symptoms of hyperglycemia and hypoglycemia  - Administer ordered medications to maintain glucose within target range  - Assess barriers to adequate nutritional intake and initiate nutrition consult as needed  - Instruct patient on self management of diabetes  Outcome: Progressing  Goal: Electrolytes maintained within normal limits  Description: INTERVENTIONS:  - Monitor labs and rhythm and assess patient for signs and symptoms of electrolyte imbalances  - Administer electrolyte replacement as ordered  - Monitor response to electrolyte replacements, including rhythm and repeat lab results as appropriate  - Fluid restriction as ordered  - Instruct patient on fluid and nutrition restrictions as appropriate  Outcome: Progressing  Goal: Hemodynamic stability and optimal renal function maintained  Description: INTERVENTIONS:  - Monitor labs and assess for signs and symptoms of volume excess or deficit  - Monitor intake, output and patient weight  - Monitor urine specific gravity, serum osmolarity  and serum sodium as indicated or ordered  - Monitor response to interventions for patient's volume status, including labs, urine output, blood pressure (other measures as available)  - Encourage oral intake as appropriate  - Instruct patient on fluid and nutrition restrictions as appropriate  Outcome: Progressing     Problem: PAIN - ADULT  Goal: Verbalizes/displays adequate comfort level or patient's stated pain goal  Description: INTERVENTIONS:  - Encourage pt to monitor pain and request assistance  - Assess pain using appropriate pain scale  - Administer analgesics based on type and severity of pain and evaluate response  - Implement non-pharmacological measures as appropriate and evaluate response  - Consider cultural and social influences on pain and pain management  - Manage/alleviate anxiety  - Utilize distraction and/or relaxation techniques  - Monitor for opioid side effects  - Notify MD/LIP if interventions unsuccessful or patient reports new pain  - Anticipate increased pain with activity and pre-medicate as appropriate  Outcome: Progressing     Problem: DISCHARGE PLANNING  Goal: Discharge to home or other facility with appropriate resources  Description: INTERVENTIONS:  - Identify barriers to discharge w/pt and caregiver  - Include patient/family/discharge partner in discharge planning  - Arrange for needed discharge resources and transportation as appropriate  - Identify discharge learning needs (meds, wound care, etc)  - Arrange for interpreters to assist at discharge as needed  - Consider post-discharge preferences of patient/family/discharge partner  - Complete POLST form as appropriate  - Assess patient's ability to be responsible for managing their own health  - Refer to Case Management Department for coordinating discharge planning if the patient needs post-hospital services based on physician/LIP order or complex needs related to functional status, cognitive ability or social support  system  Outcome: Progressing

## 2024-04-16 LAB
ANION GAP SERPL CALC-SCNC: 7 MMOL/L (ref 0–18)
BUN BLD-MCNC: 16 MG/DL (ref 9–23)
BUN/CREAT SERPL: 20.3 (ref 10–20)
CALCIUM BLD-MCNC: 9.7 MG/DL (ref 8.7–10.4)
CHLORIDE SERPL-SCNC: 109 MMOL/L (ref 98–112)
CO2 SERPL-SCNC: 24 MMOL/L (ref 21–32)
CREAT BLD-MCNC: 0.79 MG/DL
EGFRCR SERPLBLD CKD-EPI 2021: 79 ML/MIN/1.73M2 (ref 60–?)
GLUCOSE BLD-MCNC: 153 MG/DL (ref 70–99)
MAGNESIUM SERPL-MCNC: 2.2 MG/DL (ref 1.6–2.6)
OSMOLALITY SERPL CALC.SUM OF ELEC: 294 MOSM/KG (ref 275–295)
POTASSIUM SERPL-SCNC: 3.6 MMOL/L (ref 3.5–5.1)
SODIUM SERPL-SCNC: 140 MMOL/L (ref 136–145)

## 2024-04-16 PROCEDURE — 99233 SBSQ HOSP IP/OBS HIGH 50: CPT | Performed by: OTHER

## 2024-04-16 RX ORDER — ARIPIPRAZOLE 5 MG/1
5 TABLET ORAL NIGHTLY
Status: DISCONTINUED | OUTPATIENT
Start: 2024-04-17 | End: 2024-04-18

## 2024-04-16 RX ORDER — LORAZEPAM 2 MG/ML
0.5 INJECTION INTRAMUSCULAR ONCE
Status: COMPLETED | OUTPATIENT
Start: 2024-04-16 | End: 2024-04-16

## 2024-04-16 RX ORDER — CLONAZEPAM 0.5 MG/1
0.5 TABLET ORAL NIGHTLY
Status: DISCONTINUED | OUTPATIENT
Start: 2024-04-17 | End: 2024-04-17

## 2024-04-16 NOTE — PROGRESS NOTES
[facility] Hospitalist Progress Note     CC: Hospital Follow up    PCP: SIRI TABOR       Assessment/Plan:     Principal Problem:    Altered mental status, unspecified altered mental status type  Active Problems:    Altered mental status    Depression, unspecified depression type    Catatonic excited type    Serotonin syndrome    Severe episode of recurrent major depressive disorder, without psychotic features (HCC)    Patient is a 73 year old female with PMH sig for MVP, EDEL/MDD, GERD, IBS, who presents with AMS from depressive disorder.      AMS  EDEL/MDD  Behavioral changes with nervous breakdown   Possible Serotonin Syndrome?   - afebrile and normotensive  - Leukocytosis with bicarb 19, elevated AST ALT  - Urine positive for cannabis and ecstasy  - b12 normal, TSH borderline low   - Imaging was done 2 days before admit at ER visit due to cough  - CT head with no acute process, chest x-ray with no acute process  - Recent steroids use for URI symptoms   - Psych consulted   - polypharmacy  - will try to reset medication   - Certificate, petition, sitter dc'd   - failed MRI brain attempt on 4/14/24, may attempt again if able  - currently tolerating medications        FN:  - IVF: none  - Diet: general      DVT Prophy: SCDs lovenox   Atrophy: Ambulate   Lines: Piv     Dispo: pending clinical course     Outpatient records or previous hospital records reviewed.      Further recommendations pending patient's clinical course.  [facility] hospitalist to continue to follow patient while in house     Patient and/or patient's family given opportunity to ask questions and note understanding and agreeing with therapeutic plan as outlined     Thank You,  Antonino Jung MD     [facility] Hospitalist  Answering Service number: [phone]     Subjective:   Awake pleasant and calm.   Answers questions somewhat with on complaints.   Chooses which questions she wants to answer.     OBJECTIVE:    Blood pressure 114/78, pulse 109,  temperature 98.2 °F (36.8 °C), temperature source Axillary, resp. rate 22, height 5' (1.524 m), weight 108 lb 3.2 oz (49.1 kg), SpO2 93%.    Temp:  [97.5 °F (36.4 °C)-98.2 °F (36.8 °C)] 98.2 °F (36.8 °C)  Pulse:  [] 109  Resp:  [16-22] 22  BP: (114-138)/(78-97) 114/78  SpO2:  [93 %-96 %] 93 %      Intake/Output:    Intake/Output Summary (Last 24 hours) at 4/16/2024 1136  Last data filed at 4/16/2024 0911  Gross per 24 hour   Intake 910 ml   Output --   Net 910 ml       Last 3 Weights   04/11/24 0110 108 lb 3.2 oz (49.1 kg)   04/08/24 1116 122 lb (55.3 kg)   01/12/23 1210 115 lb (52.2 kg)       Exam   General: Alert, no acute distress, calm   HEENT: oral mucosa normal   Neck: non tender, no adenopathy   Lungs: clear to ausculation bilaterally  Heart: Regular rate and rhythm  Abdomen: soft, non tender, non distended   Extremities: No edema  Skin: no new rash, normal color  Neuro: 5/5 strength in bilateral extremities, normal sensations  Psych: appropriate affect     Data Review:       Labs:     Recent Labs   Lab 04/10/24  1726 04/12/24  0621 04/13/24  0604   RBC 5.08 4.73 4.95   HGB 13.8 13.0 13.7   HCT 42.1 38.7 40.6   MCV 82.9 81.8 82.0   MCH 27.2 27.5 27.7   MCHC 32.8 33.6 33.7   RDW 13.2 12.8 12.9   NEPRELIM 9.79* 11.95* 6.49   WBC 16.4* 18.1* 12.1*   .0 275.0 319.0         Recent Labs   Lab 04/14/24  0710 04/15/24  0632 04/16/24  0741   * 148* 153*   BUN 16 15 16   CREATSERUM 0.75 0.82 0.79   EGFRCR 84 75 79   CA 9.4 9.7 9.7    139 140   K 3.8  3.8 3.6 3.6    109 109   CO2 22.0 24.0 24.0       Recent Labs   Lab 04/10/24  1726 04/12/24  0621 04/13/24  0604   ALT 68* 53* 45   AST 93* 35* 22   ALB 4.8 4.1 4.1         Imaging:  No results found.      Meds:      enoxaparin  40 mg Subcutaneous Daily    ascorbic acid  500 mg Oral Daily    cetirizine  5 mg Oral Nightly    clonazePAM  0.5 mg Oral TID    ARIPiprazole  10 mg Oral Daily    fluticasone propionate  1 spray Each Nare Daily     cholecalciferol  1,000 Units Oral Daily    escitalopram  10 mg Oral Nightly         acetaminophen    melatonin    ondansetron    metoclopramide    polyethylene glycol (PEG 3350)    sennosides    bisacodyl    fleet enema    LORazepam    albuterol    LORazepam    haloperidol lactate

## 2024-04-16 NOTE — PLAN OF CARE
Problem: Patient Centered Care  Goal: Patient preferences are identified and integrated in the patient's plan of care  Description: Interventions:  - What would you like us to know as we care for you? From home with spouse  - Provide timely, complete, and accurate information to patient/family  - Incorporate patient and family knowledge, values, beliefs, and cultural backgrounds into the planning and delivery of care  - Encourage patient/family to participate in care and decision-making at the level they choose  - Honor patient and family perspectives and choices  Outcome: Progressing     Problem: SAFETY ADULT - FALL  Goal: Free from fall injury  Description: INTERVENTIONS:  - Assess pt frequently for physical needs  - Identify cognitive and physical deficits and behaviors that affect risk of falls.  - [place] fall precautions as indicated by assessment.  - Educate pt/family on patient safety including physical limitations  - Instruct pt to call for assistance with activity based on assessment  - Modify environment to reduce risk of injury  - Provide assistive devices as appropriate  - Consider OT/PT consult to assist with strengthening/mobility  - Encourage toileting schedule  Outcome: Progressing     Problem: RESPIRATORY - ADULT  Goal: Achieves optimal ventilation and oxygenation  Description: INTERVENTIONS:  - Assess for changes in respiratory status  - Assess for changes in mentation and behavior  - Position to facilitate oxygenation and minimize respiratory effort  - Oxygen supplementation based on oxygen saturation or ABGs  - Provide Smoking Cessation handout, if applicable  - Encourage broncho-pulmonary hygiene including cough, deep breathe, Incentive Spirometry  - Assess the need for suctioning and perform as needed  - Assess and instruct to report SOB or any respiratory difficulty  - Respiratory Therapy support as indicated  - Manage/alleviate anxiety  - Monitor for signs/symptoms of CO2  retention  Outcome: Progressing     Problem: NEUROLOGICAL - ADULT  Goal: Achieves stable or improved neurological status  Description: INTERVENTIONS  - Assess for and report changes in neurological status  - Initiate measures to prevent increased intracranial pressure  - Maintain blood pressure and fluid volume within ordered parameters to optimize cerebral perfusion and minimize risk of hemorrhage  - Monitor temperature, glucose, and sodium. Initiate appropriate interventions as ordered  Outcome: Progressing     Problem: Impaired Cognition  Goal: Patient will exhibit improved attention, thought processing and/or memory  Description: Interventions:  - Allow additional time for processing after asking questions or providing instructions  Outcome: Progressing     Problem: METABOLIC/FLUID AND ELECTROLYTES - ADULT  Goal: Glucose maintained within prescribed range  Description: INTERVENTIONS:  - Monitor Blood Glucose as ordered  - Assess for signs and symptoms of hyperglycemia and hypoglycemia  - Administer ordered medications to maintain glucose within target range  - Assess barriers to adequate nutritional intake and initiate nutrition consult as needed  - Instruct patient on self management of diabetes  Outcome: Progressing  Goal: Electrolytes maintained within normal limits  Description: INTERVENTIONS:  - Monitor labs and rhythm and assess patient for signs and symptoms of electrolyte imbalances  - Administer electrolyte replacement as ordered  - Monitor response to electrolyte replacements, including rhythm and repeat lab results as appropriate  - Fluid restriction as ordered  - Instruct patient on fluid and nutrition restrictions as appropriate  Outcome: Progressing  Goal: Hemodynamic stability and optimal renal function maintained  Description: INTERVENTIONS:  - Monitor labs and assess for signs and symptoms of volume excess or deficit  - Monitor intake, output and patient weight  - Monitor urine specific gravity,  serum osmolarity and serum sodium as indicated or ordered  - Monitor response to interventions for patient's volume status, including labs, urine output, blood pressure (other measures as available)  - Encourage oral intake as appropriate  - Instruct patient on fluid and nutrition restrictions as appropriate  Outcome: Progressing     Problem: PAIN - ADULT  Goal: Verbalizes/displays adequate comfort level or patient's stated pain goal  Description: INTERVENTIONS:  - Encourage pt to monitor pain and request assistance  - Assess pain using appropriate pain scale  - Administer analgesics based on type and severity of pain and evaluate response  - Implement non-pharmacological measures as appropriate and evaluate response  - Consider cultural and social influences on pain and pain management  - Manage/alleviate anxiety  - Utilize distraction and/or relaxation techniques  - Monitor for opioid side effects  - Notify MD/LIP if interventions unsuccessful or patient reports new pain  - Anticipate increased pain with activity and pre-medicate as appropriate  Outcome: Progressing     Problem: DISCHARGE PLANNING  Goal: Discharge to home or other facility with appropriate resources  Description: INTERVENTIONS:  - Identify barriers to discharge w/pt and caregiver  - Include patient/family/discharge partner in discharge planning  - Arrange for needed discharge resources and transportation as appropriate  - Identify discharge learning needs (meds, wound care, etc)  - Arrange for interpreters to assist at discharge as needed  - Consider post-discharge preferences of patient/family/discharge partner  - Complete POLST form as appropriate  - Assess patient's ability to be responsible for managing their own health  - Refer to Case Management Department for coordinating discharge planning if the patient needs post-hospital services based on physician/LIP order or complex needs related to functional status, cognitive ability or social  support system  Outcome: Progressing

## 2024-04-16 NOTE — PHYSICAL THERAPY NOTE
PHYSICAL THERAPY EVALUATION - INPATIENT     Room Number: 546/546-A  Evaluation Date: 4/16/2024  Type of Evaluation: Initial   Physician Order: PT Eval and Treat    Presenting Problem: AMS  Co-Morbidities : serotonin syndrome vs catatonia per psych  Reason for Therapy: Mobility Dysfunction and Discharge Planning    PHYSICAL THERAPY ASSESSMENT   Patient is a 73 year old female admitted 4/10/2024 for AMS, suspected serotonin syndrome vs catatonia per psych.  Prior to admission, patient's baseline is independent, lives with spouse in a two story house.  Patient is currently functioning below baseline with bed mobility, transfers, and gait.  Patient is requiring minimal assist as a result of the following impairments: decreased functional strength, decreased endurance/aerobic capacity, medical status, and decreased compliance/participation.  Physical Therapy will continue to follow for duration of hospitalization.    Patient will benefit from continued skilled PT Services to promote return to prior level of function and safety with continuous assistance and gradual rehabilitative therapy  pending progress - limited by mentation this date as pt with severely flat affect and inconsistent participation.     PLAN  PT Treatment Plan: Bed mobility;Body mechanics;Endurance;Energy conservation;Family education;Gait training;Range of motion;Stoop training;Stair training;Transfer training;Balance training  Rehab Potential : Good  Frequency (Obs): 5x/week    PHYSICAL THERAPY MEDICAL/SOCIAL HISTORY   History related to current admission: AMS     Problem List  Principal Problem:    Altered mental status, unspecified altered mental status type  Active Problems:    Altered mental status    Depression, unspecified depression type    Catatonic excited type    Serotonin syndrome    Severe episode of recurrent major depressive disorder, without psychotic features (HCC)      HOME SITUATION  Home Situation  Type of Home: House  Home Layout:  Two level  Stairs to Enter : 3  Railing: Yes  Lives With: Spouse     Prior Level of [place]: independent     SUBJECTIVE  \"Yes.\" - only stating yes/no very intermittently     PHYSICAL THERAPY EXAMINATION   OBJECTIVE  Precautions: Bed/chair alarm  Fall Risk: High fall risk    WEIGHT BEARING RESTRICTION  Weight Bearing Restriction: None                PAIN ASSESSMENT     Location: no complaints of pain       COGNITION  Flat affect, inconsistent command following, unable to answer any orientation questions    BALANCE  Static Sitting: Fair +  Dynamic Sitting: Fair  Static Standing: Fair  Dynamic Standing: Fair -    AM-PAC '6-Clicks' INPATIENT SHORT FORM - BASIC MOBILITY  How much difficulty does the patient currently have...  Patient Difficulty: Turning over in bed (including adjusting bedclothes, sheets and blankets)?: A Little   Patient Difficulty: Sitting down on and standing up from a chair with arms (e.g., wheelchair, bedside commode, etc.): A Little   Patient Difficulty: Moving from lying on back to sitting on the side of the bed?: A Lot   How much help from another person does the patient currently need...   Help from Another: Moving to and from a bed to a chair (including a wheelchair)?: A Little   Help from Another: Need to walk in hospital room?: A Lot   Help from Another: Climbing 3-5 steps with a railing?: Total     AM-PAC Score:  Raw Score: 14   Approx Degree of Impairment: 61.29%   Standardized Score (AM-PAC Scale): 38.1   CMS Modifier (G-Code): CL    FUNCTIONAL ABILITY STATUS  Functional Mobility/Gait Assessment  Gait Assistance: Minimum assistance (HHA)  Distance (ft): 3'  Pattern:  (shuffle)  Rolling: minimal assist  Supine to Sit: minimal assist  Sit to Supine: minimal assist  Sit to Stand: minimal assist    Exercise/Education Provided:  Bed mobility  Body mechanics  Functional activity tolerated  Gait training  Transfer training    The patient's Approx Degree of Impairment: 61.29% has been  calculated based on documentation in the [facility] '6 clicks' Inpatient Basic Mobility Short Form.  Research supports that patients with this level of impairment may benefit from TANK.  Final disposition will be made by interdisciplinary medical team.    Patient End of Session: Needs met;Call light within reach;RN aware of session/findings;Up in chair;With  staff;Alarm set    CURRENT GOALS  Goals to be met by: 5/1  Patient Goal Patient's self-stated goal is: unstated    Goal #1 Patient is able to demonstrate supine - sit EOB @ level: independent     Goal #1   Current Status    Goal #2 Patient is able to demonstrate transfers Sit to/from Stand at assistance level: independent with none     Goal #2  Current Status    Goal #3 Patient is able to ambulate 150 feet with assist device: none at assistance level: independent   Goal #3   Current Status    Goal #4 Patient will negotiate 3 stairs/one curb w/ assistive device and supervision   Goal #4   Current Status    Goal #5 Patient to demonstrate independence with home activity/exercise instructions provided to patient in preparation for discharge.   Goal #5   Current Status    Goal #6    Goal #6  Current Status      Patient Evaluation Complexity Level:  History Moderate - 1 or 2 personal factors and/or co-morbidities   Examination of body systems Moderate - addressing a total of 3 or more elements   Clinical Presentation  Moderate - Evolving   Clinical Decision Making  Moderate Complexity     Therapeutic Activity:  12 minutes

## 2024-04-16 NOTE — DISCHARGE INSTRUCTIONS
At this time, it is recommended that you follow up with the following referrals for psychiatry and therapy. Please call 911 or go to the nearest emergency room if symptoms persist or worsen.    Dr. Jason Mckenzie MD:  [address], [place] [address]  [facility]: [address] [place] [address]  Phone: [phone]    [facility]: [address] [place] [address]  Phone: [phone]    Dr. Christiana Madison MD: [address] [place] [address]  Phone: [phone]    [facility]: [address] [place] [address]  Phone: [phone]    Dr. Alma La MD: [address] [place] [address]  Phone: [phone]    [facility] - therapy and psychiatry: [address] [place] [address]  Phone: [phone]    Diet Instruction: Use of  Ensure plus  or equivalent Oral Nutrition Supplement 1 can  /day to maximize nutrition intake.

## 2024-04-16 NOTE — PLAN OF CARE
Problem: Patient Centered Care  Goal: Patient preferences are identified and integrated in the patient's plan of care  Description: Interventions:  - What would you like us to know as we care for you? From home with spouse    - Provide timely, complete, and accurate information to patient/family  - Incorporate patient and family knowledge, values, beliefs, and cultural backgrounds into the planning and delivery of care  - Encourage patient/family to participate in care and decision-making at the level they choose  - Honor patient and family perspectives and choices  Outcome: Progressing     Problem: Patient/Family Goals  Goal: Patient/Family Long Term Goal  Description: Patient's Long Term Goal: Discharge home    Interventions:  - Medications as ordered  - Tests and procedures as ordered  - PT/OT as ordered  - See additional Care Plan goals for specific interventions  Outcome: Progressing  Goal: Patient/Family Short Term Goal  Description: Patient's Short Term Goal: Increased strength and mobility    Interventions:   - Medications as ordered  - Tests and procedures as ordered  - PT/OT as ordered   - See additional Care Plan goals for specific interventions  Outcome: Progressing     Problem: SAFETY ADULT - FALL  Goal: Free from fall injury  Description: INTERVENTIONS:  - Assess pt frequently for physical needs  - Identify cognitive and physical deficits and behaviors that affect risk of falls.  - [place] fall precautions as indicated by assessment.  - Educate pt/family on patient safety including physical limitations  - Instruct pt to call for assistance with activity based on assessment  - Modify environment to reduce risk of injury  - Provide assistive devices as appropriate  - Consider OT/PT consult to assist with strengthening/mobility  - Encourage toileting schedule  Outcome: Progressing     Problem: RESPIRATORY - ADULT  Goal: Achieves optimal ventilation and oxygenation  Description: INTERVENTIONS:  -  Assess for changes in respiratory status  - Assess for changes in mentation and behavior  - Position to facilitate oxygenation and minimize respiratory effort  - Oxygen supplementation based on oxygen saturation or ABGs  - Provide Smoking Cessation handout, if applicable  - Encourage broncho-pulmonary hygiene including cough, deep breathe, Incentive Spirometry  - Assess the need for suctioning and perform as needed  - Assess and instruct to report SOB or any respiratory difficulty  - Respiratory Therapy support as indicated  - Manage/alleviate anxiety  - Monitor for signs/symptoms of CO2 retention  Outcome: Progressing     Problem: NEUROLOGICAL - ADULT  Goal: Achieves stable or improved neurological status  Description: INTERVENTIONS  - Assess for and report changes in neurological status  - Initiate measures to prevent increased intracranial pressure  - Maintain blood pressure and fluid volume within ordered parameters to optimize cerebral perfusion and minimize risk of hemorrhage  - Monitor temperature, glucose, and sodium. Initiate appropriate interventions as ordered  Outcome: Progressing     Problem: Impaired Cognition  Goal: Patient will exhibit improved attention, thought processing and/or memory  Description: Interventions:  - Minimize distractions in the room when full attention is required  - Allow additional time for processing after asking questions or providing instructions  Outcome: Progressing     Problem: METABOLIC/FLUID AND ELECTROLYTES - ADULT  Goal: Glucose maintained within prescribed range  Description: INTERVENTIONS:  - Monitor Blood Glucose as ordered  - Assess for signs and symptoms of hyperglycemia and hypoglycemia  - Administer ordered medications to maintain glucose within target range  - Assess barriers to adequate nutritional intake and initiate nutrition consult as needed  - Instruct patient on self management of diabetes  Outcome: Progressing  Goal: Electrolytes maintained within  normal limits  Description: INTERVENTIONS:  - Monitor labs and rhythm and assess patient for signs and symptoms of electrolyte imbalances  - Administer electrolyte replacement as ordered  - Monitor response to electrolyte replacements, including rhythm and repeat lab results as appropriate  - Fluid restriction as ordered  - Instruct patient on fluid and nutrition restrictions as appropriate  Outcome: Progressing  Goal: Hemodynamic stability and optimal renal function maintained  Description: INTERVENTIONS:  - Monitor labs and assess for signs and symptoms of volume excess or deficit  - Monitor intake, output and patient weight  - Monitor urine specific gravity, serum osmolarity and serum sodium as indicated or ordered  - Monitor response to interventions for patient's volume status, including labs, urine output, blood pressure (other measures as available)  - Encourage oral intake as appropriate  - Instruct patient on fluid and nutrition restrictions as appropriate  Outcome: Progressing     Problem: PAIN - ADULT  Goal: Verbalizes/displays adequate comfort level or patient's stated pain goal  Description: INTERVENTIONS:  - Encourage pt to monitor pain and request assistance  - Assess pain using appropriate pain scale  - Administer analgesics based on type and severity of pain and evaluate response  - Implement non-pharmacological measures as appropriate and evaluate response  - Consider cultural and social influences on pain and pain management  - Manage/alleviate anxiety  - Utilize distraction and/or relaxation techniques  - Monitor for opioid side effects  - Notify MD/LIP if interventions unsuccessful or patient reports new pain  - Anticipate increased pain with activity and pre-medicate as appropriate  Outcome: Progressing     Problem: DISCHARGE PLANNING  Goal: Discharge to home or other facility with appropriate resources  Description: INTERVENTIONS:  - Identify barriers to discharge w/pt and caregiver  - Include  patient/family/discharge partner in discharge planning  - Arrange for needed discharge resources and transportation as appropriate  - Identify discharge learning needs (meds, wound care, etc)  - Arrange for interpreters to assist at discharge as needed  - Consider post-discharge preferences of patient/family/discharge partner  - Complete POLST form as appropriate  - Assess patient's ability to be responsible for managing their own health  - Refer to Case Management Department for coordinating discharge planning if the patient needs post-hospital services based on physician/LIP order or complex needs related to functional status, cognitive ability or social support system  Outcome: Progressing

## 2024-04-17 ENCOUNTER — APPOINTMENT (OUTPATIENT)
Dept: MRI IMAGING | Facility: HOSPITAL | Age: 74
End: 2024-04-17
Attending: Other
Payer: MEDICARE

## 2024-04-17 PROBLEM — F06.1 MAJOR DEPRESSIVE DISORDER, RECURRENT EPISODE, SEVERE WITH CATATONIA (HCC): Status: ACTIVE | Noted: 2024-04-11

## 2024-04-17 PROBLEM — F33.2 MAJOR DEPRESSIVE DISORDER, RECURRENT EPISODE, SEVERE WITH CATATONIA (HCC): Status: ACTIVE | Noted: 2024-04-11

## 2024-04-17 LAB
ANION GAP SERPL CALC-SCNC: 9 MMOL/L (ref 0–18)
BUN BLD-MCNC: 13 MG/DL (ref 9–23)
BUN/CREAT SERPL: 16.7 (ref 10–20)
CALCIUM BLD-MCNC: 9.8 MG/DL (ref 8.7–10.4)
CHLORIDE SERPL-SCNC: 109 MMOL/L (ref 98–112)
CO2 SERPL-SCNC: 24 MMOL/L (ref 21–32)
CREAT BLD-MCNC: 0.78 MG/DL
EGFRCR SERPLBLD CKD-EPI 2021: 80 ML/MIN/1.73M2 (ref 60–?)
GLUCOSE BLD-MCNC: 154 MG/DL (ref 70–99)
MAGNESIUM SERPL-MCNC: 2.2 MG/DL (ref 1.6–2.6)
OSMOLALITY SERPL CALC.SUM OF ELEC: 297 MOSM/KG (ref 275–295)
POTASSIUM SERPL-SCNC: 3.6 MMOL/L (ref 3.5–5.1)
SODIUM SERPL-SCNC: 142 MMOL/L (ref 136–145)

## 2024-04-17 PROCEDURE — 99233 SBSQ HOSP IP/OBS HIGH 50: CPT | Performed by: OTHER

## 2024-04-17 RX ORDER — LITHIUM CARBONATE 150 MG/1
150 CAPSULE ORAL 2 TIMES DAILY WITH MEALS
Status: DISCONTINUED | OUTPATIENT
Start: 2024-04-17 | End: 2024-04-18

## 2024-04-17 RX ORDER — HALOPERIDOL 5 MG/ML
5 INJECTION INTRAMUSCULAR
Status: COMPLETED | OUTPATIENT
Start: 2024-04-17 | End: 2024-04-17

## 2024-04-17 RX ORDER — LORAZEPAM 0.5 MG/1
0.5 TABLET ORAL 3 TIMES DAILY
Status: DISCONTINUED | OUTPATIENT
Start: 2024-04-17 | End: 2024-04-18

## 2024-04-17 NOTE — PROGRESS NOTES
Patient is a 73 year old , , female with past medical history of anxiety, depression, GERD and back pain who was admitted to the hospital for altered mental status and confusion. The patient has been demonstrating confusion, restlessness, agitation. Patient indicated for psych consult for evaluation and advise.  Consult Duration   The patient seen for over 50-minute, follow-up evaluation, over 50% counseling and coordinating care addressing  confusion, restlessness, agitation.    Record reviewed, communication with attending, communication with RN and patient seen face to face evaluation.  History of Present Illness:   Communicating with the team, no issues reported overnight.   Otherwise the patient has not been communicating verbally.    Patient receiving Abilify 5 mg, klonopin 0.5 mg TID, lexapro 10 mg,    The patient seen today laying in hospital bed with  at bedside. The patient is still not communicating verbally and all information was provided by her . He noticed patient's eyes were wide open today and then she eventually fell asleep. He brought family pictures and points them out to her to see if she will respond.     According to patient's , patient has severe depression and trauma from significant life changes. They were living in [place] and patient enjoyed the nature and animals but moved back to the area 7 years ago.  Has been reporting that it was hard for him to continue working remotely.  Additionally he indicated that there [place] adopted son, Zenon, used to be very attentive to the family decided a year ago to separate himself from the family, stating he was tired of being with \"whites\" and that he would never speak to them again if they tried to find him. Patient also had 2 dogs that  from cancer.     Has been indicated that for the last year the patient was barely functioning sink in bed most of the time and her medication continue increasing by  virtual visit with a psychiatrist.    Has been indicating her depression is severely melancholic and did not respond to the medication.  He indicated that the reason he brought her here is for alcohol behavior and disorganized.    Patient has not been demonstrating any focal neurological symptoms otherwise seem mostly severely depressed with catatonic presentation.    Concerned that the patient depression has becoming resistant to the treatment and patient may be indicating the need for inpatient admission with ECT.      Otherwise the patient denies any suicidal ideation.    The patient today informed about the possibility for admission to inpatient psych facility and ECT treatment.    Otherwise I agree with patient and  the need for MRI to eliminate any other neurological causes.      Collateral obtained from psychiatric liaison from patients .     reports that patient has had uncontrollable movements, is taking her clothes off, has been confused and does not know the names of anybody.     says that since Friday she has had these difficulties and it seemed to have gotten worse the last 2 days where now, in addition to those difficulties, she also cannot control her bowels.  She says she has been soiling her clothes and her bed.   said he wonders if it is could be attributed to the recent prescribing of meds with codeine that were meant to manage a cough that she had.   also reports that patient has \"been in bed over a year\".  He says that she does not say much and sleeps a lot.  He says she is grieving the loss of 2 dogs that  5 years ago as well as grieving the loss of their adopted son, who 7 yrs ago, at 33 y/o walked out of their lives.   He said that pt has friends and she does occasionally go to lunch with them and does attend AA meetings once a week.  He said that she will eat the food that he cooks for her.    states that he is power of  for his  wife.  Counselor advised him to bring copies of that paperwork to the hospital so that staff can scan it into the clinic record.      Past Psychiatric/Medication History:  1. Prior diagnoses: anxiety, depression  2. Past psychiatric inpatient: 1 psychiatric admission for severe depression few years ago with [facility].  3. Past outpatient history: Patient seen by psychiatrist.  4. Past suicide history: Denied any  5. Medication history: Abilify, bupropion, Lexapro, hydroxyzine.    Social History:   Patient lives with her  and adult son (41 years old). She is a retired teacher.    Patient had an adopted son (34 year old) who left the family 7 years ago.     Patient has been sober from alcohol for 9 years. Patient uses marijuana gummies at night.     Family History:  Unknown family history  Medical History:   Past Medical History  Past Medical History:    Anxiety state    Back problem    COMPRESSION FX    Cataract    Depression    Esophageal reflux    Hematoma and contusion of liver    STATES HAS BEEN THERE FOR MANY YEARS    History of fractured kneecap    RIGHT    IBS (irritable bowel syndrome)    Mitral prolapse    Osteoarthritis       Past Surgical History  Past Surgical History:   Procedure Laterality Date          Correct bunion,othr methods      Correct bunion,simple      BILAT.     Dilation/curettage,diagnostic      FOR \"MOLE PREGNANCY\"    Other Right     ORIF RIGHT ANKLE    Spine surgery procedure unlisted      cervical spine 2020    Total knee replacement         Family History  Family History   Problem Relation Age of Onset    Heart Disorder Father     Cancer Mother         ESOPHAGUS       Social History  Social History     Socioeconomic History    Marital status:    Tobacco Use    Smoking status: Former     Current packs/day: 2.00     Average packs/day: 2.0 packs/day for 15.0 years (30.0 ttl pk-yrs)     Types: Cigarettes    Smokeless tobacco: Never    Tobacco comments:      QUIT IN 1983   Vaping Use    Vaping status: Never Used   Substance and Sexual Activity    Alcohol use: No    Drug use: No     Social Determinants of Health     Financial Resource Strain: Low Risk  (12/12/2022)    [facility]    Overall Financial Resource Strain (CARDIA)     Difficulty of Paying Living Expenses: Not hard at all   Food Insecurity: No Food Insecurity (4/11/2024)    Food Insecurity     Food Insecurity: Never true   Transportation Needs: No Transportation Needs (4/11/2024)    Transportation Needs     Lack of Transportation: No    [facility]    Social Connections   Housing Stability: Low Risk  (4/11/2024)    Housing Stability     Housing Instability: No           Current Medications:  Current Facility-Administered Medications   Medication Dose Route Frequency    ARIPiprazole (Abilify) tab 5 mg  5 mg Oral Nightly    clonazePAM (KlonoPIN) tab 0.5 mg  0.5 mg Oral Nightly    escitalopram (Lexapro) tab 15 mg  15 mg Oral Nightly    enoxaparin (Lovenox) 40 MG/0.4ML SUBQ injection 40 mg  40 mg Subcutaneous Daily    ascorbic acid (Vitamin C) tab 500 mg  500 mg Oral Daily    cetirizine (ZyrTEC) tab 5 mg  5 mg Oral Nightly    acetaminophen (Tylenol Extra Strength) tab 500 mg  500 mg Oral Q4H PRN    melatonin tab 3 mg  3 mg Oral Nightly PRN    ondansetron (Zofran) 4 MG/2ML injection 4 mg  4 mg Intravenous Q6H PRN    metoclopramide (Reglan) 5 mg/mL injection 5 mg  5 mg Intravenous Q8H PRN    polyethylene glycol (PEG 3350) (Miralax) 17 g oral packet 17 g  17 g Oral Daily PRN    sennosides (Senokot) tab 17.2 mg  17.2 mg Oral Nightly PRN    bisacodyl (Dulcolax) 10 MG rectal suppository 10 mg  10 mg Rectal Daily PRN    fleet enema (Fleet) 7-19 GM/118ML rectal enema 133 mL  1 enema Rectal Once PRN    LORazepam (Ativan) tab 1 mg  1 mg Oral Q6H PRN    fluticasone propionate (Flonase) 50  MCG/ACT nasal suspension 1 spray  1 spray Each Nare Daily    albuterol (Ventolin HFA) 108 (90 Base) MCG/ACT inhaler 2 puff  2 puff Inhalation Q4H PRN    cholecalciferol (Vitamin D3) tab 1,000 Units  1,000 Units Oral Daily    LORazepam (Ativan) 2 mg/mL injection 2 mg  2 mg Intramuscular Q6H PRN    haloperidol lactate (Haldol) 5 MG/ML injection 2 mg  2 mg Intramuscular Q6H PRN     Medications Prior to Admission   Medication Sig    cholecalciferol 1000 UNITS Oral Cap Take 1 capsule (1,000 Units total) by mouth daily.       Allergies  Allergies   Allergen Reactions    Radiology Contrast Iodinated Dyes HIVES     HIVES AND THROAT TIGHTENED WHILE HAVING AN MRI    Sulfa Antibiotics HIVES     \"got sicker\"    Seroquel [Quetiapine] UNKNOWN    Cinnamon RASH       Review of Systems:   As by Admitting/Attending    Results:   Laboratory Data:  Lab Results   Component Value Date    WBC 12.1 (H) 04/13/2024    HGB 13.7 04/13/2024    HCT 40.6 04/13/2024    .0 04/13/2024    CREATSERUM 0.78 04/17/2024    BUN 13 04/17/2024     04/17/2024    K 3.6 04/17/2024     04/17/2024    CO2 24.0 04/17/2024     (H) 04/17/2024    CA 9.8 04/17/2024    ALB 4.1 04/13/2024    ALKPHO 78 04/13/2024    TP 6.4 04/13/2024    AST 22 04/13/2024    ALT 45 04/13/2024    T4F 1.7 04/12/2024    TSH 0.528 (L) 04/12/2024     01/19/2022    DDIMER <0.27 01/12/2023    CRP 8.33 (H) 09/04/2020    MG 2.2 04/17/2024    TROP <0.045 08/26/2020    B12 484 04/12/2024    ETOH <3 04/10/2024         Imaging:  No results found.    Vital Signs:   Blood pressure 117/82, pulse 110, temperature 97.7 °F (36.5 °C), temperature source Axillary, resp. rate 16, height 60\", weight 49.1 kg (108 lb 3.2 oz), SpO2 96%.    Mental Status Exam:   Appearance: Stated age female, in hospital gown, laying down in hospital bed.  Psychomotor: The patient demonstrating slight restlessness with repositioning herself frequently, otherwise no agitation but easily falling  asleep.  Orientation: Alert and oriented to self, date and location but not to exact condition.  Gait: Not evaluated.  Attitude/Coorperation: Patient has limited cooperation due to her distractibility.  Behavior: Patient would look at me and not answering the questions.  Speech: Speech was very limited today.  Mood: Patient demonstrated apathy with lack of ability to express emotion.  Affect: Dysphoric but restricted affect.  Thought process: Blocking thought process.  Thought content: Patient repeatedly denied any suicidal or homicidal ideation.  Perceptions: Patient denying any auditory visual hallucination  Concentration: Grossly distracted  Memory: Grossly limited  Intellect: Average.  Judgment and Insight: Questionable.     Impression:     Major depressive disorder, recurrent episode, severe with catatonia  Delirium due to another medical condition.    Patient is a 73 year old , , female with past medical history of anxiety, depression who was admitted to the hospital for Altered mental status, unspecified altered mental status type: The patient has been demonstrating confusion, restlessness, agitation.    The patient is restless, irritable, unable to communicate, keep moving back-and-forth and aimless movement with inability to express her emotion and not communicating verbally.  Patient has been taking multiple antidepressant medication with recent cough medicine with codeine indicate the high possibility for serotonin syndrome versus excited catatonia.    4/12/2024: The patient who presented with catatonic anxiety type mixed with serotonin syndrome due to excessive antidepressant medication and mixed with codeine.  The patient has been demonstrating improvement in her cognition, ability to communicate verbally otherwise demonstrating severe depression with no safety concern and no agitation.    4/12/2024: Patient continues to demonstrate alternation in her mood and cognition with episodes of  increased anxiety and restlessness.    4/15/2024: The patient continued demonstrating depression process and found withdrawn.  No restlessness or agitation has been observed or reported.    4/16/2024: The patient who has been demonstrating catatonic presentation with limited verbal communication continue demonstrate severe depressive episode would like for ability to care for self.    4/17/2024: The patient continues to demonstrate severe depression with catatonia, restlessness, and no verbal communication. Will order MRI brain and if negative transfer to inpatient psychiatric hospital for ECT.     Discussed risk and benefit, acknowledging the current symptom and severity.  At this point, I would recommend the following approach:     Focus on safety.    Focus on education and support.  Encourage patient to be in her recliner.  Continue Abilify 5 mg nightly.  Continue Lexapro 15 mg nightly.  Discontinue Klonopin.  Start Ativan 0.5 mg 3 times daily.  Start lithium 150 mg twice daily.  Order MRI and administer haldol 5 mg and ativan 2 mg in same syringe 30 minutes prior.  Coordinate plan with team    Orders This Visit:  Orders Placed This Encounter   Procedures    CBC With Differential With Platelet    Comp Metabolic Panel (14)    Urinalysis with Culture Reflex    Scan slide    Drug screen 7 w/out confirmation, urine    Ethyl Alcohol    Acetaminophen (Tylenol), S    Salicylate, Serum    Magnesium    Vitamin B12    TSH W Reflex To Free T4    T4, FREE (S)    Potassium    Free T3 (Triiodothryronine)    Potassium    Basic Metabolic Panel (8)       Meds This Visit:  Requested Prescriptions      No prescriptions requested or ordered in this encounter     Jason Mckenzie MD  4/17/2024    Note to Patient: The 21st Century Cures Act makes medical notes like these available to patients in the interest of transparency. However, be advised this is a medical document. It is intended as peer to peer communication. It is written in  medical language and may contain abbreviations or verbiage that are unfamiliar. It may appear blunt or direct. Medical documents are intended to carry relevant information, facts as evident, and the clinical opinion of the practitioner. This note may have been transcribed using a voice dictation system. Voice recognition errors may occur. This should not be taken to alter the content or meaning of this note.

## 2024-04-17 NOTE — PLAN OF CARE
Problem: Patient Centered Care  Goal: Patient preferences are identified and integrated in the patient's plan of care  Description: Interventions:  - What would you like us to know as we care for you? From home with spouse    - Provide timely, complete, and accurate information to patient/family  - Incorporate patient and family knowledge, values, beliefs, and cultural backgrounds into the planning and delivery of care  - Encourage patient/family to participate in care and decision-making at the level they choose  - Honor patient and family perspectives and choices  Outcome: Progressing    Problem: SAFETY ADULT - FALL  Goal: Free from fall injury  Description: INTERVENTIONS:  - Assess pt frequently for physical needs  - Identify cognitive and physical deficits and behaviors that affect risk of falls.  - [place] fall precautions as indicated by assessment.  - Educate pt/family on patient safety including physical limitations  - Instruct pt to call for assistance with activity based on assessment  - Modify environment to reduce risk of injury  - Provide assistive devices as appropriate  - Consider OT/PT consult to assist with strengthening/mobility  - Encourage toileting schedule  Outcome: Progressing     Problem: RESPIRATORY - ADULT  Goal: Achieves optimal ventilation and oxygenation  Description: INTERVENTIONS:  - Assess for changes in respiratory status  - Assess for changes in mentation and behavior  - Position to facilitate oxygenation and minimize respiratory effort  - Oxygen supplementation based on oxygen saturation or ABGs  - Provide Smoking Cessation handout, if applicable  - Encourage broncho-pulmonary hygiene including cough, deep breathe, Incentive Spirometry  - Assess the need for suctioning and perform as needed  - Assess and instruct to report SOB or any respiratory difficulty  - Respiratory Therapy support as indicated  - Manage/alleviate anxiety  - Monitor for signs/symptoms of CO2  retention  Outcome: Progressing     Problem: NEUROLOGICAL - ADULT  Goal: Achieves stable or improved neurological status  Description: INTERVENTIONS  - Assess for and report changes in neurological status  - Initiate measures to prevent increased intracranial pressure  - Maintain blood pressure and fluid volume within ordered parameters to optimize cerebral perfusion and minimize risk of hemorrhage  - Monitor temperature, glucose, and sodium. Initiate appropriate interventions as ordered  Outcome: Progressing     Problem: Impaired Cognition  Goal: Patient will exhibit improved attention, thought processing and/or memory  Description: Interventions:  - Minimize distractions in the room when full attention is required  Outcome: Progressing     Problem: PAIN - ADULT  Goal: Verbalizes/displays adequate comfort level or patient's stated pain goal  Description: INTERVENTIONS:  - Encourage pt to monitor pain and request assistance  - Assess pain using appropriate pain scale  - Administer analgesics based on type and severity of pain and evaluate response  - Implement non-pharmacological measures as appropriate and evaluate response  - Consider cultural and social influences on pain and pain management  - Manage/alleviate anxiety  - Utilize distraction and/or relaxation techniques  - Monitor for opioid side effects  - Notify MD/LIP if interventions unsuccessful or patient reports new pain  - Anticipate increased pain with activity and pre-medicate as appropriate  Outcome: Progressing     Problem: DISCHARGE PLANNING  Goal: Discharge to home or other facility with appropriate resources  Description: INTERVENTIONS:  - Identify barriers to discharge w/pt and caregiver  - Include patient/family/discharge partner in discharge planning  - Arrange for needed discharge resources and transportation as appropriate  - Identify discharge learning needs (meds, wound care, etc)  - Arrange for interpreters to assist at discharge as  needed  - Consider post-discharge preferences of patient/family/discharge partner  - Complete POLST form as appropriate  - Assess patient's ability to be responsible for managing their own health  - Refer to Case Management Department for coordinating discharge planning if the patient needs post-hospital services based on physician/LIP order or complex needs related to functional status, cognitive ability or social support system  Outcome: Progressing     Problem: METABOLIC/FLUID AND ELECTROLYTES - ADULT  Goal: Glucose maintained within prescribed range  Description: INTERVENTIONS:  - Monitor Blood Glucose as ordered  - Assess for signs and symptoms of hyperglycemia and hypoglycemia  - Administer ordered medications to maintain glucose within target range  - Assess barriers to adequate nutritional intake and initiate nutrition consult as needed  - Instruct patient on self management of diabetes  Outcome: Progressing  Goal: Electrolytes maintained within normal limits  Description: INTERVENTIONS:  - Monitor labs and rhythm and assess patient for signs and symptoms of electrolyte imbalances  - Administer electrolyte replacement as ordered  - Monitor response to electrolyte replacements, including rhythm and repeat lab results as appropriate  - Fluid restriction as ordered  - Instruct patient on fluid and nutrition restrictions as appropriate  Outcome: Progressing  Goal: Hemodynamic stability and optimal renal function maintained  Description: INTERVENTIONS:  - Monitor labs and assess for signs and symptoms of volume excess or deficit  - Monitor intake, output and patient weight  - Monitor urine specific gravity, serum osmolarity and serum sodium as indicated or ordered  - Monitor response to interventions for patient's volume status, including labs, urine output, blood pressure (other measures as available)  - Encourage oral intake as appropriate  - Instruct patient on fluid and nutrition restrictions as  appropriate  Outcome: Progressing      Reattempt MRI of brain per MD, give Haldol 5mg/Ativan 2mg IM prior to test. Patient was able to walk to bathroom. No other changes, safety precautions in place, no other changes.

## 2024-04-17 NOTE — PROGRESS NOTES
[facility] Hospitalist Progress Note     CC: Hospital Follow up    PCP: SIRI TABOR       Assessment/Plan:     Principal Problem:    Altered mental status, unspecified altered mental status type  Active Problems:    Altered mental status    Depression, unspecified depression type    Catatonic excited type    Serotonin syndrome    Severe episode of recurrent major depressive disorder, without psychotic features (HCC)    Ms. Antonio is a 73 year old female with PMH sig for MVP, EDEL/MDD, GERD, IBS, who presents with AMS from depressive disorder.      AMS  EDEL/MDD  Behavioral changes with nervous breakdown   Possible Serotonin Syndrome?   - afebrile and normotensive  - Leukocytosis with bicarb 19, elevated AST ALT  - Urine positive for cannabis and ecstasy  - b12 normal, TSH borderline low   - Imaging was done 2 days before admit at ER visit due to cough  - CT head with no acute process, chest x-ray with no acute process  - Recent steroids use for URI symptoms   - Psych consulted   - polypharmacy  - will try to reset medication   - Certificate, petition, sitter dc'd   - failed MRI brain attempt on 4/14/24, will attempt with higher dose of IM haldol and IM ativan per psych recs  - currently tolerating medications        FN:  - IVF: none  - Diet: general      DVT Prophy: SCDs lovenox   Atrophy: Ambulate   Lines: Piv     Dispo: pending clinical course, may need inpatient psych pending course     Outpatient records or previous hospital records reviewed.      Further recommendations pending patient's clinical course.  [facility] hospitalist to continue to follow patient while in house     Patient and/or patient's family given opportunity to ask questions and note understanding and agreeing with therapeutic plan as outlined     Drea Yeboah MD  [facility] Hospitalist  Answering Service number: [phone]     Subjective:     Opens eyes to voice, not responding verbally.     OBJECTIVE:    Blood pressure 117/82, pulse  110, temperature 97.7 °F (36.5 °C), temperature source Axillary, resp. rate 16, height 5' (1.524 m), weight 108 lb 3.2 oz (49.1 kg), SpO2 96%.    Temp:  [97.6 °F (36.4 °C)-98 °F (36.7 °C)] 97.7 °F (36.5 °C)  Pulse:  [107-116] 110  Resp:  [16-20] 16  BP: (117-137)/() 117/82  SpO2:  [92 %-96 %] 96 %      Intake/Output:    Intake/Output Summary (Last 24 hours) at 4/17/2024 1435  Last data filed at 4/17/2024 0919  Gross per 24 hour   Intake 100 ml   Output --   Net 100 ml       Last 3 Weights   04/11/24 0110 108 lb 3.2 oz (49.1 kg)   04/08/24 1116 122 lb (55.3 kg)   01/12/23 1210 115 lb (52.2 kg)     Exam   GEN: elderly female in NAD, opens eyes to voice, not responding verbally  HEENT: EOMI  Pulm: CTAB, no crackles or wheezes  CV: tachycardic, regular rhythm, no murmurs  ABD: Soft, non-tender, non-distended, +BS  SKIN: warm, dry  EXT: no edema    Data Review:       Labs:     Recent Labs   Lab 04/10/24  1726 04/12/24  0621 04/13/24  0604   RBC 5.08 4.73 4.95   HGB 13.8 13.0 13.7   HCT 42.1 38.7 40.6   MCV 82.9 81.8 82.0   MCH 27.2 27.5 27.7   MCHC 32.8 33.6 33.7   RDW 13.2 12.8 12.9   NEPRELIM 9.79* 11.95* 6.49   WBC 16.4* 18.1* 12.1*   .0 275.0 319.0         Recent Labs   Lab 04/15/24  0632 04/16/24  0741 04/17/24  0541   * 153* 154*   BUN 15 16 13   CREATSERUM 0.82 0.79 0.78   EGFRCR 75 79 80   CA 9.7 9.7 9.8    140 142   K 3.6 3.6 3.6    109 109   CO2 24.0 24.0 24.0       Recent Labs   Lab 04/10/24  1726 04/12/24  0621 04/13/24  0604   ALT 68* 53* 45   AST 93* 35* 22   ALB 4.8 4.1 4.1         Imaging:  No results found.      Meds:      ARIPiprazole  5 mg Oral Nightly    clonazePAM  0.5 mg Oral Nightly    escitalopram  15 mg Oral Nightly    enoxaparin  40 mg Subcutaneous Daily    ascorbic acid  500 mg Oral Daily    cetirizine  5 mg Oral Nightly    fluticasone propionate  1 spray Each Nare Daily    cholecalciferol  1,000 Units Oral Daily         haloperidol lactate    acetaminophen     melatonin    ondansetron    polyethylene glycol (PEG 3350)    sennosides    bisacodyl    fleet enema    LORazepam    albuterol    LORazepam    haloperidol lactate

## 2024-04-17 NOTE — SPIRITUAL CARE NOTE
Spiritual Care Visit Note    Patient Name: Meli Antonio Date of Spiritual Care Visit: 24   : 11/3/1950 Primary Dx: Altered mental status, unspecified altered mental status type       Referred By: Referral From:     Spiritual Care Taxonomy:    Intended Effects: Establish rapport and connectedness    Methods: Offer support    Interventions: Silent prayer    Visit Type/Summary:     - Spiritual Care: Attempted visit. Patient is unavailable. Left a Spiritual Care contact information card.    Spiritual Care support can be requested via an Epic consult. For urgent/immediate needs, please contact the On Call  at: [place]: ext [phone]    MAYA Valentin CAMII   [phone]

## 2024-04-18 ENCOUNTER — APPOINTMENT (OUTPATIENT)
Dept: MRI IMAGING | Facility: HOSPITAL | Age: 74
End: 2024-04-18
Attending: Other
Payer: MEDICARE

## 2024-04-18 VITALS
SYSTOLIC BLOOD PRESSURE: 133 MMHG | HEIGHT: 60 IN | BODY MASS INDEX: 21.24 KG/M2 | RESPIRATION RATE: 18 BRPM | WEIGHT: 108.19 LBS | OXYGEN SATURATION: 92 % | HEART RATE: 99 BPM | DIASTOLIC BLOOD PRESSURE: 82 MMHG | TEMPERATURE: 98 F

## 2024-04-18 PROBLEM — F32.9 MDD (MAJOR DEPRESSIVE DISORDER): Status: ACTIVE | Noted: 2024-04-18

## 2024-04-18 LAB
ANION GAP SERPL CALC-SCNC: 7 MMOL/L (ref 0–18)
BUN BLD-MCNC: 13 MG/DL (ref 9–23)
CALCIUM BLD-MCNC: 9.7 MG/DL (ref 8.7–10.4)
CHLORIDE SERPL-SCNC: 108 MMOL/L (ref 98–112)
CO2 SERPL-SCNC: 26 MMOL/L (ref 21–32)
CREAT BLD-MCNC: 0.84 MG/DL
DEPRECATED RDW RBC AUTO: 41.1 FL (ref 35.1–46.3)
EGFRCR SERPLBLD CKD-EPI 2021: 73 ML/MIN/1.73M2 (ref 60–?)
ERYTHROCYTE [DISTWIDTH] IN BLOOD BY AUTOMATED COUNT: 13.5 % (ref 11–15)
GLUCOSE BLD-MCNC: 152 MG/DL (ref 70–99)
HCT VFR BLD AUTO: 42.3 %
HGB BLD-MCNC: 13.8 G/DL
MAGNESIUM SERPL-MCNC: 2.3 MG/DL (ref 1.6–2.6)
MCH RBC QN AUTO: 27.8 PG (ref 26–34)
MCHC RBC AUTO-ENTMCNC: 32.6 G/DL (ref 31–37)
MCV RBC AUTO: 85.1 FL
PLATELET # BLD AUTO: 562 10(3)UL (ref 150–450)
POTASSIUM SERPL-SCNC: 4.4 MMOL/L (ref 3.5–5.1)
RBC # BLD AUTO: 4.97 X10(6)UL
SARS-COV-2 RNA RESP QL NAA+PROBE: NOT DETECTED
SODIUM SERPL-SCNC: 141 MMOL/L (ref 136–145)
T PALLIDUM AB SER QL IA: NONREACTIVE
WBC # BLD AUTO: 10.6 X10(3) UL (ref 4–11)

## 2024-04-18 PROCEDURE — 99233 SBSQ HOSP IP/OBS HIGH 50: CPT | Performed by: OTHER

## 2024-04-18 PROCEDURE — 70551 MRI BRAIN STEM W/O DYE: CPT | Performed by: OTHER

## 2024-04-18 RX ORDER — ARIPIPRAZOLE 5 MG/1
5 TABLET ORAL NIGHTLY
Status: ON HOLD | COMMUNITY
Start: 2024-04-18

## 2024-04-18 RX ORDER — VITAMIN E 268 MG
400 CAPSULE ORAL DAILY
Status: DISCONTINUED | OUTPATIENT
Start: 2024-04-18 | End: 2024-04-18

## 2024-04-18 RX ORDER — LORAZEPAM 0.5 MG/1
0.5 TABLET ORAL 3 TIMES DAILY
Status: ON HOLD | COMMUNITY
Start: 2024-04-18

## 2024-04-18 RX ORDER — LITHIUM CARBONATE 150 MG/1
150 CAPSULE ORAL 2 TIMES DAILY WITH MEALS
Status: ON HOLD | COMMUNITY
Start: 2024-04-18

## 2024-04-18 RX ORDER — ESCITALOPRAM OXALATE 5 MG/1
15 TABLET ORAL NIGHTLY
Status: ON HOLD | COMMUNITY
Start: 2024-04-18

## 2024-04-18 RX ORDER — ALBUTEROL SULFATE 90 UG/1
2 AEROSOL, METERED RESPIRATORY (INHALATION) EVERY 4 HOURS PRN
Status: ON HOLD | COMMUNITY
Start: 2024-04-18 | End: 2024-04-18

## 2024-04-18 NOTE — CM/SW NOTE
04/18/24 1600   Discharge disposition   Expected discharge disposition Psychiatric   Post Acute Care Provider   ([facility])   Discharge transportation [facility]      confirmed with BABATUNDE Villalpando that pt is medically ready for discharge today/accepted at [facility],   scheduled [facility] for 7:00 PM psych transport.  BABATUNDE Villalpando is aware of discharge time and location and will inform patient/ family.     PLAN: DC to [facility] via [facility] at 8:00 PM.  PCS completed.    Raquel Phillips MSW, LSW [phone]

## 2024-04-18 NOTE — DISCHARGE SUMMARY
General Medicine Discharge Summary     Patient ID:  Meli Antonio  73 year old  11/3/1950    Admit date: 4/10/2024    Discharge date and time: 04/18/24    Attending Physician: Drea Yeboah MD     Primary Care Physician: SIRI TABOR     Reason for admission: AMS    Discharge Diagnoses: Altered mental status, unspecified altered mental status type [R41.82]  Depression, unspecified depression type [F32.A]    Discharged Condition: stable    Disposition:  inpatient psych    Consults:   Consultants         Provider   Role Specialty     Jason Mckenzie MD      Consulting Physician Psychiatry              HPI:  Per Dr. Jung    Patient is a 73 year old female with PMH sig for MVP, EDEL/MDD, GERD, IBS, who presents with AMS.  Patient's  who is the POA brought her in because she was acting different.  She has a history of depression and mood changes but this seemed a little more concerning to him.  2 days ago patient did have a cough which seems to be better at this time.  Patient was put on abx and steroids.   at bedside states one of their adopted sons  moved away and another son is going through a divorce and this has put a lot of stress on her.  She had some lose stools 1-2 times.  Patient denies CP, SOB, abd pain, n/v, f/c, dysuria.        In the ER she was afebrile and normotensive.  Leukocytosis with bicarb 19, elevated AST ALT  Urine positive for cannabis and ecstasy    Hospital Course:       Ms. Antonio is a 73 year old female with PMH sig for MVP, EDEL/MDD, GERD, IBS, who presents with AMS from depressive disorder. Psych consulted. Less likely 2/2 serotonin syndrome. MRI brain without acute findings. Plan for inpatient psych on discharge.      AMS  EDEL/MDD  Behavioral changes with nervous breakdown   Rule out Serotonin Syndrome?   - afebrile and normotensive  - Leukocytosis with bicarb 19, elevated AST ALT  - Urine positive for cannabis and ecstasy  - b12 normal, TSH borderline low   -  Imaging was done 2 days before admit at ER visit due to cough  - CT head with no acute process, chest x-ray with no acute process  - Recent steroids use for URI symptoms   - Psych consulted   - polypharmacy  - will try to reset medication   - becky Link sitter dc'd   - MRI brain without acute findings  - plan for inpatient psych on discharge     Exam   GEN: elderly female in NAD, opens eyes to voice, says no to some questions  HEENT: EOMI  Pulm: CTAB, no crackles or wheezes  CV: tachycardic, regular rhythm, no murmurs  ABD: Soft, non-tender, non-distended, +BS  SKIN: warm, dry  EXT: no edema    Operative Procedures:      Imaging: MRI BRAIN (CPT=70551)    Result Date: 4/18/2024  CONCLUSION:  1. No acute intracranial process. 2. 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 3. Mild chronic microangiopathic ischemic changes.   A preliminary report was issued by the [facility] teleradiology service. There are no major discrepancies.  [facility]  Dictated by (CST): Boris Rodriguez MD on 4/18/2024 at 7:11 AM     Finalized by (CST): Boris Rodriguez MD on 4/18/2024 at 7:21 AM               Home Medication Changes:     I reconciled current and discharge medications on day of discharge. These medication changes have been made as below         Medication List        START taking these medications      ARIPiprazole 5 MG Tabs  Commonly known as: Abilify     escitalopram 5 MG Tabs  Commonly known as: Lexapro     lithium carbonate 150 MG Caps     LORazepam 0.5 MG Tabs  Commonly known as: Ativan            CONTINUE taking these medications      albuterol 108 (90 Base) MCG/ACT Aers  Commonly known as: Ventolin HFA     cholecalciferol 1000 UNITS Caps  Commonly known as: Vitamin D3            STOP taking these medications      azithromycin 250 MG Tabs  Commonly known as: Zithromax Z-Alok     benzonatate 100 MG Caps  Commonly known as: Tessalon     guaiFENesin-codeine 100-10  MG/5ML Soln  Commonly known as: Robitussin AC     predniSONE 20 MG Tabs  Commonly known as: Deltasone              Activity: activity as tolerated  Diet: regular diet  Wound Care: none needed  Code Status: Full Code  O2: n/a    Follow-up with:    PCP   Specialist psych       FU      DC instructions:      Other Discharge Instructions:         At this time, it is recommended that you follow up with the following referrals for psychiatry and therapy. Please call 911 or go to the nearest emergency room if symptoms persist or worsen.    Dr. Jason Mckenzie MD:  [address], [place] [address]  [facility]: [address] [place] [address]  Phone: [phone]    [facility]: [address] [place] [address]. [address] [place] [address]  Phone: [phone]    Dr. Christiana Madison MD: [address] [facility] [place] [address]  Phone: [phone]    [facility]: [address] [place] [address]  Phone: [phone]    Dr. Alma La MD: [address] [place] [address]  Phone: [phone]    [facility] - therapy and [facility]ry: [address] [place] [address]  Phone: [phone]          Follow-up with labs: none    Total Time Coordinating Care: 31 minutes    Patient had opportunity to ask questions and state understand and agree with therapeutic plan as outlined      Drea Yeboah MD  [facility] Hospitalist

## 2024-04-18 NOTE — BH PROGRESS NOTE
Dr. Santiago accepted pt to [facility]. Writer obtained DV consent for inpatient from pt's POA. Writer gave SBAR to Sherice and provided pt's RN Irasema with N2N # to call for report and set up transport.

## 2024-04-18 NOTE — PLAN OF CARE
Pt alert not orientated. Unable to follow commands. PRN ativan and haldol IM given per Dr. Mckenzie prior to MRI, MRI completed. Bed locked and in lowest condition. Frequent nursing rounding done. All safety measures maintained.     Problem: SAFETY ADULT - FALL  Goal: Free from fall injury  Description: INTERVENTIONS:  - Assess pt frequently for physical needs  - Identify cognitive and physical deficits and behaviors that affect risk of falls.  - [place] fall precautions as indicated by assessment.  - Educate pt/family on patient safety including physical limitations  - Instruct pt to call for assistance with activity based on assessment  - Modify environment to reduce risk of injury  - Provide assistive devices as appropriate  - Consider OT/PT consult to assist with strengthening/mobility  - Encourage toileting schedule  Outcome: Progressing     Problem: RESPIRATORY - ADULT  Goal: Achieves optimal ventilation and oxygenation  Description: INTERVENTIONS:  - Assess for changes in respiratory status  - Assess for changes in mentation and behavior  - Position to facilitate oxygenation and minimize respiratory effort  - Oxygen supplementation based on oxygen saturation or ABGs  - Provide Smoking Cessation handout, if applicable  - Encourage broncho-pulmonary hygiene including cough, deep breathe, Incentive Spirometry  - Assess the need for suctioning and perform as needed  - Assess and instruct to report SOB or any respiratory difficulty  - Respiratory Therapy support as indicated  - Manage/alleviate anxiety  - Monitor for signs/symptoms of CO2 retention  Outcome: Progressing     Problem: PAIN - ADULT  Goal: Verbalizes/displays adequate comfort level or patient's stated pain goal  Description: INTERVENTIONS:  - Encourage pt to monitor pain and request assistance  - Assess pain using appropriate pain scale  - Administer analgesics based on type and severity of pain and evaluate response  - Implement non-pharmacological  measures as appropriate and evaluate response  - Consider cultural and social influences on pain and pain management  - Manage/alleviate anxiety  - Utilize distraction and/or relaxation techniques  - Monitor for opioid side effects  - Notify MD/LIP if interventions unsuccessful or patient reports new pain  - Anticipate increased pain with activity and pre-medicate as appropriate  Outcome: Progressing     Problem: METABOLIC/FLUID AND ELECTROLYTES - ADULT  Goal: Glucose maintained within prescribed range  Description: INTERVENTIONS:  - Monitor Blood Glucose as ordered  - Assess for signs and symptoms of hyperglycemia and hypoglycemia  - Administer ordered medications to maintain glucose within target range  - Assess barriers to adequate nutritional intake and initiate nutrition consult as needed  - Instruct patient on self management of diabetes  Outcome: Progressing  Goal: Electrolytes maintained within normal limits  Description: INTERVENTIONS:  - Monitor labs and rhythm and assess patient for signs and symptoms of electrolyte imbalances  - Administer electrolyte replacement as ordered  - Monitor response to electrolyte replacements, including rhythm and repeat lab results as appropriate  - Fluid restriction as ordered  - Instruct patient on fluid and nutrition restrictions as appropriate  Outcome: Progressing

## 2024-04-18 NOTE — CERTIFICATION
Ref: [facility] 5/3-403, 5/3-602, 5/3-607, 5/3-610    5/3-702, 5/3-813, 5/4-306, 5/4-402, 5/4-403    5/4-405, 5/4-501, 5/4-611, 5/4-705   Inpatient Certificate  Re: Meli Antonio    (name)     I personally informed the above-named individual of the purpose of this examination and that he or she did not have to speak to me, and that any statements made might be related in court as to the individual's clinical condition or need for services.  Additionally, if this examination was for the purpose of determining that the above-named individual is developmentally disabled and dangerous, I informed the individual of his or her right to speak with a relative, friend or  before the examination, and of his or her right to have an  appointed for him or her if he or she so desired.     Electronically signed by Jason Mckenzie MD    Signature of Examiner     On 4/18 , 2024 , at 1: 15  [] a.m. [x] p.m.,  I personally examined the    (date)  (year)  (time)    above-named individual. The examination was conducted in person at [facility].  Or   [] Via Interactive Communication System (telepsychiatry)      Based on the foregoing examination, it is my opinion that he or she is:  []  A person with mental illness who, because of his or her illness is reasonably expected, unless treated on an inpatient basis, to engage in conduct placing such person or another in physical harm or in reasonable expectation of being physically harmed;   [x]  A person with mental illness who, because of his or her illness is unable to provide for his or her basic physical needs so as to guard himself or herself from serious harm, without the assistance of family or others, unless treated on an inpatient basis;   []  A person with mental illness who: refuses treatment or is not adhering adequately to prescribed treatment; because of the nature of his or her illness is unable to understand his or her need for treatment; and if not  treated on an inpatient basis, is reasonably expected based on his or her behavioral history, to suffer mental or emotional deterioration and is reasonably expected, after such deterioration, to meet the criteria of either paragraph one or paragraph two above;   []  An individual who is developmentally disabled and unless treated on an in-patient basis is reasonably expected to inflict serious physical harm upon himself or herself or others in the near future, and/or   [x]  Is in need of immediate hospitalization for the prevention of such harm.   I base my opinion on the following (including clinical observations, factual information):  The patient with history of depression who presented to the hospital with severe catatonic presentation, melancholic feeling, lack of energy, hopelessness and helplessness demonstrating severe depression with inability to care for self indicating the need for inpatient admission for safety and stabilization.  I believe that the individual is subject to: []  Involuntary inpatient admission and is not in need of immediate hospitalization   (check one) [x]  Involuntary inpatient admission and is in need of immediate hospitalization    []  Judicial inpatient admission and is not in need of immediate hospitalization    []  Judicial inpatient admission and is in need of immediate hospitalization     Date: 4/18/2024 Signature: Electronically signed by Jason Mckenzie MD   Title: MD Printed Name: Jason Mckenzie MD     -2006 (R-12-22) Inpatient Certificate  Printed by Authority of the [facility] -0- Copies Page 1 of 1

## 2024-04-18 NOTE — PROGRESS NOTES
POA paperwork scanned to pt chart.     [facility] contacted pt /POA [place] to discuss recommendation for inpatient psych treatment. Reviewed the rights of individuals receiving mental health and developmental disability services with POA. POA was agreeable to signing the voluntary and expressed understanding and consent. Crisis worker Nikole OSEGUERA signed as witness.     Signed voluntary was scanned into pt chart and sent to [facility] per protocol.         KHUSHBOO Shaw  [id number]

## 2024-04-18 NOTE — DIETARY NOTE
ADULT NUTRITION INITIAL ASSESSMENT      RECOMMENDATIONS TO MD: See Nutrition Intervention    Pt is at moderate nutrition risk.  Pt does not meet malnutrition criteria.       ADMITTING DIAGNOSIS:  Altered mental status, unspecified altered mental status type [R41.82]Depression, unspecified depression type [F32.A]     PERTINENT PAST MEDICAL HISTORY:    Past Medical History:    Anxiety state    Back problem    COMPRESSION FX    Cataract    Depression    Esophageal reflux    Hematoma and contusion of liver    STATES HAS BEEN THERE FOR MANY YEARS    History of fractured kneecap    RIGHT    IBS (irritable bowel syndrome)    Mitral prolapse    Osteoarthritis    has a past surgical history that includes ; dilation/curettage,diagnostic; other (Right); correct bunion,simple; correct bunion,othr methods; spine surgery procedure unlisted; and total knee replacement.     PATIENT STATUS: Initial 24: Patient (pt) identified at Nutrition risk due to poor po and significant wt loss  after the Re-screening process.  Diagnosis & PMH above.  Medical findings:  Catatonic excited type, possible serotonin syndrome, severe depression.   Upon visit, pt poor historian, no meaningful conversation. Noted variable po intake recorded, from 10-75%. Average meal intake of 33% in 2-3 meals in the past 7 days here ( inadequate). Diet hx/eval:    unknown, will reach out to family when time allows on follow up. Wt eval:    10.7% or 13# significant wt loss in the past 4 months based on 121# on dec 2023 per Emr wt history, compared to current wt of 108#.  Agree with current diet and need to initiate Oral Nutrition Supplement ( ONS ) to  maximize po.     FOOD/NUTRITION INTAKE ANALYSIS:    Current Appetite: Poor to fair  Current Intake:  poor to marginal po intake.   Current Intake Meeting Needs: No, but oral nutrition supplements (ONS) to maximize  Percent Meals Eaten (last 6 days)       Date/Time Percent Meals Eaten (%)    24 3865  50 %    04/12/24 1435 20 %    04/13/24 1000 50 %    04/13/24 1500 15 %    04/14/24 1030 75 %    04/14/24 1628 10 %    04/14/24 1900 25 %    04/15/24 0908 30 %    04/15/24 1512 --     Percent Meals Eaten (%): pt refused lunch at 04/15/24 1512    04/15/24 1841 40 %    04/16/24 0911 20 %    04/16/24 1400 30 %    04/17/24 0919 20 %    04/17/24 1500 10 %    04/18/24 1028 25 %    04/18/24 1300 0 %     Percent Meals Eaten (%): refused at 04/18/24 1300           Food Allergies:  cinnamon  Cultural/Ethnic/Taoist Preferences: Not Obtained    GASTROINTESTINAL: +BM 4/16 Soft brown large stool x 1      MEDICATIONS: reviewed      lithium carbonate  150 mg Oral BID with meals    LORazepam  0.5 mg Oral TID    ARIPiprazole  5 mg Oral Nightly    escitalopram  15 mg Oral Nightly    enoxaparin  40 mg Subcutaneous Daily    ascorbic acid  500 mg Oral Daily    cetirizine  5 mg Oral Nightly    fluticasone propionate  1 spray Each Nare Daily    cholecalciferol  1,000 Units Oral Daily       LABS: reviewed   Last A1c value was 5.8% done 4/11/2017.    Recent Labs     04/15/24  0632 04/16/24  0741 04/17/24  0541 04/18/24  0546 04/18/24  0726   * 153* 154* 152*  --    BUN 15 16 13  --  13   CREATSERUM 0.82 0.79 0.78 0.84  --    CA 9.7 9.7 9.8 9.7  --    MG 2.2 2.2 2.2 2.3  --     140 142 141  --    K 3.6 3.6 3.6  --  4.4    109 109 108  --    CO2 24.0 24.0 24.0 26.0  --    OSMOCALC 292 294 297*  --   --        NUTRITION RELATED PHYSICAL FINDINGS:  - Nutrition Focused Physical Exam (NFPE): no wasting noted   - Fluid Accumulation: none  see RN documentation for details  - Skin Integrity: at risk see RN documentation for details    ANTHROPOMETRICS:  HT: 152.4 cm (5')  WT: 49.1 kg (108 lb 3.2 oz)   BMI: Body mass index is 21.13 kg/m².  BMI CLASSIFICATION: <22 considered underweight for advanced age  IBW: 100 lbs        108% IBW  Usual Body Wt: 121 lbs based on emr wt hx on dec 2023      89% UBW  WEIGHT HISTORY:    Patient  Weight(s) for the past 336 hrs:   Weight   04/11/24 0110 49.1 kg (108 lb 3.2 oz)     Wt Readings from Last 10 Encounters:   04/11/24 49.1 kg (108 lb 3.2 oz)   04/08/24 55.3 kg (122 lb)   01/12/23 52.2 kg (115 lb)   10/06/20 53.5 kg (118 lb)   09/07/20 52.1 kg (114 lb 12.8 oz)   04/10/17 56.2 kg (123 lb 12.8 oz)       NUTRITION DIAGNOSIS/PROBLEM:    Inadequate oral intake related to Decreased ability to consume sufficient energy in the setting of psychological deficit as evidenced by po intake <50% over the course admission and notable 10.7% significant wt loss in the past 4 months based on EMR wt review.     NUTRITION INTERVENTION:     NUTRITION PRESCRIPTION:   Estimated Nutrition needs: Dosing wt of 49 kg --wt taken on 4/11 .  Calories: 8455-0017 calories/day (30-32 calories per kg Dosing wt)  Protein: 59 g protein/day (1.2 g protein/kg  Dosing wt)    - Diet:       Procedures    Regular/General diet Is Patient on Accuchecks? No   - Meals and snacks: Encouraged small frequent meals and Encouraged increased PO intake  - Medical Food Supplements- Ensure Enlive (350 calories/ 20 g protein each) Daily Vanilla--added by RD,  Rational/Benefits discussed.  - Vitamin and mineral supplements: none  - Feeding assistance: meal set up  - Nutrition education: not appropriate at this time   - Coordination of nutrition care: collaboration with other providers  - Discharge and transfer of nutrition care to new setting or provider: monitor plans. Plan discharge to Inpatient psych.       MONITOR AND EVALUATE/NUTRITION GOALS:  - Food and Nutrient Intake:    Monitor: adequacy of PO intake and adequacy of supplement intake  - Food and Nutrient Administration:    Monitor: N/A  - Anthropometric Measurement:    Monitor weight  - Nutrition Goals:    halt wt loss, regain wt as able, PO and supplement greater than 75% of needs, prevent skin breakdown, and promote healing      RD will follow up.    Padmini Trevino, RD, LDN, [facility]  Clinical  Dietitian  [phone]

## 2024-04-18 NOTE — PROGRESS NOTES
[facility] Hospitalist Progress Note     CC: Hospital Follow up    PCP: SIRI TABOR       Assessment/Plan:     Principal Problem:    Altered mental status, unspecified altered mental status type  Active Problems:    Altered mental status    Depression, unspecified depression type    Catatonic excited type    Serotonin syndrome    Major depressive disorder, recurrent episode, severe with catatonia (HCC)    Ms. Antonio is a 73 year old female with PMH sig for MVP, EDEL/MDD, GERD, IBS, who presents with AMS from depressive disorder.      AMS  EDEL/MDD  Behavioral changes with nervous breakdown   Possible Serotonin Syndrome?   - afebrile and normotensive  - Leukocytosis with bicarb 19, elevated AST ALT  - Urine positive for cannabis and ecstasy  - b12 normal, TSH borderline low   - Imaging was done 2 days before admit at ER visit due to cough  - CT head with no acute process, chest x-ray with no acute process  - Recent steroids use for URI symptoms   - Psych consulted   - polypharmacy  - will try to reset medication   - Certificate, petition, sitter dc'd   - MRI brain without acute findings  - plan for inpatient psych on discharge     FN:  - IVF: none  - Diet: general      DVT Prophy: SCDs lovenox   Atrophy: Ambulate   Lines: Piv     Dispo: medically clear for discharge, will need inpatient psych      Outpatient records or previous hospital records reviewed.      Further recommendations pending patient's clinical course.  [facility] hospitalist to continue to follow patient while in house     Patient and/or patient's family given opportunity to ask questions and note understanding and agreeing with therapeutic plan as outlined     Drea Yeboah MD  [facility] Hospitalist  Answering Service number: [phone]     Subjective:     Responds no to some questions, no complaints    OBJECTIVE:    Blood pressure 132/84, pulse 103, temperature 97.7 °F (36.5 °C), temperature source Axillary, resp. rate 18, height 5' (1.524  m), weight 108 lb 3.2 oz (49.1 kg), SpO2 94%.    Temp:  [97.7 °F (36.5 °C)-98.5 °F (36.9 °C)] 97.7 °F (36.5 °C)  Pulse:  [] 103  Resp:  [16-18] 18  BP: (118-132)/(80-92) 132/84  SpO2:  [90 %-96 %] 94 %      Intake/Output:    Intake/Output Summary (Last 24 hours) at 4/18/2024 1407  Last data filed at 4/18/2024 1300  Gross per 24 hour   Intake 140 ml   Output --   Net 140 ml       Last 3 Weights   04/11/24 0110 108 lb 3.2 oz (49.1 kg)   04/08/24 1116 122 lb (55.3 kg)   01/12/23 1210 115 lb (52.2 kg)     Exam   GEN: elderly female in NAD, opens eyes to voice, says no to some questions  HEENT: EOMI  Pulm: CTAB, no crackles or wheezes  CV: tachycardic, regular rhythm, no murmurs  ABD: Soft, non-tender, non-distended, +BS  SKIN: warm, dry  EXT: no edema    Data Review:       Labs:     Recent Labs   Lab 04/12/24  0621 04/13/24  0604 04/18/24  0546   RBC 4.73 4.95 4.97   HGB 13.0 13.7 13.8   HCT 38.7 40.6 42.3   MCV 81.8 82.0 85.1   MCH 27.5 27.7 27.8   MCHC 33.6 33.7 32.6   RDW 12.8 12.9 13.5   NEPRELIM 11.95* 6.49  --    WBC 18.1* 12.1* 10.6   .0 319.0 562.0*         Recent Labs   Lab 04/16/24  0741 04/17/24  0541 04/18/24  0546 04/18/24  0726   * 154* 152*  --    BUN 16 13  --  13   CREATSERUM 0.79 0.78 0.84  --    EGFRCR 79 80 73  --    CA 9.7 9.8 9.7  --     142 141  --    K 3.6 3.6  --  4.4    109 108  --    CO2 24.0 24.0 26.0  --        Recent Labs   Lab 04/12/24  0621 04/13/24  0604   ALT 53* 45   AST 35* 22   ALB 4.1 4.1         Imaging:  MRI BRAIN (CPT=70551)    Result Date: 4/18/2024  CONCLUSION:  1. No acute intracranial process. 2. 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 3. Mild chronic microangiopathic ischemic changes.   A preliminary report was issued by the Pending sale to [facility] teleradiology service. There are no major discrepancies.  [employer]  Dictated by (CST): Boris Rodriguez MD on 4/18/2024 at 7:11 AM     Finalized by (CST):  Boris Rodriguez MD on 4/18/2024 at 7:21 AM             Meds:      lithium carbonate  150 mg Oral BID with meals    LORazepam  0.5 mg Oral TID    ARIPiprazole  5 mg Oral Nightly    escitalopram  15 mg Oral Nightly    enoxaparin  40 mg Subcutaneous Daily    ascorbic acid  500 mg Oral Daily    cetirizine  5 mg Oral Nightly    fluticasone propionate  1 spray Each Nare Daily    cholecalciferol  1,000 Units Oral Daily         acetaminophen    melatonin    ondansetron    polyethylene glycol (PEG 3350)    sennosides    bisacodyl    fleet enema    LORazepam    albuterol    LORazepam    haloperidol lactate

## 2024-04-18 NOTE — PLAN OF CARE
No acute changes. Up standby with walker to bathroom.  at bedside updated on plan of care. Pt sliding in chair and unable to follow commands so sit safely, pt put back in bed with bed alarm on.     Report given to Lukasz at [facility]. Pt set to discharge tonight at 8pm.  aware    Problem: Patient Centered Care  Goal: Patient preferences are identified and integrated in the patient's plan of care  Description: Interventions:  - What would you like us to know as we care for you? From home with spouse    - Provide timely, complete, and accurate information to patient/family  - Incorporate patient and family knowledge, values, beliefs, and cultural backgrounds into the planning and delivery of care  - Encourage patient/family to participate in care and decision-making at the level they choose  - Honor patient and family perspectives and choices  Outcome: Progressing     Problem: SAFETY ADULT - FALL  Goal: Free from fall injury  Description: INTERVENTIONS:  - Assess pt frequently for physical needs  - Identify cognitive and physical deficits and behaviors that affect risk of falls.  - [place] fall precautions as indicated by assessment.  - Educate pt/family on patient safety including physical limitations  - Instruct pt to call for assistance with activity based on assessment  - Modify environment to reduce risk of injury  - Provide assistive devices as appropriate  - Consider OT/PT consult to assist with strengthening/mobility  - Encourage toileting schedule  Outcome: Progressing     Problem: RESPIRATORY - ADULT  Goal: Achieves optimal ventilation and oxygenation  Description: INTERVENTIONS:  - Assess for changes in respiratory status  - Assess for changes in mentation and behavior  - Position to facilitate oxygenation and minimize respiratory effort  - Oxygen supplementation based on oxygen saturation or ABGs  - Provide Smoking Cessation handout, if applicable  - Encourage broncho-pulmonary hygiene including  cough, deep breathe, Incentive Spirometry  - Assess the need for suctioning and perform as needed  - Assess and instruct to report SOB or any respiratory difficulty  - Respiratory Therapy support as indicated  - Manage/alleviate anxiety  - Monitor for signs/symptoms of CO2 retention  Outcome: Progressing     Problem: NEUROLOGICAL - ADULT  Goal: Achieves stable or improved neurological status  Description: INTERVENTIONS  - Assess for and report changes in neurological status  - Initiate measures to prevent increased intracranial pressure  - Maintain blood pressure and fluid volume within ordered parameters to optimize cerebral perfusion and minimize risk of hemorrhage  - Monitor temperature, glucose, and sodium. Initiate appropriate interventions as ordered  Outcome: Progressing     Problem: Impaired Cognition  Goal: Patient will exhibit improved attention, thought processing and/or memory  Description: Interventions:    Outcome: Progressing     Problem: PAIN - ADULT  Goal: Verbalizes/displays adequate comfort level or patient's stated pain goal  Description: INTERVENTIONS:  - Encourage pt to monitor pain and request assistance  - Assess pain using appropriate pain scale  - Administer analgesics based on type and severity of pain and evaluate response  - Implement non-pharmacological measures as appropriate and evaluate response  - Consider cultural and social influences on pain and pain management  - Manage/alleviate anxiety  - Utilize distraction and/or relaxation techniques  - Monitor for opioid side effects  - Notify MD/LIP if interventions unsuccessful or patient reports new pain  - Anticipate increased pain with activity and pre-medicate as appropriate  Outcome: Progressing     Problem: DISCHARGE PLANNING  Goal: Discharge to home or other facility with appropriate resources  Description: INTERVENTIONS:  - Identify barriers to discharge w/pt and caregiver  - Include patient/family/discharge partner in discharge  planning  - Arrange for needed discharge resources and transportation as appropriate  - Identify discharge learning needs (meds, wound care, etc)  - Arrange for interpreters to assist at discharge as needed  - Consider post-discharge preferences of patient/family/discharge partner  - Complete POLST form as appropriate  - Assess patient's ability to be responsible for managing their own health  - Refer to Case Management Department for coordinating discharge planning if the patient needs post-hospital services based on physician/LIP order or complex needs related to functional status, cognitive ability or social support system  Outcome: Progressing     Problem: METABOLIC/FLUID AND ELECTROLYTES - ADULT  Goal: Glucose maintained within prescribed range  Description: INTERVENTIONS:  - Monitor Blood Glucose as ordered  - Assess for signs and symptoms of hyperglycemia and hypoglycemia  - Administer ordered medications to maintain glucose within target range  - Assess barriers to adequate nutritional intake and initiate nutrition consult as needed  - Instruct patient on self management of diabetes  Outcome: Progressing  Goal: Electrolytes maintained within normal limits  Description: INTERVENTIONS:  - Monitor labs and rhythm and assess patient for signs and symptoms of electrolyte imbalances  - Administer electrolyte replacement as ordered  - Monitor response to electrolyte replacements, including rhythm and repeat lab results as appropriate  - Fluid restriction as ordered  - Instruct patient on fluid and nutrition restrictions as appropriate  Outcome: Progressing  Goal: Hemodynamic stability and optimal renal function maintained  Description: INTERVENTIONS:  - Monitor labs and assess for signs and symptoms of volume excess or deficit  - Monitor intake, output and patient weight  - Monitor urine specific gravity, serum osmolarity and serum sodium as indicated or ordered  - Monitor response to interventions for patient's  volume status, including labs, urine output, blood pressure (other measures as available)  - Encourage oral intake as appropriate  - Instruct patient on fluid and nutrition restrictions as appropriate  Outcome: Progressing

## 2024-04-19 ENCOUNTER — TELEPHONE (OUTPATIENT)
Dept: BEHAVIORAL HEALTH | Age: 74
End: 2024-04-19

## 2024-04-19 NOTE — PAYOR COMM NOTE
--------------  DISCHARGE REVIEW    Payor: [employer]  Subscriber #:  [id number]  Authorization Number: [id number]    Admit date: 4/10/24  Admit time:   9:59 PM  Discharge Date: 4/18/2024  8:39 PM     Admitting Physician: Elizabeth Borden MD  Attending Physician:  No att. providers found  Primary Care Physician: Siri Tabor MD          Discharge Summary Notes        Discharge Summary signed by Drea Yeboah MD at 4/18/2024  5:05 PM       Author: Drea Yeboah MD Specialty: HOSPITALIST Author Type: Physician    Filed: 4/18/2024  5:05 PM Date of Service: 4/18/2024  5:02 PM Status: Signed    : Drea Yeboah MD (Physician)           General Medicine Discharge Summary     Patient ID:  Meli Antonio  73 year old  11/3/1950    Admit date: 4/10/2024    Discharge date and time: 04/18/24    Attending Physician: Drea Yeboah MD     Primary Care Physician: SIRI TABOR     Reason for admission: AMS    Discharge Diagnoses: Altered mental status, unspecified altered mental status type [R41.82]  Depression, unspecified depression type [F32.A]    Discharged Condition: stable    Disposition:  inpatient psych    Consults:   Consultants         Provider   Role Specialty     Jason Mckenzie MD      Consulting Physician Psychiatry              HPI:  Per Dr. Jung    Patient is a 73 year old female with PMH sig for MVP, EDEL/MDD, GERD, IBS, who presents with AMS.  Patient's  who is the POA brought her in because she was acting different.  She has a history of depression and mood changes but this seemed a little more concerning to him.  2 days ago patient did have a cough which seems to be better at this time.  Patient was put on abx and steroids.   at bedside states one of their adopted sons  moved away and another son is going through a divorce and this has put a lot of stress on her.  She had some lose stools 1-2 times.  Patient denies CP, SOB, abd pain, n/v, f/c, dysuria.         In the ER she was afebrile and normotensive.  Leukocytosis with bicarb 19, elevated AST ALT  Urine positive for cannabis and ecstasy    Hospital Course:       Ms. Antonio is a 73 year old female with PMH sig for MVP, EDEL/MDD, GERD, IBS, who presents with AMS from depressive disorder. Psych consulted. Less likely 2/2 serotonin syndrome. MRI brain without acute findings. Plan for inpatient psych on discharge.      AMS  EDEL/MDD  Behavioral changes with nervous breakdown   Rule out Serotonin Syndrome?   - afebrile and normotensive  - Leukocytosis with bicarb 19, elevated AST ALT  - Urine positive for cannabis and ecstasy  - b12 normal, TSH borderline low   - Imaging was done 2 days before admit at ER visit due to cough  - CT head with no acute process, chest x-ray with no acute process  - Recent steroids use for URI symptoms   - Psych consulted   - polypharmacy  - will try to reset medication   - Certificate, petition, sitter dc'd   - MRI brain without acute findings  - plan for inpatient psych on discharge     Exam   GEN: elderly female in NAD, opens eyes to voice, says no to some questions  HEENT: EOMI  Pulm: CTAB, no crackles or wheezes  CV: tachycardic, regular rhythm, no murmurs  ABD: Soft, non-tender, non-distended, +BS  SKIN: warm, dry  EXT: no edema    Operative Procedures:      Imaging: MRI BRAIN (CPT=70551)    Result Date: 4/18/2024  CONCLUSION:  1. No acute intracranial process. 2. 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 3. Mild chronic microangiopathic ischemic changes.   A preliminary report was issued by the [facility] teleradiology service. There are no major discrepancies.  [facility]  Dictated by (CST): Boris Rodriguez MD on 4/18/2024 at 7:11 AM     Finalized by (CST): Borsi Rodriguez MD on 4/18/2024 at 7:21 AM               Home Medication Changes:     I reconciled current and discharge medications on day of discharge. These medication changes  have been made as below         Medication List        START taking these medications      ARIPiprazole 5 MG Tabs  Commonly known as: Abilify     escitalopram 5 MG Tabs  Commonly known as: Lexapro     lithium carbonate 150 MG Caps     LORazepam 0.5 MG Tabs  Commonly known as: Ativan            CONTINUE taking these medications      albuterol 108 (90 Base) MCG/ACT Aers  Commonly known as: Ventolin HFA     cholecalciferol 1000 UNITS Caps  Commonly known as: Vitamin D3            STOP taking these medications      azithromycin 250 MG Tabs  Commonly known as: Zithromax Z-Alok     benzonatate 100 MG Caps  Commonly known as: Tessalon     guaiFENesin-codeine 100-10 MG/5ML Soln  Commonly known as: Robitussin AC     predniSONE 20 MG Tabs  Commonly known as: Deltasone              Activity: activity as tolerated  Diet: regular diet  Wound Care: none needed  Code Status: Full Code  O2: n/a    Follow-up with:    PCP   Specialist psych       FU      DC instructions:      Other Discharge Instructions:         At this time, it is recommended that you follow up with the following referrals for psychiatry and therapy. Please call 911 or go to the nearest emergency room if symptoms persist or worsen.    Dr. Jason Mckenzie MD:  [address], [place] [address]  [facility]: [address] [place] [address]  Phone: [phone]    [facility]: [address] [place] [address]  Phone: [phone]    Dr. Christiana Madison MD: [address] [place] [address]  Phone: [phone]    [facility]: [address] [place] [address]  Phone: [phone]    Dr. Alma La MD: [address] [place] [address]  Phone: [phone]    [facility]: [address] [place] [address]  Phone: [phone]          Follow-up with labs: none    Total Time  Coordinating Care: 31 minutes    Patient had opportunity to ask questions and state understand and agree with therapeutic plan as outlined      Drea Yeboah MD  [facility] Hospitalist            Electronically signed by Drea Yeboah MD on 4/18/2024  5:05 PM         REVIEWER COMMENTS

## 2024-04-19 NOTE — PROGRESS NOTES
Patient with no IV, [facility]. Belongings with . PM meds given prior to discharge.

## 2024-04-21 ENCOUNTER — HOSPITAL ENCOUNTER (INPATIENT)
Facility: HOSPITAL | Age: 74
LOS: 12 days | Discharge: NURSING, REHAB OR LONG-TERM CARE | DRG: 640 | End: 2024-05-03
Attending: EMERGENCY MEDICINE | Admitting: INTERNAL MEDICINE
Payer: MEDICARE

## 2024-04-21 ENCOUNTER — APPOINTMENT (OUTPATIENT)
Dept: GENERAL RADIOLOGY | Facility: HOSPITAL | Age: 74
DRG: 640 | End: 2024-04-21
Attending: EMERGENCY MEDICINE
Payer: MEDICARE

## 2024-04-21 ENCOUNTER — APPOINTMENT (OUTPATIENT)
Dept: CT IMAGING | Facility: HOSPITAL | Age: 74
DRG: 640 | End: 2024-04-21
Attending: EMERGENCY MEDICINE
Payer: MEDICARE

## 2024-04-21 DIAGNOSIS — N17.9 AKI (ACUTE KIDNEY INJURY) (HCC): ICD-10-CM

## 2024-04-21 DIAGNOSIS — F06.1 CATATONIC EXCITED TYPE: ICD-10-CM

## 2024-04-21 DIAGNOSIS — E87.0 HYPERNATREMIA: ICD-10-CM

## 2024-04-21 DIAGNOSIS — E86.0 DEHYDRATION: ICD-10-CM

## 2024-04-21 DIAGNOSIS — F33.2 MAJOR DEPRESSIVE DISORDER, RECURRENT EPISODE, SEVERE WITH CATATONIA (HCC): ICD-10-CM

## 2024-04-21 DIAGNOSIS — F06.1 MAJOR DEPRESSIVE DISORDER, RECURRENT EPISODE, SEVERE WITH CATATONIA (HCC): ICD-10-CM

## 2024-04-21 DIAGNOSIS — R50.9 FEVER, UNKNOWN ORIGIN: Primary | ICD-10-CM

## 2024-04-21 DIAGNOSIS — D72.829 LEUKOCYTOSIS, UNSPECIFIED TYPE: ICD-10-CM

## 2024-04-21 DIAGNOSIS — R05.1 ACUTE COUGH: ICD-10-CM

## 2024-04-21 DIAGNOSIS — G89.29 OTHER CHRONIC PAIN: ICD-10-CM

## 2024-04-21 PROBLEM — R73.9 HYPERGLYCEMIA: Status: ACTIVE | Noted: 2024-04-21

## 2024-04-21 LAB
ALBUMIN SERPL-MCNC: 3.6 G/DL (ref 3.4–5)
ALBUMIN/GLOB SERPL: 1 {RATIO} (ref 1–2)
ALP LIVER SERPL-CCNC: 82 U/L
ALT SERPL-CCNC: 25 U/L
ANION GAP SERPL CALC-SCNC: 8 MMOL/L (ref 0–18)
AST SERPL-CCNC: 27 U/L (ref 15–37)
ATRIAL RATE: 108 BPM
BASOPHILS # BLD AUTO: 0.08 X10(3) UL (ref 0–0.2)
BASOPHILS NFR BLD AUTO: 0.4 %
BILIRUB SERPL-MCNC: 0.3 MG/DL (ref 0.1–2)
BILIRUB UR QL STRIP.AUTO: NEGATIVE
BUN BLD-MCNC: 49 MG/DL (ref 9–23)
CALCIUM BLD-MCNC: 10.3 MG/DL (ref 8.5–10.1)
CHLORIDE SERPL-SCNC: 114 MMOL/L (ref 98–112)
CLARITY UR REFRACT.AUTO: CLEAR
CO2 SERPL-SCNC: 25 MMOL/L (ref 21–32)
CREAT BLD-MCNC: 1.91 MG/DL
EGFRCR SERPLBLD CKD-EPI 2021: 27 ML/MIN/1.73M2 (ref 60–?)
EOSINOPHIL # BLD AUTO: 0.02 X10(3) UL (ref 0–0.7)
EOSINOPHIL NFR BLD AUTO: 0.1 %
ERYTHROCYTE [DISTWIDTH] IN BLOOD BY AUTOMATED COUNT: 14 %
FLUAV + FLUBV RNA SPEC NAA+PROBE: NEGATIVE
FLUAV + FLUBV RNA SPEC NAA+PROBE: NEGATIVE
GLOBULIN PLAS-MCNC: 3.7 G/DL (ref 2.8–4.4)
GLUCOSE BLD-MCNC: 174 MG/DL (ref 70–99)
GLUCOSE UR STRIP.AUTO-MCNC: NORMAL MG/DL
HCT VFR BLD AUTO: 47.4 %
HGB BLD-MCNC: 15.1 G/DL
IMM GRANULOCYTES # BLD AUTO: 0.14 X10(3) UL (ref 0–1)
IMM GRANULOCYTES NFR BLD: 0.7 %
KETONES UR STRIP.AUTO-MCNC: NEGATIVE MG/DL
LACTATE SERPL-SCNC: 0.9 MMOL/L (ref 0.4–2)
LEUKOCYTE ESTERASE UR QL STRIP.AUTO: NEGATIVE
LITHIUM SERPL-SCNC: 0.2 MMOL/L (ref 0.6–1.2)
LYMPHOCYTES # BLD AUTO: 4.9 X10(3) UL (ref 1–4)
LYMPHOCYTES NFR BLD AUTO: 24.5 %
MAGNESIUM SERPL-MCNC: 3.5 MG/DL (ref 1.6–2.6)
MCH RBC QN AUTO: 27.9 PG (ref 26–34)
MCHC RBC AUTO-ENTMCNC: 31.9 G/DL (ref 31–37)
MCV RBC AUTO: 87.5 FL
MONOCYTES # BLD AUTO: 1.64 X10(3) UL (ref 0.1–1)
MONOCYTES NFR BLD AUTO: 8.2 %
NEUTROPHILS # BLD AUTO: 13.2 X10 (3) UL (ref 1.5–7.7)
NEUTROPHILS # BLD AUTO: 13.2 X10(3) UL (ref 1.5–7.7)
NEUTROPHILS NFR BLD AUTO: 66.1 %
NITRITE UR QL STRIP.AUTO: NEGATIVE
OSMOLALITY SERPL CALC.SUM OF ELEC: 321 MOSM/KG (ref 275–295)
P AXIS: 59 DEGREES
P-R INTERVAL: 126 MS
PH UR STRIP.AUTO: 5 [PH] (ref 5–8)
PLATELET # BLD AUTO: 582 10(3)UL (ref 150–450)
POTASSIUM SERPL-SCNC: 4.7 MMOL/L (ref 3.5–5.1)
PROT SERPL-MCNC: 7.3 G/DL (ref 6.4–8.2)
Q-T INTERVAL: 322 MS
QRS DURATION: 58 MS
QTC CALCULATION (BEZET): 431 MS
R AXIS: 55 DEGREES
RBC # BLD AUTO: 5.42 X10(6)UL
RBC UR QL AUTO: NEGATIVE
RSV RNA SPEC NAA+PROBE: NEGATIVE
SARS-COV-2 RNA RESP QL NAA+PROBE: NOT DETECTED
SODIUM SERPL-SCNC: 147 MMOL/L (ref 136–145)
SP GR UR STRIP.AUTO: 1.01 (ref 1–1.03)
T AXIS: 66 DEGREES
UROBILINOGEN UR STRIP.AUTO-MCNC: NORMAL MG/DL
VENTRICULAR RATE: 108 BPM
WBC # BLD AUTO: 20 X10(3) UL (ref 4–11)

## 2024-04-21 PROCEDURE — 99223 1ST HOSP IP/OBS HIGH 75: CPT | Performed by: INTERNAL MEDICINE

## 2024-04-21 PROCEDURE — 71045 X-RAY EXAM CHEST 1 VIEW: CPT | Performed by: EMERGENCY MEDICINE

## 2024-04-21 PROCEDURE — 74176 CT ABD & PELVIS W/O CONTRAST: CPT | Performed by: EMERGENCY MEDICINE

## 2024-04-21 RX ORDER — DEXTROSE AND SODIUM CHLORIDE 5; .45 G/100ML; G/100ML
INJECTION, SOLUTION INTRAVENOUS ONCE
Status: DISCONTINUED | OUTPATIENT
Start: 2024-04-21 | End: 2024-04-21

## 2024-04-21 RX ORDER — ONDANSETRON 2 MG/ML
4 INJECTION INTRAMUSCULAR; INTRAVENOUS EVERY 6 HOURS PRN
Status: DISCONTINUED | OUTPATIENT
Start: 2024-04-21 | End: 2024-05-03

## 2024-04-21 RX ORDER — MELATONIN
1000 DAILY
Status: DISCONTINUED | OUTPATIENT
Start: 2024-04-22 | End: 2024-04-21

## 2024-04-21 RX ORDER — POLYETHYLENE GLYCOL 3350 17 G/17G
17 POWDER, FOR SOLUTION ORAL DAILY PRN
Status: DISCONTINUED | OUTPATIENT
Start: 2024-04-21 | End: 2024-05-03

## 2024-04-21 RX ORDER — PREGABALIN 50 MG/1
100 CAPSULE ORAL 3 TIMES DAILY
Status: DISCONTINUED | OUTPATIENT
Start: 2024-04-21 | End: 2024-04-21

## 2024-04-21 RX ORDER — MELATONIN
3 NIGHTLY PRN
Status: DISCONTINUED | OUTPATIENT
Start: 2024-04-21 | End: 2024-05-03

## 2024-04-21 RX ORDER — MELATONIN
1000 DAILY
Status: DISCONTINUED | OUTPATIENT
Start: 2024-04-21 | End: 2024-05-03

## 2024-04-21 RX ORDER — LITHIUM CARBONATE 150 MG/1
150 CAPSULE ORAL 2 TIMES DAILY WITH MEALS
Status: DISCONTINUED | OUTPATIENT
Start: 2024-04-21 | End: 2024-04-25

## 2024-04-21 RX ORDER — PROPRANOLOL HYDROCHLORIDE 10 MG/1
10 TABLET ORAL 2 TIMES DAILY
Status: DISCONTINUED | OUTPATIENT
Start: 2024-04-21 | End: 2024-05-03

## 2024-04-21 RX ORDER — ALBUTEROL SULFATE 90 UG/1
2 AEROSOL, METERED RESPIRATORY (INHALATION) EVERY 4 HOURS PRN
Status: DISCONTINUED | OUTPATIENT
Start: 2024-04-21 | End: 2024-05-03

## 2024-04-21 RX ORDER — ARIPIPRAZOLE 5 MG/1
5 TABLET ORAL NIGHTLY
Status: DISCONTINUED | OUTPATIENT
Start: 2024-04-21 | End: 2024-04-25

## 2024-04-21 RX ORDER — HEPARIN SODIUM 5000 [USP'U]/ML
5000 INJECTION, SOLUTION INTRAVENOUS; SUBCUTANEOUS EVERY 8 HOURS SCHEDULED
Status: DISCONTINUED | OUTPATIENT
Start: 2024-04-21 | End: 2024-04-24

## 2024-04-21 RX ORDER — MAGNESIUM HYDROXIDE/ALUMINUM HYDROXICE/SIMETHICONE 120; 1200; 1200 MG/30ML; MG/30ML; MG/30ML
30 SUSPENSION ORAL EVERY 4 HOURS PRN
Status: DISCONTINUED | OUTPATIENT
Start: 2024-04-21 | End: 2024-05-03

## 2024-04-21 RX ORDER — ACETAMINOPHEN 650 MG/1
650 SUPPOSITORY RECTAL ONCE
Status: COMPLETED | OUTPATIENT
Start: 2024-04-21 | End: 2024-04-21

## 2024-04-21 RX ORDER — SODIUM CHLORIDE 450 MG/100ML
INJECTION, SOLUTION INTRAVENOUS CONTINUOUS
Status: DISCONTINUED | OUTPATIENT
Start: 2024-04-21 | End: 2024-04-22

## 2024-04-21 RX ORDER — ECHINACEA PURPUREA EXTRACT 125 MG
1 TABLET ORAL
Status: DISCONTINUED | OUTPATIENT
Start: 2024-04-21 | End: 2024-05-03

## 2024-04-21 RX ORDER — ACETAMINOPHEN 325 MG/1
650 TABLET ORAL EVERY 4 HOURS PRN
Status: DISCONTINUED | OUTPATIENT
Start: 2024-04-21 | End: 2024-05-03

## 2024-04-21 RX ORDER — PREGABALIN 50 MG/1
100 CAPSULE ORAL 3 TIMES DAILY
Status: DISCONTINUED | OUTPATIENT
Start: 2024-04-21 | End: 2024-04-22

## 2024-04-21 RX ORDER — BENZONATATE 100 MG/1
200 CAPSULE ORAL 3 TIMES DAILY PRN
Status: DISCONTINUED | OUTPATIENT
Start: 2024-04-21 | End: 2024-05-03

## 2024-04-21 RX ORDER — PROPRANOLOL HYDROCHLORIDE 10 MG/1
10 TABLET ORAL 2 TIMES DAILY
Status: DISCONTINUED | OUTPATIENT
Start: 2024-04-21 | End: 2024-04-21

## 2024-04-21 RX ORDER — LORAZEPAM 0.5 MG/1
0.25 TABLET ORAL 3 TIMES DAILY
Status: DISCONTINUED | OUTPATIENT
Start: 2024-04-21 | End: 2024-04-24

## 2024-04-21 RX ORDER — SENNOSIDES 8.6 MG
17.2 TABLET ORAL NIGHTLY PRN
Status: DISCONTINUED | OUTPATIENT
Start: 2024-04-21 | End: 2024-05-03

## 2024-04-21 RX ORDER — TRAZODONE HYDROCHLORIDE 50 MG/1
50 TABLET ORAL NIGHTLY PRN
Status: DISCONTINUED | OUTPATIENT
Start: 2024-04-21 | End: 2024-05-03

## 2024-04-21 RX ORDER — LORAZEPAM 0.5 MG/1
0.5 TABLET ORAL 3 TIMES DAILY
Status: DISCONTINUED | OUTPATIENT
Start: 2024-04-21 | End: 2024-04-21

## 2024-04-21 RX ORDER — ACETAMINOPHEN 325 MG/1
325 TABLET ORAL EVERY 4 HOURS PRN
Status: DISCONTINUED | OUTPATIENT
Start: 2024-04-21 | End: 2024-05-03

## 2024-04-21 RX ORDER — BISACODYL 10 MG
10 SUPPOSITORY, RECTAL RECTAL
Status: DISCONTINUED | OUTPATIENT
Start: 2024-04-21 | End: 2024-05-03

## 2024-04-21 RX ORDER — TIZANIDINE 4 MG/1
4 TABLET ORAL 3 TIMES DAILY
Status: DISCONTINUED | OUTPATIENT
Start: 2024-04-21 | End: 2024-04-22

## 2024-04-21 RX ORDER — IBUPROFEN 400 MG/1
400 TABLET ORAL 3 TIMES DAILY
Status: DISCONTINUED | OUTPATIENT
Start: 2024-04-21 | End: 2024-04-21

## 2024-04-21 RX ORDER — ACETAMINOPHEN 650 MG/1
SUPPOSITORY RECTAL
Status: COMPLETED
Start: 2024-04-21 | End: 2024-04-21

## 2024-04-21 RX ORDER — ACETAMINOPHEN 500 MG
1000 TABLET ORAL EVERY 8 HOURS PRN
Status: DISCONTINUED | OUTPATIENT
Start: 2024-04-21 | End: 2024-05-03

## 2024-04-21 RX ORDER — METOCLOPRAMIDE HYDROCHLORIDE 5 MG/ML
5 INJECTION INTRAMUSCULAR; INTRAVENOUS EVERY 8 HOURS PRN
Status: DISCONTINUED | OUTPATIENT
Start: 2024-04-21 | End: 2024-04-24

## 2024-04-21 NOTE — DISCHARGE SUMMARY
[facility]  Discharge Summary     Meli Antonio YOB: 1950   Age/Gender 73 year old female MRN [id number]   Location [facility] Attending James Santiago MD   Hosp Day # 0 PCP SIRI TABOR     Date of Service: 4/21/2024    Date of Admission: 4/18/2024    Date of Discharge: 4/21/2024    Chief Complaint:   \"History of catatonia\"    Reason for Hospitalization:  MDD on multiple psychotropic medications leading to serotonin syndrome per history  History of anxiety disorder  History of multiple losses (dogs and son per history)    Hospital Course:  Services received: Staffing, medication education, discharge planning, individual therapy, family therapy, group therapy, disease education, medication evaluation, evaluated by internal medicine, risks/benefits/alternatives of treatments.    Patient was admitted to [facility] on 4/18/2024 with complaints of AMS for 1 week and catatonia. Per inpatient history concerns arose for serotonin syndrome due to antidepressant and codeine use. Patient's  and POA filed for voluntary admission.    Patient was only able to verbalize few words during time at [facility], otherwise patient was nonverbal. Patient was disoriented x 4. Patient's psychiatric medications were discontinued due to concerns of serotonin syndrome and polypharmacy. Patient was prescribed Ativan 0.25 mg TID.    Patient's blood work resulted in concerns for hypernatremia and renal failure on the morning on 4/21/2024. Internist was consulted and patient was transferred to [facility].    Condition/Prognosis on Discharge:  Psychiatric level of functioning: unable to assess    Physical level of functioning: unable to assess    Social level of functioning: unable to assess    Suicide Risk Assessment:  I have reviewed the initial suicide risk assessment.  Since the initial suicide risk assessment, the overall level of risk has decreased to a current low  risk level. This is evidenced by the following:    Risk: H/o MDD, EDEL     Environmental: No access to any weapons     Mitigating: Family supports     Discharge Diagnosis:  Primary Psychiatric Diagnosis   Depressive Disorders: Major Depressive Disorder, Recurrent Episode  Depressive Disorder Recurrent Episode: Severe    Secondary Psychiatric Diagnoses   Anxiety disorder NOS     Rule Out Diagnoses   Delirium vs dementia   Serotonin syndrome    Pervasive Diagnoses   Neurodevelopmental Disorders: Deferred  Personality Disorders: Deferred    Pertinent Non-Psychiatric Diagnoses   Pertinent Non-psychiatric Diagnoses: see medical     Disposition/Discharge Instructions:    Patient transferred to [facility] on 4/21/2024 due to concerns of hypernatremia and renal failure.        Delmi Ribeiro PA-C  4/21/2024

## 2024-04-21 NOTE — ED QUICK NOTES
Orders for admission, patient is aware of plan and ready to go upstairs. Any questions, please call ED RN Les at extension [phone].     Patient Covid vaccination status: Unvaccinated     COVID Test Ordered in ED: SARS-CoV-2/Flu A and B/RSV by PCR (GeneXpert)    COVID Suspicion at Admission: N/A    Running Infusions:  None    Mental Status/LOC at time of transport: A/OX0    Other pertinent information:   CIWA score: N/A   NIH score:  N/A

## 2024-04-21 NOTE — SPIRITUAL CARE NOTE
Spiritual Care Visit Note    Patient Name: Meli Antonio Date of Spiritual Care Visit: 24   : 11/3/1950 Primary Dx: <principal problem not specified>       Referred By:      Spiritual Care Taxonomy:    Intended Effects: Helping someone feel comforted    Methods: Collaborate with care team member;Offer spiritual/Sikhism support;Offer emotional support    Interventions: Provide compassionate touch;Perform a blessing;Prayer for healing;Silent prayer    Visit Type/Summary:     - Spiritual Care: Consulted with RN prior to visit.  saw Meli appear on his [facility] consults list, but currently in ED.  provided prayers and spiritual support and music. Meli often seemed to react normally to certain cues or comments or questions. The sitter seemed interested in 's work, so he provided her with 432 hz music (available on You Tube, Spotify, and other sources, to calm the mind.   remains available as needed for follow up.    Spiritual Care support can be requested via an [facility] consult. For urgent/immediate needs, please contact the On Call  at: Edward: ext [phone]

## 2024-04-21 NOTE — H&P
[facility]     Meli Antonio Patient Status:  Emergency    11/3/1950 MRN [id number]   Location [facility] EMERGENCY DEPARTMENT Attending Kayden Gilliland, DO   Hosp Day # 0 PCP SIRI TABOR     Chief Complaint: Abnormal labs    Subjective:    History of Present Illness:     Meli Antonio is a 73 year old female with past medical history of generalized anxiety disorder major depressive disorder presents from [facility] facility due to abnormal labs.    Patient was hospitalized at [facility] for approximately 10 days and discharged on the  3 days ago.  She had presented there primarily with psychiatric symptoms which were unable to be resolved while she was there so she was discharged to [facility].  Patient is denying any pain.  No reports of any cough or discomfort.  Her  is at bedside and reports that she does eat but is only when he is around to feed her and may be eats around 50% of her meals.  Upon arrival to the emergency department she was also noted to have a low-grade fever.  She is denying chest pain abdominal pain difficulty breathing or any somatic complaint.    History/Other:    Past Medical History:  Past Medical History:    Anxiety state    Back problem    COMPRESSION FX    Cataract    Depression    Esophageal reflux    GERD (gastroesophageal reflux disease)    Hematoma and contusion of liver    STATES HAS BEEN THERE FOR MANY YEARS    History of fractured kneecap    RIGHT    IBS (irritable bowel syndrome)    Mitral prolapse    Osteoarthritis     Past Surgical History:   Past Surgical History:   Procedure Laterality Date          Correct bunion,othr methods      Correct bunion,simple      BILAT.     Dilation/curettage,diagnostic      FOR \"MOLE PREGNANCY\"    Other Right     ORIF RIGHT ANKLE    Spine surgery procedure unlisted      cervical spine 2020    Total knee replacement        Family History:    Family History   Problem Relation Age of Onset    Heart Disorder Father     Cancer Mother         ESOPHAGUS     Social History:    reports that she has quit smoking. Her smoking use included cigarettes. She has a 30 pack-year smoking history. She has never used smokeless tobacco. She reports that she does not drink alcohol and does not use drugs.     Allergies:   Allergies   Allergen Reactions    Radiology Contrast Iodinated Dyes HIVES     HIVES AND THROAT TIGHTENED WHILE HAVING AN MRI    Sulfa Antibiotics HIVES     \"got sicker\"    Seroquel [Quetiapine] UNKNOWN    Cinnamon RASH       Medications:    Current Facility-Administered Medications on File Prior to Encounter   Medication Dose Route Frequency Provider Last Rate Last Admin    [COMPLETED] haloperidol lactate (Haldol) 5 MG/ML injection 5 mg  5 mg Intramuscular Once PRN Drea Yeboah MD   5 mg at 04/17/24 2134    [COMPLETED] LORazepam (Ativan) 2 mg/mL injection 0.5 mg  0.5 mg Intravenous Once Jason Mckenzie MD   0.5 mg at 04/16/24 1150    [COMPLETED] potassium chloride (Klor-Con) 20 MEQ oral powder 40 mEq  40 mEq Oral Once Antonino Jung MD   40 mEq at 04/13/24 0859    [COMPLETED] potassium chloride (K-Dur) tab 40 mEq  40 mEq Oral Once Antonino Jung MD   40 mEq at 04/12/24 0818    [COMPLETED] LORazepam (Ativan) tab 1 mg  1 mg Oral Once Elizabeth Borden MD   1 mg at 04/11/24 0205    [COMPLETED] ARIPiprazole (Abilify) tab 15 mg  15 mg Oral Once Jason Mckenzie MD   15 mg at 04/11/24 1155    [COMPLETED] droperidol (Inapsine) 2.5 mg/mL injection 2.5 mg  2.5 mg Intravenous Once Em Toscano MD   2.5 mg at 04/10/24 1807    [COMPLETED] ARIPiprazole (Abilify) tab 10 mg  10 mg Oral Once Em Toscano MD   10 mg at 04/10/24 2048    [COMPLETED] ALPRAZolam (Xanax) tab 0.25 mg  0.25 mg Oral Once Sandy Chopra MD   0.25 mg at 04/08/24 1405     Current Outpatient Medications on File Prior to Encounter   Medication Sig Dispense Refill    diclofenac 75 MG  Oral Tab EC Take 1 tablet (75 mg total) by mouth 2 (two) times daily.      pregabalin 100 MG Oral Cap Take 1 capsule (100 mg total) by mouth in the morning, at noon, and at bedtime.      Meloxicam 15 MG Oral Tab Take 0.5 tablets (7.5 mg total) by mouth in the morning and 0.5 tablets (7.5 mg total) before bedtime.      propranolol 10 MG Oral Tab Take 1 tablet (10 mg total) by mouth 2 (two) times daily.      tiZANidine 4 MG Oral Tab Take 1 tablet (4 mg total) by mouth 3 (three) times daily.      ARIPiprazole 5 MG Oral Tab Take 1 tablet (5 mg total) by mouth at bedtime.      escitalopram 5 MG Oral Tab Take 3 tablets (15 mg total) by mouth nightly.      lithium carbonate 150 MG Oral Cap Take 1 capsule (150 mg total) by mouth 2 (two) times daily with meals.      LORazepam 0.5 MG Oral Tab Take 1 tablet (0.5 mg total) by mouth in the morning, at noon, and at bedtime.      cholecalciferol 1000 UNITS Oral Cap Take 1 capsule (1,000 Units total) by mouth daily.      [] azithromycin (ZITHROMAX Z-MARCOS) 250 MG Oral Tab 500 mg once followed by 250 mg daily x 4 days (Patient not taking: Reported on 2024) 6 tablet 0    [] guaiFENesin-codeine 100-10 MG/5ML Oral Solution Take 5 mL by mouth every 6 (six) hours as needed for cough. DO NOT TAKE WITH ORAL NARCOTICS as INSTRUCTED. 100 mL 0    [] predniSONE 20 MG Oral Tab Take 2 tablets (40 mg total) by mouth daily for 5 days. 10 tablet 0    [] benzonatate 100 MG Oral Cap Take 1 capsule (100 mg total) by mouth 3 (three) times daily as needed for cough. 21 capsule 0       Review of Systems:   A comprehensive review of systems was completed.    Pertinent positives and negatives noted in the HPI.    Objective:   Physical Exam:    BP (!) 152/137   Pulse 103   Temp (!) 100.6 °F (38.1 °C) (Rectal)   Resp 18   Wt 110 lb 3.7 oz (50 kg)   SpO2 94%   BMI 21.53 kg/m²   General: No acute distress, awake and alert to person, follows commands, minimally responsive  but does answer questions intermittently  Respiratory: No rhonchi, no wheezes  Cardiovascular: S1, S2.  Tachycardic with regular rhythm  Abdomen: Soft, Non-tender, non-distended, positive bowel sounds  Neuro: Able to move all extremities, no focal deficits appreciated  Extremities: No edema      Results:    Labs:      Labs Last 24 Hours:    Recent Labs   Lab 04/18/24  0546 04/20/24  0520 04/21/24  0930   RBC 4.97 5.03 5.42*   HGB 13.8 13.9 15.1   HCT 42.3 43.8 47.4   MCV 85.1 87.1 87.5   MCH 27.8 27.6 27.9   MCHC 32.6 31.7 31.9   RDW 13.5 13.8 14.0   NEPRELIM  --  5.52 13.20*   WBC 10.6 10.3 20.0*   .0* 569.0* 582.0*       Recent Labs   Lab 04/20/24  0520 04/21/24  0629 04/21/24  0917   * 159* 174*   BUN 21 46* 49*   CREATSERUM 1.02 1.79* 1.91*   EGFRCR 58* 30* 27*   CA 9.8 10.1 10.3*   ALB 3.4 3.5 3.6   * 148* 147*   K 3.9 4.6 4.7   * 117* 114*   CO2 26.0 27.0 25.0   ALKPHO 77 81 82   AST 13* 16 27   ALT 28 28 25   BILT 0.4 0.3 0.3   TP 6.9 7.0 7.3       No results found for: \"PT\", \"INR\"    No results for input(s): \"TROP\", \"TROPHS\", \"CK\" in the last 168 hours.    No results for input(s): \"TROP\", \"PBNP\" in the last 168 hours.    No results for input(s): \"PCT\" in the last 168 hours.    Imaging: Imaging data reviewed in Epic.    Assessment & Plan:      #Severe Sepsis? with end organ damage/kidney injury  -no bacterial infectious etiology identified, hold further antibiotics   -Blood Cultures  -Hemodynamic monitoring     #Hypernatremia/Decreased PO intake   -PO intake encouragement as able    #SUPA  -IVF, 0.45 NS  -Hold/stop NSAIDs  -Hold and Check Lithium level     #EDEL/MDD  -at [facility] prior to this admission  -PTA psych meds aside from Niotaze   -Consult Psych         Plan of care discussed with Dr. Talat Suazo, DO    Supplementary Documentation:     The 21st Century Cures Act makes medical notes like these available to patients in the interest of transparency. Please be advised this  is a medical document. Medical documents are intended to carry relevant information, facts as evident, and the clinical opinion of the practitioner. The medical note is intended as peer to peer communication and may appear blunt or direct. It is written in medical language and may contain abbreviations or verbiage that are unfamiliar.

## 2024-04-21 NOTE — ED PROVIDER NOTES
Patient Seen in: [facility]      History     Chief Complaint   Patient presents with    Abnormal Labs     PT sent from [facility] for hypernatremia     Stated Complaint: PT sent from [facility] for hypernatremia.    Subjective:   73-year-old female, history of anxiety, currently at [facility], presents with decreased oral intake and hypernatremia.  Outpatient labs this morning had a sodium of 148.  Also mild SUPA.  Patient arrived febrile and tachycardic.  She is oriented to person.            Objective:   Past Medical History:    Anxiety state    Back problem    COMPRESSION FX    Cataract    Depression    Esophageal reflux    GERD (gastroesophageal reflux disease)    Hematoma and contusion of liver    STATES HAS BEEN THERE FOR MANY YEARS    History of fractured kneecap    RIGHT    IBS (irritable bowel syndrome)    Mitral prolapse    Osteoarthritis              Past Surgical History:   Procedure Laterality Date          Correct bunion,othr methods      Correct bunion,simple      BILAT.     Dilation/curettage,diagnostic      FOR \"MOLE PREGNANCY\"    Other Right     ORIF RIGHT ANKLE    Spine surgery procedure unlisted      cervical spine 2020    Total knee replacement                  Social History     Socioeconomic History    Marital status:    Tobacco Use    Smoking status: Former     Current packs/day: 2.00     Average packs/day: 2.0 packs/day for 15.0 years (30.0 ttl pk-yrs)     Types: Cigarettes    Smokeless tobacco: Never    Tobacco comments:     QUIT IN    Vaping Use    Vaping status: Never Used   Substance and Sexual Activity    Alcohol use: No    Drug use: No     Social Determinants of Health     Financial Resource Strain: Low Risk  (2024)    Financial Resource Strain     Med Affordability: No   Food Insecurity: No Food Insecurity (2024)    Food Insecurity     Food Insecurity: Never true   Transportation Needs: No Transportation Needs (2024)    Transportation  Needs     Lack of Transportation: No    [facility]    Social Connections   Housing Stability: Low Risk  (4/20/2024)    Housing Stability     Housing Instability: No              Review of Systems   Unable to perform ROS: Psychiatric disorder       Positive for stated complaint: PT sent from [facility] for hypernatremia.  Other systems are as noted in HPI.  Constitutional and vital signs reviewed.      All other systems reviewed and negative except as noted above.    Physical Exam     ED Triage Vitals [04/21/24 0913]   /90   Pulse 108   Resp 16   Temp 97.7 °F (36.5 °C)   Temp src Temporal   SpO2 92 %   O2 Device None (Room air)       Current:/86   Pulse 112   Temp (!) 100.6 °F (38.1 °C) (Rectal)   Resp 14   Wt 50 kg   SpO2 95%   BMI 21.53 kg/m²         Physical Exam  Vitals and nursing note reviewed.   Constitutional:       Appearance: She is not toxic-appearing.   HENT:      Head: Normocephalic.      Nose: Nose normal.   Eyes:      Pupils: Pupils are equal, round, and reactive to light.   Cardiovascular:      Rate and Rhythm: Regular rhythm. Tachycardia present.      Pulses: Normal pulses.   Pulmonary:      Effort: Pulmonary effort is normal. No respiratory distress.      Breath sounds: Normal breath sounds. No stridor. No wheezing, rhonchi or rales.   Abdominal:      General: There is no distension.      Palpations: Abdomen is soft.      Tenderness: There is no abdominal tenderness.   Musculoskeletal:      Cervical back: Neck supple.   Skin:     General: Skin is warm and dry.   Neurological:      Mental Status: She is alert.               ED Course     Labs Reviewed   URINALYSIS, ROUTINE - Abnormal; Notable for the following components:       Result Value    Protein Urine Trace (*)     All other components within normal limits   MAGNESIUM - Abnormal; Notable for the following components:    Magnesium 3.5 (*)     All other components within  normal limits   COMP METABOLIC PANEL (14) - Abnormal; Notable for the following components:    Glucose 174 (*)     Sodium 147 (*)     Chloride 114 (*)     BUN 49 (*)     Creatinine 1.91 (*)     Calcium, Total 10.3 (*)     Calculated Osmolality 321 (*)     eGFR-Cr 27 (*)     All other components within normal limits   CBC W/ DIFFERENTIAL - Abnormal; Notable for the following components:    WBC 20.0 (*)     RBC 5.42 (*)     .0 (*)     Neutrophil Absolute Prelim 13.20 (*)     Neutrophil Absolute 13.20 (*)     Lymphocyte Absolute 4.90 (*)     Monocyte Absolute 1.64 (*)     All other components within normal limits   LACTIC ACID, PLASMA - Normal   SARS-COV-2/FLU A AND B/RSV BY PCR (GENEXPERT) - Normal    Narrative:     This test is intended for the qualitative detection and differentiation of SARS-CoV-2, influenza A, influenza B, and respiratory syncytial virus (RSV) viral RNA in nasopharyngeal or nares swabs from individuals suspected of respiratory viral infection consistent with COVID-19 by their healthcare provider. Signs and symptoms of respiratory viral infection due to SARS-CoV-2, influenza, and RSV can be similar.    Test performed using the Xpert Xpress SARS-CoV-2/FLU/RSV (real time RT-PCR)  assay on the [employer] instrument, [employer], [place] [address].   This test is being used under the Food and Drug Administration's Emergency Use Authorization.    The authorized Fact Sheet for Healthcare Providers for this assay is available upon request from the laboratory.   CBC WITH DIFFERENTIAL WITH PLATELET    Narrative:     The following orders were created for panel order CBC With Differential With Platelet.  Procedure                               Abnormality         Status                     ---------                               -----------         ------                     CBC W/ DIFFERENTIAL[[id number]]          Abnormal            Final result                 Please view results for these tests on the  individual orders.   BLOOD CULTURE   BLOOD CULTURE     EKG    Rate, intervals and axes as noted on EKG Report.  Rate: 108  Rhythm: Sinus Rhythm  Reading: EKG sinus tachycardia 108 bpm.  Normal axis.  No ST elevations.  Intervals are stable.  When compared to April 10, 2024, no significant change noted    Patient placed on cardiac monitor for telemetry monitoring secondary to fever, tachycardia, hypernatremia. Interpretation at bedside by me is sinus tachycardia.                            [facility]      CT ABDOMEN+PELVIS(CPT=74176)    Result Date: 4/21/2024  CONCLUSION:  No acute abdominal or pelvic disease process on this noncontrast CT abdomen and pelvis.  Nonobstructing small stone right kidney.   LOCATION:  [id number]   Dictated by (CST): Piotr Thibodeaux MD on 4/21/2024 at 1:22 PM     Finalized by (CST): Piotr Thibodeaux MD on 4/21/2024 at 1:26 PM       XR CHEST AP PORTABLE  (CPT=71045)    Result Date: 4/21/2024  CONCLUSION:  No evidence of active cardiopulmonary disease.   [place]  [facility]      Dictated by (CST): Jeronimo Joseph MD on 4/21/2024 at 10:17 AM     Finalized by (CST): Jeronimo Josehp MD on 4/21/2024 at 10:18 AM        Independently interpreted the x-ray of the chest without any obvious signs of acute infiltrate    Differential diagnosis includes, but is not limited to, dehydration, electrolyte disturbance, renal failure, viral syndrome, bacterial infection    Family at bedside helpful to provide information on the history presenting illness    External chart review demonstrates her [facility] psychiatric [facility]     73-year-old female sent over for dehydration and hypernatremia.  I talked to psychiatry over there, they think she is more of a medical admit and a psychiatric admit.  Given IV fluids.  No clear source of infection.  Could be viral, also has a pretty harsh cough that was documented by psychiatry as well, may have aspirated or have acute early pneumonia.  Given broad-spectrum antibiotics.  Lactate is  negative.  Culture sent.  Admitted to Dr. Suazo, awaiting bed assignment              Admission disposition: 4/21/2024  1:47 PM                                        Medical Decision Making      Disposition and Plan     Clinical Impression:  1. Fever, unknown origin    2. Acute cough    3. Leukocytosis, unspecified type    4. SUPA (acute kidney injury) (HCC)    5. Dehydration    6. Hypernatremia         Disposition:  Admit  4/21/2024  1:47 pm    Follow-up:  No follow-up provider specified.        Medications Prescribed:  Current Discharge Medication List                            Hospital Problems       Present on Admission  Date Reviewed: 4/1/2024            ICD-10-CM Noted POA    * (Principal) Fever, unknown origin R50.9 4/21/2024 Unknown    Hyperglycemia R73.9 4/21/2024 Yes

## 2024-04-21 NOTE — PROGRESS NOTES
NURSING ADMISSION NOTE      Patient admitted via Cart  Oriented to room.  Safety precautions initiated.  Bed in low position.  Call light in reach.    Patient orientated to room #416, from [facility] due to hypernatremia 147 and low lithium levels 0.2. Patient is alert and orientated to self only, Will give one word answers, smiles and responds when being addressed. IV fluids running and patient is a set order and will need to be fed. Appetite low. Psychiatry has been consulted.

## 2024-04-21 NOTE — PLAN OF CARE
Problem: GASTROINTESTINAL - ADULT  Goal: Maintains or returns to baseline bowel function  Description: INTERVENTIONS:  - Assess bowel function  - Maintain adequate hydration with IV or PO as ordered and tolerated  - Evaluate effectiveness of GI medications  - Encourage mobilization and activity  - Obtain nutritional consult as needed  - Establish a toileting routine/schedule  - Consider collaborating with pharmacy to review patient's medication profile  Outcome: Progressing  Goal: Maintains adequate nutritional intake (undernourished)  Description: INTERVENTIONS:  - Monitor percentage of each meal consumed  - Identify factors contributing to decreased intake, treat as appropriate  - Assist with meals as needed  - Monitor I&O, WT and lab values  - Obtain nutritional consult as needed  - Optimize oral hygiene and moisture  - Encourage food from home; allow for food preferences  - Enhance eating environment  Outcome: Progressing     Problem: METABOLIC/FLUID AND ELECTROLYTES - ADULT  Goal: Electrolytes maintained within normal limits  Description: INTERVENTIONS:  - Monitor labs and rhythm and assess patient for signs and symptoms of electrolyte imbalances  - Administer electrolyte replacement as ordered  - Monitor response to electrolyte replacements, including rhythm and repeat lab results as appropriate  - Fluid restriction as ordered  - Instruct patient on fluid and nutrition restrictions as appropriate  Outcome: Progressing     Problem: MUSCULOSKELETAL - ADULT  Goal: Return mobility to safest level of function  Description: INTERVENTIONS:  - Assess patient stability and activity tolerance for standing, transferring and ambulating w/ or w/o assistive devices  - Assist with transfers and ambulation using safe patient handling equipment as needed  - Ensure adequate protection for wounds/incisions during mobilization  - Obtain PT/OT consults as needed  - Advance activity as appropriate  - Communicate ordered activity  level and limitations with patient/family  Outcome: Progressing     Problem: NEUROLOGICAL - ADULT  Goal: Achieves stable or improved neurological status  Description: INTERVENTIONS  - Assess for and report changes in neurological status  - Initiate measures to prevent increased intracranial pressure  - Maintain blood pressure and fluid volume within ordered parameters to optimize cerebral perfusion and minimize risk of hemorrhage  - Monitor temperature, glucose, and sodium. Initiate appropriate interventions as ordered  Outcome: Progressing     Problem: SAFETY ADULT - FALL  Goal: Free from fall injury  Description: INTERVENTIONS:  - Assess pt frequently for physical needs  - Identify cognitive and physical deficits and behaviors that affect risk of falls.  - [place] fall precautions as indicated by assessment.  - Educate pt/family on patient safety including physical limitations  - Instruct pt to call for assistance with activity based on assessment  - Modify environment to reduce risk of injury  - Provide assistive devices as appropriate  - Consider OT/PT consult to assist with strengthening/mobility  - Encourage toileting schedule  Outcome: Progressing     Problem: DISCHARGE PLANNING  Goal: Discharge to home or other facility with appropriate resources  Description: INTERVENTIONS:  - Identify barriers to discharge w/pt and caregiver  - Include patient/family/discharge partner in discharge planning  - Arrange for needed discharge resources and transportation as appropriate  - Identify discharge learning needs (meds, wound care, etc)  - Arrange for interpreters to assist at discharge as needed  - Consider post-discharge preferences of patient/family/discharge partner  - Complete POLST form as appropriate  - Assess patient's ability to be responsible for managing their own health  - Refer to Case Management Department for coordinating discharge planning if the patient needs post-hospital services based on  physician/LIP order or complex needs related to functional status, cognitive ability or social support system  Outcome: Progressing

## 2024-04-22 LAB
ANION GAP SERPL CALC-SCNC: 6 MMOL/L (ref 0–18)
BASOPHILS # BLD AUTO: 0.07 X10(3) UL (ref 0–0.2)
BASOPHILS NFR BLD AUTO: 0.5 %
BUN BLD-MCNC: 28 MG/DL (ref 9–23)
CALCIUM BLD-MCNC: 8.7 MG/DL (ref 8.5–10.1)
CHLORIDE SERPL-SCNC: 121 MMOL/L (ref 98–112)
CO2 SERPL-SCNC: 24 MMOL/L (ref 21–32)
CREAT BLD-MCNC: 1.13 MG/DL
EGFRCR SERPLBLD CKD-EPI 2021: 51 ML/MIN/1.73M2 (ref 60–?)
EOSINOPHIL # BLD AUTO: 0.06 X10(3) UL (ref 0–0.7)
EOSINOPHIL NFR BLD AUTO: 0.4 %
ERYTHROCYTE [DISTWIDTH] IN BLOOD BY AUTOMATED COUNT: 14.1 %
GLUCOSE BLD-MCNC: 137 MG/DL (ref 70–99)
HCT VFR BLD AUTO: 36.8 %
HGB BLD-MCNC: 12 G/DL
IMM GRANULOCYTES # BLD AUTO: 0.06 X10(3) UL (ref 0–1)
IMM GRANULOCYTES NFR BLD: 0.4 %
LYMPHOCYTES # BLD AUTO: 3.79 X10(3) UL (ref 1–4)
LYMPHOCYTES NFR BLD AUTO: 24.5 %
MCH RBC QN AUTO: 28.3 PG (ref 26–34)
MCHC RBC AUTO-ENTMCNC: 32.6 G/DL (ref 31–37)
MCV RBC AUTO: 86.8 FL
MONOCYTES # BLD AUTO: 1.1 X10(3) UL (ref 0.1–1)
MONOCYTES NFR BLD AUTO: 7.1 %
NEUTROPHILS # BLD AUTO: 10.42 X10 (3) UL (ref 1.5–7.7)
NEUTROPHILS # BLD AUTO: 10.42 X10(3) UL (ref 1.5–7.7)
NEUTROPHILS NFR BLD AUTO: 67.1 %
OSMOLALITY SERPL CALC.SUM OF ELEC: 320 MOSM/KG (ref 275–295)
PLATELET # BLD AUTO: 446 10(3)UL (ref 150–450)
POTASSIUM SERPL-SCNC: 3.9 MMOL/L (ref 3.5–5.1)
RBC # BLD AUTO: 4.24 X10(6)UL
SODIUM SERPL-SCNC: 151 MMOL/L (ref 136–145)
WBC # BLD AUTO: 15.5 X10(3) UL (ref 4–11)

## 2024-04-22 PROCEDURE — 99232 SBSQ HOSP IP/OBS MODERATE 35: CPT | Performed by: INTERNAL MEDICINE

## 2024-04-22 PROCEDURE — 90792 PSYCH DIAG EVAL W/MED SRVCS: CPT | Performed by: OTHER

## 2024-04-22 RX ORDER — PREGABALIN 50 MG/1
50 CAPSULE ORAL 3 TIMES DAILY
Status: DISCONTINUED | OUTPATIENT
Start: 2024-04-22 | End: 2024-04-24

## 2024-04-22 NOTE — PROGRESS NOTES
PSYCH CONSULT    Date of Admission: 4/21/2024  Date of Consult: 4/22/2024  Reason for Consultation: Severe depression    Impression:  Primary Psychiatric Diagnosis:  Major depressive disorder, recurrent, severe, with vegetative symptoms. She has depressed mood, guilty worthless feelings, hopelessness, decreased motivation and apathy and a lack of appetite leading to minimal po intake.     Rule out mixed catatonic features. She had recent mutism and excitatory motor movements; improved with benzos.     Rule out serotonin syndrome. Lyrica was stopped, Lexapro decreased, and she was given benzos at [place]. If present, SS has improved.     Acute delirium/encephalopathy due to SUPA and hypernatremia.     Pertinent Medical Diagnoses:  SUPA-improving. Hypernatremia.     Recommendations:  1) ECT consult. Strongly recommended ECT for treatment of her severe depression causing minimal po intake. Dr. Adames will see her tomorrow.    2) Decrease Lyrica from 100mg to 50mg po three times a day due to concern for recent serotonin syndrome. Lyrica was held when she was at [place] last week. Also with the recommendation for ECT, Lyrica will have to be held on the day before ECT. Lyrica is for her chronic back pain.     3) Continue lower dose Lexapro 15mg po daily for anxiety/depression. She was on 30mg daily prior to her [place] admission on 4/10/24.     4) Continue Abilify 5mg nightly as an adjunct for her depression.     5) Continue Lithium 150mg po twice a day for depression. Started on 4/18/24.     6) Continue low dose Ativan 0.25mg po three times a day with a plan to taper off.     7) Patient tells me she is open to ECT, but I do not think she is decisional at this time. Her  is POA. Left a message for her  with the plan above.     8) Transfer to behavior med floor.     Full note to follow.    Dr. Freddy Marie

## 2024-04-22 NOTE — PROGRESS NOTES
[facility]   part of [facility]     Hospitalist Progress Note     Meli Antonio Patient Status:  Inpatient    11/3/1950 MRN [id number]   [facility]A Attending Juan C Suazo,    Hosp Day # 1 PCP SIRI TABOR     Chief Complaint: Abnormal labs    Subjective:     Patient is awake, alert, conversational, feeling well, smiles  Not eating much     Objective:    Review of Systems:   A comprehensive review of systems was completed; pertinent positive and negatives stated in subjective.    Vital signs:  Temp:  [97.7 °F (36.5 °C)-100.6 °F (38.1 °C)] 98.5 °F (36.9 °C)  Pulse:  [] 78  Resp:  [14-22] 22  BP: (102-152)/() 127/71  SpO2:  [90 %-96 %] 91 %    Physical Exam:    General: No acute distress  Respiratory: No wheezes, no rhonchi  Cardiovascular: S1, S2, regular rate and rhythm  Abdomen: Soft, Non-tender, non-distended, positive bowel sounds  Neuro: No new focal deficits.   Extremities: No edema      Diagnostic Data:    Labs:  Recent Labs   Lab 24  0546 24  0520 24  0930 24  0641   WBC 10.6 10.3 20.0* 15.5*   HGB 13.8 13.9 15.1 12.0   MCV 85.1 87.1 87.5 86.8   .0* 569.0* 582.0* 446.0       Recent Labs   Lab 24  0520 24  0629 24  0917   * 159* 174*   BUN 21 46* 49*   CREATSERUM 1.02 1.79* 1.91*   CA 9.8 10.1 10.3*   ALB 3.4 3.5 3.6   * 148* 147*   K 3.9 4.6 4.7   * 117* 114*   CO2 26.0 27.0 25.0   ALKPHO 77 81 82   AST 13* 16 27   ALT 28 28 25   BILT 0.4 0.3 0.3   TP 6.9 7.0 7.3       Estimated Creatinine Clearance: 18.8 mL/min (A) (based on SCr of 1.91 mg/dL (H)).    No results for input(s): \"TROP\", \"TROPHS\", \"CK\" in the last 168 hours.    No results for input(s): \"PTP\", \"INR\" in the last 168 hours.               Microbiology    No results found for this visit on 24.      Imaging: Reviewed in Epic.    Medications:    cholecalciferol  1,000 Units Oral Daily    ARIPiprazole  5 mg Oral Nightly     escitalopram  15 mg Oral Nightly    lithium carbonate  150 mg Oral BID with meals    propranolol  10 mg Oral BID    heparin  5,000 Units Subcutaneous Q8H LUIS    pregabalin  100 mg Oral TID    LORazepam  0.25 mg Oral TID       Assessment & Plan:      #Leukocytosis  -no bacterial infectious etiology identified, hold further antibiotics   -one time fever - monitor for recurrence   -Blood Cultures     #Hypernatremia/Decreased PO intake   -PO intake encouragement as able     #SUPA  -resolved with gentle hydration   -Hold/stop NSAIDs     #EDEL/MDD  -at [facility] prior to this admission  -PTA psych meds   -Consult Psych     #Poor PO intake  -may need to add appetite stimulant, defer to Psychiatry          uJan C Suazo,     Supplementary Documentation:     Quality:  DVT Mechanical Prophylaxis:     Early ambuation  DVT Pharmacologic Prophylaxis   Medication    heparin (Porcine) 5000 UNIT/ML injection 5,000 Units                Code Status: Full Code  Parrish: No urinary catheter in place  [place] Duration (in days):   Central line:    LOGAN: 4/21/2024      The 21st Century Cures Act makes medical notes like these available to patients in the interest of transparency. Please be advised this is a medical document. Medical documents are intended to carry relevant information, facts as evident, and the clinical opinion of the practitioner. The medical note is intended as peer to peer communication and may appear blunt or direct. It is written in medical language and may contain abbreviations or verbiage that are unfamiliar.             **Certification      PHYSICIAN Certification of Need for Inpatient Hospitalization - Initial Certification    Patient will require inpatient services that will reasonably be expected to span two midnight's based on the clinical documentation in H+P.   Based on patients current state of illness, I anticipate that, after discharge, patient will require TBD.

## 2024-04-22 NOTE — PLAN OF CARE
Problem: GASTROINTESTINAL - ADULT  Goal: Maintains or returns to baseline bowel function  Description: INTERVENTIONS:  - Assess bowel function  - Maintain adequate hydration with IV or PO as ordered and tolerated  - Evaluate effectiveness of GI medications  - Encourage mobilization and activity  - Obtain nutritional consult as needed  - Establish a toileting routine/schedule  - Consider collaborating with pharmacy to review patient's medication profile  Outcome: Progressing  Goal: Maintains adequate nutritional intake (undernourished)  Description: INTERVENTIONS:  - Monitor percentage of each meal consumed  - Identify factors contributing to decreased intake, treat as appropriate  - Assist with meals as needed  - Monitor I&O, WT and lab values  - Obtain nutritional consult as needed  - Optimize oral hygiene and moisture  - Encourage food from home; allow for food preferences  - Enhance eating environment  Outcome: Progressing     Problem: METABOLIC/FLUID AND ELECTROLYTES - ADULT  Goal: Electrolytes maintained within normal limits  Description: INTERVENTIONS:  - Monitor labs and rhythm and assess patient for signs and symptoms of electrolyte imbalances  - Administer electrolyte replacement as ordered  - Monitor response to electrolyte replacements, including rhythm and repeat lab results as appropriate  - Fluid restriction as ordered  - Instruct patient on fluid and nutrition restrictions as appropriate  Outcome: Progressing     Problem: MUSCULOSKELETAL - ADULT  Goal: Return mobility to safest level of function  Description: INTERVENTIONS:  - Assess patient stability and activity tolerance for standing, transferring and ambulating w/ or w/o assistive devices  - Assist with transfers and ambulation using safe patient handling equipment as needed  - Ensure adequate protection for wounds/incisions during mobilization  - Obtain PT/OT consults as needed  - Advance activity as appropriate  - Communicate ordered activity  level and limitations with patient/family  Outcome: Progressing     Problem: NEUROLOGICAL - ADULT  Goal: Achieves stable or improved neurological status  Description: INTERVENTIONS  - Assess for and report changes in neurological status  - Initiate measures to prevent increased intracranial pressure  - Maintain blood pressure and fluid volume within ordered parameters to optimize cerebral perfusion and minimize risk of hemorrhage  - Monitor temperature, glucose, and sodium. Initiate appropriate interventions as ordered  Outcome: Progressing     Problem: SAFETY ADULT - FALL  Goal: Free from fall injury  Description: INTERVENTIONS:  - Assess pt frequently for physical needs  - Identify cognitive and physical deficits and behaviors that affect risk of falls.  - [place] fall precautions as indicated by assessment.  - Educate pt/family on patient safety including physical limitations  - Instruct pt to call for assistance with activity based on assessment  - Modify environment to reduce risk of injury  - Provide assistive devices as appropriate  - Consider OT/PT consult to assist with strengthening/mobility  - Encourage toileting schedule  Outcome: Progressing     Problem: DISCHARGE PLANNING  Goal: Discharge to home or other facility with appropriate resources  Description: INTERVENTIONS:  - Identify barriers to discharge w/pt and caregiver  - Include patient/family/discharge partner in discharge planning  - Arrange for needed discharge resources and transportation as appropriate  - Identify discharge learning needs (meds, wound care, etc)  - Arrange for interpreters to assist at discharge as needed  - Consider post-discharge preferences of patient/family/discharge partner  - Complete POLST form as appropriate  - Assess patient's ability to be responsible for managing their own health  - Refer to Case Management Department for coordinating discharge planning if the patient needs post-hospital services based on  physician/LIP order or complex needs related to functional status, cognitive ability or social support system  Outcome: Progressing

## 2024-04-22 NOTE — CONSULTS
[facility]  Report of Psychiatric Consultation    Meli Antonio Patient Status:  Inpatient    11/3/1950 MRN [id number]   [facility] 4NW-A Attending Juan C Suazo,    Hosp Day # 1 PCP SIRI TABOR     Date of Admission: 2024  Date of Consult: 2024  Reason for Consultation: Severe depression    Impression:  Primary Psychiatric Diagnosis:  Major depressive disorder, recurrent, severe, with vegetative symptoms. She has depressed mood, guilty worthless feelings, hopelessness, decreased motivation and apathy and a lack of appetite leading to minimal po intake.     Rule out mixed catatonia. She had recent mutism and excitatory motor movements; improved with benzos.     Rule out serotonin syndrome. Lyrica was stopped, Lexapro decreased, and she was given benzos at [place]. If present, SS has improved.     Acute delirium/encephalopathy due to SUPA and hypernatremia.     Alcohol use disorder, severity unspecified, in remission for 9 years.     Pertinent Medical Diagnoses:  SUPA-improving. Hypernatremia.     Recommendations:  1) ECT consult. Strongly recommended ECT for treatment of her severe depression causing minimal po intake. Dr. Adames will see her tomorrow.    2) Decrease Lyrica from 100mg to 50mg po three times a day due to concern for recent serotonin syndrome. Lyrica was held when she was at [facility] last week. Also with the recommendation for ECT, Lyrica will have to be held on the day before ECT. Lyrica is for her chronic back pain.     3) Continue lower dose Lexapro 15mg po daily for anxiety/depression. She was on 30mg daily prior to her [facility] admission on 4/10/24.     4) Continue Abilify 5mg nightly as an adjunct for her depression.     5) Continue Lithium 150mg po twice a day for depression. Started on 24.     6) Continue low dose Ativan 0.25mg po three times a day with a plan to taper off.     7) Patient tells me she is open to ECT, but I do not think she is  decisional at this time. Her  is POA. Left a message for her  with the plan above.     8) Transfer to behavior med floor.     Freddy Marie MD  2024  1:01 PM    History of Present Illness:  72 yo female with recurrent major depressive disorder initially presented to [facility] on 4/10/24 for altered mental status with confusion, restlessness, and agitation.     Per psych liaison:  \" reports that patient has had uncontrollable movements, is taking her clothes off, has been confused and does not know the names of anybody.     says that since Friday she has had these difficulties and it seemed to have gotten worse the last 2 days where now, in addition to those difficulties, she also cannot control her bowels.  She says she has been soiling her clothes and her bed.   said he wonders if it is could be attributed to the recent prescribing of meds with codeine that were meant to manage a cough that she had.   also reports that patient has \"been in bed over a year\".  He says that she does not say much and sleeps a lot.  He says she is grieving the loss of 2 dogs that  5 years ago as well as grieving the loss of their adopted son, who 7 yrs ago, at 31 y/o walked out of their lives. He said that pt has friends and she does occasionally go to lunch with them and does attend AA meetings once a week.  He said that she will eat the food that he cooks for her.  states that he is power of  for his wife.  Counselor advised him to bring copies of that paperwork to the hospital so that staff can scan it into the clinic record.\"    She was eval by Dr. 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 The Lexapro was decreased from 30mg to 10mg (then increased to 15mg) and Lyrica and Codeine prn stopped just in case she had SS. She was started on benzos, Klonipin and then Ativan,  which helped with both the possible excitable catatonia vs SS. Wellbutrin was DC'ed (doesn't increase serotonin, but rather dopamine and nor-epi), Abilify continued, and Lithium started on 4/18/24. She was no longer so restless and agitated and disorganized, but remained apathetic with minimal verbal communication. She was eating/drinking little.    She was transferred to [facility] on 4/19/24. She developed SUPA and hypernatremia due to low po intake and was sent to the [place] ED. She was started on IVF's and admitted to the med floor.    Currently, she has depressed mood, guilty worthless feelings, hopelessness, decreased motivation and a lack of appetite leading to low po intake. She ate only 20% of her breakfast per RN. She denies any tremors or jerking movements.     Discussed ECT and recommendation to start it. She said she was fine with treatment, but I did not think she understood or appreciated the pros/cons of treatment.  is POA.     Psychiatry Review Systems: No voices or visions. No suicidal ideation.    Past Psychiatric History: Major depressive disorder treated with Lexapro, Wellbutrin, and Abilify.    Substance Use History: Alcohol use disorder, severity unspecified, in remission x 9 yrs. THC edibles at night.    Psych Family History: None, but tells me her adopted son has mental health issues.    Social and Developmental History: Retired teacher. She lives with her  and adult son who is 41 yr old and going through a divorce. Her adopted son (who is Chinese) stopped talking to the family 7 yrs ago. He is 39 yr old.    Past Medical History:    Anxiety state    Back problem    COMPRESSION FX    Cataract    Depression    Esophageal reflux    GERD (gastroesophageal reflux disease)    Hematoma and contusion of liver    STATES HAS BEEN THERE FOR MANY YEARS    History of fractured kneecap    RIGHT    IBS (irritable bowel syndrome)    Mitral prolapse    Osteoarthritis     Past Surgical History:    Procedure Laterality Date          Correct bunion,othr methods      Correct bunion,simple      BILAT.     Dilation/curettage,diagnostic      FOR \"MOLE PREGNANCY\"    Other Right     ORIF RIGHT ANKLE    Spine surgery procedure unlisted      cervical spine 2020    Total knee replacement       Family History   Problem Relation Age of Onset    Heart Disorder Father     Cancer Mother         ESOPHAGUS      reports that she has quit smoking. Her smoking use included cigarettes. She has a 30 pack-year smoking history. She has never used smokeless tobacco. She reports that she does not drink alcohol and does not use drugs.    Allergies:  Allergies   Allergen Reactions    Radiology Contrast Iodinated Dyes HIVES     HIVES AND THROAT TIGHTENED WHILE HAVING AN MRI    Sulfa Antibiotics HIVES     \"got sicker\"    Seroquel [Quetiapine] UNKNOWN    Cinnamon RASH       Medications:    Current Facility-Administered Medications:     cholecalciferol (Vitamin D3) tab 1,000 Units, 1,000 Units, Oral, Daily    ARIPiprazole (Abilify) tab 5 mg, 5 mg, Oral, Nightly    escitalopram (Lexapro) tab 15 mg, 15 mg, Oral, Nightly    lithium carbonate cap 150 mg, 150 mg, Oral, BID with meals    propranolol (Inderal) tab 10 mg, 10 mg, Oral, BID    acetaminophen (Tylenol Extra Strength) tab 1,000 mg, 1,000 mg, Oral, Q8H PRN    melatonin tab 3 mg, 3 mg, Oral, Nightly PRN    heparin (Porcine) 5000 UNIT/ML injection 5,000 Units, 5,000 Units, Subcutaneous, Q8H LUIS    ondansetron (Zofran) 4 MG/2ML injection 4 mg, 4 mg, Intravenous, Q6H PRN    metoclopramide (Reglan) 5 mg/mL injection 5 mg, 5 mg, Intravenous, Q8H PRN    polyethylene glycol (PEG 3350) (Miralax) 17 g oral packet 17 g, 17 g, Oral, Daily PRN    sennosides (Senokot) tab 17.2 mg, 17.2 mg, Oral, Nightly PRN    bisacodyl (Dulcolax) 10 MG rectal suppository 10 mg, 10 mg, Rectal, Daily PRN    benzonatate (Tessalon) cap 200 mg, 200 mg, Oral, TID PRN    guaiFENesin (Robitussin) 100 MG/5  ML oral liquid 200 mg, 200 mg, Oral, Q4H PRN    glycerin-hypromellose- (Artificial Tears) 0.2-0.2-1 % ophthalmic solution 1 drop, 1 drop, Both Eyes, QID PRN    sodium chloride (Saline Mist) 0.65 % nasal solution 1 spray, 1 spray, Each Nare, Q3H PRN    albuterol (Ventolin HFA) 108 (90 Base) MCG/ACT inhaler 2 puff, 2 puff, Inhalation, Q4H PRN    magnesium hydroxide (Milk of Magnesia) 400 MG/5ML oral suspension 30 mL, 30 mL, Oral, Nightly PRN    alum-mag hydroxide-simethicone (Maalox) 200-200-20 MG/5ML oral suspension 30 mL, 30 mL, Oral, Q4H PRN    acetaminophen (Tylenol) tab 325 mg, 325 mg, Oral, Q4H PRN **OR** acetaminophen (Tylenol) tab 650 mg, 650 mg, Oral, Q4H PRN    pregabalin (Lyrica) cap 100 mg, 100 mg, Oral, TID    LORazepam (Ativan) tab 0.25 mg, 0.25 mg, Oral, TID    traZODone (Desyrel) tab 50 mg, 50 mg, Oral, Nightly PRN    Review of Systems   Constitutional:  Positive for malaise/fatigue.   Psychiatric/Behavioral:  Positive for depression. Negative for suicidal ideas. The patient is nervous/anxious.      Mental Status Exam:     Objective      Vitals:    04/22/24 1150   BP: 100/56   Pulse: 74   Resp: 19   Temp: 99 °F (37.2 °C)     Appearance: fair grooming  Behavior: withdrawn but cooperative, fair eye contact  Gait: not observed    Speech: soft, terse    Mood: depressed and anxious  Affect: Congruent    Thought process: logical to most questions  Thought content: no hallucinations    Orientation: oriented person, place  Attention and Concentration: poor  Memory:  intact remote and impaired recent  Language: Intact naming   Fund of Knowledge: Able to recite name of [place] president    Insight: limited  Judgment: limited    Laboratory Data:  Lab Results   Component Value Date    WBC 15.5 04/22/2024    HGB 12.0 04/22/2024    HCT 36.8 04/22/2024    .0 04/22/2024    CREATSERUM 1.13 04/22/2024    BUN 28 04/22/2024     04/22/2024    K 3.9 04/22/2024     04/22/2024    CO2 24.0 04/22/2024      04/22/2024    CA 8.7 04/22/2024

## 2024-04-22 NOTE — DIETARY NOTE
[facility]   part of [facility]    NUTRITION ASSESSMENT    Unable to diagnose malnutrition criteria at this time.    NUTRITION INTERVENTION:      Meal and Snacks - Continued general diet. Monitor and encourage adequate PO intake.   Medical Food Supplements - Magic Cup TID.     PATIENT STATUS: 04/22/24 Pt seen for MST risk and admitted for abnormal labs.  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 Intake has been variable prior to admission per chart report. Currently consuming 25% with magic cup on board. Sleeping during attempted interview.    ANTHROPOMETRICS:  Ht:  152.4 cm  Wt: 50 kg (110 lb 3.7 oz).   BMI: Body mass index is 21.53 kg/m².  IBW: 45.4 kg      WEIGHT HISTORY:   Weight loss: Yes, Non-severe Wt loss of 11 lbs, 9%, over 4 months     Wt Readings from Last 10 Encounters:   04/21/24 50 kg (110 lb 3.7 oz)   04/11/24 49.1 kg (108 lb 3.2 oz)   04/08/24 55.3 kg (122 lb)   01/12/23 52.2 kg (115 lb)   10/06/20 53.5 kg (118 lb)   09/07/20 52.1 kg (114 lb 12.8 oz)   04/10/17 56.2 kg (123 lb 12.8 oz)        NUTRITION:  Diet:       Procedures    Regular/General diet Is Patient on Accuchecks? No      Food Allergies: No  Cultural/Ethnic/Voodoo Preferences Addressed: Yes    Percent Meals Eaten (last 3 days)       Date/Time Percent Meals Eaten (%)    04/21/24 1709 25 %            GI system review: constipation Last BM: 4/14  Skin and wounds: no pi    NUTRITION RELATED PHYSICAL FINDINGS:     1. Body Fat/Muscle Mass:  unable to obtain     2. Fluid Accumulation: none     NUTRITION PRESCRIPTION: 45.4 kg  Calories: 1361 - 1814 calories/day (30-40 kcal/kg)  Protein: 54 - 68 grams protein/day (1.2-1.5 grams protein per kg)  Fluid: ~1 ml/kcal or per MD discretion    NUTRITION DIAGNOSIS/PROBLEM:  Inadequate energy intake related to insufficient appetite resulting in inadequate nutrition intake as evidenced by documented/reported insufficient oral intake and documented/reported  unintentional weight loss      MONITOR AND EVALUATE/NUTRITION GOALS:  PO intake of 75% of meals TID - New  PO intake of 75% of oral nutrition supplement/s - New  Weight stable within 1 to 2 lbs during admission - New      MEDICATIONS:  lithium    LABS:  Na 151    Pt is at Moderate nutrition risk

## 2024-04-22 NOTE — PHYSICAL THERAPY NOTE
PHYSICAL THERAPY EVALUATION - INPATIENT     Room Number: 416/416-A  Evaluation Date: 4/22/2024  Type of Evaluation: Initial  Physician Order: PT Eval and Treat    Presenting Problem: abnormal labs  Co-Morbidities : serotonin syndrome vs catatonia during last admit per psych  Reason for Therapy: Mobility Dysfunction and Discharge Planning    PHYSICAL THERAPY ASSESSMENT   Patient is currently functioning below baseline with bed mobility, transfers, and gait.  Prior to admission, patient's baseline is ambulatory (prior to recent [facility] admit).  Patient is requiring maximum assist and dependent as a result of the following impairments: cognitive deficits (AMS).  Physical Therapy will continue to follow for duration of hospitalization.    Will f/u for PT while in house to determine further anticipated therapy needs if/when pt is able to actively participate.  Will trial PT at this time.  .    PLAN  PT Treatment Plan: Bed mobility;Body mechanics;Energy conservation;Patient education;Family education;Neuromuscular re-educate;Gait training;Strengthening;Transfer training;Balance training  Rehab Potential : Guarded  Frequency (Obs):  (2-3x/week)  Number of Visits to Meet Established Goals: 2;Trial      CURRENT GOALS    Goal #1 Patient is able to demonstrate supine - sit EOB @ level: supervision     Goal #2 Patient is able to demonstrate transfers Sit to/from Stand at assistance level: supervision     Goal #3 Patient is able to ambulate 50 feet with assist device:  least restrictive device  at assistance level: supervision     Goal #4    Goal #5    Goal #6    Goal Comments: Goals established on 4/22/2024      PHYSICAL THERAPY MEDICAL/SOCIAL HISTORY  History related to current admission: Patient is a 73 year old female admitted on 4/21/2024 from [facility] for abnormal labs.      HOME SITUATION  Type of Home: House   Home Layout: Two level  Stairs to Enter : 3  Railing: Yes          Lives With: Spouse             Prior Level of  [place]: From PT eval on 4/16 at [facility], pt was ind PTA.  Pt unable to give history, reports resides with mother (only verbalization during session) despite chart stating pt resides with spouse.    SUBJECTIVE  \"My mother...\"      OBJECTIVE  Precautions: Bed/chair alarm  Fall Risk: High fall risk    WEIGHT BEARING RESTRICTION                   PAIN ASSESSMENT  Rating: Unable to rate          COGNITION  Following Commands:  does not follow commands    RANGE OF MOTION AND STRENGTH ASSESSMENT      Lower extremity ROM is within functional limits     Unable to perform MMT 2/2 cognition.  Pt demos grossly <3/5 by observation alone        BALANCE  Static Sitting: Poor -             ADDITIONAL TESTS                                    ACTIVITY TOLERANCE                         O2 WALK       NEUROLOGICAL FINDINGS                        AM-PAC '6-Clicks' INPATIENT SHORT FORM - BASIC MOBILITY  How much difficulty does the patient currently have...  Patient Difficulty: Turning over in bed (including adjusting bedclothes, sheets and blankets)?: Unable   Patient Difficulty: Sitting down on and standing up from a chair with arms (e.g., wheelchair, bedside commode, etc.): Unable   Patient Difficulty: Moving from lying on back to sitting on the side of the bed?: A Lot   How much help from another person does the patient currently need...   Help from Another: Moving to and from a bed to a chair (including a wheelchair)?: Total   Help from Another: Need to walk in hospital room?: Total   Help from Another: Climbing 3-5 steps with a railing?: Total       AM-PAC Score:  Raw Score: 7   Approx Degree of Impairment: 92.36%   Standardized Score (AM-PAC Scale): 26.42   CMS Modifier (G-Code): CM    FUNCTIONAL ABILITY STATUS  Gait Assessment   Functional Mobility/Gait Assessment  Gait Assistance: Not tested  Distance (ft): 0    Skilled Therapy Provided     Bed Mobility:  Rolling: dependent  Supine to sit: dependent.   Sit to supine: max  A     Transfer Mobility:  Sit to stand: NT       Therapist's Comments: Pt not physically resistive, however does not initiate any movement.  Pt progressing from max A to CGA while seated eob.  Pt requires tactile cues to assist with moving any extremity.    Exercise/Education Provided:  Bed mobility  Body mechanics    Patient End of Session: Needs met;Call light within reach;RN aware of session/findings;All patient questions and concerns addressed;In bed;Alarm set      Patient Evaluation Complexity Level:  History Moderate - 1 or 2 personal factors and/or co-morbidities   Examination of body systems Low - addressing 1-2 elements   Clinical Presentation Low - Stable   Clinical Decision Making Low - Stable       PT Session Time: 15 minutes    Therapeutic Activity: 10 minutes

## 2024-04-23 PROBLEM — F33.2 MAJOR DEPRESSIVE DISORDER, RECURRENT SEVERE WITHOUT PSYCHOTIC FEATURES (HCC): Status: ACTIVE | Noted: 2024-04-23

## 2024-04-23 PROCEDURE — 99232 SBSQ HOSP IP/OBS MODERATE 35: CPT | Performed by: INTERNAL MEDICINE

## 2024-04-23 PROCEDURE — 99232 SBSQ HOSP IP/OBS MODERATE 35: CPT | Performed by: OTHER

## 2024-04-23 NOTE — PAYOR COMM NOTE
--------------  ADMISSION REVIEW     Payor: [employer]  Subscriber #:  [id number]  Authorization Number: [id number]    Admit date: 4/21/24  Admit time:  3:07 PM       REVIEW DOCUMENTATION:      Patient Seen in: [facility] Emergency Department      History     Chief Complaint   Patient presents with    Abnormal Labs     PT sent from [facility] for hypernatremia     Stated Complaint: PT sent from [facility] for hypernatremia.    Subjective:   73-year-old female, history of anxiety, currently at [facility], presents with decreased oral intake and hypernatremia.  Outpatient labs this morning had a sodium of 148.  Also mild SUPA.  Patient arrived febrile and tachycardic.  She is oriented to person.      Social Determinants of Health     Financial Resource Strain: Low Risk  (4/20/2024)    Financial Resource Strain     Med Affordability: No   Food Insecurity: No Food Insecurity (4/20/2024)    Food Insecurity     Food Insecurity: Never true   Transportation Needs: No Transportation Needs (4/20/2024)    Transportation Needs     Lack of Transportation: No    [facility]    Social Connections   Housing Stability: Low Risk  (4/20/2024)    Housing Stability     Housing Instability: No       Review of Systems   Unable to perform ROS: Psychiatric disorder       Positive for stated complaint: PT sent from [facility] for hypernatremia.    Physical Exam     ED Triage Vitals [04/21/24 0913]   /90   Pulse 108   Resp 16   Temp 97.7 °F (36.5 °C)   Temp src Temporal   SpO2 92 %   O2 Device None (Room air)       Current:/86   Pulse 112   Temp (!) 100.6 °F (38.1 °C) (Rectal)   Resp 14   Wt 50 kg   SpO2 95%   BMI 21.53 kg/m²     Physical Exam  Cardiovascular:      Rate and Rhythm: Regular rhythm. Tachycardia present.      Pulses: Normal pulses.       ED Course     Labs Reviewed   URINALYSIS, ROUTINE - Abnormal; Notable for the following components:       Result Value     Protein Urine Trace (*)     All other components within normal limits   MAGNESIUM - Abnormal; Notable for the following components:    Magnesium 3.5 (*)     All other components within normal limits   COMP METABOLIC PANEL (14) - Abnormal; Notable for the following components:    Glucose 174 (*)     Sodium 147 (*)     Chloride 114 (*)     BUN 49 (*)     Creatinine 1.91 (*)     Calcium, Total 10.3 (*)     Calculated Osmolality 321 (*)     eGFR-Cr 27 (*)     All other components within normal limits   CBC W/ DIFFERENTIAL - Abnormal; Notable for the following components:    WBC 20.0 (*)     RBC 5.42 (*)     .0 (*)     Neutrophil Absolute Prelim 13.20 (*)     Neutrophil Absolute 13.20 (*)     Lymphocyte Absolute 4.90 (*)     Monocyte Absolute 1.64 (*)    EKG    Rate, intervals and axes as noted on EKG Report.  Rate: 108  Rhythm: Sinus Rhythm  Reading: EKG sinus tachycardia 108 bpm.  Normal axis.  No ST elevations.  Intervals are stable.  When compared to April 10, 2024, no significant change noted    73-year-old female sent over for dehydration and hypernatremia.  I talked to psychiatry over there, they think she is more of a medical admit and a psychiatric admit.  Given IV fluids.  No clear source of infection.  Could be viral, also has a pretty harsh cough that was documented by psychiatry as well, may have aspirated or have acute early pneumonia.  Given broad-spectrum antibiotics.  Lactate is negative.  Culture sent.  Admitted to Dr. Suazo, awaiting bed assignment       Disposition and Plan     Clinical Impression:  1. Fever, unknown origin    2. Acute cough    3. Leukocytosis, unspecified type    4. SUPA (acute kidney injury) (HCC)    5. Dehydration    6. Hypernatremia       Disposition:  Admit  4/21/2024  1:47 pm      Chief Complaint: Abnormal labs    Subjective:    History of Present Illness:     Meli Antonio is a 73 year old female with past medical history of generalized anxiety disorder major  depressive disorder presents from [facility] psychiatric facility due to abnormal labs.    Patient was hospitalized at [facility] for approximately 10 days and discharged on the 18th 3 days ago.  She had presented there primarily with psychiatric symptoms which were unable to be resolved while she was there so she was discharged to [facility].  Patient is denying any pain.  No reports of any cough or discomfort.  Her  is at bedside and reports that she does eat but is only when he is around to feed her and may be eats around 50% of her meals.  Upon arrival to the emergency department she was also noted to have a low-grade fever.  She is denying chest pain abdominal pain difficulty breathing or any somatic complaint.    History/Other:    Past Medical History:  Past Medical History:    Anxiety state    Back problem    COMPRESSION FX    Cataract    Depression    Esophageal reflux    GERD (gastroesophageal reflux disease)    Hematoma and contusion of liver    STATES HAS BEEN THERE FOR MANY YEARS    History of fractured kneecap    RIGHT    IBS (irritable bowel syndrome)    Mitral prolapse    Osteoarthritis     Past Surgical History:   Past Surgical History:   Procedure Laterality Date          Correct bunion,othr methods      Correct bunion,simple      BILAT.     Dilation/curettage,diagnostic      FOR \"MOLE PREGNANCY\"    Other Right     ORIF RIGHT ANKLE    Spine surgery procedure unlisted      cervical spine 2020    Total knee replacement        Family History:   Family History   Problem Relation Age of Onset    Heart Disorder Father     Cancer Mother         ESOPHAGUS     Social History:    reports that she has quit smoking. Her smoking use included cigarettes. She has a 30 pack-year smoking history. She has never used smokeless tobacco. She reports that she does not drink alcohol and does not use drugs.     Allergies:   Allergies   Allergen Reactions    Radiology Contrast Iodinated  Dyes HIVES     HIVES AND THROAT TIGHTENED WHILE HAVING AN MRI    Sulfa Antibiotics HIVES     \"got sicker\"    Seroquel [Quetiapine] UNKNOWN    Cinnamon RASH       Current Outpatient Medications on File Prior to Encounter   Medication Sig Dispense Refill    diclofenac 75 MG Oral Tab EC Take 1 tablet (75 mg total) by mouth 2 (two) times daily.      pregabalin 100 MG Oral Cap Take 1 capsule (100 mg total) by mouth in the morning, at noon, and at bedtime.      Meloxicam 15 MG Oral Tab Take 0.5 tablets (7.5 mg total) by mouth in the morning and 0.5 tablets (7.5 mg total) before bedtime.      propranolol 10 MG Oral Tab Take 1 tablet (10 mg total) by mouth 2 (two) times daily.      tiZANidine 4 MG Oral Tab Take 1 tablet (4 mg total) by mouth 3 (three) times daily.      ARIPiprazole 5 MG Oral Tab Take 1 tablet (5 mg total) by mouth at bedtime.      escitalopram 5 MG Oral Tab Take 3 tablets (15 mg total) by mouth nightly.      lithium carbonate 150 MG Oral Cap Take 1 capsule (150 mg total) by mouth 2 (two) times daily with meals.      LORazepam 0.5 MG Oral Tab Take 1 tablet (0.5 mg total) by mouth in the morning, at noon, and at bedtime.      cholecalciferol 1000 UNITS Oral Cap Take 1 capsule (1,000 Units total) by mouth daily.      [] azithromycin (ZITHROMAX Z-MARCOS) 250 MG Oral Tab 500 mg once followed by 250 mg daily x 4 days (Patient not taking: Reported on 2024) 6 tablet 0    [] guaiFENesin-codeine 100-10 MG/5ML Oral Solution Take 5 mL by mouth every 6 (six) hours as needed for cough. DO NOT TAKE WITH ORAL NARCOTICS as INSTRUCTED. 100 mL 0    [] predniSONE 20 MG Oral Tab Take 2 tablets (40 mg total) by mouth daily for 5 days. 10 tablet 0    [] benzonatate 100 MG Oral Cap Take 1 capsule (100 mg total) by mouth 3 (three) times daily as needed for cough. 21 capsule 0       Review of Systems:   A comprehensive review of systems was completed.    Pertinent positives and negatives noted in the  HPI.    Objective:   Physical Exam:    BP (!) 152/137   Pulse 103   Temp (!) 100.6 °F (38.1 °C) (Rectal)   Resp 18   Wt 110 lb 3.7 oz (50 kg)   SpO2 94%   BMI 21.53 kg/m²   General: No acute distress, awake and alert to person, follows commands, minimally responsive but does answer questions intermittently  Respiratory: No rhonchi, no wheezes  Cardiovascular: S1, S2.  Tachycardic with regular rhythm  Abdomen: Soft, Non-tender, non-distended, positive bowel sounds  Neuro: Able to move all extremities, no focal deficits appreciated  Extremities: No edema      Results:    Labs:      Labs Last 24 Hours:    Recent Labs   Lab 04/18/24  0546 04/20/24  0520 04/21/24  0930   RBC 4.97 5.03 5.42*   HGB 13.8 13.9 15.1   HCT 42.3 43.8 47.4   MCV 85.1 87.1 87.5   MCH 27.8 27.6 27.9   MCHC 32.6 31.7 31.9   RDW 13.5 13.8 14.0   NEPRELIM  --  5.52 13.20*   WBC 10.6 10.3 20.0*   .0* 569.0* 582.0*       Recent Labs   Lab 04/20/24  0520 04/21/24  0629 04/21/24  0917   * 159* 174*   BUN 21 46* 49*   CREATSERUM 1.02 1.79* 1.91*   EGFRCR 58* 30* 27*   CA 9.8 10.1 10.3*   ALB 3.4 3.5 3.6   * 148* 147*   K 3.9 4.6 4.7   * 117* 114*   CO2 26.0 27.0 25.0   ALKPHO 77 81 82   AST 13* 16 27   ALT 28 28 25   BILT 0.4 0.3 0.3   TP 6.9 7.0 7.3       Assessment & Plan:      #Severe Sepsis? with end organ damage/kidney injury  -no bacterial infectious etiology identified, hold further antibiotics   -Blood Cultures  -Hemodynamic monitoring     #Hypernatremia/Decreased PO intake   -PO intake encouragement as able    #SUPA  -IVF, 0.45 NS  -Hold/stop NSAIDs  -Hold and Check Lithium level     #EDEL/MDD  -at [facility] prior to this admission  -PTA psych meds aside from [place]   -Consult Psych     Plan of care discussed with Dr. Talat Suazo, DO        4/22 Hospitalist    #Leukocytosis  -no bacterial infectious etiology identified, hold further antibiotics   -one time fever - monitor for recurrence   -Blood Cultures      #Hypernatremia/Decreased PO intake   -PO intake encouragement as able     #SUPA  -resolved with gentle hydration   -Hold/stop NSAIDs     #EDEL/MDD  -at [facility] prior to this admission  -PTA psych meds   -Consult Psych      #Poor PO intake  -may need to add appetite stimulant, defer to Psychiatry          Juan C Suazo DO      Psych 4/22    Date of Admission: 4/21/2024  Date of Consult: 4/22/2024  Reason for Consultation: Severe depression     Impression:  Primary Psychiatric Diagnosis:  Major depressive disorder, recurrent, severe, with vegetative symptoms. She has depressed mood, guilty worthless feelings, hopelessness, decreased motivation and apathy and a lack of appetite leading to minimal po intake.      Rule out mixed catatonia. She had recent mutism and excitatory motor movements; improved with benzos.      Rule out serotonin syndrome. Lyrica was stopped, Lexapro decreased, and she was given benzos at [facility]. If present, SS has improved.      Acute delirium/encephalopathy due to SUPA and hypernatremia.      Alcohol use disorder, severity unspecified, in remission for 9 years.      Pertinent Medical Diagnoses:  SUPA-improving. Hypernatremia.      Recommendations:  1) ECT consult. Strongly recommended ECT for treatment of her severe depression causing minimal po intake. Dr. Adames will see her tomorrow.     2) Decrease Lyrica from 100mg to 50mg po three times a day due to concern for recent serotonin syndrome. Lyrica was held when she was at [facility] last week. Also with the recommendation for ECT, Lyrica will have to be held on the day before ECT. Lyrica is for her chronic back pain.      3) Continue lower dose Lexapro 15mg po daily for anxiety/depression. She was on 30mg daily prior to her [facility] admission on 4/10/24.      4) Continue Abilify 5mg nightly as an adjunct for her depression.      5) Continue Lithium 150mg po twice a day for depression. Started on 4/18/24.      6) Continue low dose Ativan 0.25mg po  three times a day with a plan to taper off.      7) Patient tells me she is open to ECT, but I do not think she is decisional at this time. Her  is POA. Left a message for her  with the plan above.      8) Transfer to behavior [facility] floor.     She was transferred to [facility] on 4/19/24. She developed SUPA and hypernatremia due to low po intake and was sent to the [place] ED. She was started on IVF's and admitted to the [facility] floor.     Currently, she has depressed mood, guilty worthless feelings, hopelessness, decreased motivation and a lack of appetite leading to low po intake. She ate only 20% of her breakfast per RN. She denies any tremors or jerking movements.      Discussed ECT and recommendation to start it. She said she was fine with treatment, but I did not think she understood or appreciated the pros/cons of treatment.  is POA.      Freddy Marie MD  4/22/2024        Nursing       04/21/24 1004 04/21/24 1700 04/21/24 2100   [place] Coma Scale   Eye Opening 3 3 3   Best Verbal Response 4 4 4   Best Motor Response 5 5 5   Khadra Coma Scale Score 12 12 12      04/22/24 0900 04/22/24 2130   [place] Coma Scale   Eye Opening 4 4   Best Verbal Response 4 4   Best Motor Response 6 6   Khadra Coma Scale Score 14 14                Latest Reference Range & Units 04/21/24 06:29 04/21/24 09:17 04/22/24 08:08   Glucose 70 - 99 mg/dL 159 (H) 174 (H) 137 (H)   Sodium 136 - 145 mmol/L 148 (H) 147 (H) 151 (H)   Potassium 3.5 - 5.1 mmol/L 4.6 4.7 3.9   Chloride 98 - 112 mmol/L 117 (H) 114 (H) 121 (H)   Carbon Dioxide, Total 21.0 - 32.0 mmol/L 27.0 25.0 24.0   BUN 9 - 23 mg/dL 46 (H) 49 (H) 28 (H)   CREATININE 0.55 - 1.02 mg/dL 1.79 (H) 1.91 (H) 1.13 (H)   CALCIUM 8.5 - 10.1 mg/dL 10.1 10.3 (H) 8.7   EGFR >=60 mL/min/1.73m2 30 (L) 27 (L) 51 (L)   ANION GAP 0 - 18 mmol/L 4 8 6   CALCULATED OSMOLALITY 275 - 295 mOsm/kg 321 (H) 321 (H) 320 (H)   (H): Data is abnormally high  (L): Data is abnormally low        Latest Reference Range & Units 04/20/24 05:20 04/21/24 09:30 04/22/24 06:41   WBC 4.0 - 11.0 x10(3) uL 10.3 20.0 (H) 15.5 (H)   Hemoglobin 12.0 - 16.0 g/dL 13.9 15.1 12.0   Hematocrit 35.0 - 48.0 % 43.8 47.4 36.8   Platelet Count 150.0 - 450.0 10(3)uL 569.0 (H) 582.0 (H) 446.0   (H): Data is abnormally high        MEDICATIONS ADMINISTERED IN LAST 1 DAY:  acetaminophen (Tylenol Extra Strength) tab 1,000 mg       Date Action Dose Route User    4/22/2024 2133 Given 1,000 mg Oral Lexie Perez RN    4/22/2024 1148 Given 1,000 mg Oral Willy Nye RN          ARIPiprazole (Abilify) tab 5 mg       Date Action Dose Route User    4/22/2024 2133 Given 5 mg Oral Lexie Perez RN          heparin (Porcine) 5000 UNIT/ML injection 5,000 Units       Date Action Dose Route User    4/23/2024 0557 Given 5,000 Units Subcutaneous (Right Lower Abdomen) Lexie Perez RN    4/22/2024 2134 Given 5,000 Units Subcutaneous (Left Upper Abdomen) Lexie Perez RN    4/22/2024 1420 Given 5,000 Units Subcutaneous (Left Upper Abdomen) Willy Nye RN          lithium carbonate cap 150 mg       Date Action Dose Route User    4/23/2024 0829 Given 150 mg Oral Soni Sharma RN    4/22/2024 1728 Given 150 mg Oral Willy Nye RN          LORazepam (Ativan) tab 0.25 mg       Date Action Dose Route User    4/23/2024 0829 Given 0.25 mg Oral Soni Sharma RN    4/22/2024 2133 Given 0.25 mg Oral Lexie Perez RN    4/22/2024 1144 Given 0.25 mg Oral Willy Nye RN          pregabalin (Lyrica) cap 100 mg       Date Action Dose Route User    4/22/2024 1420 Given 100 mg Oral Willy Nye RN          pregabalin (Lyrica) cap 50 mg       Date Action Dose Route User    4/23/2024 0829 Given 50 mg Oral Soni Sharma RN    4/22/2024 2133 Given 50 mg Oral Lexie Perez RN          propranolol (Inderal) tab 10 mg       Date Action Dose Route User    4/23/2024 0832 Given 10 mg Oral Soni Sharma RN     4/22/2024 2133 Given 10 mg Oral Lexie Perez RN          cholecalciferol (Vitamin D3) tab 1,000 Units       Date Action Dose Route User    4/23/2024 0829 Given 1,000 Units Oral Soni Sharma RN          escitalopram (Lexapro) tab 15 mg       Date Action Dose Route User    4/22/2024 2133 Given 15 mg Oral Lexie Perez RN            Vitals (last day)       Date/Time Temp Pulse Resp BP SpO2 Weight O2 Device O2 Flow Rate (L/min) [facility]    04/23/24 0759 97.4 °F (36.3 °C) 73 20 126/71 95 % -- None (Room air) --     04/23/24 0630 -- 70 15 -- 95 % -- -- -- [place]    04/23/24 0600 98.7 °F (37.1 °C) 72 26 105/71 96 % -- None (Room air) -- AK    04/22/24 2330 98 °F (36.7 °C) 63 18 128/78 95 % -- None (Room air) -- AK    04/22/24 2140 98 °F (36.7 °C) 82 16 119/59 92 % -- None (Room air) -- AK    04/22/24 1801 99.1 °F (37.3 °C) 75 19 126/87 95 % -- None (Room air) --     04/22/24 1150 99 °F (37.2 °C) 74 19 100/56 95 % -- None (Room air) --     04/22/24 0839 99.2 °F (37.3 °C) 82 20 132/83 94 % -- None (Room air) --     04/22/24 0512 98.5 °F (36.9 °C) 78 22 127/71 91 % -- None (Room air) --      04/21/24 1602 98.1 °F (36.7 °C) 88 18 117/98 Abnormal  96 % -- None (Room air) -- [place]   04/21/24 1400 -- 112 14 129/86 95 % -- -- --    04/21/24 1330 -- 103 18 152/137 Abnormal  94 % -- -- --    04/21/24 1245 -- 117 14 131/110 Abnormal  95 % -- -- --    04/21/24 1115 -- 88 16 131/89 94 % -- -- --    04/21/24 1015 -- 93 15 145/86 94 % -- -- --    04/21/24 0926 100.6 °F (38.1 °C) Abnormal  -- -- -- -- -- -- --    04/21/24 0915 -- 105 19 121/90 93 % -- -- --    04/21/24 0913 97.7 °F (36.5 °C) 108 16 121/90 92 % 110 lb 3.7 oz None (Room air) --

## 2024-04-23 NOTE — PROGRESS NOTES
Assumed care at 1420  Denies n/v & pain meds per MAR  Bed in the lowest position  Safety measures in place

## 2024-04-23 NOTE — PLAN OF CARE
Pt a/o x2, confused at times. VSS, remained afebrile. Meds given per MAR. Psych consulted. Orders to transfer to CTU3, waiting on bed. Tylenol given for ankle pain. Fall precautions in place. Call light within reach.     Problem: NEUROLOGICAL - ADULT  Goal: Achieves stable or improved neurological status  Description: INTERVENTIONS  - Assess for and report changes in neurological status  - Initiate measures to prevent increased intracranial pressure  - Maintain blood pressure and fluid volume within ordered parameters to optimize cerebral perfusion and minimize risk of hemorrhage  - Monitor temperature, glucose, and sodium. Initiate appropriate interventions as ordered  Outcome: Progressing     Problem: SAFETY ADULT - FALL  Goal: Free from fall injury  Description: INTERVENTIONS:  - Assess pt frequently for physical needs  - Identify cognitive and physical deficits and behaviors that affect risk of falls.  - [place] fall precautions as indicated by assessment.  - Educate pt/family on patient safety including physical limitations  - Instruct pt to call for assistance with activity based on assessment  - Modify environment to reduce risk of injury  - Provide assistive devices as appropriate  - Consider OT/PT consult to assist with strengthening/mobility  - Encourage toileting schedule  Outcome: Progressing     Problem: MUSCULOSKELETAL - ADULT  Goal: Return mobility to safest level of function  Description: INTERVENTIONS:  - Assess patient stability and activity tolerance for standing, transferring and ambulating w/ or w/o assistive devices  - Assist with transfers and ambulation using safe patient handling equipment as needed  - Ensure adequate protection for wounds/incisions during mobilization  - Obtain PT/OT consults as needed  - Advance activity as appropriate  - Communicate ordered activity level and limitations with patient/family  Outcome: Progressing

## 2024-04-23 NOTE — PLAN OF CARE
A&Ox1-2, VSS and afebrile  PRN tylenol given for ankle pain.   Meds given as per MAR.   Safety precautions in place, call light within pt's reach.     Plan: transfer to CTU 3 for ECT.     Update: bed alarm went off around 0600, pt was observed on her knees next to the bed on the floor. Per pt, she tried to get out of bed d/t having a accident. Pt's bed was soiled w/urine and BM. Pt was assessed, no injury noted. Pt denies hitting her head. Per pt she just slid herself off of the bed - and that she didn't fall. Pt linens changed, pt was wiped down w/soap and water. And assisted back to the bed. MD and pt's  was notified.     Problem: MUSCULOSKELETAL - ADULT  Goal: Return mobility to safest level of function  Description: INTERVENTIONS:  - Assess patient stability and activity tolerance for standing, transferring and ambulating w/ or w/o assistive devices  - Assist with transfers and ambulation using safe patient handling equipment as needed  - Ensure adequate protection for wounds/incisions during mobilization  - Obtain PT/OT consults as needed  - Advance activity as appropriate  - Communicate ordered activity level and limitations with patient/family  Outcome: Progressing     Problem: NEUROLOGICAL - ADULT  Goal: Achieves stable or improved neurological status  Description: INTERVENTIONS  - Assess for and report changes in neurological status  - Initiate measures to prevent increased intracranial pressure  - Maintain blood pressure and fluid volume within ordered parameters to optimize cerebral perfusion and minimize risk of hemorrhage  - Monitor temperature, glucose, and sodium. Initiate appropriate interventions as ordered  Outcome: Progressing     Problem: SAFETY ADULT - FALL  Goal: Free from fall injury  Description: INTERVENTIONS:  - Assess pt frequently for physical needs  - Identify cognitive and physical deficits and behaviors that affect risk of falls.  - [place] fall precautions as indicated by  assessment.  - Educate pt/family on patient safety including physical limitations  - Instruct pt to call for assistance with activity based on assessment  - Modify environment to reduce risk of injury  - Provide assistive devices as appropriate  - Consider OT/PT consult to assist with strengthening/mobility  - Encourage toileting schedule  Outcome: Progressing

## 2024-04-23 NOTE — PROGRESS NOTES
[facility]  Report of Psychiatric Progress Note    Meli Antonio Patient Status:  Inpatient    11/3/1950 MRN [id number]   [facility] 4NW-A Attending Juan C Suazo,    Hosp Day # 2 PCP SIRI TABOR     Date of Admission: 2024  Date of Service: 2024  Reason for Consultation: Severe depression    Impression:  Primary Psychiatric Diagnosis:  Major depressive disorder, recurrent, severe, with vegetative symptoms. She has depressed mood, guilty worthless feelings, hopelessness, decreased motivation and apathy and a lack of appetite leading to minimal po intake. Eating about 20-30% of meals on average.     Rule out mixed catatonia. She had recent mutism and excitatory motor movements; improved with benzos.     Rule out serotonin syndrome. Lyrica was stopped, Lexapro decreased, and she was given benzos at [facility]. If present, SS has improved.     Acute delirium/encephalopathy due to SUPA and hypernatremia. She is inattentive.     Psychosis unspecified with visual hallucinations of a mouse in the room this AM likely due to delirium.     Alcohol use disorder, severity unspecified, in remission for 9 years.     Pertinent Medical Diagnoses:  SUPA-improving. Hypernatremia.     Recommendations:  1) ECT consult. Strongly recommend ECT for treatment of her severe depression causing minimal po intake. Dr. Adames will see her today. Her  (POA) will consent for her to have ECT.    2) Continue lower dose Lyrica at 50mg po three times a day due to concern for recent serotonin syndrome. Lyrica was held when she was at [facility] last week. Also with the recommendation for ECT, Lyrica will have to be held on the day before ECT. Lyrica is for her chronic back pain which is at a 4-5/10 right now.    3) Continue lower dose Lexapro 15mg po daily for anxiety/depression. She was on 30mg daily prior to her [facility] admission on 4/10/24.     4) Continue Abilify 5mg nightly as an adjunct for her  depression.     5) Continue Lithium 150mg po twice a day for depression. Started on 24.     6) Continue low dose Ativan 0.25mg po three times a day for now. Will be held the day before ECT.    7) Left a message for her outpatient psychiatrist Dr. Ulises Capps.  was updated with the plan above as well.     8) Dispo-- plan is to start ECT at [facility] and once she is eating close to 50% of her meals, transfer back to [facility].     Freddy Marie MD    History of Present Illness:  72 yo female with recurrent major depressive disorder initially presented to [facility] on 4/10/24 for altered mental status with confusion, restlessness, and agitation.     Per psych liaison:  \" reports that patient has had uncontrollable movements, is taking her clothes off, has been confused and does not know the names of anybody.     says that since Friday she has had these difficulties and it seemed to have gotten worse the last 2 days where now, in addition to those difficulties, she also cannot control her bowels.  She says she has been soiling her clothes and her bed.   said he wonders if it is could be attributed to the recent prescribing of meds with codeine that were meant to manage a cough that she had.   also reports that patient has \"been in bed over a year\".  He says that she does not say much and sleeps a lot.  He says she is grieving the loss of 2 dogs that  5 years ago as well as grieving the loss of their adopted son, who 7 yrs ago, at 31 y/o walked out of their lives. He said that pt has friends and she does occasionally go to lunch with them and does attend AA meetings once a week.  He said that she will eat the food that he cooks for her.  states that he is power of  for his wife.  Counselor advised him to bring copies of that paperwork to the hospital so that staff can scan it into the clinic record.\"    She was eval by Dr. Mckenzie (psych service at  [facility]) and thought to have delirium and depression with excitable catatonia vs serotonin syndrome (SS). The Lexapro was decreased from 30mg to 10mg (then increased to 15mg) and Lyrica and Codeine prn stopped just in case she had SS. She was started on benzos, Klonipin and then Ativan, which helped with both the possible excitable catatonia vs SS. Wellbutrin was DC'ed (doesn't increase serotonin, but rather dopamine and nor-epi), Abilify continued, and Lithium started on 4/18/24. She was no longer so restless and agitated and disorganized, but remained apathetic with minimal verbal communication. She was eating/drinking little.    She was transferred to [facility] on 4/19/24. She developed SUPA and hypernatremia due to low po intake and was sent to the ward ED. She was started on IVF's and admitted to the med floor.    Currently, she has depressed mood, guilty worthless feelings, hopelessness, decreased motivation and a lack of appetite leading to low po intake. She ate only 20% of her breakfast per RN. She denies any tremors or jerking movements.     Discussed ECT and recommendation to start it. She said she was fine with treatment, but I did not think she understood or appreciated the pros/cons of treatment.  is POA.     Interval Hx:  4/23/2024- She ate 20%/60%/25% of her meals yesterday. She ate about 30% of her breakfast and lunch today. She was found on the floor with her knees earlier today; did not hit her head. She is now in the chair. Per RN, she had difficultly initiating walking, but once she started, she was able to ambulate well with the walker.     Currently, she feels tired and depressed. She cries when she thinks about her estranged son and her dogs that have passed away. She has low energy/motivation/appetite and feelings of hopelessness. No suicidal ideation. She endorsed seeing a mouse in the room this AM; no voices/visions now.    Discussed ECT and she appeared to understand more  today. Her  who is POA wants her to start the treatments.    Regarding her chronic back pain, it is 4-5/10 with 10 being severe pain.     Psychiatry Review Systems: No suicidal ideation.    Past Psychiatric History: Major depressive disorder treated with Lexapro, Wellbutrin, and Abilify.    Substance Use History: Alcohol use disorder, severity unspecified, in remission x 9 yrs. THC edibles at night.    Psych Family History: None, but tells me her adopted son has mental health issues.    Social and Developmental History: Retired teacher. She lives with her  and adult son who is 41 yr old and going through a divorce. Her adopted son (from [place] per , she told me [place] initially) stopped talking to the family 7 yrs ago. He is 39 yr old.    Past Medical History:    Anxiety state    Back problem    COMPRESSION FX    Cataract    Depression    Esophageal reflux    GERD (gastroesophageal reflux disease)    Hematoma and contusion of liver    STATES HAS BEEN THERE FOR MANY YEARS    History of fractured kneecap    RIGHT    IBS (irritable bowel syndrome)    Mitral prolapse    Osteoarthritis     Past Surgical History:   Procedure Laterality Date          Correct bunion,othr methods      Correct bunion,simple      BILAT.     Dilation/curettage,diagnostic      FOR \"MOLE PREGNANCY\"    Other Right     ORIF RIGHT ANKLE    Spine surgery procedure unlisted      cervical spine 2020    Total knee replacement       Family History   Problem Relation Age of Onset    Heart Disorder Father     Cancer Mother         ESOPHAGUS      reports that she has quit smoking. Her smoking use included cigarettes. She has a 30 pack-year smoking history. She has never used smokeless tobacco. She reports that she does not drink alcohol and does not use drugs.    Allergies:  Allergies   Allergen Reactions    Radiology Contrast Iodinated Dyes HIVES     HIVES AND THROAT TIGHTENED WHILE HAVING AN MRI    Sulfa  Antibiotics HIVES     \"got sicker\"    Seroquel [Quetiapine] UNKNOWN    Cinnamon RASH       Medications:    Current Facility-Administered Medications:     pregabalin (Lyrica) cap 50 mg, 50 mg, Oral, TID    cholecalciferol (Vitamin D3) tab 1,000 Units, 1,000 Units, Oral, Daily    ARIPiprazole (Abilify) tab 5 mg, 5 mg, Oral, Nightly    escitalopram (Lexapro) tab 15 mg, 15 mg, Oral, Nightly    lithium carbonate cap 150 mg, 150 mg, Oral, BID with meals    propranolol (Inderal) tab 10 mg, 10 mg, Oral, BID    acetaminophen (Tylenol Extra Strength) tab 1,000 mg, 1,000 mg, Oral, Q8H PRN    melatonin tab 3 mg, 3 mg, Oral, Nightly PRN    heparin (Porcine) 5000 UNIT/ML injection 5,000 Units, 5,000 Units, Subcutaneous, Q8H LUIS    ondansetron (Zofran) 4 MG/2ML injection 4 mg, 4 mg, Intravenous, Q6H PRN    metoclopramide (Reglan) 5 mg/mL injection 5 mg, 5 mg, Intravenous, Q8H PRN    polyethylene glycol (PEG 3350) (Miralax) 17 g oral packet 17 g, 17 g, Oral, Daily PRN    sennosides (Senokot) tab 17.2 mg, 17.2 mg, Oral, Nightly PRN    bisacodyl (Dulcolax) 10 MG rectal suppository 10 mg, 10 mg, Rectal, Daily PRN    benzonatate (Tessalon) cap 200 mg, 200 mg, Oral, TID PRN    guaiFENesin (Robitussin) 100 MG/5 ML oral liquid 200 mg, 200 mg, Oral, Q4H PRN    glycerin-hypromellose- (Artificial Tears) 0.2-0.2-1 % ophthalmic solution 1 drop, 1 drop, Both Eyes, QID PRN    sodium chloride (Saline Mist) 0.65 % nasal solution 1 spray, 1 spray, Each Nare, Q3H PRN    albuterol (Ventolin HFA) 108 (90 Base) MCG/ACT inhaler 2 puff, 2 puff, Inhalation, Q4H PRN    magnesium hydroxide (Milk of Magnesia) 400 MG/5ML oral suspension 30 mL, 30 mL, Oral, Nightly PRN    alum-mag hydroxide-simethicone (Maalox) 200-200-20 MG/5ML oral suspension 30 mL, 30 mL, Oral, Q4H PRN    acetaminophen (Tylenol) tab 325 mg, 325 mg, Oral, Q4H PRN **OR** acetaminophen (Tylenol) tab 650 mg, 650 mg, Oral, Q4H PRN    LORazepam (Ativan) tab 0.25 mg, 0.25 mg, Oral, TID     traZODone (Desyrel) tab 50 mg, 50 mg, Oral, Nightly PRN    Review of Systems   Constitutional:  Positive for malaise/fatigue.   Psychiatric/Behavioral:  Positive for depression. Negative for suicidal ideas. The patient is nervous/anxious.      Mental Status Exam:     Objective      Vitals:    04/23/24 1227   BP: 104/84   Pulse: 84   Resp: 18   Temp: 98 °F (36.7 °C)     Appearance: fair grooming  Behavior: less withdrawn today, cooperative, fair eye contact  Gait: not observed    Speech: soft, fluent today    Mood: depressed and anxious  Affect: Congruent    Thought process: logical   Thought content: saw a mouse in the room this AM, no hallucinations at this moment    Orientation: oriented person, place, unable to tell me month or year  Attention and Concentration: poor, unable to tell me the months of the year backwards from Dec to July  Memory:  intact remote and impaired recent  Language: Intact naming   Fund of Knowledge: Able to recite name of [place] president    Insight: limited  Judgment: limited

## 2024-04-23 NOTE — PROGRESS NOTES
[facility]   part of [facility]     Hospitalist Progress Note     Meli Antonio Patient Status:  Inpatient    11/3/1950 MRN [id number]   [facility] 4NW-A Attending Juan C Suazo,    Hosp Day # 2 PCP SIRI TABOR     Chief Complaint: Abnormal labs    Subjective:     Patient found sitting on her knees on floor early this morning, no trauma, she says she was embarrassed about having a BM accident    No somatic complaints  Eating breakfast     Objective:    Review of Systems:   A comprehensive review of systems was completed; pertinent positive and negatives stated in subjective.    Vital signs:  Temp:  [97.4 °F (36.3 °C)-99.2 °F (37.3 °C)] 97.4 °F (36.3 °C)  Pulse:  [63-82] 73  Resp:  [15-26] 20  BP: (100-132)/(56-87) 126/71  SpO2:  [92 %-96 %] 95 %    Physical Exam:    General: No acute distress  Respiratory: No wheezes, no rhonchi  Cardiovascular: S1, S2, regular rate and rhythm  Abdomen: Soft, Non-tender, non-distended, positive bowel sounds  Neuro: No new focal deficits.   Extremities: No edema      Diagnostic Data:    Labs:  Recent Labs   Lab 24  0546 24  0520 24  0930 24  0641   WBC 10.6 10.3 20.0* 15.5*   HGB 13.8 13.9 15.1 12.0   MCV 85.1 87.1 87.5 86.8   .0* 569.0* 582.0* 446.0       Recent Labs   Lab 24  0520 24  0629 24  0917 24  0808   * 159* 174* 137*   BUN 21 46* 49* 28*   CREATSERUM 1.02 1.79* 1.91* 1.13*   CA 9.8 10.1 10.3* 8.7   ALB 3.4 3.5 3.6  --    * 148* 147* 151*   K 3.9 4.6 4.7 3.9   * 117* 114* 121*   CO2 26.0 27.0 25.0 24.0   ALKPHO 77 81 82  --    AST 13* 16 27  --    ALT 28 28 25  --    BILT 0.4 0.3 0.3  --    TP 6.9 7.0 7.3  --        Estimated Creatinine Clearance: 31.8 mL/min (A) (based on SCr of 1.13 mg/dL (H)).    No results for input(s): \"TROP\", \"TROPHS\", \"CK\" in the last 168 hours.    No results for input(s): \"PTP\", \"INR\" in the last 168 hours.                Microbiology    Hospital Encounter on 04/21/24   1. Blood Culture     Status: None (Preliminary result)    Collection Time: 04/21/24 11:06 AM    Specimen: Blood,peripheral   Result Value Ref Range    Blood Culture Result No Growth 1 Day N/A         Imaging: Reviewed in Epic.    Medications:    pregabalin  50 mg Oral TID    cholecalciferol  1,000 Units Oral Daily    ARIPiprazole  5 mg Oral Nightly    escitalopram  15 mg Oral Nightly    lithium carbonate  150 mg Oral BID with meals    propranolol  10 mg Oral BID    heparin  5,000 Units Subcutaneous Q8H LUIS    LORazepam  0.25 mg Oral TID       Assessment & Plan:      #EDEL/MDD  #Decreased PO intake   -at [facility] prior to this admission  -medical management per Psychiatry   -ECT planned    #Leukocytosis  -no bacterial infectious etiology identified, hold further antibiotics   -one time fever - monitor for recurrence   -Blood Cultures NGTD     #Hypernatremia/Decreased PO intake   -PO intake encouragement as able     #SUPA  -resolved with gentle hydration   -Hold/stop NSAIDs           Juan C Suazo, DO    Supplementary Documentation:     Quality:  DVT Mechanical Prophylaxis:     Early ambuation  DVT Pharmacologic Prophylaxis   Medication    heparin (Porcine) 5000 UNIT/ML injection 5,000 Units                Code Status: Full Code  Parrish: External urinary catheter in place  Parrish Duration (in days):   Central line:    LOGAN:       The 21st Century Cures Act makes medical notes like these available to patients in the interest of transparency. Please be advised this is a medical document. Medical documents are intended to carry relevant information, facts as evident, and the clinical opinion of the practitioner. The medical note is intended as peer to peer communication and may appear blunt or direct. It is written in medical language and may contain abbreviations or verbiage that are unfamiliar.             **Certification      PHYSICIAN Certification of Need for Inpatient  Hospitalization - Initial Certification    Patient will require inpatient services that will reasonably be expected to span two midnight's based on the clinical documentation in H+P.   Based on patients current state of illness, I anticipate that, after discharge, patient will require TBD.

## 2024-04-24 LAB
ANION GAP SERPL CALC-SCNC: 5 MMOL/L (ref 0–18)
BASOPHILS # BLD AUTO: 0.03 X10(3) UL (ref 0–0.2)
BASOPHILS NFR BLD AUTO: 0.3 %
BUN BLD-MCNC: 10 MG/DL (ref 9–23)
CALCIUM BLD-MCNC: 9.3 MG/DL (ref 8.5–10.1)
CHLORIDE SERPL-SCNC: 112 MMOL/L (ref 98–112)
CO2 SERPL-SCNC: 26 MMOL/L (ref 21–32)
CREAT BLD-MCNC: 0.82 MG/DL
EGFRCR SERPLBLD CKD-EPI 2021: 75 ML/MIN/1.73M2 (ref 60–?)
EOSINOPHIL # BLD AUTO: 0.1 X10(3) UL (ref 0–0.7)
EOSINOPHIL NFR BLD AUTO: 0.9 %
ERYTHROCYTE [DISTWIDTH] IN BLOOD BY AUTOMATED COUNT: 13.5 %
EST. AVERAGE GLUCOSE BLD GHB EST-MCNC: 151 MG/DL (ref 68–126)
GLUCOSE BLD-MCNC: 139 MG/DL (ref 70–99)
HBA1C MFR BLD: 6.9 % (ref ?–5.7)
HCT VFR BLD AUTO: 39.5 %
HGB BLD-MCNC: 12.7 G/DL
IMM GRANULOCYTES # BLD AUTO: 0.06 X10(3) UL (ref 0–1)
IMM GRANULOCYTES NFR BLD: 0.5 %
LYMPHOCYTES # BLD AUTO: 4.13 X10(3) UL (ref 1–4)
LYMPHOCYTES NFR BLD AUTO: 36 %
MAGNESIUM SERPL-MCNC: 2.3 MG/DL (ref 1.6–2.6)
MCH RBC QN AUTO: 28 PG (ref 26–34)
MCHC RBC AUTO-ENTMCNC: 32.2 G/DL (ref 31–37)
MCV RBC AUTO: 87 FL
MONOCYTES # BLD AUTO: 0.75 X10(3) UL (ref 0.1–1)
MONOCYTES NFR BLD AUTO: 6.5 %
NEUTROPHILS # BLD AUTO: 6.39 X10 (3) UL (ref 1.5–7.7)
NEUTROPHILS # BLD AUTO: 6.39 X10(3) UL (ref 1.5–7.7)
NEUTROPHILS NFR BLD AUTO: 55.8 %
OSMOLALITY SERPL CALC.SUM OF ELEC: 297 MOSM/KG (ref 275–295)
PLATELET # BLD AUTO: 412 10(3)UL (ref 150–450)
POTASSIUM SERPL-SCNC: 3.5 MMOL/L (ref 3.5–5.1)
RBC # BLD AUTO: 4.54 X10(6)UL
SODIUM SERPL-SCNC: 143 MMOL/L (ref 136–145)
WBC # BLD AUTO: 11.5 X10(3) UL (ref 4–11)

## 2024-04-24 PROCEDURE — 99232 SBSQ HOSP IP/OBS MODERATE 35: CPT | Performed by: INTERNAL MEDICINE

## 2024-04-24 PROCEDURE — 90792 PSYCH DIAG EVAL W/MED SRVCS: CPT | Performed by: OTHER

## 2024-04-24 PROCEDURE — 99232 SBSQ HOSP IP/OBS MODERATE 35: CPT | Performed by: OTHER

## 2024-04-24 RX ORDER — POTASSIUM CHLORIDE 20 MEQ/1
40 TABLET, EXTENDED RELEASE ORAL EVERY 4 HOURS
Status: COMPLETED | OUTPATIENT
Start: 2024-04-24 | End: 2024-04-24

## 2024-04-24 RX ORDER — LORAZEPAM 0.5 MG/1
0.25 TABLET ORAL 3 TIMES DAILY
Status: DISCONTINUED | OUTPATIENT
Start: 2024-04-24 | End: 2024-04-25

## 2024-04-24 RX ORDER — ENOXAPARIN SODIUM 100 MG/ML
40 INJECTION SUBCUTANEOUS NIGHTLY
Status: DISCONTINUED | OUTPATIENT
Start: 2024-04-24 | End: 2024-05-03

## 2024-04-24 RX ORDER — PREGABALIN 50 MG/1
50 CAPSULE ORAL 3 TIMES DAILY
Status: DISCONTINUED | OUTPATIENT
Start: 2024-04-24 | End: 2024-04-25

## 2024-04-24 NOTE — PROGRESS NOTES
[facility]   part of [facility]     Hospitalist Progress Note     Meli Antonio Patient Status:  Inpatient    11/3/1950 MRN [id number]   [facility] 4NW-A Attending Juan C Suazo,    Hosp Day # 3 PCP SIRI TABOR     Chief Complaint: Abnormal labs    Subjective:     Pt seen and examined  C/o back pain     Objective:    Review of Systems:   Limited but as above     Vital signs:  Temp:  [97.7 °F (36.5 °C)-98.3 °F (36.8 °C)] 97.7 °F (36.5 °C)  Pulse:  [65-96] 65  Resp:  [18-20] 18  BP: (103-137)/(66-88) 137/88  SpO2:  [93 %-100 %] 98 %    Physical Exam:    General: No acute distress, Awake, confused   Respiratory: No wheezes, no rhonchi  Cardiovascular: S1, S2, regular rate and rhythm  Abdomen: Soft, Non-tender, non-distended, positive bowel sounds  Neuro: No new focal deficits.   Extremities: No edema      Diagnostic Data:    Labs:  Recent Labs   Lab 24  0546 24  0520 24  0930 24  0641 24  0711   WBC 10.6 10.3 20.0* 15.5* 11.5*   HGB 13.8 13.9 15.1 12.0 12.7   MCV 85.1 87.1 87.5 86.8 87.0   .0* 569.0* 582.0* 446.0 412.0       Recent Labs   Lab 24  0520 24  0629 24  0917 24  0808 24  0711   * 159* 174* 137* 139*   BUN 21 46* 49* 28* 10   CREATSERUM 1.02 1.79* 1.91* 1.13* 0.82   CA 9.8 10.1 10.3* 8.7 9.3   ALB 3.4 3.5 3.6  --   --    * 148* 147* 151* 143   K 3.9 4.6 4.7 3.9 3.5   * 117* 114* 121* 112   CO2 26.0 27.0 25.0 24.0 26.0   ALKPHO 77 81 82  --   --    AST 13* 16 27  --   --    ALT 28 28 25  --   --    BILT 0.4 0.3 0.3  --   --    TP 6.9 7.0 7.3  --   --        Estimated Creatinine Clearance: 43.9 mL/min (based on SCr of 0.82 mg/dL).    No results for input(s): \"TROP\", \"TROPHS\", \"CK\" in the last 168 hours.    No results for input(s): \"PTP\", \"INR\" in the last 168 hours.               Microbiology    Hospital Encounter on 24   1. Blood Culture     Status: None (Preliminary result)     Collection Time: 04/21/24 11:06 AM    Specimen: Blood,peripheral   Result Value Ref Range    Blood Culture Result No Growth 2 Days N/A         Imaging: Reviewed in Epic.    Medications:    pregabalin  50 mg Oral TID    cholecalciferol  1,000 Units Oral Daily    ARIPiprazole  5 mg Oral Nightly    escitalopram  15 mg Oral Nightly    lithium carbonate  150 mg Oral BID with meals    propranolol  10 mg Oral BID    heparin  5,000 Units Subcutaneous Q8H LUIS    LORazepam  0.25 mg Oral TID       Assessment & Plan:      #EDEL/MDD  #Decreased PO intake   -at [facility] prior to this admission  -medical management per Psychiatry   -ECT planned    #Leukocytosis  -no bacterial infectious etiology identified, hold further antibiotics   -one time fever - monitor for recurrence   -Blood Cultures NGTD  -trending down      #Hypernatremia 2/2 free water deficit   -resolved     #SUPA  -resolved with gentle hydration   -Hold/stop NSAIDs           Juan C Suazo DO    Supplementary Documentation:     Quality:  DVT Mechanical Prophylaxis:     Early ambuation  DVT Pharmacologic Prophylaxis   Medication    heparin (Porcine) 5000 UNIT/ML injection 5,000 Units                Code Status: Full Code  Parrish: External urinary catheter in place    The 21st Century Cures Act makes medical notes like these available to patients in the interest of transparency. Please be advised this is a medical document. Medical documents are intended to carry relevant information, facts as evident, and the clinical opinion of the practitioner. The medical note is intended as peer to peer communication and may appear blunt or direct. It is written in medical language and may contain abbreviations or verbiage that are unfamiliar.             **Certification      PHYSICIAN Certification of Need for Inpatient Hospitalization - Initial Certification    Patient will require inpatient services that will reasonably be expected to span two midnight's based on the clinical  documentation in H+P.   Based on patients current state of illness, I anticipate that, after discharge, patient will require TBD.

## 2024-04-24 NOTE — PLAN OF CARE
Assumed care at 1930  A&ox1-2 fluctuates  RA  NSR  Purewick in place  Denies pain  Up with 1 and walker  PIV SL  Safety precautions in place  All needs met at this time  Continue current POC  Problem: SAFETY ADULT - FALL  Goal: Free from fall injury  Description: INTERVENTIONS:  - Assess pt frequently for physical needs  - Identify cognitive and physical deficits and behaviors that affect risk of falls.  - Raymond fall precautions as indicated by assessment.  - Educate pt/family on patient safety including physical limitations  - Instruct pt to call for assistance with activity based on assessment  - Modify environment to reduce risk of injury  - Provide assistive devices as appropriate  - Consider OT/PT consult to assist with strengthening/mobility  - Encourage toileting schedule  Outcome: Progressing

## 2024-04-24 NOTE — PROGRESS NOTES
[facility]  Report of Psychiatric Progress Note    Meli Antonio Patient Status:  Inpatient    11/3/1950 MRN [id number]   Location [facility] 4NW-A Attending Juan C Suazo,    Hosp Day # 3 PCP SIRI TABOR     Date of Admission: 2024  Date of Service: 2024  Reason for Consultation: Severe depression    ADDENDUM: Talked to Dr. Adames. Patient's capacity appears to wax and wane, so  (POA) will be involved with consent for ECT. He wants her to have it. At this time, there are no open spots for tomorrow. Unlikely Friday as well. Will most likely received 1st ECT on . UNHELD the Ativan, Lyrica, and Lithium.     Dr. Marie    Impression:  Primary Psychiatric Diagnosis:  Major depressive disorder, recurrent, severe, with vegetative symptoms. She has depressed mood, guilty worthless feelings, hopelessness, decreased motivation and apathy, poor concentration, and a lack of appetite leading to minimal po intake. Eating about 20-30% of meals on average.     Rule out mixed catatonia. She had recent mutism and excitatory motor movements; improved with benzos.     Rule out serotonin syndrome. Lyrica was stopped, Lexapro decreased, and she was given benzos at [place]. If present, SS has improved.     Acute delirium/encephalopathy due to SUPA and hypernatremia. She remains inattentive.    Cognitive disorder unspecified due to delirium and depression.     Psychosis unspecified with visual hallucinations of a mouse in the room on 24. NONE today.     Alcohol use disorder, severity unspecified, in remission for 9 years.     Pertinent Medical Diagnoses:  SUPA-improving. Hypernatremia.     Recommendations:  1) HOLD Lyrica 50mg po TID and Ativan 0.25mg TID for now since both meds will increase seizure threshold. HOLD Lithium 150mg BID since it can worsen her cognition if given before ECT. Will discussed meds with ECT psychiatrist. Considering tapering off the Ativan and Lyrica.     2)  Continue lower dose Lexapro 15mg po daily for anxiety/depression due to concern for recent serotonin syndrome. She was on 30mg daily prior to her [facility] admission on 4/10/24.     3) Continue Abilify 5mg nightly as an adjunct for her depression.     4) Continue Lithium 150mg po twice a day for depression. Started on 24.     5) Left a message for her outpatient psychiatrist Dr. Ulises Capps on 24 updating her on the plan above.    6) Dispo-- Appreciate Dr. Adames's ECT consult. Plan is to start ECT hopefully tomorrow  or on . Waiting to see if there is a cancellation and spot she can have. Once she is eating about 50% of her meals, she will be transferred back to [facility] and continue further ECT.    Freddy Marie MD    History of Present Illness:  72 yo female with recurrent major depressive disorder initially presented to [facility] on 4/10/24 for altered mental status with confusion, restlessness, and agitation.     Per psych liaison:  \" reports that patient has had uncontrollable movements, is taking her clothes off, has been confused and does not know the names of anybody.     says that since Friday she has had these difficulties and it seemed to have gotten worse the last 2 days where now, in addition to those difficulties, she also cannot control her bowels.  She says she has been soiling her clothes and her bed.   said he wonders if it is could be attributed to the recent prescribing of meds with codeine that were meant to manage a cough that she had.   also reports that patient has \"been in bed over a year\".  He says that she does not say much and sleeps a lot.  He says she is grieving the loss of 2 dogs that  5 years ago as well as grieving the loss of their adopted son, who 7 yrs ago, at 31 y/o walked out of their lives. He said that pt has friends and she does occasionally go to lunch with them and does attend AA meetings once a week.  He  said that she will eat the food that he cooks for her.  states that he is power of  for his wife.  Counselor advised him to bring copies of that paperwork to the hospital so that staff can scan it into the clinic record.\"    She was eval by Dr. Mckenzie (psych service at [facility]) and thought to have delirium and depression with excitable catatonia vs serotonin syndrome (SS). The Lexapro was decreased from 30mg to 10mg (then increased to 15mg) and Lyrica and Codeine prn stopped just in case she had SS. She was started on benzos, Klonipin and then Ativan, which helped with both the possible excitable catatonia vs SS. Wellbutrin was DC'ed (doesn't increase serotonin, but rather dopamine and nor-epi), Abilify continued, and Lithium started on 4/18/24. She was no longer so restless and agitated and disorganized, but remained apathetic with minimal verbal communication. She was eating/drinking little.    She was transferred to [facility] on 4/19/24. She developed SUPA and hypernatremia due to low po intake and was sent to the [facility] ED. She was started on IVF's and admitted to the med floor.    Currently, she has depressed mood, guilty worthless feelings, hopelessness, decreased motivation and a lack of appetite leading to low po intake. She ate only 20% of her breakfast per RN. She denies any tremors or jerking movements.     Discussed ECT and recommendation to start it. She said she was fine with treatment, but I did not think she understood or appreciated the pros/cons of treatment.  is POA.     Interval Hx:  4/23/2024- She ate 20%/60%/25% of her meals yesterday. She ate about 30% of her breakfast and lunch today. She was found on the floor with her knees earlier today; did not hit her head. She is now in the chair. Per RN, she had difficultly initiating walking, but once she started, she was able to ambulate well with the walker.     Currently, she feels tired and depressed. She cries when she  thinks about her estranged son and her dogs that have passed away. She has low energy/motivation/appetite and feelings of hopelessness. No suicidal ideation. She endorsed seeing a mouse in the room this AM; no voices/visions now.    Discussed ECT and she appeared to understand more today. Her  who is POA wants her to start the treatments.    Regarding her chronic back pain, it is 4-5/10 with 10 being severe pain.    2024- She feels tired and sleepy this AM. She feels depressed, lacks motivation, and has a low appetite. She ate about 30% of her breakfast per RN. She remains forgetful and disoriented to month. She feels hopeless when she thinks about her estranged son. She shares how her sister had severe depression and was treated with ECT before. She is open to ECT.  is МАРИЯ and wants her to get it as well.     Psychiatry Review Systems: No suicidal ideation.    Past Psychiatric History: Major depressive disorder treated with Lexapro, Wellbutrin, and Abilify.    Substance Use History: Alcohol use disorder, severity unspecified, in remission x 9 yrs. THC edibles at night.    Psych Family History: None, but tells me her adopted son has mental health issues.    Social and Developmental History: Retired teacher. She lives with her  and adult son who is 41 yr old and going through a divorce. Her adopted son (from [place] per , she told me [place] initially) stopped talking to the family 7 yrs ago. He is 39 yr old.    Past Medical History:    Anxiety state    Back problem    COMPRESSION FX    Cataract    Depression    Esophageal reflux    GERD (gastroesophageal reflux disease)    Hematoma and contusion of liver    STATES HAS BEEN THERE FOR MANY YEARS    History of fractured kneecap    RIGHT    IBS (irritable bowel syndrome)    Mitral prolapse    Osteoarthritis     Past Surgical History:   Procedure Laterality Date          Correct bunion,othr methods      Correct bunion,simple       BILAT.     Dilation/curettage,diagnostic      FOR \"MOLE PREGNANCY\"    Other Right     ORIF RIGHT ANKLE    Spine surgery procedure unlisted      cervical spine february 2020    Total knee replacement       Family History   Problem Relation Age of Onset    Heart Disorder Father     Cancer Mother         ESOPHAGUS      reports that she has quit smoking. Her smoking use included cigarettes. She has a 30 pack-year smoking history. She has never used smokeless tobacco. She reports that she does not drink alcohol and does not use drugs.    Allergies:  Allergies   Allergen Reactions    Radiology Contrast Iodinated Dyes HIVES     HIVES AND THROAT TIGHTENED WHILE HAVING AN MRI    Sulfa Antibiotics HIVES     \"got sicker\"    Seroquel [Quetiapine] UNKNOWN    Cinnamon RASH       Medications:    Current Facility-Administered Medications:     [Held by provider] pregabalin (Lyrica) cap 50 mg, 50 mg, Oral, TID    cholecalciferol (Vitamin D3) tab 1,000 Units, 1,000 Units, Oral, Daily    ARIPiprazole (Abilify) tab 5 mg, 5 mg, Oral, Nightly    escitalopram (Lexapro) tab 15 mg, 15 mg, Oral, Nightly    [Held by provider] lithium carbonate cap 150 mg, 150 mg, Oral, BID with meals    propranolol (Inderal) tab 10 mg, 10 mg, Oral, BID    acetaminophen (Tylenol Extra Strength) tab 1,000 mg, 1,000 mg, Oral, Q8H PRN    melatonin tab 3 mg, 3 mg, Oral, Nightly PRN    heparin (Porcine) 5000 UNIT/ML injection 5,000 Units, 5,000 Units, Subcutaneous, Q8H LUIS    ondansetron (Zofran) 4 MG/2ML injection 4 mg, 4 mg, Intravenous, Q6H PRN    polyethylene glycol (PEG 3350) (Miralax) 17 g oral packet 17 g, 17 g, Oral, Daily PRN    sennosides (Senokot) tab 17.2 mg, 17.2 mg, Oral, Nightly PRN    bisacodyl (Dulcolax) 10 MG rectal suppository 10 mg, 10 mg, Rectal, Daily PRN    benzonatate (Tessalon) cap 200 mg, 200 mg, Oral, TID PRN    guaiFENesin (Robitussin) 100 MG/5 ML oral liquid 200 mg, 200 mg, Oral, Q4H PRN    glycerin-hypromellose- (Artificial  Tears) 0.2-0.2-1 % ophthalmic solution 1 drop, 1 drop, Both Eyes, QID PRN    sodium chloride (Saline Mist) 0.65 % nasal solution 1 spray, 1 spray, Each Nare, Q3H PRN    albuterol (Ventolin HFA) 108 (90 Base) MCG/ACT inhaler 2 puff, 2 puff, Inhalation, Q4H PRN    magnesium hydroxide (Milk of Magnesia) 400 MG/5ML oral suspension 30 mL, 30 mL, Oral, Nightly PRN    alum-mag hydroxide-simethicone (Maalox) 200-200-20 MG/5ML oral suspension 30 mL, 30 mL, Oral, Q4H PRN    acetaminophen (Tylenol) tab 325 mg, 325 mg, Oral, Q4H PRN **OR** acetaminophen (Tylenol) tab 650 mg, 650 mg, Oral, Q4H PRN    [Held by provider] LORazepam (Ativan) tab 0.25 mg, 0.25 mg, Oral, TID    traZODone (Desyrel) tab 50 mg, 50 mg, Oral, Nightly PRN    Review of Systems   Constitutional:  Positive for malaise/fatigue.   Psychiatric/Behavioral:  Positive for depression. Negative for suicidal ideas. The patient is nervous/anxious.      Mental Status Exam:     Objective      Vitals:    04/24/24 0800   BP: 137/88   Pulse: 65   Resp: 18   Temp: 97.7 °F (36.5 °C)     Appearance: fair grooming  Behavior: cooperative, fair eye contact  Gait: not observed    Speech: soft, fluent     Mood: depressed and anxious  Affect: Congruent    Thought process: logical   Thought content: no hallucinations    Orientation: oriented person, place, and year  Attention and Concentration: poor, unable to tell me the months of the year backwards from Dec to July or spell BOAT backwards  Memory:  intact remote and impaired recent  Language: Intact naming   Fund of Knowledge: Able to recite name of [place] president    Insight: limited  Judgment: limited

## 2024-04-24 NOTE — CONSULTS
[facility]  Psychiatric Consultation    Meli Antonio YOB: 1950   Age/Gender 73 year old female MRN [id number]   [facility] 3NE-A Attending Juan C Suazo DO   Hosp Day # 3 PCP SIRI TABOR     Date of Service:  4/24/2024     Allergies:  Radiology contrast iodinated dyes, Sulfa antibiotics, Seroquel [quetiapine], and Cinnamon    Chief Complaint: Severe depression       Reason for Admission/History of Present Illness:  Meli is a 73 year old female with a history of major depressive disorder and anxiety who was admitted on 4/21/2024.  Patient had initially been at [facility] where she had been admitted for delirium symptoms consisting of confusion, altered mental status, and agitation.  At time of presentation, patient was being prescribed Wellbutrin, Abilify, Prozac, Lexapro, lorazepam, and Klonopin.  It was felt that the patient may have been exhibiting symptoms of mixed catatonia, serotonin syndrome, delirium/encephalopathy due to AKA and hyponatremia likely due to poor oral intake.  Patient responded well to decreasing the amount of serotoninergic medication, being given benzodiazepines for possible catatonia.  Patient  provided collateral information and reported that patient has been very restless and disinhibited, disrobing, and with loss of control of bowel movements.  Patient's  also reports that she has been in bed for over a year with depression.  He notes that she sleeps a lot and he believes she is grieving the loss of 2 dogs as well as losing the connection with their 32-year-old adopted son who abruptly walked out of her life 7 years ago.  Patient with low functioning at home, spending most of the day in bed, not eating unless somebody brings food to her.  Patient with past history of alcohol use disorder but sober for many years.     Patient reports feeling depressed with loss of energy interest and motivation, feelings of  guilt, feelings of hopelessness helplessness and worthlessness.  Poor functioning in daily routines.  Patient reports poor appetite.  Patient denies suicidal thoughts or history of suicide attempts.  There is no history of psychosis.  No history of adwoa.  There is persistent anxiety.  The patient is alert and oriented to person only, even with prompts to assist her memory from this writer.  She recognizes that her memory and concentration are affected by her depression and anxiety.  She is aware of the recommendation for ECT and understands that it is being proposed to treat her depression, she appears to understand the risks benefits and side effects.  She appears to have some limited understanding of treatment alternatives and why they would not be effective.  She does not appear to retain the information we discussed about ECT a short time later.  She does express some fearfulness about ECT that she has perceived from previous books and movies.  She believes that her sister may have had ECT for her mental illness or it was perhaps recommended to her sister.      Past Psychiatric/Medication History:  No history of inpatient  No history of suicide attempt      Past Medical History:   Past Medical History:    Anxiety state    Back problem    COMPRESSION FX    Cataract    Depression    Esophageal reflux    GERD (gastroesophageal reflux disease)    Hematoma and contusion of liver    STATES HAS BEEN THERE FOR MANY YEARS    History of fractured kneecap    RIGHT    IBS (irritable bowel syndrome)    Mitral prolapse    Osteoarthritis       Past Surgical History:   Past Surgical History:   Procedure Laterality Date          Correct bunion,othr methods      Correct bunion,simple      BILAT.     Dilation/curettage,diagnostic      FOR \"MOLE PREGNANCY\"    Other Right     ORIF RIGHT ANKLE    Spine surgery procedure unlisted      cervical spine 2020    Total knee replacement           Medications:  Scheduled:    pregabalin  50 mg Oral TID    cholecalciferol  1,000 Units Oral Daily    ARIPiprazole  5 mg Oral Nightly    escitalopram  15 mg Oral Nightly    lithium carbonate  150 mg Oral BID with meals    propranolol  10 mg Oral BID    heparin  5,000 Units Subcutaneous Q8H LUIS    LORazepam  0.25 mg Oral TID     PRN:    acetaminophen    melatonin    ondansetron    metoclopramide    polyethylene glycol (PEG 3350)    sennosides    bisacodyl    benzonatate    guaiFENesin    glycerin-hypromellose-    sodium chloride    albuterol    magnesium hydroxide    alum-mag hydroxide-simethicone    acetaminophen **OR** acetaminophen    traZODone      Substance Use History:  Former smoker.  No vaping  History of alcohol use disorder sober for 9 years  Marijuana edibles at night    Family History:   Family History   Problem Relation Age of Onset    Heart Disorder Father     Cancer Mother         ESOPHAGUS   Sister with mental illness and possible ECT    Developmental/Social History:  The patient is .  She is a retired teacher.  She lives with her  and adult biological son who is currently going through a divorce.  She has an adopted son who is estranged from the family for the last 7 years.  Her  is POA and is very supportive    Exam:  /80 (BP Location: Left arm)   Pulse 96   Temp 98.3 °F (36.8 °C) (Oral)   Resp 18   Wt 50 kg (110 lb 3.7 oz)   SpO2 98%   BMI 21.53 kg/m²   No data found.     MSE:  Appearance: fair grooming  Behavior: Mildly hypoactive  Gait and Station:  Unable to assess, seated in bed  Attitude: cooperative and pleasant  Speech: soft and mostly fluid, some delays in onset of speech at times  Mood: depressed  Affect: Congruent and Restricted  Thought process: Mostly organized and logical  Thought content: no delusions, obsessions, or other thought abnormalities  Perceptions: no hallucinations  Consciousness/Orientation: Alert and oriented x 1  Concentration:   fair and making effort to  concentrate  Memory:  impaired immediate, impaired recent, impaired remote, and as evidenced by some difficulty recalling both immediate and remote events  Intellect: average and as evidenced by  language skills  Language: normal  Insight: good as evidenced by recognition of her depression  Judgment: good as evidenced by cooperative with treatment    Assessment/Diagnoses:  Primary Psychiatric Diagnosis     Major depressive disorder recurrent and severe      Secondary Psychiatric Diagnoses     Unspecified anxiety    Rule Out Diagnoses     Bipolar disorder       Pervasive Diagnoses     Deferred      Pertinent Non-Psychiatric Diagnoses     Recent SUPA,  hyponatremia, recent serotonin syndrome      Plan:  Patient is a candidate for ECT for her severe depression.  The patient has partial capacity to consent for ECT and her capacity appears to wax and wane.  She does understand that she is very depressed and that her medicines are either not working or poorly tolerated.  She understands that the ECT is proposed to treat her depression and anxiety.  She does understand the explanation of the risks and benefits of ECT, the side effects including cognitive and the risk of not doing ECT.  She does not remember these things shortly after we discussed them.  Therefore her capacity appears to be waxing and waning and therefore her  who is POA needs to step in and make decisions for her on her behalf.  Dr. Marie has been speaking with the  about ECT, and he is in agreement with the recommendation and willing to sign consent.  ECT workup will be ordered and patient will be scheduled for next available appointment      Meli Adames MD  4/24/2024    Note to Patient: The 21st Century Cures Act makes medical notes like these available to patients in the interest of transparency. However, be advised this is a medical document. It is intended as peer to peer communication. It is written in medical language and may contain  abbreviations or verbiage that are unfamiliar. It may appear blunt or direct. Medical documents are intended to carry relevant information, facts as evident, and the clinical opinion of the practitioner. This note may have been transcribed using a voice dictation system. Voice recognition errors may occur. This should not be taken to alter the content or meaning of this note.

## 2024-04-24 NOTE — CDS QUERY
CLINICAL DOCUMENTATION CLARIFICATION FORM  Dr. Suazo:   Please specify encephalopathy documented by psychiatric consultation:          ( x ) Metabolic Encephalopathy  (  ) Encephalopathy - Other  (please specify)  (  ) Other (please specify):   (   ) Encephalopathy ruled out    _______________________________________________________________________________      ed impression: fever; acute cough; leukocytosis; lewis; dehydration; hypernatremia     H&P: severe sepsis? with end organ damage/kidney injury; hypernatremia/decrease po intake; lewis; manish/mdd      4/23 martini: MANISH/mdd - decreased po intake; leukocytosis - no bacterial infectious etiology identified - one time fever - bld cx = ngtd; hypernatremia/decreased po intake; lewis- resolved with gentle hydration;     4/23 psych: acute delirium/encephalopathy d/t lewis and hypernatremia; psychosis with visual hallucinations      If you have any questions, please contact Clinical : Marcelina Orr RN CDS at Cell: [phone] . Thank You!      THIS FORM IS A PERMANENT PART OF THE MEDICAL RECORD

## 2024-04-24 NOTE — PLAN OF CARE
Assumed pt care this morning at 0730.  Pt is A&Ox2, following commands.   Pt on room air, VSS.  NSR  on tele.  Pt denies pain.  Pt one person assist.  Pt on regular diet. 1:1 feed.   L Hand   saline locked.    Bed in lowest position, call light in reach.     Problem: GASTROINTESTINAL - ADULT  Goal: Maintains or returns to baseline bowel function  Description: INTERVENTIONS:  - Assess bowel function  - Maintain adequate hydration with IV or PO as ordered and tolerated  - Evaluate effectiveness of GI medications  - Encourage mobilization and activity  - Obtain nutritional consult as needed  - Establish a toileting routine/schedule  - Consider collaborating with pharmacy to review patient's medication profile  Outcome: Progressing     Problem: METABOLIC/FLUID AND ELECTROLYTES - ADULT  Goal: Electrolytes maintained within normal limits  Description: INTERVENTIONS:  - Monitor labs and rhythm and assess patient for signs and symptoms of electrolyte imbalances  - Administer electrolyte replacement as ordered  - Monitor response to electrolyte replacements, including rhythm and repeat lab results as appropriate  - Fluid restriction as ordered  - Instruct patient on fluid and nutrition restrictions as appropriate  Outcome: Progressing     Problem: NEUROLOGICAL - ADULT  Goal: Achieves stable or improved neurological status  Description: INTERVENTIONS  - Assess for and report changes in neurological status  - Initiate measures to prevent increased intracranial pressure  - Maintain blood pressure and fluid volume within ordered parameters to optimize cerebral perfusion and minimize risk of hemorrhage  - Monitor temperature, glucose, and sodium. Initiate appropriate interventions as ordered  Outcome: Progressing

## 2024-04-24 NOTE — PHYSICAL THERAPY NOTE
PHYSICAL THERAPY TREATMENT NOTE - INPATIENT    Room Number: 3603/3603-A     Session: 1     Number of Visits to Meet Established Goals: 4    Presenting Problem: abnormal labs  Co-Morbidities : serotonin syndrome vs catatonia during last admit per psych    PHYSICAL THERAPY MEDICAL/SOCIAL HISTORY  History related to current admission: Patient is a 73 year old female admitted on 4/21/2024 from [facility] for abnormal labs.        HOME SITUATION  Type of Home: House   Home Layout: Two level  Stairs to Enter : 3  Railing: Yes     Lives With: Spouse     Prior Level of [place]: From PT eval on 4/16 at [facility], pt was ind PTA.  Pt unable to give history, reports resides with mother (only verbalization during session) despite chart stating pt resides with spouse.       ASSESSMENT   Patient demonstrates fair progress this session, goals  remain in progress.    Patient continues to function below baseline with bed mobility, transfers, and gait.  Contributing factors to remaining limitations include decreased endurance/aerobic capacity, impaired Standing and gait balance, decreased muscular endurance, and cognitive deficits ( ).  Next session anticipate patient to progress bed mobility, transfers, and gait.  Physical Therapy will continue to follow patient for duration of hospitalization.    Patient continues to benefit from continued skilled PT services: to facilitate return to prior level of function as patient demonstrates high motivation with excellent tolerance to an intensive therapy program  and to promote return to prior level of function and safety with continuous assistance and gradual rehabilitative therapy .    PLAN  PT Treatment Plan: Bed mobility;Body mechanics;Energy conservation;Patient education;Family education;Neuromuscular re-educate;Gait training;Strengthening;Transfer training;Balance training  Rehab Potential : Good  Frequency (Obs): 3-5x/week    CURRENT GOALS     Goal #1 Patient is able to demonstrate supine  - sit EOB @ level: supervision      Goal #2 Patient is able to demonstrate transfers Sit to/from Stand at assistance level: supervision      Goal #3 Patient is able to ambulate 50 feet with assist device:  least restrictive device  at assistance level: supervision      Goal #4     Goal #5     Goal #6     Goal Comments: Goals established on 4/22/2024 4/24/2024 all goals ongoing    SUBJECTIVE  \"Ok\"    OBJECTIVE  Precautions: Bed/chair alarm    WEIGHT BEARING RESTRICTION                   PAIN ASSESSMENT   Rating: Unable to rate  Location: Pt reports no c/o of pain       BALANCE                                                                                                                       Static Sitting: Fair -  Dynamic Sitting: Fair -           Static Standing: Fair -  Dynamic Standing: Poor +    ACTIVITY TOLERANCE                         O2 WALK         AM-PAC '6-Clicks' INPATIENT SHORT FORM - BASIC MOBILITY  How much difficulty does the patient currently have...  Patient Difficulty: Turning over in bed (including adjusting bedclothes, sheets and blankets)?: A Little   Patient Difficulty: Sitting down on and standing up from a chair with arms (e.g., wheelchair, bedside commode, etc.): A Little   Patient Difficulty: Moving from lying on back to sitting on the side of the bed?: A Little   How much help from another person does the patient currently need...   Help from Another: Moving to and from a bed to a chair (including a wheelchair)?: A Lot   Help from Another: Need to walk in hospital room?: A Lot   Help from Another: Climbing 3-5 steps with a railing?: A Lot       AM-PAC Score:  Raw Score: 15   Approx Degree of Impairment: 57.7%   Standardized Score (AM-PAC Scale): 39.45   CMS Modifier (G-Code): CK    FUNCTIONAL ABILITY STATUS  Gait Assessment   Functional Mobility/Gait Assessment  Gait Assistance: Minimum assistance  Distance (ft): 20  Assistive Device: Rolling walker  Pattern: Shuffle    Skilled Therapy  Provided    Bed Mobility:  Rolling: NT   Supine<>Sit: Min A   Sit<>Supine: Min A     Transfer Mobility:  Sit<>Stand: Min A   Stand<>Sit: Min A   Gait: Min A 20ft using RW    Therapist's Comments: While standing pt required min a to provide tactile cues to promote forward weight shift to maintain balance while standing.  Pt demonstrated decrease facilitation of activity and required tactile cues to guide during the whole activity to complete safely.         Patient End of Session: Up in chair;Needs met;Call light within reach;RN aware of session/findings;All patient questions and concerns addressed;Alarm set    PT Session Time: 23 minutes  Therapeutic Activity: 23 minutes

## 2024-04-25 LAB — POTASSIUM SERPL-SCNC: 4.3 MMOL/L (ref 3.5–5.1)

## 2024-04-25 PROCEDURE — 99232 SBSQ HOSP IP/OBS MODERATE 35: CPT | Performed by: OTHER

## 2024-04-25 PROCEDURE — 99232 SBSQ HOSP IP/OBS MODERATE 35: CPT | Performed by: INTERNAL MEDICINE

## 2024-04-25 RX ORDER — ARIPIPRAZOLE 5 MG/1
7.5 TABLET ORAL NIGHTLY
Status: DISCONTINUED | OUTPATIENT
Start: 2024-04-25 | End: 2024-05-03

## 2024-04-25 RX ORDER — LORAZEPAM 0.5 MG/1
0.25 TABLET ORAL 2 TIMES DAILY
Status: DISCONTINUED | OUTPATIENT
Start: 2024-04-25 | End: 2024-04-25

## 2024-04-25 RX ORDER — LITHIUM CARBONATE 150 MG/1
150 CAPSULE ORAL 2 TIMES DAILY WITH MEALS
Status: DISPENSED | OUTPATIENT
Start: 2024-04-25 | End: 2024-04-28

## 2024-04-25 RX ORDER — LORAZEPAM 0.5 MG/1
0.25 TABLET ORAL 2 TIMES DAILY
Status: COMPLETED | OUTPATIENT
Start: 2024-04-25 | End: 2024-04-27

## 2024-04-25 RX ORDER — ACETAMINOPHEN 500 MG
1000 TABLET ORAL ONCE
Status: COMPLETED | OUTPATIENT
Start: 2024-04-29 | End: 2024-04-29

## 2024-04-25 RX ORDER — PREGABALIN 50 MG/1
50 CAPSULE ORAL 3 TIMES DAILY
Status: COMPLETED | OUTPATIENT
Start: 2024-04-25 | End: 2024-04-27

## 2024-04-25 NOTE — PLAN OF CARE
Assumed care at 1930. A&Ox2, orientated to self and place. Pt claims to see animals at times, no command hallucinations present at this time. On RA. NSR on tele. Regular diet. Cardiac electrolyte protocol, K+ replaced per protocol. PIV L Hand saline locked. Pt complains of moderate L shoulder pain that radiates to the back, pt states this is chronic pain and repositioned. Purewick in place. High fall risk precautions in place. Pt resting in bed, call light within reach. Updated on POC, all needs met at this time.      Problem: GASTROINTESTINAL - ADULT  Goal: Maintains or returns to baseline bowel function  Description: INTERVENTIONS:  - Assess bowel function  - Maintain adequate hydration with IV or PO as ordered and tolerated  - Evaluate effectiveness of GI medications  - Encourage mobilization and activity  - Obtain nutritional consult as needed  - Establish a toileting routine/schedule  - Consider collaborating with pharmacy to review patient's medication profile  Outcome: Progressing  Goal: Maintains adequate nutritional intake (undernourished)  Description: INTERVENTIONS:  - Monitor percentage of each meal consumed  - Identify factors contributing to decreased intake, treat as appropriate  - Assist with meals as needed  - Monitor I&O, WT and lab values  - Obtain nutritional consult as needed  - Optimize oral hygiene and moisture  - Encourage food from home; allow for food preferences  - Enhance eating environment  Outcome: Progressing     Problem: METABOLIC/FLUID AND ELECTROLYTES - ADULT  Goal: Electrolytes maintained within normal limits  Description: INTERVENTIONS:  - Monitor labs and rhythm and assess patient for signs and symptoms of electrolyte imbalances  - Administer electrolyte replacement as ordered  - Monitor response to electrolyte replacements, including rhythm and repeat lab results as appropriate  - Fluid restriction as ordered  - Instruct patient on fluid and nutrition restrictions as  appropriate  Outcome: Progressing     Problem: MUSCULOSKELETAL - ADULT  Goal: Return mobility to safest level of function  Description: INTERVENTIONS:  - Assess patient stability and activity tolerance for standing, transferring and ambulating w/ or w/o assistive devices  - Assist with transfers and ambulation using safe patient handling equipment as needed  - Ensure adequate protection for wounds/incisions during mobilization  - Obtain PT/OT consults as needed  - Advance activity as appropriate  - Communicate ordered activity level and limitations with patient/family  Outcome: Progressing     Problem: NEUROLOGICAL - ADULT  Goal: Achieves stable or improved neurological status  Description: INTERVENTIONS  - Assess for and report changes in neurological status  - Initiate measures to prevent increased intracranial pressure  - Maintain blood pressure and fluid volume within ordered parameters to optimize cerebral perfusion and minimize risk of hemorrhage  - Monitor temperature, glucose, and sodium. Initiate appropriate interventions as ordered  Outcome: Progressing     Problem: SAFETY ADULT - FALL  Goal: Free from fall injury  Description: INTERVENTIONS:  - Assess pt frequently for physical needs  - Identify cognitive and physical deficits and behaviors that affect risk of falls.  - [place] fall precautions as indicated by assessment.  - Educate pt/family on patient safety including physical limitations  - Instruct pt to call for assistance with activity based on assessment  - Modify environment to reduce risk of injury  - Provide assistive devices as appropriate  - Consider OT/PT consult to assist with strengthening/mobility  - Encourage toileting schedule  Outcome: Progressing     Problem: DISCHARGE PLANNING  Goal: Discharge to home or other facility with appropriate resources  Description: INTERVENTIONS:  - Identify barriers to discharge w/pt and caregiver  - Include patient/family/discharge partner in discharge  planning  - Arrange for needed discharge resources and transportation as appropriate  - Identify discharge learning needs (meds, wound care, etc)  - Arrange for interpreters to assist at discharge as needed  - Consider post-discharge preferences of patient/family/discharge partner  - Complete POLST form as appropriate  - Assess patient's ability to be responsible for managing their own health  - Refer to Case Management Department for coordinating discharge planning if the patient needs post-hospital services based on physician/LIP order or complex needs related to functional status, cognitive ability or social support system  Outcome: Progressing     Problem: Delirium  Goal: Minimize duration of delirium  Description: Interventions:  - Encourage use of hearing aids, eye glasses  - Promote highest level of mobility daily  - Provide frequent reorientation  - Promote wakefulness i.e. lights on, blinds open  - Promote sleep, encourage patient's normal rest cycle i.e. lights off, TV off, minimize noise and interruptions  - Encourage family to assist in orientation and promotion of home routines  Outcome: Progressing

## 2024-04-25 NOTE — PHYSICAL THERAPY NOTE
PHYSICAL THERAPY TREATMENT NOTE - INPATIENT    Room Number: 3603/3603-A     Session: 1     Number of Visits to Meet Established Goals: 4    Presenting Problem: abnormal labs  Co-Morbidities : serotonin syndrome vs catatonia during last admit per psych    ASSESSMENT   Patient demonstrates fair progress this session, goals  remain in progress.    Patient continues to function at baseline with bed mobility, transfers, and gait.  Contributing factors to remaining limitations include decreased endurance/aerobic capacity, impaired gait and standing balance, and decreased muscular endurance.  Next session anticipate patient to progress bed mobility, transfers, and gait.  Physical Therapy will continue to follow patient for duration of hospitalization.    Patient continues to benefit from continued skilled PT services: to promote return to prior level of function and safety with continuous assistance and gradual rehabilitative therapy .    PLAN  PT Treatment Plan: Bed mobility;Body mechanics;Energy conservation;Patient education;Family education;Neuromuscular re-educate;Gait training;Strengthening;Transfer training;Balance training  Rehab Potential : Good  Frequency (Obs): 3-5x/week    CURRENT GOALS     Goal #1 Patient is able to demonstrate supine - sit EOB @ level: supervision      Goal #2 Patient is able to demonstrate transfers Sit to/from Stand at assistance level: supervision      Goal #3 Patient is able to ambulate 50 feet with assist device:  least restrictive device  at assistance level: supervision      Goal #4     Goal #5     Goal #6     Goal Comments: Goals established on 4/22/2024 4/25/2024 all goals ongoing    SUBJECTIVE  \"I would like to get in the chair\"    OBJECTIVE  Precautions: Bed/chair alarm    WEIGHT BEARING RESTRICTION                   PAIN ASSESSMENT   Rating: Unable to rate  Location: Pt reports no c/o of pain       BALANCE                                                                                                                        Static Sitting: Fair -  Dynamic Sitting: Fair -           Static Standing: Fair -  Dynamic Standing: Poor +    ACTIVITY TOLERANCE                         O2 WALK         AM-PAC '6-Clicks' INPATIENT SHORT FORM - BASIC MOBILITY  How much difficulty does the patient currently have...  Patient Difficulty: Turning over in bed (including adjusting bedclothes, sheets and blankets)?: A Little   Patient Difficulty: Sitting down on and standing up from a chair with arms (e.g., wheelchair, bedside commode, etc.): A Little   Patient Difficulty: Moving from lying on back to sitting on the side of the bed?: A Little   How much help from another person does the patient currently need...   Help from Another: Moving to and from a bed to a chair (including a wheelchair)?: A Little   Help from Another: Need to walk in hospital room?: A Lot   Help from Another: Climbing 3-5 steps with a railing?: A Lot       AM-PAC Score:  Raw Score: 16   Approx Degree of Impairment: 54.16%   Standardized Score (AM-PAC Scale): 40.78   CMS Modifier (G-Code): CK    FUNCTIONAL ABILITY STATUS  Gait Assessment   Functional Mobility/Gait Assessment  Gait Assistance: Minimum assistance  Distance (ft): 60  Assistive Device: Rolling walker  Pattern: Shuffle    Skilled Therapy Provided    Bed Mobility:  Rolling: NT   Supine<>Sit: Min A Pt is able to initiate, however pt continues to require assistance to bring LE from supine to sitting.   Sit<>Supine: NT    Transfer Mobility:  Sit<>Stand: Min A   Stand<>Sit: Min A Pt continues to require cues for proper hand placement to control descent into the chair   Gait: Min A 60ft using RW    Therapist's Comments: Pt was observed to improve gait distance however continues to demonstrate poor gait performance as pt requires constant verbal and tactile cue for proper use of the RW as well as increase step length. During session pt was given extra time to assist in problem solving  for transfers and bed mobility.       Patient End of Session: Up in chair;Needs met;Call light within reach;RN aware of session/findings;All patient questions and concerns addressed;Alarm set    PT Session Time: 23 minutes  Therapeutic Activity: 23 minutes

## 2024-04-25 NOTE — PROGRESS NOTES
[facility]   part of [facility]     Hospitalist Progress Note     Meli Antonio Patient Status:  Inpatient    11/3/1950 MRN [id number]   [facility] 4NW-A Attending Juan C Suazo,    Hosp Day # 4 PCP SIRI TABOR     Chief Complaint: Abnormal labs    Subjective:     Feeling well, sitting up, no complaints     Objective:    Review of Systems:   Limited but as above     Vital signs:  Temp:  [97 °F (36.1 °C)-98.5 °F (36.9 °C)] 97.6 °F (36.4 °C)  Pulse:  [62-99] 85  Resp:  [18] 18  BP: (111-129)/(69-92) 126/87  SpO2:  [92 %-99 %] 96 %    Physical Exam:    General: No acute distress, Awake, confused   Respiratory: No wheezes, no rhonchi  Cardiovascular: S1, S2, regular rate and rhythm  Abdomen: Soft, Non-tender, non-distended, positive bowel sounds  Neuro: No new focal deficits.   Extremities: No edema      Diagnostic Data:    Labs:  Recent Labs   Lab 24  0520 24  0930 24  0641 24  0711   WBC 10.3 20.0* 15.5* 11.5*   HGB 13.9 15.1 12.0 12.7   MCV 87.1 87.5 86.8 87.0   .0* 582.0* 446.0 412.0       Recent Labs   Lab 24  0520 24  0629 24  0917 24  0808 24  0711 24  0349   * 159* 174* 137* 139*  --    BUN 21 46* 49* 28* 10  --    CREATSERUM 1.02 1.79* 1.91* 1.13* 0.82  --    CA 9.8 10.1 10.3* 8.7 9.3  --    ALB 3.4 3.5 3.6  --   --   --    * 148* 147* 151* 143  --    K 3.9 4.6 4.7 3.9 3.5 4.3   * 117* 114* 121* 112  --    CO2 26.0 27.0 25.0 24.0 26.0  --    ALKPHO 77 81 82  --   --   --    AST 13* 16 27  --   --   --    ALT 28 28 25  --   --   --    BILT 0.4 0.3 0.3  --   --   --    TP 6.9 7.0 7.3  --   --   --        Estimated Creatinine Clearance: 43.9 mL/min (based on SCr of 0.82 mg/dL).    No results for input(s): \"TROP\", \"TROPHS\", \"CK\" in the last 168 hours.    No results for input(s): \"PTP\", \"INR\" in the last 168 hours.               Microbiology    Hospital Encounter on 24   1. Blood Culture      Status: None (Preliminary result)    Collection Time: 04/21/24 11:06 AM    Specimen: Blood,peripheral   Result Value Ref Range    Blood Culture Result No Growth 3 Days N/A         Imaging: Reviewed in Epic.    Medications:    ARIPiprazole  7.5 mg Oral Nightly    enoxaparin  40 mg Subcutaneous Nightly    LORazepam  0.25 mg Oral TID    pregabalin  50 mg Oral TID    cholecalciferol  1,000 Units Oral Daily    escitalopram  15 mg Oral Nightly    lithium carbonate  150 mg Oral BID with meals    propranolol  10 mg Oral BID       Assessment & Plan:      #EDEL/MDD  #Decreased PO intake   -at [facility] prior to this admission  -medical management per Psychiatry   -ECT planned    #Leukocytosis  -no bacterial infectious etiology identified, hold further antibiotics   -one time fever - monitor for recurrence   -Blood Cultures NGTD  -trending down      #Hypernatremia 2/2 free water deficit   -resolved     #SUPA  -resolved with gentle hydration   -Hold/stop NSAIDs    #Type II DM  #Fasting Hyperglycemia   -not on any meds  -A1c is 6.9%  -can monitor off ISS, consideration for Metformin initiation upon discharge            Juan C Suazo DO    Supplementary Documentation:     Quality:  DVT Mechanical Prophylaxis:     Early ambuation  DVT Pharmacologic Prophylaxis   Medication    enoxaparin (Lovenox) 40 MG/0.4ML SUBQ injection 40 mg                Code Status: Full Code  [place]: External urinary catheter in place    The 21st Century Cures Act makes medical notes like these available to patients in the interest of transparency. Please be advised this is a medical document. Medical documents are intended to carry relevant information, facts as evident, and the clinical opinion of the practitioner. The medical note is intended as peer to peer communication and may appear blunt or direct. It is written in medical language and may contain abbreviations or verbiage that are unfamiliar.             **Certification      PHYSICIAN Certification of  Need for Inpatient Hospitalization - Initial Certification    Patient will require inpatient services that will reasonably be expected to span two midnight's based on the clinical documentation in H+P.   Based on patients current state of illness, I anticipate that, after discharge, patient will require TBD.

## 2024-04-25 NOTE — CM/SW NOTE
04/25/24 1200   CM/SW Referral Data   Referral Source Social Work (self-referral)   Reason for Referral Discharge planning   Informant EMR;Clinical Staff Member   Patient Info   Patient's Home Environment House   Patient lives with Spouse/Significant other   Discharge Needs   Anticipated D/C needs To be determined;Psychiatric placement     HOME SITUATION  Type of Home: House   Home Layout: Two level  Stairs to Enter : 3  Railing: Yes     Lives With: Spouse     Prior Level of [place] per PT eval: From PT eval on 4/16 at [facility], pt was ind PTA.  Pt unable to give history, reports resides with mother (only verbalization during session) despite chart stating pt resides with spouse.    Pt is a 72 y/o female admitted with fevers. Chart reviewed and pt was transferred to  from [facility]. Noted PT is anticipating pt will need gradual rehabilitative therapy at discharge. Per psych notes, plan to start ECT tomorrow 4/26/24 or Monday 4/29/24. Per Psych notes, 'once she is eating about 50% of her meals, she will be transferred back to [facility].'    Pt is anticipated to return to [facility] at discharge. SW will continue to remain available.     KHUSHBOO Noel  Discharge Planner

## 2024-04-25 NOTE — PROGRESS NOTES
[facility]  Report of Psychiatric Progress Note    Meli Antonio Patient Status:  Inpatient    11/3/1950 MRN [id number]   Location [facility] 4NW-A Attending Juan C Suazo,    Hosp Day # 4 PCP SIRI TABOR     Date of Admission: 2024  Date of Service: 2024  Reason for Consultation: Severe depression    Impression:  Primary Psychiatric Diagnosis:  Major depressive disorder, recurrent, severe, with vegetative symptoms. She has depressed mood, guilty worthless feelings, hopelessness, decreased motivation and apathy, poor concentration, and a lack of appetite leading to minimal po intake. Eating about 20-30% of meals on average.     Rule out mixed catatonia. She had recent mutism and excitatory motor movements; improved with benzos.     Rule out serotonin syndrome. Lyrica was stopped, Lexapro decreased, and she was given benzos at [place]. If present, SS has improved.     Acute delirium/encephalopathy likely due to SUPA and hypernatremia.    Rule out illusions vs visual hallucinations.     Cognitive disorder unspecified due to delirium and depression.     Alcohol use disorder, severity unspecified, in remission for 9 years.     Pertinent Medical Diagnoses:  SUPA. Hypernatremia.     Recommendations:  1) Increase Abilify from 5mg to 7.5mg nightly to help with depression and possible psychosis/visual hallucinations. Will continue to titrate up.    2) Decrease Ativan from 0.25mg po TID to 0.25mg BID for anxiety. Will be tapered off.     3) Continue lower dose Lexapro 15mg po daily for anxiety/depression due to concern for recent serotonin syndrome. She was on 30mg daily prior to her [place] admission on 4/10/24.     4) Continue Lithium 150mg po twice a day for depression. Started on 24.     5) Continue Lyrica 50mg po three times a day for chronic pain.     6) On the day before ECT, Lyrica 50mg po TID and Ativan 0.25mg TID will be placed on HOLD since they increase the seizure threshold.  Lithium will also be HELD the day before since it can worsen her cognition if given before ECT.     7) Left a message for her outpatient psychiatrist Dr. Ulises Capps on 24 updating her on the plan above.    8) Dispo-- Appreciate Dr. Adames's ECT consult. First ECT will be on 24. Once she is eating about 50% of her meals, she will be transferred back to [facility] and continue further ECT.    Freddy Marie MD    History of Present Illness:  74 yo female with recurrent major depressive disorder initially presented to Health system on 4/10/24 for altered mental status with confusion, restlessness, and agitation.     Per psych liaison:  \" reports that patient has had uncontrollable movements, is taking her clothes off, has been confused and does not know the names of anybody.     says that since Friday she has had these difficulties and it seemed to have gotten worse the last 2 days where now, in addition to those difficulties, she also cannot control her bowels.  She says she has been soiling her clothes and her bed.   said he wonders if it is could be attributed to the recent prescribing of meds with codeine that were meant to manage a cough that she had.   also reports that patient has \"been in bed over a year\".  He says that she does not say much and sleeps a lot.  He says she is grieving the loss of 2 dogs that  5 years ago as well as grieving the loss of their adopted son, who 7 yrs ago, at 31 y/o walked out of their lives. He said that pt has friends and she does occasionally go to lunch with them and does attend AA meetings once a week.  He said that she will eat the food that he cooks for her.  states that he is power of  for his wife.  Counselor advised him to bring copies of that paperwork to the hospital so that staff can scan it into the clinic record.\"    She was eval by Dr. Mckenzie (psych service at [place]) and thought to have  delirium and depression with excitable catatonia vs serotonin syndrome (SS). The Lexapro was decreased from 30mg to 10mg (then increased to 15mg) and Lyrica and Codeine prn stopped just in case she had SS. She was started on benzos, Klonipin and then Ativan, which helped with both the possible excitable catatonia vs SS. Wellbutrin was DC'ed (doesn't increase serotonin, but rather dopamine and nor-epi), Abilify continued, and Lithium started on 4/18/24. She was no longer so restless and agitated and disorganized, but remained apathetic with minimal verbal communication. She was eating/drinking little.    She was transferred to [facility] on 4/19/24. She developed SUPA and hypernatremia due to low po intake and was sent to the [place] ED. She was started on IVF's and admitted to the med floor.    Currently, she has depressed mood, guilty worthless feelings, hopelessness, decreased motivation and a lack of appetite leading to low po intake. She ate only 20% of her breakfast per RN. She denies any tremors or jerking movements.     Discussed ECT and recommendation to start it. She said she was fine with treatment, but I did not think she understood or appreciated the pros/cons of treatment.  is POA.     Interval Hx:  4/23/2024- She ate 20%/60%/25% of her meals yesterday. She ate about 30% of her breakfast and lunch today. She was found on the floor with her knees earlier today; did not hit her head. She is now in the chair. Per RN, she had difficultly initiating walking, but once she started, she was able to ambulate well with the walker.     Currently, she feels tired and depressed. She cries when she thinks about her estranged son and her dogs that have passed away. She has low energy/motivation/appetite and feelings of hopelessness. No suicidal ideation. She endorsed seeing a mouse in the room this AM; no voices/visions now.    Discussed ECT and she appeared to understand more today. Her  who is POA wants  her to start the treatments.    Regarding her chronic back pain, it is 4-5/10 with 10 being severe pain.    4/24/2024- She feels tired and sleepy this AM. She feels depressed, lacks motivation, and has a low appetite. She ate about 30% of her breakfast per RN. She remains forgetful and disoriented to month. She feels hopeless when she thinks about her estranged son. She shares how her sister had severe depression and was treated with ECT before. She is open to ECT.  is POA and wants her to get it as well.     ADDENDUM: Talked to Dr. Adames. Patient's capacity appears to wax and wane, so  (POA) will be involved with consent for ECT. He wants her to have it. At this time, there are no open spots for tomorrow. Unlikely Friday as well. Will most likely received 1st ECT on Monday 4/2/9/24. UNHELD the Ativan, Lyrica, and Lithium.     4/25/2024- She at about 30% of her meals yesterday. She ate 50% of her breakfast today. She told staff that she saw a dog in the room, but she did have a visit from the therapy dog yesterday. I can see the card photo of the dog. While I was in the room with her, she thought she saw a cat in the corner of the room. When I asked her to point to the location, she said the cat disappeared.     She feels tired, anxious, and depressed. She shares how her son Alexandru was adopted at 3 months from [place]. He came out as walker and was not ostracized from the family but accepted. He was brought up in a Voodoo household and in Voodoo schools. She wonders whether the Voodoo belief about homosexuality being a sin is the reason he doesn't associate with them anymore. She isn't sure. She feels sad and guilty when she thinks about him.     Asked her about what she recalled about ECT and she said it was a treatment for her depression. She wasn't able to tell me the pros and cons of the treatment. Discussed how her  will have to consent for treatment and she was fine with that.      Psychiatry Review Systems: No suicidal ideation.    Past Psychiatric History: Major depressive disorder treated with Lexapro, Wellbutrin, and Abilify.    Substance Use History: Alcohol use disorder, severity unspecified, in remission x 9 yrs. THC edibles at night.    Psych Family History: None, but tells me her adopted son has mental health issues.    Social and Developmental History: Retired teacher. She lives with her  and adult son who is 41 yr old and going through a divorce. Her adopted son (from [place] per , she told me [place] initially) stopped talking to the family 7 yrs ago. He is 39 yr old.    Past Medical History:    Anxiety state    Back problem    COMPRESSION FX    Cataract    Depression    Esophageal reflux    GERD (gastroesophageal reflux disease)    Hematoma and contusion of liver    STATES HAS BEEN THERE FOR MANY YEARS    History of fractured kneecap    RIGHT    IBS (irritable bowel syndrome)    Mitral prolapse    Osteoarthritis     Past Surgical History:   Procedure Laterality Date          Correct bunion,othr methods      Correct bunion,simple      BILAT.     Dilation/curettage,diagnostic      FOR \"MOLE PREGNANCY\"    Other Right     ORIF RIGHT ANKLE    Spine surgery procedure unlisted      cervical spine 2020    Total knee replacement       Family History   Problem Relation Age of Onset    Heart Disorder Father     Cancer Mother         ESOPHAGUS      reports that she has quit smoking. Her smoking use included cigarettes. She has a 30 pack-year smoking history. She has never used smokeless tobacco. She reports that she does not drink alcohol and does not use drugs.    Allergies:  Allergies   Allergen Reactions    Radiology Contrast Iodinated Dyes HIVES     HIVES AND THROAT TIGHTENED WHILE HAVING AN MRI    Sulfa Antibiotics HIVES     \"got sicker\"    Seroquel [Quetiapine] UNKNOWN    Cinnamon RASH       Medications:    Current Facility-Administered Medications:      enoxaparin (Lovenox) 40 MG/0.4ML SUBQ injection 40 mg, 40 mg, Subcutaneous, Nightly    LORazepam (Ativan) tab 0.25 mg, 0.25 mg, Oral, TID    pregabalin (Lyrica) cap 50 mg, 50 mg, Oral, TID    cholecalciferol (Vitamin D3) tab 1,000 Units, 1,000 Units, Oral, Daily    ARIPiprazole (Abilify) tab 5 mg, 5 mg, Oral, Nightly    escitalopram (Lexapro) tab 15 mg, 15 mg, Oral, Nightly    lithium carbonate cap 150 mg, 150 mg, Oral, BID with meals    propranolol (Inderal) tab 10 mg, 10 mg, Oral, BID    acetaminophen (Tylenol Extra Strength) tab 1,000 mg, 1,000 mg, Oral, Q8H PRN    melatonin tab 3 mg, 3 mg, Oral, Nightly PRN    ondansetron (Zofran) 4 MG/2ML injection 4 mg, 4 mg, Intravenous, Q6H PRN    polyethylene glycol (PEG 3350) (Miralax) 17 g oral packet 17 g, 17 g, Oral, Daily PRN    sennosides (Senokot) tab 17.2 mg, 17.2 mg, Oral, Nightly PRN    bisacodyl (Dulcolax) 10 MG rectal suppository 10 mg, 10 mg, Rectal, Daily PRN    benzonatate (Tessalon) cap 200 mg, 200 mg, Oral, TID PRN    guaiFENesin (Robitussin) 100 MG/5 ML oral liquid 200 mg, 200 mg, Oral, Q4H PRN    glycerin-hypromellose- (Artificial Tears) 0.2-0.2-1 % ophthalmic solution 1 drop, 1 drop, Both Eyes, QID PRN    sodium chloride (Saline Mist) 0.65 % nasal solution 1 spray, 1 spray, Each Nare, Q3H PRN    albuterol (Ventolin HFA) 108 (90 Base) MCG/ACT inhaler 2 puff, 2 puff, Inhalation, Q4H PRN    magnesium hydroxide (Milk of Magnesia) 400 MG/5ML oral suspension 30 mL, 30 mL, Oral, Nightly PRN    alum-mag hydroxide-simethicone (Maalox) 200-200-20 MG/5ML oral suspension 30 mL, 30 mL, Oral, Q4H PRN    acetaminophen (Tylenol) tab 325 mg, 325 mg, Oral, Q4H PRN **OR** acetaminophen (Tylenol) tab 650 mg, 650 mg, Oral, Q4H PRN    traZODone (Desyrel) tab 50 mg, 50 mg, Oral, Nightly PRN    Review of Systems   Constitutional:  Positive for malaise/fatigue.   Psychiatric/Behavioral:  Positive for depression. Negative for suicidal ideas. The patient is  nervous/anxious.      Mental Status Exam:     Objective      Vitals:    04/25/24 0800   BP: 118/86   Pulse: 80   Resp: 18   Temp: 98.5 °F (36.9 °C)     Appearance: fair grooming  Behavior: cooperative, fair eye contact  Gait: not observed    Speech: soft, fluent     Mood: depressed and anxious  Affect: Congruent    Thought process: logical   Thought content: no hallucinations    Orientation: oriented person, place, and year  Attention and Concentration: improving, fair now  Memory:  intact remote and impaired recent  Language: Intact naming   Fund of Knowledge: Able to recite name of [place] president    Insight: limited  Judgment: limited

## 2024-04-25 NOTE — PLAN OF CARE
Assumed care at 0730.  A&O 2.  Room air.  Regular diet. 1:1 feed.  Tele- NSR.  VTE- Lovenox.   PRN pain meds utilized this shift. See MAR for more details.   Purewick in place.   Plan for ECT on 4/29 per psych. See note for more details.   Blood cultures pending. See results for more details.   1 assist with the walker to the bathroom.    Safety prec initiated, bed is in the lowest position and call ln reach. Pt and spouse at bedside updated on the plan of care. All needs met at this time.    Problem: GASTROINTESTINAL - ADULT  Goal: Maintains or returns to baseline bowel function  Description: INTERVENTIONS:  - Assess bowel function  - Maintain adequate hydration with IV or PO as ordered and tolerated  - Evaluate effectiveness of GI medications  - Encourage mobilization and activity  - Obtain nutritional consult as needed  - Establish a toileting routine/schedule  - Consider collaborating with pharmacy to review patient's medication profile  Outcome: Progressing  Goal: Maintains adequate nutritional intake (undernourished)  Description: INTERVENTIONS:  - Monitor percentage of each meal consumed  - Identify factors contributing to decreased intake, treat as appropriate  - Assist with meals as needed  - Monitor I&O, WT and lab values  - Obtain nutritional consult as needed  - Optimize oral hygiene and moisture  - Encourage food from home; allow for food preferences  - Enhance eating environment  Outcome: Progressing     Problem: METABOLIC/FLUID AND ELECTROLYTES - ADULT  Goal: Electrolytes maintained within normal limits  Description: INTERVENTIONS:  - Monitor labs and rhythm and assess patient for signs and symptoms of electrolyte imbalances  - Administer electrolyte replacement as ordered  - Monitor response to electrolyte replacements, including rhythm and repeat lab results as appropriate  - Fluid restriction as ordered  - Instruct patient on fluid and nutrition restrictions as appropriate  Outcome: Progressing      Problem: MUSCULOSKELETAL - ADULT  Goal: Return mobility to safest level of function  Description: INTERVENTIONS:  - Assess patient stability and activity tolerance for standing, transferring and ambulating w/ or w/o assistive devices  - Assist with transfers and ambulation using safe patient handling equipment as needed  - Ensure adequate protection for wounds/incisions during mobilization  - Obtain PT/OT consults as needed  - Advance activity as appropriate  - Communicate ordered activity level and limitations with patient/family  Outcome: Progressing     Problem: NEUROLOGICAL - ADULT  Goal: Achieves stable or improved neurological status  Description: INTERVENTIONS  - Assess for and report changes in neurological status  - Initiate measures to prevent increased intracranial pressure  - Maintain blood pressure and fluid volume within ordered parameters to optimize cerebral perfusion and minimize risk of hemorrhage  - Monitor temperature, glucose, and sodium. Initiate appropriate interventions as ordered  Outcome: Progressing     Problem: SAFETY ADULT - FALL  Goal: Free from fall injury  Description: INTERVENTIONS:  - Assess pt frequently for physical needs  - Identify cognitive and physical deficits and behaviors that affect risk of falls.  - [place] fall precautions as indicated by assessment.  - Educate pt/family on patient safety including physical limitations  - Instruct pt to call for assistance with activity based on assessment  - Modify environment to reduce risk of injury  - Provide assistive devices as appropriate  - Consider OT/PT consult to assist with strengthening/mobility  - Encourage toileting schedule  Outcome: Progressing     Problem: DISCHARGE PLANNING  Goal: Discharge to home or other facility with appropriate resources  Description: INTERVENTIONS:  - Identify barriers to discharge w/pt and caregiver  - Include patient/family/discharge partner in discharge planning  - Arrange for needed  discharge resources and transportation as appropriate  - Identify discharge learning needs (meds, wound care, etc)  - Arrange for interpreters to assist at discharge as needed  - Consider post-discharge preferences of patient/family/discharge partner  - Complete POLST form as appropriate  - Assess patient's ability to be responsible for managing their own health  - Refer to Case Management Department for coordinating discharge planning if the patient needs post-hospital services based on physician/LIP order or complex needs related to functional status, cognitive ability or social support system  Outcome: Progressing     Problem: Delirium  Goal: Minimize duration of delirium  Description: Interventions:  - Encourage use of hearing aids, eye glasses  - Promote highest level of mobility daily  - Provide frequent reorientation  - Promote wakefulness i.e. lights on, blinds open  - Promote sleep, encourage patient's normal rest cycle i.e. lights off, TV off, minimize noise and interruptions  - Encourage family to assist in orientation and promotion of home routines  Outcome: Progressing

## 2024-04-26 LAB
BASOPHILS # BLD AUTO: 0.04 X10(3) UL (ref 0–0.2)
BASOPHILS NFR BLD AUTO: 0.3 %
EOSINOPHIL # BLD AUTO: 0.11 X10(3) UL (ref 0–0.7)
EOSINOPHIL NFR BLD AUTO: 0.8 %
ERYTHROCYTE [DISTWIDTH] IN BLOOD BY AUTOMATED COUNT: 14 %
HCT VFR BLD AUTO: 40.7 %
HGB BLD-MCNC: 13.5 G/DL
IMM GRANULOCYTES # BLD AUTO: 0.07 X10(3) UL (ref 0–1)
IMM GRANULOCYTES NFR BLD: 0.5 %
LYMPHOCYTES # BLD AUTO: 3.78 X10(3) UL (ref 1–4)
LYMPHOCYTES NFR BLD AUTO: 27.1 %
MCH RBC QN AUTO: 28.8 PG (ref 26–34)
MCHC RBC AUTO-ENTMCNC: 33.2 G/DL (ref 31–37)
MCV RBC AUTO: 87 FL
MONOCYTES # BLD AUTO: 0.82 X10(3) UL (ref 0.1–1)
MONOCYTES NFR BLD AUTO: 5.9 %
NEUTROPHILS # BLD AUTO: 9.15 X10 (3) UL (ref 1.5–7.7)
NEUTROPHILS # BLD AUTO: 9.15 X10(3) UL (ref 1.5–7.7)
NEUTROPHILS NFR BLD AUTO: 65.4 %
PLATELET # BLD AUTO: 348 10(3)UL (ref 150–450)
RBC # BLD AUTO: 4.68 X10(6)UL
WBC # BLD AUTO: 14 X10(3) UL (ref 4–11)

## 2024-04-26 PROCEDURE — 99232 SBSQ HOSP IP/OBS MODERATE 35: CPT | Performed by: INTERNAL MEDICINE

## 2024-04-26 PROCEDURE — 99232 SBSQ HOSP IP/OBS MODERATE 35: CPT | Performed by: OTHER

## 2024-04-26 NOTE — PLAN OF CARE
Assumed care at 1930. A&Ox2, orientated to self and place. On RA. NSR on tele. Cardiac electrolyte protocol. Regular diet. PIV L Hand saline locked. Purewick in place. Pt denies any pain at this time. High fall risk precautions in place. Pt resting in bed, call light within reach. Updated on POC, all needs met at this time.      Problem: GASTROINTESTINAL - ADULT  Goal: Maintains or returns to baseline bowel function  Description: INTERVENTIONS:  - Assess bowel function  - Maintain adequate hydration with IV or PO as ordered and tolerated  - Evaluate effectiveness of GI medications  - Encourage mobilization and activity  - Obtain nutritional consult as needed  - Establish a toileting routine/schedule  - Consider collaborating with pharmacy to review patient's medication profile  Outcome: Progressing  Goal: Maintains adequate nutritional intake (undernourished)  Description: INTERVENTIONS:  - Monitor percentage of each meal consumed  - Identify factors contributing to decreased intake, treat as appropriate  - Assist with meals as needed  - Monitor I&O, WT and lab values  - Obtain nutritional consult as needed  - Optimize oral hygiene and moisture  - Encourage food from home; allow for food preferences  - Enhance eating environment  Outcome: Progressing     Problem: METABOLIC/FLUID AND ELECTROLYTES - ADULT  Goal: Electrolytes maintained within normal limits  Description: INTERVENTIONS:  - Monitor labs and rhythm and assess patient for signs and symptoms of electrolyte imbalances  - Administer electrolyte replacement as ordered  - Monitor response to electrolyte replacements, including rhythm and repeat lab results as appropriate  - Fluid restriction as ordered  - Instruct patient on fluid and nutrition restrictions as appropriate  Outcome: Progressing     Problem: MUSCULOSKELETAL - ADULT  Goal: Return mobility to safest level of function  Description: INTERVENTIONS:  - Assess patient stability and activity tolerance  for standing, transferring and ambulating w/ or w/o assistive devices  - Assist with transfers and ambulation using safe patient handling equipment as needed  - Ensure adequate protection for wounds/incisions during mobilization  - Obtain PT/OT consults as needed  - Advance activity as appropriate  - Communicate ordered activity level and limitations with patient/family  Outcome: Progressing     Problem: NEUROLOGICAL - ADULT  Goal: Achieves stable or improved neurological status  Description: INTERVENTIONS  - Assess for and report changes in neurological status  - Initiate measures to prevent increased intracranial pressure  - Maintain blood pressure and fluid volume within ordered parameters to optimize cerebral perfusion and minimize risk of hemorrhage  - Monitor temperature, glucose, and sodium. Initiate appropriate interventions as ordered  Outcome: Progressing     Problem: SAFETY ADULT - FALL  Goal: Free from fall injury  Description: INTERVENTIONS:  - Assess pt frequently for physical needs  - Identify cognitive and physical deficits and behaviors that affect risk of falls.  - [place] fall precautions as indicated by assessment.  - Educate pt/family on patient safety including physical limitations  - Instruct pt to call for assistance with activity based on assessment  - Modify environment to reduce risk of injury  - Provide assistive devices as appropriate  - Consider OT/PT consult to assist with strengthening/mobility  - Encourage toileting schedule  Outcome: Progressing     Problem: DISCHARGE PLANNING  Goal: Discharge to home or other facility with appropriate resources  Description: INTERVENTIONS:  - Identify barriers to discharge w/pt and caregiver  - Include patient/family/discharge partner in discharge planning  - Arrange for needed discharge resources and transportation as appropriate  - Identify discharge learning needs (meds, wound care, etc)  - Arrange for interpreters to assist at discharge as  needed  - Consider post-discharge preferences of patient/family/discharge partner  - Complete POLST form as appropriate  - Assess patient's ability to be responsible for managing their own health  - Refer to Case Management Department for coordinating discharge planning if the patient needs post-hospital services based on physician/LIP order or complex needs related to functional status, cognitive ability or social support system  Outcome: Progressing     Problem: Delirium  Goal: Minimize duration of delirium  Description: Interventions:  - Encourage use of hearing aids, eye glasses  - Promote highest level of mobility daily  - Provide frequent reorientation  - Promote wakefulness i.e. lights on, blinds open  - Promote sleep, encourage patient's normal rest cycle i.e. lights off, TV off, minimize noise and interruptions  - Encourage family to assist in orientation and promotion of home routines  Outcome: Progressing

## 2024-04-26 NOTE — PROGRESS NOTES
[facility]  Report of Psychiatric Progress Note    Meli Antonio Patient Status:  Inpatient    11/3/1950 MRN [id number]   Location [facility] 4NW-A Attending Juan C Suazo,    Hosp Day # 5 PCP SIRI TABOR     Date of Admission: 2024  Date of Service: 2024  Reason for Consultation: Severe depression    Impression:  Primary Psychiatric Diagnosis:  Major depressive disorder, recurrent, severe, with vegetative symptoms. She has depressed mood, guilty worthless feelings, hopelessness, decreased motivation and apathy, poor concentration, and a lack of appetite leading to minimal po intake. Eating 30-50% of meals on average.     Rule out mixed catatonia. She had recent mutism and excitatory motor movements; improved with benzos.     Rule out serotonin syndrome. Lyrica was stopped, Lexapro decreased, and she was given benzos at [place]. If present, SS has improved.     Acute delirium/encephalopathy likely due to SUPA and hypernatremia. Improving. Alert and more attentive. NO illusions or visual hallucinations today.    Cognitive disorder unspecified due to delirium and depression.     Alcohol use disorder, severity unspecified, in remission for 9 years.     Pertinent Medical Diagnoses:  SUPA. Hypernatremia.     Recommendations:  1) Continue lower dose Lexapro 15mg po daily for anxiety/depression due to concern for recent serotonin syndrome. She was on 30mg daily prior to her [place] admission on 4/10/24.     2) Continue Lithium 150mg po twice a day for depression. Started on 24. EVENING DOSE will be held before ECT on 24.    3) Continue Abilify 7.5mg nightly to help with depression.    4) Continue Ativan 0.25mg BID for anxiety. Last dose on 24 PM.     5) Continue Lyrica 50mg po three times a day for chronic pain. WILL BE HELD on 24 since ECT is on 24.     6) Left a message for her outpatient psychiatrist Dr. Ulises Capps on 24 updating her  on the plan above.    7) Dispo-- Appreciate Dr. Adames's ECT consult. First ECT will be on 24. NPO at midnight before ECT. Once she is eating about 50% of her meals, she will be transferred back to [facility] and continue further ECT.    Left a message with patient's  with the plan above.    Freddy Marie MD    History of Present Illness:  74 yo female with recurrent major depressive disorder initially presented to [facility] on 4/10/24 for altered mental status with confusion, restlessness, and agitation.     Per psych liaison:  \" reports that patient has had uncontrollable movements, is taking her clothes off, has been confused and does not know the names of anybody.     says that since Friday she has had these difficulties and it seemed to have gotten worse the last 2 days where now, in addition to those difficulties, she also cannot control her bowels.  She says she has been soiling her clothes and her bed.   said he wonders if it is could be attributed to the recent prescribing of meds with codeine that were meant to manage a cough that she had.   also reports that patient has \"been in bed over a year\".  He says that she does not say much and sleeps a lot.  He says she is grieving the loss of 2 dogs that  5 years ago as well as grieving the loss of their adopted son, who 7 yrs ago, at 33 y/o walked out of their lives. He said that pt has friends and she does occasionally go to lunch with them and does attend AA meetings once a week.  He said that she will eat the food that he cooks for her.  states that he is power of  for his wife.  Counselor advised him to bring copies of that paperwork to the hospital so that staff can scan it into the clinic record.\"    She was eval by Dr. Mckenzie (psych service at [place]) and thought to have delirium and depression with excitable catatonia vs serotonin syndrome (SS). The Lexapro was decreased from  30mg to 10mg (then increased to 15mg) and Lyrica and Codeine prn stopped just in case she had SS. She was started on benzos, Klonipin and then Ativan, which helped with both the possible excitable catatonia vs SS. Wellbutrin was DC'ed (doesn't increase serotonin, but rather dopamine and nor-epi), Abilify continued, and Lithium started on 4/18/24. She was no longer so restless and agitated and disorganized, but remained apathetic with minimal verbal communication. She was eating/drinking little.    She was transferred to [facility] on 4/19/24. She developed SUPA and hypernatremia due to low po intake and was sent to the [place] ED. She was started on IVF's and admitted to the med floor.    Currently, she has depressed mood, guilty worthless feelings, hopelessness, decreased motivation and a lack of appetite leading to low po intake. She ate only 20% of her breakfast per RN. She denies any tremors or jerking movements.     Discussed ECT and recommendation to start it. She said she was fine with treatment, but I did not think she understood or appreciated the pros/cons of treatment.  is POA.     Interval Hx:  4/23/2024- She ate 20%/60%/25% of her meals yesterday. She ate about 30% of her breakfast and lunch today. She was found on the floor with her knees earlier today; did not hit her head. She is now in the chair. Per RN, she had difficultly initiating walking, but once she started, she was able to ambulate well with the walker.     Currently, she feels tired and depressed. She cries when she thinks about her estranged son and her dogs that have passed away. She has low energy/motivation/appetite and feelings of hopelessness. No suicidal ideation. She endorsed seeing a mouse in the room this AM; no voices/visions now.    Discussed ECT and she appeared to understand more today. Her  who is POA wants her to start the treatments.    Regarding her chronic back pain, it is 4-5/10 with 10 being severe  pain.    4/24/2024- She feels tired and sleepy this AM. She feels depressed, lacks motivation, and has a low appetite. She ate about 30% of her breakfast per RN. She remains forgetful and disoriented to month. She feels hopeless when she thinks about her estranged son. She shares how her sister had severe depression and was treated with ECT before. She is open to ECT.  is POA and wants her to get it as well.     ADDENDUM: Talked to Dr. Adames. Patient's capacity appears to wax and wane, so  (POA) will be involved with consent for ECT. He wants her to have it. At this time, there are no open spots for tomorrow. Unlikely Friday as well. Will most likely received 1st ECT on Monday 4/2/9/24. UNHELD the Ativan, Lyrica, and Lithium.     4/25/2024- She at about 30% of her meals yesterday. She ate 50% of her breakfast today. She told staff that she saw a dog in the room, but she did have a visit from the therapy dog yesterday. I can see the card photo of the dog. While I was in the room with her, she thought she saw a cat in the corner of the room. When I asked her to point to the location, she said the cat disappeared.     She feels tired, anxious, and depressed. She shares how her son Alexandru was adopted at 3 months from [place]. He came out as walker and was not ostracized from the family but accepted. He was brought up in a Lutheran household and in [employer]an schools. She wonders whether the Lutheran belief about homosexuality being a sin is the reason he doesn't associate with them anymore. She isn't sure. She feels sad and guilty when she thinks about him.     Asked her about what she recalled about ECT and she said it was a treatment for her depression. She wasn't able to tell me the pros and cons of the treatment. Discussed how her  will have to consent for treatment and she was fine with that.    4/26/2024- She feels tired, anxious, and depressed and has low motivation and appetite. She is  eating 30-50% of her meals per report. She is pleasant and cooperative per staff. She is open to ECT on Monday and knows it is a treatment for depression, but is unable to tell me cons or alteratives to treatment. Her  will consent for ECT and wants her to get it.     Psychiatry Review Systems: No suicidal ideation.    Past Psychiatric History: Major depressive disorder treated with Lexapro, Wellbutrin, and Abilify.    Substance Use History: Alcohol use disorder, severity unspecified, in remission x 9 yrs. THC edibles at night.    Psych Family History: None, but tells me her adopted son has mental health issues.    Social and Developmental History: Retired teacher. She lives with her  and adult son who is 41 yr old and going through a divorce. Her adopted son (from [place] per , she told me [place] initially) stopped talking to the family 7 yrs ago. He is 39 yr old.    Past Medical History:    Anxiety state    Back problem    COMPRESSION FX    Cataract    Depression    Esophageal reflux    GERD (gastroesophageal reflux disease)    Hematoma and contusion of liver    STATES HAS BEEN THERE FOR MANY YEARS    History of fractured kneecap    RIGHT    IBS (irritable bowel syndrome)    Mitral prolapse    Osteoarthritis     Past Surgical History:   Procedure Laterality Date          Correct bunion,othr methods      Correct bunion,simple      BILAT.     Dilation/curettage,diagnostic      FOR \"MOLE PREGNANCY\"    Other Right     ORIF RIGHT ANKLE    Spine surgery procedure unlisted      cervical spine 2020    Total knee replacement       Family History   Problem Relation Age of Onset    Heart Disorder Father     Cancer Mother         ESOPHAGUS      reports that she has quit smoking. Her smoking use included cigarettes. She has a 30 pack-year smoking history. She has never used smokeless tobacco. She reports that she does not drink alcohol and does not use drugs.    Allergies:  Allergies    Allergen Reactions    Radiology Contrast Iodinated Dyes HIVES     HIVES AND THROAT TIGHTENED WHILE HAVING AN MRI    Sulfa Antibiotics HIVES     \"got sicker\"    Seroquel [Quetiapine] UNKNOWN    Cinnamon RASH       Medications:    Current Facility-Administered Medications:     ARIPiprazole (Abilify) tab 7.5 mg, 7.5 mg, Oral, Nightly    [START ON 4/29/2024] acetaminophen (Tylenol Extra Strength) tab 1,000 mg, 1,000 mg, Oral, Once    pregabalin (Lyrica) cap 50 mg, 50 mg, Oral, TID    LORazepam (Ativan) tab 0.25 mg, 0.25 mg, Oral, BID    lithium carbonate cap 150 mg, 150 mg, Oral, BID with meals    enoxaparin (Lovenox) 40 MG/0.4ML SUBQ injection 40 mg, 40 mg, Subcutaneous, Nightly    cholecalciferol (Vitamin D3) tab 1,000 Units, 1,000 Units, Oral, Daily    escitalopram (Lexapro) tab 15 mg, 15 mg, Oral, Nightly    propranolol (Inderal) tab 10 mg, 10 mg, Oral, BID    acetaminophen (Tylenol Extra Strength) tab 1,000 mg, 1,000 mg, Oral, Q8H PRN    melatonin tab 3 mg, 3 mg, Oral, Nightly PRN    ondansetron (Zofran) 4 MG/2ML injection 4 mg, 4 mg, Intravenous, Q6H PRN    polyethylene glycol (PEG 3350) (Miralax) 17 g oral packet 17 g, 17 g, Oral, Daily PRN    sennosides (Senokot) tab 17.2 mg, 17.2 mg, Oral, Nightly PRN    bisacodyl (Dulcolax) 10 MG rectal suppository 10 mg, 10 mg, Rectal, Daily PRN    benzonatate (Tessalon) cap 200 mg, 200 mg, Oral, TID PRN    guaiFENesin (Robitussin) 100 MG/5 ML oral liquid 200 mg, 200 mg, Oral, Q4H PRN    glycerin-hypromellose- (Artificial Tears) 0.2-0.2-1 % ophthalmic solution 1 drop, 1 drop, Both Eyes, QID PRN    sodium chloride (Saline Mist) 0.65 % nasal solution 1 spray, 1 spray, Each Nare, Q3H PRN    albuterol (Ventolin HFA) 108 (90 Base) MCG/ACT inhaler 2 puff, 2 puff, Inhalation, Q4H PRN    magnesium hydroxide (Milk of Magnesia) 400 MG/5ML oral suspension 30 mL, 30 mL, Oral, Nightly PRN    alum-mag hydroxide-simethicone (Maalox) 200-200-20 MG/5ML oral suspension 30 mL, 30 mL,  Oral, Q4H PRN    acetaminophen (Tylenol) tab 325 mg, 325 mg, Oral, Q4H PRN **OR** acetaminophen (Tylenol) tab 650 mg, 650 mg, Oral, Q4H PRN    traZODone (Desyrel) tab 50 mg, 50 mg, Oral, Nightly PRN    Review of Systems   Constitutional:  Positive for malaise/fatigue.   Psychiatric/Behavioral:  Positive for depression. Negative for suicidal ideas. The patient is nervous/anxious.      Mental Status Exam:     Objective      Vitals:    04/26/24 0800   BP: 127/89   Pulse: 85   Resp: 16   Temp: 98.6 °F (37 °C)     Appearance: fair grooming  Behavior: cooperative, fair eye contact  Gait: not observed    Speech: soft, fluent     Mood: depressed and anxious  Affect: Congruent    Thought process: logical   Thought content: no hallucinations    Orientation: oriented person, place, and year  Attention and Concentration: improving, fair now  Memory:  intact remote and impaired recent  Language: Intact naming   Fund of Knowledge: Able to recite name of [place] president    Insight: limited  Judgment: limited

## 2024-04-26 NOTE — DIETARY NOTE
[facility]   part of [facility]    NUTRITION ASSESSMENT    Unable to diagnose malnutrition criteria at this time.    NUTRITION INTERVENTION:    Meal and Snacks - Continued general diet. Monitor and encourage adequate PO intake.   Medical Food Supplements - Magic Cup TID.     PATIENT STATUS:   4/26 - Pt waiting for ECT treatment - planned for Monday.  RN reports pt eating some, ate 65% of breakfast this AM(pancakes, potatoes, sausage, yogurt) but refused lunch.  No n/v/d. Last BM 4/23. Continue Magic Cup to maximize oral intake.  Plan to transfer back to [facility] once taking in 50% of meals.     04/22/24 Pt seen for MST risk and admitted for abnormal labs.  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 Intake has been variable prior to admission per chart report. Currently consuming 25% with magic cup on board. Sleeping during attempted interview.    ANTHROPOMETRICS:  Ht:  152.4 cm  Wt: 50 kg (110 lb 3.7 oz).   BMI: Body mass index is 21.53 kg/m².  IBW: 45.4 kg      WEIGHT HISTORY:   Weight loss: Yes, Non-severe Wt loss of 11 lbs, 9%, over 4 months     Wt Readings from Last 10 Encounters:   04/21/24 50 kg (110 lb 3.7 oz)   04/11/24 49.1 kg (108 lb 3.2 oz)   04/08/24 55.3 kg (122 lb)   01/12/23 52.2 kg (115 lb)   10/06/20 53.5 kg (118 lb)   09/07/20 52.1 kg (114 lb 12.8 oz)   04/10/17 56.2 kg (123 lb 12.8 oz)        NUTRITION:  Diet:       Procedures    Regular/General diet Is Patient on Accuchecks? No    NPO      Food Allergies: No  Cultural/Ethnic/Spiritism Preferences Addressed: Yes    Percent Meals Eaten (last 3 days)       Date/Time Percent Meals Eaten (%)    04/25/24 0930 50 %    04/26/24 0930 65 %          GI system review:  anorexia  Last BM: 4/23  Skin and wounds: intact    NUTRITION RELATED PHYSICAL FINDINGS:     1. Body Fat/Muscle Mass:  unable to obtain     2. Fluid Accumulation: none per RN documentation     NUTRITION PRESCRIPTION: 50 kg  Calories: 3058-8114  calories/day (30-40 kcal/kg)  Protein: 60-75 grams protein/day (1.2-1.5 grams protein per kg)  Fluid: ~1 ml/kcal or per MD discretion    NUTRITION DIAGNOSIS/PROBLEM:  Inadequate energy intake related to insufficient appetite resulting in inadequate nutrition intake as evidenced by documented/reported insufficient oral intake and documented/reported unintentional weight loss      MONITOR AND EVALUATE/NUTRITION GOALS:  PO intake of 75% of meals TID - Not met, Continues  PO intake of 75% of oral nutrition supplement/s - Not met, Continues  Weight stable within 1 to 2 lbs during admission - Ongoing      MEDICATIONS:  D3,     LABS:  reviewed    Pt is at Moderate nutrition risk    Kavitha Yi RD, LDN, CNSC  Clinical Dietitian  Phone [phone]

## 2024-04-26 NOTE — PLAN OF CARE
Pt alert and oriented x 2-3  Assumd pt care at 0730  RA  Tele- NSR  Cardiac Electrolyte Protocol  Lovenox-VTE  Reg/Gen Diet  Pt up to BR with 1 assist walker  Tylenol for back pain  PIV left FA-saline locked  ECT Monday    Problem: GASTROINTESTINAL - ADULT  Goal: Maintains or returns to baseline bowel function  Description: INTERVENTIONS:  - Assess bowel function  - Maintain adequate hydration with IV or PO as ordered and tolerated  - Evaluate effectiveness of GI medications  - Encourage mobilization and activity  - Obtain nutritional consult as needed  - Establish a toileting routine/schedule  - Consider collaborating with pharmacy to review patient's medication profile  Outcome: Progressing  Goal: Maintains adequate nutritional intake (undernourished)  Description: INTERVENTIONS:  - Monitor percentage of each meal consumed  - Identify factors contributing to decreased intake, treat as appropriate  - Assist with meals as needed  - Monitor I&O, WT and lab values  - Obtain nutritional consult as needed  - Optimize oral hygiene and moisture  - Encourage food from home; allow for food preferences  - Enhance eating environment  Outcome: Progressing     Problem: METABOLIC/FLUID AND ELECTROLYTES - ADULT  Goal: Electrolytes maintained within normal limits  Description: INTERVENTIONS:  - Monitor labs and rhythm and assess patient for signs and symptoms of electrolyte imbalances  - Administer electrolyte replacement as ordered  - Monitor response to electrolyte replacements, including rhythm and repeat lab results as appropriate  - Fluid restriction as ordered  - Instruct patient on fluid and nutrition restrictions as appropriate  Outcome: Progressing

## 2024-04-26 NOTE — PROGRESS NOTES
[place]   part of Located within [facility]     Hospitalist Progress Note     Meli Antonio Patient Status:  Inpatient    11/3/1950 MRN [id number]   [facility] 4NW-A Attending Juan C Suazo,    Hosp Day # 5 PCP SIRI TABOR     Chief Complaint: Abnormal labs    Subjective:     Is feeling well, no complaints     Objective:    Review of Systems:   Limited but as above     Vital signs:  Temp:  [97.6 °F (36.4 °C)-98.5 °F (36.9 °C)] 97.6 °F (36.4 °C)  Pulse:  [73-85] 73  Resp:  [16-18] 16  BP: (111-148)/() 120/85  SpO2:  [92 %-98 %] 98 %    Physical Exam:    General: No acute distress, Awake, confused   Respiratory: No wheezes, no rhonchi  Cardiovascular: S1, S2, regular rate and rhythm  Abdomen: Soft, Non-tender, non-distended, positive bowel sounds  Neuro: No new focal deficits.   Extremities: No edema      Diagnostic Data:    Labs:  Recent Labs   Lab 24  0520 24  0930 24  0641 24  0711 24  0551   WBC 10.3 20.0* 15.5* 11.5* 14.0*   HGB 13.9 15.1 12.0 12.7 13.5   MCV 87.1 87.5 86.8 87.0 87.0   .0* 582.0* 446.0 412.0 348.0       Recent Labs   Lab 24  0520 24  0629 24  0917 24  0808 24  0711 24  0349   * 159* 174* 137* 139*  --    BUN 21 46* 49* 28* 10  --    CREATSERUM 1.02 1.79* 1.91* 1.13* 0.82  --    CA 9.8 10.1 10.3* 8.7 9.3  --    ALB 3.4 3.5 3.6  --   --   --    * 148* 147* 151* 143  --    K 3.9 4.6 4.7 3.9 3.5 4.3   * 117* 114* 121* 112  --    CO2 26.0 27.0 25.0 24.0 26.0  --    ALKPHO 77 81 82  --   --   --    AST 13* 16 27  --   --   --    ALT 28 28 25  --   --   --    BILT 0.4 0.3 0.3  --   --   --    TP 6.9 7.0 7.3  --   --   --        Estimated Creatinine Clearance: 43.9 mL/min (based on SCr of 0.82 mg/dL).    No results for input(s): \"TROP\", \"TROPHS\", \"CK\" in the last 168 hours.    No results for input(s): \"PTP\", \"INR\" in the last 168 hours.               Microbiology    Hospital Encounter  on 04/21/24   1. Blood Culture     Status: None (Preliminary result)    Collection Time: 04/21/24 11:06 AM    Specimen: Blood,peripheral   Result Value Ref Range    Blood Culture Result No Growth 4 Days N/A         Imaging: Reviewed in Epic.    Medications:    ARIPiprazole  7.5 mg Oral Nightly    [START ON 4/29/2024] acetaminophen  1,000 mg Oral Once    pregabalin  50 mg Oral TID    LORazepam  0.25 mg Oral BID    lithium carbonate  150 mg Oral BID with meals    enoxaparin  40 mg Subcutaneous Nightly    cholecalciferol  1,000 Units Oral Daily    escitalopram  15 mg Oral Nightly    propranolol  10 mg Oral BID       Assessment & Plan:      #EDEL/MDD  #Decreased PO intake   -at [facility] prior to this admission  -medical management per Psychiatry   -ECT planned for Monday 4/29    #Leukocytosis  -no bacterial infectious etiology identified, hold further antibiotics   -one time fever - monitor for recurrence   -Blood Cultures NGTD  -monitor      #Hypernatremia 2/2 free water deficit   -resolved     #SUPA  -resolved with gentle hydration   -Hold/stop NSAIDs    #Type II DM  #Fasting Hyperglycemia   -not on any meds  -A1c is 6.9%  -can monitor off ISS, consideration for Metformin initiation upon discharge            Juan C Suazo DO    Supplementary Documentation:     Quality:  DVT Mechanical Prophylaxis:     Early ambuation  DVT Pharmacologic Prophylaxis   Medication    enoxaparin (Lovenox) 40 MG/0.4ML SUBQ injection 40 mg                Code Status: Full Code  [place]: External urinary catheter in place    The 21st Century Cures Act makes medical notes like these available to patients in the interest of transparency. Please be advised this is a medical document. Medical documents are intended to carry relevant information, facts as evident, and the clinical opinion of the practitioner. The medical note is intended as peer to peer communication and may appear blunt or direct. It is written in medical language and may contain  abbreviations or verbiage that are unfamiliar.             **Certification      PHYSICIAN Certification of Need for Inpatient Hospitalization - Initial Certification    Patient will require inpatient services that will reasonably be expected to span two midnight's based on the clinical documentation in H+P.   Based on patients current state of illness, I anticipate that, after discharge, patient will require TBD.

## 2024-04-27 PROBLEM — E11.9 TYPE 2 DIABETES MELLITUS WITHOUT COMPLICATION, WITHOUT LONG-TERM CURRENT USE OF INSULIN (HCC): Status: ACTIVE | Noted: 2024-04-27

## 2024-04-27 LAB — PROCALCITONIN SERPL-MCNC: <0.05 NG/ML (ref ?–0.16)

## 2024-04-27 PROCEDURE — 99232 SBSQ HOSP IP/OBS MODERATE 35: CPT | Performed by: INTERNAL MEDICINE

## 2024-04-27 NOTE — PROGRESS NOTES
[facility]   part of [facility]     Hospitalist Progress Note     Meli Antonio Patient Status:  Inpatient    11/3/1950 MRN [id number]   Location [facility] 4NW-A Attending Juan C Suazo,    Hosp Day # 6 PCP SIRI TABOR     Chief Complaint: Abnormal labs    Subjective:     Is feeling well, no complaints   Denies any chest pain or shortness of breath.  Denies any headache.  Sitting up in the chair.  Seen with RN at bedside.  Awaiting ECT treatment on Monday per psychiatrist    Objective:    Review of Systems:   Limited but as above     Vital signs:  Temp:  [97.9 °F (36.6 °C)-98.6 °F (37 °C)] 97.9 °F (36.6 °C)  Pulse:  [] 113  Resp:  [16-18] 16  BP: ()/() 93/53  SpO2:  [84 %-96 %] 96 %    Physical Exam:    BP 93/53 (BP Location: Left arm)   Pulse 113   Temp 97.9 °F (36.6 °C) (Oral)   Resp 16   Wt 110 lb 3.7 oz (50 kg)   SpO2 96%   BMI 21.53 kg/m²     General: No acute distress, Awake, confused   Respiratory: No wheezes, no rhonchi  Cardiovascular: S1, S2, regular rate and rhythm  Abdomen: Soft, Non-tender, non-distended, positive bowel sounds  Neuro: No new focal deficits.   Extremities: No edema      Diagnostic Data:    Labs:  Recent Labs   Lab 24  0930 24  0641 24  0711 24  0551   WBC 20.0* 15.5* 11.5* 14.0*   HGB 15.1 12.0 12.7 13.5   MCV 87.5 86.8 87.0 87.0   .0* 446.0 412.0 348.0       Recent Labs   Lab 24  0629 24  0917 24  0808 24  0711 24  0349   * 174* 137* 139*  --    BUN 46* 49* 28* 10  --    CREATSERUM 1.79* 1.91* 1.13* 0.82  --    CA 10.1 10.3* 8.7 9.3  --    ALB 3.5 3.6  --   --   --    * 147* 151* 143  --    K 4.6 4.7 3.9 3.5 4.3   * 114* 121* 112  --    CO2 27.0 25.0 24.0 26.0  --    ALKPHO 81 82  --   --   --    AST 16 27  --   --   --    ALT 28 25  --   --   --    BILT 0.3 0.3  --   --   --    TP 7.0 7.3  --   --   --        Estimated Creatinine Clearance: 43.9 mL/min  (based on SCr of 0.82 mg/dL).    No results for input(s): \"TROP\", \"TROPHS\", \"CK\" in the last 168 hours.    No results for input(s): \"PTP\", \"INR\" in the last 168 hours.               Microbiology    Hospital Encounter on 04/21/24   1. Blood Culture     Status: None    Collection Time: 04/21/24 11:06 AM    Specimen: Blood,peripheral   Result Value Ref Range    Blood Culture Result No Growth 5 Days N/A         Imaging: Reviewed in Epic.    Medications:    ARIPiprazole  7.5 mg Oral Nightly    [START ON 4/29/2024] acetaminophen  1,000 mg Oral Once    pregabalin  50 mg Oral TID    LORazepam  0.25 mg Oral BID    lithium carbonate  150 mg Oral BID with meals    enoxaparin  40 mg Subcutaneous Nightly    cholecalciferol  1,000 Units Oral Daily    escitalopram  15 mg Oral Nightly    propranolol  10 mg Oral BID       Assessment & Plan:      # Major depression, generalized anxiety disorder  #Decreased PO intake   -at [facility] prior to this admission  -medical management per Psychiatry   -ECT planned for Monday 4/29 by psychiatrist    #Leukocytosis  -no bacterial infectious etiology identified, hold further antibiotics   -one time fever - monitor for recurrence   -Blood Cultures NGTD  -UA negative on 4/21/2024  -Chest x-ray done on 4/21/2024 with no evidence of active cardiopulmonary disease  -monitor CBC in a.m. will also check procalcitonin level     #Hypernatremia 2/2 free water deficit due to dehydration  -resolved     #SUPA  -resolved with gentle hydration   -No NSAIDs    #Type II DM  #Fasting Hyperglycemia   -not on any meds at home  -A1c is 6.9%       Morenita Das MD      Supplementary Documentation:     Quality:  DVT Mechanical Prophylaxis:     Early ambuation  DVT Pharmacologic Prophylaxis   Medication    enoxaparin (Lovenox) 40 MG/0.4ML SUBQ injection 40 mg                Code Status: Full Code  Parrish: External urinary catheter in place    The 21st Century Cures Act makes medical notes like these available to patients in the  interest of transparency. Please be advised this is a medical document. Medical documents are intended to carry relevant information, facts as evident, and the clinical opinion of the practitioner. The medical note is intended as peer to peer communication and may appear blunt or direct. It is written in medical language and may contain abbreviations or verbiage that are unfamiliar.             **Certification      PHYSICIAN Certification of Need for Inpatient Hospitalization - Initial Certification    Patient will require inpatient services that will reasonably be expected to span two midnight's based on the clinical documentation in H+P.   Based on patients current state of illness, I anticipate that, after discharge, patient will require TBD.

## 2024-04-27 NOTE — PROGRESS NOTES
A+Ox4 at this time  RA  Tele SR  Incontinent. Pericare provided. Purewick applied.   Ate 90% of dinner  Safety/fall precautions in place  Updated on plan of care

## 2024-04-27 NOTE — PLAN OF CARE
Pt alert and oriented x 2-3  Assumd pt care at 0730  RA  Tele- NSR  Cardiac Electrolyte Protocol  Lovenox-VTE  Reg/Gen Diet  Order/set up for meals  Pt up to BR with 1 assist walker  Tylenol for back pain  PIV left FA-saline locked  ECT Monday     Problem: GASTROINTESTINAL - ADULT  Goal: Maintains or returns to baseline bowel function  Description: INTERVENTIONS:  - Assess bowel function  - Maintain adequate hydration with IV or PO as ordered and tolerated  - Evaluate effectiveness of GI medications  - Encourage mobilization and activity  - Obtain nutritional consult as needed  - Establish a toileting routine/schedule  - Consider collaborating with pharmacy to review patient's medication profile  Outcome: Progressing  Goal: Maintains adequate nutritional intake (undernourished)  Description: INTERVENTIONS:  - Monitor percentage of each meal consumed  - Identify factors contributing to decreased intake, treat as appropriate  - Assist with meals as needed  - Monitor I&O, WT and lab values  - Obtain nutritional consult as needed  - Optimize oral hygiene and moisture  - Encourage food from home; allow for food preferences  - Enhance eating environment  Outcome: Progressing     Problem: METABOLIC/FLUID AND ELECTROLYTES - ADULT  Goal: Electrolytes maintained within normal limits  Description: INTERVENTIONS:  - Monitor labs and rhythm and assess patient for signs and symptoms of electrolyte imbalances  - Administer electrolyte replacement as ordered  - Monitor response to electrolyte replacements, including rhythm and repeat lab results as appropriate  - Fluid restriction as ordered  - Instruct patient on fluid and nutrition restrictions as appropriate  Outcome: Progressing     Problem: MUSCULOSKELETAL - ADULT  Goal: Return mobility to safest level of function  Description: INTERVENTIONS:  - Assess patient stability and activity tolerance for standing, transferring and ambulating w/ or w/o assistive devices  - Assist with  transfers and ambulation using safe patient handling equipment as needed  - Ensure adequate protection for wounds/incisions during mobilization  - Obtain PT/OT consults as needed  - Advance activity as appropriate  - Communicate ordered activity level and limitations with patient/family  Outcome: Progressing

## 2024-04-28 LAB
ANION GAP SERPL CALC-SCNC: 4 MMOL/L (ref 0–18)
BASOPHILS # BLD AUTO: 0.06 X10(3) UL (ref 0–0.2)
BASOPHILS NFR BLD AUTO: 0.5 %
BUN BLD-MCNC: 11 MG/DL (ref 9–23)
CALCIUM BLD-MCNC: 9.4 MG/DL (ref 8.5–10.1)
CHLORIDE SERPL-SCNC: 111 MMOL/L (ref 98–112)
CO2 SERPL-SCNC: 26 MMOL/L (ref 21–32)
CREAT BLD-MCNC: 0.81 MG/DL
EGFRCR SERPLBLD CKD-EPI 2021: 77 ML/MIN/1.73M2 (ref 60–?)
EOSINOPHIL # BLD AUTO: 0.16 X10(3) UL (ref 0–0.7)
EOSINOPHIL NFR BLD AUTO: 1.2 %
ERYTHROCYTE [DISTWIDTH] IN BLOOD BY AUTOMATED COUNT: 14.2 %
GLUCOSE BLD-MCNC: 130 MG/DL (ref 70–99)
HCT VFR BLD AUTO: 42.2 %
HGB BLD-MCNC: 13.4 G/DL
IMM GRANULOCYTES # BLD AUTO: 0.12 X10(3) UL (ref 0–1)
IMM GRANULOCYTES NFR BLD: 0.9 %
LYMPHOCYTES # BLD AUTO: 4.2 X10(3) UL (ref 1–4)
LYMPHOCYTES NFR BLD AUTO: 32.1 %
MCH RBC QN AUTO: 28.3 PG (ref 26–34)
MCHC RBC AUTO-ENTMCNC: 31.8 G/DL (ref 31–37)
MCV RBC AUTO: 89.2 FL
MONOCYTES # BLD AUTO: 1.01 X10(3) UL (ref 0.1–1)
MONOCYTES NFR BLD AUTO: 7.7 %
NEUTROPHILS # BLD AUTO: 7.53 X10 (3) UL (ref 1.5–7.7)
NEUTROPHILS # BLD AUTO: 7.53 X10(3) UL (ref 1.5–7.7)
NEUTROPHILS NFR BLD AUTO: 57.6 %
OSMOLALITY SERPL CALC.SUM OF ELEC: 293 MOSM/KG (ref 275–295)
PLATELET # BLD AUTO: 325 10(3)UL (ref 150–450)
POTASSIUM SERPL-SCNC: 4 MMOL/L (ref 3.5–5.1)
RBC # BLD AUTO: 4.73 X10(6)UL
SODIUM SERPL-SCNC: 141 MMOL/L (ref 136–145)
WBC # BLD AUTO: 13.1 X10(3) UL (ref 4–11)

## 2024-04-28 PROCEDURE — 99232 SBSQ HOSP IP/OBS MODERATE 35: CPT | Performed by: INTERNAL MEDICINE

## 2024-04-28 RX ORDER — KETOROLAC TROMETHAMINE 30 MG/ML
15 INJECTION, SOLUTION INTRAMUSCULAR; INTRAVENOUS ONCE
Status: CANCELLED | OUTPATIENT
Start: 2024-04-28 | End: 2024-04-28

## 2024-04-28 RX ORDER — GLYCOPYRROLATE 0.2 MG/ML
0.1 INJECTION, SOLUTION INTRAMUSCULAR; INTRAVENOUS ONCE
Status: DISCONTINUED | OUTPATIENT
Start: 2024-04-28 | End: 2024-05-03

## 2024-04-28 RX ORDER — KETOROLAC TROMETHAMINE 15 MG/ML
15 INJECTION, SOLUTION INTRAMUSCULAR; INTRAVENOUS ONCE
Status: ACTIVE | OUTPATIENT
Start: 2024-04-28 | End: 2024-04-30

## 2024-04-28 RX ORDER — SODIUM CHLORIDE, SODIUM LACTATE, POTASSIUM CHLORIDE, CALCIUM CHLORIDE 600; 310; 30; 20 MG/100ML; MG/100ML; MG/100ML; MG/100ML
INJECTION, SOLUTION INTRAVENOUS CONTINUOUS
Status: DISCONTINUED | OUTPATIENT
Start: 2024-04-28 | End: 2024-04-30

## 2024-04-28 RX ORDER — GLYCOPYRROLATE 0.2 MG/ML
0.1 INJECTION, SOLUTION INTRAMUSCULAR; INTRAVENOUS ONCE
Status: CANCELLED | OUTPATIENT
Start: 2024-04-28 | End: 2024-04-28

## 2024-04-28 RX ORDER — ONDANSETRON 2 MG/ML
4 INJECTION INTRAMUSCULAR; INTRAVENOUS ONCE
Status: DISCONTINUED | OUTPATIENT
Start: 2024-04-28 | End: 2024-05-03

## 2024-04-28 RX ORDER — ONDANSETRON 2 MG/ML
4 INJECTION INTRAMUSCULAR; INTRAVENOUS ONCE
Status: CANCELLED | OUTPATIENT
Start: 2024-04-28 | End: 2024-04-28

## 2024-04-28 RX ORDER — SODIUM CHLORIDE, SODIUM LACTATE, POTASSIUM CHLORIDE, CALCIUM CHLORIDE 600; 310; 30; 20 MG/100ML; MG/100ML; MG/100ML; MG/100ML
INJECTION, SOLUTION INTRAVENOUS CONTINUOUS
Status: CANCELLED | OUTPATIENT
Start: 2024-04-28

## 2024-04-28 NOTE — PLAN OF CARE
Pt alert and oriented x 2-3  Assumd pt care at 0730  RA  Tele- NSR  Cardiac Electrolyte Protocol  Lovenox-VTE  Reg/Gen Diet  Order/set up for meals  Pt up to BR with 1 assist walker  Tylenol for back pain  PIV left FA-saline locked  ECT Monday           Problem: GASTROINTESTINAL - ADULT  Goal: Maintains or returns to baseline bowel function  Description: INTERVENTIONS:  - Assess bowel function  - Maintain adequate hydration with IV or PO as ordered and tolerated  - Evaluate effectiveness of GI medications  - Encourage mobilization and activity  - Obtain nutritional consult as needed  - Establish a toileting routine/schedule  - Consider collaborating with pharmacy to review patient's medication profile  Outcome: Progressing  Goal: Maintains adequate nutritional intake (undernourished)  Description: INTERVENTIONS:  - Monitor percentage of each meal consumed  - Identify factors contributing to decreased intake, treat as appropriate  - Assist with meals as needed  - Monitor I&O, WT and lab values  - Obtain nutritional consult as needed  - Optimize oral hygiene and moisture  - Encourage food from home; allow for food preferences  - Enhance eating environment  Outcome: Progressing     Problem: METABOLIC/FLUID AND ELECTROLYTES - ADULT  Goal: Electrolytes maintained within normal limits  Description: INTERVENTIONS:  - Monitor labs and rhythm and assess patient for signs and symptoms of electrolyte imbalances  - Administer electrolyte replacement as ordered  - Monitor response to electrolyte replacements, including rhythm and repeat lab results as appropriate  - Fluid restriction as ordered  - Instruct patient on fluid and nutrition restrictions as appropriate  Outcome: Progressing     Problem: MUSCULOSKELETAL - ADULT  Goal: Return mobility to safest level of function  Description: INTERVENTIONS:  - Assess patient stability and activity tolerance for standing, transferring and ambulating w/ or w/o assistive devices  -  Assist with transfers and ambulation using safe patient handling equipment as needed  - Ensure adequate protection for wounds/incisions during mobilization  - Obtain PT/OT consults as needed  - Advance activity as appropriate  - Communicate ordered activity level and limitations with patient/family  Outcome: Progressing     Problem: Delirium  Goal: Minimize duration of delirium  Description: Interventions:  - Encourage use of hearing aids, eye glasses  - Promote highest level of mobility daily  - Provide frequent reorientation  - Promote wakefulness i.e. lights on, blinds open  - Promote sleep, encourage patient's normal rest cycle i.e. lights off, TV off, minimize noise and interruptions  - Encourage family to assist in orientation and promotion of home routines  Outcome: Progressing

## 2024-04-28 NOTE — PLAN OF CARE
Pt is A&O x2-3 (forgetful- thinks she is at [facility] and cannot tell me the date). Pleasant. VSS- NSR on tele. RA. Lungs clear. Pt currently denies pain/nausea/SOB. PIV to L forearm is SL. General diet. Order, set. Purewick in place for incontinence. Pt up with 1 assist and walker. SubQ lovenox for VTE proph.   Psych following case.   Plan for NPO tomorrow night at [facility] and ECT treatment on Monday morning.     Problem: GASTROINTESTINAL - ADULT  Goal: Maintains or returns to baseline bowel function  Description: INTERVENTIONS:  - Assess bowel function  - Maintain adequate hydration with IV or PO as ordered and tolerated  - Evaluate effectiveness of GI medications  - Encourage mobilization and activity  - Obtain nutritional consult as needed  - Establish a toileting routine/schedule  - Consider collaborating with pharmacy to review patient's medication profile  Outcome: Progressing  Goal: Maintains adequate nutritional intake (undernourished)  Description: INTERVENTIONS:  - Monitor percentage of each meal consumed  - Identify factors contributing to decreased intake, treat as appropriate  - Assist with meals as needed  - Monitor I&O, WT and lab values  - Obtain nutritional consult as needed  - Optimize oral hygiene and moisture  - Encourage food from home; allow for food preferences  - Enhance eating environment  Outcome: Progressing     Problem: METABOLIC/FLUID AND ELECTROLYTES - ADULT  Goal: Electrolytes maintained within normal limits  Description: INTERVENTIONS:  - Monitor labs and rhythm and assess patient for signs and symptoms of electrolyte imbalances  - Administer electrolyte replacement as ordered  - Monitor response to electrolyte replacements, including rhythm and repeat lab results as appropriate  - Fluid restriction as ordered  - Instruct patient on fluid and nutrition restrictions as appropriate  Outcome: Progressing     Problem: MUSCULOSKELETAL - ADULT  Goal: Return mobility to safest level of  function  Description: INTERVENTIONS:  - Assess patient stability and activity tolerance for standing, transferring and ambulating w/ or w/o assistive devices  - Assist with transfers and ambulation using safe patient handling equipment as needed  - Ensure adequate protection for wounds/incisions during mobilization  - Obtain PT/OT consults as needed  - Advance activity as appropriate  - Communicate ordered activity level and limitations with patient/family  Outcome: Progressing     Problem: NEUROLOGICAL - ADULT  Goal: Achieves stable or improved neurological status  Description: INTERVENTIONS  - Assess for and report changes in neurological status  - Initiate measures to prevent increased intracranial pressure  - Maintain blood pressure and fluid volume within ordered parameters to optimize cerebral perfusion and minimize risk of hemorrhage  - Monitor temperature, glucose, and sodium. Initiate appropriate interventions as ordered  Outcome: Progressing     Problem: SAFETY ADULT - FALL  Goal: Free from fall injury  Description: INTERVENTIONS:  - Assess pt frequently for physical needs  - Identify cognitive and physical deficits and behaviors that affect risk of falls.  - [place] fall precautions as indicated by assessment.  - Educate pt/family on patient safety including physical limitations  - Instruct pt to call for assistance with activity based on assessment  - Modify environment to reduce risk of injury  - Provide assistive devices as appropriate  - Consider OT/PT consult to assist with strengthening/mobility  - Encourage toileting schedule  Outcome: Progressing     Problem: DISCHARGE PLANNING  Goal: Discharge to home or other facility with appropriate resources  Description: INTERVENTIONS:  - Identify barriers to discharge w/pt and caregiver  - Include patient/family/discharge partner in discharge planning  - Arrange for needed discharge resources and transportation as appropriate  - Identify discharge  learning needs (meds, wound care, etc)  - Arrange for interpreters to assist at discharge as needed  - Consider post-discharge preferences of patient/family/discharge partner  - Complete POLST form as appropriate  - Assess patient's ability to be responsible for managing their own health  - Refer to Case Management Department for coordinating discharge planning if the patient needs post-hospital services based on physician/LIP order or complex needs related to functional status, cognitive ability or social support system  Outcome: Progressing     Problem: Delirium  Goal: Minimize duration of delirium  Description: Interventions:  - Encourage use of hearing aids, eye glasses  - Promote highest level of mobility daily  - Provide frequent reorientation  - Promote wakefulness i.e. lights on, blinds open  - Promote sleep, encourage patient's normal rest cycle i.e. lights off, TV off, minimize noise and interruptions  - Encourage family to assist in orientation and promotion of home routines  Outcome: Progressing

## 2024-04-28 NOTE — PHYSICAL THERAPY NOTE
PHYSICAL THERAPY TREATMENT NOTE - INPATIENT    Room Number: 3603/3603-A     Session: 3     Number of Visits to Meet Established Goals: 4    Presenting Problem: fever of unknown origin  Co-Morbidities : serotonin syndrome vs catatonia during last admit per psych    ASSESSMENT   Patient demonstrates fair progress this session, goals  remain in progress.    Patient continues to function below baseline with transfers, gait, and standing prolonged periods.  Contributing factors to remaining limitations include impaired standing balance, impaired coordination, impaired motor planning, and decreased muscular endurance.  Next session anticipate patient to progress bed mobility, transfers, gait, and standing prolonged periods.  Physical Therapy will continue to follow patient for duration of hospitalization.    Patient continues to benefit from continued skilled PT services: to promote return to prior level of function and safety with continuous assistance and gradual rehabilitative therapy .    PLAN  PT Treatment Plan: Gait training;Balance training;Transfer training  Rehab Potential : Good  Frequency (Obs): 3x/week    CURRENT GOALS     Goal #1 Patient is able to demonstrate supine - sit EOB @ level: supervision      Goal #2 Patient is able to demonstrate transfers Sit to/from Stand at assistance level: supervision      Goal #3 Patient is able to ambulate 150 feet with assist device:  walker  at assistance level: supervision      Goal #4     Goal #5     Goal #6     Goal Comments: Goals established on 2024 all goals ongoing  2024 all goals ongoing and updated  SUBJECTIVE  \" I'm glad you came\"     OBJECTIVE  Precautions: Bed/chair alarm    WEIGHT BEARING RESTRICTION                   PAIN ASSESSMENT   Ratin  Location: Pt reports no c/o of pain       BALANCE                                                                                                                       Static Sitting: Fair  +  Dynamic Sitting: Fair           Static Standing: Fair  Dynamic Standing: Poor +    ACTIVITY TOLERANCE                         O2 WALK         AM-PAC '6-Clicks' INPATIENT SHORT FORM - BASIC MOBILITY  How much difficulty does the patient currently have...  Patient Difficulty: Turning over in bed (including adjusting bedclothes, sheets and blankets)?: A Little   Patient Difficulty: Sitting down on and standing up from a chair with arms (e.g., wheelchair, bedside commode, etc.): A Little   Patient Difficulty: Moving from lying on back to sitting on the side of the bed?: A Little   How much help from another person does the patient currently need...   Help from Another: Moving to and from a bed to a chair (including a wheelchair)?: A Little   Help from Another: Need to walk in hospital room?: A Little   Help from Another: Climbing 3-5 steps with a railing?: A Little       AM-PAC Score:  Raw Score: 18   Approx Degree of Impairment: 46.58%   Standardized Score (AM-PAC Scale): 43.63   CMS Modifier (G-Code): CK    FUNCTIONAL ABILITY STATUS  Gait Assessment   Functional Mobility/Gait Assessment  Gait Assistance: Minimum assistance;Contact guard assist  Distance (ft): 140x2  Assistive Device: Rolling walker  Pattern: Shuffle    Skilled Therapy Provided    Bed Mobility:  Rolling: NT   Supine<>Sit: NT   Sit<>Supine: NT     Transfer Mobility:  Sit<>Stand: Giancarlo   Stand<>Sit: CGA   Gait: 140x2 with walker CGA to Giancarlo     Therapist's Comments: Patient demonstrated slow and shuffling gait pattern with intermittent VC and TC to improve sequencing with the walker. Dynamic standing balance impaired requiring assistance during transfers with noted minimal posterior leaning.         Patient End of Session: Up in chair;Needs met;Call light within reach;Alarm set    PT Session Time: 30 minutes  Gait Trainin minutes  Therapeutic Activity: 10 minutes  Therapeutic Exercise:  minutes   Neuromuscular Re-education:  minutes

## 2024-04-28 NOTE — PROGRESS NOTES
[facility]   part of [facility]     Hospitalist Progress Note     Meli Antonio Patient Status:  Inpatient    11/3/1950 MRN [id number]   Location [facility] 4NW-A Attending Juan C Suazo,    Hosp Day # 7 PCP SIRI TABOR     Chief Complaint: Abnormal labs    Subjective:     Is feeling well, no new complaints   Denies any chest pain or shortness of breath.  Denies any headache.  Sitting up in the chair.  Seen with RN at bedside.  Awaiting ECT treatment on Monday per psychiatrist    Objective:    Review of Systems:   Limited but as above     Vital signs:  Temp:  [96.9 °F (36.1 °C)-98.6 °F (37 °C)] 98.6 °F (37 °C)  Pulse:  [] 92  Resp:  [18] 18  BP: ()/(64-93) 118/79  SpO2:  [92 %-98 %] 92 %    Physical Exam:    /79 (BP Location: Right arm)   Pulse 92   Temp 98.6 °F (37 °C) (Oral)   Resp 18   Wt 110 lb 3.7 oz (50 kg)   SpO2 92%   BMI 21.53 kg/m²     General: No acute distress, Awake, confused   Respiratory: No wheezes, no rhonchi  Cardiovascular: S1, S2, regular rate and rhythm  Abdomen: Soft, Non-tender, non-distended, positive bowel sounds  Neuro: No new focal deficits.   Extremities: No edema      Diagnostic Data:    Labs:  Recent Labs   Lab 24  0641 24  0711 24  0551 24  0656   WBC 15.5* 11.5* 14.0* 13.1*   HGB 12.0 12.7 13.5 13.4   MCV 86.8 87.0 87.0 89.2   .0 412.0 348.0 325.0       Recent Labs   Lab 24  0808 24  0711 24  0349 24  0656   * 139*  --  130*   BUN 28* 10  --  11   CREATSERUM 1.13* 0.82  --  0.81   CA 8.7 9.3  --  9.4   * 143  --  141   K 3.9 3.5 4.3 4.0   * 112  --  111   CO2 24.0 26.0  --  26.0       Estimated Creatinine Clearance: 44.4 mL/min (based on SCr of 0.81 mg/dL).    No results for input(s): \"TROP\", \"TROPHS\", \"CK\" in the last 168 hours.    No results for input(s): \"PTP\", \"INR\" in the last 168 hours.               Microbiology    Hospital Encounter on 24   1.  Blood Culture     Status: None    Collection Time: 04/21/24 11:06 AM    Specimen: Blood,peripheral   Result Value Ref Range    Blood Culture Result No Growth 5 Days N/A         Imaging: Reviewed in Epic.    Medications:    ARIPiprazole  7.5 mg Oral Nightly    [START ON 4/29/2024] acetaminophen  1,000 mg Oral Once    lithium carbonate  150 mg Oral BID with meals    enoxaparin  40 mg Subcutaneous Nightly    cholecalciferol  1,000 Units Oral Daily    escitalopram  15 mg Oral Nightly    propranolol  10 mg Oral BID       Assessment & Plan:      # Major depression, generalized anxiety disorder  #Decreased PO intake   -at [facility] prior to this admission  -medical management per Psychiatry   -ECT planned for Monday 4/29 by psychiatrist    #Leukocytosis  -no bacterial infectious etiology identified, hold further antibiotics   -one time fever - monitor for recurrence   -Blood Cultures NGTD  -UA negative on 4/21/2024  -Chest x-ray done on 4/21/2024 with no evidence of active cardiopulmonary disease  -procalcitonin level negative less than 0.05  -White count improving     #Hypernatremia 2/2 free water deficit due to dehydration  -resolved     #SUPA  -resolved with gentle hydration   -No NSAIDs    #Type II DM  #Fasting Hyperglycemia   -not on any meds at home  -A1c is 6.9%     Dr Ben Skinner will follow from tomorrow.    Morenita Das MD      Supplementary Documentation:     Quality:  DVT Mechanical Prophylaxis:     Early ambuation  DVT Pharmacologic Prophylaxis   Medication    enoxaparin (Lovenox) 40 MG/0.4ML SUBQ injection 40 mg                Code Status: Full Code  Parrish: External urinary catheter in place    The 21st Century Cures Act makes medical notes like these available to patients in the interest of transparency. Please be advised this is a medical document. Medical documents are intended to carry relevant information, facts as evident, and the clinical opinion of the practitioner. The medical note is intended as peer to  peer communication and may appear blunt or direct. It is written in medical language and may contain abbreviations or verbiage that are unfamiliar.             **Certification      PHYSICIAN Certification of Need for Inpatient Hospitalization - Initial Certification    Patient will require inpatient services that will reasonably be expected to span two midnight's based on the clinical documentation in H+P.   Based on patients current state of illness, I anticipate that, after discharge, patient will require TBD.

## 2024-04-29 ENCOUNTER — HOSPITAL ENCOUNTER (OUTPATIENT)
Dept: POSTOP/PACU | Facility: HOSPITAL | Age: 74
Discharge: HOME | End: 2024-04-29
Attending: Other
Payer: MEDICARE

## 2024-04-29 ENCOUNTER — ANESTHESIA (OUTPATIENT)
Dept: POSTOP/PACU | Facility: HOSPITAL | Age: 74
End: 2024-04-29
Payer: MEDICARE

## 2024-04-29 ENCOUNTER — ANESTHESIA EVENT (OUTPATIENT)
Dept: POSTOP/PACU | Facility: HOSPITAL | Age: 74
End: 2024-04-29
Payer: MEDICARE

## 2024-04-29 VITALS
DIASTOLIC BLOOD PRESSURE: 74 MMHG | OXYGEN SATURATION: 95 % | SYSTOLIC BLOOD PRESSURE: 138 MMHG | RESPIRATION RATE: 17 BRPM | HEART RATE: 96 BPM | TEMPERATURE: 98 F

## 2024-04-29 DIAGNOSIS — F33.2 MAJOR DEPRESSIVE DISORDER, RECURRENT EPISODE, SEVERE WITH CATATONIA (HCC): ICD-10-CM

## 2024-04-29 DIAGNOSIS — F06.1 MAJOR DEPRESSIVE DISORDER, RECURRENT EPISODE, SEVERE WITH CATATONIA (HCC): ICD-10-CM

## 2024-04-29 LAB — GLUCOSE BLD-MCNC: 176 MG/DL (ref 70–99)

## 2024-04-29 PROCEDURE — 99232 SBSQ HOSP IP/OBS MODERATE 35: CPT | Performed by: HOSPITALIST

## 2024-04-29 PROCEDURE — 99232 SBSQ HOSP IP/OBS MODERATE 35: CPT | Performed by: OTHER

## 2024-04-29 RX ORDER — NALOXONE HYDROCHLORIDE 0.4 MG/ML
80 INJECTION, SOLUTION INTRAMUSCULAR; INTRAVENOUS; SUBCUTANEOUS AS NEEDED
Status: ACTIVE | OUTPATIENT
Start: 2024-04-29 | End: 2024-04-29

## 2024-04-29 RX ORDER — LABETALOL HYDROCHLORIDE 5 MG/ML
5 INJECTION, SOLUTION INTRAVENOUS EVERY 5 MIN PRN
Status: ACTIVE | OUTPATIENT
Start: 2024-04-29 | End: 2024-04-29

## 2024-04-29 RX ORDER — GLYCOPYRROLATE 0.2 MG/ML
0.1 INJECTION, SOLUTION INTRAMUSCULAR; INTRAVENOUS ONCE
Status: COMPLETED | OUTPATIENT
Start: 2024-04-29 | End: 2024-04-29

## 2024-04-29 RX ORDER — KETAMINE HYDROCHLORIDE 50 MG/ML
INJECTION, SOLUTION INTRAMUSCULAR; INTRAVENOUS AS NEEDED
Status: DISCONTINUED | OUTPATIENT
Start: 2024-04-29 | End: 2024-04-29 | Stop reason: SURG

## 2024-04-29 RX ORDER — GLYCOPYRROLATE 0.2 MG/ML
INJECTION, SOLUTION INTRAMUSCULAR; INTRAVENOUS
Status: COMPLETED
Start: 2024-04-29 | End: 2024-04-29

## 2024-04-29 RX ORDER — HYDROMORPHONE HYDROCHLORIDE 1 MG/ML
0.6 INJECTION, SOLUTION INTRAMUSCULAR; INTRAVENOUS; SUBCUTANEOUS EVERY 5 MIN PRN
Status: ACTIVE | OUTPATIENT
Start: 2024-04-29 | End: 2024-04-29

## 2024-04-29 RX ORDER — LITHIUM CARBONATE 300 MG/1
300 CAPSULE ORAL NIGHTLY
Status: DISCONTINUED | OUTPATIENT
Start: 2024-04-29 | End: 2024-05-03

## 2024-04-29 RX ORDER — ACETAMINOPHEN 500 MG
1000 TABLET ORAL ONCE AS NEEDED
Status: ACTIVE | OUTPATIENT
Start: 2024-04-29 | End: 2024-04-29

## 2024-04-29 RX ORDER — HYDROMORPHONE HYDROCHLORIDE 1 MG/ML
0.4 INJECTION, SOLUTION INTRAMUSCULAR; INTRAVENOUS; SUBCUTANEOUS EVERY 5 MIN PRN
Status: ACTIVE | OUTPATIENT
Start: 2024-04-29 | End: 2024-04-29

## 2024-04-29 RX ORDER — ONDANSETRON 2 MG/ML
4 INJECTION INTRAMUSCULAR; INTRAVENOUS ONCE
Status: COMPLETED | OUTPATIENT
Start: 2024-04-29 | End: 2024-04-29

## 2024-04-29 RX ORDER — ETOMIDATE 2 MG/ML
INJECTION INTRAVENOUS AS NEEDED
Status: DISCONTINUED | OUTPATIENT
Start: 2024-04-29 | End: 2024-04-29 | Stop reason: SURG

## 2024-04-29 RX ORDER — SODIUM CHLORIDE, SODIUM LACTATE, POTASSIUM CHLORIDE, CALCIUM CHLORIDE 600; 310; 30; 20 MG/100ML; MG/100ML; MG/100ML; MG/100ML
INJECTION, SOLUTION INTRAVENOUS CONTINUOUS PRN
Status: DISCONTINUED | OUTPATIENT
Start: 2024-04-29 | End: 2024-04-29 | Stop reason: SURG

## 2024-04-29 RX ORDER — KETOROLAC TROMETHAMINE 30 MG/ML
15 INJECTION, SOLUTION INTRAMUSCULAR; INTRAVENOUS ONCE
Status: COMPLETED | OUTPATIENT
Start: 2024-04-29 | End: 2024-04-29

## 2024-04-29 RX ORDER — ONDANSETRON 2 MG/ML
INJECTION INTRAMUSCULAR; INTRAVENOUS
Status: COMPLETED
Start: 2024-04-29 | End: 2024-04-29

## 2024-04-29 RX ORDER — MIDAZOLAM HYDROCHLORIDE 1 MG/ML
1 INJECTION INTRAMUSCULAR; INTRAVENOUS EVERY 5 MIN PRN
Status: ACTIVE | OUTPATIENT
Start: 2024-04-29 | End: 2024-04-29

## 2024-04-29 RX ORDER — PREGABALIN 50 MG/1
50 CAPSULE ORAL 3 TIMES DAILY
Status: DISCONTINUED | OUTPATIENT
Start: 2024-04-29 | End: 2024-05-03

## 2024-04-29 RX ORDER — HYDROMORPHONE HYDROCHLORIDE 1 MG/ML
0.2 INJECTION, SOLUTION INTRAMUSCULAR; INTRAVENOUS; SUBCUTANEOUS EVERY 5 MIN PRN
Status: ACTIVE | OUTPATIENT
Start: 2024-04-29 | End: 2024-04-29

## 2024-04-29 RX ORDER — DEXTROSE MONOHYDRATE 25 G/50ML
50 INJECTION, SOLUTION INTRAVENOUS
Status: DISCONTINUED | OUTPATIENT
Start: 2024-04-29 | End: 2024-05-01

## 2024-04-29 RX ORDER — NICOTINE POLACRILEX 4 MG
30 LOZENGE BUCCAL
Status: DISCONTINUED | OUTPATIENT
Start: 2024-04-29 | End: 2024-05-01

## 2024-04-29 RX ORDER — SODIUM CHLORIDE, SODIUM LACTATE, POTASSIUM CHLORIDE, CALCIUM CHLORIDE 600; 310; 30; 20 MG/100ML; MG/100ML; MG/100ML; MG/100ML
INJECTION, SOLUTION INTRAVENOUS CONTINUOUS
Status: DISCONTINUED | OUTPATIENT
Start: 2024-04-29 | End: 2024-05-01

## 2024-04-29 RX ORDER — KETOROLAC TROMETHAMINE 30 MG/ML
INJECTION, SOLUTION INTRAMUSCULAR; INTRAVENOUS
Status: COMPLETED
Start: 2024-04-29 | End: 2024-04-29

## 2024-04-29 RX ORDER — NICOTINE POLACRILEX 4 MG
15 LOZENGE BUCCAL
Status: DISCONTINUED | OUTPATIENT
Start: 2024-04-29 | End: 2024-05-01

## 2024-04-29 RX ADMIN — KETOROLAC TROMETHAMINE 15 MG: 30 INJECTION, SOLUTION INTRAMUSCULAR; INTRAVENOUS at 06:33:00

## 2024-04-29 RX ADMIN — SODIUM CHLORIDE, SODIUM LACTATE, POTASSIUM CHLORIDE, CALCIUM CHLORIDE: 600; 310; 30; 20 INJECTION, SOLUTION INTRAVENOUS at 06:34:00

## 2024-04-29 RX ADMIN — ONDANSETRON 4 MG: 2 INJECTION INTRAMUSCULAR; INTRAVENOUS at 06:33:00

## 2024-04-29 RX ADMIN — GLYCOPYRROLATE 0.1 MG: 0.2 INJECTION, SOLUTION INTRAMUSCULAR; INTRAVENOUS at 06:33:00

## 2024-04-29 RX ADMIN — KETAMINE HYDROCHLORIDE 25 MG: 50 INJECTION, SOLUTION INTRAMUSCULAR; INTRAVENOUS at 07:00:00

## 2024-04-29 RX ADMIN — SODIUM CHLORIDE, SODIUM LACTATE, POTASSIUM CHLORIDE, CALCIUM CHLORIDE: 600; 310; 30; 20 INJECTION, SOLUTION INTRAVENOUS at 06:45:00

## 2024-04-29 RX ADMIN — ETOMIDATE 10 MG: 2 INJECTION INTRAVENOUS at 07:00:00

## 2024-04-29 NOTE — PLAN OF CARE
Assumed care of patient at 0700  ECT this AM, tolerated well  Lyrica and Lithium reordered  PRN Tylenol for lower back pain  Plan for repeat ECT on Wednesday  Strict documentation on patient's intake, currently tolerating +50% of her diet   at the bedside, updated on POC  Patient currently resting in bed, call light within reach        Problem: GASTROINTESTINAL - ADULT  Goal: Maintains or returns to baseline bowel function  Description: INTERVENTIONS:  - Assess bowel function  - Maintain adequate hydration with IV or PO as ordered and tolerated  - Evaluate effectiveness of GI medications  - Encourage mobilization and activity  - Obtain nutritional consult as needed  - Establish a toileting routine/schedule  - Consider collaborating with pharmacy to review patient's medication profile  Outcome: Progressing  Goal: Maintains adequate nutritional intake (undernourished)  Description: INTERVENTIONS:  - Monitor percentage of each meal consumed  - Identify factors contributing to decreased intake, treat as appropriate  - Assist with meals as needed  - Monitor I&O, WT and lab values  - Obtain nutritional consult as needed  - Optimize oral hygiene and moisture  - Encourage food from home; allow for food preferences  - Enhance eating environment  Outcome: Progressing     Problem: METABOLIC/FLUID AND ELECTROLYTES - ADULT  Goal: Electrolytes maintained within normal limits  Description: INTERVENTIONS:  - Monitor labs and rhythm and assess patient for signs and symptoms of electrolyte imbalances  - Administer electrolyte replacement as ordered  - Monitor response to electrolyte replacements, including rhythm and repeat lab results as appropriate  - Fluid restriction as ordered  - Instruct patient on fluid and nutrition restrictions as appropriate  Outcome: Progressing     Problem: MUSCULOSKELETAL - ADULT  Goal: Return mobility to safest level of function  Description: INTERVENTIONS:  - Assess patient stability and  Patient made aware of 24/7 emergency services. activity tolerance for standing, transferring and ambulating w/ or w/o assistive devices  - Assist with transfers and ambulation using safe patient handling equipment as needed  - Ensure adequate protection for wounds/incisions during mobilization  - Obtain PT/OT consults as needed  - Advance activity as appropriate  - Communicate ordered activity level and limitations with patient/family  Outcome: Progressing     Problem: NEUROLOGICAL - ADULT  Goal: Achieves stable or improved neurological status  Description: INTERVENTIONS  - Assess for and report changes in neurological status  - Initiate measures to prevent increased intracranial pressure  - Maintain blood pressure and fluid volume within ordered parameters to optimize cerebral perfusion and minimize risk of hemorrhage  - Monitor temperature, glucose, and sodium. Initiate appropriate interventions as ordered  Outcome: Progressing     Problem: SAFETY ADULT - FALL  Goal: Free from fall injury  Description: INTERVENTIONS:  - Assess pt frequently for physical needs  - Identify cognitive and physical deficits and behaviors that affect risk of falls.  - [place] fall precautions as indicated by assessment.  - Educate pt/family on patient safety including physical limitations  - Instruct pt to call for assistance with activity based on assessment  - Modify environment to reduce risk of injury  - Provide assistive devices as appropriate  - Consider OT/PT consult to assist with strengthening/mobility  - Encourage toileting schedule  Outcome: Progressing     Problem: DISCHARGE PLANNING  Goal: Discharge to home or other facility with appropriate resources  Description: INTERVENTIONS:  - Identify barriers to discharge w/pt and caregiver  - Include patient/family/discharge partner in discharge planning  - Arrange for needed discharge resources and transportation as appropriate  - Identify discharge learning needs (meds, wound care, etc)  - Arrange for interpreters to assist  at discharge as needed  - Consider post-discharge preferences of patient/family/discharge partner  - Complete POLST form as appropriate  - Assess patient's ability to be responsible for managing their own health  - Refer to Case Management Department for coordinating discharge planning if the patient needs post-hospital services based on physician/LIP order or complex needs related to functional status, cognitive ability or social support system  Outcome: Progressing     Problem: Delirium  Goal: Minimize duration of delirium  Description: Interventions:  - Encourage use of hearing aids, eye glasses  - Promote highest level of mobility daily  - Provide frequent reorientation  - Promote wakefulness i.e. lights on, blinds open  - Promote sleep, encourage patient's normal rest cycle i.e. lights off, TV off, minimize noise and interruptions  - Encourage family to assist in orientation and promotion of home routines  Outcome: Progressing

## 2024-04-29 NOTE — PROGRESS NOTES
[employer] [facility] / [facility]  ECT History & Physical    Meli Antonio Patient Status:  Outpatient   Age/Gender 73 year old female MRN [id number]   Location [facility] POST ANESTHESIA CARE UNIT Attending Agapito Romero MD   Hosp Day # 0 PCP SIRI TABOR     Date of Service: 2024    Diagnosis:  Major Depression Recurrent Severe Without Psychotic Features. F33.2    Procedure:  Bifrontal    HPI: Patient remains depressed and anxious but more clear.  No physical complaints      Medical History:  Past Medical History:    Anxiety state    Back problem    COMPRESSION FX    Cataract    Depression    Esophageal reflux    GERD (gastroesophageal reflux disease)    Hematoma and contusion of liver    STATES HAS BEEN THERE FOR MANY YEARS    History of fractured kneecap    RIGHT    IBS (irritable bowel syndrome)    Mitral prolapse    Osteoarthritis       Surgical History:  Past Surgical History:   Procedure Laterality Date          Correct bunion,othr methods      Correct bunion,simple      BILAT.     Dilation/curettage,diagnostic      FOR \"MOLE PREGNANCY\"    Other Right     ORIF RIGHT ANKLE    Spine surgery procedure unlisted      cervical spine 2020    Total knee replacement         Family History:  Family History   Problem Relation Age of Onset    Heart Disorder Father     Cancer Mother         ESOPHAGUS       Social History:  Social History     Socioeconomic History    Marital status:      Spouse name: Not on file    Number of children: Not on file    Years of education: Not on file    Highest education level: Not on file   Occupational History    Not on file   Tobacco Use    Smoking status: Former     Current packs/day: 2.00     Average packs/day: 2.0 packs/day for 15.0 years (30.0 ttl pk-yrs)     Types: Cigarettes    Smokeless tobacco: Never    Tobacco comments:     QUIT IN    Vaping Use    Vaping status: Never Used   Substance and Sexual Activity    Alcohol use: No    Drug use:  No    Sexual activity: Not on file   Other Topics Concern    Not on file   Social History Narrative    Not on file     Social Determinants of Health     Financial Resource Strain: Low Risk  (4/20/2024)    Financial Resource Strain     Difficulty of Paying Living Expenses: Not on file     Med Affordability: No   Food Insecurity: No Food Insecurity (4/21/2024)    Food Insecurity     Food Insecurity: Never true   Transportation Needs: No Transportation Needs (4/21/2024)    Transportation Needs     Lack of Transportation: No   Physical Activity: Not on file   Stress: Not on file   Social Connections: Unknown (3/13/2021)    Received [facility]    Social Connections     Conversations with friends/family/neighbors per week: Not on file   Housing Stability: Low Risk  (4/21/2024)    Housing Stability     Housing Instability: No     Housing Instability Emergency: Not on file       ROS:  unremarkable    Physical Exam:   There were no vitals taken for this visit.    General Appearance:    Alert, cooperative, no distress, appears stated age   Head:    Normocephalic, without obvious abnormality, atraumatic   Eyes:    PERRL, conjunctiva/corneas clear, EOM's intact   Nose:   Nares normal, septum midline, mucosa normal, no drainage    or sinus tenderness   Throat:   Lips, mucosa, and tongue normal; teeth and gums normal   Neck:   Supple, symmetrical, trachea midline   Lungs:     Clear to auscultation bilaterally, respirations unlabored    Heart:    Regular rate and rhythm, S1 and S2 normal, no murmur, rub or gallop   Abdomen:     Soft, non-tender, bowel sounds active all four quadrants,     no masses, no organomegaly   Extremities:   Extremities normal, atraumatic, no cyanosis or edema   Pulses:   2+ and symmetric all extremities   Skin:   Skin color, texture, turgor normal, no rashes or lesions   Neurologic:   CNII-XII intact, normal strength, sensation and reflexes      throughout     Impressions & Plans:    Diagnosis:  Major Depression Recurrent Severe Without Psychotic Features. F33.2    Procedure:  Bifrontal    I have discussed the risks and benefits and alternatives with the patient/family.  They understand and agree to proceed with plan of care.    Meli Adames MD  4/29/2024

## 2024-04-29 NOTE — ANESTHESIA PREPROCEDURE EVALUATION
PRE-OP EVALUATION    Patient Name: Meli Antonio    Admit Diagnosis: Major depressive disorder, recurrent episode, severe with catatonia (HCC) [F33.2, F06.1]    Pre-op Diagnosis: * No surgery found *        Anesthesia Procedure: ECT(BEDSIDE)    * Surgery not found *    Pre-op vitals reviewed.  Temp: 97.6 °F (36.4 °C)  Pulse: 91  Resp: 18  BP: 128/90  SpO2: 98 %  There is no height or weight on file to calculate BMI.    Current medications reviewed.  Hospital Medications:   lactated ringers infusion   Intravenous Continuous    [COMPLETED] glycopyrrolate (Robinul) 0.2 MG/ML injection 0.1 mg  0.1 mg Intravenous Once    [COMPLETED] ketorolac (Toradol) 30 MG/ML injection 15 mg  15 mg Intravenous Once    [COMPLETED] ondansetron (Zofran) 4 MG/2ML injection 4 mg  4 mg Intravenous Once    lactated ringers infusion   Intravenous Continuous    glycopyrrolate (Robinul) 0.2 MG/ML injection 0.1 mg  0.1 mg Intravenous Once    ketorolac (Toradol) 15 MG/ML injection 15 mg  15 mg Intravenous Once    ondansetron (Zofran) 4 MG/2ML injection 4 mg  4 mg Intravenous Once    ARIPiprazole (Abilify) tab 7.5 mg  7.5 mg Oral Nightly    [COMPLETED] acetaminophen (Tylenol Extra Strength) tab 1,000 mg  1,000 mg Oral Once    enoxaparin (Lovenox) 40 MG/0.4ML SUBQ injection 40 mg  40 mg Subcutaneous Nightly    cholecalciferol (Vitamin D3) tab 1,000 Units  1,000 Units Oral Daily    escitalopram (Lexapro) tab 15 mg  15 mg Oral Nightly    propranolol (Inderal) tab 10 mg  10 mg Oral BID    acetaminophen (Tylenol Extra Strength) tab 1,000 mg  1,000 mg Oral Q8H PRN    melatonin tab 3 mg  3 mg Oral Nightly PRN    ondansetron (Zofran) 4 MG/2ML injection 4 mg  4 mg Intravenous Q6H PRN    polyethylene glycol (PEG 3350) (Miralax) 17 g oral packet 17 g  17 g Oral Daily PRN    sennosides (Senokot) tab 17.2 mg  17.2 mg Oral Nightly PRN    bisacodyl (Dulcolax) 10 MG rectal suppository 10 mg  10 mg Rectal Daily PRN    benzonatate (Tessalon) cap 200 mg  200 mg  Oral TID PRN    guaiFENesin (Robitussin) 100 MG/5 ML oral liquid 200 mg  200 mg Oral Q4H PRN    glycerin-hypromellose- (Artificial Tears) 0.2-0.2-1 % ophthalmic solution 1 drop  1 drop Both Eyes QID PRN    sodium chloride (Saline Mist) 0.65 % nasal solution 1 spray  1 spray Each Nare Q3H PRN    albuterol (Ventolin HFA) 108 (90 Base) MCG/ACT inhaler 2 puff  2 puff Inhalation Q4H PRN    magnesium hydroxide (Milk of Magnesia) 400 MG/5ML oral suspension 30 mL  30 mL Oral Nightly PRN    alum-mag hydroxide-simethicone (Maalox) 200-200-20 MG/5ML oral suspension 30 mL  30 mL Oral Q4H PRN    acetaminophen (Tylenol) tab 325 mg  325 mg Oral Q4H PRN    Or    acetaminophen (Tylenol) tab 650 mg  650 mg Oral Q4H PRN    traZODone (Desyrel) tab 50 mg  50 mg Oral Nightly PRN       Outpatient Medications:   (Not in a hospital admission)      Allergies: Radiology contrast iodinated dyes, Sulfa antibiotics, Seroquel [quetiapine], and Cinnamon      Anesthesia Evaluation    Patient summary reviewed.    Anesthetic Complications  (-) history of anesthetic complications         GI/Hepatic/Renal      (+) GERD                      (+) irritable bowel syndrome     Cardiovascular    Negative cardiovascular ROS.    Exercise tolerance: good     MET: >4                                           Endo/Other      (+) diabetes and well controlled, type 2, not using insulin                         Pulmonary    Negative pulmonary ROS.                       Neuro/Psych      (+) depression  (+) anxiety                              Past Surgical History:   Procedure Laterality Date          Correct bunion,othr methods      Correct bunion,simple      BILAT.     Dilation/curettage,diagnostic      FOR \"MOLE PREGNANCY\"    Other Right     ORIF RIGHT ANKLE    Spine surgery procedure unlisted      cervical spine 2020    Total knee replacement       Social History     Socioeconomic History    Marital status:    Tobacco Use    Smoking  status: Former     Current packs/day: 2.00     Average packs/day: 2.0 packs/day for 15.0 years (30.0 ttl pk-yrs)     Types: Cigarettes    Smokeless tobacco: Never    Tobacco comments:     QUIT IN 1983   Vaping Use    Vaping status: Never Used   Substance and Sexual Activity    Alcohol use: No    Drug use: No     History   Drug Use No     Available pre-op labs reviewed.  Lab Results   Component Value Date    WBC 13.1 (H) 04/28/2024    RBC 4.73 04/28/2024    HGB 13.4 04/28/2024    HCT 42.2 04/28/2024    MCV 89.2 04/28/2024    MCH 28.3 04/28/2024    MCHC 31.8 04/28/2024    RDW 14.2 04/28/2024    .0 04/28/2024     Lab Results   Component Value Date     04/28/2024    K 4.0 04/28/2024     04/28/2024    CO2 26.0 04/28/2024    BUN 11 04/28/2024    CREATSERUM 0.81 04/28/2024     (H) 04/28/2024    CA 9.4 04/28/2024            Airway      Mallampati: II  Mouth opening: >3 FB  TM distance: > 6 cm  Neck ROM: full Cardiovascular    Cardiovascular exam normal.  Rhythm: regular  Rate: normal     Dental    Dentition appears grossly intact         Pulmonary    Pulmonary exam normal.  Breath sounds clear to auscultation bilaterally.               Other findings              ASA: 2   Plan: general  NPO status verified and patient meets guidelines.  Patient has not taken beta blockers in last 24 hours.  Post-procedure pain management plan discussed with surgeon and patient.      Plan/risks discussed with: patient and spouse                Present on Admission:  **None**

## 2024-04-29 NOTE — ANESTHESIA POSTPROCEDURE EVALUATION
[facility]    Meli Antonio Patient Status:  Outpatient   Age/Gender 73 year old female MRN [id number]   Location [facility] POST ANESTHESIA CARE UNIT Attending Agapito Romero MD   Hosp Day # 0 PCP SIRI TABOR       Anesthesia Post-op Note        Procedure Summary       Date: 04/29/24 Room / Location: [facility] Post Anesthesia Care Unit    Anesthesia Start: 0645 Anesthesia Stop: 0706    Procedure: ECT(BEDSIDE) Diagnosis: Major depressive disorder, recurrent episode, severe with catatonia (HCC)    Scheduled Providers:  Anesthesiologist: Ulisses Acharya MD    Anesthesia Type: general ASA Status: 2            Anesthesia Type: general    Vitals Value Taken Time   BP 1378/76 04/29/24 0707   Temp 98.0 04/29/24 0707   Pulse 75 04/29/24 0707   Resp 16 04/29/24 0707   SpO2 99% 04/29/24 0707       Patient Location: PACU    Anesthesia Type: general    Airway Patency: patent    Postop Pain Control: adequate    Mental Status: mildly sedated but able to meaningfully participate in the post-anesthesia evaluation    Nausea/Vomiting: none    Cardiopulmonary/Hydration status: stable euvolemic    Complications: no apparent anesthesia related complications    Postop vital signs: stable    Dental Exam: Unchanged from Preop    Patient to be discharged from PACU when criteria met.

## 2024-04-29 NOTE — PLAN OF CARE
Assumed care at 1930. A&O X2-3, RA, VSS, NSR on tele. Cardiac electrolyte replacement protocol. Lab draw. PIV in L forearm flushed and capped. Dressing CDI. LR 25 mL/hr. Lovenox for VTE. Regular diet, NPO midnight for ECT in AM. Incontinent, purewick in place. Up 1 assist with walker. Denies pain. PRN melatonin- see MAR.     Problem: GASTROINTESTINAL - ADULT  Goal: Maintains or returns to baseline bowel function  Description: INTERVENTIONS:  - Assess bowel function  - Maintain adequate hydration with IV or PO as ordered and tolerated  - Evaluate effectiveness of GI medications  - Encourage mobilization and activity  - Obtain nutritional consult as needed  - Establish a toileting routine/schedule  - Consider collaborating with pharmacy to review patient's medication profile  Outcome: Progressing  Goal: Maintains adequate nutritional intake (undernourished)  Description: INTERVENTIONS:  - Monitor percentage of each meal consumed  - Identify factors contributing to decreased intake, treat as appropriate  - Assist with meals as needed  - Monitor I&O, WT and lab values  - Obtain nutritional consult as needed  - Optimize oral hygiene and moisture  - Encourage food from home; allow for food preferences  - Enhance eating environment  Outcome: Progressing     Problem: METABOLIC/FLUID AND ELECTROLYTES - ADULT  Goal: Electrolytes maintained within normal limits  Description: INTERVENTIONS:  - Monitor labs and rhythm and assess patient for signs and symptoms of electrolyte imbalances  - Administer electrolyte replacement as ordered  - Monitor response to electrolyte replacements, including rhythm and repeat lab results as appropriate  - Fluid restriction as ordered  - Instruct patient on fluid and nutrition restrictions as appropriate  Outcome: Progressing     Problem: MUSCULOSKELETAL - ADULT  Goal: Return mobility to safest level of function  Description: INTERVENTIONS:  - Assess patient stability and activity tolerance for  standing, transferring and ambulating w/ or w/o assistive devices  - Assist with transfers and ambulation using safe patient handling equipment as needed  - Ensure adequate protection for wounds/incisions during mobilization  - Obtain PT/OT consults as needed  - Advance activity as appropriate  - Communicate ordered activity level and limitations with patient/family  Outcome: Progressing     Problem: NEUROLOGICAL - ADULT  Goal: Achieves stable or improved neurological status  Description: INTERVENTIONS  - Assess for and report changes in neurological status  - Initiate measures to prevent increased intracranial pressure  - Maintain blood pressure and fluid volume within ordered parameters to optimize cerebral perfusion and minimize risk of hemorrhage  - Monitor temperature, glucose, and sodium. Initiate appropriate interventions as ordered  Outcome: Progressing     Problem: SAFETY ADULT - FALL  Goal: Free from fall injury  Description: INTERVENTIONS:  - Assess pt frequently for physical needs  - Identify cognitive and physical deficits and behaviors that affect risk of falls.  - [place] fall precautions as indicated by assessment.  - Educate pt/family on patient safety including physical limitations  - Instruct pt to call for assistance with activity based on assessment  - Modify environment to reduce risk of injury  - Provide assistive devices as appropriate  - Consider OT/PT consult to assist with strengthening/mobility  - Encourage toileting schedule  Outcome: Progressing     Problem: DISCHARGE PLANNING  Goal: Discharge to home or other facility with appropriate resources  Description: INTERVENTIONS:  - Identify barriers to discharge w/pt and caregiver  - Include patient/family/discharge partner in discharge planning  - Arrange for needed discharge resources and transportation as appropriate  - Identify discharge learning needs (meds, wound care, etc)  - Arrange for interpreters to assist at discharge as  needed  - Consider post-discharge preferences of patient/family/discharge partner  - Complete POLST form as appropriate  - Assess patient's ability to be responsible for managing their own health  - Refer to Case Management Department for coordinating discharge planning if the patient needs post-hospital services based on physician/LIP order or complex needs related to functional status, cognitive ability or social support system  Outcome: Progressing     Problem: Delirium  Goal: Minimize duration of delirium  Description: Interventions:  - Encourage use of hearing aids, eye glasses  - Promote highest level of mobility daily  - Provide frequent reorientation  - Promote wakefulness i.e. lights on, blinds open  - Promote sleep, encourage patient's normal rest cycle i.e. lights off, TV off, minimize noise and interruptions  - Encourage family to assist in orientation and promotion of home routines  Outcome: Progressing

## 2024-04-29 NOTE — PROGRESS NOTES
[facility]   part of [facility]     Hospitalist Progress Note     Meli Antonio Patient Status:  Inpatient    11/3/1950 MRN [id number]   Location [facility] 4NW-A Attending Juan C Suazo,    Hosp Day # 8 PCP SIRI TABOR     Chief Complaint: Abnormal labs    Subjective:     Sp ECT  Doing well. No complaints.     Objective:    Review of Systems:   Limited but as above     Vital signs:  Temp:  [97.6 °F (36.4 °C)-98.6 °F (37 °C)] 98 °F (36.7 °C)  Pulse:  [] 79  Resp:  [16-28] 18  BP: (114-156)/(69-96) 149/87  SpO2:  [92 %-99 %] 98 %    Physical Exam:    /87 (BP Location: Right arm)   Pulse 79   Temp 98 °F (36.7 °C) (Oral)   Resp 18   Wt 110 lb 3.7 oz (50 kg)   SpO2 98%   BMI 21.53 kg/m²     General: No acute distress, Awake, confused   Respiratory: No wheezes, no rhonchi  Cardiovascular: S1, S2, regular rate and rhythm  Abdomen: Soft, Non-tender, non-distended, positive bowel sounds  Neuro: No new focal deficits.   Extremities: No edema      Diagnostic Data:    Labs:  Recent Labs   Lab 24  0711 24  0551 24  0656   WBC 11.5* 14.0* 13.1*   HGB 12.7 13.5 13.4   MCV 87.0 87.0 89.2   .0 348.0 325.0       Recent Labs   Lab 24  0711 24  0349 24  0656   *  --  130*   BUN 10  --  11   CREATSERUM 0.82  --  0.81   CA 9.3  --  9.4     --  141   K 3.5 4.3 4.0     --  111   CO2 26.0  --  26.0       Estimated Creatinine Clearance: 44.4 mL/min (based on SCr of 0.81 mg/dL).    No results for input(s): \"TROP\", \"TROPHS\", \"CK\" in the last 168 hours.    No results for input(s): \"PTP\", \"INR\" in the last 168 hours.               Microbiology    Hospital Encounter on 24   1. Blood Culture     Status: None    Collection Time: 24 11:06 AM    Specimen: Blood,peripheral   Result Value Ref Range    Blood Culture Result No Growth 5 Days N/A         Imaging: Reviewed in Epic.    Medications:    glycopyrrolate  0.1 mg Intravenous Once     ketorolac  15 mg Intravenous Once    ondansetron  4 mg Intravenous Once    ARIPiprazole  7.5 mg Oral Nightly    enoxaparin  40 mg Subcutaneous Nightly    cholecalciferol  1,000 Units Oral Daily    escitalopram  15 mg Oral Nightly    propranolol  10 mg Oral BID       Assessment & Plan:      # Major depression, generalized anxiety disorder  #Decreased PO intake   -at [facility] prior to this admission  -medical management per Psychiatry   -sp ect today - await psych eval     #Leukocytosis  -no bacterial infectious etiology identified, hold further antibiotics   -one time low grade fever - monitor for recurrence   -Blood Cultures NGTD  -UA negative on 4/21/2024  -Chest x-ray done on 4/21/2024 with no evidence of active cardiopulmonary disease  -procalcitonin level negative less than 0.05  -White count improving. Completely asymptomatic. Dw pt to notify if any symptoms occur     #Hypernatremia 2/2 free water deficit due to dehydration  -resolved     #SUPA  -resolved with gentle hydration   -No NSAIDs    #Type II DM  #Fasting Hyperglycemia   -not on any meds at home  -A1c is 6.9%     Dr Ben Skinner     Supplementary Documentation:     Quality:  DVT Mechanical Prophylaxis:     Early ambuation  DVT Pharmacologic Prophylaxis   Medication    enoxaparin (Lovenox) 40 MG/0.4ML SUBQ injection 40 mg                Code Status: Full Code  [place]: External urinary catheter in place    The 21st Century Cures Act makes medical notes like these available to patients in the interest of transparency. Please be advised this is a medical document. Medical documents are intended to carry relevant information, facts as evident, and the clinical opinion of the practitioner. The medical note is intended as peer to peer communication and may appear blunt or direct. It is written in medical language and may contain abbreviations or verbiage that are unfamiliar.             **Certification      PHYSICIAN Certification of Need for Inpatient  Hospitalization - Initial Certification    Patient will require inpatient services that will reasonably be expected to span two midnight's based on the clinical documentation in H+P.   Based on patients current state of illness, I anticipate that, after discharge, patient will require TBD.

## 2024-04-29 NOTE — PROGRESS NOTES
[facility]  ECT Procedure Note    Meli Antonio Patient Status:  Outpatient   Age/Gender 73 year old female   MRN [id number]    Location [facility] POST ANESTHESIA CARE UNIT Attending No att. providers found   Hosp Day # 0 PCP SIRI TABOR     ECT Number: #1    Diagnosis: Major Depression Recurrent Severe Without Psychotic Features. F33.2    Type of ECT:  Bifrontal    Place of Service:  Inpatient    Settings:   1.  Energy Percentage: 60%    2.  Program:  Low 0.5    Pre-ECT Evaluation    Symptoms:      Prior to procedure, reviewed with treatment team correct patient, time of procedure and type of ECT.  Also reviewed with anesthesia pre-ECT medications    Patient is a 73-year-old female with a history of major depressive disorder and anxiety who was initially admitted to the med floor for delirium due to multiple causes, including psychotropic medication related.  Patient's delirium has been resolving but is not fully resolved and her capacity appears to wax and wane.  Patient's  is her POA.  Although patient is more cognitively clear today, patient remains depressed with poor sleep and appetite and function.  She denies suicidal thoughts.  She is agreeable to ECT though her  has signed consent as POA due to her waxing and waning mental status and capacity    Risk/Benefits:  Dischssed with POA/guardian/family risk/benefits/alternatives of ECT    Side Effects:  N/A    Exam:  Mood: depressed  Affect: Congruent  Memory:  impaired immediate, impaired recent, impaired remote, and as evidenced by difficulty presenting a consistent history  Concentration:   fair but appears better than last week  Suicidal ideation: no suicidal ideation    Patient Monitored:  B/P, EKG, EEG, Pulse Ox, Left Ankle Cuff    Pre-ECT Medications: Robinul 0.1 mg IV, Toradol 15 mg IV, Zofran 4 mg IV    ECT Medications:  Anesthetic  Etomidate 10 mg IV and Ketamine 25 mg IV and Succinylcholine 60 mg  IV      Seizure Duration:  Motor: 29 seconds       EE seconds    Post-ECT Condition:  Treatment unremarkable    ECT Medications:  None     Meli Adames    2024

## 2024-04-29 NOTE — DISCHARGE INSTRUCTIONS
Discharge Instructions  Electroconvulsive Therapy    Activities:  You MUST arrange to have a responsible adult drive you home and have a responsible adult stay with you the rest of the day and overnight.  Do not drive today.  Do not operate any machinery today. Use kitchen equipment with caution.  Rest and take it easy today.  Do not take public transportation without the presence of another responsible adult for 24 hours    Medications:  Resume your regular medications when you get home  The nurse will instruct you not to take any NSAIDS (Advil, Aleve, Motrin, Ibuprofen) before 1 pm today because you were given a certain medication during the procedure.    Diet:  You may resume your regular diet when you get home  Do not drink alcohol for 24 hours    Additional Instructions:  Someone should call you to schedule any upcoming ECT treatments. Call as needed to schedule or cancel your ECT appointments [phone].  If you have any questions or concerns, please call your own psychiatrist.  For your safety, please do not wear make-up to any future ECT appointments.  For any questions regarding your ECT appointment, please call [facility] [phone].  For cancellations after hours, call [phone] and leave a message.    Expected Recovery:  As you awaken, you may experience one or more of the following:  Headache, nausea, temporary confusion, or muscle stiffness.  If these symptoms increase, become severe or are accompanied by a fever of more than 101, please seek medical attention.  The ECT may affect memory.  Many patients report loss of memory for events that occurred in the days, weeks or months surrounding the ECT.  Many of these memories may return, but not always.  Short-term memory may also be affected for months, but this can also be a result of the disorder that you have.

## 2024-04-30 PROCEDURE — 99232 SBSQ HOSP IP/OBS MODERATE 35: CPT | Performed by: OTHER

## 2024-04-30 PROCEDURE — 99232 SBSQ HOSP IP/OBS MODERATE 35: CPT | Performed by: HOSPITALIST

## 2024-04-30 NOTE — PLAN OF CARE
Assumed care at 1930. A&O X3, confused at times. RA, VSS, NSR on tele. Cardiac electrolyte replacement protocol. Lab draw. PIV in L forearm flushed and capped. Dressing CDI. Lovenox for VTE. Regular diet. Did not eat dinner this shift, states had late lunch around 4pm. Incontinent, purewick in place at night. Lower back pain, PRN tylenol and scheduled lyrica. Up 1 with walker. Plan for ECT 5/1.     Problem: GASTROINTESTINAL - ADULT  Goal: Maintains or returns to baseline bowel function  Description: INTERVENTIONS:  - Assess bowel function  - Maintain adequate hydration with IV or PO as ordered and tolerated  - Evaluate effectiveness of GI medications  - Encourage mobilization and activity  - Obtain nutritional consult as needed  - Establish a toileting routine/schedule  - Consider collaborating with pharmacy to review patient's medication profile  Outcome: Progressing  Goal: Maintains adequate nutritional intake (undernourished)  Description: INTERVENTIONS:  - Monitor percentage of each meal consumed  - Identify factors contributing to decreased intake, treat as appropriate  - Assist with meals as needed  - Monitor I&O, WT and lab values  - Obtain nutritional consult as needed  - Optimize oral hygiene and moisture  - Encourage food from home; allow for food preferences  - Enhance eating environment  Outcome: Progressing     Problem: METABOLIC/FLUID AND ELECTROLYTES - ADULT  Goal: Electrolytes maintained within normal limits  Description: INTERVENTIONS:  - Monitor labs and rhythm and assess patient for signs and symptoms of electrolyte imbalances  - Administer electrolyte replacement as ordered  - Monitor response to electrolyte replacements, including rhythm and repeat lab results as appropriate  - Fluid restriction as ordered  - Instruct patient on fluid and nutrition restrictions as appropriate  Outcome: Progressing     Problem: MUSCULOSKELETAL - ADULT  Goal: Return mobility to safest level of  function  Description: INTERVENTIONS:  - Assess patient stability and activity tolerance for standing, transferring and ambulating w/ or w/o assistive devices  - Assist with transfers and ambulation using safe patient handling equipment as needed  - Ensure adequate protection for wounds/incisions during mobilization  - Obtain PT/OT consults as needed  - Advance activity as appropriate  - Communicate ordered activity level and limitations with patient/family  Outcome: Progressing     Problem: NEUROLOGICAL - ADULT  Goal: Achieves stable or improved neurological status  Description: INTERVENTIONS  - Assess for and report changes in neurological status  - Initiate measures to prevent increased intracranial pressure  - Maintain blood pressure and fluid volume within ordered parameters to optimize cerebral perfusion and minimize risk of hemorrhage  - Monitor temperature, glucose, and sodium. Initiate appropriate interventions as ordered  Outcome: Progressing     Problem: SAFETY ADULT - FALL  Goal: Free from fall injury  Description: INTERVENTIONS:  - Assess pt frequently for physical needs  - Identify cognitive and physical deficits and behaviors that affect risk of falls.  - [place] fall precautions as indicated by assessment.  - Educate pt/family on patient safety including physical limitations  - Instruct pt to call for assistance with activity based on assessment  - Modify environment to reduce risk of injury  - Provide assistive devices as appropriate  - Consider OT/PT consult to assist with strengthening/mobility  - Encourage toileting schedule  Outcome: Progressing     Problem: DISCHARGE PLANNING  Goal: Discharge to home or other facility with appropriate resources  Description: INTERVENTIONS:  - Identify barriers to discharge w/pt and caregiver  - Include patient/family/discharge partner in discharge planning  - Arrange for needed discharge resources and transportation as appropriate  - Identify discharge  learning needs (meds, wound care, etc)  - Arrange for interpreters to assist at discharge as needed  - Consider post-discharge preferences of patient/family/discharge partner  - Complete POLST form as appropriate  - Assess patient's ability to be responsible for managing their own health  - Refer to Case Management Department for coordinating discharge planning if the patient needs post-hospital services based on physician/LIP order or complex needs related to functional status, cognitive ability or social support system  Outcome: Progressing     Problem: Delirium  Goal: Minimize duration of delirium  Description: Interventions:  - Encourage use of hearing aids, eye glasses  - Promote highest level of mobility daily  - Provide frequent reorientation  - Promote wakefulness i.e. lights on, blinds open  - Promote sleep, encourage patient's normal rest cycle i.e. lights off, TV off, minimize noise and interruptions  - Encourage family to assist in orientation and promotion of home routines  Outcome: Progressing

## 2024-04-30 NOTE — PROGRESS NOTES
[facility]  Report of Psychiatric Progress Note    Meli Antonio Patient Status:  Inpatient    11/3/1950 MRN [id number]   Location [facility] 4NW-A Attending Juan C Suazo,    Hosp Day # 9 PCP SIRI TABOR     Date of Admission: 2024  Date of Service: 2024  Reason for Consultation: Severe depression    Impression:  Primary Psychiatric Diagnosis:  Major depressive disorder, recurrent, severe, with vegetative symptoms. She has depressed mood, guilty worthless feelings, hopelessness, decreased motivation and apathy, poor concentration, and low appetite. PO intake improving. She ate 75% of breakfast today.    Recent mixed catatonia. She had mutism and excitatory motor movements- resolved.    Possible recent serotonin syndrome. Lyrica was stopped, Lexapro decreased, and she was given benzos at [facility]. Resolved.     Acute delirium/encephalopathy due to SUPA and hypernatremia. Improved. Alert and more attentive and organized in thinking. NO more illusions or visual hallucinations,    Rule out cognitive impairment due to major depression.     Alcohol use disorder, severity unspecified, in remission for 9 years.     Pertinent Medical Diagnoses:  SUPA and hypernatremia-resolved.    Recommendations:  1) ECT#2 tomorrow AM. NPO at midnight except for meds with sips of water.     2) Continue lower dose Lexapro 15mg po daily for anxiety/depression due to concern for recent serotonin syndrome. She was on 30mg daily prior to her [facility] admission on 4/10/24.     3) Continue Lithium 300mg nightly for depression. Will be HELD on evenings before ECT.    4) Continue Abilify 7.5mg nightly to help with depression.    5) Continue Lyrica 50mg po three times a day for chronic pain. Will be HELD on the afternoon before ECT.     6) Left a message for her outpatient psychiatrist Dr. Ulises Capps on 24 updating her on the plan to treat her depression with ECT.    7) Dispo-- Transfer to [facility].  There is no bed for her today, but hopefully tomorrow.     Freddy Marie MD    History of Present Illness:  72 yo female with recurrent major depressive disorder initially presented to [facility] on 4/10/24 for altered mental status with confusion, restlessness, and agitation.     Per psych liaison:  \" reports that patient has had uncontrollable movements, is taking her clothes off, has been confused and does not know the names of anybody.     says that since Friday she has had these difficulties and it seemed to have gotten worse the last 2 days where now, in addition to those difficulties, she also cannot control her bowels.  She says she has been soiling her clothes and her bed.   said he wonders if it is could be attributed to the recent prescribing of meds with codeine that were meant to manage a cough that she had.   also reports that patient has \"been in bed over a year\".  He says that she does not say much and sleeps a lot.  He says she is grieving the loss of 2 dogs that  5 years ago as well as grieving the loss of their adopted son, who 7 yrs ago, at 31 y/o walked out of their lives. He said that pt has friends and she does occasionally go to lunch with them and does attend AA meetings once a week.  He said that she will eat the food that he cooks for her.  states that he is power of  for his wife.  Counselor advised him to bring copies of that paperwork to the hospital so that staff can scan it into the clinic record.\"    She was eval by Dr. Mckenzie (psych service at [place]) and thought to have delirium and depression with excitable catatonia vs serotonin syndrome (SS). The Lexapro was decreased from 30mg to 10mg (then increased to 15mg) and Lyrica and Codeine prn stopped just in case she had SS. She was started on benzos, Klonipin and then Ativan, which helped with both the possible excitable catatonia vs SS. Wellbutrin was DC'ed (doesn't increase  serotonin, but rather dopamine and nor-epi), Abilify continued, and Lithium started on 4/18/24. She was no longer so restless and agitated and disorganized, but remained apathetic with minimal verbal communication. She was eating/drinking little.    She was transferred to [facility] on 4/19/24. She developed SUPA and hypernatremia due to low po intake and was sent to the [facility]. She was started on IVF's and admitted to the med floor.    Currently, she has depressed mood, guilty worthless feelings, hopelessness, decreased motivation and a lack of appetite leading to low po intake. She ate only 20% of her breakfast per RN. She denies any tremors or jerking movements.     Discussed ECT and recommendation to start it. She said she was fine with treatment, but I did not think she understood or appreciated the pros/cons of treatment.  is POA.     Interval Hx:  4/23/2024- She ate 20%/60%/25% of her meals yesterday. She ate about 30% of her breakfast and lunch today. She was found on the floor with her knees earlier today; did not hit her head. She is now in the chair. Per RN, she had difficultly initiating walking, but once she started, she was able to ambulate well with the walker.     Currently, she feels tired and depressed. She cries when she thinks about her estranged son and her dogs that have passed away. She has low energy/motivation/appetite and feelings of hopelessness. No suicidal ideation. She endorsed seeing a mouse in the room this AM; no voices/visions now.    Discussed ECT and she appeared to understand more today. Her  who is POA wants her to start the treatments.    Regarding her chronic back pain, it is 4-5/10 with 10 being severe pain.    4/24/2024- She feels tired and sleepy this AM. She feels depressed, lacks motivation, and has a low appetite. She ate about 30% of her breakfast per RN. She remains forgetful and disoriented to month. She feels hopeless when she thinks about her  estranged son. She shares how her sister had severe depression and was treated with ECT before. She is open to ECT.  is POA and wants her to get it as well.     ADDENDUM: Talked to Dr. Adames. Patient's capacity appears to wax and wane, so  (POA) will be involved with consent for ECT. He wants her to have it. At this time, there are no open spots for tomorrow. Unlikely Friday as well. Will most likely received 1st ECT on Monday 4/2/9/24. UNHELD the Ativan, Lyrica, and Lithium.     4/25/2024- She at about 30% of her meals yesterday. She ate 50% of her breakfast today. She told staff that she saw a dog in the room, but she did have a visit from the therapy dog yesterday. I can see the card photo of the dog. While I was in the room with her, she thought she saw a cat in the corner of the room. When I asked her to point to the location, she said the cat disappeared.     She feels tired, anxious, and depressed. She shares how her son Alexandru was adopted at 3 months from [place]. He came out as walker and was not ostracized from the family but accepted. He was brought up in a Hindu household and in Hindu schools. She wonders whether the Hindu belief about homosexuality being a sin is the reason he doesn't associate with them anymore. She isn't sure. She feels sad and guilty when she thinks about him.     Asked her about what she recalled about ECT and she said it was a treatment for her depression. She wasn't able to tell me the pros and cons of the treatment. Discussed how her  will have to consent for treatment and she was fine with that.    4/26/2024- She feels tired, anxious, and depressed and has low motivation and appetite. She is eating 30-50% of her meals per report. She is pleasant and cooperative per staff. She is open to ECT on Monday and knows it is a treatment for depression, but is unable to tell me cons or alteratives to treatment. Her  will consent for ECT and wants  her to get it.     4/29/2024- She tolerated the ECT this AM. Denies forgetfulness or headache post-ECT. She feels tired with mild anxiety and depressed mood. She has increased appetite and ate 50% of her breakfast and 65% of her lunch today. She has decreased motivation and anhedonia, but NO hopelessness or suicidal ideation.    4/30/2024- She has generalized anxiety and depressed mood. Guilt worthless feelings persists. She has fatigue, low motivation, and poor concentration. Her appetite is improving; she ate 75% of her breakfast. She talked about being estranged from her son Alexandru. She became tearful. She said there must be something he is going through to make him cut off all communication with her and her . She mentioned how he is walker and how they did not  him or tell him there was anything wrong with him. They raised him Moravian. She wants to write him a letter saying she loves him and is always there for him. He is a special education arts teacher in [place], [place].     Past Psychiatric History: Major depressive disorder treated with Lexapro, Wellbutrin, and Abilify.    Substance Use History: Alcohol use disorder, severity unspecified, in remission x 9 yrs. THC edibles at night.    Psych Family History: None, but tells me her adopted son has mental health issues.    Social and Developmental History: Retired teacher. She lives with her  and adult son who is 41 yr old and going through a divorce. Her adopted son (from [place] per , she told me [place] initially) stopped talking to the family 7 yrs ago. He is 39 yr old.    Past Medical History:    Anxiety state    Back problem    COMPRESSION FX    Cataract    Depression    Esophageal reflux    GERD (gastroesophageal reflux disease)    Hematoma and contusion of liver    STATES HAS BEEN THERE FOR MANY YEARS    History of fractured kneecap    RIGHT    IBS (irritable bowel syndrome)    Mitral prolapse    Osteoarthritis     Past Surgical  History:   Procedure Laterality Date          Correct bunion,othr methods      Correct bunion,simple      BILAT.     Dilation/curettage,diagnostic      FOR \"MOLE PREGNANCY\"    Other Right     ORIF RIGHT ANKLE    Spine surgery procedure unlisted      cervical spine 2020    Total knee replacement       Family History   Problem Relation Age of Onset    Heart Disorder Father     Cancer Mother         ESOPHAGUS      reports that she has quit smoking. Her smoking use included cigarettes. She has a 30 pack-year smoking history. She has never used smokeless tobacco. She reports that she does not drink alcohol and does not use drugs.    Allergies:  Allergies   Allergen Reactions    Radiology Contrast Iodinated Dyes HIVES     HIVES AND THROAT TIGHTENED WHILE HAVING AN MRI    Sulfa Antibiotics HIVES     \"got sicker\"    Methylprednisolone OTHER (SEE COMMENTS)    Seroquel [Quetiapine] UNKNOWN    Cinnamon RASH    Iodine (Topical) RASH       Medications:    Current Facility-Administered Medications:     lithium carbonate cap 300 mg, 300 mg, Oral, Nightly    pregabalin (Lyrica) cap 50 mg, 50 mg, Oral, TID    glycopyrrolate (Robinul) 0.2 MG/ML injection 0.1 mg, 0.1 mg, Intravenous, Once    ketorolac (Toradol) 15 MG/ML injection 15 mg, 15 mg, Intravenous, Once    ondansetron (Zofran) 4 MG/2ML injection 4 mg, 4 mg, Intravenous, Once    ARIPiprazole (Abilify) tab 7.5 mg, 7.5 mg, Oral, Nightly    enoxaparin (Lovenox) 40 MG/0.4ML SUBQ injection 40 mg, 40 mg, Subcutaneous, Nightly    cholecalciferol (Vitamin D3) tab 1,000 Units, 1,000 Units, Oral, Daily    escitalopram (Lexapro) tab 15 mg, 15 mg, Oral, Nightly    propranolol (Inderal) tab 10 mg, 10 mg, Oral, BID    acetaminophen (Tylenol Extra Strength) tab 1,000 mg, 1,000 mg, Oral, Q8H PRN    melatonin tab 3 mg, 3 mg, Oral, Nightly PRN    ondansetron (Zofran) 4 MG/2ML injection 4 mg, 4 mg, Intravenous, Q6H PRN    polyethylene glycol (PEG 3350) (Miralax) 17 g oral packet  17 g, 17 g, Oral, Daily PRN    sennosides (Senokot) tab 17.2 mg, 17.2 mg, Oral, Nightly PRN    bisacodyl (Dulcolax) 10 MG rectal suppository 10 mg, 10 mg, Rectal, Daily PRN    benzonatate (Tessalon) cap 200 mg, 200 mg, Oral, TID PRN    guaiFENesin (Robitussin) 100 MG/5 ML oral liquid 200 mg, 200 mg, Oral, Q4H PRN    glycerin-hypromellose- (Artificial Tears) 0.2-0.2-1 % ophthalmic solution 1 drop, 1 drop, Both Eyes, QID PRN    sodium chloride (Saline Mist) 0.65 % nasal solution 1 spray, 1 spray, Each Nare, Q3H PRN    albuterol (Ventolin HFA) 108 (90 Base) MCG/ACT inhaler 2 puff, 2 puff, Inhalation, Q4H PRN    magnesium hydroxide (Milk of Magnesia) 400 MG/5ML oral suspension 30 mL, 30 mL, Oral, Nightly PRN    alum-mag hydroxide-simethicone (Maalox) 200-200-20 MG/5ML oral suspension 30 mL, 30 mL, Oral, Q4H PRN    acetaminophen (Tylenol) tab 325 mg, 325 mg, Oral, Q4H PRN **OR** acetaminophen (Tylenol) tab 650 mg, 650 mg, Oral, Q4H PRN    traZODone (Desyrel) tab 50 mg, 50 mg, Oral, Nightly PRN    Facility-Administered Medications Ordered in Other Encounters:     lactated ringers infusion, , Intravenous, Continuous    glucose (Dex4) 15 GM/59ML oral liquid 15 g, 15 g, Oral, Q15 Min PRN **OR** glucose (Glutose) 40% oral gel 15 g, 15 g, Oral, Q15 Min PRN **OR** glucose-vitamin C (Dex-4) chewable tab 4 tablet, 4 tablet, Oral, Q15 Min PRN **OR** dextrose 50% injection 50 mL, 50 mL, Intravenous, Q15 Min PRN **OR** glucose (Dex4) 15 GM/59ML oral liquid 30 g, 30 g, Oral, Q15 Min PRN **OR** glucose (Glutose) 40% oral gel 30 g, 30 g, Oral, Q15 Min PRN **OR** glucose-vitamin C (Dex-4) chewable tab 8 tablet, 8 tablet, Oral, Q15 Min PRN    lactated ringers infusion, , Intravenous, Continuous    Review of Systems   Constitutional:  Positive for malaise/fatigue.   Psychiatric/Behavioral:  Positive for depression. Negative for suicidal ideas. The patient is nervous/anxious.      Mental Status Exam:     Objective      Vitals:     04/30/24 0804   BP:    Pulse:    Resp: 18   Temp: 98.5 °F (36.9 °C)     Appearance: fair grooming  Behavior: cooperative, fair eye contact  Gait: not observed    Speech: soft, fluent     Mood: depressed and anxious  Affect: Congruent    Thought process: logical   Thought content: no hallucinations    Orientation: oriented person, place, and year  Attention and Concentration: improving, fair now  Memory:  intact remote and impaired recent  Language: Intact naming   Fund of Knowledge: Able to recite name of [place] president    Insight: fair now  Judgment: fair now

## 2024-04-30 NOTE — PROGRESS NOTES
[facility]   part of [facility]     Hospitalist Progress Note     Meli Antonio Patient Status:  Inpatient    11/3/1950 MRN [id number]   Location [facility] 4NW-A Attending Juan C Suazo,    Hosp Day # 9 PCP SIRI TABOR     Chief Complaint: Abnormal labs    Subjective:     Doing well. No complaints. Ate most of her meal     Objective:    Review of Systems:   Limited but as above     Vital signs:  Temp:  [98 °F (36.7 °C)-98.5 °F (36.9 °C)] 98.1 °F (36.7 °C)  Pulse:  [63-98] 63  Resp:  [15-27] 18  BP: (104-135)/() 120/84  SpO2:  [93 %-97 %] 95 %    Physical Exam:    /84 (BP Location: Right arm)   Pulse 63   Temp 98.1 °F (36.7 °C) (Oral)   Resp 18   Wt 110 lb 3.7 oz (50 kg)   SpO2 95%   BMI 21.53 kg/m²     General: No acute distress, Awake, confused   Respiratory: No wheezes, no rhonchi  Cardiovascular: S1, S2, regular rate and rhythm  Abdomen: Soft, Non-tender, non-distended, positive bowel sounds  Neuro: No new focal deficits.   Extremities: No edema      Diagnostic Data:    Labs:  Recent Labs   Lab 24  0711 24  0551 24  0656   WBC 11.5* 14.0* 13.1*   HGB 12.7 13.5 13.4   MCV 87.0 87.0 89.2   .0 348.0 325.0       Recent Labs   Lab 24  0711 24  0349 24  0656   *  --  130*   BUN 10  --  11   CREATSERUM 0.82  --  0.81   CA 9.3  --  9.4     --  141   K 3.5 4.3 4.0     --  111   CO2 26.0  --  26.0       Estimated Creatinine Clearance: 44.4 mL/min (based on SCr of 0.81 mg/dL).    No results for input(s): \"TROP\", \"TROPHS\", \"CK\" in the last 168 hours.    No results for input(s): \"PTP\", \"INR\" in the last 168 hours.               Microbiology    Hospital Encounter on 24   1. Blood Culture     Status: None    Collection Time: 24 11:06 AM    Specimen: Blood,peripheral   Result Value Ref Range    Blood Culture Result No Growth 5 Days N/A         Imaging: Reviewed in Epic.    Medications:    [Held by provider]  lithium carbonate  300 mg Oral Nightly    [Held by provider] pregabalin  50 mg Oral TID    glycopyrrolate  0.1 mg Intravenous Once    ketorolac  15 mg Intravenous Once    ondansetron  4 mg Intravenous Once    ARIPiprazole  7.5 mg Oral Nightly    enoxaparin  40 mg Subcutaneous Nightly    cholecalciferol  1,000 Units Oral Daily    escitalopram  15 mg Oral Nightly    propranolol  10 mg Oral BID       Assessment & Plan:      # Major depression, generalized anxiety disorder  #Decreased PO intake   -at [facility] prior to this admission  -sp ect - d/w psych yesterday and today    #Leukocytosis  -no bacterial infectious etiology identified, hold further antibiotics   -one time low grade fever - monitor for recurrence   -Blood Cultures NGTD  -UA negative on 4/21/2024  -Chest x-ray done on 4/21/2024 with no evidence of active cardiopulmonary disease  -procalcitonin level negative less than 0.05  -White count improving. Completely asymptomatic. Dw pt to notify if any symptoms occur     #Hypernatremia 2/2 free water deficit due to dehydration  -resolved     #SUPA  -resolved with gentle hydration   -No NSAIDs    #Type II DM  #Fasting Hyperglycemia   -not on any meds at home  -A1c is 6.9%    DISPO: ok to [place]. No beds at [facility]. To go tomorrow     Dr Ben Skinner     Supplementary Documentation:     Quality:  DVT Mechanical Prophylaxis:     Early ambuation  DVT Pharmacologic Prophylaxis   Medication    enoxaparin (Lovenox) 40 MG/0.4ML SUBQ injection 40 mg                Code Status: Full Code  [place]: External urinary catheter in place    The 21st Century Cures Act makes medical notes like these available to patients in the interest of transparency. Please be advised this is a medical document. Medical documents are intended to carry relevant information, facts as evident, and the clinical opinion of the practitioner. The medical note is intended as peer to peer communication and may appear blunt or direct. It is written in medical language  and may contain abbreviations or verbiage that are unfamiliar.             **Certification      PHYSICIAN Certification of Need for Inpatient Hospitalization - Initial Certification    Patient will require inpatient services that will reasonably be expected to span two midnight's based on the clinical documentation in H+P.   Based on patients current state of illness, I anticipate that, after discharge, patient will require TBD.

## 2024-05-01 ENCOUNTER — ANESTHESIA EVENT (OUTPATIENT)
Dept: POSTOP/PACU | Facility: HOSPITAL | Age: 74
End: 2024-05-01
Payer: MEDICARE

## 2024-05-01 ENCOUNTER — HOSPITAL ENCOUNTER (OUTPATIENT)
Dept: POSTOP/PACU | Facility: HOSPITAL | Age: 74
Discharge: HOME | End: 2024-05-01
Attending: Other
Payer: MEDICARE

## 2024-05-01 ENCOUNTER — ANESTHESIA (OUTPATIENT)
Dept: POSTOP/PACU | Facility: HOSPITAL | Age: 74
End: 2024-05-01
Payer: MEDICARE

## 2024-05-01 VITALS
SYSTOLIC BLOOD PRESSURE: 125 MMHG | RESPIRATION RATE: 18 BRPM | TEMPERATURE: 98 F | OXYGEN SATURATION: 94 % | HEART RATE: 84 BPM | DIASTOLIC BLOOD PRESSURE: 77 MMHG

## 2024-05-01 DIAGNOSIS — F06.1 MAJOR DEPRESSIVE DISORDER, RECURRENT EPISODE, SEVERE WITH CATATONIA (HCC): ICD-10-CM

## 2024-05-01 DIAGNOSIS — F33.2 MAJOR DEPRESSIVE DISORDER, RECURRENT EPISODE, SEVERE WITH CATATONIA (HCC): ICD-10-CM

## 2024-05-01 PROCEDURE — 99232 SBSQ HOSP IP/OBS MODERATE 35: CPT | Performed by: OTHER

## 2024-05-01 PROCEDURE — 99232 SBSQ HOSP IP/OBS MODERATE 35: CPT | Performed by: HOSPITALIST

## 2024-05-01 RX ORDER — SODIUM CHLORIDE, SODIUM LACTATE, POTASSIUM CHLORIDE, CALCIUM CHLORIDE 600; 310; 30; 20 MG/100ML; MG/100ML; MG/100ML; MG/100ML
INJECTION, SOLUTION INTRAVENOUS CONTINUOUS
Status: DISCONTINUED | OUTPATIENT
Start: 2024-05-01 | End: 2024-05-01 | Stop reason: HOSPADM

## 2024-05-01 RX ORDER — SODIUM CHLORIDE, SODIUM LACTATE, POTASSIUM CHLORIDE, CALCIUM CHLORIDE 600; 310; 30; 20 MG/100ML; MG/100ML; MG/100ML; MG/100ML
INJECTION, SOLUTION INTRAVENOUS CONTINUOUS
Status: DISCONTINUED | OUTPATIENT
Start: 2024-05-01 | End: 2024-05-03

## 2024-05-01 RX ORDER — GLYCOPYRROLATE 0.2 MG/ML
0.1 INJECTION, SOLUTION INTRAMUSCULAR; INTRAVENOUS ONCE
Status: COMPLETED | OUTPATIENT
Start: 2024-05-01 | End: 2024-05-01

## 2024-05-01 RX ORDER — KETOROLAC TROMETHAMINE 30 MG/ML
INJECTION, SOLUTION INTRAMUSCULAR; INTRAVENOUS
Status: COMPLETED
Start: 2024-05-01 | End: 2024-05-01

## 2024-05-01 RX ORDER — HYDROMORPHONE HYDROCHLORIDE 1 MG/ML
0.4 INJECTION, SOLUTION INTRAMUSCULAR; INTRAVENOUS; SUBCUTANEOUS EVERY 5 MIN PRN
Status: DISCONTINUED | OUTPATIENT
Start: 2024-05-01 | End: 2024-05-01 | Stop reason: HOSPADM

## 2024-05-01 RX ORDER — KETOROLAC TROMETHAMINE 30 MG/ML
15 INJECTION, SOLUTION INTRAMUSCULAR; INTRAVENOUS ONCE
Status: COMPLETED | OUTPATIENT
Start: 2024-05-01 | End: 2024-05-01

## 2024-05-01 RX ORDER — HYDROMORPHONE HYDROCHLORIDE 1 MG/ML
0.2 INJECTION, SOLUTION INTRAMUSCULAR; INTRAVENOUS; SUBCUTANEOUS EVERY 5 MIN PRN
Status: DISCONTINUED | OUTPATIENT
Start: 2024-05-01 | End: 2024-05-01 | Stop reason: HOSPADM

## 2024-05-01 RX ORDER — GLYCOPYRROLATE 0.2 MG/ML
INJECTION, SOLUTION INTRAMUSCULAR; INTRAVENOUS
Status: COMPLETED
Start: 2024-05-01 | End: 2024-05-01

## 2024-05-01 RX ORDER — CAFFEINE AND SODIUM BENZOATE 125 MG/ML
125 INJECTION, SOLUTION INTRAMUSCULAR; INTRAVENOUS ONCE
Status: COMPLETED | OUTPATIENT
Start: 2024-05-01 | End: 2024-05-01

## 2024-05-01 RX ORDER — NALOXONE HYDROCHLORIDE 0.4 MG/ML
0.08 INJECTION, SOLUTION INTRAMUSCULAR; INTRAVENOUS; SUBCUTANEOUS AS NEEDED
Status: DISCONTINUED | OUTPATIENT
Start: 2024-05-01 | End: 2024-05-01 | Stop reason: HOSPADM

## 2024-05-01 RX ORDER — HYDROMORPHONE HYDROCHLORIDE 1 MG/ML
0.6 INJECTION, SOLUTION INTRAMUSCULAR; INTRAVENOUS; SUBCUTANEOUS EVERY 5 MIN PRN
Status: DISCONTINUED | OUTPATIENT
Start: 2024-05-01 | End: 2024-05-01 | Stop reason: HOSPADM

## 2024-05-01 RX ORDER — KETAMINE HYDROCHLORIDE 50 MG/ML
INJECTION, SOLUTION INTRAMUSCULAR; INTRAVENOUS AS NEEDED
Status: DISCONTINUED | OUTPATIENT
Start: 2024-05-01 | End: 2024-05-01 | Stop reason: SURG

## 2024-05-01 RX ORDER — CAFFEINE AND SODIUM BENZOATE 125 MG/ML
INJECTION, SOLUTION INTRAMUSCULAR; INTRAVENOUS
Status: COMPLETED
Start: 2024-05-01 | End: 2024-05-01

## 2024-05-01 RX ORDER — ONDANSETRON 2 MG/ML
4 INJECTION INTRAMUSCULAR; INTRAVENOUS ONCE
Status: COMPLETED | OUTPATIENT
Start: 2024-05-01 | End: 2024-05-01

## 2024-05-01 RX ORDER — ONDANSETRON 2 MG/ML
INJECTION INTRAMUSCULAR; INTRAVENOUS
Status: COMPLETED
Start: 2024-05-01 | End: 2024-05-01

## 2024-05-01 RX ORDER — DEXTROSE MONOHYDRATE 25 G/50ML
50 INJECTION, SOLUTION INTRAVENOUS
Status: DISCONTINUED | OUTPATIENT
Start: 2024-05-01 | End: 2024-05-01 | Stop reason: HOSPADM

## 2024-05-01 RX ORDER — ETOMIDATE 2 MG/ML
INJECTION INTRAVENOUS AS NEEDED
Status: DISCONTINUED | OUTPATIENT
Start: 2024-05-01 | End: 2024-05-01 | Stop reason: SURG

## 2024-05-01 RX ORDER — NICOTINE POLACRILEX 4 MG
15 LOZENGE BUCCAL
Status: DISCONTINUED | OUTPATIENT
Start: 2024-05-01 | End: 2024-05-01 | Stop reason: HOSPADM

## 2024-05-01 RX ORDER — NICOTINE POLACRILEX 4 MG
30 LOZENGE BUCCAL
Status: DISCONTINUED | OUTPATIENT
Start: 2024-05-01 | End: 2024-05-01 | Stop reason: HOSPADM

## 2024-05-01 RX ADMIN — KETOROLAC TROMETHAMINE 15 MG: 30 INJECTION, SOLUTION INTRAMUSCULAR; INTRAVENOUS at 06:27:00

## 2024-05-01 RX ADMIN — KETAMINE HYDROCHLORIDE 25 MG: 50 INJECTION, SOLUTION INTRAMUSCULAR; INTRAVENOUS at 06:45:00

## 2024-05-01 RX ADMIN — SODIUM CHLORIDE, SODIUM LACTATE, POTASSIUM CHLORIDE, CALCIUM CHLORIDE: 600; 310; 30; 20 INJECTION, SOLUTION INTRAVENOUS at 06:27:00

## 2024-05-01 RX ADMIN — CAFFEINE AND SODIUM BENZOATE 125 MG: 125 INJECTION, SOLUTION INTRAMUSCULAR; INTRAVENOUS at 06:28:00

## 2024-05-01 RX ADMIN — ETOMIDATE 10 MG: 2 INJECTION INTRAVENOUS at 06:45:00

## 2024-05-01 RX ADMIN — ONDANSETRON 4 MG: 2 INJECTION INTRAMUSCULAR; INTRAVENOUS at 06:27:00

## 2024-05-01 RX ADMIN — GLYCOPYRROLATE 0.1 MG: 0.2 INJECTION, SOLUTION INTRAMUSCULAR; INTRAVENOUS at 06:27:00

## 2024-05-01 NOTE — PROGRESS NOTES
[facility]  Report of Psychiatric Progress Note    Meli Antonio Patient Status:  Inpatient    11/3/1950 MRN [id number]   Location [facility] 4NW-A Attending Juan C Suazo,    Hosp Day # 10 PCP SIRI TABOR     Date of Admission: 2024  Date of Service: 2024  Reason for Consultation: Severe depression    Impression:  Primary Psychiatric Diagnosis:  Major depressive disorder, recurrent, severe, with vegetative symptoms. She has depressed mood, guilty worthless feelings, hopelessness, decreased motivation and apathy, poor concentration, and low appetite. Depression is improving. She no longer feels hopeless and her appetite and concentration have improved.     Recent mixed catatonia 2 wks ago. She had mutism and excitatory motor movements- resolved.    Possible recent serotonin syndrome 2 wks ago. Lyrica was stopped, Lexapro decreased, and she was given benzos at [facility]. Resolved.     Acute delirium/encephalopathy due to SUPA and hypernatremia. Improved. Alert and attentive and organized in thinking now. NO more illusions or visual hallucinations,    Alcohol use disorder, severity unspecified, in remission for 9 years.     Pertinent Medical Diagnoses:  SUPA and hypernatremia-resolved.    Recommendations:  1) ECT#3 scheduled for Friday 5/3/2024.    2) Continue lower dose Lexapro 15mg po daily for anxiety/depression due to concern for recent serotonin syndrome. She was on 30mg daily prior to her [facility] admission on 4/10/24.     3) Continue Lithium 300mg nightly for depression. Will be HELD on evenings before ECT.    4) Continue Abilify 7.5mg nightly to help with depression.    5) Continue Lyrica 50mg po three times a day for chronic pain. Will be HELD on the afternoon before ECT.     6) Left a message for her outpatient psychiatrist Dr. Ulises Capps on 24 updating her on the plan to treat her depression with ECT.    7) Dispo-- Transfer to [facility] when bed available. She  will greatly benefit from group psychotherapy at this time and further ECT.    Freddy Marie MD    History of Present Illness:  72 yo female with recurrent major depressive disorder initially presented to [facility] on 4/10/24 for altered mental status with confusion, restlessness, and agitation.     Per psych liaison:  \" reports that patient has had uncontrollable movements, is taking her clothes off, has been confused and does not know the names of anybody.     says that since Friday she has had these difficulties and it seemed to have gotten worse the last 2 days where now, in addition to those difficulties, she also cannot control her bowels.  She says she has been soiling her clothes and her bed.   said he wonders if it is could be attributed to the recent prescribing of meds with codeine that were meant to manage a cough that she had.   also reports that patient has \"been in bed over a year\".  He says that she does not say much and sleeps a lot.  He says she is grieving the loss of 2 dogs that  5 years ago as well as grieving the loss of their adopted son, who 7 yrs ago, at 33 y/o walked out of their lives. He said that pt has friends and she does occasionally go to lunch with them and does attend AA meetings once a week.  He said that she will eat the food that he cooks for her.  states that he is power of  for his wife.  Counselor advised him to bring copies of that paperwork to the hospital so that staff can scan it into the clinic record.\"    She was eval by Dr. Mckenzie (psych service at [facility]) and thought to have delirium and depression with excitable catatonia vs serotonin syndrome (SS). The Lexapro was decreased from 30mg to 10mg (then increased to 15mg) and Lyrica and Codeine prn stopped just in case she had SS. She was started on benzos, Klonipin and then Ativan, which helped with both the possible excitable catatonia vs SS. Wellbutrin was DC'ed  (doesn't increase serotonin, but rather dopamine and nor-epi), Abilify continued, and Lithium started on 4/18/24. She was no longer so restless and agitated and disorganized, but remained apathetic with minimal verbal communication. She was eating/drinking little.    She was transferred to [facility] on 4/19/24. She developed SUPA and hypernatremia due to low po intake and was sent to the [facility] ED. She was started on IVF's and admitted to the med floor.    Currently, she has depressed mood, guilty worthless feelings, hopelessness, decreased motivation and a lack of appetite leading to low po intake. She ate only 20% of her breakfast per RN. She denies any tremors or jerking movements.     Discussed ECT and recommendation to start it. She said she was fine with treatment, but I did not think she understood or appreciated the pros/cons of treatment.  is POA.     Interval Hx:  4/23/2024- She ate 20%/60%/25% of her meals yesterday. She ate about 30% of her breakfast and lunch today. She was found on the floor with her knees earlier today; did not hit her head. She is now in the chair. Per RN, she had difficultly initiating walking, but once she started, she was able to ambulate well with the walker.     Currently, she feels tired and depressed. She cries when she thinks about her estranged son and her dogs that have passed away. She has low energy/motivation/appetite and feelings of hopelessness. No suicidal ideation. She endorsed seeing a mouse in the room this AM; no voices/visions now.    Discussed ECT and she appeared to understand more today. Her  who is POA wants her to start the treatments.    Regarding her chronic back pain, it is 4-5/10 with 10 being severe pain.    4/24/2024- She feels tired and sleepy this AM. She feels depressed, lacks motivation, and has a low appetite. She ate about 30% of her breakfast per RN. She remains forgetful and disoriented to month. She feels hopeless when she  thinks about her estranged son. She shares how her sister had severe depression and was treated with ECT before. She is open to ECT.  is POA and wants her to get it as well.     ADDENDUM: Talked to Dr. Adames. Patient's capacity appears to wax and wane, so  (POA) will be involved with consent for ECT. He wants her to have it. At this time, there are no open spots for tomorrow. Unlikely Friday as well. Will most likely received 1st ECT on Monday 4/2/9/24. UNHELD the Ativan, Lyrica, and Lithium.     4/25/2024- She at about 30% of her meals yesterday. She ate 50% of her breakfast today. She told staff that she saw a dog in the room, but she did have a visit from the therapy dog yesterday. I can see the card photo of the dog. While I was in the room with her, she thought she saw a cat in the corner of the room. When I asked her to point to the location, she said the cat disappeared.     She feels tired, anxious, and depressed. She shares how her son Alexandru was adopted at 3 months from [place]. He came out as walker and was not ostracized from the family but accepted. He was brought up in a Lutheran household and in Lutheran schools. She wonders whether the Lutheran belief about homosexuality being a sin is the reason he doesn't associate with them anymore. She isn't sure. She feels sad and guilty when she thinks about him.     Asked her about what she recalled about ECT and she said it was a treatment for her depression. She wasn't able to tell me the pros and cons of the treatment. Discussed how her  will have to consent for treatment and she was fine with that.    4/26/2024- She feels tired, anxious, and depressed and has low motivation and appetite. She is eating 30-50% of her meals per report. She is pleasant and cooperative per staff. She is open to ECT on Monday and knows it is a treatment for depression, but is unable to tell me cons or alteratives to treatment. Her  will consent  for ECT and wants her to get it.     4/29/2024- She tolerated the ECT this AM. Denies forgetfulness or headache post-ECT. She feels tired with mild anxiety and depressed mood. She has increased appetite and ate 50% of her breakfast and 65% of her lunch today. She has decreased motivation and anhedonia, but NO hopelessness or suicidal ideation.    4/30/2024- She has generalized anxiety and depressed mood. Guilt worthless feelings persists. She has fatigue, low motivation, and poor concentration. Her appetite is improving; she ate 75% of her breakfast. She talked about being estranged from her son Alexandru. She became tearful. She said there must be something he is going through to make him cut off all communication with her and her . She mentioned how he is walker and how they did not  him or tell him there was anything wrong with him. They raised him Orthodox. She wants to write him a letter saying she loves him and is always there for him. He is a special education arts teacher in [place], [place].    5/1/2024- She had ECT#2 today. No headaches. She feels sad and depressed when she thinks about her son Alexandru. She shares how she is angry at her friends who told her to just forget about him. She doesn't want to forget about him. She wants to know why he cut off all communication. She has doubts and wonders if it was something she did wrong. This makes her feel empty and guilty. She is thinking about writing a letter to him.     She feels anxious and depressed, but no longer hopeless. She has no suicidal ideation. Concentration and appetite have improved. Some difficulty staying asleep, but sleeping for the majority of the night.     She is eating % of her meals now.     Past Psychiatric History: Major depressive disorder treated with Lexapro, Wellbutrin, and Abilify.    Substance Use History: Alcohol use disorder, severity unspecified, in remission x 9 yrs. THC edibles at night.    Psych Family History:  None, but tells me her adopted son has mental health issues.    Social and Developmental History: Retired teacher. She lives with her  and adult son who is 41 yr old and going through a divorce. Her adopted son (from [place] per , she told me [place] initially) stopped talking to the family 7 yrs ago. He is 39 yr old.    Past Medical History:    Anxiety state    Back problem    COMPRESSION FX    Cataract    Depression    Esophageal reflux    GERD (gastroesophageal reflux disease)    Hematoma and contusion of liver    STATES HAS BEEN THERE FOR MANY YEARS    History of fractured kneecap    RIGHT    IBS (irritable bowel syndrome)    Mitral prolapse    Osteoarthritis     Past Surgical History:   Procedure Laterality Date          Correct bunion,othr methods      Correct bunion,simple      BILAT.     Dilation/curettage,diagnostic      FOR \"MOLE PREGNANCY\"    Other Right     ORIF RIGHT ANKLE    Spine surgery procedure unlisted      cervical spine 2020    Total knee replacement       Family History   Problem Relation Age of Onset    Heart Disorder Father     Cancer Mother         ESOPHAGUS      reports that she has quit smoking. Her smoking use included cigarettes. She has a 30 pack-year smoking history. She has never used smokeless tobacco. She reports that she does not drink alcohol and does not use drugs.    Allergies:  Allergies   Allergen Reactions    Radiology Contrast Iodinated Dyes HIVES     HIVES AND THROAT TIGHTENED WHILE HAVING AN MRI    Sulfa Antibiotics HIVES     \"got sicker\"    Methylprednisolone OTHER (SEE COMMENTS)    Seroquel [Quetiapine] UNKNOWN    Cinnamon RASH    Iodine (Topical) RASH       Medications:    Current Facility-Administered Medications:     [Held by provider] lithium carbonate cap 300 mg, 300 mg, Oral, Nightly    [Held by provider] pregabalin (Lyrica) cap 50 mg, 50 mg, Oral, TID    glycopyrrolate (Robinul) 0.2 MG/ML injection 0.1 mg, 0.1 mg, Intravenous,  Once    ondansetron (Zofran) 4 MG/2ML injection 4 mg, 4 mg, Intravenous, Once    ARIPiprazole (Abilify) tab 7.5 mg, 7.5 mg, Oral, Nightly    enoxaparin (Lovenox) 40 MG/0.4ML SUBQ injection 40 mg, 40 mg, Subcutaneous, Nightly    cholecalciferol (Vitamin D3) tab 1,000 Units, 1,000 Units, Oral, Daily    escitalopram (Lexapro) tab 15 mg, 15 mg, Oral, Nightly    propranolol (Inderal) tab 10 mg, 10 mg, Oral, BID    acetaminophen (Tylenol Extra Strength) tab 1,000 mg, 1,000 mg, Oral, Q8H PRN    melatonin tab 3 mg, 3 mg, Oral, Nightly PRN    ondansetron (Zofran) 4 MG/2ML injection 4 mg, 4 mg, Intravenous, Q6H PRN    polyethylene glycol (PEG 3350) (Miralax) 17 g oral packet 17 g, 17 g, Oral, Daily PRN    sennosides (Senokot) tab 17.2 mg, 17.2 mg, Oral, Nightly PRN    bisacodyl (Dulcolax) 10 MG rectal suppository 10 mg, 10 mg, Rectal, Daily PRN    benzonatate (Tessalon) cap 200 mg, 200 mg, Oral, TID PRN    guaiFENesin (Robitussin) 100 MG/5 ML oral liquid 200 mg, 200 mg, Oral, Q4H PRN    glycerin-hypromellose- (Artificial Tears) 0.2-0.2-1 % ophthalmic solution 1 drop, 1 drop, Both Eyes, QID PRN    sodium chloride (Saline Mist) 0.65 % nasal solution 1 spray, 1 spray, Each Nare, Q3H PRN    albuterol (Ventolin HFA) 108 (90 Base) MCG/ACT inhaler 2 puff, 2 puff, Inhalation, Q4H PRN    magnesium hydroxide (Milk of Magnesia) 400 MG/5ML oral suspension 30 mL, 30 mL, Oral, Nightly PRN    alum-mag hydroxide-simethicone (Maalox) 200-200-20 MG/5ML oral suspension 30 mL, 30 mL, Oral, Q4H PRN    acetaminophen (Tylenol) tab 325 mg, 325 mg, Oral, Q4H PRN **OR** acetaminophen (Tylenol) tab 650 mg, 650 mg, Oral, Q4H PRN    traZODone (Desyrel) tab 50 mg, 50 mg, Oral, Nightly PRN    Facility-Administered Medications Ordered in Other Encounters:     lactated ringers infusion, , Intravenous, Continuous    Review of Systems   Constitutional:  Positive for malaise/fatigue.   Psychiatric/Behavioral:  Positive for depression. Negative for  suicidal ideas. The patient is nervous/anxious.      Mental Status Exam:     Objective      Vitals:    05/01/24 0900   BP: 140/87   Pulse: 95   Resp: 20   Temp: 98.1 °F (36.7 °C)     Appearance: fair grooming  Behavior: cooperative, fair eye contact  Gait: not observed    Speech: soft, fluent     Mood: depressed and anxious  Affect: Congruent    Thought process: logical   Thought content: no hallucinations    Orientation: oriented person, place, and year  Attention and Concentration: improving, fair now  Memory:  intact remote and impaired recent  Language: Intact naming   Fund of Knowledge: Able to recite name of [place] president    Insight: fair now  Judgment: fair now

## 2024-05-01 NOTE — PLAN OF CARE
Assumed care at 0730  A/O x 3 - disoriented to time. Reports mood feels better today.  RA  NSR on tele  VSS  Complains of chronic back pain - tylenol given per MAR with good relief  Cardiac EP  BM today  Lovenox for VTE  Reular diet - eating well today. See flowsheets for full details.   L forearm PIV c/d/I  Up SBA with walker. Mixed incontinence.   Received medical clearance, waiting on bed availability at [facility]. Pt and pts  updated. All needs met. Will continue with plan of care.    Problem: GASTROINTESTINAL - ADULT  Goal: Maintains or returns to baseline bowel function  Description: INTERVENTIONS:  - Assess bowel function  - Maintain adequate hydration with IV or PO as ordered and tolerated  - Evaluate effectiveness of GI medications  - Encourage mobilization and activity  - Obtain nutritional consult as needed  - Establish a toileting routine/schedule  - Consider collaborating with pharmacy to review patient's medication profile  Outcome: Progressing  Goal: Maintains adequate nutritional intake (undernourished)  Description: INTERVENTIONS:  - Monitor percentage of each meal consumed  - Identify factors contributing to decreased intake, treat as appropriate  - Assist with meals as needed  - Monitor I&O, WT and lab values  - Obtain nutritional consult as needed  - Optimize oral hygiene and moisture  - Encourage food from home; allow for food preferences  - Enhance eating environment  Outcome: Progressing     Problem: METABOLIC/FLUID AND ELECTROLYTES - ADULT  Goal: Electrolytes maintained within normal limits  Description: INTERVENTIONS:  - Monitor labs and rhythm and assess patient for signs and symptoms of electrolyte imbalances  - Administer electrolyte replacement as ordered  - Monitor response to electrolyte replacements, including rhythm and repeat lab results as appropriate  - Fluid restriction as ordered  - Instruct patient on fluid and nutrition restrictions as appropriate  Outcome: Progressing      Problem: MUSCULOSKELETAL - ADULT  Goal: Return mobility to safest level of function  Description: INTERVENTIONS:  - Assess patient stability and activity tolerance for standing, transferring and ambulating w/ or w/o assistive devices  - Assist with transfers and ambulation using safe patient handling equipment as needed  - Ensure adequate protection for wounds/incisions during mobilization  - Obtain PT/OT consults as needed  - Advance activity as appropriate  - Communicate ordered activity level and limitations with patient/family  Outcome: Progressing     Problem: NEUROLOGICAL - ADULT  Goal: Achieves stable or improved neurological status  Description: INTERVENTIONS  - Assess for and report changes in neurological status  - Initiate measures to prevent increased intracranial pressure  - Maintain blood pressure and fluid volume within ordered parameters to optimize cerebral perfusion and minimize risk of hemorrhage  - Monitor temperature, glucose, and sodium. Initiate appropriate interventions as ordered  Outcome: Progressing     Problem: SAFETY ADULT - FALL  Goal: Free from fall injury  Description: INTERVENTIONS:  - Assess pt frequently for physical needs  - Identify cognitive and physical deficits and behaviors that affect risk of falls.  - [place] fall precautions as indicated by assessment.  - Educate pt/family on patient safety including physical limitations  - Instruct pt to call for assistance with activity based on assessment  - Modify environment to reduce risk of injury  - Provide assistive devices as appropriate  - Consider OT/PT consult to assist with strengthening/mobility  - Encourage toileting schedule  Outcome: Progressing     Problem: DISCHARGE PLANNING  Goal: Discharge to home or other facility with appropriate resources  Description: INTERVENTIONS:  - Identify barriers to discharge w/pt and caregiver  - Include patient/family/discharge partner in discharge planning  - Arrange for needed  discharge resources and transportation as appropriate  - Identify discharge learning needs (meds, wound care, etc)  - Arrange for interpreters to assist at discharge as needed  - Consider post-discharge preferences of patient/family/discharge partner  - Complete POLST form as appropriate  - Assess patient's ability to be responsible for managing their own health  - Refer to Case Management Department for coordinating discharge planning if the patient needs post-hospital services based on physician/LIP order or complex needs related to functional status, cognitive ability or social support system  Outcome: Progressing

## 2024-05-01 NOTE — PLAN OF CARE
Assumed care at 1930. A&O X3, confused at times. RA, VSS, NSR on tele. Cardiac electrolyte replacement protocol. Lab draw. PIV in L forearm flushed and capped. Dressing CDI. Lovenox for VTE. Regular diet, NPO midnight for ECT in AM. Did not eat anything during shift. 2 BM on day shift, bowel sounds active, denies nausea. Chronic back pain, PRN tylenol- see MAR. PRN melatonin. Up 1 with walker. Medically cleared, waiting for bed at [facility].     Problem: GASTROINTESTINAL - ADULT  Goal: Maintains or returns to baseline bowel function  Description: INTERVENTIONS:  - Assess bowel function  - Maintain adequate hydration with IV or PO as ordered and tolerated  - Evaluate effectiveness of GI medications  - Encourage mobilization and activity  - Obtain nutritional consult as needed  - Establish a toileting routine/schedule  - Consider collaborating with pharmacy to review patient's medication profile  Outcome: Progressing  Goal: Maintains adequate nutritional intake (undernourished)  Description: INTERVENTIONS:  - Monitor percentage of each meal consumed  - Identify factors contributing to decreased intake, treat as appropriate  - Assist with meals as needed  - Monitor I&O, WT and lab values  - Obtain nutritional consult as needed  - Optimize oral hygiene and moisture  - Encourage food from home; allow for food preferences  - Enhance eating environment  Outcome: Progressing     Problem: METABOLIC/FLUID AND ELECTROLYTES - ADULT  Goal: Electrolytes maintained within normal limits  Description: INTERVENTIONS:  - Monitor labs and rhythm and assess patient for signs and symptoms of electrolyte imbalances  - Administer electrolyte replacement as ordered  - Monitor response to electrolyte replacements, including rhythm and repeat lab results as appropriate  - Fluid restriction as ordered  - Instruct patient on fluid and nutrition restrictions as appropriate  Outcome: Progressing     Problem: MUSCULOSKELETAL - ADULT  Goal: Return  mobility to safest level of function  Description: INTERVENTIONS:  - Assess patient stability and activity tolerance for standing, transferring and ambulating w/ or w/o assistive devices  - Assist with transfers and ambulation using safe patient handling equipment as needed  - Ensure adequate protection for wounds/incisions during mobilization  - Obtain PT/OT consults as needed  - Advance activity as appropriate  - Communicate ordered activity level and limitations with patient/family  Outcome: Progressing     Problem: NEUROLOGICAL - ADULT  Goal: Achieves stable or improved neurological status  Description: INTERVENTIONS  - Assess for and report changes in neurological status  - Initiate measures to prevent increased intracranial pressure  - Maintain blood pressure and fluid volume within ordered parameters to optimize cerebral perfusion and minimize risk of hemorrhage  - Monitor temperature, glucose, and sodium. Initiate appropriate interventions as ordered  Outcome: Progressing     Problem: SAFETY ADULT - FALL  Goal: Free from fall injury  Description: INTERVENTIONS:  - Assess pt frequently for physical needs  - Identify cognitive and physical deficits and behaviors that affect risk of falls.  - [place] fall precautions as indicated by assessment.  - Educate pt/family on patient safety including physical limitations  - Instruct pt to call for assistance with activity based on assessment  - Modify environment to reduce risk of injury  - Provide assistive devices as appropriate  - Consider OT/PT consult to assist with strengthening/mobility  - Encourage toileting schedule  Outcome: Progressing     Problem: DISCHARGE PLANNING  Goal: Discharge to home or other facility with appropriate resources  Description: INTERVENTIONS:  - Identify barriers to discharge w/pt and caregiver  - Include patient/family/discharge partner in discharge planning  - Arrange for needed discharge resources and transportation as  appropriate  - Identify discharge learning needs (meds, wound care, etc)  - Arrange for interpreters to assist at discharge as needed  - Consider post-discharge preferences of patient/family/discharge partner  - Complete POLST form as appropriate  - Assess patient's ability to be responsible for managing their own health  - Refer to Case Management Department for coordinating discharge planning if the patient needs post-hospital services based on physician/LIP order or complex needs related to functional status, cognitive ability or social support system  Outcome: Progressing     Problem: Delirium  Goal: Minimize duration of delirium  Description: Interventions:  - Encourage use of hearing aids, eye glasses  - Promote highest level of mobility daily  - Provide frequent reorientation  - Promote wakefulness i.e. lights on, blinds open  - Promote sleep, encourage patient's normal rest cycle i.e. lights off, TV off, minimize noise and interruptions  - Encourage family to assist in orientation and promotion of home routines  Outcome: Progressing

## 2024-05-01 NOTE — PROGRESS NOTES
[facility]   part of [facility]     Hospitalist Progress Note     Meli Antonio Patient Status:  Inpatient    11/3/1950 MRN [id number]   Location [facility] 4NW-A Attending Juan C Suazo,    Hosp Day # 10 PCP SIRI TABOR     Chief Complaint: Abnormal labs    Subjective:     Doing well. No complaints. Ate most of her meal     Objective:    Review of Systems:   Limited but as above     Vital signs:  Temp:  [97.8 °F (36.6 °C)-98.4 °F (36.9 °C)] 98.2 °F (36.8 °C)  Pulse:  [] 70  Resp:  [15-20] 18  BP: (102-143)/(59-94) 120/72  SpO2:  [93 %-100 %] 96 %    Physical Exam:    /72 (BP Location: Left arm)   Pulse 70   Temp 98.2 °F (36.8 °C) (Oral)   Resp 18   Wt 110 lb 3.7 oz (50 kg)   SpO2 96%   BMI 21.53 kg/m²     General: No acute distress, Awake, confused   Respiratory: No wheezes, no rhonchi  Cardiovascular: S1, S2, regular rate and rhythm  Abdomen: Soft, Non-tender, non-distended, positive bowel sounds  Neuro: No new focal deficits.   Extremities: No edema      Diagnostic Data:    Labs:  Recent Labs   Lab 24  0551 24  0656   WBC 14.0* 13.1*   HGB 13.5 13.4   MCV 87.0 89.2   .0 325.0       Recent Labs   Lab 24  0349 24  0656   GLU  --  130*   BUN  --  11   CREATSERUM  --  0.81   CA  --  9.4   NA  --  141   K 4.3 4.0   CL  --  111   CO2  --  26.0       Estimated Creatinine Clearance: 44.4 mL/min (based on SCr of 0.81 mg/dL).    No results for input(s): \"TROP\", \"TROPHS\", \"CK\" in the last 168 hours.    No results for input(s): \"PTP\", \"INR\" in the last 168 hours.               Microbiology    Hospital Encounter on 24   1. Blood Culture     Status: None    Collection Time: 24 11:06 AM    Specimen: Blood,peripheral   Result Value Ref Range    Blood Culture Result No Growth 5 Days N/A         Imaging: Reviewed in Epic.    Medications:    lithium carbonate  300 mg Oral Nightly    pregabalin  50 mg Oral TID    glycopyrrolate  0.1 mg  Intravenous Once    ondansetron  4 mg Intravenous Once    ARIPiprazole  7.5 mg Oral Nightly    enoxaparin  40 mg Subcutaneous Nightly    cholecalciferol  1,000 Units Oral Daily    escitalopram  15 mg Oral Nightly    propranolol  10 mg Oral BID       Assessment & Plan:      # Major depression, generalized anxiety disorder  #Decreased PO intake   -at [facility] prior to this admission  -sp ect - d/w psych. 3x/week    #Leukocytosis  -no bacterial infectious etiology identified, hold further antibiotics   -one time low grade fever - monitor for recurrence   -Blood Cultures NGTD  -UA negative on 4/21/2024  -Chest x-ray done on 4/21/2024 with no evidence of active cardiopulmonary disease  -procalcitonin level negative less than 0.05  -White count improving. Completely asymptomatic. Dw pt to notify if any symptoms occur     #Hypernatremia 2/2 free water deficit due to dehydration  -resolved     #SUPA  -resolved with gentle hydration   -No NSAIDs    #Type II DM  #Fasting Hyperglycemia   -not on any meds at home  -A1c is 6.9%    DISPO: ok to [place]. No beds at [facility]. To go tomorrow if bed available     Dr Ben Skinner     Supplementary Documentation:     Quality:  DVT Mechanical Prophylaxis:     Early ambuation  DVT Pharmacologic Prophylaxis   Medication    enoxaparin (Lovenox) 40 MG/0.4ML SUBQ injection 40 mg                Code Status: Full Code  [place]: External urinary catheter in place    The 21st Century Cures Act makes medical notes like these available to patients in the interest of transparency. Please be advised this is a medical document. Medical documents are intended to carry relevant information, facts as evident, and the clinical opinion of the practitioner. The medical note is intended as peer to peer communication and may appear blunt or direct. It is written in medical language and may contain abbreviations or verbiage that are unfamiliar.             **Certification      PHYSICIAN Certification of Need for Inpatient  Hospitalization - Initial Certification    Patient will require inpatient services that will reasonably be expected to span two midnight's based on the clinical documentation in H+P.   Based on patients current state of illness, I anticipate that, after discharge, patient will require TBD.

## 2024-05-01 NOTE — PLAN OF CARE
Assume care @ 0730  AO X3 with occasional forgetfulness, Verbally responsive, RA  NSR on Tele. ECT #2 done today.   C/o of chronic back pain. PRN tylenol administered with relief  Continent. X1 assist with walker  Lovenox for VTE  Good appetite with breakfast and lunch. Call light within place. Fall and safety precautions in place.      Problem: GASTROINTESTINAL - ADULT  Goal: Maintains or returns to baseline bowel function  Description: INTERVENTIONS:  - Assess bowel function  - Maintain adequate hydration with IV or PO as ordered and tolerated  - Evaluate effectiveness of GI medications  - Encourage mobilization and activity  - Obtain nutritional consult as needed  - Establish a toileting routine/schedule  - Consider collaborating with pharmacy to review patient's medication profile  Outcome: Progressing  Goal: Maintains adequate nutritional intake (undernourished)  Description: INTERVENTIONS:  - Monitor percentage of each meal consumed  - Identify factors contributing to decreased intake, treat as appropriate  - Assist with meals as needed  - Monitor I&O, WT and lab values  - Obtain nutritional consult as needed  - Optimize oral hygiene and moisture  - Encourage food from home; allow for food preferences  - Enhance eating environment  Outcome: Progressing     Problem: METABOLIC/FLUID AND ELECTROLYTES - ADULT  Goal: Electrolytes maintained within normal limits  Description: INTERVENTIONS:  - Monitor labs and rhythm and assess patient for signs and symptoms of electrolyte imbalances  - Administer electrolyte replacement as ordered  - Monitor response to electrolyte replacements, including rhythm and repeat lab results as appropriate  - Fluid restriction as ordered  - Instruct patient on fluid and nutrition restrictions as appropriate  Outcome: Progressing     Problem: MUSCULOSKELETAL - ADULT  Goal: Return mobility to safest level of function  Description: INTERVENTIONS:  - Assess patient stability and activity  tolerance for standing, transferring and ambulating w/ or w/o assistive devices  - Assist with transfers and ambulation using safe patient handling equipment as needed  - Ensure adequate protection for wounds/incisions during mobilization  - Obtain PT/OT consults as needed  - Advance activity as appropriate  - Communicate ordered activity level and limitations with patient/family  Outcome: Progressing     Problem: NEUROLOGICAL - ADULT  Goal: Achieves stable or improved neurological status  Description: INTERVENTIONS  - Assess for and report changes in neurological status  - Initiate measures to prevent increased intracranial pressure  - Maintain blood pressure and fluid volume within ordered parameters to optimize cerebral perfusion and minimize risk of hemorrhage  - Monitor temperature, glucose, and sodium. Initiate appropriate interventions as ordered  Outcome: Progressing     Problem: SAFETY ADULT - FALL  Goal: Free from fall injury  Description: INTERVENTIONS:  - Assess pt frequently for physical needs  - Identify cognitive and physical deficits and behaviors that affect risk of falls.  - [place] fall precautions as indicated by assessment.  - Educate pt/family on patient safety including physical limitations  - Instruct pt to call for assistance with activity based on assessment  - Modify environment to reduce risk of injury  - Provide assistive devices as appropriate  - Consider OT/PT consult to assist with strengthening/mobility  - Encourage toileting schedule  Outcome: Progressing     Problem: DISCHARGE PLANNING  Goal: Discharge to home or other facility with appropriate resources  Description: INTERVENTIONS:  - Identify barriers to discharge w/pt and caregiver  - Include patient/family/discharge partner in discharge planning  - Arrange for needed discharge resources and transportation as appropriate  - Identify discharge learning needs (meds, wound care, etc)  - Arrange for interpreters to assist at  discharge as needed  - Consider post-discharge preferences of patient/family/discharge partner  - Complete POLST form as appropriate  - Assess patient's ability to be responsible for managing their own health  - Refer to Case Management Department for coordinating discharge planning if the patient needs post-hospital services based on physician/LIP order or complex needs related to functional status, cognitive ability or social support system  Outcome: Progressing     Problem: Delirium  Goal: Minimize duration of delirium  Description: Interventions:  - Encourage use of hearing aids, eye glasses  - Promote highest level of mobility daily  - Provide frequent reorientation  - Promote wakefulness i.e. lights on, blinds open  - Promote sleep, encourage patient's normal rest cycle i.e. lights off, TV off, minimize noise and interruptions  - Encourage family to assist in orientation and promotion of home routines  Outcome: Progressing

## 2024-05-01 NOTE — ANESTHESIA PREPROCEDURE EVALUATION
PRE-OP EVALUATION    Patient Name: Meli Antonio    Admit Diagnosis: Major depressive disorder, recurrent episode, severe with catatonia (HCC) [F33.2, F06.1]    Pre-op Diagnosis: * No surgery found *        Anesthesia Procedure: ECT(BEDSIDE)    * Surgery not found *    Pre-op vitals reviewed.  Temp: 98.4 °F (36.9 °C)  Pulse: 75  Resp: 16  BP: 102/59  SpO2: 95 %  There is no height or weight on file to calculate BMI.    Current medications reviewed.  Hospital Medications:   lactated ringers infusion   Intravenous Continuous    [COMPLETED] glycopyrrolate (Robinul) 0.2 MG/ML injection 0.1 mg  0.1 mg Intravenous Once    [COMPLETED] ketorolac (Toradol) 30 MG/ML injection 15 mg  15 mg Intravenous Once    [COMPLETED] ondansetron (Zofran) 4 MG/2ML injection 4 mg  4 mg Intravenous Once    [COMPLETED] caffeine-sodium benzoate 125-125 mg/mL injection  125 mg Intravenous Once    [Held by provider] lithium carbonate cap 300 mg  300 mg Oral Nightly    [Held by provider] pregabalin (Lyrica) cap 50 mg  50 mg Oral TID    glycopyrrolate (Robinul) 0.2 MG/ML injection 0.1 mg  0.1 mg Intravenous Once    ondansetron (Zofran) 4 MG/2ML injection 4 mg  4 mg Intravenous Once    ARIPiprazole (Abilify) tab 7.5 mg  7.5 mg Oral Nightly    enoxaparin (Lovenox) 40 MG/0.4ML SUBQ injection 40 mg  40 mg Subcutaneous Nightly    cholecalciferol (Vitamin D3) tab 1,000 Units  1,000 Units Oral Daily    escitalopram (Lexapro) tab 15 mg  15 mg Oral Nightly    propranolol (Inderal) tab 10 mg  10 mg Oral BID    acetaminophen (Tylenol Extra Strength) tab 1,000 mg  1,000 mg Oral Q8H PRN    melatonin tab 3 mg  3 mg Oral Nightly PRN    ondansetron (Zofran) 4 MG/2ML injection 4 mg  4 mg Intravenous Q6H PRN    polyethylene glycol (PEG 3350) (Miralax) 17 g oral packet 17 g  17 g Oral Daily PRN    sennosides (Senokot) tab 17.2 mg  17.2 mg Oral Nightly PRN    bisacodyl (Dulcolax) 10 MG rectal suppository 10 mg  10 mg Rectal Daily PRN    benzonatate (Tessalon) cap  200 mg  200 mg Oral TID PRN    guaiFENesin (Robitussin) 100 MG/5 ML oral liquid 200 mg  200 mg Oral Q4H PRN    glycerin-hypromellose- (Artificial Tears) 0.2-0.2-1 % ophthalmic solution 1 drop  1 drop Both Eyes QID PRN    sodium chloride (Saline Mist) 0.65 % nasal solution 1 spray  1 spray Each Nare Q3H PRN    albuterol (Ventolin HFA) 108 (90 Base) MCG/ACT inhaler 2 puff  2 puff Inhalation Q4H PRN    magnesium hydroxide (Milk of Magnesia) 400 MG/5ML oral suspension 30 mL  30 mL Oral Nightly PRN    alum-mag hydroxide-simethicone (Maalox) 200-200-20 MG/5ML oral suspension 30 mL  30 mL Oral Q4H PRN    acetaminophen (Tylenol) tab 325 mg  325 mg Oral Q4H PRN    Or    acetaminophen (Tylenol) tab 650 mg  650 mg Oral Q4H PRN    traZODone (Desyrel) tab 50 mg  50 mg Oral Nightly PRN       Outpatient Medications:   (Not in a hospital admission)      Allergies: Radiology contrast iodinated dyes, Sulfa antibiotics, Methylprednisolone, Seroquel [quetiapine], Cinnamon, and Iodine (topical)      Anesthesia Evaluation    Patient summary reviewed.    Anesthetic Complications  (-) history of anesthetic complications         GI/Hepatic/Renal      (+) GERD                      (+) irritable bowel syndrome     Cardiovascular    Negative cardiovascular ROS.    Exercise tolerance: good     MET: >4                                           Endo/Other      (+) diabetes and well controlled, type 2, not using insulin                         Pulmonary    Negative pulmonary ROS.                       Neuro/Psych      (+) depression  (+) anxiety                              Past Surgical History:   Procedure Laterality Date          Correct bunion,othr methods      Correct bunion,simple      BILAT.     Dilation/curettage,diagnostic      FOR \"MOLE PREGNANCY\"    Other Right     ORIF RIGHT ANKLE    Spine surgery procedure unlisted      cervical spine 2020    Total knee replacement       Social History     Socioeconomic  History    Marital status:    Tobacco Use    Smoking status: Former     Current packs/day: 2.00     Average packs/day: 2.0 packs/day for 15.0 years (30.0 ttl pk-yrs)     Types: Cigarettes    Smokeless tobacco: Never    Tobacco comments:     QUIT IN 1983   Vaping Use    Vaping status: Never Used   Substance and Sexual Activity    Alcohol use: No    Drug use: No     History   Drug Use No     Available pre-op labs reviewed.  Lab Results   Component Value Date    WBC 13.1 (H) 04/28/2024    RBC 4.73 04/28/2024    HGB 13.4 04/28/2024    HCT 42.2 04/28/2024    MCV 89.2 04/28/2024    MCH 28.3 04/28/2024    MCHC 31.8 04/28/2024    RDW 14.2 04/28/2024    .0 04/28/2024     Lab Results   Component Value Date     04/28/2024    K 4.0 04/28/2024     04/28/2024    CO2 26.0 04/28/2024    BUN 11 04/28/2024    CREATSERUM 0.81 04/28/2024     (H) 04/28/2024    CA 9.4 04/28/2024            Airway      Mallampati: II  Mouth opening: >3 FB  TM distance: > 6 cm  Neck ROM: full Cardiovascular    Cardiovascular exam normal.  Rhythm: regular  Rate: normal     Dental    Dentition appears grossly intact         Pulmonary    Pulmonary exam normal.  Breath sounds clear to auscultation bilaterally.               Other findings              ASA: 2   Plan: general  NPO status verified and patient meets guidelines.    Post-procedure pain management plan discussed with surgeon and patient.      Plan/risks discussed with: patient                Present on Admission:  **None**

## 2024-05-01 NOTE — ANESTHESIA POSTPROCEDURE EVALUATION
[facility]    Meli Antonio Patient Status:  Outpatient   Age/Gender 73 year old female MRN [id number]   Location [facility] POST ANESTHESIA CARE UNIT Attending Agapito Romero MD   Hosp Day # 0 PCP SIRI TABOR       Anesthesia Post-op Note        Procedure Summary       Date: 05/01/24 Room / Location: [facility] Post Anesthesia Care Unit    Anesthesia Start: 0644 Anesthesia Stop:     Procedure: ECT(BEDSIDE) Diagnosis: Major depressive disorder, recurrent episode, severe with catatonia (HCC)    Scheduled Providers:  Anesthesiologist: Getachew Abrams MD    Anesthesia Type: general ASA Status: 2            Anesthesia Type: general    Vitals Value Taken Time   /68 05/01/24 0656   Temp  05/01/24 0657   Pulse 62 05/01/24 0656   Resp 16 05/01/24 0656   SpO2 99 05/01/24 0657       Patient Location: PACU    Anesthesia Type: general    Airway Patency: patent    Postop Pain Control: adequate    Mental Status: mildly sedated but able to meaningfully participate in the post-anesthesia evaluation    Nausea/Vomiting: none    Cardiopulmonary/Hydration status: stable euvolemic    Complications: no apparent anesthesia related complications    Postop vital signs: stable    Dental Exam: Unchanged from Preop    Patient to be discharged from PACU when criteria met.

## 2024-05-01 NOTE — PROGRESS NOTES
[facility]  ECT History & Physical    Meli Antonio Patient Status:  Outpatient   Age/Gender 73 year old female MRN [id number]   Location [facility] POST ANESTHESIA CARE UNIT Attending Agapito Romero MD   Hosp Day # 0 PCP SIRI TABOR     Date: 2024    Diagnosis: Major depression recurrent severe    Procedure:  ECT    HPI:     Patient seen.  Patient reports no cognitive or physical complaints      Medical History:  Past Medical History:    Anxiety state    Back problem    COMPRESSION FX    Cataract    Depression    Esophageal reflux    GERD (gastroesophageal reflux disease)    Hematoma and contusion of liver    STATES HAS BEEN THERE FOR MANY YEARS    History of fractured kneecap    RIGHT    IBS (irritable bowel syndrome)    Mitral prolapse    Osteoarthritis       Surgical History:  Past Surgical History:   Procedure Laterality Date          Correct bunion,othr methods      Correct bunion,simple      BILAT.     Dilation/curettage,diagnostic      FOR \"MOLE PREGNANCY\"    Other Right     ORIF RIGHT ANKLE    Spine surgery procedure unlisted      cervical spine 2020    Total knee replacement         Family History:  Family History   Problem Relation Age of Onset    Heart Disorder Father     Cancer Mother         ESOPHAGUS       ROS:  Unremarkable    Physical Exam:   /77   Pulse 84   Temp 98.3 °F (36.8 °C) (Temporal)   Resp 18   SpO2 94%     General Appearance:    Alert, cooperative, no distress, appears stated age   Head:    Normocephalic, without obvious abnormality, atraumatic   Eyes:    PERRL, conjunctiva/corneas clear, EOM's intact       Nose:   Nares normal, septum midline, mucosa normal, no drainage    or sinus tenderness   Throat:   Lips, mucosa, and tongue normal; teeth and gums normal   Neck:   Supple, symmetrical, trachea midline   Lungs:     Clear to auscultation bilaterally, respirations unlabored    Heart:    Regular rate and rhythm, S1 and S2  normal, no murmur, rub   or gallop   Abdomen:     Soft, non-tender, bowel sounds active all four quadrants,     no masses, no organomegaly   Extremities:   Extremities normal, atraumatic, no cyanosis or edema   Pulses:   2+ and symmetric all extremities   Skin:   Skin color, texture, turgor normal, no rashes or lesions   Neurologic:   CNII-XII intact, normal strength, sensation and reflexes     throughout     Impressions & Plans: Major depression recurrent severe.  Bifrontal ECT    I have discussed the risks and benefits and alternatives with the patient/family.  They understand and agree to proceed with plan of care.    Agapito Romero MD

## 2024-05-01 NOTE — PROGRESS NOTES
[facility]  ECT Procedure Note    Meli Antonio Patient Status:  Outpatient   Age/Gender 73 year old female MRN [id number]   Location [facility] POST ANESTHESIA CARE UNIT Attending Agapito Romero MD   Hosp Day # 0 PCP SIRI TABOR     5/1/2024    ECT Number: #2    Diagnosis: Major Depression Recurrent Severe Without Psychotic Features. F33.2    Type of ECT:  Bifrontal    Place of Service:  Inpatient    Settings:   1.  Energy Percentage: 60%    2.  Program:  Low 0.5    Pre-ECT Evaluation    Symptoms:  Patient seen.  Patient noted that she has had some continuation of dysphoria and anxiety but there has been some improvements.  Patient noted no cognitive or physical complaints from first ECT.  Patient has POA who has consented to ECT but also patient appears to be showing some signs of understanding and she would like to continue ECT.  She reports no suicidal thoughts today.    Risk/Benefits:  Discussed with patient side effects of ECT including headache, teeth, jaw, cardiac, pulmonary, NPO, aspiration, allergic reactions, anesthetic reactions, musculoskeletal, neurologic, morbidity/mortality, potential lack of efficacy, unilateral/bilateral ECT, relapse/maintenance issues, cognitive risks including memory, concentration, cognition, and other risks.    Side Effects:  Patient notes no cognitive/physical complaints    Exam:  Mood:  While dysphoric and anxious, patient showing some mild improvement  Affect: Congruent  Memory:  intact immediate, recent, remote and as evidenced by ability to present consistent history  Concentration:   fair  Suicidal ideation: no suicidal ideation    Prior to procedure, reviewed with treatment team correct patient, time of procedure and type of ECT.  Also reviewed with anesthesia pre-ECT medications.    Patient Monitored:  B/P, EKG, EEG, Pulse Ox, Left Ankle Cuff    Pre-ECT Medications: Robinul 0.1 mg IV, Toradol 15 mg IV, Zofran 4 mg IV, and caffeine  125 mg IV    ECT Medications:  Anesthetic  Etomidate 10 mg IV followed by ketamine 25 mg IV and Succinylcholine 60 mg IV    Seizure Duration:  Motor: 27 seconds       EE seconds    Post-ECT Condition:  Treatment unremarkable    Post-ECT Medications:  None     Agapito Romero MD

## 2024-05-02 PROCEDURE — 99231 SBSQ HOSP IP/OBS SF/LOW 25: CPT | Performed by: HOSPITALIST

## 2024-05-02 PROCEDURE — 99232 SBSQ HOSP IP/OBS MODERATE 35: CPT | Performed by: OTHER

## 2024-05-02 NOTE — PROGRESS NOTES
[facility]   part of [facility]     Hospitalist Progress Note     Meli Antonio Patient Status:  Inpatient    11/3/1950 MRN [id number]   Location [facility] 4NW-A Attending Juan C Suazo,    Hosp Day # 11 PCP SIRI TABOR     Chief Complaint: Abnormal labs    Subjective:     Doing well. No complaints. Ate most of her meal     Objective:    Review of Systems:   Limited but as above     Vital signs:  Temp:  [97.4 °F (36.3 °C)-97.9 °F (36.6 °C)] 97.4 °F (36.3 °C)  Pulse:  [59-96] 87  Resp:  [18-20] 18  BP: (112-132)/(79-89) 112/79  SpO2:  [90 %-95 %] 94 %    Physical Exam:    /79 (BP Location: Left arm)   Pulse 87   Temp 97.4 °F (36.3 °C) (Oral)   Resp 18   Wt 110 lb 3.7 oz (50 kg)   SpO2 94%   BMI 21.53 kg/m²     General: No acute distress, Awake, confused   Respiratory: No wheezes, no rhonchi  Cardiovascular: S1, S2, regular rate and rhythm  Abdomen: Soft, Non-tender, non-distended, positive bowel sounds  Neuro: No new focal deficits.   Extremities: No edema      Diagnostic Data:    Labs:  Recent Labs   Lab 24  0551 24  0656   WBC 14.0* 13.1*   HGB 13.5 13.4   MCV 87.0 89.2   .0 325.0       Recent Labs   Lab 24  0656   *   BUN 11   CREATSERUM 0.81   CA 9.4      K 4.0      CO2 26.0       Estimated Creatinine Clearance: 44.4 mL/min (based on SCr of 0.81 mg/dL).    No results for input(s): \"TROP\", \"TROPHS\", \"CK\" in the last 168 hours.    No results for input(s): \"PTP\", \"INR\" in the last 168 hours.               Microbiology    Hospital Encounter on 24   1. Blood Culture     Status: None    Collection Time: 24 11:06 AM    Specimen: Blood,peripheral   Result Value Ref Range    Blood Culture Result No Growth 5 Days N/A         Imaging: Reviewed in Epic.    Medications:    [Held by provider] lithium carbonate  300 mg Oral Nightly    [Held by provider] pregabalin  50 mg Oral TID    glycopyrrolate  0.1 mg Intravenous Once     ondansetron  4 mg Intravenous Once    ARIPiprazole  7.5 mg Oral Nightly    enoxaparin  40 mg Subcutaneous Nightly    cholecalciferol  1,000 Units Oral Daily    escitalopram  15 mg Oral Nightly    propranolol  10 mg Oral BID       Assessment & Plan:      # Major depression, generalized anxiety disorder  #Decreased PO intake   -at [facility] prior to this admission  -sp ect - d/w psych. 3x/week    #Leukocytosis  -no bacterial infectious etiology identified, hold further antibiotics   -one time low grade fever - monitor for recurrence   -Blood Cultures NGTD  -UA negative on 4/21/2024  -Chest x-ray done on 4/21/2024 with no evidence of active cardiopulmonary disease  -procalcitonin level negative less than 0.05  -White count improving. Completely asymptomatic. Dw pt to notify if any symptoms occur     #Hypernatremia 2/2 free water deficit due to dehydration  -resolved     #SUPA  -resolved with gentle hydration   -No NSAIDs    #Type II DM  #Fasting Hyperglycemia   -not on any meds at home  -A1c is 6.9%    DISPO: ok to OK. Still no beds at [facility]. D/w team To go tomorrow if bed available     Dr Ben Skinner     Supplementary Documentation:     Quality:  DVT Mechanical Prophylaxis:     Early ambuation  DVT Pharmacologic Prophylaxis   Medication    enoxaparin (Lovenox) 40 MG/0.4ML SUBQ injection 40 mg                Code Status: Full Code  [place]: External urinary catheter in place    The 21st Century Cures Act makes medical notes like these available to patients in the interest of transparency. Please be advised this is a medical document. Medical documents are intended to carry relevant information, facts as evident, and the clinical opinion of the practitioner. The medical note is intended as peer to peer communication and may appear blunt or direct. It is written in medical language and may contain abbreviations or verbiage that are unfamiliar.             **Certification      PHYSICIAN Certification of Need for Inpatient  Hospitalization - Initial Certification    Patient will require inpatient services that will reasonably be expected to span two midnight's based on the clinical documentation in H+P.   Based on patients current state of illness, I anticipate that, after discharge, patient will require TBD.

## 2024-05-02 NOTE — PLAN OF CARE
Assumed care at 0730  A/O x 4 but forgetful at times.   RA  NSR on tele  VSS  Complains of back pain - tylenol given per MAR with good relief.   Up SBA. Incontinent at times pull up brief on.   Regular diet eating well  Cardiac EP no needs  Lovenox for VTE  BM today  Awaiting bed availability at [facility]  All needs met. Pt updated. Will continue with plan of care.    Problem: METABOLIC/FLUID AND ELECTROLYTES - ADULT  Goal: Electrolytes maintained within normal limits  Description: INTERVENTIONS:  - Monitor labs and rhythm and assess patient for signs and symptoms of electrolyte imbalances  - Administer electrolyte replacement as ordered  - Monitor response to electrolyte replacements, including rhythm and repeat lab results as appropriate  - Fluid restriction as ordered  - Instruct patient on fluid and nutrition restrictions as appropriate  Outcome: Progressing     Problem: MUSCULOSKELETAL - ADULT  Goal: Return mobility to safest level of function  Description: INTERVENTIONS:  - Assess patient stability and activity tolerance for standing, transferring and ambulating w/ or w/o assistive devices  - Assist with transfers and ambulation using safe patient handling equipment as needed  - Ensure adequate protection for wounds/incisions during mobilization  - Obtain PT/OT consults as needed  - Advance activity as appropriate  - Communicate ordered activity level and limitations with patient/family  Outcome: Progressing     Problem: NEUROLOGICAL - ADULT  Goal: Achieves stable or improved neurological status  Description: INTERVENTIONS  - Assess for and report changes in neurological status  - Initiate measures to prevent increased intracranial pressure  - Maintain blood pressure and fluid volume within ordered parameters to optimize cerebral perfusion and minimize risk of hemorrhage  - Monitor temperature, glucose, and sodium. Initiate appropriate interventions as ordered  Outcome: Progressing     Problem: SAFETY ADULT -  FALL  Goal: Free from fall injury  Description: INTERVENTIONS:  - Assess pt frequently for physical needs  - Identify cognitive and physical deficits and behaviors that affect risk of falls.  - [place] fall precautions as indicated by assessment.  - Educate pt/family on patient safety including physical limitations  - Instruct pt to call for assistance with activity based on assessment  - Modify environment to reduce risk of injury  - Provide assistive devices as appropriate  - Consider OT/PT consult to assist with strengthening/mobility  - Encourage toileting schedule  Outcome: Progressing     Problem: DISCHARGE PLANNING  Goal: Discharge to home or other facility with appropriate resources  Description: INTERVENTIONS:  - Identify barriers to discharge w/pt and caregiver  - Include patient/family/discharge partner in discharge planning  - Arrange for needed discharge resources and transportation as appropriate  - Identify discharge learning needs (meds, wound care, etc)  - Arrange for interpreters to assist at discharge as needed  - Consider post-discharge preferences of patient/family/discharge partner  - Complete POLST form as appropriate  - Assess patient's ability to be responsible for managing their own health  - Refer to Case Management Department for coordinating discharge planning if the patient needs post-hospital services based on physician/LIP order or complex needs related to functional status, cognitive ability or social support system  Outcome: Progressing     Problem: Delirium  Goal: Minimize duration of delirium  Description: Interventions:  - Encourage use of hearing aids, eye glasses  - Promote highest level of mobility daily  - Provide frequent reorientation  - Promote wakefulness i.e. lights on, blinds open  - Promote sleep, encourage patient's normal rest cycle i.e. lights off, TV off, minimize noise and interruptions  - Encourage family to assist in orientation and promotion of home  routines  Outcome: Progressing

## 2024-05-02 NOTE — PROGRESS NOTES
Problem: Falls - Risk of:  Goal: Will remain free from falls  Description: Will remain free from falls  2/17/2021 2238 by Deandra Ewing RN  Outcome: Ongoing  Note: Pt using call light appropriately to call for assistance with ambulation to the bathroom and to chair. Pt is also compliant with use of non-skid slippers. Pt reports understanding of fall prevention when discussed.    2/17/2021 1127 by Nehemiah Siemens, RN  Outcome: Ongoing  Goal: Absence of physical injury  Description: Absence of physical injury  2/17/2021 2238 by Deandra Ewing RN  Outcome: Ongoing  Note: Hourly rounding complete, no signs on physical injury at this time  2/17/2021 1127 by Nehemiah Siemens, RN  Outcome: Ongoing     Problem: Pain:  Goal: Pain level will decrease  Description: Pain level will decrease  2/17/2021 2238 by Deandra Ewing RN  Outcome: Ongoing  2/17/2021 1127 by Nehemiah Siemens, RN  Outcome: Ongoing  Goal: Control of acute pain  Description: Control of acute pain  2/17/2021 2238 by Deandar Ewing RN  Outcome: Ongoing  2/17/2021 1127 by Nehemiah Siemens, RN  Outcome: Ongoing     Problem: DISCHARGE BARRIERS  Goal: Patient's continuum of care needs are met  2/17/2021 2238 by Deandra Ewing RN  Outcome: Ongoing  2/17/2021 1127 by Nehemiah Siemens, RN  Outcome: Ongoing     Problem: Nutrition  Goal: Optimal nutrition therapy  2/17/2021 2238 by Deandra Ewing RN  Outcome: Ongoing  2/17/2021 1530 by Quoc Tellez RD, LD  Outcome: Ongoing [facility]  Report of Psychiatric Progress Note    Meli Antonio Patient Status:  Inpatient    11/3/1950 MRN [id number]   Location [facility] 4NW-A Attending Juan C Suazo,    Hosp Day # 11 PCP SIRI TABOR     Date of Admission: 2024  Date of Service: 2024  Reason for Consultation: Severe depression    Impression:  Primary Psychiatric Diagnosis:  Major depressive disorder, recurrent, severe, with vegetative symptoms. She has depressed mood, guilty worthless feelings, hopelessness, decreased motivation and apathy, poor concentration, and low appetite. Depression is improving. She no longer feels hopeless and her appetite and concentration have improved. Eating 75% of her meals now.    Recent mixed catatonia 2 wks ago. She had mutism and excitatory motor movements- resolved.    Possible recent serotonin syndrome 2 wks ago. Lyrica was stopped, Lexapro decreased, and she was given benzos at [facility]. Resolved.     Acute delirium/encephalopathy due to SUPA and hypernatremia. Improved. Alert and attentive and organized in thinking now. NO more illusions or visual hallucinations,    Alcohol use disorder, severity unspecified, in remission for 9 years.     Pertinent Medical Diagnoses:  SUPA and hypernatremia-resolved.    Recommendations:  1) ECT#3 scheduled for tomorrow 5/3/2024. Plan for her to get a total of 6 treatments.     2) Continue lower dose Lexapro 15mg po daily for anxiety/depression due to concern for recent serotonin syndrome. She was on 30mg daily prior to her [facility] admission on 4/10/24.     3) Continue Lithium 300mg nightly for depression. Will be HELD on evenings before ECT.    4) Continue Abilify 7.5mg nightly to help with depression.    5) Continue Lyrica 50mg po three times a day for chronic pain. Will be HELD on the afternoon before ECT.     6) Left a message for her outpatient psychiatrist Dr. Ulises Capps on 24 updating her on the plan to treat her depression with  ECT.    7) Dispo-- Can DC home tomorrow after her 3rd ECT treatment. She will continue outpatient ECT for 1 week (Mon/Wed/Fri). She will follow up with her outpatient psychiatrist. Will ask psych liaison to make her an appointment with an individual psychotherapist.     Discussed plan above with patient. Left a message for her  as well.     rFeddy Marie MD    History of Present Illness:  74 yo female with recurrent major depressive disorder initially presented to [facility] on 4/10/24 for altered mental status with confusion, restlessness, and agitation.     Per psych liaison:  \" reports that patient has had uncontrollable movements, is taking her clothes off, has been confused and does not know the names of anybody.     says that since Friday she has had these difficulties and it seemed to have gotten worse the last 2 days where now, in addition to those difficulties, she also cannot control her bowels.  She says she has been soiling her clothes and her bed.   said he wonders if it is could be attributed to the recent prescribing of meds with codeine that were meant to manage a cough that she had.   also reports that patient has \"been in bed over a year\".  He says that she does not say much and sleeps a lot.  He says she is grieving the loss of 2 dogs that  5 years ago as well as grieving the loss of their adopted son, who 7 yrs ago, at 31 y/o walked out of their lives. He said that pt has friends and she does occasionally go to lunch with them and does attend AA meetings once a week.  He said that she will eat the food that he cooks for her.  states that he is power of  for his wife.  Counselor advised him to bring copies of that paperwork to the hospital so that staff can scan it into the clinic record.\"    She was eval by Dr. Mckenzie (psych service at [place]) and thought to have delirium and depression with excitable catatonia vs serotonin syndrome  (SS). The Lexapro was decreased from 30mg to 10mg (then increased to 15mg) and Lyrica and Codeine prn stopped just in case she had SS. She was started on benzos, Klonipin and then Ativan, which helped with both the possible excitable catatonia vs SS. Wellbutrin was DC'ed (doesn't increase serotonin, but rather dopamine and nor-epi), Abilify continued, and Lithium started on 4/18/24. She was no longer so restless and agitated and disorganized, but remained apathetic with minimal verbal communication. She was eating/drinking little.    She was transferred to [facility] on 4/19/24. She developed SUPA and hypernatremia due to low po intake and was sent to the ward ED. She was started on IVF's and admitted to the med floor.    Currently, she has depressed mood, guilty worthless feelings, hopelessness, decreased motivation and a lack of appetite leading to low po intake. She ate only 20% of her breakfast per RN. She denies any tremors or jerking movements.     Discussed ECT and recommendation to start it. She said she was fine with treatment, but I did not think she understood or appreciated the pros/cons of treatment.  is POA.     Interval Hx:  4/23/2024- She ate 20%/60%/25% of her meals yesterday. She ate about 30% of her breakfast and lunch today. She was found on the floor with her knees earlier today; did not hit her head. She is now in the chair. Per RN, she had difficultly initiating walking, but once she started, she was able to ambulate well with the walker.     Currently, she feels tired and depressed. She cries when she thinks about her estranged son and her dogs that have passed away. She has low energy/motivation/appetite and feelings of hopelessness. No suicidal ideation. She endorsed seeing a mouse in the room this AM; no voices/visions now.    Discussed ECT and she appeared to understand more today. Her  who is POA wants her to start the treatments.    Regarding her chronic back pain, it is  4-5/10 with 10 being severe pain.    4/24/2024- She feels tired and sleepy this AM. She feels depressed, lacks motivation, and has a low appetite. She ate about 30% of her breakfast per RN. She remains forgetful and disoriented to month. She feels hopeless when she thinks about her estranged son. She shares how her sister had severe depression and was treated with ECT before. She is open to ECT.  is POA and wants her to get it as well.     ADDENDUM: Talked to Dr. Adames. Patient's capacity appears to wax and wane, so  (POA) will be involved with consent for ECT. He wants her to have it. At this time, there are no open spots for tomorrow. Unlikely Friday as well. Will most likely received 1st ECT on Monday 4/2/9/24. UNHELD the Ativan, Lyrica, and Lithium.     4/25/2024- She at about 30% of her meals yesterday. She ate 50% of her breakfast today. She told staff that she saw a dog in the room, but she did have a visit from the therapy dog yesterday. I can see the card photo of the dog. While I was in the room with her, she thought she saw a cat in the corner of the room. When I asked her to point to the location, she said the cat disappeared.     She feels tired, anxious, and depressed. She shares how her son Alexandru was adopted at 3 months from [place]. He came out as walker and was not ostracized from the family but accepted. He was brought up in a Anabaptism household and in Anabaptism schools. She wonders whether the Anabaptism belief about homosexuality being a sin is the reason he doesn't associate with them anymore. She isn't sure. She feels sad and guilty when she thinks about him.     Asked her about what she recalled about ECT and she said it was a treatment for her depression. She wasn't able to tell me the pros and cons of the treatment. Discussed how her  will have to consent for treatment and she was fine with that.    4/26/2024- She feels tired, anxious, and depressed and has low  motivation and appetite. She is eating 30-50% of her meals per report. She is pleasant and cooperative per staff. She is open to ECT on Monday and knows it is a treatment for depression, but is unable to tell me cons or alteratives to treatment. Her  will consent for ECT and wants her to get it.     4/29/2024- She tolerated the ECT this AM. Denies forgetfulness or headache post-ECT. She feels tired with mild anxiety and depressed mood. She has increased appetite and ate 50% of her breakfast and 65% of her lunch today. She has decreased motivation and anhedonia, but NO hopelessness or suicidal ideation.    4/30/2024- She has generalized anxiety and depressed mood. Guilt worthless feelings persists. She has fatigue, low motivation, and poor concentration. Her appetite is improving; she ate 75% of her breakfast. She talked about being estranged from her son Alexandru. She became tearful. She said there must be something he is going through to make him cut off all communication with her and her . She mentioned how he is walker and how they did not  him or tell him there was anything wrong with him. They raised him Tenri[employer]. She wants to write him a letter saying she loves him and is always there for him. He is a special education arts teacher in [place], [place].    5/1/2024- She had ECT#2 today. No headaches. She feels sad and depressed when she thinks about her son Alexandru. She shares how she is angry at her friends who told her to just forget about him. She doesn't want to forget about him. She wants to know why he cut off all communication. She has doubts and wonders if it was something she did wrong. This makes her feel empty and guilty. She is thinking about writing a letter to him.     She feels anxious and depressed, but no longer hopeless. She has no suicidal ideation. Concentration and appetite have improved. Some difficulty staying asleep, but sleeping for the majority of the night.     She is eating  % of her meals now.    2024- She feels less anxious and less depressed today. She is working on a letter to her son Alexandru. She shared how he was 3 months old when they [place] him [place] at the airport. He was the son of [place]. He had webbed fingers on both hands (surgeries were performed in the [place]). She recalls it taking 2 wks before he would look at her. She surmises that he missed his biological mother and was expecting to see black hair rather than her blond hair. She encouraged Alexandru to go back to [employer] to learn more about his heritage when he was a teenager, but he never wanted to go. She feels sadness and guilty feelings when she thinks about Alexandru. But she no longer feels paralyzed and hopeless and empty. She has no suicidal ideation. She is eating 75% of her meals. Her appetite and concentration have improved. She is sleeping well.      Past Psychiatric History: Major depressive disorder treated with Lexapro, Wellbutrin, and Abilify.    Substance Use History: Alcohol use disorder, severity unspecified, in remission x 9 yrs. THC edibles at night.    Psych Family History: None, but tells me her adopted son has mental health issues.    Social and Developmental History: Retired teacher. She lives with her  and adult son who is 41 yr old and going through a divorce. Her adopted son (from [place] per , she told me [place] initially) stopped talking to the family 7 yrs ago. He is 39 yr old.    Past Medical History:    Anxiety state    Back problem    COMPRESSION FX    Cataract    Depression    Esophageal reflux    GERD (gastroesophageal reflux disease)    Hematoma and contusion of liver    STATES HAS BEEN THERE FOR MANY YEARS    History of fractured kneecap    RIGHT    IBS (irritable bowel syndrome)    Mitral prolapse    Osteoarthritis     Past Surgical History:   Procedure Laterality Date          Correct bunion,othr methods      Correct bunion,simple      BILAT.      Dilation/curettage,diagnostic      FOR \"MOLE PREGNANCY\"    Other Right     ORIF RIGHT ANKLE    Spine surgery procedure unlisted      cervical spine february 2020    Total knee replacement       Family History   Problem Relation Age of Onset    Heart Disorder Father     Cancer Mother         ESOPHAGUS      reports that she has quit smoking. Her smoking use included cigarettes. She has a 30 pack-year smoking history. She has never used smokeless tobacco. She reports that she does not drink alcohol and does not use drugs.    Allergies:  Allergies   Allergen Reactions    Radiology Contrast Iodinated Dyes HIVES     HIVES AND THROAT TIGHTENED WHILE HAVING AN MRI    Sulfa Antibiotics HIVES     \"got sicker\"    Methylprednisolone OTHER (SEE COMMENTS)    Seroquel [Quetiapine] UNKNOWN    Cinnamon RASH    Iodine (Topical) RASH       Medications:    Current Facility-Administered Medications:     lithium carbonate cap 300 mg, 300 mg, Oral, Nightly    pregabalin (Lyrica) cap 50 mg, 50 mg, Oral, TID    glycopyrrolate (Robinul) 0.2 MG/ML injection 0.1 mg, 0.1 mg, Intravenous, Once    ondansetron (Zofran) 4 MG/2ML injection 4 mg, 4 mg, Intravenous, Once    ARIPiprazole (Abilify) tab 7.5 mg, 7.5 mg, Oral, Nightly    enoxaparin (Lovenox) 40 MG/0.4ML SUBQ injection 40 mg, 40 mg, Subcutaneous, Nightly    cholecalciferol (Vitamin D3) tab 1,000 Units, 1,000 Units, Oral, Daily    escitalopram (Lexapro) tab 15 mg, 15 mg, Oral, Nightly    propranolol (Inderal) tab 10 mg, 10 mg, Oral, BID    acetaminophen (Tylenol Extra Strength) tab 1,000 mg, 1,000 mg, Oral, Q8H PRN    melatonin tab 3 mg, 3 mg, Oral, Nightly PRN    ondansetron (Zofran) 4 MG/2ML injection 4 mg, 4 mg, Intravenous, Q6H PRN    polyethylene glycol (PEG 3350) (Miralax) 17 g oral packet 17 g, 17 g, Oral, Daily PRN    sennosides (Senokot) tab 17.2 mg, 17.2 mg, Oral, Nightly PRN    bisacodyl (Dulcolax) 10 MG rectal suppository 10 mg, 10 mg, Rectal, Daily PRN    benzonatate (Tessalon)  cap 200 mg, 200 mg, Oral, TID PRN    guaiFENesin (Robitussin) 100 MG/5 ML oral liquid 200 mg, 200 mg, Oral, Q4H PRN    glycerin-hypromellose- (Artificial Tears) 0.2-0.2-1 % ophthalmic solution 1 drop, 1 drop, Both Eyes, QID PRN    sodium chloride (Saline Mist) 0.65 % nasal solution 1 spray, 1 spray, Each Nare, Q3H PRN    albuterol (Ventolin HFA) 108 (90 Base) MCG/ACT inhaler 2 puff, 2 puff, Inhalation, Q4H PRN    magnesium hydroxide (Milk of Magnesia) 400 MG/5ML oral suspension 30 mL, 30 mL, Oral, Nightly PRN    alum-mag hydroxide-simethicone (Maalox) 200-200-20 MG/5ML oral suspension 30 mL, 30 mL, Oral, Q4H PRN    acetaminophen (Tylenol) tab 325 mg, 325 mg, Oral, Q4H PRN **OR** acetaminophen (Tylenol) tab 650 mg, 650 mg, Oral, Q4H PRN    traZODone (Desyrel) tab 50 mg, 50 mg, Oral, Nightly PRN    Facility-Administered Medications Ordered in Other Encounters:     lactated ringers infusion, , Intravenous, Continuous    Review of Systems   Constitutional:  Positive for malaise/fatigue.   Psychiatric/Behavioral:  Positive for depression. Negative for suicidal ideas. The patient is nervous/anxious.      Mental Status Exam:     Objective      Vitals:    05/02/24 0530   BP: 132/88   Pulse: 82   Resp: 20   Temp: 97.6 °F (36.4 °C)     Appearance: fair grooming  Behavior: cooperative, fair eye contact  Gait: not observed    Speech: soft, fluent    Mood: Less depressed and anxious  Affect: Congruent, brighter now, smiles at times    Thought process: logical   Thought content: no hallucinations    Orientation: oriented person, place, and year  Attention and Concentration: improving, fair now  Memory:  intact remote and impaired recent  Language: Intact naming   Fund of Knowledge: Able to recite name of [place] president    Insight: fair now  Judgment: fair now

## 2024-05-02 NOTE — DISCHARGE INSTRUCTIONS
1) You have your 4th ECT appointment on Monday 5/6/2024. Make sure to HOLD the night time Lithium and the afternoon and evening pregabalin/lyrica doses on day before ECT. You will also have ECT on Wed 5/8 and Fri 5/10 and then be done with ECT.     2) Continue your meds for depression-- Lithium 300mg every night, Lexapro/Escitalopram 15mg every night, and Abilify/Aripiprazole 7.5mg every night.     3) You are scheduled for an appointments through [facility] for psychotherapy    Friday, May 17th with Ifeanyi Velasquez LCPC at 10:00 AM in-person    [facility] - [address], [place] [address] [phone]

## 2024-05-02 NOTE — DIETARY NOTE
[facility]   part of [facility]    NUTRITION ASSESSMENT    Unable to diagnose malnutrition criteria at this time.    NUTRITION INTERVENTION:    Meal and Snacks - Continued general diet. Monitor and encourage adequate PO intake.   Medical Food Supplements - Magic Cup TID.     PATIENT STATUS:   5/2 - pt awaiting bed at [facility], but now with improved PO intake.  Able to tolerate % of meals per notes.  Per MD, to have ECT #3 in AM and then plan for discharge home thereafter. No n/v/d. Last BM 5/2. No new wt    4/26 - Pt waiting for ECT treatment - planned for Monday.  RN reports pt eating some, ate 65% of breakfast this AM(pancakes, potatoes, sausage, yogurt) but refused lunch.  No n/v/d. Last BM 4/23. Continue Magic Cup to maximize oral intake.  Plan to transfer back to [facility] once taking in 50% of meals.     04/22/24 Pt seen for MST risk and admitted for abnormal labs.  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 Intake has been variable prior to admission per chart report. Currently consuming 25% with magic cup on board. Sleeping during attempted interview.    ANTHROPOMETRICS:  Ht:  152.4 cm  Wt: 50 kg (110 lb 3.7 oz).   BMI: Body mass index is 21.53 kg/m².  IBW: 45.4 kg      WEIGHT HISTORY:   Weight loss: Yes, Non-severe Wt loss of 11 lbs, 9%, over 4 months     Wt Readings from Last 10 Encounters:   04/21/24 50 kg (110 lb 3.7 oz)   04/11/24 49.1 kg (108 lb 3.2 oz)   04/08/24 55.3 kg (122 lb)   01/12/23 52.2 kg (115 lb)   10/06/20 53.5 kg (118 lb)   09/07/20 52.1 kg (114 lb 12.8 oz)   04/10/17 56.2 kg (123 lb 12.8 oz)        NUTRITION:  Diet:       Procedures    Regular/General diet Is Patient on Accuchecks? No    NPO      Food Allergies: No  Cultural/Ethnic/Caodaism Preferences Addressed: Yes    Percent Meals Eaten (last 3 days)       Date/Time Percent Meals Eaten (%)    04/29/24 0906 50 %    04/29/24 1533 65 %    04/30/24 0915 75 %    04/30/24 1345 100 %    04/30/24 1830  80 %          GI system review: WNL Last BM: 5/2 (soft)  Skin and wounds: intact    NUTRITION RELATED PHYSICAL FINDINGS:     1. Body Fat/Muscle Mass:  unable to obtain     2. Fluid Accumulation: none per RN documentation     NUTRITION PRESCRIPTION: 50 kg  Calories: 5109-7265 calories/day (30-40 kcal/kg)  Protein: 60-75 grams protein/day (1.2-1.5 grams protein per kg)  Fluid: ~1 ml/kcal or per MD discretion    NUTRITION DIAGNOSIS/PROBLEM:  Inadequate energy intake related to insufficient appetite resulting in inadequate nutrition intake as evidenced by documented/reported insufficient oral intake and documented/reported unintentional weight loss      MONITOR AND EVALUATE/NUTRITION GOALS:  PO intake of 75% of meals TID - Met, continues  PO intake of 75% of oral nutrition supplement/s - Met, continues  Weight stable within 1 to 2 lbs during admission - Ongoing      MEDICATIONS:  D3,     LABS:  reviewed    Pt is at Moderate nutrition risk    Kavitha Yi RD, LDN, CNSC  Clinical Dietitian  Phone [phone]

## 2024-05-02 NOTE — PROGRESS NOTES
Patient scheduled for an appointments through [facility] for psychotherapy    Friday, May 17th with Ifeanyi Velasquez LCPC at 10:00 AM in-person    [facility] - [address], [place], [place] [address] [phone]

## 2024-05-02 NOTE — PLAN OF CARE
Assumed patient care at 1930  Pt oriented x 4, forgetful at times  Sinus on tele, vitals stable, room air   Prn tylenol for back pain   Up x 1 assist   Safety precautions in place   Pending discharge to [facility]   Continue poc     Problem: GASTROINTESTINAL - ADULT  Goal: Maintains or returns to baseline bowel function  Description: INTERVENTIONS:  - Assess bowel function  - Maintain adequate hydration with IV or PO as ordered and tolerated  - Evaluate effectiveness of GI medications  - Encourage mobilization and activity  - Obtain nutritional consult as needed  - Establish a toileting routine/schedule  - Consider collaborating with pharmacy to review patient's medication profile  Outcome: Progressing  Goal: Maintains adequate nutritional intake (undernourished)  Description: INTERVENTIONS:  - Monitor percentage of each meal consumed  - Identify factors contributing to decreased intake, treat as appropriate  - Assist with meals as needed  - Monitor I&O, WT and lab values  - Obtain nutritional consult as needed  - Optimize oral hygiene and moisture  - Encourage food from home; allow for food preferences  - Enhance eating environment  Outcome: Progressing

## 2024-05-02 NOTE — PHYSICAL THERAPY NOTE
PHYSICAL THERAPY TREATMENT NOTE - INPATIENT    Room Number: 3603/3603-A     Session: 4     Number of Visits to Meet Established Goals: 4    Presenting Problem: fever of unknown origin  Co-Morbidities : serotonin syndrome vs catatonia during last admit per psych    PHYSICAL THERAPY MEDICAL/SOCIAL HISTORY  History related to current admission: Patient is a 73 year old female admitted on 4/21/2024 from [facility] for abnormal labs.        HOME SITUATION  Type of Home: House   Home Layout: Two level  Stairs to Enter : 3  Railing: Yes     Lives With: Spouse     Prior Level of [place]: From PT eval on 4/16 at [facility], pt was ind PTA.  Pt unable to give history, reports resides with mother (only verbalization during session) despite chart stating pt resides with spouse.  ASSESSMENT   Patient demonstrates fair progress this session, goals  remain in progress.    Patient continues to function near baseline with bed mobility, transfers, and gait.  Contributing factors to remaining limitations include decreased endurance/aerobic capacity, pain, impaired standing and gait balance, and decreased muscular endurance.  Next session anticipate patient to progress bed mobility, transfers, and gait.  Physical Therapy will continue to follow patient for duration of hospitalization.    Patient continues to benefit from continued skilled PT services: Pt will benefit with further physical therapy as permitted by [facility] discharge.    PLAN  PT Treatment Plan: Gait training;Balance training;Transfer training  Rehab Potential : Good  Frequency (Obs): 3x/week    CURRENT GOALS     Goal #1 Patient is able to demonstrate supine - sit EOB @ level: supervision      Goal #2 Patient is able to demonstrate transfers Sit to/from Stand at assistance level: supervision      Goal #3 Patient is able to ambulate 150 feet with assist device:  walker  at assistance level: supervision      Goal #4     Goal #5     Goal #6     Goal Comments: Goals established on  2024 all goals ongoing    SUBJECTIVE  \"I feel ok\"    OBJECTIVE  Precautions: Bed/chair alarm    WEIGHT BEARING RESTRICTION                   PAIN ASSESSMENT   Ratin  Location: PT reported no pain       BALANCE                                                                                                                       Static Sitting: Fair +  Dynamic Sitting: Fair +           Static Standing: Fair  Dynamic Standing: Fair -    ACTIVITY TOLERANCE                         O2 WALK         AM-PAC '6-Clicks' INPATIENT SHORT FORM - BASIC MOBILITY  How much difficulty does the patient currently have...  Patient Difficulty: Turning over in bed (including adjusting bedclothes, sheets and blankets)?: A Little   Patient Difficulty: Sitting down on and standing up from a chair with arms (e.g., wheelchair, bedside commode, etc.): A Little   Patient Difficulty: Moving from lying on back to sitting on the side of the bed?: A Little   How much help from another person does the patient currently need...   Help from Another: Moving to and from a bed to a chair (including a wheelchair)?: A Little   Help from Another: Need to walk in hospital room?: A Little   Help from Another: Climbing 3-5 steps with a railing?: A Little       AM-PAC Score:  Raw Score: 18   Approx Degree of Impairment: 46.58%   Standardized Score (AM-PAC Scale): 43.63   CMS Modifier (G-Code): CK    FUNCTIONAL ABILITY STATUS  Gait Assessment   Functional Mobility/Gait Assessment  Gait Assistance: Contact guard assist  Distance (ft): 250, 50  Assistive Device: Rolling walker;None  Pattern: Shuffle    Skilled Therapy Provided    Bed Mobility:  Rolling: NT   Supine<>Sit: NT   Sit<>Supine: NT     Transfer Mobility:  Sit<>Stand: Sup   Stand<>Sit: Sup   Gait: CGA 250ft with RW , 50ft with no assisted device Min A    Therapist's Comments: Pt has improved with ambulation as pt is able to ambulate long distance with the RW. Pt continues to require CGA  as pt demonstrates poor environmental navigation that causes pt to occasionally run into objects and walls. Pt is able to self correct when cued. Attempted to ambulate using no assisted device. Pt was observed with increase gait instability as pt required min A to maintain balance.         Patient End of Session: Up in chair;Needs met;Call light within reach;All patient questions and concerns addressed;Alarm set    PT Session Time: 23 minutes  Gait Trainin minutes

## 2024-05-03 ENCOUNTER — HOSPITAL ENCOUNTER (OUTPATIENT)
Dept: POSTOP/PACU | Facility: HOSPITAL | Age: 74
Discharge: HOME | End: 2024-05-03
Attending: Other
Payer: MEDICARE

## 2024-05-03 ENCOUNTER — ANESTHESIA (OUTPATIENT)
Dept: POSTOP/PACU | Facility: HOSPITAL | Age: 74
End: 2024-05-03
Payer: MEDICARE

## 2024-05-03 ENCOUNTER — ANESTHESIA EVENT (OUTPATIENT)
Dept: POSTOP/PACU | Facility: HOSPITAL | Age: 74
End: 2024-05-03
Payer: MEDICARE

## 2024-05-03 VITALS
SYSTOLIC BLOOD PRESSURE: 128 MMHG | RESPIRATION RATE: 15 BRPM | HEART RATE: 86 BPM | TEMPERATURE: 99 F | DIASTOLIC BLOOD PRESSURE: 86 MMHG | OXYGEN SATURATION: 98 %

## 2024-05-03 VITALS
DIASTOLIC BLOOD PRESSURE: 91 MMHG | BODY MASS INDEX: 22 KG/M2 | OXYGEN SATURATION: 94 % | SYSTOLIC BLOOD PRESSURE: 124 MMHG | WEIGHT: 110.25 LBS | HEART RATE: 90 BPM | RESPIRATION RATE: 16 BRPM | TEMPERATURE: 98 F

## 2024-05-03 DIAGNOSIS — F06.1 MAJOR DEPRESSIVE DISORDER, RECURRENT EPISODE, SEVERE WITH CATATONIA (HCC): ICD-10-CM

## 2024-05-03 DIAGNOSIS — F33.2 MAJOR DEPRESSIVE DISORDER, RECURRENT EPISODE, SEVERE WITH CATATONIA (HCC): ICD-10-CM

## 2024-05-03 PROCEDURE — 99239 HOSP IP/OBS DSCHRG MGMT >30: CPT | Performed by: HOSPITALIST

## 2024-05-03 PROCEDURE — 99232 SBSQ HOSP IP/OBS MODERATE 35: CPT | Performed by: OTHER

## 2024-05-03 RX ORDER — ARIPIPRAZOLE 15 MG/1
7.5 TABLET ORAL NIGHTLY
Qty: 15 TABLET | Refills: 0 | Status: SHIPPED | OUTPATIENT
Start: 2024-05-03

## 2024-05-03 RX ORDER — GLYCOPYRROLATE 0.2 MG/ML
INJECTION, SOLUTION INTRAMUSCULAR; INTRAVENOUS
Status: COMPLETED
Start: 2024-05-03 | End: 2024-05-03

## 2024-05-03 RX ORDER — HYDROXYZINE 50 MG/1
50 TABLET, FILM COATED ORAL 3 TIMES DAILY PRN
COMMUNITY
End: 2024-05-03

## 2024-05-03 RX ORDER — HYDROMORPHONE HYDROCHLORIDE 1 MG/ML
0.4 INJECTION, SOLUTION INTRAMUSCULAR; INTRAVENOUS; SUBCUTANEOUS EVERY 5 MIN PRN
Status: DISCONTINUED | OUTPATIENT
Start: 2024-05-03 | End: 2024-05-03 | Stop reason: HOSPADM

## 2024-05-03 RX ORDER — ETOMIDATE 2 MG/ML
INJECTION INTRAVENOUS AS NEEDED
Status: DISCONTINUED | OUTPATIENT
Start: 2024-05-03 | End: 2024-05-03 | Stop reason: SURG

## 2024-05-03 RX ORDER — ONDANSETRON 2 MG/ML
INJECTION INTRAMUSCULAR; INTRAVENOUS
Status: COMPLETED
Start: 2024-05-03 | End: 2024-05-03

## 2024-05-03 RX ORDER — SODIUM CHLORIDE, SODIUM LACTATE, POTASSIUM CHLORIDE, CALCIUM CHLORIDE 600; 310; 30; 20 MG/100ML; MG/100ML; MG/100ML; MG/100ML
INJECTION, SOLUTION INTRAVENOUS CONTINUOUS
Status: DISCONTINUED | OUTPATIENT
Start: 2024-05-03 | End: 2024-05-03 | Stop reason: HOSPADM

## 2024-05-03 RX ORDER — PREGABALIN 50 MG/1
50 CAPSULE ORAL 3 TIMES DAILY
Qty: 90 CAPSULE | Refills: 0 | Status: SHIPPED | OUTPATIENT
Start: 2024-05-03

## 2024-05-03 RX ORDER — HYDROMORPHONE HYDROCHLORIDE 1 MG/ML
0.6 INJECTION, SOLUTION INTRAMUSCULAR; INTRAVENOUS; SUBCUTANEOUS EVERY 5 MIN PRN
Status: DISCONTINUED | OUTPATIENT
Start: 2024-05-03 | End: 2024-05-03 | Stop reason: HOSPADM

## 2024-05-03 RX ORDER — CAFFEINE AND SODIUM BENZOATE 125 MG/ML
INJECTION, SOLUTION INTRAMUSCULAR; INTRAVENOUS
Status: COMPLETED
Start: 2024-05-03 | End: 2024-05-03

## 2024-05-03 RX ORDER — DEXTROSE MONOHYDRATE 25 G/50ML
50 INJECTION, SOLUTION INTRAVENOUS
Status: DISCONTINUED | OUTPATIENT
Start: 2024-05-03 | End: 2024-05-03 | Stop reason: HOSPADM

## 2024-05-03 RX ORDER — ONDANSETRON 2 MG/ML
4 INJECTION INTRAMUSCULAR; INTRAVENOUS ONCE
Status: COMPLETED | OUTPATIENT
Start: 2024-05-03 | End: 2024-05-03

## 2024-05-03 RX ORDER — NICOTINE POLACRILEX 4 MG
15 LOZENGE BUCCAL
Status: DISCONTINUED | OUTPATIENT
Start: 2024-05-03 | End: 2024-05-03 | Stop reason: HOSPADM

## 2024-05-03 RX ORDER — KETAMINE HYDROCHLORIDE 50 MG/ML
INJECTION, SOLUTION INTRAMUSCULAR; INTRAVENOUS AS NEEDED
Status: DISCONTINUED | OUTPATIENT
Start: 2024-05-03 | End: 2024-05-03 | Stop reason: SURG

## 2024-05-03 RX ORDER — LITHIUM CARBONATE 300 MG/1
300 CAPSULE ORAL NIGHTLY
Qty: 30 CAPSULE | Refills: 0 | Status: SHIPPED | OUTPATIENT
Start: 2024-05-03

## 2024-05-03 RX ORDER — GLYCOPYRROLATE 0.2 MG/ML
0.1 INJECTION, SOLUTION INTRAMUSCULAR; INTRAVENOUS ONCE
Status: COMPLETED | OUTPATIENT
Start: 2024-05-03 | End: 2024-05-03

## 2024-05-03 RX ORDER — ESCITALOPRAM OXALATE 10 MG/1
15 TABLET ORAL NIGHTLY
Qty: 45 TABLET | Refills: 0 | Status: SHIPPED | OUTPATIENT
Start: 2024-05-03

## 2024-05-03 RX ORDER — NALOXONE HYDROCHLORIDE 0.4 MG/ML
0.08 INJECTION, SOLUTION INTRAMUSCULAR; INTRAVENOUS; SUBCUTANEOUS AS NEEDED
Status: DISCONTINUED | OUTPATIENT
Start: 2024-05-03 | End: 2024-05-03 | Stop reason: HOSPADM

## 2024-05-03 RX ORDER — HYDROMORPHONE HYDROCHLORIDE 1 MG/ML
0.2 INJECTION, SOLUTION INTRAMUSCULAR; INTRAVENOUS; SUBCUTANEOUS EVERY 5 MIN PRN
Status: DISCONTINUED | OUTPATIENT
Start: 2024-05-03 | End: 2024-05-03 | Stop reason: HOSPADM

## 2024-05-03 RX ORDER — BUPROPION HYDROCHLORIDE 300 MG/1
300 TABLET ORAL DAILY
COMMUNITY
Start: 2023-09-27 | End: 2024-05-03

## 2024-05-03 RX ORDER — NICOTINE POLACRILEX 4 MG
30 LOZENGE BUCCAL
Status: DISCONTINUED | OUTPATIENT
Start: 2024-05-03 | End: 2024-05-03 | Stop reason: HOSPADM

## 2024-05-03 RX ORDER — CAFFEINE AND SODIUM BENZOATE 125 MG/ML
125 INJECTION, SOLUTION INTRAMUSCULAR; INTRAVENOUS ONCE
Status: COMPLETED | OUTPATIENT
Start: 2024-05-03 | End: 2024-05-03

## 2024-05-03 RX ORDER — SODIUM CHLORIDE, SODIUM LACTATE, POTASSIUM CHLORIDE, CALCIUM CHLORIDE 600; 310; 30; 20 MG/100ML; MG/100ML; MG/100ML; MG/100ML
INJECTION, SOLUTION INTRAVENOUS CONTINUOUS
Status: DISCONTINUED | OUTPATIENT
Start: 2024-05-03 | End: 2024-05-05

## 2024-05-03 RX ADMIN — SODIUM CHLORIDE, SODIUM LACTATE, POTASSIUM CHLORIDE, CALCIUM CHLORIDE: 600; 310; 30; 20 INJECTION, SOLUTION INTRAVENOUS at 06:55:00

## 2024-05-03 RX ADMIN — CAFFEINE AND SODIUM BENZOATE 125 MG: 125 INJECTION, SOLUTION INTRAMUSCULAR; INTRAVENOUS at 06:31:00

## 2024-05-03 RX ADMIN — ONDANSETRON 4 MG: 2 INJECTION INTRAMUSCULAR; INTRAVENOUS at 06:00:00

## 2024-05-03 RX ADMIN — KETAMINE HYDROCHLORIDE 25 MG: 50 INJECTION, SOLUTION INTRAMUSCULAR; INTRAVENOUS at 06:47:00

## 2024-05-03 RX ADMIN — ETOMIDATE 10 MG: 2 INJECTION INTRAVENOUS at 06:47:00

## 2024-05-03 RX ADMIN — GLYCOPYRROLATE 0.1 MG: 0.2 INJECTION, SOLUTION INTRAMUSCULAR; INTRAVENOUS at 06:01:00

## 2024-05-03 RX ADMIN — SODIUM CHLORIDE, SODIUM LACTATE, POTASSIUM CHLORIDE, CALCIUM CHLORIDE: 600; 310; 30; 20 INJECTION, SOLUTION INTRAVENOUS at 06:34:00

## 2024-05-03 RX ADMIN — SODIUM CHLORIDE, SODIUM LACTATE, POTASSIUM CHLORIDE, CALCIUM CHLORIDE: 600; 310; 30; 20 INJECTION, SOLUTION INTRAVENOUS at 06:00:00

## 2024-05-03 NOTE — ANESTHESIA PREPROCEDURE EVALUATION
PRE-OP EVALUATION    Patient Name: Meli Antonio    Admit Diagnosis: Major depressive disorder, recurrent episode, severe with catatonia (HCC) [F33.2, F06.1]    Pre-op Diagnosis: * No surgery found *        Anesthesia Procedure: ECT(BEDSIDE)    * Surgery not found *    Pre-op vitals reviewed.  Temp: 98.1 °F (36.7 °C)  Pulse: 72  Resp: 16  BP: 126/84  SpO2: 96 %  There is no height or weight on file to calculate BMI.    Current medications reviewed.  Hospital Medications:   lactated ringers infusion   Intravenous Continuous    [COMPLETED] glycopyrrolate (Robinul) 0.2 MG/ML injection 0.1 mg  0.1 mg Intravenous Once    [COMPLETED] ondansetron (Zofran) 4 MG/2ML injection 4 mg  4 mg Intravenous Once    [COMPLETED] caffeine-sodium benzoate 125-125 mg/mL injection  125 mg Intravenous Once    [COMPLETED] ondansetron (Zofran) 4 MG/2ML injection        [COMPLETED] glycopyrrolate (Robinul) 0.2 MG/ML injection        [COMPLETED] caffeine-sodium benzoate 125-125 MG/ML injection        [Held by provider] lithium carbonate cap 300 mg  300 mg Oral Nightly    [Held by provider] pregabalin (Lyrica) cap 50 mg  50 mg Oral TID    glycopyrrolate (Robinul) 0.2 MG/ML injection 0.1 mg  0.1 mg Intravenous Once    ondansetron (Zofran) 4 MG/2ML injection 4 mg  4 mg Intravenous Once    ARIPiprazole (Abilify) tab 7.5 mg  7.5 mg Oral Nightly    enoxaparin (Lovenox) 40 MG/0.4ML SUBQ injection 40 mg  40 mg Subcutaneous Nightly    cholecalciferol (Vitamin D3) tab 1,000 Units  1,000 Units Oral Daily    escitalopram (Lexapro) tab 15 mg  15 mg Oral Nightly    propranolol (Inderal) tab 10 mg  10 mg Oral BID    acetaminophen (Tylenol Extra Strength) tab 1,000 mg  1,000 mg Oral Q8H PRN    melatonin tab 3 mg  3 mg Oral Nightly PRN    ondansetron (Zofran) 4 MG/2ML injection 4 mg  4 mg Intravenous Q6H PRN    polyethylene glycol (PEG 3350) (Miralax) 17 g oral packet 17 g  17 g Oral Daily PRN    sennosides (Senokot) tab 17.2 mg  17.2 mg Oral Nightly PRN     bisacodyl (Dulcolax) 10 MG rectal suppository 10 mg  10 mg Rectal Daily PRN    benzonatate (Tessalon) cap 200 mg  200 mg Oral TID PRN    guaiFENesin (Robitussin) 100 MG/5 ML oral liquid 200 mg  200 mg Oral Q4H PRN    glycerin-hypromellose- (Artificial Tears) 0.2-0.2-1 % ophthalmic solution 1 drop  1 drop Both Eyes QID PRN    sodium chloride (Saline Mist) 0.65 % nasal solution 1 spray  1 spray Each Nare Q3H PRN    albuterol (Ventolin HFA) 108 (90 Base) MCG/ACT inhaler 2 puff  2 puff Inhalation Q4H PRN    magnesium hydroxide (Milk of Magnesia) 400 MG/5ML oral suspension 30 mL  30 mL Oral Nightly PRN    alum-mag hydroxide-simethicone (Maalox) 200-200-20 MG/5ML oral suspension 30 mL  30 mL Oral Q4H PRN    acetaminophen (Tylenol) tab 325 mg  325 mg Oral Q4H PRN    Or    acetaminophen (Tylenol) tab 650 mg  650 mg Oral Q4H PRN    traZODone (Desyrel) tab 50 mg  50 mg Oral Nightly PRN       Outpatient Medications:   (Not in a hospital admission)      Allergies: Radiology contrast iodinated dyes, Sulfa antibiotics, Methylprednisolone, Seroquel [quetiapine], Cinnamon, and Iodine (topical)      Anesthesia Evaluation    Patient summary reviewed.    Anesthetic Complications  (-) history of anesthetic complications         GI/Hepatic/Renal      (+) GERD                      (+) irritable bowel syndrome     Cardiovascular    Negative cardiovascular ROS.    Exercise tolerance: good     MET: >4                                           Endo/Other      (+) diabetes and well controlled, type 2, not using insulin                         Pulmonary    Negative pulmonary ROS.                       Neuro/Psych      (+) depression  (+) anxiety                              Past Surgical History:   Procedure Laterality Date          Correct bunion,othr methods      Correct bunion,simple      BILAT.     Dilation/curettage,diagnostic      FOR \"MOLE PREGNANCY\"    Other Right     ORIF RIGHT ANKLE    Spine surgery procedure unlisted       cervical spine february 2020    Total knee replacement       Social History     Socioeconomic History    Marital status:    Tobacco Use    Smoking status: Former     Current packs/day: 2.00     Average packs/day: 2.0 packs/day for 15.0 years (30.0 ttl pk-yrs)     Types: Cigarettes    Smokeless tobacco: Never    Tobacco comments:     QUIT IN 1983   Vaping Use    Vaping status: Never Used   Substance and Sexual Activity    Alcohol use: No    Drug use: No     History   Drug Use No     Available pre-op labs reviewed.  Lab Results   Component Value Date    WBC 13.1 (H) 04/28/2024    RBC 4.73 04/28/2024    HGB 13.4 04/28/2024    HCT 42.2 04/28/2024    MCV 89.2 04/28/2024    MCH 28.3 04/28/2024    MCHC 31.8 04/28/2024    RDW 14.2 04/28/2024    .0 04/28/2024     Lab Results   Component Value Date     04/28/2024    K 4.0 04/28/2024     04/28/2024    CO2 26.0 04/28/2024    BUN 11 04/28/2024    CREATSERUM 0.81 04/28/2024     (H) 04/28/2024    CA 9.4 04/28/2024            Airway      Mallampati: II  Mouth opening: >3 FB  TM distance: > 6 cm  Neck ROM: full Cardiovascular    Cardiovascular exam normal.  Rhythm: regular  Rate: normal     Dental    Dentition appears grossly intact         Pulmonary    Pulmonary exam normal.  Breath sounds clear to auscultation bilaterally.               Other findings              ASA: 2   Plan: general  NPO status verified and patient meets guidelines.    Post-procedure pain management plan discussed with surgeon and patient.      Plan/risks discussed with: patient                Present on Admission:  **None**

## 2024-05-03 NOTE — PROGRESS NOTES
[facility]  Report of Psychiatric Progress Note    Meli Antonio Patient Status:  Inpatient    11/3/1950 MRN [id number]   Location [facility] 4NW-A Attending Juan C Suazo,    Hosp Day # 12 PCP SIRI TABOR     Date of Admission: 2024  Date of Service: 5/3/2024  Reason for Consultation: Severe depression    Impression:  Primary Psychiatric Diagnosis:  Major depressive disorder, recurrent, severe, with vegetative symptoms. She has depressed mood, guilty worthless feelings, hopelessness, decreased motivation and apathy, poor concentration, and low appetite. Depression is improving. She no longer feels hopeless and her appetite and concentration have improved. Eating about 75% of her meals now.    Recent mixed catatonia 2 wks ago. She had mutism and excitatory motor movements- resolved.    Possible recent serotonin syndrome 2 wks ago. Lyrica was stopped, Lexapro decreased, and she was given benzos at [facility]. Resolved.     Acute delirium/encephalopathy due to SUPA and hypernatremia. Improved. Alert and attentive and organized in thinking now. NO more illusions or visual hallucinations.    Alcohol use disorder, severity unspecified, in remission for 9 years.     Pertinent Medical Diagnoses:  SUPA and hypernatremia-resolved.    Recommendations:  1) ECT#4 scheduled for 2024. She will complete a total of 6 treatments (#5 on  and #6 on 5/10).    2) Continue lower dose Lexapro 15mg po daily for anxiety/depression due to concern for recent serotonin syndrome. She was on 30mg daily prior to her [facility] admission on 4/10/24.     3) Continue Lithium 300mg nightly for depression. Will be HELD on evenings before ECT.    4) Continue Abilify 7.5mg nightly to help with depression.    5) Continue Lyrica 50mg po three times a day for chronic pain. After and evening doses will be HELD on the day before ECT.     6) Left a message for her outpatient psychiatrist Dr. Ulises Capps on 24 updating  her on the plan to treat her depression with ECT.    7) Dispo-- DC home today. She will follow up with her outpatient psychiatrist.     She has an appointment with a psychotherapist on Friday, May 17th with Ifeanyi Velasquez LCPC at 10:00 AM in-person  [address], [place] [address]  [phone]  Discussed plan above with patient and .    Freddy Marie MD    History of Present Illness:  74 yo female with recurrent major depressive disorder initially presented to [facility] on 4/10/24 for altered mental status with confusion, restlessness, and agitation.     Per psych liaison:  \" reports that patient has had uncontrollable movements, is taking her clothes off, has been confused and does not know the names of anybody.     says that since Friday she has had these difficulties and it seemed to have gotten worse the last 2 days where now, in addition to those difficulties, she also cannot control her bowels.  She says she has been soiling her clothes and her bed.   said he wonders if it is could be attributed to the recent prescribing of meds with codeine that were meant to manage a cough that she had.   also reports that patient has \"been in bed over a year\".  He says that she does not say much and sleeps a lot.  He says she is grieving the loss of 2 dogs that  5 years ago as well as grieving the loss of their adopted son, who 7 yrs ago, at 33 y/o walked out of their lives. He said that pt has friends and she does occasionally go to lunch with them and does attend AA meetings once a week.  He said that she will eat the food that he cooks for her.  states that he is power of  for his wife.  Counselor advised him to bring copies of that paperwork to the hospital so that staff can scan it into the clinic record.\"    She was eval by Dr. Mckenzie (psych service at [facility]) and thought to have delirium and depression  with excitable catatonia vs serotonin syndrome (SS). The Lexapro was decreased from 30mg to 10mg (then increased to 15mg) and Lyrica and Codeine prn stopped just in case she had SS. She was started on benzos, Klonipin and then Ativan, which helped with both the possible excitable catatonia vs SS. Wellbutrin was DC'ed (doesn't increase serotonin, but rather dopamine and nor-epi), Abilify continued, and Lithium started on 4/18/24. She was no longer so restless and agitated and disorganized, but remained apathetic with minimal verbal communication. She was eating/drinking little.    She was transferred to [facility] on 4/19/24. She developed SUPA and hypernatremia due to low po intake and was sent to the [facility] ED. She was started on IVF's and admitted to the med floor.    Currently, she has depressed mood, guilty worthless feelings, hopelessness, decreased motivation and a lack of appetite leading to low po intake. She ate only 20% of her breakfast per RN. She denies any tremors or jerking movements.     Discussed ECT and recommendation to start it. She said she was fine with treatment, but I did not think she understood or appreciated the pros/cons of treatment.  is POA.     Interval Hx:  4/23/2024- She ate 20%/60%/25% of her meals yesterday. She ate about 30% of her breakfast and lunch today. She was found on the floor with her knees earlier today; did not hit her head. She is now in the chair. Per RN, she had difficultly initiating walking, but once she started, she was able to ambulate well with the walker.     Currently, she feels tired and depressed. She cries when she thinks about her estranged son and her dogs that have passed away. She has low energy/motivation/appetite and feelings of hopelessness. No suicidal ideation. She endorsed seeing a mouse in the room this AM; no voices/visions now.    Discussed ECT and she appeared to understand more today. Her  who is POA wants her to start the  treatments.    Regarding her chronic back pain, it is 4-5/10 with 10 being severe pain.    4/24/2024- She feels tired and sleepy this AM. She feels depressed, lacks motivation, and has a low appetite. She ate about 30% of her breakfast per RN. She remains forgetful and disoriented to month. She feels hopeless when she thinks about her estranged son. She shares how her sister had severe depression and was treated with ECT before. She is open to ECT.  is POA and wants her to get it as well.     ADDENDUM: Talked to Dr. Adames. Patient's capacity appears to wax and wane, so  (POA) will be involved with consent for ECT. He wants her to have it. At this time, there are no open spots for tomorrow. Unlikely Friday as well. Will most likely received 1st ECT on Monday 4/2/9/24. UNHELD the Ativan, Lyrica, and Lithium.     4/25/2024- She at about 30% of her meals yesterday. She ate 50% of her breakfast today. She told staff that she saw a dog in the room, but she did have a visit from the therapy dog yesterday. I can see the card photo of the dog. While I was in the room with her, she thought she saw a cat in the corner of the room. When I asked her to point to the location, she said the cat disappeared.     She feels tired, anxious, and depressed. She shares how her son Alexandru was adopted at 3 months from [place]. He came out as walker and was not ostracized from the family but accepted. He was brought up in a Mu-ism household and in Mu-ism schools. She wonders whether the Mu-ism belief about homosexuality being a sin is the reason he doesn't associate with them anymore. She isn't sure. She feels sad and guilty when she thinks about him.     Asked her about what she recalled about ECT and she said it was a treatment for her depression. She wasn't able to tell me the pros and cons of the treatment. Discussed how her  will have to consent for treatment and she was fine with that.    4/26/2024- She  feels tired, anxious, and depressed and has low motivation and appetite. She is eating 30-50% of her meals per report. She is pleasant and cooperative per staff. She is open to ECT on Monday and knows it is a treatment for depression, but is unable to tell me cons or alteratives to treatment. Her  will consent for ECT and wants her to get it.     4/29/2024- She tolerated the ECT this AM. Denies forgetfulness or headache post-ECT. She feels tired with mild anxiety and depressed mood. She has increased appetite and ate 50% of her breakfast and 65% of her lunch today. She has decreased motivation and anhedonia, but NO hopelessness or suicidal ideation.    4/30/2024- She has generalized anxiety and depressed mood. Guilt worthless feelings persists. She has fatigue, low motivation, and poor concentration. Her appetite is improving; she ate 75% of her breakfast. She talked about being estranged from her son Alexandru. She became tearful. She said there must be something he is going through to make him cut off all communication with her and her . She mentioned how he is walker and how they did not  him or tell him there was anything wrong with him. They raised him Buddhism. She wants to write him a letter saying she loves him and is always there for him. He is a special education arts teacher in [place], [place].    5/1/2024- She had ECT#2 today. No headaches. She feels sad and depressed when she thinks about her son Alexandru. She shares how she is angry at her friends who told her to just forget about him. She doesn't want to forget about him. She wants to know why he cut off all communication. She has doubts and wonders if it was something she did wrong. This makes her feel empty and guilty. She is thinking about writing a letter to him.     She feels anxious and depressed, but no longer hopeless. She has no suicidal ideation. Concentration and appetite have improved. Some difficulty staying asleep, but sleeping  for the majority of the night.     She is eating % of her meals now.    5/2/2024- She feels less anxious and less depressed today. She is working on a letter to her son Alexandru. She shared how he was 3 months old when they [place] him up at the airport. He was the son of [employer]. He had webbed fingers on both hands (surgeries were performed in the [place]). She recalls it taking 2 wks before he would look at her. She surmises that he missed his biological mother and was expecting to see black hair rather than her blond hair. She encouraged Alexandru to go back to [place] to learn more about his heritage when he was a teenager, but he never wanted to go. She feels sadness and guilty feelings when she thinks about Alexandru. But she no longer feels paralyzed and hopeless and empty. She has no suicidal ideation. She is eating 75% of her meals. Her appetite and concentration have improved. She is sleeping well.     5/3/2024- She feels less anxious/depressed vs admission. She feels hopeful now. Her mood, appetite, and concentration have improved. But she is still forgetful and loses her train of thought easily. This was present the past month even before the ECT.    She feels more comfortable talking about Alexandru. She is writing a letter to him. She wonders if he cut off their relationship because he is doing what his parents did to him, abandoning him. 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 After his hand surgeries, she had him playing with linda and playing the violin as well, to help with his fine motor movements. He is now an  and loves [employer].    She still has some guilty, worthless feelings, but they are not as intense. She is trying to give herself tierney regarding Alexandru and trying not to blame herself. She denies suicidal ideation.     Past Psychiatric History: Major depressive disorder treated with Lexapro, Wellbutrin, and  Abilify.    Substance Use History: Alcohol use disorder, severity unspecified, in remission x 9 yrs. THC edibles at night.    Psych Family History: None, but tells me her adopted son has mental health issues.    Social and Developmental History: Retired teacher. She lives with her  and adult son who is 41 yr old and going through a divorce. Her adopted son (from [place] per , she told me [place] initially) stopped talking to the family 7 yrs ago. He is 39 yr old.    Past Medical History:    Anxiety state    Back problem    COMPRESSION FX    Cataract    Depression    Esophageal reflux    GERD (gastroesophageal reflux disease)    Hematoma and contusion of liver    STATES HAS BEEN THERE FOR MANY YEARS    History of fractured kneecap    RIGHT    IBS (irritable bowel syndrome)    Mitral prolapse    Osteoarthritis     Past Surgical History:   Procedure Laterality Date          Correct bunion,othr methods      Correct bunion,simple      BILAT.     Dilation/curettage,diagnostic      FOR \"MOLE PREGNANCY\"    Other Right     ORIF RIGHT ANKLE    Spine surgery procedure unlisted      cervical spine 2020    Total knee replacement       Family History   Problem Relation Age of Onset    Heart Disorder Father     Cancer Mother         ESOPHAGUS      reports that she has quit smoking. Her smoking use included cigarettes. She has a 30 pack-year smoking history. She has never used smokeless tobacco. She reports that she does not drink alcohol and does not use drugs.    Allergies:  Allergies   Allergen Reactions    Radiology Contrast Iodinated Dyes HIVES     HIVES AND THROAT TIGHTENED WHILE HAVING AN MRI    Sulfa Antibiotics HIVES     \"got sicker\"    Methylprednisolone OTHER (SEE COMMENTS)    Seroquel [Quetiapine] UNKNOWN    Cinnamon RASH    Iodine (Topical) RASH       Medications:    Current Facility-Administered Medications:     [Held by provider] lithium carbonate cap 300 mg, 300 mg, Oral, Nightly     [Held by provider] pregabalin (Lyrica) cap 50 mg, 50 mg, Oral, TID    glycopyrrolate (Robinul) 0.2 MG/ML injection 0.1 mg, 0.1 mg, Intravenous, Once    ondansetron (Zofran) 4 MG/2ML injection 4 mg, 4 mg, Intravenous, Once    ARIPiprazole (Abilify) tab 7.5 mg, 7.5 mg, Oral, Nightly    enoxaparin (Lovenox) 40 MG/0.4ML SUBQ injection 40 mg, 40 mg, Subcutaneous, Nightly    cholecalciferol (Vitamin D3) tab 1,000 Units, 1,000 Units, Oral, Daily    escitalopram (Lexapro) tab 15 mg, 15 mg, Oral, Nightly    propranolol (Inderal) tab 10 mg, 10 mg, Oral, BID    acetaminophen (Tylenol Extra Strength) tab 1,000 mg, 1,000 mg, Oral, Q8H PRN    melatonin tab 3 mg, 3 mg, Oral, Nightly PRN    ondansetron (Zofran) 4 MG/2ML injection 4 mg, 4 mg, Intravenous, Q6H PRN    polyethylene glycol (PEG 3350) (Miralax) 17 g oral packet 17 g, 17 g, Oral, Daily PRN    sennosides (Senokot) tab 17.2 mg, 17.2 mg, Oral, Nightly PRN    bisacodyl (Dulcolax) 10 MG rectal suppository 10 mg, 10 mg, Rectal, Daily PRN    benzonatate (Tessalon) cap 200 mg, 200 mg, Oral, TID PRN    guaiFENesin (Robitussin) 100 MG/5 ML oral liquid 200 mg, 200 mg, Oral, Q4H PRN    glycerin-hypromellose- (Artificial Tears) 0.2-0.2-1 % ophthalmic solution 1 drop, 1 drop, Both Eyes, QID PRN    sodium chloride (Saline Mist) 0.65 % nasal solution 1 spray, 1 spray, Each Nare, Q3H PRN    albuterol (Ventolin HFA) 108 (90 Base) MCG/ACT inhaler 2 puff, 2 puff, Inhalation, Q4H PRN    magnesium hydroxide (Milk of Magnesia) 400 MG/5ML oral suspension 30 mL, 30 mL, Oral, Nightly PRN    alum-mag hydroxide-simethicone (Maalox) 200-200-20 MG/5ML oral suspension 30 mL, 30 mL, Oral, Q4H PRN    acetaminophen (Tylenol) tab 325 mg, 325 mg, Oral, Q4H PRN **OR** acetaminophen (Tylenol) tab 650 mg, 650 mg, Oral, Q4H PRN    traZODone (Desyrel) tab 50 mg, 50 mg, Oral, Nightly PRN    Facility-Administered Medications Ordered in Other Encounters:     lactated ringers infusion, , Intravenous,  Continuous    Review of Systems   Constitutional:  Positive for malaise/fatigue.   Psychiatric/Behavioral:  Positive for depression. Negative for suicidal ideas. The patient is nervous/anxious.      Mental Status Exam:     Objective      Vitals:    05/03/24 0830   BP: (!) 124/91   Pulse: 90   Resp: 16   Temp: 97.5 °F (36.4 °C)     Appearance: fair grooming  Behavior: cooperative, fair eye contact  Gait: not observed    Speech: soft, fluent    Mood: Less depressed and anxious  Affect: Congruent, brighter now, smiles at times    Thought process: logical   Thought content: no hallucinations    Orientation: oriented person, place, and year  Attention and Concentration: improving, fair now  Memory:  intact remote and impaired recent  Language: Intact naming   Fund of Knowledge: Able to recite name of [place] president    Insight: fair now  Judgment: fair now

## 2024-05-03 NOTE — PLAN OF CARE
NURSING DISCHARGE NOTE    Discharged Home via Wheelchair.  Accompanied by Support staff  Belongings Taken by patient/family.  IV removed  Medication changes discussed  Thorough discussion about ECT 5/6- holding medication, NPO at midnight, and what time to arrive to ECT.   Pt left unit in stable condition.

## 2024-05-03 NOTE — DISCHARGE SUMMARY
[facility]     Meli Antonio Patient Status:  Inpatient    11/3/1950 MRN [id number]   Location [facility] 3NE-A Attending No att. providers found   Hosp Day # 12 PCP SIRI TABOR     Date of Admission: 2024  Date of Discharge:  5/3/2024     Discharge Disposition: Psychiatric Hospital or Psych Unit    Discharge Diagnosis:  # Major depression, generalized anxiety disorder  #Decreased PO intake   -at [facility] prior to this admission  -sp ect - d/w psych. 3x/week     #Leukocytosis  -no bacterial infectious etiology identified, hold further antibiotics   -one time low grade fever - monitor for recurrence   -Blood Cultures NGTD  -UA negative on 2024  -Chest x-ray done on 2024 with no evidence of active cardiopulmonary disease  -procalcitonin level negative less than 0.05  -White count improving. Completely asymptomatic. Dw pt to notify if any symptoms occur     #Hypernatremia 2/2 free water deficit due to dehydration  -resolved     #SUPA  -resolved with gentle hydration   -No NSAIDs     #Type II DM  #Fasting Hyperglycemia   -not on any meds at home  -A1c is 6.9%    History of Present Illness: Meli Antonio is a 73 year old female with past medical history of generalized anxiety disorder major depressive disorder presents from [facility] psychiatric facility due to abnormal labs.     Patient was hospitalized at [facility] for approximately 10 days and discharged on the 18th 3 days ago.  She had presented there primarily with psychiatric symptoms which were unable to be resolved while she was there so she was discharged to [facility].  Patient is denying any pain.  No reports of any cough or discomfort.  Her  is at bedside and reports that she does eat but is only when he is around to feed her and may be eats around 50% of her meals.  Upon arrival to the emergency department she was also noted to have a low-grade fever.  She is denying chest pain abdominal  pain difficulty breathing or any somatic complaint.    Brief Synopsis: pt w/ severe MDD and EDEL. Underwent ECT and responded very well. Intially was going to go to [facility] for inpt stay but improved with continued ECT while waiting for a bed. Improved enough to go home. Has a one time low grade fever. No source of infection and asx. Monitored off abx and did not recur. Ok to DC home.     Lace+ Score: 74  59-90 High Risk  29-58 Medium Risk  0-28   Low Risk       TCM Follow-Up Recommendation:  LACE > 58: High Risk of readmission after discharge from the hospital.      Procedures during hospitalization:   ECT    Incidental or significant findings and recommendations (brief descriptions):  none    Lab/Test results pending at Discharge:   none    Consultants:  psych    Discharge Medication List:     Discharge Medications        CHANGE how you take these medications        Instructions Prescription details   ARIPiprazole 15 MG Tabs  Commonly known as: Abilify  What changed:   medication strength  how much to take      Take 0.5 tablets (7.5 mg total) by mouth at bedtime.   Quantity: 15 tablet  Refills: 0     escitalopram 10 MG Tabs  Commonly known as: Lexapro  What changed: medication strength      Take 1.5 tablets (15 mg total) by mouth nightly.   Quantity: 45 tablet  Refills: 0     lithium carbonate 300 MG Caps  What changed:   medication strength  how much to take  when to take this      Take 1 capsule (300 mg total) by mouth nightly.   Quantity: 30 capsule  Refills: 0     pregabalin 50 MG Caps  Commonly known as: Lyrica  What changed:   medication strength  how much to take  when to take this  additional instructions      Take 1 capsule (50 mg total) by mouth 3 (three) times daily. Hold the afternoon and evening doses prior to ECT   Quantity: 90 capsule  Refills: 0            CONTINUE taking these medications        Instructions Prescription details   cholecalciferol 1000 UNITS Caps  Commonly known as: Vitamin D3      Take  1 capsule (1,000 Units total) by mouth daily.   Refills: 0     propranolol 10 MG Tabs  Commonly known as: Inderal      Take 1 tablet (10 mg total) by mouth 2 (two) times daily.   Refills: 0     tiZANidine 4 MG Tabs  Commonly known as: Zanaflex      Take 1 tablet (4 mg total) by mouth 3 (three) times daily.   Refills: 0            STOP taking these medications      azithromycin 250 MG Tabs  Commonly known as: Zithromax Z-Alok        benzonatate 100 MG Caps  Commonly known as: Tessalon        buPROPion  MG Tb24  Commonly known as: Wellbutrin XL        guaiFENesin-codeine 100-10 MG/5ML Soln  Commonly known as: Robitussin AC        hydrOXYzine 50 MG Tabs  Commonly known as: Atarax        LORazepam 0.5 MG Tabs  Commonly known as: Ativan        predniSONE 20 MG Tabs  Commonly known as: Deltasone                  Where to Get Your Medications        These medications were sent to [id number] - [place], [place] - [address] [place] [address] [phone], [phone]  [address], [place] [address]      Phone: [phone]   ARIPiprazole 15 MG Tabs  escitalopram 10 MG Tabs  lithium carbonate 300 MG Caps  pregabalin 50 MG Caps         [place]    Follow-up appointment:   Birdie Valverde MD  [address] [place] [id number] [phone]    Schedule an appointment as soon as possible for a visit in 1 month(s)      Appointments for Next 30 Days 5/3/2024 - 6/2/2024        Date Arrival Time Visit Type Length Department Provider     5/6/2024  5:30 AM  ECT [2170] 60 min [facility] PACU ROOM    Patient Instructions:         Location Instructions:     Masks are optional for all patients and visitors, unless otherwise indicated.               5/8/2024  5:30 AM  ECT [2170] 60 min [facility] Unit PACU ROOM    Patient Instructions:         Location Instructions:     Masks are optional for all patients and visitors, unless otherwise indicated.                5/10/2024  5:30 AM  ECT [2170] 60 min [facility] Unit PACU ROOM    Patient Instructions:         Location Instructions:     Masks are optional for all patients and visitors, unless otherwise indicated.                      Vital signs:  Temp:  [97.5 °F (36.4 °C)-99 °F (37.2 °C)] 97.5 °F (36.4 °C)  Pulse:  [] 90  Resp:  [15-22] 16  BP: (116-152)/() 124/91  SpO2:  [94 %-100 %] 98 %    Physical Exam:    General: No acute distress   Lungs: clear to auscultation  Cardiovascular: S1, S2  Abdomen: Soft      -----------------------------------------------------------------------------------------------  PATIENT DISCHARGE INSTRUCTIONS: See electronic chart    Ben Skinner MD    Total time spent on discharge plannin minutes     The  [employer] Cures Act makes medical notes like these available to patients in the interest of transparency. Please be advised this is a medical document. Medical documents are intended to carry relevant information, facts as evident, and the clinical opinion of the practitioner. The medical note is intended as peer to peer communication and may appear blunt or direct. It is written in medical language and may contain abbreviations or verbiage that are unfamiliar.

## 2024-05-03 NOTE — ANESTHESIA POSTPROCEDURE EVALUATION
[facility]    Meli Antonio Patient Status:  Outpatient   Age/Gender 73 year old female MRN [id number]   Location [facility] POST ANESTHESIA CARE UNIT Attending Agapito Romero MD   Hosp Day # 0 PCP SIRI TABOR       Anesthesia Post-op Note        Procedure Summary       Date: 05/03/24 Room / Location: [facility] Post Anesthesia Care Unit    Anesthesia Start: 0644 Anesthesia Stop: 0655    Procedure: ECT(BEDSIDE) Diagnosis: Major depressive disorder, recurrent episode, severe with catatonia (HCC)    Scheduled Providers:  Anesthesiologist: Stef Bradley MD    Anesthesia Type: general ASA Status: 2            Anesthesia Type: general    Vitals Value Taken Time   /96 05/03/24 0655   Temp 98.3 °F (36.8 °C) 05/03/24 0655   Pulse 77 05/03/24 0655   Resp 16 05/03/24 0655   SpO2 95 % 05/03/24 0655       Patient Location: PACU    Anesthesia Type: general    Airway Patency: patent    Postop Pain Control: adequate    Mental Status: mildly sedated but able to meaningfully participate in the post-anesthesia evaluation    Nausea/Vomiting: none    Cardiopulmonary/Hydration status: stable euvolemic    Complications: no apparent anesthesia related complications    Postop vital signs: stable    Dental Exam: Unchanged from Preop

## 2024-05-03 NOTE — PROGRESS NOTES
[facility] / [facility]  ECT History & Physical    Meli Antonio Patient Status:  Outpatient   Age/Gender 73 year old female MRN [id number]   Location [facility] POST ANESTHESIA CARE UNIT Attending Agapito Romero MD   Hosp Day # 0 PCP SIRI TABOR     Date: 5/3/2024    Diagnosis: Major depression recurrent severe    Procedure:  ECT    HPI:     Patient seen.  Patient reports no cognitive or physical complaints      Medical History:  Past Medical History:    Anxiety state    Back problem    COMPRESSION FX    Cataract    Depression    Esophageal reflux    GERD (gastroesophageal reflux disease)    Hematoma and contusion of liver    STATES HAS BEEN THERE FOR MANY YEARS    History of fractured kneecap    RIGHT    IBS (irritable bowel syndrome)    Mitral prolapse    Osteoarthritis       Surgical History:  Past Surgical History:   Procedure Laterality Date          Correct bunion,othr methods      Correct bunion,simple      BILAT.     Dilation/curettage,diagnostic      FOR \"MOLE PREGNANCY\"    Other Right     ORIF RIGHT ANKLE    Spine surgery procedure unlisted      cervical spine 2020    Total knee replacement         Family History:  Family History   Problem Relation Age of Onset    Heart Disorder Father     Cancer Mother         ESOPHAGUS       ROS:  Unremarkable    Physical Exam:   /86   Pulse 86   Temp 99 °F (37.2 °C) (Temporal)   Resp 15   SpO2 98%     General Appearance:    Alert, cooperative, no distress, appears stated age   Head:    Normocephalic, without obvious abnormality, atraumatic   Eyes:    PERRL, conjunctiva/corneas clear, EOM's intact       Nose:   Nares normal, septum midline, mucosa normal, no drainage    or sinus tenderness   Throat:   Lips, mucosa, and tongue normal; teeth and gums normal   Neck:   Supple, symmetrical, trachea midline   Lungs:     Clear to auscultation bilaterally, respirations unlabored    Heart:    Regular rate and rhythm, S1 and S2  normal, no murmur, rub   or gallop   Abdomen:     Soft, non-tender, bowel sounds active all four quadrants,     no masses, no organomegaly   Extremities:   Extremities normal, atraumatic, no cyanosis or edema   Pulses:   2+ and symmetric all extremities   Skin:   Skin color, texture, turgor normal, no rashes or lesions   Neurologic:   CNII-XII intact, normal strength, sensation and reflexes     throughout     Impressions & Plans: Major depression recurrent severe.  Bifrontal ECT    I have discussed the risks and benefits and alternatives with the patient/family.  They understand and agree to proceed with plan of care.    Agapito Romero MD

## 2024-05-03 NOTE — PLAN OF CARE
Assumed care at 1930. A&Ox4, forgetful at times. On RA. NPO at 0000 for ECT in AM. Lithium and Lyrica held. NSR on tele. Cardiac electrolyte protocol. Pt denies any pain at this time. Standby assist with walker. PIV L Hand saline locked. Expected discharge to home tomorrow after ECT. Pt resting in bed, call light within reach. Updated on POC, all needs met at this time.      Problem: GASTROINTESTINAL - ADULT  Goal: Maintains or returns to baseline bowel function  Description: INTERVENTIONS:  - Assess bowel function  - Maintain adequate hydration with IV or PO as ordered and tolerated  - Evaluate effectiveness of GI medications  - Encourage mobilization and activity  - Obtain nutritional consult as needed  - Establish a toileting routine/schedule  - Consider collaborating with pharmacy to review patient's medication profile  Outcome: Progressing  Goal: Maintains adequate nutritional intake (undernourished)  Description: INTERVENTIONS:  - Monitor percentage of each meal consumed  - Identify factors contributing to decreased intake, treat as appropriate  - Assist with meals as needed  - Monitor I&O, WT and lab values  - Obtain nutritional consult as needed  - Optimize oral hygiene and moisture  - Encourage food from home; allow for food preferences  - Enhance eating environment  Outcome: Progressing     Problem: METABOLIC/FLUID AND ELECTROLYTES - ADULT  Goal: Electrolytes maintained within normal limits  Description: INTERVENTIONS:  - Monitor labs and rhythm and assess patient for signs and symptoms of electrolyte imbalances  - Administer electrolyte replacement as ordered  - Monitor response to electrolyte replacements, including rhythm and repeat lab results as appropriate  - Fluid restriction as ordered  - Instruct patient on fluid and nutrition restrictions as appropriate  Outcome: Progressing     Problem: MUSCULOSKELETAL - ADULT  Goal: Return mobility to safest level of function  Description: INTERVENTIONS:  -  Assess patient stability and activity tolerance for standing, transferring and ambulating w/ or w/o assistive devices  - Assist with transfers and ambulation using safe patient handling equipment as needed  - Ensure adequate protection for wounds/incisions during mobilization  - Obtain PT/OT consults as needed  - Advance activity as appropriate  - Communicate ordered activity level and limitations with patient/family  Outcome: Progressing     Problem: NEUROLOGICAL - ADULT  Goal: Achieves stable or improved neurological status  Description: INTERVENTIONS  - Assess for and report changes in neurological status  - Initiate measures to prevent increased intracranial pressure  - Maintain blood pressure and fluid volume within ordered parameters to optimize cerebral perfusion and minimize risk of hemorrhage  - Monitor temperature, glucose, and sodium. Initiate appropriate interventions as ordered  Outcome: Progressing     Problem: SAFETY ADULT - FALL  Goal: Free from fall injury  Description: INTERVENTIONS:  - Assess pt frequently for physical needs  - Identify cognitive and physical deficits and behaviors that affect risk of falls.  - [place] fall precautions as indicated by assessment.  - Educate pt/family on patient safety including physical limitations  - Instruct pt to call for assistance with activity based on assessment  - Modify environment to reduce risk of injury  - Provide assistive devices as appropriate  - Consider OT/PT consult to assist with strengthening/mobility  - Encourage toileting schedule  Outcome: Progressing     Problem: DISCHARGE PLANNING  Goal: Discharge to home or other facility with appropriate resources  Description: INTERVENTIONS:  - Identify barriers to discharge w/pt and caregiver  - Include patient/family/discharge partner in discharge planning  - Arrange for needed discharge resources and transportation as appropriate  - Identify discharge learning needs (meds, wound care, etc)  -  Arrange for interpreters to assist at discharge as needed  - Consider post-discharge preferences of patient/family/discharge partner  - Complete POLST form as appropriate  - Assess patient's ability to be responsible for managing their own health  - Refer to Case Management Department for coordinating discharge planning if the patient needs post-hospital services based on physician/LIP order or complex needs related to functional status, cognitive ability or social support system  Outcome: Progressing     Problem: Delirium  Goal: Minimize duration of delirium  Description: Interventions:  - Encourage use of hearing aids, eye glasses  - Promote highest level of mobility daily  - Provide frequent reorientation  - Promote wakefulness i.e. lights on, blinds open  - Promote sleep, encourage patient's normal rest cycle i.e. lights off, TV off, minimize noise and interruptions  - Encourage family to assist in orientation and promotion of home routines  Outcome: Progressing

## 2024-05-03 NOTE — PROGRESS NOTES
[facility] / [facility]  ECT Procedure Note    Meli Antonio Patient Status:  Outpatient   Age/Gender 73 year old female MRN [id number]   Location [facility] POST ANESTHESIA CARE UNIT Attending Agapito Romero MD   Hosp Day # 0 PCP SIRI TABOR     5/3/2024    ECT Number: #3    Diagnosis: Major Depression Recurrent Severe Without Psychotic Features. F33.2    Type of ECT:  Bifrontal    Place of Service:  Inpatient    Settings:   1.  Energy Percentage: 70%    2.  Program:  Low 0.5    Pre-ECT Evaluation    Symptoms:  Patient seen.  Overall patient noted she feels ECT has shown some mild improvements in her mood.  Patient noted today she feels more hopeful and less anxious.  Patient currently denies suicidal thoughts.  She reports no cognitive or physical complaints from ECT.  Discussed treatment options and plan on scheduling 3 ECT next week.  Discussed with patient that if showing further improvements and/or showing signs of any cognitive side effects, there would be a consideration to decrease to twice next week.  Patient noted she feels safer.  Patient with active POA and also patient agreeable for ECT.    Risk/Benefits:  Discussed with patient side effects of ECT including headache, teeth, jaw, cardiac, pulmonary, NPO, aspiration, allergic reactions, anesthetic reactions, musculoskeletal, neurologic, morbidity/mortality, potential lack of efficacy, unilateral/bilateral ECT, relapse/maintenance issues, cognitive risks including memory, concentration, cognition, and other risks.    Side Effects:  Patient notes no cognitive/physical complaints    Exam:  Mood: less depressed and less anxious  Affect: Congruent  Memory:  intact immediate, recent, remote and as evidenced by ability to present consistent history  Concentration:   fair  Suicidal ideation: no suicidal ideation    Prior to procedure, reviewed with treatment team correct patient, time of procedure and type of ECT.  Also reviewed with  anesthesia pre-ECT medications.    Patient Monitored:  B/P, EKG, EEG, Pulse Ox, Left Ankle Cuff    Pre-ECT Medications: Robinul 0.1 mg IV, Zofran 4 mg IV, and caffeine 125 mg IV    ECT Medications:  Anesthetic  Etomidate 10 mg IV followed by ketamine 25 mg IV and Succinylcholine 60 mg IV    Seizure Duration:  Motor: 24 seconds       EE seconds    Post-ECT Condition:  Treatment unremarkable    Post-ECT Medications:  None     Agapito Romero MD

## 2024-05-06 ENCOUNTER — ANESTHESIA EVENT (OUTPATIENT)
Dept: POSTOP/PACU | Facility: HOSPITAL | Age: 74
End: 2024-05-06
Payer: MEDICARE

## 2024-05-06 ENCOUNTER — ANESTHESIA (OUTPATIENT)
Dept: POSTOP/PACU | Facility: HOSPITAL | Age: 74
End: 2024-05-06
Payer: MEDICARE

## 2024-05-06 ENCOUNTER — HOSPITAL ENCOUNTER (OUTPATIENT)
Dept: POSTOP/PACU | Facility: HOSPITAL | Age: 74
Discharge: HOME | DRG: 640 | End: 2024-05-06
Attending: Other
Payer: MEDICARE

## 2024-05-06 VITALS
TEMPERATURE: 98 F | WEIGHT: 110 LBS | HEART RATE: 100 BPM | SYSTOLIC BLOOD PRESSURE: 121 MMHG | HEIGHT: 60 IN | OXYGEN SATURATION: 98 % | BODY MASS INDEX: 21.6 KG/M2 | RESPIRATION RATE: 18 BRPM | DIASTOLIC BLOOD PRESSURE: 89 MMHG

## 2024-05-06 DIAGNOSIS — F33.2 MAJOR DEPRESSIVE DISORDER, RECURRENT EPISODE, SEVERE WITH CATATONIA (HCC): ICD-10-CM

## 2024-05-06 DIAGNOSIS — F06.1 MAJOR DEPRESSIVE DISORDER, RECURRENT EPISODE, SEVERE WITH CATATONIA (HCC): ICD-10-CM

## 2024-05-06 LAB — GLUCOSE BLD-MCNC: 109 MG/DL (ref 70–99)

## 2024-05-06 RX ORDER — ONDANSETRON 2 MG/ML
INJECTION INTRAMUSCULAR; INTRAVENOUS
Status: COMPLETED
Start: 2024-05-06 | End: 2024-05-06

## 2024-05-06 RX ORDER — NALOXONE HYDROCHLORIDE 0.4 MG/ML
0.08 INJECTION, SOLUTION INTRAMUSCULAR; INTRAVENOUS; SUBCUTANEOUS AS NEEDED
Status: ACTIVE | OUTPATIENT
Start: 2024-05-06 | End: 2024-05-06

## 2024-05-06 RX ORDER — ETOMIDATE 2 MG/ML
INJECTION INTRAVENOUS AS NEEDED
Status: DISCONTINUED | OUTPATIENT
Start: 2024-05-06 | End: 2024-05-06 | Stop reason: SURG

## 2024-05-06 RX ORDER — SODIUM CHLORIDE, SODIUM LACTATE, POTASSIUM CHLORIDE, CALCIUM CHLORIDE 600; 310; 30; 20 MG/100ML; MG/100ML; MG/100ML; MG/100ML
INJECTION, SOLUTION INTRAVENOUS CONTINUOUS
Status: DISCONTINUED | OUTPATIENT
Start: 2024-05-06 | End: 2024-05-08

## 2024-05-06 RX ORDER — GLYCOPYRROLATE 0.2 MG/ML
0.1 INJECTION, SOLUTION INTRAMUSCULAR; INTRAVENOUS ONCE
Status: COMPLETED | OUTPATIENT
Start: 2024-05-06 | End: 2024-05-06

## 2024-05-06 RX ORDER — HYDROMORPHONE HYDROCHLORIDE 1 MG/ML
0.4 INJECTION, SOLUTION INTRAMUSCULAR; INTRAVENOUS; SUBCUTANEOUS EVERY 5 MIN PRN
Status: ACTIVE | OUTPATIENT
Start: 2024-05-06 | End: 2024-05-06

## 2024-05-06 RX ORDER — ONDANSETRON 2 MG/ML
4 INJECTION INTRAMUSCULAR; INTRAVENOUS ONCE
Status: COMPLETED | OUTPATIENT
Start: 2024-05-06 | End: 2024-05-06

## 2024-05-06 RX ORDER — CAFFEINE AND SODIUM BENZOATE 125 MG/ML
125 INJECTION, SOLUTION INTRAMUSCULAR; INTRAVENOUS ONCE
Status: COMPLETED | OUTPATIENT
Start: 2024-05-06 | End: 2024-05-06

## 2024-05-06 RX ORDER — DEXTROSE MONOHYDRATE 25 G/50ML
50 INJECTION, SOLUTION INTRAVENOUS
Status: DISCONTINUED | OUTPATIENT
Start: 2024-05-06 | End: 2024-05-08

## 2024-05-06 RX ORDER — KETAMINE HYDROCHLORIDE 50 MG/ML
INJECTION, SOLUTION INTRAMUSCULAR; INTRAVENOUS AS NEEDED
Status: DISCONTINUED | OUTPATIENT
Start: 2024-05-06 | End: 2024-05-06 | Stop reason: SURG

## 2024-05-06 RX ORDER — CAFFEINE AND SODIUM BENZOATE 125 MG/ML
INJECTION, SOLUTION INTRAMUSCULAR; INTRAVENOUS
Status: COMPLETED
Start: 2024-05-06 | End: 2024-05-06

## 2024-05-06 RX ORDER — NICOTINE POLACRILEX 4 MG
15 LOZENGE BUCCAL
Status: DISCONTINUED | OUTPATIENT
Start: 2024-05-06 | End: 2024-05-08

## 2024-05-06 RX ORDER — GLYCOPYRROLATE 0.2 MG/ML
INJECTION, SOLUTION INTRAMUSCULAR; INTRAVENOUS
Status: COMPLETED
Start: 2024-05-06 | End: 2024-05-06

## 2024-05-06 RX ORDER — HYDROMORPHONE HYDROCHLORIDE 1 MG/ML
0.6 INJECTION, SOLUTION INTRAMUSCULAR; INTRAVENOUS; SUBCUTANEOUS EVERY 5 MIN PRN
Status: ACTIVE | OUTPATIENT
Start: 2024-05-06 | End: 2024-05-06

## 2024-05-06 RX ORDER — NICOTINE POLACRILEX 4 MG
30 LOZENGE BUCCAL
Status: DISCONTINUED | OUTPATIENT
Start: 2024-05-06 | End: 2024-05-08

## 2024-05-06 RX ORDER — HYDROMORPHONE HYDROCHLORIDE 1 MG/ML
0.2 INJECTION, SOLUTION INTRAMUSCULAR; INTRAVENOUS; SUBCUTANEOUS EVERY 5 MIN PRN
Status: ACTIVE | OUTPATIENT
Start: 2024-05-06 | End: 2024-05-06

## 2024-05-06 RX ADMIN — SODIUM CHLORIDE, SODIUM LACTATE, POTASSIUM CHLORIDE, CALCIUM CHLORIDE: 600; 310; 30; 20 INJECTION, SOLUTION INTRAVENOUS at 06:44:00

## 2024-05-06 RX ADMIN — ONDANSETRON 4 MG: 2 INJECTION INTRAMUSCULAR; INTRAVENOUS at 06:42:00

## 2024-05-06 RX ADMIN — KETAMINE HYDROCHLORIDE 25 MG: 50 INJECTION, SOLUTION INTRAMUSCULAR; INTRAVENOUS at 07:39:00

## 2024-05-06 RX ADMIN — GLYCOPYRROLATE 0.1 MG: 0.2 INJECTION, SOLUTION INTRAMUSCULAR; INTRAVENOUS at 06:41:00

## 2024-05-06 RX ADMIN — CAFFEINE AND SODIUM BENZOATE 125 MG: 125 INJECTION, SOLUTION INTRAMUSCULAR; INTRAVENOUS at 07:26:00

## 2024-05-06 RX ADMIN — SODIUM CHLORIDE, SODIUM LACTATE, POTASSIUM CHLORIDE, CALCIUM CHLORIDE: 600; 310; 30; 20 INJECTION, SOLUTION INTRAVENOUS at 07:48:00

## 2024-05-06 RX ADMIN — SODIUM CHLORIDE, SODIUM LACTATE, POTASSIUM CHLORIDE, CALCIUM CHLORIDE: 600; 310; 30; 20 INJECTION, SOLUTION INTRAVENOUS at 08:00:00

## 2024-05-06 RX ADMIN — ETOMIDATE 10 MG: 2 INJECTION INTRAVENOUS at 07:39:00

## 2024-05-06 RX ADMIN — SODIUM CHLORIDE, SODIUM LACTATE, POTASSIUM CHLORIDE, CALCIUM CHLORIDE: 600; 310; 30; 20 INJECTION, SOLUTION INTRAVENOUS at 06:46:00

## 2024-05-06 NOTE — PROGRESS NOTES
[facility] / [facility]  ECT History & Physical    Meli Antonio Patient Status:  Outpatient   Age/Gender 73 year old female MRN [id number]   Location [facility] POST ANESTHESIA CARE UNIT Attending Agapito Romero MD   Hosp Day # 0 PCP SIRI TABOR     Date of Service: 2024    Diagnosis:  Major Depression Recurrent Severe Without Psychotic Features. F33.2    Procedure:  Bifrontal    HPI: Improving in all areas.  No physical complaints      Medical History:  Past Medical History:    Anxiety state    Back problem    COMPRESSION FX    Cataract    Depression    Esophageal reflux    GERD (gastroesophageal reflux disease)    Hematoma and contusion of liver    STATES HAS BEEN THERE FOR MANY YEARS    History of fractured kneecap    RIGHT    IBS (irritable bowel syndrome)    Mitral prolapse    Osteoarthritis       Surgical History:  Past Surgical History:   Procedure Laterality Date          Correct bunion,othr methods      Correct bunion,simple      BILAT.     Dilation/curettage,diagnostic      FOR \"MOLE PREGNANCY\"    Other Right     ORIF RIGHT ANKLE    Spine surgery procedure unlisted      cervical spine 2020    Total knee replacement         Family History:  Family History   Problem Relation Age of Onset    Heart Disorder Father     Cancer Mother         ESOPHAGUS       Social History:  Social History     Socioeconomic History    Marital status:      Spouse name: Not on file    Number of children: Not on file    Years of education: Not on file    Highest education level: Not on file   Occupational History    Not on file   Tobacco Use    Smoking status: Former     Current packs/day: 2.00     Average packs/day: 2.0 packs/day for 15.0 years (30.0 ttl pk-yrs)     Types: Cigarettes    Smokeless tobacco: Never    Tobacco comments:     QUIT IN    Vaping Use    Vaping status: Never Used   Substance and Sexual Activity    Alcohol use: No    Drug use: No    Sexual activity: Not on  file   Other Topics Concern    Not on file   Social History Narrative    Not on file     Social Determinants of Health     Financial Resource Strain: Low Risk  (4/20/2024)    Financial Resource Strain     Difficulty of Paying Living Expenses: Not on file     Med Affordability: No   Food Insecurity: No Food Insecurity (4/21/2024)    Food Insecurity     Food Insecurity: Never true   Transportation Needs: No Transportation Needs (4/21/2024)    Transportation Needs     Lack of Transportation: No   Physical Activity: Not on file   Stress: Not on file   Social Connections: Unknown (3/13/2021)    [facility]    Social Connections     Conversations with friends/family/neighbors per week: Not on file   Housing Stability: Low Risk  (4/21/2024)    Housing Stability     Housing Instability: No     Housing Instability Emergency: Not on file       ROS:  unremarkable    Physical Exam:   Ht 60\"   Wt 49.9 kg (110 lb)   BMI 21.48 kg/m²     General Appearance:    Alert, cooperative, no distress, appears stated age   Head:    Normocephalic, without obvious abnormality, atraumatic   Eyes:    PERRL, conjunctiva/corneas clear, EOM's intact   Nose:   Nares normal, septum midline, mucosa normal, no drainage    or sinus tenderness   Throat:   Lips, mucosa, and tongue normal; teeth and gums normal   Neck:   Supple, symmetrical, trachea midline   Lungs:     Clear to auscultation bilaterally, respirations unlabored    Heart:    Regular rate and rhythm, S1 and S2 normal, no murmur, rub or gallop   Abdomen:     Soft, non-tender, bowel sounds active all four quadrants,     no masses, no organomegaly   Extremities:   Extremities normal, atraumatic, no cyanosis or edema   Pulses:   2+ and symmetric all extremities   Skin:   Skin color, texture, turgor normal, no rashes or lesions   Neurologic:   CNII-XII intact, normal strength, sensation and reflexes     throughout     Impressions &  Plans:    Diagnosis:  Major Depression Recurrent Severe Without Psychotic Features. F33.2    Procedure:  Bifrontal    I have discussed the risks and benefits and alternatives with the patient/family.  They understand and agree to proceed with plan of care.    Meli Adames MD  5/6/2024

## 2024-05-06 NOTE — DISCHARGE INSTRUCTIONS
Discharge Instructions  Electroconvulsive Therapy    Activities:  You MUST arrange to have a responsible adult drive you home and have a responsible adult stay with you the rest of the day and overnight.  Do not drive today.  Do not operate any machinery today. Use kitchen equipment with caution.  Rest and take it easy today.  Do not take public transportation without the presence of another responsible adult for 24 hours    Medications:  Resume your regular medications when you get home  The nurse will instruct you not to take any NSAIDS (Advil, Aleve, Motrin, Ibuprofen) before 1 pm today because you were given a certain medication during the procedure.    Diet:  You may resume your regular diet when you get home  Do not drink alcohol for 24 hours    Additional Instructions:  Someone should call you to schedule any upcoming ECT treatments. Call as needed to schedule or cancel your ECT appointments [phone].  If you have any questions or concerns, please call your own psychiatrist.  For your safety, please do not wear make-up to any future ECT appointments.  For any questions regarding your ECT appointment, please call [facility] [phone].  For cancellations after hours, call [phone] and leave a message.    Expected Recovery:  As you awaken, you may experience one or more of the following:  Headache, nausea, temporary confusion, or muscle stiffness.  If these symptoms increase, become severe or are accompanied by a fever of more than 101, please seek medical attention.  The ECT may affect memory.  Many patients report loss of memory for events that occurred in the days, weeks or months surrounding the ECT.  Many of these memories may return, but not always.  Short-term memory may also be affected for months, but this can also be a result of the disorder that you have.

## 2024-05-06 NOTE — ANESTHESIA PREPROCEDURE EVALUATION
PRE-OP EVALUATION    Patient Name: Meli Antonio    Admit Diagnosis: Major depressive disorder, recurrent episode, severe with catatonia (HCC) [F33.2, F06.1]    Pre-op Diagnosis: * No surgery found *        Anesthesia Procedure: ECT(BEDSIDE)    * Surgery not found *    Pre-op vitals reviewed.  Temp: 98.3 °F (36.8 °C)  Pulse: 100  Resp: 18  BP: 111/79  SpO2: 100 %  Body mass index is 21.48 kg/m².    Current medications reviewed.  Hospital Medications:   lactated ringers infusion   Intravenous Continuous    [COMPLETED] glycopyrrolate (Robinul) 0.2 MG/ML injection 0.1 mg  0.1 mg Intravenous Once    [COMPLETED] ondansetron (Zofran) 4 MG/2ML injection 4 mg  4 mg Intravenous Once    caffeine-sodium benzoate 125-125 mg/mL injection  125 mg Intravenous Once    [COMPLETED] caffeine-sodium benzoate 125-125 MG/ML injection           Outpatient Medications:   (Not in a hospital admission)      Allergies: Radiology contrast iodinated dyes, Sulfa antibiotics, Methylprednisolone, Seroquel [quetiapine], Cinnamon, and Iodine (topical)      Anesthesia Evaluation    Patient summary reviewed.    Anesthetic Complications  (-) history of anesthetic complications         GI/Hepatic/Renal      (+) GERD                      (+) irritable bowel syndrome     Cardiovascular    Negative cardiovascular ROS.    Exercise tolerance: good     MET: >4                                           Endo/Other      (+) diabetes and well controlled, type 2, not using insulin                         Pulmonary    Negative pulmonary ROS.                       Neuro/Psych      (+) depression  (+) anxiety                              Past Surgical History:   Procedure Laterality Date          Correct bunion,othr methods      Correct bunion,simple      BILAT.     Dilation/curettage,diagnostic      FOR \"MOLE PREGNANCY\"    Other Right     ORIF RIGHT ANKLE    Spine surgery procedure unlisted      cervical spine 2020    Total knee replacement        Social History     Socioeconomic History    Marital status:    Tobacco Use    Smoking status: Former     Current packs/day: 2.00     Average packs/day: 2.0 packs/day for 15.0 years (30.0 ttl pk-yrs)     Types: Cigarettes    Smokeless tobacco: Never    Tobacco comments:     QUIT IN 1983   Vaping Use    Vaping status: Never Used   Substance and Sexual Activity    Alcohol use: No    Drug use: No     History   Drug Use No     Available pre-op labs reviewed.  Lab Results   Component Value Date    WBC 13.1 (H) 04/28/2024    RBC 4.73 04/28/2024    HGB 13.4 04/28/2024    HCT 42.2 04/28/2024    MCV 89.2 04/28/2024    MCH 28.3 04/28/2024    MCHC 31.8 04/28/2024    RDW 14.2 04/28/2024    .0 04/28/2024     Lab Results   Component Value Date     04/28/2024    K 4.0 04/28/2024     04/28/2024    CO2 26.0 04/28/2024    BUN 11 04/28/2024    CREATSERUM 0.81 04/28/2024     (H) 04/28/2024    CA 9.4 04/28/2024            Airway      Mallampati: II  Mouth opening: >3 FB  TM distance: > 6 cm  Neck ROM: full Cardiovascular    Cardiovascular exam normal.  Rhythm: regular  Rate: normal     Dental    Dentition appears grossly intact         Pulmonary    Pulmonary exam normal.  Breath sounds clear to auscultation bilaterally.               Other findings              ASA: 2   Plan: general  NPO status verified and patient meets guidelines.    Post-procedure pain management plan discussed with surgeon and patient.      Plan/risks discussed with: patient                Present on Admission:  **None**

## 2024-05-06 NOTE — PROGRESS NOTES
[facility] / [facility]  ECT Procedure Note    Meli Antonio Patient Status:  Outpatient   Age/Gender 73 year old female   MRN [id number]    Location [facility] POST ANESTHESIA CARE UNIT Attending No att. providers found   Hosp Day # 0 PCP SIRI TBAOR     ECT Number: #4    Diagnosis: Major Depression Recurrent Severe Without Psychotic Features. F33.2    Type of ECT:  Bifrontal    Place of Service:  Outpatient    Settings:   1.  Energy Percentage: 70%    2.  Program:  Low 0.5    Pre-ECT Evaluation    Symptoms:      Prior to procedure, reviewed with treatment team correct patient, time of procedure and type of ECT.  Also reviewed with anesthesia pre-ECT medications    Patient is continuing to improve in all areas.  She has successfully discharged back home and she feels much better being at home.  She notes some mild physical disequilibrium/unsteadiness, but also reports that her depression has greatly improved and she is now eating well.  No SI. Minimal cognitive side effects and cognition overall improving.  Patient has ECT Wednesday and Friday and then we will reevaluate treatment for next      The patient continues to retain capacity to consent for ECT.    Risk/Benefits:  Discussed with patient risk/benefit/alternatives of ECT    Side Effects:  Patient notes no cognitive/physical complaints    Exam:  Mood: less depressed and less anxious  Affect: Congruent and much brighter  Memory:  intact immediate, recent, remote and as evidenced by ability to present consistent history  Concentration:   good and as assessed by  ability to concentrate on our conversation  Suicidal ideation: no suicidal ideation    Patient Monitored:  B/P, EKG, EEG, Pulse Ox, Left Ankle Cuff    Pre-ECT Medications:   Robinul 0.1 mg IV, Zofran 4 mg IV, and caffeine 125 mg IV     ECT Medications:  Anesthetic  Etomidate 10 mg IV followed by ketamine 25 mg IV and Succinylcholine 60 mg IV     Seizure Duration:  Motor: 21  seconds       EE seconds    Post-ECT Condition:  Treatment unremarkable    ECT Medications:  None     Meli Adames    2024

## 2024-05-06 NOTE — ANESTHESIA POSTPROCEDURE EVALUATION
[facility]    Meli Antonio Patient Status:  Outpatient   Age/Gender 73 year old female MRN [id number]   Location [facility] POST ANESTHESIA CARE UNIT Attending Agapito Romero MD   Hosp Day # 0 PCP SIRI TABOR       Anesthesia Post-op Note        Procedure Summary       Date: 05/06/24 Room / Location: [facility] Post Anesthesia Care Unit    Anesthesia Start: 0736 Anesthesia Stop: 0748    Procedure: ECT(BEDSIDE) Diagnosis: Major depressive disorder, recurrent episode, severe with catatonia (HCC)    Scheduled Providers:  Anesthesiologist: Stef Bradley MD    Anesthesia Type: general ASA Status: 2            Anesthesia Type: general    Vitals Value Taken Time   /91 05/06/24 0748   Temp 98.3 °F (36.8 °C) 05/06/24 0748   Pulse 100 05/06/24 0748   Resp 16 05/06/24 0748   SpO2 100 % 05/06/24 0748       Patient Location: PACU    Anesthesia Type: general    Airway Patency: patent    Postop Pain Control: adequate    Mental Status: mildly sedated but able to meaningfully participate in the post-anesthesia evaluation    Nausea/Vomiting: none    Cardiopulmonary/Hydration status: stable euvolemic    Complications: no apparent anesthesia related complications    Postop vital signs: stable    Dental Exam: Unchanged from Preop

## 2024-05-08 ENCOUNTER — ANESTHESIA EVENT (OUTPATIENT)
Dept: POSTOP/PACU | Facility: HOSPITAL | Age: 74
End: 2024-05-08
Payer: MEDICARE

## 2024-05-08 ENCOUNTER — ANESTHESIA (OUTPATIENT)
Dept: POSTOP/PACU | Facility: HOSPITAL | Age: 74
End: 2024-05-08
Payer: MEDICARE

## 2024-05-08 RX ORDER — KETAMINE HYDROCHLORIDE 50 MG/ML
INJECTION, SOLUTION INTRAMUSCULAR; INTRAVENOUS AS NEEDED
Status: DISCONTINUED | OUTPATIENT
Start: 2024-05-08 | End: 2024-05-08 | Stop reason: SURG

## 2024-05-08 RX ORDER — ETOMIDATE 2 MG/ML
INJECTION INTRAVENOUS AS NEEDED
Status: DISCONTINUED | OUTPATIENT
Start: 2024-05-08 | End: 2024-05-08 | Stop reason: SURG

## 2024-05-08 RX ADMIN — SODIUM CHLORIDE, SODIUM LACTATE, POTASSIUM CHLORIDE, CALCIUM CHLORIDE: 600; 310; 30; 20 INJECTION, SOLUTION INTRAVENOUS at 07:00:00

## 2024-05-08 RX ADMIN — KETAMINE HYDROCHLORIDE 25 MG: 50 INJECTION, SOLUTION INTRAMUSCULAR; INTRAVENOUS at 07:48:00

## 2024-05-08 RX ADMIN — ETOMIDATE 10 MG: 2 INJECTION INTRAVENOUS at 07:48:00

## 2024-05-08 RX ADMIN — SODIUM CHLORIDE, SODIUM LACTATE, POTASSIUM CHLORIDE, CALCIUM CHLORIDE: 600; 310; 30; 20 INJECTION, SOLUTION INTRAVENOUS at 08:05:00

## 2024-05-08 NOTE — ANESTHESIA POSTPROCEDURE EVALUATION
[facility]    Meli Antonio Patient Status:  Outpatient   Age/Gender 73 year old female MRN [id number]   Location [facility] POST ANESTHESIA CARE UNIT Attending Agapito Romero MD   Hosp Day # 0 PCP SIRI TABOR       Anesthesia Post-op Note        Procedure Summary       Date: 05/08/24 Room / Location: [facility] Post Anesthesia Care Unit    Anesthesia Start: 0743 Anesthesia Stop: 0805    Procedure: ECT(BEDSIDE) Diagnosis: Major depressive disorder, recurrent episode, severe with catatonia (HCC)    Scheduled Providers:  Anesthesiologist: Stef Bradley MD    Anesthesia Type: general ASA Status: 2            Anesthesia Type: general    Vitals Value Taken Time   /122 05/08/24 0805   Temp 98.3 °F (36.8 °C) 05/08/24 0805   Pulse 82 05/08/24 0805   Resp 16 05/08/24 0805   SpO2 98 05/08/24 0806       Patient Location: PACU    Anesthesia Type: general    Airway Patency: patent    Postop Pain Control: adequate    Mental Status: mildly sedated but able to meaningfully participate in the post-anesthesia evaluation    Nausea/Vomiting: none    Cardiopulmonary/Hydration status: stable euvolemic    Complications: no apparent anesthesia related complications    Postop vital signs: stable    Dental Exam: Unchanged from Preop

## 2024-05-08 NOTE — ANESTHESIA PREPROCEDURE EVALUATION
PRE-OP EVALUATION    Patient Name: Meli Antonio    Admit Diagnosis: Major depressive disorder, recurrent episode, severe with catatonia (HCC) [F33.2, F06.1]    Pre-op Diagnosis: * No surgery found *        Anesthesia Procedure: ECT(BEDSIDE)    * Surgery not found *    Pre-op vitals reviewed.  Temp: 98.3 °F (36.8 °C)  Pulse: 90  Resp: 18  BP: 129/80  SpO2: 94 %  Body mass index is 21.48 kg/m².    Current medications reviewed.  Hospital Medications:   glucose (Dex4) 15 GM/59ML oral liquid 15 g  15 g Oral Q15 Min PRN    Or    glucose (Glutose) 40% oral gel 15 g  15 g Oral Q15 Min PRN    Or    glucose-vitamin C (Dex-4) chewable tab 4 tablet  4 tablet Oral Q15 Min PRN    Or    dextrose 50% injection 50 mL  50 mL Intravenous Q15 Min PRN    Or    glucose (Dex4) 15 GM/59ML oral liquid 30 g  30 g Oral Q15 Min PRN    Or    glucose (Glutose) 40% oral gel 30 g  30 g Oral Q15 Min PRN    Or    glucose-vitamin C (Dex-4) chewable tab 8 tablet  8 tablet Oral Q15 Min PRN    lactated ringers infusion   Intravenous Continuous    lactated ringers infusion   Intravenous Continuous    [COMPLETED] glycopyrrolate (Robinul) 0.2 MG/ML injection 0.1 mg  0.1 mg Intravenous Once    caffeine-sodium benzoate 125-125 mg/mL injection  125 mg Intravenous Once    [COMPLETED] ondansetron (Zofran) 4 MG/2ML injection 4 mg  4 mg Intravenous Once    [COMPLETED] caffeine-sodium benzoate 125-125 MG/ML injection           Outpatient Medications:   (Not in a hospital admission)      Allergies: Radiology contrast iodinated dyes, Sulfa antibiotics, Methylprednisolone, Seroquel [quetiapine], Cinnamon, and Iodine (topical)      Anesthesia Evaluation    Patient summary reviewed.    Anesthetic Complications  (-) history of anesthetic complications         GI/Hepatic/Renal      (+) GERD                      (+) irritable bowel syndrome     Cardiovascular    Negative cardiovascular ROS.    Exercise tolerance: good     MET: >4                                            Endo/Other      (+) diabetes and well controlled, type 2, not using insulin                         Pulmonary    Negative pulmonary ROS.                       Neuro/Psych      (+) depression  (+) anxiety                              Past Surgical History:   Procedure Laterality Date          Correct bunion,othr methods      Correct bunion,simple      BILAT.     Dilation/curettage,diagnostic      FOR \"MOLE PREGNANCY\"    Other Right     ORIF RIGHT ANKLE    Spine surgery procedure unlisted      cervical spine 2020    Total knee replacement       Social History     Socioeconomic History    Marital status:    Tobacco Use    Smoking status: Former     Current packs/day: 2.00     Average packs/day: 2.0 packs/day for 15.0 years (30.0 ttl pk-yrs)     Types: Cigarettes    Smokeless tobacco: Never    Tobacco comments:     QUIT IN    Vaping Use    Vaping status: Never Used   Substance and Sexual Activity    Alcohol use: No    Drug use: No     History   Drug Use No     Available pre-op labs reviewed.  Lab Results   Component Value Date    WBC 13.1 (H) 2024    RBC 4.73 2024    HGB 13.4 2024    HCT 42.2 2024    MCV 89.2 2024    MCH 28.3 2024    MCHC 31.8 2024    RDW 14.2 2024    .0 2024     Lab Results   Component Value Date     2024    K 4.0 2024     2024    CO2 26.0 2024    BUN 11 2024    CREATSERUM 0.81 2024     (H) 2024    CA 9.4 2024            Airway      Mallampati: II  Mouth opening: >3 FB  TM distance: > 6 cm  Neck ROM: full Cardiovascular    Cardiovascular exam normal.  Rhythm: regular  Rate: normal     Dental    Dentition appears grossly intact         Pulmonary    Pulmonary exam normal.  Breath sounds clear to auscultation bilaterally.               Other findings              ASA: 2   Plan: general  NPO status verified and patient meets  guidelines.    Post-procedure pain management plan discussed with surgeon and patient.      Plan/risks discussed with: patient                Present on Admission:  **None**

## 2024-05-10 ENCOUNTER — ANESTHESIA EVENT (OUTPATIENT)
Dept: POSTOP/PACU | Facility: HOSPITAL | Age: 74
End: 2024-05-10
Payer: MEDICARE

## 2024-05-10 ENCOUNTER — ANESTHESIA (OUTPATIENT)
Dept: POSTOP/PACU | Facility: HOSPITAL | Age: 74
End: 2024-05-10
Payer: MEDICARE

## 2024-05-10 RX ORDER — KETAMINE HYDROCHLORIDE 50 MG/ML
INJECTION, SOLUTION INTRAMUSCULAR; INTRAVENOUS AS NEEDED
Status: DISCONTINUED | OUTPATIENT
Start: 2024-05-10 | End: 2024-05-10 | Stop reason: SURG

## 2024-05-10 RX ORDER — ETOMIDATE 2 MG/ML
INJECTION INTRAVENOUS AS NEEDED
Status: DISCONTINUED | OUTPATIENT
Start: 2024-05-10 | End: 2024-05-10 | Stop reason: SURG

## 2024-05-10 RX ADMIN — KETAMINE HYDROCHLORIDE 25 MG: 50 INJECTION, SOLUTION INTRAMUSCULAR; INTRAVENOUS at 07:53:00

## 2024-05-10 RX ADMIN — SODIUM CHLORIDE, SODIUM LACTATE, POTASSIUM CHLORIDE, CALCIUM CHLORIDE: 600; 310; 30; 20 INJECTION, SOLUTION INTRAVENOUS at 07:50:00

## 2024-05-10 RX ADMIN — ETOMIDATE 10 MG: 2 INJECTION INTRAVENOUS at 07:53:00

## 2024-05-10 NOTE — ANESTHESIA POSTPROCEDURE EVALUATION
[facility]    Meli Antonio Patient Status:  Outpatient   Age/Gender 73 year old female MRN [id number]   Location [facility] POST ANESTHESIA CARE UNIT Attending Agapito Romero MD   Hosp Day # 0 PCP SIRI TABOR       Anesthesia Post-op Note        Procedure Summary       Date: 05/10/24 Room / Location: [facility] Post Anesthesia Care Unit    Anesthesia Start: 0750 Anesthesia Stop: 0803    Procedure: ECT(BEDSIDE) Diagnosis: Major depressive disorder, recurrent episode, severe with catatonia (HCC)    Scheduled Providers:  Responsible Provider: Jeronimo Aly MD    Anesthesia Type: general ASA Status: 2            Anesthesia Type: No value filed.    Vitals Value Taken Time   /70 05/10/24 0900   Temp 98.2 05/10/24 0900   Pulse 78 05/10/24 0900   Resp 18 05/10/24 0900   SpO2 99 05/10/24 0900       Patient Location: PACU    Anesthesia Type: general    Airway Patency: patent    Postop Pain Control: adequate    Mental Status: mildly sedated but able to meaningfully participate in the post-anesthesia evaluation    Nausea/Vomiting: none    Cardiopulmonary/Hydration status: stable euvolemic    Complications: no apparent anesthesia related complications    Postop vital signs: stable    Dental Exam: Unchanged from Preop    Patient to be discharged from PACU when criteria met.

## 2024-05-10 NOTE — ANESTHESIA PREPROCEDURE EVALUATION
PRE-OP EVALUATION    Patient Name: Meli Antonio    Admit Diagnosis: Major depressive disorder, recurrent episode, severe with catatonia (HCC) [F33.2, F06.1]    Pre-op Diagnosis: * No pre-op diagnosis entered *        Anesthesia Procedure: ECT(BEDSIDE)    * No surgeons found in log *    Pre-op vitals reviewed.  Temp: 97.6 °F (36.4 °C)  Pulse: 84  Resp: 18  BP: 124/88  SpO2: 96 %  There is no height or weight on file to calculate BMI.    Current medications reviewed.  Hospital Medications:   lactated ringers infusion   Intravenous Continuous    [COMPLETED] glycopyrrolate (Robinul) 0.2 MG/ML injection 0.1 mg  0.1 mg Intravenous Once    [COMPLETED] ondansetron (Zofran) 4 MG/2ML injection 4 mg  4 mg Intravenous Once    [COMPLETED] caffeine-sodium benzoate 125-125 mg/mL injection  125 mg Intravenous Once       Outpatient Medications:   (Not in a hospital admission)      Allergies: Radiology contrast iodinated dyes, Sulfa antibiotics, Methylprednisolone, Seroquel [quetiapine], Cinnamon, and Iodine (topical)      Anesthesia Evaluation    Patient summary reviewed.    Anesthetic Complications  (-) history of anesthetic complications         GI/Hepatic/Renal      (+) GERD                      (+) irritable bowel syndrome     Cardiovascular    Negative cardiovascular ROS.    Exercise tolerance: good     MET: >4                                  (-) FERREIRA  (-) orthopnea  (-) PND     Endo/Other      (+) diabetes and well controlled, type 2, not using insulin                         Pulmonary    Negative pulmonary ROS.           (-) shortness of breath  (-) recent URI          Neuro/Psych      (+) depression  (+) anxiety                              Past Surgical History:   Procedure Laterality Date          Correct bunion,othr methods      Correct bunion,simple      BILAT.     Dilation/curettage,diagnostic      FOR \"MOLE PREGNANCY\"    Other Right     ORIF RIGHT ANKLE    Spine surgery procedure unlisted      cervical  spine february 2020    Total knee replacement       Social History     Socioeconomic History    Marital status:    Tobacco Use    Smoking status: Former     Current packs/day: 2.00     Average packs/day: 2.0 packs/day for 15.0 years (30.0 ttl pk-yrs)     Types: Cigarettes    Smokeless tobacco: Never    Tobacco comments:     QUIT IN 1983   Vaping Use    Vaping status: Never Used   Substance and Sexual Activity    Alcohol use: No    Drug use: No     History   Drug Use No     Available pre-op labs reviewed.  Lab Results   Component Value Date    WBC 13.1 (H) 04/28/2024    RBC 4.73 04/28/2024    HGB 13.4 04/28/2024    HCT 42.2 04/28/2024    MCV 89.2 04/28/2024    MCH 28.3 04/28/2024    MCHC 31.8 04/28/2024    RDW 14.2 04/28/2024    .0 04/28/2024     Lab Results   Component Value Date     04/28/2024    K 4.0 04/28/2024     04/28/2024    CO2 26.0 04/28/2024    BUN 11 04/28/2024    CREATSERUM 0.81 04/28/2024     (H) 04/28/2024    CA 9.4 04/28/2024            Airway      Mallampati: I  Mouth opening: 3 FB  TM distance: < 4 cm  Neck ROM: full Cardiovascular    Cardiovascular exam normal.  Rhythm: regular  Rate: normal  (-) murmur   Dental    Dentition appears grossly intact         Pulmonary    Pulmonary exam normal.  Breath sounds clear to auscultation bilaterally.          (-) rales     Other findings              ASA: 2   Plan: general  NPO status verified and patient meets guidelines.          Plan/risks discussed with: patient            We discussed GA w/mask or ETT and possible scratchy throat and rarely dental damage.  We discussed analgesic plan and PONV prophylaxis.  The patient's questions were answered and consent was attained.

## 2024-05-13 ENCOUNTER — ANESTHESIA EVENT (OUTPATIENT)
Dept: POSTOP/PACU | Facility: HOSPITAL | Age: 74
End: 2024-05-13
Payer: MEDICARE

## 2024-05-13 ENCOUNTER — HOSPITAL ENCOUNTER (OUTPATIENT)
Dept: POSTOP/PACU | Facility: HOSPITAL | Age: 74
Discharge: HOME | End: 2024-05-13
Attending: Other

## 2024-05-13 ENCOUNTER — ANESTHESIA (OUTPATIENT)
Dept: POSTOP/PACU | Facility: HOSPITAL | Age: 74
End: 2024-05-13
Payer: MEDICARE

## 2024-05-13 VITALS
SYSTOLIC BLOOD PRESSURE: 112 MMHG | OXYGEN SATURATION: 95 % | DIASTOLIC BLOOD PRESSURE: 76 MMHG | TEMPERATURE: 98 F | RESPIRATION RATE: 16 BRPM | HEART RATE: 82 BPM

## 2024-05-13 DIAGNOSIS — F06.1 MAJOR DEPRESSIVE DISORDER, RECURRENT EPISODE, SEVERE WITH CATATONIA (HCC): ICD-10-CM

## 2024-05-13 DIAGNOSIS — F33.2 MAJOR DEPRESSIVE DISORDER, RECURRENT EPISODE, SEVERE WITH CATATONIA (HCC): ICD-10-CM

## 2024-05-13 RX ORDER — NICOTINE POLACRILEX 4 MG
30 LOZENGE BUCCAL
Status: DISCONTINUED | OUTPATIENT
Start: 2024-05-13 | End: 2024-05-15

## 2024-05-13 RX ORDER — ACETAMINOPHEN 500 MG
1000 TABLET ORAL ONCE AS NEEDED
Status: ACTIVE | OUTPATIENT
Start: 2024-05-13 | End: 2024-05-13

## 2024-05-13 RX ORDER — DEXTROSE MONOHYDRATE 25 G/50ML
50 INJECTION, SOLUTION INTRAVENOUS
Status: DISCONTINUED | OUTPATIENT
Start: 2024-05-13 | End: 2024-05-15

## 2024-05-13 RX ORDER — METOCLOPRAMIDE HYDROCHLORIDE 5 MG/ML
10 INJECTION INTRAMUSCULAR; INTRAVENOUS EVERY 8 HOURS PRN
Status: DISCONTINUED | OUTPATIENT
Start: 2024-05-13 | End: 2024-05-15

## 2024-05-13 RX ORDER — SODIUM CHLORIDE, SODIUM LACTATE, POTASSIUM CHLORIDE, CALCIUM CHLORIDE 600; 310; 30; 20 MG/100ML; MG/100ML; MG/100ML; MG/100ML
INJECTION, SOLUTION INTRAVENOUS CONTINUOUS
Status: DISCONTINUED | OUTPATIENT
Start: 2024-05-13 | End: 2024-05-15

## 2024-05-13 RX ORDER — CAFFEINE AND SODIUM BENZOATE 125 MG/ML
INJECTION, SOLUTION INTRAMUSCULAR; INTRAVENOUS
Status: COMPLETED
Start: 2024-05-13 | End: 2024-05-13

## 2024-05-13 RX ORDER — GLYCOPYRROLATE 0.2 MG/ML
INJECTION, SOLUTION INTRAMUSCULAR; INTRAVENOUS
Status: COMPLETED
Start: 2024-05-13 | End: 2024-05-13

## 2024-05-13 RX ORDER — KETAMINE HYDROCHLORIDE 50 MG/ML
INJECTION, SOLUTION INTRAMUSCULAR; INTRAVENOUS AS NEEDED
Status: DISCONTINUED | OUTPATIENT
Start: 2024-05-13 | End: 2024-05-13 | Stop reason: SURG

## 2024-05-13 RX ORDER — ONDANSETRON 2 MG/ML
4 INJECTION INTRAMUSCULAR; INTRAVENOUS ONCE
Status: COMPLETED | OUTPATIENT
Start: 2024-05-13 | End: 2024-05-13

## 2024-05-13 RX ORDER — ETOMIDATE 2 MG/ML
INJECTION INTRAVENOUS AS NEEDED
Status: DISCONTINUED | OUTPATIENT
Start: 2024-05-13 | End: 2024-05-13 | Stop reason: SURG

## 2024-05-13 RX ORDER — GLYCOPYRROLATE 0.2 MG/ML
0.1 INJECTION, SOLUTION INTRAMUSCULAR; INTRAVENOUS ONCE
Status: COMPLETED | OUTPATIENT
Start: 2024-05-13 | End: 2024-05-13

## 2024-05-13 RX ORDER — HYDROCODONE BITARTRATE AND ACETAMINOPHEN 5; 325 MG/1; MG/1
1 TABLET ORAL ONCE AS NEEDED
Status: ACTIVE | OUTPATIENT
Start: 2024-05-13 | End: 2024-05-13

## 2024-05-13 RX ORDER — ONDANSETRON 2 MG/ML
INJECTION INTRAMUSCULAR; INTRAVENOUS
Status: COMPLETED
Start: 2024-05-13 | End: 2024-05-13

## 2024-05-13 RX ORDER — ONDANSETRON 2 MG/ML
4 INJECTION INTRAMUSCULAR; INTRAVENOUS EVERY 6 HOURS PRN
Status: DISCONTINUED | OUTPATIENT
Start: 2024-05-13 | End: 2024-05-15

## 2024-05-13 RX ORDER — LABETALOL HYDROCHLORIDE 5 MG/ML
5 INJECTION, SOLUTION INTRAVENOUS EVERY 5 MIN PRN
Status: ACTIVE | OUTPATIENT
Start: 2024-05-13 | End: 2024-05-13

## 2024-05-13 RX ORDER — CAFFEINE AND SODIUM BENZOATE 125 MG/ML
125 INJECTION, SOLUTION INTRAMUSCULAR; INTRAVENOUS ONCE
Status: COMPLETED | OUTPATIENT
Start: 2024-05-13 | End: 2024-05-13

## 2024-05-13 RX ORDER — HYDROMORPHONE HYDROCHLORIDE 1 MG/ML
0.2 INJECTION, SOLUTION INTRAMUSCULAR; INTRAVENOUS; SUBCUTANEOUS EVERY 5 MIN PRN
Status: ACTIVE | OUTPATIENT
Start: 2024-05-13 | End: 2024-05-13

## 2024-05-13 RX ORDER — NALOXONE HYDROCHLORIDE 0.4 MG/ML
0.08 INJECTION, SOLUTION INTRAMUSCULAR; INTRAVENOUS; SUBCUTANEOUS AS NEEDED
Status: ACTIVE | OUTPATIENT
Start: 2024-05-13 | End: 2024-05-13

## 2024-05-13 RX ORDER — HYDROMORPHONE HYDROCHLORIDE 1 MG/ML
0.6 INJECTION, SOLUTION INTRAMUSCULAR; INTRAVENOUS; SUBCUTANEOUS EVERY 5 MIN PRN
Status: ACTIVE | OUTPATIENT
Start: 2024-05-13 | End: 2024-05-13

## 2024-05-13 RX ORDER — HYDROMORPHONE HYDROCHLORIDE 1 MG/ML
0.4 INJECTION, SOLUTION INTRAMUSCULAR; INTRAVENOUS; SUBCUTANEOUS EVERY 5 MIN PRN
Status: ACTIVE | OUTPATIENT
Start: 2024-05-13 | End: 2024-05-13

## 2024-05-13 RX ORDER — ACETAMINOPHEN 325 MG/1
650 TABLET ORAL ONCE
Status: DISCONTINUED | OUTPATIENT
Start: 2024-05-13 | End: 2024-05-15

## 2024-05-13 RX ORDER — HYDROCODONE BITARTRATE AND ACETAMINOPHEN 5; 325 MG/1; MG/1
2 TABLET ORAL ONCE AS NEEDED
Status: ACTIVE | OUTPATIENT
Start: 2024-05-13 | End: 2024-05-13

## 2024-05-13 RX ORDER — NICOTINE POLACRILEX 4 MG
15 LOZENGE BUCCAL
Status: DISCONTINUED | OUTPATIENT
Start: 2024-05-13 | End: 2024-05-15

## 2024-05-13 RX ORDER — MEPERIDINE HYDROCHLORIDE 25 MG/ML
12.5 INJECTION INTRAMUSCULAR; INTRAVENOUS; SUBCUTANEOUS AS NEEDED
Status: DISCONTINUED | OUTPATIENT
Start: 2024-05-13 | End: 2024-05-15

## 2024-05-13 RX ADMIN — SODIUM CHLORIDE, SODIUM LACTATE, POTASSIUM CHLORIDE, CALCIUM CHLORIDE: 600; 310; 30; 20 INJECTION, SOLUTION INTRAVENOUS at 07:08:00

## 2024-05-13 RX ADMIN — GLYCOPYRROLATE 0.1 MG: 0.2 INJECTION, SOLUTION INTRAMUSCULAR; INTRAVENOUS at 06:28:00

## 2024-05-13 RX ADMIN — SODIUM CHLORIDE, SODIUM LACTATE, POTASSIUM CHLORIDE, CALCIUM CHLORIDE: 600; 310; 30; 20 INJECTION, SOLUTION INTRAVENOUS at 07:17:00

## 2024-05-13 RX ADMIN — KETAMINE HYDROCHLORIDE 25 MG: 50 INJECTION, SOLUTION INTRAMUSCULAR; INTRAVENOUS at 07:12:00

## 2024-05-13 RX ADMIN — ONDANSETRON 4 MG: 2 INJECTION INTRAMUSCULAR; INTRAVENOUS at 06:28:00

## 2024-05-13 RX ADMIN — SODIUM CHLORIDE, SODIUM LACTATE, POTASSIUM CHLORIDE, CALCIUM CHLORIDE: 600; 310; 30; 20 INJECTION, SOLUTION INTRAVENOUS at 06:28:00

## 2024-05-13 RX ADMIN — CAFFEINE AND SODIUM BENZOATE 125 MG: 125 INJECTION, SOLUTION INTRAMUSCULAR; INTRAVENOUS at 06:28:00

## 2024-05-13 RX ADMIN — ETOMIDATE 10 MG: 2 INJECTION INTRAVENOUS at 07:12:00

## 2024-05-13 NOTE — PROGRESS NOTES
[facility] / [facility]  ECT History & Physical    Meli Antonio Patient Status:  Outpatient   Age/Gender 73 year old female MRN [id number]   Location [facility] POST ANESTHESIA CARE UNIT Attending Agapito Romero MD   Hosp Day # 0 PCP SIRI TABOR     Date of Service: 2024    Diagnosis:  Major Depression Recurrent Severe Without Psychotic Features. F33.2    Procedure:  Bifrontal    HPI: Improving in all areas.  No physical complaints      Medical History:  Past Medical History:    Anxiety state    Back problem    COMPRESSION FX    Cataract    Depression    Esophageal reflux    GERD (gastroesophageal reflux disease)    Hematoma and contusion of liver    STATES HAS BEEN THERE FOR MANY YEARS    History of fractured kneecap    RIGHT    IBS (irritable bowel syndrome)    Mitral prolapse    Osteoarthritis       Surgical History:  Past Surgical History:   Procedure Laterality Date          Correct bunion,othr methods      Correct bunion,simple      BILAT.     Dilation/curettage,diagnostic      FOR \"MOLE PREGNANCY\"    Other Right     ORIF RIGHT ANKLE    Spine surgery procedure unlisted      cervical spine 2020    Total knee replacement         Family History:  Family History   Problem Relation Age of Onset    Heart Disorder Father     Cancer Mother         ESOPHAGUS       Social History:  Social History     Socioeconomic History    Marital status:      Spouse name: Not on file    Number of children: Not on file    Years of education: Not on file    Highest education level: Not on file   Occupational History    Not on file   Tobacco Use    Smoking status: Former     Current packs/day: 2.00     Average packs/day: 2.0 packs/day for 15.0 years (30.0 ttl pk-yrs)     Types: Cigarettes    Smokeless tobacco: Never    Tobacco comments:     QUIT IN    Vaping Use    Vaping status: Never Used   Substance and Sexual Activity    Alcohol use: No    Drug use: No    Sexual activity: Not on  file   Other Topics Concern    Not on file   Social History Narrative    Not on file     Social Determinants of Health     Financial Resource Strain: Low Risk  (4/20/2024)    Financial Resource Strain     Difficulty of Paying Living Expenses: Not on file     Med Affordability: No   Food Insecurity: No Food Insecurity (4/21/2024)    Food Insecurity     Food Insecurity: Never true   Transportation Needs: No Transportation Needs (4/21/2024)    Transportation Needs     Lack of Transportation: No   Physical Activity: Not on file   Stress: Not on file   Social Connections: Unknown (3/13/2021)    [facility]    Social Connections     Conversations with friends/family/neighbors per week: Not on file   Housing Stability: Low Risk  (4/21/2024)    Housing Stability     Housing Instability: No     Housing Instability Emergency: Not on file     Crib or Bassinette: Not on file       ROS:  unremarkable    Physical Exam:   There were no vitals taken for this visit.    General Appearance:    Alert, cooperative, no distress, appears stated age   Head:    Normocephalic, without obvious abnormality, atraumatic   Eyes:    PERRL, conjunctiva/corneas clear, EOM's intact   Nose:   Nares normal, septum midline, mucosa normal, no drainage    or sinus tenderness   Throat:   Lips, mucosa, and tongue normal; teeth and gums normal   Neck:   Supple, symmetrical, trachea midline   Lungs:     Clear to auscultation bilaterally, respirations unlabored    Heart:    Regular rate and rhythm, S1 and S2 normal, no murmur, rub or gallop   Abdomen:     Soft, non-tender, bowel sounds active all four quadrants,     no masses, no organomegaly   Extremities:   Extremities normal, atraumatic, no cyanosis or edema   Pulses:   2+ and symmetric all extremities   Skin:   Skin color, texture, turgor normal, no rashes or lesions   Neurologic:   CNII-XII intact, normal strength, sensation and reflexes      throughout     Impressions & Plans:    Diagnosis:  Major Depression Recurrent Severe Without Psychotic Features. F33.2    Procedure:  Bifrontal    I have discussed the risks and benefits and alternatives with the patient/family.  They understand and agree to proceed with plan of care.    Meli Adames MD  5/13/2024

## 2024-05-13 NOTE — PROGRESS NOTES
[facility] / [facility]  ECT Procedure Note    Meli Antonio Patient Status:  Outpatient   Age/Gender 73 year old female   MRN [id number]    Location [facility] POST ANESTHESIA CARE UNIT Attending No att. providers found   Hosp Day # 0 PCP SIRI TABOR     ECT Number: #[id number]    Diagnosis: Major Depression Recurrent Severe Without Psychotic Features. F33.2    Type of ECT:  Bifrontal    Place of Service:  Outpatient    Settings:   1.  Energy Percentage: 75%    2.  Program:  Low 0.5    Pre-ECT Evaluation    Symptoms:      Prior to procedure, reviewed with treatment team correct patient, time of procedure and type of ECT.  Also reviewed with anesthesia pre-ECT medications    Patient reports that she is doing well other than feeling hurt and upset that her son, with whom she has been estranged since he was 23 yo, did not call on Mother's Day.  Patient is mildly tearful about it but appropriate.  Patient is alert to month and year.  Patient notes significant improvement in her mood and anxiety with ECT.  Patient is sleeping and eating adequately.  Improved function at home.  She feels her cognition is improving.  Patient will have second ECT on Thursday and reevaluate    The patient continues to retain capacity to consent for ECT.    Risk/Benefits:  Discussed with patient side effects of ECT including headache, teeth, jaw, cardiac, pulmonary, NPO, aspiration, allergic reactions, anesthetic reactions, musculoskeletal, neurologic, morbidity/mortality, potential lack of efficacy, unilateral/bilateral ECT, relapse/maintenance issues, cognitive risks including memory, concentration, cognition, and other risks.    Side Effects:  Patient notes no cognitive/physical complaints    Exam:  Mood: less depressed and less anxious  Affect: Congruent and overall brighter  Memory:  intact immediate, recent, remote and as evidenced by ability to present consistent history  Concentration:   focused and attentive and  as assessed by  ability to concentrate on our conversation  Suicidal ideation: no suicidal ideation    Patient Monitored:  B/P, EKG, EEG, Pulse Ox, Left Ankle Cuff    Pre-ECT Medications:   Robinul 0.1 mg IV, Zofran 4 mg IV, and caffeine 125 mg IV     ECT Medications:   Anesthetic  Etomidate 10 mg IV followed by ketamine 25 mg IV and Succinylcholine 60 mg IV     Seizure Duration:  Motor: 22 seconds       EE seconds    Post-ECT Condition:  Treatment unremarkable    ECT Medications:  None     Meli Adames    2024

## 2024-05-13 NOTE — DISCHARGE INSTRUCTIONS
Discharge Instructions  Electroconvulsive Therapy    Activities:  You MUST arrange to have a responsible adult drive you home and have a responsible adult stay with you the rest of the day and overnight.  Do not drive today.  Do not operate any machinery today. Use kitchen equipment with caution.  Rest and take it easy today.  Do not take public transportation without the presence of another responsible adult for 24 hours    Medications:  Resume your regular medications when you get home  The nurse will instruct you not to take any NSAIDS (Advil, Aleve, Motrin, Ibuprofen) before 1 pm today because you were given a certain medication during the procedure.    Diet:  You may resume your regular diet when you get home  Do not drink alcohol for 24 hours    Additional Instructions:  Someone should call you to schedule any upcoming ECT treatments. Call as needed to schedule or cancel your ECT appointments [phone].  If you have any questions or concerns, please call your own psychiatrist.  For your safety, please do not wear make-up to any future ECT appointments.  For any questions regarding your ECT appointment, please call [facility] [phone].  For cancellations after hours, call [phone] and leave a message.    Expected Recovery:  As you awaken, you may experience one or more of the following:  Headache, nausea, temporary confusion, or muscle stiffness.  If these symptoms increase, become severe or are accompanied by a fever of more than 101, please seek medical attention.  The ECT may affect memory.  Many patients report loss of memory for events that occurred in the days, weeks or months surrounding the ECT.  Many of these memories may return, but not always.  Short-term memory may also be affected for months, but this can also be a result of the disorder that you have.

## 2024-05-13 NOTE — ANESTHESIA PREPROCEDURE EVALUATION
PRE-OP EVALUATION    Patient Name: Meli Antonio    Admit Diagnosis: Major depressive disorder, recurrent episode, severe with catatonia (HCC) [F33.2, F06.1]    Pre-op Diagnosis: * No pre-op diagnosis entered *        Anesthesia Procedure: ECT(BEDSIDE)    * No surgeons found in log *    Pre-op vitals reviewed.        There is no height or weight on file to calculate BMI.    Current medications reviewed.  Hospital Medications:   lactated ringers infusion   Intravenous Continuous    [COMPLETED] glycopyrrolate (Robinul) 0.2 MG/ML injection 0.1 mg  0.1 mg Intravenous Once    [COMPLETED] ondansetron (Zofran) 4 MG/2ML injection 4 mg  4 mg Intravenous Once    [COMPLETED] caffeine-sodium benzoate 125-125 mg/mL injection  125 mg Intravenous Once       Outpatient Medications:   (Not in a hospital admission)      Allergies: Radiology contrast iodinated dyes, Sulfa antibiotics, Methylprednisolone, Seroquel [quetiapine], Cinnamon, and Iodine (topical)      Anesthesia Evaluation    Patient summary reviewed.    Anesthetic Complications  (-) history of anesthetic complications         GI/Hepatic/Renal      (+) GERD                      (+) irritable bowel syndrome     Cardiovascular    Negative cardiovascular ROS.    Exercise tolerance: good     MET: >4                                  (-) FERREIRA  (-) orthopnea  (-) PND     Endo/Other      (+) diabetes and well controlled, type 2, not using insulin                         Pulmonary    Negative pulmonary ROS.           (-) shortness of breath  (-) recent URI          Neuro/Psych      (+) depression  (+) anxiety                              Past Surgical History:   Procedure Laterality Date          Correct bunion,othr methods      Correct bunion,simple      BILAT.     Dilation/curettage,diagnostic      FOR \"MOLE PREGNANCY\"    Other Right     ORIF RIGHT ANKLE    Spine surgery procedure unlisted      cervical spine 2020    Total knee replacement       Social History      Socioeconomic History    Marital status:    Tobacco Use    Smoking status: Former     Current packs/day: 2.00     Average packs/day: 2.0 packs/day for 15.0 years (30.0 ttl pk-yrs)     Types: Cigarettes    Smokeless tobacco: Never    Tobacco comments:     QUIT IN 1983   Vaping Use    Vaping status: Never Used   Substance and Sexual Activity    Alcohol use: No    Drug use: No     History   Drug Use No     Available pre-op labs reviewed.  Lab Results   Component Value Date    WBC 13.1 (H) 04/28/2024    RBC 4.73 04/28/2024    HGB 13.4 04/28/2024    HCT 42.2 04/28/2024    MCV 89.2 04/28/2024    MCH 28.3 04/28/2024    MCHC 31.8 04/28/2024    RDW 14.2 04/28/2024    .0 04/28/2024     Lab Results   Component Value Date     04/28/2024    K 4.0 04/28/2024     04/28/2024    CO2 26.0 04/28/2024    BUN 11 04/28/2024    CREATSERUM 0.81 04/28/2024     (H) 04/28/2024    CA 9.4 04/28/2024            Airway      Mallampati: I  Mouth opening: 3 FB  TM distance: < 4 cm  Neck ROM: full Cardiovascular    Cardiovascular exam normal.  Rhythm: regular  Rate: normal  (-) murmur   Dental    Dentition appears grossly intact         Pulmonary    Pulmonary exam normal.  Breath sounds clear to auscultation bilaterally.          (-) rales     Other findings              ASA: 2   Plan: general  NPO status verified and patient meets guidelines.          Plan/risks discussed with: patient            We discussed GA w/mask or ETT and possible scratchy throat and rarely dental damage.  We discussed analgesic plan and PONV prophylaxis.  The patient's questions were answered and consent was attained.

## 2024-05-13 NOTE — ANESTHESIA POSTPROCEDURE EVALUATION
[facility]    Meli Antonio Patient Status:  Outpatient   Age/Gender 73 year old female MRN [id number]   Location [facility] POST ANESTHESIA CARE UNIT Attending Agapito Romero MD   Hosp Day # 0 PCP SIRI TABOR       Anesthesia Post-op Note        Procedure Summary       Date: 05/13/24 Room / Location: [facility] Post Anesthesia Care Unit    Anesthesia Start: 0708 Anesthesia Stop: 0719    Procedure: ECT(BEDSIDE) Diagnosis: Major depressive disorder, recurrent episode, severe with catatonia (HCC)    Scheduled Providers:  Anesthesiologist: Thiago Ashford MD    Anesthesia Type: general ASA Status: 2            Anesthesia Type: general    Vitals Value Taken Time   /73 05/13/24 0719   Temp 98.2 05/13/24 0719   Pulse 82 05/13/24 0719   Resp 12 05/13/24 0719   SpO2 100 05/13/24 0719       Patient Location: PACU    Anesthesia Type: general    Airway Patency: patent    Postop Pain Control: adequate    Mental Status: preanesthetic baseline    Nausea/Vomiting: none    Cardiopulmonary/Hydration status: stable euvolemic    Complications: no apparent anesthesia related complications    Postop vital signs: stable    Dental Exam: Unchanged from Preop    Patient to be discharged from PACU when criteria met.

## 2024-05-16 ENCOUNTER — ANESTHESIA (OUTPATIENT)
Dept: POSTOP/PACU | Facility: HOSPITAL | Age: 74
End: 2024-05-16

## 2024-05-16 ENCOUNTER — ANESTHESIA EVENT (OUTPATIENT)
Dept: POSTOP/PACU | Facility: HOSPITAL | Age: 74
End: 2024-05-16

## 2024-05-16 RX ORDER — ETOMIDATE 2 MG/ML
INJECTION INTRAVENOUS AS NEEDED
Status: DISCONTINUED | OUTPATIENT
Start: 2024-05-16 | End: 2024-05-16 | Stop reason: SURG

## 2024-05-16 RX ORDER — KETAMINE HYDROCHLORIDE 50 MG/ML
INJECTION, SOLUTION INTRAMUSCULAR; INTRAVENOUS AS NEEDED
Status: DISCONTINUED | OUTPATIENT
Start: 2024-05-16 | End: 2024-05-16 | Stop reason: SURG

## 2024-05-16 RX ADMIN — SODIUM CHLORIDE, SODIUM LACTATE, POTASSIUM CHLORIDE, CALCIUM CHLORIDE: 600; 310; 30; 20 INJECTION, SOLUTION INTRAVENOUS at 07:30:00

## 2024-05-16 RX ADMIN — SODIUM CHLORIDE, SODIUM LACTATE, POTASSIUM CHLORIDE, CALCIUM CHLORIDE: 600; 310; 30; 20 INJECTION, SOLUTION INTRAVENOUS at 07:26:00

## 2024-05-16 RX ADMIN — ETOMIDATE 10 MG: 2 INJECTION INTRAVENOUS at 07:28:00

## 2024-05-16 RX ADMIN — KETAMINE HYDROCHLORIDE 25 MG: 50 INJECTION, SOLUTION INTRAMUSCULAR; INTRAVENOUS at 07:28:00

## 2024-05-16 NOTE — ANESTHESIA PREPROCEDURE EVALUATION
PRE-OP EVALUATION    Patient Name: Meli Antonio    Admit Diagnosis: Major depressive disorder, recurrent episode, severe with catatonia (HCC) [F33.2, F06.1]    Pre-op Diagnosis: * No pre-op diagnosis entered *        Anesthesia Procedure: ECT(BEDSIDE)    * No surgeons found in log *    Pre-op vitals reviewed.        Body mass index is 21.48 kg/m².    Current medications reviewed.  Hospital Medications:   glucose (Dex4) 15 GM/59ML oral liquid 15 g  15 g Oral Q15 Min PRN    Or    glucose (Glutose) 40% oral gel 15 g  15 g Oral Q15 Min PRN    Or    glucose-vitamin C (Dex-4) chewable tab 4 tablet  4 tablet Oral Q15 Min PRN    Or    dextrose 50% injection 50 mL  50 mL Intravenous Q15 Min PRN    Or    glucose (Dex4) 15 GM/59ML oral liquid 30 g  30 g Oral Q15 Min PRN    Or    glucose (Glutose) 40% oral gel 30 g  30 g Oral Q15 Min PRN    Or    glucose-vitamin C (Dex-4) chewable tab 8 tablet  8 tablet Oral Q15 Min PRN    lactated ringers infusion   Intravenous Continuous    glycopyrrolate (Robinul) 0.2 MG/ML injection 0.1 mg  0.1 mg Intravenous Once    ondansetron (Zofran) 4 MG/2ML injection 4 mg  4 mg Intravenous Once    caffeine-sodium benzoate 125-125 mg/mL injection  125 mg Intravenous Once       Outpatient Medications:   (Not in a hospital admission)      Allergies: Radiology contrast iodinated dyes, Sulfa antibiotics, Methylprednisolone, Seroquel [quetiapine], Cinnamon, and Iodine (topical)      Anesthesia Evaluation    Patient summary reviewed.    Anesthetic Complications  (-) history of anesthetic complications         GI/Hepatic/Renal      (+) GERD                      (+) irritable bowel syndrome     Cardiovascular    Negative cardiovascular ROS.    Exercise tolerance: good     MET: >4                                  (-) FERREIRA  (-) orthopnea  (-) PND     Endo/Other      (+) diabetes and well controlled, type 2, not using insulin                         Pulmonary    Negative pulmonary ROS.           (-) shortness  of breath  (-) recent URI          Neuro/Psych      (+) depression  (+) anxiety                              Past Surgical History:   Procedure Laterality Date          Correct bunion,othr methods      Correct bunion,simple      BILAT.     Dilation/curettage,diagnostic      FOR \"MOLE PREGNANCY\"    Other Right     ORIF RIGHT ANKLE    Spine surgery procedure unlisted      cervical spine 2020    Total knee replacement       Social History     Socioeconomic History    Marital status:    Tobacco Use    Smoking status: Former     Current packs/day: 2.00     Average packs/day: 2.0 packs/day for 15.0 years (30.0 ttl pk-yrs)     Types: Cigarettes    Smokeless tobacco: Never    Tobacco comments:     QUIT IN    Vaping Use    Vaping status: Never Used   Substance and Sexual Activity    Alcohol use: No    Drug use: No     History   Drug Use No     Available pre-op labs reviewed.  Lab Results   Component Value Date    WBC 13.1 (H) 2024    RBC 4.73 2024    HGB 13.4 2024    HCT 42.2 2024    MCV 89.2 2024    MCH 28.3 2024    MCHC 31.8 2024    RDW 14.2 2024    .0 2024     Lab Results   Component Value Date     2024    K 4.0 2024     2024    CO2 26.0 2024    BUN 11 2024    CREATSERUM 0.81 2024     (H) 2024    CA 9.4 2024            Airway      Mallampati: I  Mouth opening: 3 FB  TM distance: < 4 cm  Neck ROM: full Cardiovascular    Cardiovascular exam normal.  Rhythm: regular  Rate: normal  (-) murmur   Dental    Dentition appears grossly intact         Pulmonary    Pulmonary exam normal.  Breath sounds clear to auscultation bilaterally.          (-) rales     Other findings              ASA: 2   Plan: general  NPO status verified and patient meets guidelines.          Plan/risks discussed with: patient            We discussed GA w/mask or ETT and possible scratchy throat and  rarely dental damage.  We discussed analgesic plan and PONV prophylaxis.  The patient's questions were answered and consent was attained.

## 2024-05-16 NOTE — ANESTHESIA POSTPROCEDURE EVALUATION
[facility]    Meli Antonio Patient Status:  Outpatient   Age/Gender 73 year old female MRN [id number]   Location [facility] POST ANESTHESIA CARE UNIT Attending Agapito Romero MD   Hosp Day # 0 PCP SIRI TABOR       Anesthesia Post-op Note        Procedure Summary       Date: 05/16/24 Room / Location: [facility] Post Anesthesia Care Unit    Anesthesia Start: 0726 Anesthesia Stop: 0737    Procedure: ECT(BEDSIDE) Diagnosis: Major depressive disorder, recurrent episode, severe with catatonia (HCC)    Scheduled Providers:  Anesthesiologist: Amanda Reyes MD    Anesthesia Type: general ASA Status: 2            Anesthesia Type: general    Vitals Value Taken Time   /67 05/16/24 0737   Temp  05/16/24 0737   Pulse 81 05/16/24 0737   Resp 16 05/16/24 0737   SpO2 100 05/16/24 0737       Patient Location: PACU    Anesthesia Type: general    Airway Patency: patent    Postop Pain Control: adequate    Mental Status: mildly sedated but able to meaningfully participate in the post-anesthesia evaluation    Nausea/Vomiting: none    Cardiopulmonary/Hydration status: stable euvolemic    Complications: no apparent anesthesia related complications    Postop vital signs: stable    Patient to be discharged from PACU when criteria met.

## 2024-05-20 VITALS — HEIGHT: 60 IN | BODY MASS INDEX: 21 KG/M2

## 2024-05-20 RX ORDER — NICOTINE POLACRILEX 4 MG
30 LOZENGE BUCCAL
Status: CANCELLED | OUTPATIENT
Start: 2024-05-20

## 2024-05-20 RX ORDER — GLYCOPYRROLATE 0.2 MG/ML
0.1 INJECTION, SOLUTION INTRAMUSCULAR; INTRAVENOUS ONCE
Status: CANCELLED | OUTPATIENT
Start: 2024-05-20 | End: 2024-05-20

## 2024-05-20 RX ORDER — NICOTINE POLACRILEX 4 MG
15 LOZENGE BUCCAL
Status: CANCELLED | OUTPATIENT
Start: 2024-05-20

## 2024-05-20 RX ORDER — ONDANSETRON 2 MG/ML
4 INJECTION INTRAMUSCULAR; INTRAVENOUS ONCE
Status: CANCELLED | OUTPATIENT
Start: 2024-05-20 | End: 2024-05-20

## 2024-05-20 RX ORDER — CAFFEINE AND SODIUM BENZOATE 125 MG/ML
125 INJECTION, SOLUTION INTRAMUSCULAR; INTRAVENOUS ONCE
Status: CANCELLED | OUTPATIENT
Start: 2024-05-20 | End: 2024-05-20

## 2024-05-20 RX ORDER — SODIUM CHLORIDE, SODIUM LACTATE, POTASSIUM CHLORIDE, CALCIUM CHLORIDE 600; 310; 30; 20 MG/100ML; MG/100ML; MG/100ML; MG/100ML
INJECTION, SOLUTION INTRAVENOUS CONTINUOUS
Status: CANCELLED | OUTPATIENT
Start: 2024-05-20

## 2024-05-20 RX ORDER — DEXTROSE MONOHYDRATE 25 G/50ML
50 INJECTION, SOLUTION INTRAVENOUS
Status: CANCELLED | OUTPATIENT
Start: 2024-05-20

## 2024-05-21 ENCOUNTER — HOSPITAL ENCOUNTER (OUTPATIENT)
Dept: POSTOP/PACU | Facility: HOSPITAL | Age: 74
Discharge: HOME | End: 2024-05-21
Attending: Other

## 2024-05-21 NOTE — PROGRESS NOTES
Patient did not show up for ECT today.  Staff called patient reported that she was safe.  It appears that there was miscommunication about scheduling and she will be scheduled for next available appointment.

## 2024-05-24 RX ORDER — NICOTINE POLACRILEX 4 MG
15 LOZENGE BUCCAL
Status: DISCONTINUED | OUTPATIENT
Start: 2024-05-24 | End: 2024-05-24

## 2024-05-24 RX ORDER — DEXTROSE MONOHYDRATE 25 G/50ML
50 INJECTION, SOLUTION INTRAVENOUS
Status: DISCONTINUED | OUTPATIENT
Start: 2024-05-24 | End: 2024-05-24

## 2024-05-24 RX ORDER — NICOTINE POLACRILEX 4 MG
30 LOZENGE BUCCAL
Status: DISCONTINUED | OUTPATIENT
Start: 2024-05-24 | End: 2024-05-24

## 2024-05-24 RX ORDER — CAFFEINE AND SODIUM BENZOATE 125 MG/ML
125 INJECTION, SOLUTION INTRAMUSCULAR; INTRAVENOUS ONCE
Status: DISCONTINUED | OUTPATIENT
Start: 2024-05-24 | End: 2024-05-24

## 2024-05-24 RX ORDER — SODIUM CHLORIDE, SODIUM LACTATE, POTASSIUM CHLORIDE, CALCIUM CHLORIDE 600; 310; 30; 20 MG/100ML; MG/100ML; MG/100ML; MG/100ML
INJECTION, SOLUTION INTRAVENOUS CONTINUOUS
Status: DISCONTINUED | OUTPATIENT
Start: 2024-05-24 | End: 2024-05-24

## 2024-05-24 RX ORDER — GLYCOPYRROLATE 0.2 MG/ML
0.1 INJECTION, SOLUTION INTRAMUSCULAR; INTRAVENOUS ONCE
Status: DISCONTINUED | OUTPATIENT
Start: 2024-05-24 | End: 2024-05-24

## 2024-05-24 RX ORDER — ONDANSETRON 2 MG/ML
4 INJECTION INTRAMUSCULAR; INTRAVENOUS ONCE
Status: DISCONTINUED | OUTPATIENT
Start: 2024-05-24 | End: 2024-05-24

## 2024-05-28 ENCOUNTER — ANESTHESIA EVENT (OUTPATIENT)
Dept: POSTOP/PACU | Facility: HOSPITAL | Age: 74
End: 2024-05-28

## 2024-05-28 ENCOUNTER — ANESTHESIA (OUTPATIENT)
Dept: POSTOP/PACU | Facility: HOSPITAL | Age: 74
End: 2024-05-28

## 2024-05-28 ENCOUNTER — HOSPITAL ENCOUNTER (OUTPATIENT)
Dept: POSTOP/PACU | Facility: HOSPITAL | Age: 74
Discharge: HOME | End: 2024-05-28
Attending: Other

## 2024-05-28 VITALS
SYSTOLIC BLOOD PRESSURE: 140 MMHG | HEART RATE: 94 BPM | TEMPERATURE: 98 F | RESPIRATION RATE: 13 BRPM | BODY MASS INDEX: 21 KG/M2 | OXYGEN SATURATION: 94 % | HEIGHT: 60 IN | DIASTOLIC BLOOD PRESSURE: 93 MMHG

## 2024-05-28 DIAGNOSIS — F33.2 MAJOR DEPRESSIVE DISORDER, RECURRENT SEVERE WITHOUT PSYCHOTIC FEATURES (HCC): ICD-10-CM

## 2024-05-28 LAB — GLUCOSE BLD-MCNC: 116 MG/DL (ref 70–99)

## 2024-05-28 RX ORDER — DEXTROSE MONOHYDRATE 25 G/50ML
50 INJECTION, SOLUTION INTRAVENOUS
Status: DISCONTINUED | OUTPATIENT
Start: 2024-05-28 | End: 2024-05-30

## 2024-05-28 RX ORDER — NALOXONE HYDROCHLORIDE 0.4 MG/ML
0.08 INJECTION, SOLUTION INTRAMUSCULAR; INTRAVENOUS; SUBCUTANEOUS AS NEEDED
Status: ACTIVE | OUTPATIENT
Start: 2024-05-28 | End: 2024-05-28

## 2024-05-28 RX ORDER — ONDANSETRON 2 MG/ML
4 INJECTION INTRAMUSCULAR; INTRAVENOUS EVERY 6 HOURS PRN
Status: DISCONTINUED | OUTPATIENT
Start: 2024-05-28 | End: 2024-05-30

## 2024-05-28 RX ORDER — SODIUM CHLORIDE, SODIUM LACTATE, POTASSIUM CHLORIDE, CALCIUM CHLORIDE 600; 310; 30; 20 MG/100ML; MG/100ML; MG/100ML; MG/100ML
INJECTION, SOLUTION INTRAVENOUS CONTINUOUS
Status: DISCONTINUED | OUTPATIENT
Start: 2024-05-28 | End: 2024-05-30

## 2024-05-28 RX ORDER — HYDROMORPHONE HYDROCHLORIDE 1 MG/ML
0.6 INJECTION, SOLUTION INTRAMUSCULAR; INTRAVENOUS; SUBCUTANEOUS EVERY 5 MIN PRN
Status: ACTIVE | OUTPATIENT
Start: 2024-05-28 | End: 2024-05-28

## 2024-05-28 RX ORDER — CAFFEINE AND SODIUM BENZOATE 125 MG/ML
INJECTION, SOLUTION INTRAMUSCULAR; INTRAVENOUS
Status: COMPLETED
Start: 2024-05-28 | End: 2024-05-28

## 2024-05-28 RX ORDER — NICOTINE POLACRILEX 4 MG
15 LOZENGE BUCCAL
Status: DISCONTINUED | OUTPATIENT
Start: 2024-05-28 | End: 2024-05-30

## 2024-05-28 RX ORDER — ETOMIDATE 2 MG/ML
INJECTION INTRAVENOUS AS NEEDED
Status: DISCONTINUED | OUTPATIENT
Start: 2024-05-28 | End: 2024-05-28 | Stop reason: SURG

## 2024-05-28 RX ORDER — CAFFEINE AND SODIUM BENZOATE 125 MG/ML
125 INJECTION, SOLUTION INTRAMUSCULAR; INTRAVENOUS ONCE
Status: COMPLETED | OUTPATIENT
Start: 2024-05-28 | End: 2024-05-28

## 2024-05-28 RX ORDER — HYDROMORPHONE HYDROCHLORIDE 1 MG/ML
0.2 INJECTION, SOLUTION INTRAMUSCULAR; INTRAVENOUS; SUBCUTANEOUS EVERY 5 MIN PRN
Status: ACTIVE | OUTPATIENT
Start: 2024-05-28 | End: 2024-05-28

## 2024-05-28 RX ORDER — HYDROMORPHONE HYDROCHLORIDE 1 MG/ML
0.4 INJECTION, SOLUTION INTRAMUSCULAR; INTRAVENOUS; SUBCUTANEOUS EVERY 5 MIN PRN
Status: ACTIVE | OUTPATIENT
Start: 2024-05-28 | End: 2024-05-28

## 2024-05-28 RX ORDER — GLYCOPYRROLATE 0.2 MG/ML
INJECTION, SOLUTION INTRAMUSCULAR; INTRAVENOUS
Status: COMPLETED
Start: 2024-05-28 | End: 2024-05-28

## 2024-05-28 RX ORDER — ONDANSETRON 2 MG/ML
INJECTION INTRAMUSCULAR; INTRAVENOUS
Status: COMPLETED
Start: 2024-05-28 | End: 2024-05-28

## 2024-05-28 RX ORDER — ONDANSETRON 2 MG/ML
4 INJECTION INTRAMUSCULAR; INTRAVENOUS ONCE
Status: COMPLETED | OUTPATIENT
Start: 2024-05-28 | End: 2024-05-28

## 2024-05-28 RX ORDER — METOCLOPRAMIDE HYDROCHLORIDE 5 MG/ML
10 INJECTION INTRAMUSCULAR; INTRAVENOUS EVERY 8 HOURS PRN
Status: DISCONTINUED | OUTPATIENT
Start: 2024-05-28 | End: 2024-05-30

## 2024-05-28 RX ORDER — KETAMINE HYDROCHLORIDE 50 MG/ML
INJECTION, SOLUTION INTRAMUSCULAR; INTRAVENOUS AS NEEDED
Status: DISCONTINUED | OUTPATIENT
Start: 2024-05-28 | End: 2024-05-28 | Stop reason: SURG

## 2024-05-28 RX ORDER — NICOTINE POLACRILEX 4 MG
30 LOZENGE BUCCAL
Status: DISCONTINUED | OUTPATIENT
Start: 2024-05-28 | End: 2024-05-30

## 2024-05-28 RX ORDER — MIDAZOLAM HYDROCHLORIDE 1 MG/ML
1 INJECTION INTRAMUSCULAR; INTRAVENOUS EVERY 5 MIN PRN
Status: ACTIVE | OUTPATIENT
Start: 2024-05-28 | End: 2024-05-28

## 2024-05-28 RX ORDER — GLYCOPYRROLATE 0.2 MG/ML
0.1 INJECTION, SOLUTION INTRAMUSCULAR; INTRAVENOUS ONCE
Status: COMPLETED | OUTPATIENT
Start: 2024-05-28 | End: 2024-05-28

## 2024-05-28 RX ADMIN — ONDANSETRON 4 MG: 2 INJECTION INTRAMUSCULAR; INTRAVENOUS at 06:38:00

## 2024-05-28 RX ADMIN — CAFFEINE AND SODIUM BENZOATE 125 MG: 125 INJECTION, SOLUTION INTRAMUSCULAR; INTRAVENOUS at 06:38:00

## 2024-05-28 RX ADMIN — GLYCOPYRROLATE 0.1 MG: 0.2 INJECTION, SOLUTION INTRAMUSCULAR; INTRAVENOUS at 06:38:00

## 2024-05-28 RX ADMIN — SODIUM CHLORIDE, SODIUM LACTATE, POTASSIUM CHLORIDE, CALCIUM CHLORIDE: 600; 310; 30; 20 INJECTION, SOLUTION INTRAVENOUS at 08:02:00

## 2024-05-28 RX ADMIN — SODIUM CHLORIDE, SODIUM LACTATE, POTASSIUM CHLORIDE, CALCIUM CHLORIDE: 600; 310; 30; 20 INJECTION, SOLUTION INTRAVENOUS at 06:30:00

## 2024-05-28 RX ADMIN — ETOMIDATE 10 MG: 2 INJECTION INTRAVENOUS at 07:51:00

## 2024-05-28 RX ADMIN — KETAMINE HYDROCHLORIDE 25 MG: 50 INJECTION, SOLUTION INTRAMUSCULAR; INTRAVENOUS at 07:51:00

## 2024-05-28 NOTE — ANESTHESIA POSTPROCEDURE EVALUATION
[facility]    Meli Antonio Patient Status:  Outpatient   Age/Gender 73 year old female MRN [id number]   Location [facility] POST ANESTHESIA CARE UNIT Attending Meli Adames MD   Hosp Day # 0 PCP SIRI TABOR       Anesthesia Post-op Note        Procedure Summary       Date: 05/28/24 Room / Location: [facility] Post Anesthesia Care Unit    Anesthesia Start: 0742 Anesthesia Stop: 0803    Procedure: ECT(BEDSIDE) Diagnosis: Major depressive disorder, recurrent severe without psychotic features (HCC)    Scheduled Providers:  Anesthesiologist:     Anesthesia Type: general ASA Status: 2            Anesthesia Type: general    Vitals Value Taken Time   /77 05/28/24 0803   Temp  05/28/24 0803   Pulse 78 05/28/24 0803   Resp 16 05/28/24 0803   SpO2 98 05/28/24 0803       Patient Location: PACU    Anesthesia Type: general    Airway Patency: patent    Postop Pain Control: adequate    Mental Status: Other    Nausea/Vomiting: none    Cardiopulmonary/Hydration status: stable euvolemic    Complications: no apparent anesthesia related complications    Postop vital signs: stable    Dental Exam: Unchanged from Preop

## 2024-05-28 NOTE — ANESTHESIA PREPROCEDURE EVALUATION
PRE-OP EVALUATION    Patient Name: Meli Antonio    Admit Diagnosis: Major depressive disorder, recurrent episode, severe with catatonia (HCC) [F33.2, F06.1]    Pre-op Diagnosis: * No pre-op diagnosis entered *        Anesthesia Procedure: ECT(BEDSIDE)    * No surgeons found in log *    Pre-op vitals reviewed.  Temp: 99.6 °F (37.6 °C)  Pulse: 72  Resp: 12  BP: 128/84  SpO2: 94 %  Body mass index is 21.48 kg/m².    Current medications reviewed.  Hospital Medications:   lactated ringers infusion   Intravenous Continuous    [COMPLETED] glycopyrrolate (Robinul) 0.2 MG/ML injection 0.1 mg  0.1 mg Intravenous Once    [COMPLETED] ondansetron (Zofran) 4 MG/2ML injection 4 mg  4 mg Intravenous Once    [COMPLETED] caffeine-sodium benzoate 125-125 mg/mL injection  125 mg Intravenous Once    [COMPLETED] ondansetron (Zofran) 4 MG/2ML injection        [COMPLETED] glycopyrrolate (Robinul) 0.2 MG/ML injection        [COMPLETED] caffeine-sodium benzoate 125-125 MG/ML injection           Outpatient Medications:   (Not in a hospital admission)      Allergies: Radiology contrast iodinated dyes, Sulfa antibiotics, Methylprednisolone, Seroquel [quetiapine], Cinnamon, and Iodine (topical)      Anesthesia Evaluation    Patient summary reviewed.    Anesthetic Complications  (-) history of anesthetic complications         GI/Hepatic/Renal      (+) GERD                      (+) irritable bowel syndrome     Cardiovascular    Negative cardiovascular ROS.    Exercise tolerance: good     MET: >4                                  (-) FERREIRA  (-) orthopnea  (-) PND     Endo/Other      (+) diabetes and well controlled, type 2, not using insulin                         Pulmonary    Negative pulmonary ROS.           (-) shortness of breath  (-) recent URI          Neuro/Psych      (+) depression  (+) anxiety                              Past Surgical History:   Procedure Laterality Date          Correct bunion,othr methods      Correct  bunion,simple      BILAT.     Dilation/curettage,diagnostic      FOR \"MOLE PREGNANCY\"    Other Right     ORIF RIGHT ANKLE    Spine surgery procedure unlisted      cervical spine february 2020    Total knee replacement       Social History     Socioeconomic History    Marital status:    Tobacco Use    Smoking status: Former     Current packs/day: 2.00     Average packs/day: 2.0 packs/day for 15.0 years (30.0 ttl pk-yrs)     Types: Cigarettes    Smokeless tobacco: Never    Tobacco comments:     QUIT IN 1983   Vaping Use    Vaping status: Never Used   Substance and Sexual Activity    Alcohol use: No    Drug use: No     History   Drug Use No     Available pre-op labs reviewed.  Lab Results   Component Value Date    WBC 13.1 (H) 04/28/2024    RBC 4.73 04/28/2024    HGB 13.4 04/28/2024    HCT 42.2 04/28/2024    MCV 89.2 04/28/2024    MCH 28.3 04/28/2024    MCHC 31.8 04/28/2024    RDW 14.2 04/28/2024    .0 04/28/2024     Lab Results   Component Value Date     04/28/2024    K 4.0 04/28/2024     04/28/2024    CO2 26.0 04/28/2024    BUN 11 04/28/2024    CREATSERUM 0.81 04/28/2024     (H) 04/28/2024    CA 9.4 04/28/2024            Airway      Mallampati: I  Mouth opening: 3 FB  TM distance: < 4 cm  Neck ROM: full Cardiovascular    Cardiovascular exam normal.  Rhythm: regular  Rate: normal  (-) murmur   Dental    Dentition appears grossly intact         Pulmonary    Pulmonary exam normal.  Breath sounds clear to auscultation bilaterally.          (-) rales     Other findings              ASA: 2   Plan: general  NPO status verified and patient meets guidelines.          Plan/risks discussed with: patient            We discussed GA w/mask or ETT and possible scratchy throat and rarely dental damage.  We discussed analgesic plan and PONV prophylaxis.  The patient's questions were answered and consent was attained.

## 2024-05-28 NOTE — PROGRESS NOTES
[facility]  ECT History & Physical    Meli Antonio Patient Status:  Outpatient   Age/Gender 73 year old female MRN [id number]   Location [facility] POST ANESTHESIA CARE UNIT Attending Meli Adames MD   Hosp Day # 0 PCP SIRI TABOR     Date of Service: 2024    Diagnosis:  Major Depression Recurrent Severe Without Psychotic Features. F33.2    Procedure:  Bifrontal    HPI: Some decline with mood since last treatment.  No physical complaints      Medical History:  Past Medical History:    Anxiety state    Back problem    COMPRESSION FX    Cataract    Depression    Esophageal reflux    GERD (gastroesophageal reflux disease)    Hematoma and contusion of liver    STATES HAS BEEN THERE FOR MANY YEARS    History of fractured kneecap    RIGHT    IBS (irritable bowel syndrome)    Mitral prolapse    Osteoarthritis       Surgical History:  Past Surgical History:   Procedure Laterality Date          Correct bunion,othr methods      Correct bunion,simple      BILAT.     Dilation/curettage,diagnostic      FOR \"MOLE PREGNANCY\"    Other Right     ORIF RIGHT ANKLE    Spine surgery procedure unlisted      cervical spine 2020    Total knee replacement         Family History:  Family History   Problem Relation Age of Onset    Heart Disorder Father     Cancer Mother         ESOPHAGUS       Social History:  Social History     Socioeconomic History    Marital status:      Spouse name: Not on file    Number of children: Not on file    Years of education: Not on file    Highest education level: Not on file   Occupational History    Not on file   Tobacco Use    Smoking status: Former     Current packs/day: 2.00     Average packs/day: 2.0 packs/day for 15.0 years (30.0 ttl pk-yrs)     Types: Cigarettes    Smokeless tobacco: Never    Tobacco comments:     QUIT IN    Vaping Use    Vaping status: Never Used   Substance and Sexual Activity    Alcohol use: No    Drug use: No     Sexual activity: Not on file   Other Topics Concern    Not on file   Social History Narrative    Not on file     Social Determinants of Health     Financial Resource Strain: Low Risk  (4/20/2024)    Financial Resource Strain     Difficulty of Paying Living Expenses: Not on file     Med Affordability: No   Food Insecurity: No Food Insecurity (4/21/2024)    Food Insecurity     Food Insecurity: Never true   Transportation Needs: No Transportation Needs (4/21/2024)    Transportation Needs     Lack of Transportation: No   Physical Activity: Not on file   Stress: Not on file   Social Connections: Unknown (3/13/2021)    [facility]    Social Connections     Conversations with friends/family/neighbors per week: Not on file   Housing Stability: Low Risk  (4/21/2024)    Housing Stability     Housing Instability: No     Housing Instability Emergency: Not on file     Crib or [place]inette: Not on file       ROS:  unremarkable    Physical Exam:   Ht 60\"   BMI 21.48 kg/m²     General Appearance:    Alert, cooperative, no distress, appears stated age   Head:    Normocephalic, without obvious abnormality, atraumatic   Eyes:    PERRL, conjunctiva/corneas clear, EOM's intact   Nose:   Nares normal, septum midline, mucosa normal, no drainage    or sinus tenderness   Throat:   Lips, mucosa, and tongue normal; teeth and gums normal   Neck:   Supple, symmetrical, trachea midline   Lungs:     Clear to auscultation bilaterally, respirations unlabored    Heart:    Regular rate and rhythm, S1 and S2 normal, no murmur, rub or gallop   Abdomen:     Soft, non-tender, bowel sounds active all four quadrants,     no masses, no organomegaly   Extremities:   Extremities normal, atraumatic, no cyanosis or edema   Pulses:   2+ and symmetric all extremities   Skin:   Skin color, texture, turgor normal, no rashes or lesions   Neurologic:   CNII-XII intact, normal strength, sensation and  reflexes     throughout     Impressions & Plans:    Diagnosis:  Major Depression Recurrent Severe Without Psychotic Features. F33.2    Procedure:  Bifrontal    I have discussed the risks and benefits and alternatives with the patient/family.  They understand and agree to proceed with plan of care.    Meli Adames MD  5/28/2024

## 2024-05-28 NOTE — PROGRESS NOTES
[facility]  ECT Procedure Note    Meli Antonio Patient Status:  Outpatient   Age/Gender 73 year old female   MRN [id number]    Location [facility] POST ANESTHESIA CARE UNIT Attending No att. providers found   Hosp Day # 0 PCP SIRI TABOR     ECT Number: #[id number]    Diagnosis: Major Depression Recurrent Severe Without Psychotic Features. F33.2    Type of ECT:  Bifrontal    Place of Service:  Outpatient    Settings:   1.  Energy Percentage: 75%    2.  Program:  Low 0.5    Pre-ECT Evaluation    Symptoms:      Prior to procedure, reviewed with treatment team correct patient, time of procedure and type of ECT.  Also reviewed with anesthesia pre-ECT medications    Patient last CT was on May 18 and missed treatment on May 21 due to miscommunication.  Patient feels that she has overall been improving but anxiety and depression, but improvement has not been sustained and she definitely notices a decline from her last ECT.  Patient notes some cognitive side effects and believes that the date is June 2, we discussed scheduling a second ECT for this week due to the missed appointment and then reevaluate regarding future schedule    The patient continues to retain capacity to consent for ECT.    Risk/Benefits:  Discussed with patient side effects of ECT including headache, teeth, jaw, cardiac, pulmonary, NPO, aspiration, allergic reactions, anesthetic reactions, musculoskeletal, neurologic, morbidity/mortality, potential lack of efficacy, unilateral/bilateral ECT, relapse/maintenance issues, cognitive risks including memory, concentration, cognition, and other risks.    Side Effects:  Mild memory complaint    Exam:  Mood: Some worsening in mood since last ECT  Affect: Congruent and mildly constricted  Memory:  impaired immediate and as evidenced by unable to recall date.  It should be noted that patient went through a period of  delirium prior to ECT and this may also be affecting her  cognition  Concentration:   focused and attentive and as assessed by  ability to concentrate on our conversation  Suicidal ideation: no suicidal ideation    Patient Monitored:  B/P, EKG, EEG, Pulse Ox, Left Ankle Cuff    Pre-ECT Medications:   Robinul 0.1 mg IV, Zofran 4 mg IV, and caffeine 125 mg IV     ECT Medications:  Anesthetic  Etomidate 10 mg IV followed by ketamine 25 mg IV and Succinylcholine 60 mg IV     Seizure Duration:  Motor: 39 seconds       EE seconds    Post-ECT Condition:  Treatment unremarkable    ECT Medications:  None     Meli Adames    2024

## 2024-05-28 NOTE — DISCHARGE INSTRUCTIONS
Discharge Instructions  Electroconvulsive Therapy    Activities:  You MUST arrange to have a responsible adult drive you home and have a responsible adult stay with you the rest of the day and overnight.  Do not drive today.  Do not operate any machinery today. Use kitchen equipment with caution.  Rest and take it easy today.  Do not take public transportation without the presence of another responsible adult for 24 hours    Medications:  Resume your regular medications when you get home  The nurse will instruct you not to take any NSAIDS (Advil, Aleve, Motrin, Ibuprofen) before 1 pm today because you were given a certain medication during the procedure.    Diet:  You may resume your regular diet when you get home  Do not drink alcohol for 24 hours    Additional Instructions:  Someone should call you to schedule any upcoming ECT treatments. Call as needed to schedule or cancel your ECT appointments [phone].  If you have any questions or concerns, please call your own psychiatrist.  For your safety, please do not wear make-up to any future ECT appointments.  For any questions regarding your ECT appointment, please call [facility] [phone].  For cancellations after hours, call [phone] and leave a message.    Expected Recovery:  As you awaken, you may experience one or more of the following:  Headache, nausea, temporary confusion, or muscle stiffness.  If these symptoms increase, become severe or are accompanied by a fever of more than 101, please seek medical attention.  The ECT may affect memory.  Many patients report loss of memory for events that occurred in the days, weeks or months surrounding the ECT.  Many of these memories may return, but not always.  Short-term memory may also be affected for months, but this can also be a result of the disorder that you have.

## 2024-05-29 RX ORDER — SODIUM CHLORIDE, SODIUM LACTATE, POTASSIUM CHLORIDE, CALCIUM CHLORIDE 600; 310; 30; 20 MG/100ML; MG/100ML; MG/100ML; MG/100ML
INJECTION, SOLUTION INTRAVENOUS CONTINUOUS
Status: CANCELLED | OUTPATIENT
Start: 2024-05-29

## 2024-05-30 ENCOUNTER — ANESTHESIA (OUTPATIENT)
Dept: POSTOP/PACU | Facility: HOSPITAL | Age: 74
End: 2024-05-30
Payer: MEDICARE

## 2024-05-30 ENCOUNTER — HOSPITAL ENCOUNTER (OUTPATIENT)
Dept: POSTOP/PACU | Facility: HOSPITAL | Age: 74
End: 2024-05-30
Attending: Other
Payer: MEDICARE

## 2024-05-30 ENCOUNTER — HOSPITAL ENCOUNTER (OUTPATIENT)
Dept: POSTOP/PACU | Facility: HOSPITAL | Age: 74
Discharge: HOME | End: 2024-05-30
Attending: Other
Payer: MEDICARE

## 2024-05-30 ENCOUNTER — ANESTHESIA EVENT (OUTPATIENT)
Dept: POSTOP/PACU | Facility: HOSPITAL | Age: 74
End: 2024-05-30
Payer: MEDICARE

## 2024-05-30 VITALS
BODY MASS INDEX: 21 KG/M2 | OXYGEN SATURATION: 95 % | TEMPERATURE: 98 F | DIASTOLIC BLOOD PRESSURE: 86 MMHG | SYSTOLIC BLOOD PRESSURE: 125 MMHG | HEART RATE: 94 BPM | RESPIRATION RATE: 21 BRPM | HEIGHT: 60 IN

## 2024-05-30 DIAGNOSIS — F33.2 MAJOR DEPRESSIVE DISORDER, RECURRENT SEVERE WITHOUT PSYCHOTIC FEATURES (HCC): ICD-10-CM

## 2024-05-30 RX ORDER — CAFFEINE AND SODIUM BENZOATE 125 MG/ML
INJECTION, SOLUTION INTRAMUSCULAR; INTRAVENOUS
Status: COMPLETED
Start: 2024-05-30 | End: 2024-05-30

## 2024-05-30 RX ORDER — GLYCOPYRROLATE 0.2 MG/ML
0.1 INJECTION, SOLUTION INTRAMUSCULAR; INTRAVENOUS ONCE
Status: COMPLETED | OUTPATIENT
Start: 2024-05-30 | End: 2024-05-30

## 2024-05-30 RX ORDER — CAFFEINE AND SODIUM BENZOATE 125 MG/ML
125 INJECTION, SOLUTION INTRAMUSCULAR; INTRAVENOUS ONCE
Status: COMPLETED | OUTPATIENT
Start: 2024-05-30 | End: 2024-05-30

## 2024-05-30 RX ORDER — ONDANSETRON 2 MG/ML
4 INJECTION INTRAMUSCULAR; INTRAVENOUS ONCE
Status: COMPLETED | OUTPATIENT
Start: 2024-05-30 | End: 2024-05-30

## 2024-05-30 RX ORDER — ETOMIDATE 2 MG/ML
INJECTION INTRAVENOUS AS NEEDED
Status: DISCONTINUED | OUTPATIENT
Start: 2024-05-30 | End: 2024-05-30 | Stop reason: SURG

## 2024-05-30 RX ORDER — GLYCOPYRROLATE 0.2 MG/ML
INJECTION, SOLUTION INTRAMUSCULAR; INTRAVENOUS
Status: COMPLETED
Start: 2024-05-30 | End: 2024-05-30

## 2024-05-30 RX ORDER — SODIUM CHLORIDE, SODIUM LACTATE, POTASSIUM CHLORIDE, CALCIUM CHLORIDE 600; 310; 30; 20 MG/100ML; MG/100ML; MG/100ML; MG/100ML
INJECTION, SOLUTION INTRAVENOUS CONTINUOUS
Status: DISCONTINUED | OUTPATIENT
Start: 2024-05-30 | End: 2024-06-01

## 2024-05-30 RX ORDER — ONDANSETRON 2 MG/ML
INJECTION INTRAMUSCULAR; INTRAVENOUS
Status: COMPLETED
Start: 2024-05-30 | End: 2024-05-30

## 2024-05-30 RX ORDER — KETAMINE HYDROCHLORIDE 50 MG/ML
INJECTION, SOLUTION INTRAMUSCULAR; INTRAVENOUS AS NEEDED
Status: DISCONTINUED | OUTPATIENT
Start: 2024-05-30 | End: 2024-05-30 | Stop reason: SURG

## 2024-05-30 RX ORDER — NALOXONE HYDROCHLORIDE 0.4 MG/ML
0.08 INJECTION, SOLUTION INTRAMUSCULAR; INTRAVENOUS; SUBCUTANEOUS ONCE AS NEEDED
Status: ACTIVE | OUTPATIENT
Start: 2024-05-30 | End: 2024-05-30

## 2024-05-30 RX ADMIN — CAFFEINE AND SODIUM BENZOATE 125 MG: 125 INJECTION, SOLUTION INTRAMUSCULAR; INTRAVENOUS at 06:36:00

## 2024-05-30 RX ADMIN — GLYCOPYRROLATE 0.1 MG: 0.2 INJECTION, SOLUTION INTRAMUSCULAR; INTRAVENOUS at 06:33:00

## 2024-05-30 RX ADMIN — SODIUM CHLORIDE, SODIUM LACTATE, POTASSIUM CHLORIDE, CALCIUM CHLORIDE: 600; 310; 30; 20 INJECTION, SOLUTION INTRAVENOUS at 06:40:00

## 2024-05-30 RX ADMIN — ONDANSETRON 4 MG: 2 INJECTION INTRAMUSCULAR; INTRAVENOUS at 06:34:00

## 2024-05-30 RX ADMIN — KETAMINE HYDROCHLORIDE 25 MG: 50 INJECTION, SOLUTION INTRAMUSCULAR; INTRAVENOUS at 06:59:00

## 2024-05-30 RX ADMIN — ETOMIDATE 10 MG: 2 INJECTION INTRAVENOUS at 06:59:00

## 2024-05-30 NOTE — ANESTHESIA PREPROCEDURE EVALUATION
PRE-OP EVALUATION    Patient Name: Meli Antonio    Admit Diagnosis: Major depressive disorder, recurrent episode, severe with catatonia (HCC) [F33.2, F06.1]    Pre-op Diagnosis: * No pre-op diagnosis entered *        Anesthesia Procedure: ECT(BEDSIDE)    * No surgeons found in log *    Pre-op vitals reviewed.  Temp: 97.8 °F (36.6 °C)  Pulse: 72  Resp: 16  BP: 116/79  SpO2: 97 %  Body mass index is 21.48 kg/m².    Current medications reviewed.  Hospital Medications:   lactated ringers infusion   Intravenous Continuous    [COMPLETED] glycopyrrolate (Robinul) 0.2 MG/ML injection 0.1 mg  0.1 mg Intravenous Once    [COMPLETED] ondansetron (Zofran) 4 MG/2ML injection 4 mg  4 mg Intravenous Once    [COMPLETED] caffeine-sodium benzoate 125-125 mg/mL injection  125 mg Intravenous Once       Outpatient Medications:   (Not in a hospital admission)      Allergies: Radiology contrast iodinated dyes, Sulfa antibiotics, Methylprednisolone, Seroquel [quetiapine], Cinnamon, and Iodine (topical)      Anesthesia Evaluation    Patient summary reviewed.    Anesthetic Complications  (-) history of anesthetic complications         GI/Hepatic/Renal      (+) GERD                      (+) irritable bowel syndrome     Cardiovascular    Negative cardiovascular ROS.    Exercise tolerance: good     MET: >4                                  (-) FERREIRA  (-) orthopnea  (-) PND     Endo/Other      (+) diabetes and well controlled, type 2, not using insulin                         Pulmonary    Negative pulmonary ROS.           (-) shortness of breath  (-) recent URI          Neuro/Psych      (+) depression  (+) anxiety                              Past Surgical History:   Procedure Laterality Date          Correct bunion,othr methods      Correct bunion,simple      BILAT.     Dilation/curettage,diagnostic      FOR \"MOLE PREGNANCY\"    Other Right     ORIF RIGHT ANKLE    Spine surgery procedure unlisted      cervical spine 2020     Total knee replacement       Social History     Socioeconomic History    Marital status:    Tobacco Use    Smoking status: Former     Current packs/day: 2.00     Average packs/day: 2.0 packs/day for 15.0 years (30.0 ttl pk-yrs)     Types: Cigarettes    Smokeless tobacco: Never    Tobacco comments:     QUIT IN 1983   Vaping Use    Vaping status: Never Used   Substance and Sexual Activity    Alcohol use: No    Drug use: No     History   Drug Use No     Available pre-op labs reviewed.  Lab Results   Component Value Date    WBC 13.1 (H) 04/28/2024    RBC 4.73 04/28/2024    HGB 13.4 04/28/2024    HCT 42.2 04/28/2024    MCV 89.2 04/28/2024    MCH 28.3 04/28/2024    MCHC 31.8 04/28/2024    RDW 14.2 04/28/2024    .0 04/28/2024     Lab Results   Component Value Date     04/28/2024    K 4.0 04/28/2024     04/28/2024    CO2 26.0 04/28/2024    BUN 11 04/28/2024    CREATSERUM 0.81 04/28/2024     (H) 04/28/2024    CA 9.4 04/28/2024            Airway      Mallampati: I  Mouth opening: 3 FB  TM distance: < 4 cm  Neck ROM: full Cardiovascular    Cardiovascular exam normal.  Rhythm: regular  Rate: normal  (-) murmur   Dental    Dentition appears grossly intact         Pulmonary    Pulmonary exam normal.  Breath sounds clear to auscultation bilaterally.          (-) rales     Other findings              ASA: 2   Plan: general  NPO status verified and patient meets guidelines.          Plan/risks discussed with: patient            We discussed GA w/mask or ETT and possible scratchy throat and rarely dental damage.  We discussed analgesic plan and PONV prophylaxis.  The patient's questions were answered and consent was attained.

## 2024-05-30 NOTE — DISCHARGE INSTRUCTIONS
Discharge Instructions  Electroconvulsive Therapy    Activities:  You MUST arrange to have a responsible adult drive you home and have a responsible adult stay with you the rest of the day and overnight.  Do not drive today.  Do not operate any machinery today. Use kitchen equipment with caution.  Rest and take it easy today.  Do not take public transportation without the presence of another responsible adult for 24 hours    Medications:  Resume your regular medications when you get home    Diet:  You may resume your regular diet when you get home  Do not drink alcohol for 24 hours    Additional Instructions:  Someone should call you to schedule any upcoming ECT treatments. Call as needed to schedule or cancel your ECT appointments [phone].  If you have any questions or concerns, please call your own psychiatrist.  For your safety, please do not wear make-up to any future ECT appointments.  For any questions regarding your ECT appointment, please call [facility] [phone].  For cancellations after hours, call [phone] and leave a message.    Expected Recovery:  As you awaken, you may experience one or more of the following:  Headache, nausea, temporary confusion, or muscle stiffness.  If these symptoms increase, become severe or are accompanied by a fever of more than 101, please seek medical attention.  The ECT may affect memory.  Many patients report loss of memory for events that occurred in the days, weeks or months surrounding the ECT.  Many of these memories may return, but not always.  Short-term memory may also be affected for months, but this can also be a result of the disorder that you have.

## 2024-05-30 NOTE — PROGRESS NOTES
[facility] / [facility]  ECT Procedure Note    Meli Antonio Patient Status:  Outpatient   Age/Gender 73 year old female MRN [id number]   Location [facility] POST ANESTHESIA CARE UNIT Attending Agapito Romero MD   Hosp Day # 0 PCP SIRI TABOR     5/30/2024    ECT Number: #[id number]    Diagnosis: Major Depression Recurrent Severe Without Psychotic Features. F33.2    Type of ECT:  Bifrontal    Place of Service:  Outpatient    Settings:   1.  Energy Percentage: 75%    2.  Program:  Low 0.5    Pre-ECT Evaluation    Symptoms:  Patient seen.  Patient noted significant improvement in mood.  She notes mood is greater than 75% baseline.  She notes no significant side effects.  No suicidal thoughts.  Patient however is having a hard time finding outpatient provider.  Discussed with patient will currently monitor and also explore discharge options.  Also plan on scheduling 1 ECT next week and reevaluate it.  Discussed medications and no current side effects and will refill.  Patient shows capacity make decisions.    Risk/Benefits:  Discussed with patient side effects of ECT including headache, teeth, jaw, cardiac, pulmonary, NPO, aspiration, allergic reactions, anesthetic reactions, musculoskeletal, neurologic, morbidity/mortality, potential lack of efficacy, unilateral/bilateral ECT, relapse/maintenance issues, cognitive risks including memory, concentration, cognition, and other risks.    Side Effects:  Patient notes no cognitive/physical complaints    Exam:  Mood: less depressed and less anxious  Affect: Congruent  Memory:  intact immediate, recent, remote and as evidenced by ability to present consistent history  Concentration:   good  Suicidal ideation: no suicidal ideation    Prior to procedure, reviewed with treatment team correct patient, time of procedure and type of ECT.  Also reviewed with anesthesia pre-ECT medications.    Patient Monitored:  B/P, EKG, EEG, Pulse Ox, Left Ankle Cuff    Pre-ECT  Medications: Robinul 0.1 mg IV, Zofran 4 mg IV, and caffeine 125 mg IV    ECT Medications:  Anesthetic  Etomidate 10 mg IV followed by ketamine 25 mg IV and Succinylcholine 60 mg IV    Seizure Duration:  Motor: 46 seconds       EE seconds    Post-ECT Condition:  Treatment unremarkable    Post-ECT Medications:  None     Agapito Romero MD

## 2024-05-30 NOTE — PROGRESS NOTES
[facility] / [facility]  ECT History & Physical    Meli Antonio Patient Status:  Outpatient   Age/Gender 73 year old female MRN [id number]   Location [facility] POST ANESTHESIA CARE UNIT Attending Agapito Romero MD   Hosp Day # 0 PCP SIRI TABOR     Date: 2024    Diagnosis: Major depression recurrent severe    Procedure:  ECT    HPI:     Patient seen.  Patient reports no cognitive or physical complaints      Medical History:  Past Medical History:    Anxiety state    Back problem    COMPRESSION FX    Cataract    Depression    Esophageal reflux    GERD (gastroesophageal reflux disease)    Hematoma and contusion of liver    STATES HAS BEEN THERE FOR MANY YEARS    History of fractured kneecap    RIGHT    IBS (irritable bowel syndrome)    Mitral prolapse    Osteoarthritis       Surgical History:  Past Surgical History:   Procedure Laterality Date          Correct bunion,othr methods      Correct bunion,simple      BILAT.     Dilation/curettage,diagnostic      FOR \"MOLE PREGNANCY\"    Other Right     ORIF RIGHT ANKLE    Spine surgery procedure unlisted      cervical spine 2020    Total knee replacement         Family History:  Family History   Problem Relation Age of Onset    Heart Disorder Father     Cancer Mother         ESOPHAGUS       ROS:  Unremarkable    Physical Exam:   /74   Pulse 106   Temp 98.2 °F (36.8 °C) (Temporal)   Resp 18   Ht 60\"   SpO2 100%   BMI 21.48 kg/m²     General Appearance:    Alert, cooperative, no distress, appears stated age   Head:    Normocephalic, without obvious abnormality, atraumatic   Eyes:    PERRL, conjunctiva/corneas clear, EOM's intact       Nose:   Nares normal, septum midline, mucosa normal, no drainage    or sinus tenderness   Throat:   Lips, mucosa, and tongue normal; teeth and gums normal   Neck:   Supple, symmetrical, trachea midline   Lungs:     Clear to auscultation bilaterally, respirations unlabored    Heart:    Regular  rate and rhythm, S1 and S2 normal, no murmur, rub   or gallop   Abdomen:     Soft, non-tender, bowel sounds active all four quadrants,     no masses, no organomegaly   Extremities:   Extremities normal, atraumatic, no cyanosis or edema   Pulses:   2+ and symmetric all extremities   Skin:   Skin color, texture, turgor normal, no rashes or lesions   Neurologic:   CNII-XII intact, normal strength, sensation and reflexes     throughout     Impressions & Plans: Major depression recurrent severe.  Bifrontal ECT    I have discussed the risks and benefits and alternatives with the patient/family.  They understand and agree to proceed with plan of care.    Agapito Romero MD

## 2024-05-30 NOTE — ANESTHESIA POSTPROCEDURE EVALUATION
[facility]    Meli Antonio Patient Status:  Outpatient   Age/Gender 73 year old female MRN [id number]   Location [facility] POST ANESTHESIA CARE UNIT Attending Agapito Romero MD   Hosp Day # 0 PCP SIRI TABOR       Anesthesia Post-op Note        Procedure Summary       Date: 05/30/24 Room / Location: [facility] Post Anesthesia Care Unit    Anesthesia Start: 0653 Anesthesia Stop: 0707    Procedure: ECT(BEDSIDE) Diagnosis: Major depressive disorder, recurrent severe without psychotic features (HCC)    Scheduled Providers:  Anesthesiologist: Nicholas Schafer MD    Anesthesia Type: general ASA Status: 2            Anesthesia Type: general    Vitals Value Taken Time   /64 05/30/24 0707   Temp 97.8 05/30/24 0707   Pulse 72 05/30/24 0707   Resp 18 05/30/24 0707   SpO2 96 05/30/24 0707       Patient Location: PACU    Anesthesia Type: general    Airway Patency: patent and extubated    Postop Pain Control: adequate    Nausea/Vomiting: none    Cardiopulmonary/Hydration status: stable euvolemic    Complications: no apparent anesthesia related complications    Postop vital signs: stable    Dental Exam: Unchanged from Preop    Patient to be discharged from PACU when criteria met.

## 2024-06-05 ENCOUNTER — ANESTHESIA EVENT (OUTPATIENT)
Dept: POSTOP/PACU | Facility: HOSPITAL | Age: 74
End: 2024-06-05
Payer: MEDICARE

## 2024-06-05 ENCOUNTER — HOSPITAL ENCOUNTER (OUTPATIENT)
Dept: POSTOP/PACU | Facility: HOSPITAL | Age: 74
Discharge: HOME | End: 2024-06-05
Attending: Other
Payer: MEDICARE

## 2024-06-05 ENCOUNTER — ANESTHESIA (OUTPATIENT)
Dept: POSTOP/PACU | Facility: HOSPITAL | Age: 74
End: 2024-06-05
Payer: MEDICARE

## 2024-06-05 VITALS
SYSTOLIC BLOOD PRESSURE: 128 MMHG | DIASTOLIC BLOOD PRESSURE: 98 MMHG | TEMPERATURE: 98 F | HEART RATE: 77 BPM | HEIGHT: 60 IN | BODY MASS INDEX: 21 KG/M2 | OXYGEN SATURATION: 97 % | RESPIRATION RATE: 18 BRPM

## 2024-06-05 DIAGNOSIS — F33.2 MAJOR DEPRESSIVE DISORDER, RECURRENT SEVERE WITHOUT PSYCHOTIC FEATURES (HCC): ICD-10-CM

## 2024-06-05 RX ORDER — HYDROMORPHONE HYDROCHLORIDE 1 MG/ML
0.6 INJECTION, SOLUTION INTRAMUSCULAR; INTRAVENOUS; SUBCUTANEOUS EVERY 5 MIN PRN
Status: ACTIVE | OUTPATIENT
Start: 2024-06-05 | End: 2024-06-05

## 2024-06-05 RX ORDER — ONDANSETRON 2 MG/ML
INJECTION INTRAMUSCULAR; INTRAVENOUS
Status: COMPLETED
Start: 2024-06-05 | End: 2024-06-05

## 2024-06-05 RX ORDER — GLYCOPYRROLATE 0.2 MG/ML
0.1 INJECTION, SOLUTION INTRAMUSCULAR; INTRAVENOUS ONCE
Status: COMPLETED | OUTPATIENT
Start: 2024-06-05 | End: 2024-06-05

## 2024-06-05 RX ORDER — SODIUM CHLORIDE, SODIUM LACTATE, POTASSIUM CHLORIDE, CALCIUM CHLORIDE 600; 310; 30; 20 MG/100ML; MG/100ML; MG/100ML; MG/100ML
INJECTION, SOLUTION INTRAVENOUS CONTINUOUS
Status: DISCONTINUED | OUTPATIENT
Start: 2024-06-05 | End: 2024-06-07

## 2024-06-05 RX ORDER — NICOTINE POLACRILEX 4 MG
15 LOZENGE BUCCAL
Status: DISCONTINUED | OUTPATIENT
Start: 2024-06-05 | End: 2024-06-07

## 2024-06-05 RX ORDER — ETOMIDATE 2 MG/ML
INJECTION INTRAVENOUS AS NEEDED
Status: DISCONTINUED | OUTPATIENT
Start: 2024-06-05 | End: 2024-06-05 | Stop reason: SURG

## 2024-06-05 RX ORDER — DEXTROSE MONOHYDRATE 25 G/50ML
50 INJECTION, SOLUTION INTRAVENOUS
Status: DISCONTINUED | OUTPATIENT
Start: 2024-06-05 | End: 2024-06-07

## 2024-06-05 RX ORDER — GLYCOPYRROLATE 0.2 MG/ML
INJECTION, SOLUTION INTRAMUSCULAR; INTRAVENOUS
Status: COMPLETED
Start: 2024-06-05 | End: 2024-06-05

## 2024-06-05 RX ORDER — HYDROMORPHONE HYDROCHLORIDE 1 MG/ML
0.4 INJECTION, SOLUTION INTRAMUSCULAR; INTRAVENOUS; SUBCUTANEOUS EVERY 5 MIN PRN
Status: ACTIVE | OUTPATIENT
Start: 2024-06-05 | End: 2024-06-05

## 2024-06-05 RX ORDER — HYDROMORPHONE HYDROCHLORIDE 1 MG/ML
0.2 INJECTION, SOLUTION INTRAMUSCULAR; INTRAVENOUS; SUBCUTANEOUS EVERY 5 MIN PRN
Status: ACTIVE | OUTPATIENT
Start: 2024-06-05 | End: 2024-06-05

## 2024-06-05 RX ORDER — CAFFEINE AND SODIUM BENZOATE 125 MG/ML
INJECTION, SOLUTION INTRAMUSCULAR; INTRAVENOUS
Status: COMPLETED
Start: 2024-06-05 | End: 2024-06-05

## 2024-06-05 RX ORDER — KETAMINE HYDROCHLORIDE 50 MG/ML
INJECTION, SOLUTION INTRAMUSCULAR; INTRAVENOUS AS NEEDED
Status: DISCONTINUED | OUTPATIENT
Start: 2024-06-05 | End: 2024-06-05 | Stop reason: SURG

## 2024-06-05 RX ORDER — CAFFEINE AND SODIUM BENZOATE 125 MG/ML
125 INJECTION, SOLUTION INTRAMUSCULAR; INTRAVENOUS ONCE
Status: COMPLETED | OUTPATIENT
Start: 2024-06-05 | End: 2024-06-05

## 2024-06-05 RX ORDER — ONDANSETRON 2 MG/ML
4 INJECTION INTRAMUSCULAR; INTRAVENOUS ONCE
Status: COMPLETED | OUTPATIENT
Start: 2024-06-05 | End: 2024-06-05

## 2024-06-05 RX ORDER — NALOXONE HYDROCHLORIDE 0.4 MG/ML
0.08 INJECTION, SOLUTION INTRAMUSCULAR; INTRAVENOUS; SUBCUTANEOUS AS NEEDED
Status: ACTIVE | OUTPATIENT
Start: 2024-06-05 | End: 2024-06-05

## 2024-06-05 RX ORDER — NICOTINE POLACRILEX 4 MG
30 LOZENGE BUCCAL
Status: DISCONTINUED | OUTPATIENT
Start: 2024-06-05 | End: 2024-06-07

## 2024-06-05 RX ADMIN — ETOMIDATE 10 MG: 2 INJECTION INTRAVENOUS at 07:16:00

## 2024-06-05 RX ADMIN — GLYCOPYRROLATE 0.1 MG: 0.2 INJECTION, SOLUTION INTRAMUSCULAR; INTRAVENOUS at 06:30:00

## 2024-06-05 RX ADMIN — KETAMINE HYDROCHLORIDE 25 MG: 50 INJECTION, SOLUTION INTRAMUSCULAR; INTRAVENOUS at 07:16:00

## 2024-06-05 RX ADMIN — SODIUM CHLORIDE, SODIUM LACTATE, POTASSIUM CHLORIDE, CALCIUM CHLORIDE: 600; 310; 30; 20 INJECTION, SOLUTION INTRAVENOUS at 06:15:00

## 2024-06-05 RX ADMIN — CAFFEINE AND SODIUM BENZOATE 125 MG: 125 INJECTION, SOLUTION INTRAMUSCULAR; INTRAVENOUS at 06:32:00

## 2024-06-05 RX ADMIN — ONDANSETRON 4 MG: 2 INJECTION INTRAMUSCULAR; INTRAVENOUS at 06:29:00

## 2024-06-05 NOTE — ANESTHESIA POSTPROCEDURE EVALUATION
[facility]    Meli Antonio Patient Status:  Outpatient   Age/Gender 73 year old female MRN [id number]   Location [facility] POST ANESTHESIA CARE UNIT Attending Agapito Romero MD   Hosp Day # 0 PCP SIRI TABOR       Anesthesia Post-op Note        Procedure Summary       Date: 06/05/24 Room / Location: [facility] Post Anesthesia Care Unit    Anesthesia Start: 0714 Anesthesia Stop:     Procedure: ECT(BEDSIDE) Diagnosis: Major depressive disorder, recurrent severe without psychotic features (HCC)    Scheduled Providers:  Anesthesiologist: Everardo Crowley MD    Anesthesia Type: general ASA Status: 2            Anesthesia Type: No value filed.    Vitals Value Taken Time   /65 06/05/24 0726   Temp 98 06/05/24 0726   Pulse 74 06/05/24 0726   Resp 18 06/05/24 0726   SpO2 99 06/05/24 0726       Patient Location: PACU    Anesthesia Type: general    Airway Patency: patent    Postop Pain Control: adequate    Mental Status: mildly sedated but able to meaningfully participate in the post-anesthesia evaluation    Nausea/Vomiting: none    Cardiopulmonary/Hydration status: stable euvolemic    Complications: no apparent anesthesia related complications    Postop vital signs: stable    Dental Exam: Unchanged from Preop

## 2024-06-05 NOTE — DISCHARGE INSTRUCTIONS
Discharge Instructions  Electroconvulsive Therapy    WE WILL SEE YOU BACK IN 2 WEEKS!    Activities:  You MUST arrange to have a responsible adult drive you home and have a responsible adult stay with you the rest of the day and overnight.  Do not drive today.  Do not operate any machinery today. Use kitchen equipment with caution.  Rest and take it easy today.  Do not take public transportation without the presence of another responsible adult for 24 hours    Medications:  Resume your regular medications when you get home    Diet:  You may resume your regular diet when you get home  Do not drink alcohol for 24 hours    Additional Instructions:  Someone should call you to schedule any upcoming ECT treatments. Call as needed to schedule or cancel your ECT appointments [phone].  If you have any questions or concerns, please call your own psychiatrist.  For your safety, please do not wear make-up to any future ECT appointments.  For any questions regarding your ECT appointment, please call [facility] [phone].  For cancellations after hours, call [phone] and leave a message.    Expected Recovery:  As you awaken, you may experience one or more of the following:  Headache, nausea, temporary confusion, or muscle stiffness.  If these symptoms increase, become severe or are accompanied by a fever of more than 101, please seek medical attention.  The ECT may affect memory.  Many patients report loss of memory for events that occurred in the days, weeks or months surrounding the ECT.  Many of these memories may return, but not always.  Short-term memory may also be affected for months, but this can also be a result of the disorder that you have.

## 2024-06-05 NOTE — PROGRESS NOTES
[facility] / [facility]  ECT History & Physical    Meli Antonio Patient Status:  Outpatient   Age/Gender 73 year old female MRN [id number]   Location [facility] POST ANESTHESIA CARE UNIT Attending Agapito Romero MD   Hosp Day # 0 PCP SIRI TABOR     Date: 2024    Diagnosis: Major depression recurrent severe    Procedure:  ECT    HPI:     Patient seen.  Patient noted to have some issues with recall.  Patient was processing issues of memory, Patient that was easily able to remember things with prompting.  Patient noted that overall mood is better but she is contemplating changing and stopping ECT.  No other cognitive or physical complaints      Medical History:  Past Medical History:    Anxiety state    Back problem    COMPRESSION FX    Cataract    Depression    Esophageal reflux    GERD (gastroesophageal reflux disease)    Hematoma and contusion of liver    STATES HAS BEEN THERE FOR MANY YEARS    History of fractured kneecap    RIGHT    IBS (irritable bowel syndrome)    Mitral prolapse    Osteoarthritis       Surgical History:  Past Surgical History:   Procedure Laterality Date          Correct bunion,othr methods      Correct bunion,simple      BILAT.     Dilation/curettage,diagnostic      FOR \"MOLE PREGNANCY\"    Other Right     ORIF RIGHT ANKLE    Spine surgery procedure unlisted      cervical spine 2020    Total knee replacement         Family History:  Family History   Problem Relation Age of Onset    Heart Disorder Father     Cancer Mother         ESOPHAGUS       ROS:  As above    Physical Exam:   BP (!) 128/98   Pulse 77   Temp 97.9 °F (36.6 °C) (Temporal)   Resp 18   Ht 60\"   SpO2 97%   BMI 21.48 kg/m²     General Appearance:    Alert, cooperative, no distress, appears stated age   Head:    Normocephalic, without obvious abnormality, atraumatic   Eyes:    PERRL, conjunctiva/corneas clear, EOM's intact       Nose:   Nares normal, septum midline, mucosa normal, no  drainage    or sinus tenderness   Throat:   Lips, mucosa, and tongue normal; teeth and gums normal   Neck:   Supple, symmetrical, trachea midline   Lungs:     Clear to auscultation bilaterally, respirations unlabored    Heart:    Regular rate and rhythm, S1 and S2 normal, no murmur, rub   or gallop   Abdomen:     Soft, non-tender, bowel sounds active all four quadrants,     no masses, no organomegaly   Extremities:   Extremities normal, atraumatic, no cyanosis or edema   Pulses:   2+ and symmetric all extremities   Skin:   Skin color, texture, turgor normal, no rashes or lesions   Neurologic:   CNII-XII intact, normal strength, sensation and reflexes     throughout     Impressions & Plans: Major depression recurrent severe.  Unilateral ECT    I have discussed the risks and benefits and alternatives with the patient/family.  They understand and agree to proceed with plan of care.    Agapito Romero MD

## 2024-06-05 NOTE — ANESTHESIA PREPROCEDURE EVALUATION
PRE-OP EVALUATION    Patient Name: Meli Antonio    Admit Diagnosis: Major depressive disorder, recurrent episode, severe with catatonia (HCC) [F33.2, F06.1]    Pre-op Diagnosis: * No pre-op diagnosis entered *        Anesthesia Procedure: ECT(BEDSIDE)    * No surgeons found in log *    Pre-op vitals reviewed.  Temp: 98.9 °F (37.2 °C)  Pulse: 72  Resp: 12  BP: 135/90  SpO2: 97 %  Body mass index is 21.48 kg/m².    Current medications reviewed.  Hospital Medications:   lactated ringers infusion   Intravenous Continuous    [COMPLETED] glycopyrrolate (Robinul) 0.2 MG/ML injection 0.1 mg  0.1 mg Intravenous Once    [COMPLETED] ondansetron (Zofran) 4 MG/2ML injection 4 mg  4 mg Intravenous Once    [COMPLETED] caffeine-sodium benzoate 125-125 mg/mL injection  125 mg Intravenous Once       Outpatient Medications:   (Not in a hospital admission)      Allergies: Radiology contrast iodinated dyes, Sulfa antibiotics, Methylprednisolone, Seroquel [quetiapine], Cinnamon, and Iodine (topical)      Anesthesia Evaluation    Patient summary reviewed.    Anesthetic Complications  (-) history of anesthetic complications         GI/Hepatic/Renal      (+) GERD                      (+) irritable bowel syndrome     Cardiovascular    Negative cardiovascular ROS.    Exercise tolerance: good     MET: >4                                  (-) FERREIRA  (-) orthopnea  (-) PND     Endo/Other      (+) diabetes and well controlled, type 2, not using insulin                         Pulmonary    Negative pulmonary ROS.           (-) shortness of breath  (-) recent URI          Neuro/Psych      (+) depression  (+) anxiety                              Past Surgical History:   Procedure Laterality Date          Correct bunion,othr methods      Correct bunion,simple      BILAT.     Dilation/curettage,diagnostic      FOR \"MOLE PREGNANCY\"    Other Right     ORIF RIGHT ANKLE    Spine surgery procedure unlisted      cervical spine 2020     Total knee replacement       Social History     Socioeconomic History    Marital status:    Tobacco Use    Smoking status: Former     Current packs/day: 2.00     Average packs/day: 2.0 packs/day for 15.0 years (30.0 ttl pk-yrs)     Types: Cigarettes    Smokeless tobacco: Never    Tobacco comments:     QUIT IN 1983   Vaping Use    Vaping status: Never Used   Substance and Sexual Activity    Alcohol use: No    Drug use: No     History   Drug Use No     Available pre-op labs reviewed.  Lab Results   Component Value Date    WBC 13.1 (H) 04/28/2024    RBC 4.73 04/28/2024    HGB 13.4 04/28/2024    HCT 42.2 04/28/2024    MCV 89.2 04/28/2024    MCH 28.3 04/28/2024    MCHC 31.8 04/28/2024    RDW 14.2 04/28/2024    .0 04/28/2024     Lab Results   Component Value Date     04/28/2024    K 4.0 04/28/2024     04/28/2024    CO2 26.0 04/28/2024    BUN 11 04/28/2024    CREATSERUM 0.81 04/28/2024     (H) 04/28/2024    CA 9.4 04/28/2024            Airway      Mallampati: I  Mouth opening: 3 FB  TM distance: < 4 cm  Neck ROM: full Cardiovascular    Cardiovascular exam normal.  Rhythm: regular  Rate: normal  (-) murmur   Dental    Dentition appears grossly intact         Pulmonary    Pulmonary exam normal.  Breath sounds clear to auscultation bilaterally.          (-) rales     Other findings              ASA: 2   Plan: general  NPO status verified and patient meets guidelines.          Plan/risks discussed with: patient            We discussed GA w/mask or ETT and possible scratchy throat and rarely dental damage.  We discussed analgesic plan and PONV prophylaxis.  The patient's questions were answered and consent was attained.

## 2024-06-05 NOTE — PROGRESS NOTES
[facility] / [facility]  ECT Procedure Note    Meli Antonio Patient Status:  Outpatient   Age/Gender 73 year old female MRN [id number]   Location [facility] POST ANESTHESIA CARE UNIT Attending Agapito Romero MD   Hosp Day # 0 PCP SIRI TABOR     6/5/2024    ECT Number: #11 total.  #1 unilateral    Diagnosis: Major Depression Recurrent Severe Without Psychotic Features. F33.2    Type of ECT:  Unilateral non-dominant d'Rustam    Place of Service:  Outpatient    Settings:   1.  Energy Percentage: 75%    2.  Program:  Low 0.5    Pre-ECT Evaluation    Symptoms:  Patient seen.  Overall patient noted to have significant improvements in mood.  She reports decrease in anxiety.  She reports no suicidal thoughts.  Patient noted no adwoa or psychosis.  Patient initially noted that she had mild memory issues.  However on exam, memory appeared intact but rather recall did take some time.  However with mild encouragement, patient was easily able to remember a number of things.  Patient processed risks and benefits of ECT.  She does note there is been a significant improvement in mood.  Patient noted that she has been anxious about doing ECT long-term.  Discussed risks and benefits including past history of lack of functioning with depression, maintenance options, and risks and benefits of ECT.  Decision was to change to unilateral ECT and reevaluate.  Patient shows capacity make decisions.    Risk/Benefits:  Discussed with patient side effects of ECT including headache, teeth, jaw, cardiac, pulmonary, NPO, aspiration, allergic reactions, anesthetic reactions, musculoskeletal, neurologic, morbidity/mortality, potential lack of efficacy, unilateral/bilateral ECT, relapse/maintenance issues, cognitive risks including memory, concentration, cognition, and other risks.    Side Effects:  Mild memory complaint    Exam:  Mood: less depressed and less anxious  Affect: Congruent  Memory:  intact immediate, recent,  remote and as evidenced by ability to present consistent history on formal exam  Concentration:   fair  Suicidal ideation: no suicidal ideation    Prior to procedure, reviewed with treatment team correct patient, time of procedure and type of ECT.  Also reviewed with anesthesia pre-ECT medications.    Patient Monitored:  B/P, EKG, EEG, Pulse Ox, Left Ankle Cuff    Pre-ECT Medications: Robinul 0.1 mg IV, Zofran 4 mg IV, and caffeine 125 mg IV    ECT Medications:  Anesthetic  Etomidate 10 mg IV followed by ketamine 25 mg every and Succinylcholine 60 mg IV    Seizure Duration:  Motor: 39 seconds       EE seconds    Post-ECT Condition:  Treatment unremarkable    Post-ECT Medications:  None     Agapito Romero MD           None

## 2024-06-20 ENCOUNTER — HOSPITAL ENCOUNTER (OUTPATIENT)
Dept: POSTOP/PACU | Facility: HOSPITAL | Age: 74
Discharge: HOME | End: 2024-06-20
Attending: Other

## 2024-06-20 ENCOUNTER — ANESTHESIA (OUTPATIENT)
Dept: POSTOP/PACU | Facility: HOSPITAL | Age: 74
End: 2024-06-20

## 2024-06-20 ENCOUNTER — ANESTHESIA EVENT (OUTPATIENT)
Dept: POSTOP/PACU | Facility: HOSPITAL | Age: 74
End: 2024-06-20

## 2024-06-20 VITALS
TEMPERATURE: 98 F | DIASTOLIC BLOOD PRESSURE: 77 MMHG | HEIGHT: 61 IN | RESPIRATION RATE: 13 BRPM | BODY MASS INDEX: 23.03 KG/M2 | SYSTOLIC BLOOD PRESSURE: 132 MMHG | HEART RATE: 72 BPM | WEIGHT: 122 LBS | OXYGEN SATURATION: 97 %

## 2024-06-20 DIAGNOSIS — F33.2 MAJOR DEPRESSIVE DISORDER, RECURRENT SEVERE WITHOUT PSYCHOTIC FEATURES (HCC): ICD-10-CM

## 2024-06-20 RX ORDER — DIPHENHYDRAMINE HYDROCHLORIDE 50 MG/ML
25 INJECTION INTRAMUSCULAR; INTRAVENOUS EVERY 4 HOURS PRN
Status: DISCONTINUED | OUTPATIENT
Start: 2024-06-20 | End: 2024-06-22

## 2024-06-20 RX ORDER — DEXTROSE MONOHYDRATE 25 G/50ML
50 INJECTION, SOLUTION INTRAVENOUS
Status: DISCONTINUED | OUTPATIENT
Start: 2024-06-20 | End: 2024-06-22

## 2024-06-20 RX ORDER — ETOMIDATE 2 MG/ML
INJECTION INTRAVENOUS AS NEEDED
Status: DISCONTINUED | OUTPATIENT
Start: 2024-06-20 | End: 2024-06-20 | Stop reason: SURG

## 2024-06-20 RX ORDER — METOPROLOL TARTRATE 1 MG/ML
2.5 INJECTION, SOLUTION INTRAVENOUS ONCE
Status: DISCONTINUED | OUTPATIENT
Start: 2024-06-20 | End: 2024-06-22

## 2024-06-20 RX ORDER — CAFFEINE AND SODIUM BENZOATE 125 MG/ML
125 INJECTION, SOLUTION INTRAMUSCULAR; INTRAVENOUS ONCE
Status: COMPLETED | OUTPATIENT
Start: 2024-06-20 | End: 2024-06-20

## 2024-06-20 RX ORDER — MIDAZOLAM HYDROCHLORIDE 1 MG/ML
1 INJECTION INTRAMUSCULAR; INTRAVENOUS EVERY 5 MIN PRN
Status: ACTIVE | OUTPATIENT
Start: 2024-06-20 | End: 2024-06-20

## 2024-06-20 RX ORDER — HYDROMORPHONE HYDROCHLORIDE 1 MG/ML
0.2 INJECTION, SOLUTION INTRAMUSCULAR; INTRAVENOUS; SUBCUTANEOUS EVERY 5 MIN PRN
Status: ACTIVE | OUTPATIENT
Start: 2024-06-20 | End: 2024-06-20

## 2024-06-20 RX ORDER — NALOXONE HYDROCHLORIDE 0.4 MG/ML
80 INJECTION, SOLUTION INTRAMUSCULAR; INTRAVENOUS; SUBCUTANEOUS AS NEEDED
Status: ACTIVE | OUTPATIENT
Start: 2024-06-20 | End: 2024-06-20

## 2024-06-20 RX ORDER — HYDROMORPHONE HYDROCHLORIDE 1 MG/ML
0.4 INJECTION, SOLUTION INTRAMUSCULAR; INTRAVENOUS; SUBCUTANEOUS EVERY 5 MIN PRN
Status: ACTIVE | OUTPATIENT
Start: 2024-06-20 | End: 2024-06-20

## 2024-06-20 RX ORDER — DIPHENHYDRAMINE HYDROCHLORIDE 50 MG/ML
12.5 INJECTION INTRAMUSCULAR; INTRAVENOUS AS NEEDED
Status: ACTIVE | OUTPATIENT
Start: 2024-06-20 | End: 2024-06-20

## 2024-06-20 RX ORDER — ONDANSETRON 2 MG/ML
4 INJECTION INTRAMUSCULAR; INTRAVENOUS ONCE
Status: COMPLETED | OUTPATIENT
Start: 2024-06-20 | End: 2024-06-20

## 2024-06-20 RX ORDER — MEPERIDINE HYDROCHLORIDE 25 MG/ML
12.5 INJECTION INTRAMUSCULAR; INTRAVENOUS; SUBCUTANEOUS AS NEEDED
Status: DISCONTINUED | OUTPATIENT
Start: 2024-06-20 | End: 2024-06-22

## 2024-06-20 RX ORDER — SODIUM CHLORIDE, SODIUM LACTATE, POTASSIUM CHLORIDE, CALCIUM CHLORIDE 600; 310; 30; 20 MG/100ML; MG/100ML; MG/100ML; MG/100ML
INJECTION, SOLUTION INTRAVENOUS CONTINUOUS
Status: DISCONTINUED | OUTPATIENT
Start: 2024-06-20 | End: 2024-06-22

## 2024-06-20 RX ORDER — GLYCOPYRROLATE 0.2 MG/ML
0.1 INJECTION, SOLUTION INTRAMUSCULAR; INTRAVENOUS ONCE
Status: COMPLETED | OUTPATIENT
Start: 2024-06-20 | End: 2024-06-20

## 2024-06-20 RX ORDER — ONDANSETRON 2 MG/ML
INJECTION INTRAMUSCULAR; INTRAVENOUS
Status: COMPLETED
Start: 2024-06-20 | End: 2024-06-20

## 2024-06-20 RX ORDER — GLYCOPYRROLATE 0.2 MG/ML
INJECTION, SOLUTION INTRAMUSCULAR; INTRAVENOUS
Status: COMPLETED
Start: 2024-06-20 | End: 2024-06-20

## 2024-06-20 RX ORDER — HYDROMORPHONE HYDROCHLORIDE 1 MG/ML
0.6 INJECTION, SOLUTION INTRAMUSCULAR; INTRAVENOUS; SUBCUTANEOUS EVERY 5 MIN PRN
Status: ACTIVE | OUTPATIENT
Start: 2024-06-20 | End: 2024-06-20

## 2024-06-20 RX ORDER — NICOTINE POLACRILEX 4 MG
30 LOZENGE BUCCAL
Status: DISCONTINUED | OUTPATIENT
Start: 2024-06-20 | End: 2024-06-22

## 2024-06-20 RX ORDER — NICOTINE POLACRILEX 4 MG
15 LOZENGE BUCCAL
Status: DISCONTINUED | OUTPATIENT
Start: 2024-06-20 | End: 2024-06-22

## 2024-06-20 RX ORDER — CAFFEINE AND SODIUM BENZOATE 125 MG/ML
INJECTION, SOLUTION INTRAMUSCULAR; INTRAVENOUS
Status: COMPLETED
Start: 2024-06-20 | End: 2024-06-20

## 2024-06-20 RX ORDER — KETAMINE HYDROCHLORIDE 50 MG/ML
INJECTION, SOLUTION INTRAMUSCULAR; INTRAVENOUS AS NEEDED
Status: DISCONTINUED | OUTPATIENT
Start: 2024-06-20 | End: 2024-06-20 | Stop reason: SURG

## 2024-06-20 RX ORDER — DIPHENHYDRAMINE HYDROCHLORIDE 50 MG/ML
INJECTION INTRAMUSCULAR; INTRAVENOUS
Status: COMPLETED
Start: 2024-06-20 | End: 2024-06-20

## 2024-06-20 RX ADMIN — SODIUM CHLORIDE, SODIUM LACTATE, POTASSIUM CHLORIDE, CALCIUM CHLORIDE: 600; 310; 30; 20 INJECTION, SOLUTION INTRAVENOUS at 07:45:00

## 2024-06-20 RX ADMIN — GLYCOPYRROLATE 0.1 MG: 0.2 INJECTION, SOLUTION INTRAMUSCULAR; INTRAVENOUS at 06:33:00

## 2024-06-20 RX ADMIN — CAFFEINE AND SODIUM BENZOATE 125 MG: 125 INJECTION, SOLUTION INTRAMUSCULAR; INTRAVENOUS at 06:57:00

## 2024-06-20 RX ADMIN — ETOMIDATE 10 MG: 2 INJECTION INTRAVENOUS at 07:37:00

## 2024-06-20 RX ADMIN — ONDANSETRON 4 MG: 2 INJECTION INTRAMUSCULAR; INTRAVENOUS at 06:33:00

## 2024-06-20 RX ADMIN — KETAMINE HYDROCHLORIDE 25 MG: 50 INJECTION, SOLUTION INTRAMUSCULAR; INTRAVENOUS at 07:37:00

## 2024-06-20 RX ADMIN — SODIUM CHLORIDE, SODIUM LACTATE, POTASSIUM CHLORIDE, CALCIUM CHLORIDE: 600; 310; 30; 20 INJECTION, SOLUTION INTRAVENOUS at 06:32:00

## 2024-06-20 RX ADMIN — DIPHENHYDRAMINE HYDROCHLORIDE 25 MG: 50 INJECTION INTRAMUSCULAR; INTRAVENOUS at 07:44:00

## 2024-06-20 NOTE — ANESTHESIA POSTPROCEDURE EVALUATION
[facility]    Meli Antonio Patient Status:  Outpatient   Age/Gender 73 year old female MRN [id number]   Location [facility] POST ANESTHESIA CARE UNIT Attending Agapito Romero MD   Hosp Day # 0 PCP SIRI TABOR       Anesthesia Post-op Note        Procedure Summary       Date: 06/20/24 Room / Location: [facility] Post Anesthesia Care Unit    Anesthesia Start: 0733 Anesthesia Stop:     Procedure: ECT(BEDSIDE) Diagnosis: Major depressive disorder, recurrent severe without psychotic features (HCC)    Scheduled Providers:  Anesthesiologist: Frank Bull MD    Anesthesia Type: general ASA Status: 2            Anesthesia Type: No value filed.    Vitals Value Taken Time   /101 06/20/24 0743   Temp  06/20/24 0743   Pulse 75 06/20/24 0743   Resp 16 06/20/24 0743   SpO2 97 06/20/24 0743       Patient Location: PACU    Anesthesia Type: general    Airway Patency: patent    Postop Pain Control: adequate    Mental Status: mildly sedated but able to meaningfully participate in the post-anesthesia evaluation    Nausea/Vomiting: none    Cardiopulmonary/Hydration status: stable euvolemic    Complications: no apparent anesthesia related complications    Postop vital signs: stable    Dental Exam: Unchanged from Preop    Patient to be discharged from PACU when criteria met.

## 2024-06-20 NOTE — DISCHARGE INSTRUCTIONS
Discharge Instructions  Electroconvulsive Therapy    Activities:  You MUST arrange to have a responsible adult drive you home and have a responsible adult stay with you the rest of the day and overnight.  Do not drive today.  Do not operate any machinery today. Use kitchen equipment with caution.  Rest and take it easy today.  Do not take public transportation without the presence of another responsible adult for 24 hours    Medications:  Resume your regular medications when you get home  The nurse will instruct you not to take any NSAIDS (Advil, Aleve, Motrin, Ibuprofen) before 1 pm today because you were given a certain medication during the procedure.    Diet:  You may resume your regular diet when you get home  Do not drink alcohol for 24 hours    Additional Instructions:  Someone should call you to schedule any upcoming ECT treatments. Call as needed to schedule or cancel your ECT appointments [phone].  If you have any questions or concerns, please call your own psychiatrist.  For your safety, please do not wear make-up to any future ECT appointments.  For any questions regarding your ECT appointment, please call [facility] [phone].  For cancellations after hours, call [phone] and leave a message.    Expected Recovery:  As you awaken, you may experience one or more of the following:  Headache, nausea, temporary confusion, or muscle stiffness.  If these symptoms increase, become severe or are accompanied by a fever of more than 101, please seek medical attention.  The ECT may affect memory.  Many patients report loss of memory for events that occurred in the days, weeks or months surrounding the ECT.  Many of these memories may return, but not always.  Short-term memory may also be affected for months, but this can also be a result of the disorder that you have.

## 2024-06-20 NOTE — ANESTHESIA PREPROCEDURE EVALUATION
PRE-OP EVALUATION    Patient Name: Meli Antonio    Admit Diagnosis: Major depressive disorder, recurrent episode, severe with catatonia (HCC) [F33.2, F06.1]    Pre-op Diagnosis: * No pre-op diagnosis entered *        Anesthesia Procedure: ECT(BEDSIDE)    * No surgeons found in log *    Pre-op vitals reviewed.  Temp: 97.4 °F (36.3 °C)  Pulse: 75  Resp: 13  BP: 108/75  SpO2: 97 %  Body mass index is 23.05 kg/m².    Current medications reviewed.  Hospital Medications:   glucose (Dex4) 15 GM/59ML oral liquid 15 g  15 g Oral Q15 Min PRN    Or    glucose (Glutose) 40% oral gel 15 g  15 g Oral Q15 Min PRN    Or    glucose-vitamin C (Dex-4) chewable tab 4 tablet  4 tablet Oral Q15 Min PRN    Or    dextrose 50% injection 50 mL  50 mL Intravenous Q15 Min PRN    Or    glucose (Dex4) 15 GM/59ML oral liquid 30 g  30 g Oral Q15 Min PRN    Or    glucose (Glutose) 40% oral gel 30 g  30 g Oral Q15 Min PRN    Or    glucose-vitamin C (Dex-4) chewable tab 8 tablet  8 tablet Oral Q15 Min PRN    lactated ringers infusion   Intravenous Continuous    [COMPLETED] glycopyrrolate (Robinul) 0.2 MG/ML injection 0.1 mg  0.1 mg Intravenous Once    [COMPLETED] ondansetron (Zofran) 4 MG/2ML injection 4 mg  4 mg Intravenous Once    caffeine-sodium benzoate 125-125 mg/mL injection  125 mg Intravenous Once    [COMPLETED] ondansetron (Zofran) 4 MG/2ML injection        [COMPLETED] glycopyrrolate (Robinul) 0.2 MG/ML injection        [COMPLETED] caffeine-sodium benzoate 125-125 MG/ML injection           Outpatient Medications:   (Not in a hospital admission)      Allergies: Radiology contrast iodinated dyes, Sulfa antibiotics, Methylprednisolone, Seroquel [quetiapine], Cinnamon, and Iodine (topical)      Anesthesia Evaluation    Patient summary reviewed.    Anesthetic Complications  (-) history of anesthetic complications         GI/Hepatic/Renal      (+) GERD                      (+) irritable bowel syndrome     Cardiovascular    Negative  cardiovascular ROS.    Exercise tolerance: good     MET: >4                                  (-) FERREIRA  (-) orthopnea  (-) PND     Endo/Other      (+) diabetes and well controlled, type 2, not using insulin                         Pulmonary    Negative pulmonary ROS.           (-) shortness of breath  (-) recent URI          Neuro/Psych      (+) depression  (+) anxiety                              Past Surgical History:   Procedure Laterality Date          Correct bunion,othr methods      Correct bunion,simple      BILAT.     Dilation/curettage,diagnostic      FOR \"MOLE PREGNANCY\"    Other Right     ORIF RIGHT ANKLE    Spine surgery procedure unlisted      cervical spine 2020    Total knee replacement       Social History     Socioeconomic History    Marital status:    Tobacco Use    Smoking status: Former     Current packs/day: 2.00     Average packs/day: 2.0 packs/day for 15.0 years (30.0 ttl pk-yrs)     Types: Cigarettes    Smokeless tobacco: Never    Tobacco comments:     QUIT IN    Vaping Use    Vaping status: Never Used   Substance and Sexual Activity    Alcohol use: No    Drug use: No     History   Drug Use No     Available pre-op labs reviewed.  Lab Results   Component Value Date    WBC 13.1 (H) 2024    RBC 4.73 2024    HGB 13.4 2024    HCT 42.2 2024    MCV 89.2 2024    MCH 28.3 2024    MCHC 31.8 2024    RDW 14.2 2024    .0 2024     Lab Results   Component Value Date     2024    K 4.0 2024     2024    CO2 26.0 2024    BUN 11 2024    CREATSERUM 0.81 2024     (H) 2024    CA 9.4 2024            Airway      Mallampati: I  Mouth opening: 3 FB  TM distance: < 4 cm  Neck ROM: full Cardiovascular    Cardiovascular exam normal.  Rhythm: regular  Rate: normal  (-) murmur   Dental    Dentition appears grossly intact         Pulmonary    Pulmonary exam normal.  Breath  sounds clear to auscultation bilaterally.          (-) rales     Other findings              ASA: 2   Plan: general  NPO status verified and patient meets guidelines.          Plan/risks discussed with: patient            We discussed GA w/mask or ETT and possible scratchy throat and rarely dental damage.  We discussed analgesic plan and PONV prophylaxis.  The patient's questions were answered and consent was attained.

## 2024-06-20 NOTE — PROGRESS NOTES
[facility] / [facility]  ECT Procedure Note    Meli Antonio Patient Status:  Outpatient   Age/Gender 73 year old female MRN [id number]   Location [facility] POST ANESTHESIA CARE UNIT Attending Agapito Romero MD   Hosp Day # 0 PCP SIRI TABOR     2024    ECT Number: ***    Diagnosis: {SRIDEVI ECT DIAGNOSIS NEW:7200}    Type of ECT:  {ECT TYPE:7199}    Place of Service:  {inpatient / outpatient:}    Settings:   1.  Energy Percentage: { ECT SETTINGS:4416}    2.  Program:  { ECT PROGRAM:4417}    Pre-ECT Evaluation    Symptoms:  ***    Risk/Benefits:  {[facility] ECT RISK/BENEFITS:4580}    Side Effects:  {[facility] ECT SIDE EFFECTS:4581}    Exam:  Mood: {LOMG MSE MOOD 2:7203}  Affect: {LOMG MSE 2 AFFECT:538677::\"Congruent\"}  Memory:  {LOMG MSE MEMORY:464266::\"intact immediate, recent, remote\"}  Concentration:   {:179810}  Suicidal ideation: {Suicidal ideation:975585}    Prior to procedure, reviewed with treatment team correct patient, time of procedure and type of ECT.  Also reviewed with anesthesia pre-ECT medications.    Patient Monitored:  { ECT MONITORIN::\"B/P, EKG, EEG, Pulse Ox, Left Ankle Cuff\"}    Pre-ECT Medications: {Pre-ECT medications:0871}    ECT Medications:  {ECT Medications:7202::\"Succinylcholine ***\"}    Seizure Duration:  Motor: *** seconds       EEG: *** seconds    Post-ECT Condition:  { POST ECT CONDITION:4424::\"Treatment unremarkable\"}    Post-ECT Medications:  { POST ECT MEDICATIONS:4425::\"None \"}    Agapito Romero MD           ideation    Prior to procedure, reviewed with treatment team correct patient, time of procedure and type of ECT.  Also reviewed with anesthesia pre-ECT medications.    Patient Monitored:  B/P, EKG, EEG, Pulse Ox, Left Ankle Cuff    Pre-ECT Medications: Robinul 0.1 mg IV, Zofran 4 mg IV, and caffeine 125 mg IV    ECT Medications:  Anesthetic  Etomidate 10 mg IV followed by ketamine 25 mg IV and Succinylcholine 60 mg IV    Seizure Duration:  Motor: 32 seconds       EE seconds    Post-ECT Condition:  Treatment unremarkable    Post-ECT Medications:   Benadryl 25 mg IV    Agapito Romero MD

## 2024-06-20 NOTE — PROGRESS NOTES
[facility] / [facility]  ECT History & Physical    Meli Antonio Patient Status:  Outpatient   Age/Gender 73 year old female MRN [id number]   Location [facility] POST ANESTHESIA CARE UNIT Attending Agapito Romero MD   Hosp Day # 0 PCP SIRI TABOR     Date: 2024    Diagnosis:  ***    Procedure:  ECT    HPI:     ***      Medical History:  Past Medical History:    Anxiety state    Back problem    COMPRESSION FX    Cataract    Depression    Esophageal reflux    GERD (gastroesophageal reflux disease)    Hematoma and contusion of liver    STATES HAS BEEN THERE FOR MANY YEARS    History of fractured kneecap    RIGHT    IBS (irritable bowel syndrome)    Mitral prolapse    Osteoarthritis       Surgical History:  Past Surgical History:   Procedure Laterality Date          Correct bunion,othr methods      Correct bunion,simple      BILAT.     Dilation/curettage,diagnostic      FOR \"MOLE PREGNANCY\"    Other Right     ORIF RIGHT ANKLE    Spine surgery procedure unlisted      cervical spine 2020    Total knee replacement         Family History:  Family History   Problem Relation Age of Onset    Heart Disorder Father     Cancer Mother         ESOPHAGUS       ROS:  ***    Physical Exam:   BP 95/72   Pulse 73   Temp 98.1 °F (36.7 °C) (Temporal)   Resp 13   Ht 61\"   Wt 55.3 kg (122 lb)   SpO2 98%   BMI 23.05 kg/m²     General Appearance:    Alert, cooperative, no distress, appears stated age   Head:    Normocephalic, without obvious abnormality, atraumatic   Eyes:    PERRL, conjunctiva/corneas clear, EOM's intact       Nose:   Nares normal, septum midline, mucosa normal, no drainage    or sinus tenderness   Throat:   Lips, mucosa, and tongue normal; teeth and gums normal   Neck:   Supple, symmetrical, trachea midline   Lungs:     Clear to auscultation bilaterally, respirations unlabored    Heart:    Regular rate and rhythm, S1 and S2 normal, no murmur, rub   or gallop   Abdomen:      Soft, non-tender, bowel sounds active all four quadrants,     no masses, no organomegaly   Extremities:   Extremities normal, atraumatic, no cyanosis or edema   Pulses:   2+ and symmetric all extremities   Skin:   Skin color, texture, turgor normal, no rashes or lesions   Neurologic:   CNII-XII intact, normal strength, sensation and reflexes     throughout     Impressions & Plans:  ***    I have discussed the risks and benefits and alternatives with the patient/family.  They understand and agree to proceed with plan of care.    Agapito Romero MD

## 2024-07-03 ENCOUNTER — ANESTHESIA EVENT (OUTPATIENT)
Dept: POSTOP/PACU | Facility: HOSPITAL | Age: 74
End: 2024-07-03
Payer: MEDICARE

## 2024-07-03 ENCOUNTER — HOSPITAL ENCOUNTER (OUTPATIENT)
Dept: POSTOP/PACU | Facility: HOSPITAL | Age: 74
Discharge: HOME | End: 2024-07-03
Attending: Other
Payer: MEDICARE

## 2024-07-03 ENCOUNTER — ANESTHESIA (OUTPATIENT)
Dept: POSTOP/PACU | Facility: HOSPITAL | Age: 74
End: 2024-07-03
Payer: MEDICARE

## 2024-07-03 VITALS
TEMPERATURE: 98 F | DIASTOLIC BLOOD PRESSURE: 98 MMHG | HEART RATE: 93 BPM | WEIGHT: 122 LBS | RESPIRATION RATE: 20 BRPM | HEIGHT: 60 IN | SYSTOLIC BLOOD PRESSURE: 124 MMHG | OXYGEN SATURATION: 93 % | BODY MASS INDEX: 23.95 KG/M2

## 2024-07-03 DIAGNOSIS — F33.2 MAJOR DEPRESSIVE DISORDER, RECURRENT SEVERE WITHOUT PSYCHOTIC FEATURES (HCC): ICD-10-CM

## 2024-07-03 RX ORDER — CAFFEINE AND SODIUM BENZOATE 125 MG/ML
INJECTION, SOLUTION INTRAMUSCULAR; INTRAVENOUS
Status: COMPLETED
Start: 2024-07-03 | End: 2024-07-03

## 2024-07-03 RX ORDER — ONDANSETRON 2 MG/ML
INJECTION INTRAMUSCULAR; INTRAVENOUS
Status: COMPLETED
Start: 2024-07-03 | End: 2024-07-03

## 2024-07-03 RX ORDER — HYDROMORPHONE HYDROCHLORIDE 1 MG/ML
0.4 INJECTION, SOLUTION INTRAMUSCULAR; INTRAVENOUS; SUBCUTANEOUS EVERY 5 MIN PRN
Status: ACTIVE | OUTPATIENT
Start: 2024-07-03 | End: 2024-07-03

## 2024-07-03 RX ORDER — DEXTROSE MONOHYDRATE 25 G/50ML
50 INJECTION, SOLUTION INTRAVENOUS
Status: DISCONTINUED | OUTPATIENT
Start: 2024-07-03 | End: 2024-07-05

## 2024-07-03 RX ORDER — GLYCOPYRROLATE 0.2 MG/ML
0.1 INJECTION, SOLUTION INTRAMUSCULAR; INTRAVENOUS ONCE
Status: COMPLETED | OUTPATIENT
Start: 2024-07-03 | End: 2024-07-03

## 2024-07-03 RX ORDER — KETAMINE HYDROCHLORIDE 50 MG/ML
INJECTION, SOLUTION INTRAMUSCULAR; INTRAVENOUS AS NEEDED
Status: DISCONTINUED | OUTPATIENT
Start: 2024-07-03 | End: 2024-07-03 | Stop reason: SURG

## 2024-07-03 RX ORDER — NALOXONE HYDROCHLORIDE 0.4 MG/ML
0.08 INJECTION, SOLUTION INTRAMUSCULAR; INTRAVENOUS; SUBCUTANEOUS AS NEEDED
Status: ACTIVE | OUTPATIENT
Start: 2024-07-03 | End: 2024-07-03

## 2024-07-03 RX ORDER — DIPHENHYDRAMINE HYDROCHLORIDE 50 MG/ML
INJECTION INTRAMUSCULAR; INTRAVENOUS
Status: DISCONTINUED
Start: 2024-07-03 | End: 2024-07-03 | Stop reason: WASHOUT

## 2024-07-03 RX ORDER — DIPHENHYDRAMINE HYDROCHLORIDE 50 MG/ML
INJECTION INTRAMUSCULAR; INTRAVENOUS AS NEEDED
Status: DISCONTINUED | OUTPATIENT
Start: 2024-07-03 | End: 2024-07-03 | Stop reason: SURG

## 2024-07-03 RX ORDER — HYDROMORPHONE HYDROCHLORIDE 1 MG/ML
0.2 INJECTION, SOLUTION INTRAMUSCULAR; INTRAVENOUS; SUBCUTANEOUS EVERY 5 MIN PRN
Status: ACTIVE | OUTPATIENT
Start: 2024-07-03 | End: 2024-07-03

## 2024-07-03 RX ORDER — SODIUM CHLORIDE, SODIUM LACTATE, POTASSIUM CHLORIDE, CALCIUM CHLORIDE 600; 310; 30; 20 MG/100ML; MG/100ML; MG/100ML; MG/100ML
INJECTION, SOLUTION INTRAVENOUS CONTINUOUS
Status: DISCONTINUED | OUTPATIENT
Start: 2024-07-03 | End: 2024-07-05

## 2024-07-03 RX ORDER — GLYCOPYRROLATE 0.2 MG/ML
INJECTION, SOLUTION INTRAMUSCULAR; INTRAVENOUS
Status: COMPLETED
Start: 2024-07-03 | End: 2024-07-03

## 2024-07-03 RX ORDER — ONDANSETRON 2 MG/ML
4 INJECTION INTRAMUSCULAR; INTRAVENOUS ONCE
Status: COMPLETED | OUTPATIENT
Start: 2024-07-03 | End: 2024-07-03

## 2024-07-03 RX ORDER — NICOTINE POLACRILEX 4 MG
30 LOZENGE BUCCAL
Status: DISCONTINUED | OUTPATIENT
Start: 2024-07-03 | End: 2024-07-05

## 2024-07-03 RX ORDER — DIPHENHYDRAMINE HYDROCHLORIDE 50 MG/ML
25 INJECTION INTRAMUSCULAR; INTRAVENOUS ONCE
Status: DISCONTINUED | OUTPATIENT
Start: 2024-07-03 | End: 2024-07-05

## 2024-07-03 RX ORDER — ETOMIDATE 2 MG/ML
INJECTION INTRAVENOUS AS NEEDED
Status: DISCONTINUED | OUTPATIENT
Start: 2024-07-03 | End: 2024-07-03 | Stop reason: SURG

## 2024-07-03 RX ORDER — CAFFEINE AND SODIUM BENZOATE 125 MG/ML
125 INJECTION, SOLUTION INTRAMUSCULAR; INTRAVENOUS ONCE
Status: COMPLETED | OUTPATIENT
Start: 2024-07-03 | End: 2024-07-03

## 2024-07-03 RX ORDER — NICOTINE POLACRILEX 4 MG
15 LOZENGE BUCCAL
Status: DISCONTINUED | OUTPATIENT
Start: 2024-07-03 | End: 2024-07-05

## 2024-07-03 RX ORDER — HYDROMORPHONE HYDROCHLORIDE 1 MG/ML
0.6 INJECTION, SOLUTION INTRAMUSCULAR; INTRAVENOUS; SUBCUTANEOUS EVERY 5 MIN PRN
Status: ACTIVE | OUTPATIENT
Start: 2024-07-03 | End: 2024-07-03

## 2024-07-03 RX ADMIN — ETOMIDATE 10 MG: 2 INJECTION INTRAVENOUS at 07:26:00

## 2024-07-03 RX ADMIN — KETAMINE HYDROCHLORIDE 25 MG: 50 INJECTION, SOLUTION INTRAMUSCULAR; INTRAVENOUS at 07:26:00

## 2024-07-03 RX ADMIN — CAFFEINE AND SODIUM BENZOATE 125 MG: 125 INJECTION, SOLUTION INTRAMUSCULAR; INTRAVENOUS at 06:55:00

## 2024-07-03 RX ADMIN — ONDANSETRON 4 MG: 2 INJECTION INTRAMUSCULAR; INTRAVENOUS at 06:42:00

## 2024-07-03 RX ADMIN — GLYCOPYRROLATE 0.1 MG: 0.2 INJECTION, SOLUTION INTRAMUSCULAR; INTRAVENOUS at 06:42:00

## 2024-07-03 RX ADMIN — DIPHENHYDRAMINE HYDROCHLORIDE 25 MG: 50 INJECTION INTRAMUSCULAR; INTRAVENOUS at 07:37:00

## 2024-07-03 RX ADMIN — SODIUM CHLORIDE, SODIUM LACTATE, POTASSIUM CHLORIDE, CALCIUM CHLORIDE: 600; 310; 30; 20 INJECTION, SOLUTION INTRAVENOUS at 07:40:00

## 2024-07-03 RX ADMIN — SODIUM CHLORIDE, SODIUM LACTATE, POTASSIUM CHLORIDE, CALCIUM CHLORIDE: 600; 310; 30; 20 INJECTION, SOLUTION INTRAVENOUS at 06:40:00

## 2024-07-03 NOTE — ANESTHESIA POSTPROCEDURE EVALUATION
[facility]    Meli Antonio Patient Status:  Outpatient   Age/Gender 73 year old female MRN [id number]   Location [facility] POST ANESTHESIA CARE UNIT Attending Agapito Romero MD   Hosp Day # 0 PCP SIRI TABOR       Anesthesia Post-op Note        Procedure Summary       Date: 07/03/24 Room / Location: [facility] Post Anesthesia Care Unit    Anesthesia Start: 0726 Anesthesia Stop: 0738    Procedure: ECT(BEDSIDE) Diagnosis: Major depressive disorder, recurrent severe without psychotic features (HCC)    Scheduled Providers:  Responsible Provider: Jorge Brady DO    Anesthesia Type: general ASA Status: 2            Anesthesia Type: general    Vitals Value Taken Time   /90 07/03/24 0738   Temp 98 °F (36.7 °C) 07/03/24 0738   Pulse 90 07/03/24 0738   Resp 22 07/03/24 0738   SpO2 98 % 07/03/24 0738       Patient Location: PACU    Anesthesia Type: general    Airway Patency: patent    Postop Pain Control: adequate    Mental Status: mildly sedated but able to meaningfully participate in the post-anesthesia evaluation    Nausea/Vomiting: none    Cardiopulmonary/Hydration status: stable euvolemic    Complications: no apparent anesthesia related complications    Postop vital signs: stable    Dental Exam: Unchanged from Preop    Patient to be discharged from PACU when criteria met.

## 2024-07-03 NOTE — ANESTHESIA PREPROCEDURE EVALUATION
PRE-OP EVALUATION    Patient Name: Meli Antonio    Admit Diagnosis: Major depressive disorder, recurrent episode, severe with catatonia (HCC) [F33.2, F06.1]    Pre-op Diagnosis: * No pre-op diagnosis entered *        Anesthesia Procedure: ECT(BEDSIDE)    * No surgeons found in log *    Pre-op vitals reviewed.  Temp: 98.1 °F (36.7 °C)  Pulse: 66  Resp: 13  BP: 121/80  SpO2: 97 %  Body mass index is 23.83 kg/m².    Current medications reviewed.  Hospital Medications:   glucose (Dex4) 15 GM/59ML oral liquid 15 g  15 g Oral Q15 Min PRN    Or    glucose (Glutose) 40% oral gel 15 g  15 g Oral Q15 Min PRN    Or    glucose-vitamin C (Dex-4) chewable tab 4 tablet  4 tablet Oral Q15 Min PRN    Or    dextrose 50% injection 50 mL  50 mL Intravenous Q15 Min PRN    Or    glucose (Dex4) 15 GM/59ML oral liquid 30 g  30 g Oral Q15 Min PRN    Or    glucose (Glutose) 40% oral gel 30 g  30 g Oral Q15 Min PRN    Or    glucose-vitamin C (Dex-4) chewable tab 8 tablet  8 tablet Oral Q15 Min PRN    lactated ringers infusion   Intravenous Continuous    lactated ringers infusion   Intravenous Continuous    [COMPLETED] glycopyrrolate (Robinul) 0.2 MG/ML injection 0.1 mg  0.1 mg Intravenous Once    [COMPLETED] ondansetron (Zofran) 4 MG/2ML injection 4 mg  4 mg Intravenous Once    caffeine-sodium benzoate 125-125 mg/mL injection  125 mg Intravenous Once    [COMPLETED] ondansetron (Zofran) 4 MG/2ML injection        [COMPLETED] glycopyrrolate (Robinul) 0.2 MG/ML injection        [COMPLETED] caffeine-sodium benzoate 125-125 MG/ML injection           Outpatient Medications:   (Not in a hospital admission)      Allergies: Radiology contrast iodinated dyes, Sulfa antibiotics, Methylprednisolone, Seroquel [quetiapine], Cinnamon, and Iodine (topical)      Anesthesia Evaluation    Patient summary reviewed.    Anesthetic Complications  (-) history of anesthetic complications         GI/Hepatic/Renal      (+) GERD                      (+) irritable  bowel syndrome     Cardiovascular    Negative cardiovascular ROS.    Exercise tolerance: good     MET: >4                                  (-) FERREIRA  (-) orthopnea  (-) PND     Endo/Other      (+) diabetes and well controlled, type 2, not using insulin                         Pulmonary    Negative pulmonary ROS.           (-) shortness of breath  (-) recent URI          Neuro/Psych      (+) depression  (+) anxiety                              Past Surgical History:   Procedure Laterality Date          Correct bunion,othr methods      Correct bunion,simple      BILAT.     Dilation/curettage,diagnostic      FOR \"MOLE PREGNANCY\"    Other Right     ORIF RIGHT ANKLE    Spine surgery procedure unlisted      cervical spine 2020    Total knee replacement       Social History     Socioeconomic History    Marital status:    Tobacco Use    Smoking status: Former     Current packs/day: 2.00     Average packs/day: 2.0 packs/day for 15.0 years (30.0 ttl pk-yrs)     Types: Cigarettes    Smokeless tobacco: Never    Tobacco comments:     QUIT IN    Vaping Use    Vaping status: Never Used   Substance and Sexual Activity    Alcohol use: No    Drug use: No     History   Drug Use No     Available pre-op labs reviewed.  Lab Results   Component Value Date    WBC 13.1 (H) 2024    RBC 4.73 2024    HGB 13.4 2024    HCT 42.2 2024    MCV 89.2 2024    MCH 28.3 2024    MCHC 31.8 2024    RDW 14.2 2024    .0 2024     Lab Results   Component Value Date     2024    K 4.0 2024     2024    CO2 26.0 2024    BUN 11 2024    CREATSERUM 0.81 2024     (H) 2024    CA 9.4 2024            Airway      Mallampati: I  Mouth opening: 3 FB  TM distance: < 4 cm  Neck ROM: full Cardiovascular    Cardiovascular exam normal.  Rhythm: regular  Rate: normal  (-) murmur   Dental    Dentition appears grossly  intact         Pulmonary    Pulmonary exam normal.  Breath sounds clear to auscultation bilaterally.          (-) rales     Other findings              ASA: 2   Plan: general  NPO status verified and patient meets guidelines.        Comment: discussed  Plan/risks discussed with: patient            We discussed GA w/mask or ETT and possible scratchy throat and rarely dental damage.  We discussed analgesic plan and PONV prophylaxis.  The patient's questions were answered and consent was attained.

## 2024-07-03 NOTE — DISCHARGE INSTRUCTIONS
Discharge Instructions  Electroconvulsive Therapy    Activities:  You MUST arrange to have a responsible adult drive you home and have a responsible adult stay with you the rest of the day and overnight.  Do not drive today.  Do not operate any machinery today. Use kitchen equipment with caution.  Rest and take it easy today.  Do not take public transportation without the presence of another responsible adult for 24 hours    Medications:  Resume your regular medications when you get home  The nurse will instruct you not to take any NSAIDS (Advil, Aleve, Motrin, Ibuprofen) before 1 pm today because you were given a certain medication during the procedure.    Diet:  You may resume your regular diet when you get home  Do not drink alcohol for 24 hours    Additional Instructions:  Someone should call you to schedule any upcoming ECT treatments. Call as needed to schedule or cancel your ECT appointments [phone].  If you have any questions or concerns, please call your own psychiatrist.  For your safety, please do not wear make-up to any future ECT appointments.  For any questions regarding your ECT appointment, please call [facility] [phone].  For cancellations after hours, call [phone] and leave a message.    Expected Recovery:  As you awaken, you may experience one or more of the following:  Headache, nausea, temporary confusion, or muscle stiffness.  If these symptoms increase, become severe or are accompanied by a fever of more than 101, please seek medical attention.  The ECT may affect memory.  Many patients report loss of memory for events that occurred in the days, weeks or months surrounding the ECT.  Many of these memories may return, but not always.  Short-term memory may also be affected for months, but this can also be a result of the disorder that you have.

## 2024-07-03 NOTE — PROGRESS NOTES
[facility] / [facility]  ECT History & Physical    Meli Antonio Patient Status:  Outpatient   Age/Gender 73 year old female MRN [id number]   Location [facility] POST ANESTHESIA CARE UNIT Attending Agapito Romero MD   Hosp Day # 0 PCP SIRI TABOR     Date: 7/3/2024    Diagnosis: Major depression recurrent severe    Procedure:  ECT    HPI:     Patient seen.  Patient noted mild decrease in recent memory.  She reports this has been tolerable.  She reports no other cognitive or physical complaints      Medical History:  Past Medical History:    Anxiety state    Back problem    COMPRESSION FX    Cataract    Depression    Esophageal reflux    GERD (gastroesophageal reflux disease)    Hematoma and contusion of liver    STATES HAS BEEN THERE FOR MANY YEARS    History of fractured kneecap    RIGHT    IBS (irritable bowel syndrome)    Mitral prolapse    Osteoarthritis       Surgical History:  Past Surgical History:   Procedure Laterality Date          Correct bunion,othr methods      Correct bunion,simple      BILAT.     Dilation/curettage,diagnostic      FOR \"MOLE PREGNANCY\"    Other Right     ORIF RIGHT ANKLE    Spine surgery procedure unlisted      cervical spine 2020    Total knee replacement         Family History:  Family History   Problem Relation Age of Onset    Heart Disorder Father     Cancer Mother         ESOPHAGUS       ROS:  Unremarkable except for above    Physical Exam:   BP (!) 124/98   Pulse 93   Temp 98 °F (36.7 °C) (Temporal)   Resp 20   Ht 60\"   Wt 55.3 kg (122 lb)   SpO2 93%   BMI 23.83 kg/m²     General Appearance:    Alert, cooperative, no distress, appears stated age   Head:    Normocephalic, without obvious abnormality, atraumatic   Eyes:    PERRL, conjunctiva/corneas clear, EOM's intact       Nose:   Nares normal, septum midline, mucosa normal, no drainage    or sinus tenderness   Throat:   Lips, mucosa, and tongue normal; teeth and gums normal   Neck:    Supple, symmetrical, trachea midline   Lungs:     Clear to auscultation bilaterally, respirations unlabored    Heart:    Regular rate and rhythm, S1 and S2 normal, no murmur, rub   or gallop   Abdomen:     Soft, non-tender, bowel sounds active all four quadrants,     no masses, no organomegaly   Extremities:   Extremities normal, atraumatic, no cyanosis or edema   Pulses:   2+ and symmetric all extremities   Skin:   Skin color, texture, turgor normal, no rashes or lesions   Neurologic:   CNII-XII intact, normal strength, sensation and reflexes     throughout     Impressions & Plans: Major depression recurrent severe.  Unilateral ECT    I have discussed the risks and benefits and alternatives with the patient/family.  They understand and agree to proceed with plan of care.    Agapito Romero MD

## 2024-07-03 NOTE — PROGRESS NOTES
[facility] / [facility]  ECT Procedure Note    Meli Antonio Patient Status:  Outpatient   Age/Gender 73 year old female MRN [id number]   Location [facility] POST ANESTHESIA CARE UNIT Attending Agapito Romero MD   Hosp Day # 0 PCP SIRI TABOR     7/3/2024    ECT Number: #13 total.  #3 unilateral    Diagnosis: Major Depression Recurrent Severe Without Psychotic Features. F33.2    Type of ECT:  Unilateral non-dominant d'Rustam    Place of Service:  Outpatient    Settings:   1.  Energy Percentage: 75%    2.  Program:  Low 0.5    Pre-ECT Evaluation    Symptoms:  Patient seen.  Overall patient noted to have significant improvements in mood and anxiety.  No adwoa or psychosis.  No suicidal thoughts.  Patient does note some mild decrease in recent memory but has been transient and tolerable.  Patient noted no other cognitive or physical complaints.  Patient shows capacity to make decisions.  Discussed risks and benefits.  Discussed with patient treatment alternatives and will give trial of doing next ECT in 3 weeks.    Risk/Benefits:  Discussed with patient side effects of ECT including headache, teeth, jaw, cardiac, pulmonary, NPO, aspiration, allergic reactions, anesthetic reactions, musculoskeletal, neurologic, morbidity/mortality, potential lack of efficacy, unilateral/bilateral ECT, relapse/maintenance issues, cognitive risks including memory, concentration, cognition, and other risks.    Side Effects:  Mild memory complaint    Exam:  Mood: less depressed and less anxious  Affect: Congruent  Memory:  intact immediate, recent, remote and as evidenced by ability to present consistent history on formal exam  Concentration:   good  Suicidal ideation: no suicidal ideation    Prior to procedure, reviewed with treatment team correct patient, time of procedure and type of ECT.  Also reviewed with anesthesia pre-ECT medications.    Patient Monitored:  B/P, EKG, EEG, Pulse Ox, Left Ankle Cuff    Pre-ECT  Medications: Robinul 0.1 mg IV, Zofran 4 mg IV, and caffeine 125 mg IV    ECT Medications:  Anesthetic  Etomidate 10 mg IV followed by ketamine 25 mg IV and Succinylcholine 60 mg IV    Seizure Duration:  Motor: 37 seconds       EE seconds    Post-ECT Condition:  Treatment unremarkable    Post-ECT Medications:   Benadryl 25 mg IV    Agapito Romero MD

## 2024-07-19 ENCOUNTER — APPOINTMENT (OUTPATIENT)
Dept: GENERAL RADIOLOGY | Age: 74
End: 2024-07-19
Attending: NURSE PRACTITIONER
Payer: MEDICARE

## 2024-07-19 ENCOUNTER — HOSPITAL ENCOUNTER (OUTPATIENT)
Age: 74
Discharge: HOME | End: 2024-07-19
Payer: MEDICARE

## 2024-07-19 VITALS
BODY MASS INDEX: 24 KG/M2 | HEART RATE: 62 BPM | OXYGEN SATURATION: 96 % | WEIGHT: 122 LBS | SYSTOLIC BLOOD PRESSURE: 124 MMHG | DIASTOLIC BLOOD PRESSURE: 66 MMHG | TEMPERATURE: 98 F | RESPIRATION RATE: 18 BRPM

## 2024-07-19 DIAGNOSIS — R42 INTERMITTENT LIGHTHEADEDNESS: Primary | ICD-10-CM

## 2024-07-19 DIAGNOSIS — R25.1 SHAKINESS: ICD-10-CM

## 2024-07-19 LAB
ATRIAL RATE: 61 BPM
BASOPHILS # BLD AUTO: 0.04 X10(3) UL (ref 0–0.2)
BASOPHILS NFR BLD AUTO: 0.5 %
BUN BLD-MCNC: 20 MG/DL (ref 7–18)
CHLORIDE BLD-SCNC: 109 MMOL/L (ref 98–112)
CO2 BLD-SCNC: 19 MMOL/L (ref 21–32)
CREAT BLD-MCNC: 0.9 MG/DL
EGFRCR SERPLBLD CKD-EPI 2021: 68 ML/MIN/1.73M2 (ref 60–?)
EOSINOPHIL # BLD AUTO: 0.33 X10(3) UL (ref 0–0.7)
EOSINOPHIL NFR BLD AUTO: 4.4 %
ERYTHROCYTE [DISTWIDTH] IN BLOOD BY AUTOMATED COUNT: 13.1 %
GLUCOSE BLD-MCNC: 97 MG/DL (ref 70–99)
HCT VFR BLD AUTO: 36.2 %
HCT VFR BLD AUTO: 38.3 %
HCT VFR BLD CALC: 37 %
HGB BLD-MCNC: 12.3 G/DL
HGB BLD-MCNC: 12.7 G/DL
IMM GRANULOCYTES # BLD AUTO: 0.02 X10(3) UL (ref 0–1)
IMM GRANULOCYTES NFR BLD: 0.3 %
ISTAT IONIZED CALCIUM FOR CHEM 8: 1.11 MMOL/L (ref 1.12–1.32)
LYMPHOCYTES # BLD AUTO: 2.53 X10(3) UL (ref 1–4)
LYMPHOCYTES NFR BLD AUTO: 33.8 %
MCH RBC QN AUTO: 29 PG (ref 26–34)
MCH RBC QN AUTO: 31 PG (ref 26–34)
MCHC RBC AUTO-ENTMCNC: 32.1 G/DL (ref 31–37)
MCHC RBC AUTO-ENTMCNC: 35.1 G/DL (ref 31–37)
MCV RBC AUTO: 88.3 FL
MCV RBC AUTO: 90.3 FL (ref 80–100)
MONOCYTES # BLD AUTO: 0.75 X10(3) UL (ref 0.1–1)
MONOCYTES NFR BLD AUTO: 10 %
NEUTROPHILS # BLD AUTO: 3.82 X10 (3) UL (ref 1.5–7.7)
NEUTROPHILS # BLD AUTO: 3.82 X10(3) UL (ref 1.5–7.7)
NEUTROPHILS NFR BLD AUTO: 51 %
P AXIS: 54 DEGREES
P-R INTERVAL: 150 MS
PLATELET # BLD AUTO: 238 10(3)UL (ref 150–450)
PLATELETS.RETICULATED NFR BLD AUTO: 5.5 % (ref 0–7)
POTASSIUM BLD-SCNC: 4.4 MMOL/L (ref 3.6–5.1)
Q-T INTERVAL: 410 MS
QRS DURATION: 60 MS
QTC CALCULATION (BEZET): 412 MS
R AXIS: 13 DEGREES
RBC # BLD AUTO: 4.1 X10(6)UL
RBC # BLD AUTO: 4.24 X10ˆ6/UL
SODIUM BLD-SCNC: 136 MMOL/L (ref 136–145)
T AXIS: 30 DEGREES
TROPONIN I BLD-MCNC: <0.02 NG/ML
VENTRICULAR RATE: 61 BPM
WBC # BLD AUTO: 7.5 X10(3) UL (ref 4–11)

## 2024-07-19 PROCEDURE — 99213 OFFICE O/P EST LOW 20 MIN: CPT | Performed by: NURSE PRACTITIONER

## 2024-07-19 PROCEDURE — 80047 BASIC METABLC PNL IONIZED CA: CPT | Performed by: NURSE PRACTITIONER

## 2024-07-19 PROCEDURE — 85025 COMPLETE CBC W/AUTO DIFF WBC: CPT | Performed by: NURSE PRACTITIONER

## 2024-07-19 PROCEDURE — 84484 ASSAY OF TROPONIN QUANT: CPT | Performed by: NURSE PRACTITIONER

## 2024-07-19 PROCEDURE — 93000 ELECTROCARDIOGRAM COMPLETE: CPT | Performed by: NURSE PRACTITIONER

## 2024-07-19 PROCEDURE — 71046 X-RAY EXAM CHEST 2 VIEWS: CPT | Performed by: NURSE PRACTITIONER

## 2024-07-19 NOTE — ED PROVIDER NOTES
Patient Seen in: [facility]      History     Chief Complaint   Patient presents with    Weakness     Stated Complaint: tremors / dizzy x 4 weeks    Subjective:   73-year-old female with a history of mental illness presents to immediate care for tremors, shakiness, feeling of lightheadedness intermittently.  Patient states she has had 4 treatments of ECT recently.  She is also started on Abilify, Lyrica and Zanaflex.  She states she was concerned for other causes has not been able to get a hold of her psychiatrist            Objective:   Past Medical History:    Anxiety state    Back problem    COMPRESSION FX    Cataract    Depression    Esophageal reflux    GERD (gastroesophageal reflux disease)    Hematoma and contusion of liver    STATES HAS BEEN THERE FOR MANY YEARS    History of fractured kneecap    RIGHT    IBS (irritable bowel syndrome)    Mitral prolapse    Osteoarthritis              Past Surgical History:   Procedure Laterality Date          Correct bunion,othr methods      Correct bunion,simple      BILAT.     Dilation/curettage,diagnostic      FOR \"MOLE PREGNANCY\"    Other Right     ORIF RIGHT ANKLE    Spine surgery procedure unlisted      cervical spine 2020    Total knee replacement                  Social History     Socioeconomic History    Marital status:    Tobacco Use    Smoking status: Former     Current packs/day: 2.00     Average packs/day: 2.0 packs/day for 15.0 years (30.0 ttl pk-yrs)     Types: Cigarettes    Smokeless tobacco: Never    Tobacco comments:     QUIT IN    Vaping Use    Vaping status: Never Used   Substance and Sexual Activity    Alcohol use: No    Drug use: No     Social Determinants of Health     Financial Resource Strain: Low Risk  (2024)    Received from [facility]    Overall Financial Resource Strain (CARDIA)     Difficulty of Paying Living Expenses: Not hard at all   Food Insecurity: No Food  Insecurity (6/4/2024)    Received from [facility]    Hunger Vital Sign     Worried About Running Out of Food in the Last Year: Never true     Ran Out of Food in the Last Year: Never true   Transportation Needs: No Transportation Needs (6/4/2024)    Received from [facility]    PRAPARE - Transportation     Lack of Transportation (Medical): No     Lack of Transportation (Non-Medical): No    Received from [facility], [facility]    Social Connections   Housing Stability: Unknown (6/4/2024)    Received from [facility]    Housing Stability Vital Sign     Unable to Pay for Housing in the Last Year: No     In the last 12 months, was there a time when you did not have a steady place to sleep or slept in a shelter (including now)?: No              Review of Systems   Constitutional: Negative.    Respiratory: Negative.     Cardiovascular: Negative.    Gastrointestinal: Negative.    Skin: Negative.    Neurological: Negative.        Positive for stated Chief Complaint: Weakness    Other systems are as noted in HPI.  Constitutional and vital signs reviewed.      All other systems reviewed and negative except as noted above.    Physical Exam     ED Triage Vitals [07/19/24 1428]   /66   Pulse 62   Resp 18   Temp 98 °F (36.7 °C)   Temp src Temporal   SpO2 96 %   O2 Device None (Room air)       Current Vitals:   Vital Signs  BP: 124/66  Pulse: 62  Resp: 18  Temp: 98 °F (36.7 °C)  Temp src: Temporal    Oxygen Therapy  SpO2: 96 %  O2 Device: None (Room air)            Physical Exam  Vitals and nursing note reviewed.   Constitutional:       General: She is not in acute distress.  HENT:      Head: Normocephalic.   Cardiovascular:      Rate and Rhythm: Normal rate and regular rhythm.   Pulmonary:      Effort: Pulmonary effort is normal.   Musculoskeletal:         General: Normal range of motion.   Skin:     General: Skin is warm  and dry.   Neurological:      General: No focal deficit present.      Mental Status: She is alert and oriented to person, place, and time.               ED Course     Labs Reviewed   POCT ISTAT CHEM8 CARTRIDGE - Abnormal; Notable for the following components:       Result Value    ISTAT BUN 20 (*)     ISTAT Ionized Calcium 1.11 (*)     ISTAT TCO2 19 (*)     All other components within normal limits   ISTAT TROPONIN - Normal   CBC W AUTO DIFF   POCT CBC     EKG    Rate, intervals and axes as noted on EKG Report.  Rate: 61  Rhythm: Sinus Rhythm  Reading: DE interval 150, QRS duration 60, QT/QTc 410/412         XR CHEST PA + LAT CHEST (CPT=71046)    Result Date: 7/19/2024  PROCEDURE:  XR CHEST PA + LAT CHEST (CPT=71046)  INDICATIONS:  tremors / dizzy x 4 weeks  COMPARISON:  [facility] , XR, XR CHEST AP PORTABLE  (CPT=71045), 4/21/2024, 9:38 AM.  TECHNIQUE:  PA and lateral chest radiographs were obtained.  PATIENT STATED HISTORY: (As transcribed by Technologist)  Patient has dizzyness for 4 weeks.              CONCLUSION:    Tortuous aorta and mild cardiomegaly.  Mild hyperinflation.  Mild scarring lung base.  No sign of CHF, pleural effusion, pneumonia, pneumothorax.  LOCATION:  Counts include 234 beds at the [facility]   Dictated by (CST): Piotr Thibodeaux MD on 7/19/2024 at 3:19 PM     Finalized by (CST): Piotr Thibodeaux MD on 7/19/2024 at 3:19 PM                       MDM        Medical Decision Making  Pertinent Labs & Imaging studies reviewed. (See chart for details).  Patient coming in with tremors, intermittent dizziness.   Differential diagnosis includes psych medication side effect, ECT side effect, ACS  Labs reviewed labs are within normal limits. Radiology x-ray did not reveal acute changes from previous x-rays.  Will treat for intermittent lightheadedness, shakiness.  Will discharge on supportive care. Patient is comfortable with this plan.     Overall Pt looks good. Non-toxic, well-hydrated and in no respiratory distress. Vital signs are  reassuring. Exam is reassuring. I do not believe pt requires and additional diagnostic studies or intervention. I believe pt can be discharged home to continue evaluation as an outpatient. Follow-up provider given. Discharge instructions given and reviewed. Return for any problems. All understand and agrees with the plan.        Problems Addressed:  Intermittent lightheadedness: acute illness or injury  Shakiness: acute illness or injury    Amount and/or Complexity of Data Reviewed  Independent Historian: spouse  Labs: ordered. Decision-making details documented in ED Course.  Radiology: ordered. Decision-making details documented in ED Course.    Risk  OTC drugs.  Prescription drug management.        Disposition and Plan     Clinical Impression:  1. Intermittent lightheadedness    2. Shakiness         Disposition:  Discharge  7/19/2024  3:39 pm    Follow-up:  Birdie Valverde MD  [address] [place] [id number] [phone]                Medications Prescribed:  Current Discharge Medication List

## 2024-07-19 NOTE — ED INITIAL ASSESSMENT (HPI)
WEAKNESS AND TREMORS X A FEW MONTHS  TREMORS X 5-6 MONTHS  TREMORS ARE WORSE AT NIGHT  NEW MEDS ON 6/4- PADILLA LOUISE ZANAFLEX  CONCERNED HER NEW MEDS ARE CAUSING THIS   HAVE NOT HEARD BACK FROM THE PSYCHIATRIST

## 2024-07-23 ENCOUNTER — HOSPITAL ENCOUNTER (OUTPATIENT)
Dept: POSTOP/PACU | Facility: HOSPITAL | Age: 74
Discharge: HOME | End: 2024-07-23
Attending: Other
Payer: MEDICARE

## 2024-07-23 ENCOUNTER — ANESTHESIA EVENT (OUTPATIENT)
Dept: POSTOP/PACU | Facility: HOSPITAL | Age: 74
End: 2024-07-23
Payer: MEDICARE

## 2024-07-23 ENCOUNTER — ANESTHESIA (OUTPATIENT)
Dept: POSTOP/PACU | Facility: HOSPITAL | Age: 74
End: 2024-07-23
Payer: MEDICARE

## 2024-07-23 VITALS
HEART RATE: 96 BPM | DIASTOLIC BLOOD PRESSURE: 93 MMHG | HEIGHT: 60 IN | SYSTOLIC BLOOD PRESSURE: 135 MMHG | OXYGEN SATURATION: 95 % | RESPIRATION RATE: 20 BRPM | WEIGHT: 121.25 LBS | BODY MASS INDEX: 23.81 KG/M2 | TEMPERATURE: 98 F

## 2024-07-23 DIAGNOSIS — F33.2 MAJOR DEPRESSIVE DISORDER, RECURRENT SEVERE WITHOUT PSYCHOTIC FEATURES (HCC): ICD-10-CM

## 2024-07-23 RX ORDER — DEXTROSE MONOHYDRATE 25 G/50ML
50 INJECTION, SOLUTION INTRAVENOUS
Status: DISCONTINUED | OUTPATIENT
Start: 2024-07-23 | End: 2024-07-25

## 2024-07-23 RX ORDER — DIPHENHYDRAMINE HYDROCHLORIDE 50 MG/ML
INJECTION INTRAMUSCULAR; INTRAVENOUS
Status: COMPLETED
Start: 2024-07-23 | End: 2024-07-23

## 2024-07-23 RX ORDER — ETOMIDATE 2 MG/ML
INJECTION INTRAVENOUS AS NEEDED
Status: DISCONTINUED | OUTPATIENT
Start: 2024-07-23 | End: 2024-07-23 | Stop reason: SURG

## 2024-07-23 RX ORDER — GLYCOPYRROLATE 0.2 MG/ML
INJECTION, SOLUTION INTRAMUSCULAR; INTRAVENOUS
Status: COMPLETED
Start: 2024-07-23 | End: 2024-07-23

## 2024-07-23 RX ORDER — ONDANSETRON 2 MG/ML
INJECTION INTRAMUSCULAR; INTRAVENOUS
Status: COMPLETED
Start: 2024-07-23 | End: 2024-07-23

## 2024-07-23 RX ORDER — CAFFEINE AND SODIUM BENZOATE 125 MG/ML
INJECTION, SOLUTION INTRAMUSCULAR; INTRAVENOUS
Status: DISCONTINUED
Start: 2024-07-23 | End: 2024-07-23 | Stop reason: WASHOUT

## 2024-07-23 RX ORDER — CAFFEINE AND SODIUM BENZOATE 125 MG/ML
125 INJECTION, SOLUTION INTRAMUSCULAR; INTRAVENOUS ONCE
Status: COMPLETED | OUTPATIENT
Start: 2024-07-23 | End: 2024-07-23

## 2024-07-23 RX ORDER — KETAMINE HYDROCHLORIDE 50 MG/ML
INJECTION, SOLUTION INTRAMUSCULAR; INTRAVENOUS AS NEEDED
Status: DISCONTINUED | OUTPATIENT
Start: 2024-07-23 | End: 2024-07-23 | Stop reason: SURG

## 2024-07-23 RX ORDER — DIPHENHYDRAMINE HYDROCHLORIDE 50 MG/ML
25 INJECTION INTRAMUSCULAR; INTRAVENOUS ONCE
Status: COMPLETED | OUTPATIENT
Start: 2024-07-23 | End: 2024-07-23

## 2024-07-23 RX ORDER — NALOXONE HYDROCHLORIDE 0.4 MG/ML
0.08 INJECTION, SOLUTION INTRAMUSCULAR; INTRAVENOUS; SUBCUTANEOUS AS NEEDED
Status: ACTIVE | OUTPATIENT
Start: 2024-07-23 | End: 2024-07-23

## 2024-07-23 RX ORDER — ONDANSETRON 2 MG/ML
4 INJECTION INTRAMUSCULAR; INTRAVENOUS EVERY 6 HOURS PRN
Status: DISCONTINUED | OUTPATIENT
Start: 2024-07-23 | End: 2024-07-25

## 2024-07-23 RX ORDER — METOCLOPRAMIDE HYDROCHLORIDE 5 MG/ML
10 INJECTION INTRAMUSCULAR; INTRAVENOUS EVERY 8 HOURS PRN
Status: DISCONTINUED | OUTPATIENT
Start: 2024-07-23 | End: 2024-07-25

## 2024-07-23 RX ORDER — HYDROMORPHONE HYDROCHLORIDE 1 MG/ML
0.6 INJECTION, SOLUTION INTRAMUSCULAR; INTRAVENOUS; SUBCUTANEOUS EVERY 5 MIN PRN
Status: ACTIVE | OUTPATIENT
Start: 2024-07-23 | End: 2024-07-23

## 2024-07-23 RX ORDER — SODIUM CHLORIDE, SODIUM LACTATE, POTASSIUM CHLORIDE, CALCIUM CHLORIDE 600; 310; 30; 20 MG/100ML; MG/100ML; MG/100ML; MG/100ML
INJECTION, SOLUTION INTRAVENOUS CONTINUOUS
Status: DISCONTINUED | OUTPATIENT
Start: 2024-07-23 | End: 2024-07-25

## 2024-07-23 RX ORDER — HYDROMORPHONE HYDROCHLORIDE 1 MG/ML
0.4 INJECTION, SOLUTION INTRAMUSCULAR; INTRAVENOUS; SUBCUTANEOUS EVERY 5 MIN PRN
Status: ACTIVE | OUTPATIENT
Start: 2024-07-23 | End: 2024-07-23

## 2024-07-23 RX ORDER — MIDAZOLAM HYDROCHLORIDE 1 MG/ML
1 INJECTION INTRAMUSCULAR; INTRAVENOUS EVERY 5 MIN PRN
Status: ACTIVE | OUTPATIENT
Start: 2024-07-23 | End: 2024-07-23

## 2024-07-23 RX ORDER — NICOTINE POLACRILEX 4 MG
15 LOZENGE BUCCAL
Status: DISCONTINUED | OUTPATIENT
Start: 2024-07-23 | End: 2024-07-25

## 2024-07-23 RX ORDER — NICOTINE POLACRILEX 4 MG
30 LOZENGE BUCCAL
Status: DISCONTINUED | OUTPATIENT
Start: 2024-07-23 | End: 2024-07-25

## 2024-07-23 RX ORDER — HYDROMORPHONE HYDROCHLORIDE 1 MG/ML
0.2 INJECTION, SOLUTION INTRAMUSCULAR; INTRAVENOUS; SUBCUTANEOUS EVERY 5 MIN PRN
Status: ACTIVE | OUTPATIENT
Start: 2024-07-23 | End: 2024-07-23

## 2024-07-23 RX ORDER — CAFFEINE AND SODIUM BENZOATE 125 MG/ML
INJECTION, SOLUTION INTRAMUSCULAR; INTRAVENOUS
Status: COMPLETED
Start: 2024-07-23 | End: 2024-07-23

## 2024-07-23 RX ORDER — GLYCOPYRROLATE 0.2 MG/ML
0.1 INJECTION, SOLUTION INTRAMUSCULAR; INTRAVENOUS ONCE
Status: COMPLETED | OUTPATIENT
Start: 2024-07-23 | End: 2024-07-23

## 2024-07-23 RX ORDER — ONDANSETRON 2 MG/ML
4 INJECTION INTRAMUSCULAR; INTRAVENOUS ONCE
Status: COMPLETED | OUTPATIENT
Start: 2024-07-23 | End: 2024-07-23

## 2024-07-23 RX ADMIN — CAFFEINE AND SODIUM BENZOATE 125 MG: 125 INJECTION, SOLUTION INTRAMUSCULAR; INTRAVENOUS at 06:23:00

## 2024-07-23 RX ADMIN — KETAMINE HYDROCHLORIDE 25 MG: 50 INJECTION, SOLUTION INTRAMUSCULAR; INTRAVENOUS at 06:42:00

## 2024-07-23 RX ADMIN — ONDANSETRON 4 MG: 2 INJECTION INTRAMUSCULAR; INTRAVENOUS at 06:10:00

## 2024-07-23 RX ADMIN — GLYCOPYRROLATE 0.1 MG: 0.2 INJECTION, SOLUTION INTRAMUSCULAR; INTRAVENOUS at 06:19:00

## 2024-07-23 RX ADMIN — DIPHENHYDRAMINE HYDROCHLORIDE 25 MG: 50 INJECTION INTRAMUSCULAR; INTRAVENOUS at 07:06:00

## 2024-07-23 RX ADMIN — ETOMIDATE 10 MG: 2 INJECTION INTRAVENOUS at 06:42:00

## 2024-07-23 RX ADMIN — SODIUM CHLORIDE, SODIUM LACTATE, POTASSIUM CHLORIDE, CALCIUM CHLORIDE: 600; 310; 30; 20 INJECTION, SOLUTION INTRAVENOUS at 06:18:00

## 2024-07-23 NOTE — ANESTHESIA PREPROCEDURE EVALUATION
PRE-OP EVALUATION    Patient Name: Meli Antonio    Admit Diagnosis: Major depressive disorder, recurrent episode, severe with catatonia (HCC) [F33.2, F06.1]    Pre-op Diagnosis: * No pre-op diagnosis entered *        Anesthesia Procedure: ECT(BEDSIDE)    * No surgeons found in log *    Pre-op vitals reviewed.  Temp: 98 °F (36.7 °C)  Pulse: 73  Resp: 12  BP: 146/81  SpO2: 97 %  Body mass index is 23.68 kg/m².    Current medications reviewed.  Hospital Medications:   lactated ringers infusion   Intravenous Continuous    [COMPLETED] glycopyrrolate (Robinul) 0.2 MG/ML injection 0.1 mg  0.1 mg Intravenous Once    [COMPLETED] ondansetron (Zofran) 4 MG/2ML injection 4 mg  4 mg Intravenous Once    [COMPLETED] caffeine-sodium benzoate 125-125 mg/mL injection  125 mg Intravenous Once    diphenhydrAMINE (Benadryl) 50 mg/mL  injection 25 mg  25 mg Intravenous Once    [COMPLETED] ondansetron (Zofran) 4 MG/2ML injection        [COMPLETED] glycopyrrolate (Robinul) 0.2 MG/ML injection        [COMPLETED] caffeine-sodium benzoate 125-125 MG/ML injection        [COMPLETED] diphenhydrAMINE (Benadryl) 50 mg/mL  injection           Outpatient Medications:   (Not in a hospital admission)      Allergies: Radiology contrast iodinated dyes, Sulfa antibiotics, Methylprednisolone, Seroquel [quetiapine], Cinnamon, and Iodine (topical)      Anesthesia Evaluation    Patient summary reviewed.    Anesthetic Complications  (-) history of anesthetic complications         GI/Hepatic/Renal      (+) GERD                      (+) irritable bowel syndrome     Cardiovascular    Negative cardiovascular ROS.    Exercise tolerance: good     MET: >4                                  (-) FERREIRA  (-) orthopnea  (-) PND     Endo/Other      (+) diabetes and well controlled, type 2, not using insulin                         Pulmonary    Negative pulmonary ROS.           (-) shortness of breath  (-) recent URI          Neuro/Psych      (+) depression  (+) anxiety                               Past Surgical History:   Procedure Laterality Date          Correct bunion,othr methods      Correct bunion,simple      BILAT.     Dilation/curettage,diagnostic      FOR \"MOLE PREGNANCY\"    Other Right     ORIF RIGHT ANKLE    Spine surgery procedure unlisted      cervical spine 2020    Total knee replacement       Social History     Socioeconomic History    Marital status:    Tobacco Use    Smoking status: Former     Current packs/day: 2.00     Average packs/day: 2.0 packs/day for 15.0 years (30.0 ttl pk-yrs)     Types: Cigarettes    Smokeless tobacco: Never    Tobacco comments:     QUIT IN    Vaping Use    Vaping status: Never Used   Substance and Sexual Activity    Alcohol use: No    Drug use: No     History   Drug Use No     Available pre-op labs reviewed.  Lab Results   Component Value Date    WBC 7.5 2024    RBC 4.10 2024    HGB 12.7 2024    HCT 36.2 2024    MCV 90.3 2024    MCV 88.3 2024    MCH 31.0 2024    MCHC 32.1 2024    MCHC 35.1 2024    RDW 13.1 2024    .0 2024     Lab Results   Component Value Date     2024    K 4.0 2024     2024    CO2 26.0 2024    BUN 11 2024    CREATSERUM 0.81 2024     (H) 2024    CA 9.4 2024            Airway      Mallampati: I  Mouth opening: 3 FB  TM distance: < 4 cm  Neck ROM: full Cardiovascular    Cardiovascular exam normal.  Rhythm: regular  Rate: normal  (-) murmur   Dental    Dentition appears grossly intact         Pulmonary    Pulmonary exam normal.  Breath sounds clear to auscultation bilaterally.          (-) rales     Other findings              ASA: 2   Plan: general  NPO status verified and patient meets guidelines.        Comment: discussed  Plan/risks discussed with: patient            We discussed GA w/mask or ETT and possible scratchy throat and rarely dental damage.  We  discussed analgesic plan and PONV prophylaxis.  The patient's questions were answered and consent was attained.

## 2024-07-23 NOTE — DISCHARGE INSTRUCTIONS
Discharge Instructions  Electroconvulsive Therapy    Activities:  You MUST arrange to have a responsible adult drive you home and have a responsible adult stay with you the rest of the day and overnight.  Do not drive today.  Do not operate any machinery today. Use kitchen equipment with caution.  Rest and take it easy today.  Do not take public transportation without the presence of another responsible adult for 24 hours    Medications:  Resume your regular medications when you get home  The nurse will instruct you not to take any NSAIDS (Advil, Aleve, Motrin, Ibuprofen) before 1 pm today because you were given a certain medication during the procedure.      Diet:  You may resume your regular diet when you get home  Do not drink alcohol for 24 hours    Additional Instructions:  Someone should call you to schedule any upcoming ECT treatments. Call as needed to schedule or cancel your ECT appointments [phone].  If you have any questions or concerns, please call your own psychiatrist.  For your safety, please do not wear make-up to any future ECT appointments.  For any questions regarding your ECT appointment, please call [facility] [phone].  For cancellations after hours, call [phone] and leave a message.    Expected Recovery:  As you awaken, you may experience one or more of the following:  Headache, nausea, temporary confusion, or muscle stiffness.  If these symptoms increase, become severe or are accompanied by a fever of more than 101, please seek medical attention.  The ECT may affect memory.  Many patients report loss of memory for events that occurred in the days, weeks or months surrounding the ECT.  Many of these memories may return, but not always.  Short-term memory may also be affected for months, but this can also be a result of the disorder that you have.

## 2024-07-23 NOTE — PROGRESS NOTES
[facility] / [facility]  ECT Procedure Note    Meli Antonio Patient Status:  Outpatient   Age/Gender 73 year old female MRN [id number]   Location [facility] POST ANESTHESIA CARE UNIT Attending Agapito Romero MD   Hosp Day # 0 PCP SIRI TABOR     7/23/2024    ECT Number: #14 total.  #4 unilateral    Diagnosis: Major Depression Recurrent Severe Without Psychotic Features. F33.2    Type of ECT:  Unilateral non-dominant d'Rustam    Place of Service:  Outpatient    Settings:   1.  Energy Percentage: 75%    2.  Program:  Low 0.5    Pre-ECT Evaluation    Symptoms:  Patient seen.  Overall patient reports mood has been better.  However she has both some periods of anxiety which in hindsight may be akathisia along with occasional tremor and orthostasis.  She reports they have been tolerable and she has not fallen.  Patient noted no suicidal thoughts.  No adwoa or psychosis.  No cognitive side effects and she feels she is tolerating unilateral ECT.  We discussed treatment options and plan on ECT in 3 weeks.  We also discussed with patient follow-up in the office.  We discussed with patient transition of care eventually to N.  Discussed risks and benefits of ECT.  Patient shows capacity to make decisions.  Discussed holding Abilify.    Risk/Benefits:  Discussed with patient side effects of ECT including headache, teeth, jaw, cardiac, pulmonary, NPO, aspiration, allergic reactions, anesthetic reactions, musculoskeletal, neurologic, morbidity/mortality, potential lack of efficacy, unilateral/bilateral ECT, relapse/maintenance issues, cognitive risks including memory, concentration, cognition, and other risks.    Side Effects:  As above.  No cognitive side effects    Exam:  Mood: less depressed and less anxious  Affect: Congruent  Memory:  intact immediate, recent, remote and as evidenced by ability to present consistent history  Concentration:   good  Suicidal ideation: no suicidal ideation    Prior to  procedure, reviewed with treatment team correct patient, time of procedure and type of ECT.  Also reviewed with anesthesia pre-ECT medications.    Patient Monitored:  B/P, EKG, EEG, Pulse Ox, Left Ankle Cuff    Pre-ECT Medications: Robinul 0.1 mg IV, Zofran 4 mg IV, and caffeine 125 mg IV    ECT Medications:  Anesthetic  Etomidate 10 mg IV followed by ketamine 25 mg IV and Succinylcholine 60 mg IV    Seizure Duration:  Motor: 36 seconds       EE seconds    Post-ECT Condition:  Treatment unremarkable    Post-ECT Medications:   Benadryl 25 mg IV    Agapito Romero MD

## 2024-07-23 NOTE — PROGRESS NOTES
[facility] / [facility]  ECT History & Physical    Meli Antonio Patient Status:  Outpatient   Age/Gender 73 year old female MRN [id number]   Location [facility] POST ANESTHESIA CARE UNIT Attending Agapito Romero MD   Hosp Day # 0 PCP SIRI TABOR     Date: 2024    Diagnosis: Major depression recurrent severe    Procedure:  ECT    HPI:     Patient seen.  Patient noted to have no cognitive or physical complaints from ECT.  However patient is noted to have some mild tremor and mild orthostasis.  Discussion with patient and family to hold Abilify and reevaluate.      Medical History:  Past Medical History:    Anxiety state    Back problem    COMPRESSION FX    Cataract    Depression    Esophageal reflux    GERD (gastroesophageal reflux disease)    Hematoma and contusion of liver    STATES HAS BEEN THERE FOR MANY YEARS    History of fractured kneecap    RIGHT    IBS (irritable bowel syndrome)    Mitral prolapse    Osteoarthritis       Surgical History:  Past Surgical History:   Procedure Laterality Date          Correct bunion,othr methods      Correct bunion,simple      BILAT.     Dilation/curettage,diagnostic      FOR \"MOLE PREGNANCY\"    Other Right     ORIF RIGHT ANKLE    Spine surgery procedure unlisted      cervical spine 2020    Total knee replacement         Family History:  Family History   Problem Relation Age of Onset    Heart Disorder Father     Cancer Mother         ESOPHAGUS       ROS:  Unremarkable except for above    Physical Exam:   BP (!) 132/93   Pulse 93   Temp 97.9 °F (36.6 °C) (Temporal)   Resp 19   Ht 60\"   Wt 55 kg (121 lb 4.1 oz)   SpO2 94%   BMI 23.68 kg/m²     General Appearance:    Alert, cooperative, no distress, appears stated age   Head:    Normocephalic, without obvious abnormality, atraumatic   Eyes:    PERRL, conjunctiva/corneas clear, EOM's intact       Nose:   Nares normal, septum midline, mucosa normal, no drainage    or sinus tenderness    Throat:   Lips, mucosa, and tongue normal; teeth and gums normal   Neck:   Supple, symmetrical, trachea midline   Lungs:     Clear to auscultation bilaterally, respirations unlabored    Heart:    Regular rate and rhythm, S1 and S2 normal, no murmur, rub   or gallop   Abdomen:     Soft, non-tender, bowel sounds active all four quadrants,     no masses, no organomegaly   Extremities:   Extremities normal, atraumatic, no cyanosis or edema   Pulses:   2+ and symmetric all extremities   Skin:   Skin color, texture, turgor normal, no rashes or lesions   Neurologic:   CNII-XII intact, normal strength, sensation and reflexes     throughout     Impressions & Plans: Major depression recurrent severe.  Unilateral ECT    I have discussed the risks and benefits and alternatives with the patient/family.  They understand and agree to proceed with plan of care.    Agapito Romero MD

## 2024-07-23 NOTE — ANESTHESIA POSTPROCEDURE EVALUATION
[facility]    Meli Antonio Patient Status:  Outpatient   Age/Gender 73 year old female MRN [id number]   Location [facility] POST ANESTHESIA CARE UNIT Attending Agapito Romero MD   Hosp Day # 0 PCP SIRI TABOR       Anesthesia Post-op Note        Procedure Summary       Date: 07/23/24 Room / Location: [facility] Post Anesthesia Care Unit    Anesthesia Start: 0636 Anesthesia Stop: 0656    Procedure: ECT(BEDSIDE) Diagnosis: Major depressive disorder, recurrent severe without psychotic features (HCC)    Scheduled Providers:  Responsible Provider: Jimy Lara MD    Anesthesia Type: general ASA Status: 2            Anesthesia Type: general    Vitals Value Taken Time   /77 07/23/24 0658   Temp  07/23/24 0658   Pulse 78 07/23/24 0658   Resp 16 07/23/24 0658   SpO2 98 07/23/24 0658       Patient Location: PACU    Anesthesia Type: general    Airway Patency: patent    Postop Pain Control: adequate    Mental Status: Other    Nausea/Vomiting: none    Cardiopulmonary/Hydration status: stable euvolemic    Complications: no apparent anesthesia related complications    Postop vital signs: stable    Dental Exam: Unchanged from Preop

## 2024-07-24 ENCOUNTER — HOSPITAL ENCOUNTER (EMERGENCY)
Facility: HOSPITAL | Age: 74
Discharge: HOME | End: 2024-07-24
Attending: EMERGENCY MEDICINE
Payer: MEDICARE

## 2024-07-24 VITALS
SYSTOLIC BLOOD PRESSURE: 110 MMHG | DIASTOLIC BLOOD PRESSURE: 69 MMHG | WEIGHT: 150 LBS | HEART RATE: 54 BPM | HEIGHT: 60 IN | BODY MASS INDEX: 29.45 KG/M2 | TEMPERATURE: 98 F | OXYGEN SATURATION: 97 % | RESPIRATION RATE: 18 BRPM

## 2024-07-24 DIAGNOSIS — R55 SYNCOPE AND COLLAPSE: Primary | ICD-10-CM

## 2024-07-24 LAB
ALBUMIN SERPL-MCNC: 4.3 G/DL (ref 3.2–4.8)
ALBUMIN/GLOB SERPL: 1.8 {RATIO} (ref 1–2)
ALP LIVER SERPL-CCNC: 74 U/L
ALT SERPL-CCNC: 14 U/L
ANION GAP SERPL CALC-SCNC: 5 MMOL/L (ref 0–18)
AST SERPL-CCNC: 20 U/L (ref ?–34)
ATRIAL RATE: 49 BPM
BASOPHILS # BLD AUTO: 0.03 X10(3) UL (ref 0–0.2)
BASOPHILS NFR BLD AUTO: 0.4 %
BILIRUB SERPL-MCNC: 0.4 MG/DL (ref 0.2–1.1)
BUN BLD-MCNC: 14 MG/DL (ref 9–23)
BUN/CREAT SERPL: 13.2 (ref 10–20)
CALCIUM BLD-MCNC: 9.6 MG/DL (ref 8.7–10.4)
CHLORIDE SERPL-SCNC: 107 MMOL/L (ref 98–112)
CO2 SERPL-SCNC: 25 MMOL/L (ref 21–32)
CREAT BLD-MCNC: 1.06 MG/DL
DEPRECATED RDW RBC AUTO: 43.9 FL (ref 35.1–46.3)
EGFRCR SERPLBLD CKD-EPI 2021: 55 ML/MIN/1.73M2 (ref 60–?)
EOSINOPHIL # BLD AUTO: 0.15 X10(3) UL (ref 0–0.7)
EOSINOPHIL NFR BLD AUTO: 2.1 %
ERYTHROCYTE [DISTWIDTH] IN BLOOD BY AUTOMATED COUNT: 13.1 % (ref 11–15)
GLOBULIN PLAS-MCNC: 2.4 G/DL (ref 2–3.5)
GLUCOSE BLD-MCNC: 113 MG/DL (ref 70–99)
GLUCOSE BLDC GLUCOMTR-MCNC: 113 MG/DL (ref 70–99)
HCT VFR BLD AUTO: 38.7 %
HGB BLD-MCNC: 12.7 G/DL
IMM GRANULOCYTES # BLD AUTO: 0.01 X10(3) UL (ref 0–1)
IMM GRANULOCYTES NFR BLD: 0.1 %
LYMPHOCYTES # BLD AUTO: 1.71 X10(3) UL (ref 1–4)
LYMPHOCYTES NFR BLD AUTO: 24.5 %
MCH RBC QN AUTO: 30.2 PG (ref 26–34)
MCHC RBC AUTO-ENTMCNC: 32.8 G/DL (ref 31–37)
MCV RBC AUTO: 92.1 FL
MONOCYTES # BLD AUTO: 0.58 X10(3) UL (ref 0.1–1)
MONOCYTES NFR BLD AUTO: 8.3 %
NEUTROPHILS # BLD AUTO: 4.5 X10 (3) UL (ref 1.5–7.7)
NEUTROPHILS # BLD AUTO: 4.5 X10(3) UL (ref 1.5–7.7)
NEUTROPHILS NFR BLD AUTO: 64.6 %
OSMOLALITY SERPL CALC.SUM OF ELEC: 285 MOSM/KG (ref 275–295)
P AXIS: 55 DEGREES
P-R INTERVAL: 148 MS
PLATELET # BLD AUTO: 266 10(3)UL (ref 150–450)
POTASSIUM SERPL-SCNC: 4.6 MMOL/L (ref 3.5–5.1)
PROT SERPL-MCNC: 6.7 G/DL (ref 5.7–8.2)
Q-T INTERVAL: 468 MS
QRS DURATION: 60 MS
QTC CALCULATION (BEZET): 422 MS
R AXIS: 11 DEGREES
RBC # BLD AUTO: 4.2 X10(6)UL
SODIUM SERPL-SCNC: 137 MMOL/L (ref 136–145)
T AXIS: 44 DEGREES
TROPONIN I SERPL HS-MCNC: 3 NG/L
VENTRICULAR RATE: 49 BPM
WBC # BLD AUTO: 7 X10(3) UL (ref 4–11)

## 2024-07-24 PROCEDURE — 85025 COMPLETE CBC W/AUTO DIFF WBC: CPT | Performed by: EMERGENCY MEDICINE

## 2024-07-24 PROCEDURE — 85025 COMPLETE CBC W/AUTO DIFF WBC: CPT

## 2024-07-24 PROCEDURE — 99284 EMERGENCY DEPT VISIT MOD MDM: CPT

## 2024-07-24 PROCEDURE — 82962 GLUCOSE BLOOD TEST: CPT

## 2024-07-24 PROCEDURE — 80053 COMPREHEN METABOLIC PANEL: CPT

## 2024-07-24 PROCEDURE — 84484 ASSAY OF TROPONIN QUANT: CPT | Performed by: EMERGENCY MEDICINE

## 2024-07-24 PROCEDURE — 36415 COLL VENOUS BLD VENIPUNCTURE: CPT

## 2024-07-24 PROCEDURE — 80053 COMPREHEN METABOLIC PANEL: CPT | Performed by: EMERGENCY MEDICINE

## 2024-07-24 PROCEDURE — 93005 ELECTROCARDIOGRAM TRACING: CPT

## 2024-07-24 PROCEDURE — 93010 ELECTROCARDIOGRAM REPORT: CPT

## 2024-07-24 PROCEDURE — 84484 ASSAY OF TROPONIN QUANT: CPT

## 2024-07-24 NOTE — ED INITIAL ASSESSMENT (HPI)
Patient brought in by spouse after syncopal episode today at 1030. Spouse states patient had ECT yesterday, has received approximately 9 treatments of ECT. Denies head injury. Unknown blood thinners. Patient is a/o x 2.  denies seizure activity.

## 2024-07-24 NOTE — ED INITIAL ASSESSMENT (HPI)
Patient follows commands, denies numbness or tingling. No facial droop noted. Strength equal bilaterally.

## 2024-07-25 NOTE — ED PROVIDER NOTES
Patient Seen in: [facility] Emergency Department      History     Chief Complaint   Patient presents with    Syncope     Stated Complaint: AMS    Subjective:   HPI    73-year-old female presents for evaluation for lightheadedness and near syncope at home.  Patient had electroshock therapy yesterday.   ports that today she became lightheaded and had a near syncopal episode.  She did not hit her head.  She reports that she has been having the symptoms off and on for the past few months.  She has not yet seen her PCP for this.  She currently feels well.  She denies any fevers, chills, headache, nausea, vomit, abdominal pain, chest pain, shortness of breath.    Objective:   No pertinent past medical history.            No pertinent past surgical history.              No pertinent social history.            Review of Systems    Positive for stated Chief Complaint: Syncope    Other systems are as noted in HPI.  Constitutional and vital signs reviewed.      All other systems reviewed and negative except as noted above.    Physical Exam     ED Triage Vitals   BP 07/24/24 1325 96/65   Pulse 07/24/24 1325 80   Resp 07/24/24 1325 18   Temp 07/24/24 1325 97.5 °F (36.4 °C)   Temp src --    SpO2 07/24/24 1325 93 %   O2 Device 07/24/24 1623 None (Room air)       Current Vitals:   Vital Signs  BP: 110/69  Pulse: 54  Resp: 18  Temp: 97.5 °F (36.4 °C)  MAP (mmHg): 83    Oxygen Therapy  SpO2: 97 %  O2 Device: None (Room air)            Physical Exam  Vitals and nursing note reviewed.   Constitutional:       Appearance: Normal appearance.   HENT:      Head: Normocephalic and atraumatic.   Eyes:      General: Lids are normal.      Extraocular Movements: Extraocular movements intact.      Pupils: Pupils are equal, round, and reactive to light.   Cardiovascular:      Rate and Rhythm: Normal rate and regular rhythm.      Pulses: Normal pulses.           Radial pulses are 2+ on the right side and 2+ on the left side.       Heart sounds: Normal heart sounds.   Pulmonary:      Effort: Pulmonary effort is normal.      Breath sounds: Normal breath sounds.   Musculoskeletal:         General: Normal range of motion.      Cervical back: Normal range of motion.   Skin:     General: Skin is warm and dry.   Neurological:      General: No focal deficit present.      Mental Status: She is alert.      Cranial Nerves: No cranial nerve deficit, dysarthria or facial asymmetry.      Sensory: Sensation is intact.      Motor: Motor function is intact.      Coordination: Coordination is intact.      Gait: Gait is intact.              ED Course     Labs Reviewed   COMP METABOLIC PANEL (14) - Abnormal; Notable for the following components:       Result Value    Glucose 113 (*)     Creatinine 1.06 (*)     eGFR-Cr 55 (*)     All other components within normal limits   POCT GLUCOSE - Abnormal; Notable for the following components:    POC Glucose  113 (*)     All other components within normal limits   TROPONIN I HIGH SENSITIVITY - Normal   CBC WITH DIFFERENTIAL WITH PLATELET    Narrative:     The following orders were created for panel order CBC With Differential With Platelet.  Procedure                               Abnormality         Status                     ---------                               -----------         ------                     CBC W/ DIFFERENTIAL[[id number]]                              Final result                 Please view results for these tests on the individual orders.   RAINBOW DRAW BLUE   CBC W/ DIFFERENTIAL     EKG    Rate, intervals and axes as noted on EKG Report.  Rate: 49  Rhythm: Sinus Rhythm  Reading: no stemi, interpreted by myself.                           MDM                                         Medical Decision Making  Differential diagnosis includes but is not limited to cardiac arrhythmia, electrolyte disturbance, orthostasis, dehydration, etc.    Patient's neurologic exam is unremarkable, no evidence of  anything to suggest CVA or other CNS process.  Patient's orthostats were normal.  CBC and chemistry were also unremarkable except for mild bump in creatinine which could be secondary to dehydration.  Patient was discharged home, encouraged to stay hydrated and encouraged to follow-up with PCP for further workup and testing as needed.  Return precautions given.    Rhythm Strip: Rate 58 sinus rhythm. The cardiac monitor was ordered secondary to the patient's syncope.       Medical Record Review: I personally reviewed available prior medical records for any recent pertinent discharge summaries, testing, and procedures, and reviewed those reports.    Complicating factors: The patient  has a past medical history of Anxiety state, Back problem, Cataract, Depression, Esophageal reflux, GERD (gastroesophageal reflux disease), Hematoma and contusion of liver, History of fractured kneecap (2016), IBS (irritable bowel syndrome), Mitral prolapse, and Osteoarthritis. and  has a past surgical history that includes ; dilation/curettage,diagnostic; other (Right); correct bunion,simple; correct bunion,othr methods; spine surgery procedure unlisted; and total knee replacement. that contribute to the medical complexity of this ED evaluation.     Clinical impression as well as any lab results and radiology findings were discussed with the patient and/or caregiver. I personally reviewed all laboratory results and radiology images myself. Patient is advised to follow up with PCP for reevaluation. I provided discharge instructions and return precautions. Patient and/or caregiver voices understanding and agreement with the treatment plan. All questions were addressed and answered.         Problems Addressed:  Syncope and collapse: acute illness or injury with systemic symptoms    Amount and/or Complexity of Data Reviewed  Independent Historian: spouse  External Data Reviewed: ECG.     Details: Patient's EKG from 2024  reviewed, rate was 61 with normal sinus rhythm.  Labs: ordered. Decision-making details documented in ED Course.  ECG/medicine tests: ordered and independent interpretation performed. Decision-making details documented in ED Course.        Disposition and Plan     Clinical Impression:  1. Syncope and collapse         Disposition:  Discharge  7/24/2024  6:35 pm    Follow-up:  Birdie Valverde MD  [address] [place] [id number]  [phone]    Schedule an appointment as soon as possible for a visit  For follow up and re-evaluation    We recommend that you schedule follow up care with a primary care provider within the next three months to obtain basic health screening including reassessment of your blood pressure.      Medications Prescribed:  Discharge Medication List as of 7/24/2024  6:42 PM

## 2024-08-05 ENCOUNTER — LABORATORY ENCOUNTER (OUTPATIENT)
Dept: LAB | Age: 74
End: 2024-08-05
Attending: Other
Payer: MEDICARE

## 2024-08-05 DIAGNOSIS — F32.9 MDD (MAJOR DEPRESSIVE DISORDER): ICD-10-CM

## 2024-08-05 DIAGNOSIS — Z01.818 PRE-OP TESTING: ICD-10-CM

## 2024-08-13 VITALS — HEIGHT: 62 IN | BODY MASS INDEX: 22 KG/M2

## 2024-08-16 RX ORDER — DEXTROSE MONOHYDRATE 25 G/50ML
50 INJECTION, SOLUTION INTRAVENOUS
OUTPATIENT
Start: 2024-08-16

## 2024-08-16 RX ORDER — SODIUM CHLORIDE, SODIUM LACTATE, POTASSIUM CHLORIDE, CALCIUM CHLORIDE 600; 310; 30; 20 MG/100ML; MG/100ML; MG/100ML; MG/100ML
INJECTION, SOLUTION INTRAVENOUS CONTINUOUS
OUTPATIENT
Start: 2024-08-16

## 2024-08-16 RX ORDER — NICOTINE POLACRILEX 4 MG
15 LOZENGE BUCCAL
OUTPATIENT
Start: 2024-08-16

## 2024-08-16 RX ORDER — NICOTINE POLACRILEX 4 MG
30 LOZENGE BUCCAL
OUTPATIENT
Start: 2024-08-16

## 2024-08-18 RX ORDER — CAFFEINE AND SODIUM BENZOATE 125 MG/ML
125 INJECTION, SOLUTION INTRAMUSCULAR; INTRAVENOUS ONCE
OUTPATIENT
Start: 2024-08-18 | End: 2024-08-18

## 2024-08-18 RX ORDER — GLYCOPYRROLATE 0.2 MG/ML
0.1 INJECTION, SOLUTION INTRAMUSCULAR; INTRAVENOUS ONCE
OUTPATIENT
Start: 2024-08-18 | End: 2024-08-18

## 2024-08-18 RX ORDER — ONDANSETRON 2 MG/ML
4 INJECTION INTRAMUSCULAR; INTRAVENOUS ONCE
OUTPATIENT
Start: 2024-08-18 | End: 2024-08-18

## 2024-08-18 RX ORDER — DIPHENHYDRAMINE HYDROCHLORIDE 50 MG/ML
25 INJECTION INTRAMUSCULAR; INTRAVENOUS ONCE
OUTPATIENT
Start: 2024-08-18 | End: 2024-08-18

## 2024-08-18 RX ORDER — SODIUM CHLORIDE, SODIUM LACTATE, POTASSIUM CHLORIDE, CALCIUM CHLORIDE 600; 310; 30; 20 MG/100ML; MG/100ML; MG/100ML; MG/100ML
INJECTION, SOLUTION INTRAVENOUS CONTINUOUS
OUTPATIENT
Start: 2024-08-18

## 2024-08-19 ENCOUNTER — HOSPITAL ENCOUNTER (OUTPATIENT)
Dept: POSTOP/PACU | Facility: HOSPITAL | Age: 74
Discharge: HOME | End: 2024-08-19
Attending: Other
Payer: MEDICARE

## 2024-08-19 VITALS — BODY MASS INDEX: 22 KG/M2 | HEIGHT: 62 IN

## 2024-08-21 ENCOUNTER — HOSPITAL ENCOUNTER (OUTPATIENT)
Dept: POSTOP/PACU | Facility: HOSPITAL | Age: 74
Discharge: HOME | End: 2024-08-21
Attending: Other
Payer: MEDICARE

## 2024-08-31 ENCOUNTER — APPOINTMENT (OUTPATIENT)
Dept: GENERAL RADIOLOGY | Age: 74
End: 2024-08-31
Attending: EMERGENCY MEDICINE
Payer: MEDICARE

## 2024-08-31 ENCOUNTER — HOSPITAL ENCOUNTER (OUTPATIENT)
Age: 74
Discharge: HOME | End: 2024-08-31
Attending: EMERGENCY MEDICINE
Payer: MEDICARE

## 2024-08-31 VITALS
SYSTOLIC BLOOD PRESSURE: 121 MMHG | TEMPERATURE: 98 F | OXYGEN SATURATION: 97 % | RESPIRATION RATE: 20 BRPM | HEART RATE: 107 BPM | DIASTOLIC BLOOD PRESSURE: 73 MMHG

## 2024-08-31 DIAGNOSIS — B34.9 VIRAL SYNDROME: Primary | ICD-10-CM

## 2024-08-31 LAB
#MXD IC: 0.9 X10ˆ3/UL (ref 0.1–1)
BILIRUB UR QL STRIP: NEGATIVE
BUN BLD-MCNC: 22 MG/DL (ref 7–18)
CHLORIDE BLD-SCNC: 108 MMOL/L (ref 98–112)
CLARITY UR: CLEAR
CO2 BLD-SCNC: 18 MMOL/L (ref 21–32)
COLOR UR: YELLOW
CREAT BLD-MCNC: 1 MG/DL
DDIMER WHOLE BLOOD: <200 NG/ML DDU (ref ?–400)
EGFRCR SERPLBLD CKD-EPI 2021: 59 ML/MIN/1.73M2 (ref 60–?)
GLUCOSE BLD-MCNC: 79 MG/DL (ref 70–99)
GLUCOSE UR STRIP-MCNC: NEGATIVE MG/DL
HCT VFR BLD AUTO: 40.1 %
HCT VFR BLD CALC: 43 %
HGB BLD-MCNC: 14 G/DL
HGB UR QL STRIP: NEGATIVE
ISTAT IONIZED CALCIUM FOR CHEM 8: 1.29 MMOL/L (ref 1.12–1.32)
KETONES UR STRIP-MCNC: NEGATIVE MG/DL
LEUKOCYTE ESTERASE UR QL STRIP: NEGATIVE
LYMPHOCYTES # BLD AUTO: 2.6 X10ˆ3/UL (ref 1–4)
LYMPHOCYTES NFR BLD AUTO: 31.4 %
MCH RBC QN AUTO: 30.6 PG (ref 26–34)
MCHC RBC AUTO-ENTMCNC: 34.9 G/DL (ref 31–37)
MCV RBC AUTO: 87.7 FL (ref 80–100)
MIXED CELL %: 10.5 %
NEUTROPHILS # BLD AUTO: 4.7 X10ˆ3/UL (ref 1.5–7.7)
NEUTROPHILS NFR BLD AUTO: 58.1 %
NITRITE UR QL STRIP: NEGATIVE
PH UR STRIP: 5.5 [PH]
PLATELET # BLD AUTO: 329 X10ˆ3/UL (ref 150–450)
POCT INFLUENZA A: NEGATIVE
POCT INFLUENZA B: NEGATIVE
POTASSIUM BLD-SCNC: 4.1 MMOL/L (ref 3.6–5.1)
PROT UR STRIP-MCNC: NEGATIVE MG/DL
RBC # BLD AUTO: 4.57 X10ˆ6/UL
SARS-COV-2 RNA RESP QL NAA+PROBE: NOT DETECTED
SODIUM BLD-SCNC: 137 MMOL/L (ref 136–145)
SP GR UR STRIP: 1.01
TROPONIN I BLD-MCNC: <0.02 NG/ML
UROBILINOGEN UR STRIP-ACNC: <2 MG/DL
WBC # BLD AUTO: 8.2 X10ˆ3/UL (ref 4–11)

## 2024-08-31 PROCEDURE — 93005 ELECTROCARDIOGRAM TRACING: CPT

## 2024-08-31 PROCEDURE — 71046 X-RAY EXAM CHEST 2 VIEWS: CPT | Performed by: EMERGENCY MEDICINE

## 2024-08-31 PROCEDURE — 87502 INFLUENZA DNA AMP PROBE: CPT | Performed by: EMERGENCY MEDICINE

## 2024-08-31 PROCEDURE — 85025 COMPLETE CBC W/AUTO DIFF WBC: CPT | Performed by: EMERGENCY MEDICINE

## 2024-08-31 PROCEDURE — 80047 BASIC METABLC PNL IONIZED CA: CPT

## 2024-08-31 PROCEDURE — 93010 ELECTROCARDIOGRAM REPORT: CPT

## 2024-08-31 PROCEDURE — 85378 FIBRIN DEGRADE SEMIQUANT: CPT | Performed by: EMERGENCY MEDICINE

## 2024-08-31 PROCEDURE — 81002 URINALYSIS NONAUTO W/O SCOPE: CPT

## 2024-08-31 PROCEDURE — 99215 OFFICE O/P EST HI 40 MIN: CPT

## 2024-08-31 PROCEDURE — 84484 ASSAY OF TROPONIN QUANT: CPT

## 2024-08-31 PROCEDURE — 36415 COLL VENOUS BLD VENIPUNCTURE: CPT

## 2024-08-31 RX ORDER — DOXYCYCLINE 100 MG/1
1 CAPSULE ORAL 2 TIMES DAILY
COMMUNITY
Start: 2024-08-27 | End: 2024-09-03

## 2024-08-31 RX ORDER — BENZONATATE 200 MG/1
200 CAPSULE ORAL
COMMUNITY
Start: 2024-08-27 | End: 2024-09-03

## 2024-08-31 NOTE — DISCHARGE INSTRUCTIONS
Push p.o. fluids.  Rest.  Follow-up with your primary MD early next week for any persistent/worsening symptoms

## 2024-08-31 NOTE — ED INITIAL ASSESSMENT (HPI)
Was seen by pcp on 8/27 for sinus infection/uri symptoms, currently taking tessalon and doxycycline, states still with cough and chest congestion with mild sob, no fever

## 2024-08-31 NOTE — ED PROVIDER NOTES
Patient Seen in: [facility]      History     Chief Complaint   Patient presents with    Cough/URI     Stated Complaint: cough, headache    Subjective:   HPI    Patient is a 73-year-old female with past history of mitral valve prolapse, gastroesophageal reflux disease who presents now with multiple medical complaints.  Patient states that she has been experiencing cough, headache, sore throat, body aches for approximately 12 days.  Patient states she did a home COVID test near the onset of her symptoms which was negative.  The patient saw her primary care physician on Tuesday of last week.  The patient was prescribed doxycycline and Tessalon Perles for possible sinus infection.  Despite this, the patient states she continues to have the same symptoms.  Patient continues to have intermittent headache, body aches, fatigue.  Patient states she continues to have a minimally productive cough.  Patient states she does feel somewhat short of breath on occasion.  Patient denies any fever    Objective:   Past Medical History:    Anxiety state    Back problem    COMPRESSION FX    Cataract    Depression    Esophageal reflux    GERD (gastroesophageal reflux disease)    Heart valve disease    MVP--congenital    Hematoma and contusion of liver    STATES HAS BEEN THERE FOR MANY YEARS    History of fractured kneecap    RIGHT    IBS (irritable bowel syndrome)    Mitral prolapse    Osteoarthritis    Visual impairment    glasses              Past Surgical History:   Procedure Laterality Date          Correct bunion,othr methods      Correct bunion,simple      BILAT.     Dilation/curettage,diagnostic      FOR \"MOLE PREGNANCY\"    Other Right     ORIF RIGHT ANKLE    Spine surgery procedure unlisted      cervical spine 2020    Total knee replacement                  Social History     Socioeconomic History    Marital status:    Tobacco Use    Smoking status: Former     Current packs/day: 2.00      Average packs/day: 2.0 packs/day for 15.0 years (30.0 ttl pk-yrs)     Types: Cigarettes    Smokeless tobacco: Never    Tobacco comments:     QUIT IN 1983   Vaping Use    Vaping status: Never Used   Substance and Sexual Activity    Alcohol use: No    Drug use: No     Social Determinants of Health     Financial Resource Strain: Low Risk  (8/16/2024)    Received from [facility]    Overall Financial Resource Strain (CARDIA)     Difficulty of Paying Living Expenses: Not hard at all   Food Insecurity: No Food Insecurity (8/16/2024)    Received from [facility]    Hunger Vital Sign     Worried About Running Out of Food in the Last Year: Never true     Ran Out of Food in the Last Year: Never true   Transportation Needs: No Transportation Needs (8/16/2024)    Received from [facility]    PRAPARE - Transportation     Lack of Transportation (Medical): No     Lack of Transportation (Non-Medical): No    [facility]    Social Connections   Housing Stability: Unknown (8/16/2024)    Received from [facility]    Housing Stability Vital Sign     Unable to Pay for Housing in the Last Year: No     In the last 12 months, was there a time when you did not have a steady place to sleep or slept in a shelter (including now)?: No              Review of Systems    Positive for stated Chief Complaint: Cough/URI    Other systems are as noted in HPI.  Constitutional and vital signs reviewed.      All other systems reviewed and negative except as noted above.    Physical Exam     ED Triage Vitals [08/31/24 1436]   /73   Pulse 107   Resp 20   Temp 97.5 °F (36.4 °C)   Temp src Temporal   SpO2 97 %   O2 Device None (Room air)       Current Vitals:   Vital Signs  BP: 121/73  Pulse: 107  Resp: 20  Temp: 97.5 °F (36.4 °C)  Temp src: Temporal    Oxygen Therapy  SpO2: 97 %  O2 Device: None (Room  air)            Physical Exam    Constitutional: Well-developed well-nourished in no acute distress  Head: Normocephalic, no swelling or tenderness  Eyes: Nonicteric sclera, no conjunctival injection  ENT: TMs are clear and flat bilaterally.  There is no posterior pharyngeal erythema  Chest: Clear to auscultation, no tenderness  Cardiovascular: Regular rate and rhythm without murmur  Abdomen: Soft, nontender and nondistended  Neurologic: Patient is awake, alert and oriented ×3.  The patient's motor strength is 5 out of 5 and symmetric in the upper and lower extremities bilaterally  Extremities: No focal swelling or tenderness  Skin: No pallor, no redness or warmth to the touch      ED Course     Labs Reviewed   POCT ISTAT CHEM8 CARTRIDGE - Abnormal; Notable for the following components:       Result Value    ISTAT BUN 22 (*)     ISTAT TCO2 18 (*)     eGFR-Cr 59 (*)     All other components within normal limits   D-DIMER (POC) - Normal   ISTAT TROPONIN - Normal   RAPID SARS-COV-2 BY PCR - Normal   POCT FLU TEST - Normal    Narrative:     This assay is a rapid molecular in vitro test utilizing nucleic acid amplification of influenza A and B viral RNA.   POCT CBC   EM POCT URINALYSIS DIPSTICK     EKG    Rate, intervals and axes as noted on EKG Report.  Rate: 104  Rhythm: Sinus Rhythm  Reading: Patient's EKG demonstrates a sinus tachycardia with a rate of 104.  Patient's axis and intervals are normal.  There are nonspecific ST-T wave changes.                 Pulse ox is 97% on room air, normal     Patient's chest x-ray was independently reviewed by myself.  There is no acute infiltrate noted.    Patient's lab results including normal CBC/troponin, urinalysis, D-dimer.  Patient's bicarb is 18 today.  It was 20 on 8/27/2024.  Recommend pushing fluids and follow-up with primary MD next week.  Patient's negative chest x-ray was discussed with her.        MDM      Viral URI versus pneumonia versus COVID                                    Medical Decision Making      Disposition and Plan     Clinical Impression:  1. Viral syndrome         Disposition:  Discharge  8/31/2024  3:36 pm    Follow-up:  Birdie Valverde MD  [address] [place] [id number] [phone]    In 3 days            Medications Prescribed:  Discharge Medication List as of 8/31/2024  3:37 PM

## 2024-09-01 LAB
ATRIAL RATE: 104 BPM
P AXIS: 21 DEGREES
P-R INTERVAL: 152 MS
Q-T INTERVAL: 352 MS
QRS DURATION: 62 MS
QTC CALCULATION (BEZET): 462 MS
R AXIS: 2 DEGREES
T AXIS: -5 DEGREES
VENTRICULAR RATE: 104 BPM

## 2024-09-16 ENCOUNTER — ANESTHESIA EVENT (OUTPATIENT)
Dept: POSTOP/PACU | Facility: HOSPITAL | Age: 74
End: 2024-09-16
Payer: MEDICARE

## 2024-09-16 ENCOUNTER — HOSPITAL ENCOUNTER (OUTPATIENT)
Dept: POSTOP/PACU | Facility: HOSPITAL | Age: 74
Discharge: HOME | End: 2024-09-16
Attending: Other
Payer: MEDICARE

## 2024-09-16 ENCOUNTER — ANESTHESIA (OUTPATIENT)
Dept: POSTOP/PACU | Facility: HOSPITAL | Age: 74
End: 2024-09-16
Payer: MEDICARE

## 2024-09-16 VITALS
OXYGEN SATURATION: 96 % | SYSTOLIC BLOOD PRESSURE: 117 MMHG | TEMPERATURE: 99 F | RESPIRATION RATE: 10 BRPM | DIASTOLIC BLOOD PRESSURE: 77 MMHG | WEIGHT: 118 LBS | BODY MASS INDEX: 21.71 KG/M2 | HEIGHT: 62 IN | HEART RATE: 75 BPM

## 2024-09-16 DIAGNOSIS — F33.2 MAJOR DEPRESSIVE DISORDER, RECURRENT EPISODE, SEVERE WITH CATATONIA (HCC): ICD-10-CM

## 2024-09-16 DIAGNOSIS — F06.1 MAJOR DEPRESSIVE DISORDER, RECURRENT EPISODE, SEVERE WITH CATATONIA (HCC): ICD-10-CM

## 2024-09-16 LAB — GLUCOSE BLD-MCNC: 135 MG/DL (ref 70–99)

## 2024-09-16 RX ORDER — DEXTROSE MONOHYDRATE 25 G/50ML
50 INJECTION, SOLUTION INTRAVENOUS
Status: DISCONTINUED | OUTPATIENT
Start: 2024-09-16 | End: 2024-09-18

## 2024-09-16 RX ORDER — GLYCOPYRROLATE 0.2 MG/ML
INJECTION, SOLUTION INTRAMUSCULAR; INTRAVENOUS
Status: COMPLETED
Start: 2024-09-16 | End: 2024-09-16

## 2024-09-16 RX ORDER — NICOTINE POLACRILEX 4 MG
30 LOZENGE BUCCAL
Status: DISCONTINUED | OUTPATIENT
Start: 2024-09-16 | End: 2024-09-18

## 2024-09-16 RX ORDER — HYDROMORPHONE HYDROCHLORIDE 1 MG/ML
0.4 INJECTION, SOLUTION INTRAMUSCULAR; INTRAVENOUS; SUBCUTANEOUS EVERY 5 MIN PRN
Status: ACTIVE | OUTPATIENT
Start: 2024-09-16 | End: 2024-09-16

## 2024-09-16 RX ORDER — ETOMIDATE 2 MG/ML
INJECTION INTRAVENOUS AS NEEDED
Status: DISCONTINUED | OUTPATIENT
Start: 2024-09-16 | End: 2024-09-16 | Stop reason: SURG

## 2024-09-16 RX ORDER — HYDROMORPHONE HYDROCHLORIDE 1 MG/ML
0.6 INJECTION, SOLUTION INTRAMUSCULAR; INTRAVENOUS; SUBCUTANEOUS EVERY 5 MIN PRN
Status: ACTIVE | OUTPATIENT
Start: 2024-09-16 | End: 2024-09-16

## 2024-09-16 RX ORDER — GLYCOPYRROLATE 0.2 MG/ML
0.1 INJECTION, SOLUTION INTRAMUSCULAR; INTRAVENOUS ONCE
Status: COMPLETED | OUTPATIENT
Start: 2024-09-16 | End: 2024-09-16

## 2024-09-16 RX ORDER — DIPHENHYDRAMINE HYDROCHLORIDE 50 MG/ML
25 INJECTION INTRAMUSCULAR; INTRAVENOUS ONCE
Status: COMPLETED | OUTPATIENT
Start: 2024-09-16 | End: 2024-09-16

## 2024-09-16 RX ORDER — CAFFEINE AND SODIUM BENZOATE 125 MG/ML
125 INJECTION, SOLUTION INTRAMUSCULAR; INTRAVENOUS ONCE
Status: COMPLETED | OUTPATIENT
Start: 2024-09-16 | End: 2024-09-16

## 2024-09-16 RX ORDER — CAFFEINE AND SODIUM BENZOATE 125 MG/ML
INJECTION, SOLUTION INTRAMUSCULAR; INTRAVENOUS
Status: COMPLETED
Start: 2024-09-16 | End: 2024-09-16

## 2024-09-16 RX ORDER — ONDANSETRON 2 MG/ML
4 INJECTION INTRAMUSCULAR; INTRAVENOUS ONCE
Status: COMPLETED | OUTPATIENT
Start: 2024-09-16 | End: 2024-09-16

## 2024-09-16 RX ORDER — SODIUM CHLORIDE, SODIUM LACTATE, POTASSIUM CHLORIDE, CALCIUM CHLORIDE 600; 310; 30; 20 MG/100ML; MG/100ML; MG/100ML; MG/100ML
INJECTION, SOLUTION INTRAVENOUS CONTINUOUS
Status: DISCONTINUED | OUTPATIENT
Start: 2024-09-16 | End: 2024-09-18

## 2024-09-16 RX ORDER — METOCLOPRAMIDE HYDROCHLORIDE 5 MG/ML
10 INJECTION INTRAMUSCULAR; INTRAVENOUS EVERY 8 HOURS PRN
Status: DISCONTINUED | OUTPATIENT
Start: 2024-09-16 | End: 2024-09-18

## 2024-09-16 RX ORDER — HYDROMORPHONE HYDROCHLORIDE 1 MG/ML
0.2 INJECTION, SOLUTION INTRAMUSCULAR; INTRAVENOUS; SUBCUTANEOUS EVERY 5 MIN PRN
Status: ACTIVE | OUTPATIENT
Start: 2024-09-16 | End: 2024-09-16

## 2024-09-16 RX ORDER — NALOXONE HYDROCHLORIDE 0.4 MG/ML
0.08 INJECTION, SOLUTION INTRAMUSCULAR; INTRAVENOUS; SUBCUTANEOUS AS NEEDED
Status: ACTIVE | OUTPATIENT
Start: 2024-09-16 | End: 2024-09-16

## 2024-09-16 RX ORDER — ONDANSETRON 2 MG/ML
INJECTION INTRAMUSCULAR; INTRAVENOUS
Status: COMPLETED
Start: 2024-09-16 | End: 2024-09-16

## 2024-09-16 RX ORDER — DIPHENHYDRAMINE HYDROCHLORIDE 50 MG/ML
INJECTION INTRAMUSCULAR; INTRAVENOUS
Status: COMPLETED
Start: 2024-09-16 | End: 2024-09-16

## 2024-09-16 RX ORDER — KETAMINE HYDROCHLORIDE 50 MG/ML
INJECTION, SOLUTION INTRAMUSCULAR; INTRAVENOUS AS NEEDED
Status: DISCONTINUED | OUTPATIENT
Start: 2024-09-16 | End: 2024-09-16 | Stop reason: SURG

## 2024-09-16 RX ORDER — NICOTINE POLACRILEX 4 MG
15 LOZENGE BUCCAL
Status: DISCONTINUED | OUTPATIENT
Start: 2024-09-16 | End: 2024-09-18

## 2024-09-16 RX ORDER — MIDAZOLAM HYDROCHLORIDE 1 MG/ML
1 INJECTION INTRAMUSCULAR; INTRAVENOUS EVERY 5 MIN PRN
Status: ACTIVE | OUTPATIENT
Start: 2024-09-16 | End: 2024-09-16

## 2024-09-16 RX ORDER — ONDANSETRON 2 MG/ML
4 INJECTION INTRAMUSCULAR; INTRAVENOUS EVERY 6 HOURS PRN
Status: DISCONTINUED | OUTPATIENT
Start: 2024-09-16 | End: 2024-09-18

## 2024-09-16 RX ADMIN — SODIUM CHLORIDE, SODIUM LACTATE, POTASSIUM CHLORIDE, CALCIUM CHLORIDE: 600; 310; 30; 20 INJECTION, SOLUTION INTRAVENOUS at 06:22:00

## 2024-09-16 RX ADMIN — GLYCOPYRROLATE 0.1 MG: 0.2 INJECTION, SOLUTION INTRAMUSCULAR; INTRAVENOUS at 06:31:00

## 2024-09-16 RX ADMIN — ETOMIDATE 10 MG: 2 INJECTION INTRAVENOUS at 06:51:00

## 2024-09-16 RX ADMIN — KETAMINE HYDROCHLORIDE 25 MG: 50 INJECTION, SOLUTION INTRAMUSCULAR; INTRAVENOUS at 06:51:00

## 2024-09-16 RX ADMIN — DIPHENHYDRAMINE HYDROCHLORIDE 25 MG: 50 INJECTION INTRAMUSCULAR; INTRAVENOUS at 07:07:00

## 2024-09-16 RX ADMIN — ONDANSETRON 4 MG: 2 INJECTION INTRAMUSCULAR; INTRAVENOUS at 06:24:00

## 2024-09-16 RX ADMIN — CAFFEINE AND SODIUM BENZOATE 125 MG: 125 INJECTION, SOLUTION INTRAMUSCULAR; INTRAVENOUS at 06:31:00

## 2024-09-16 NOTE — PROGRESS NOTES
[facility] / [facility]  ECT Procedure Note    Meli Antonio Patient Status:  Outpatient   Age/Gender 73 year old female   MRN [id number]    Location [facility] POST ANESTHESIA CARE UNIT Attending No att. providers found   Hosp Day # 0 PCP SIRI TABOR     ECT Number: #15 total-#5 unilateral-#1 in series    Diagnosis: Major Depression Recurrent Severe Without Psychotic Features. F33.2    Type of ECT:  Unilateral, non-dominant D'Rustam    Place of Service:  Outpatient    Settings:   1.  Energy Percentage: 75%    2.  Program:  Low 0.5    Pre-ECT Evaluation    Symptoms:      Prior to procedure, reviewed with treatment team correct patient, time of procedure and type of ECT.  Also reviewed with anesthesia pre-ECT medications    Patient struggling more with mood.  She missed ECT because she was hospitalized .  She notes that she has become more depressed with less interest,  energy, and motivation.  She is still functioning somewhat.  She does not sleep as well.  She denies suicidal thoughts.  She is not nearly as depressed as she had done prior to starting ECT.  We discussed starting with weekly ECT to see if her symptoms improve, however, she may need more frequent treatments temporarily to fully resolve symptoms.    The patient continues to retain capacity to consent for ECT.    Risk/Benefits:  Discussed with patient side effects of ECT including headache, teeth, jaw, cardiac, pulmonary, NPO, aspiration, allergic reactions, anesthetic reactions, musculoskeletal, neurologic, morbidity/mortality, potential lack of efficacy, unilateral/bilateral ECT, relapse/maintenance issues, cognitive risks including memory, concentration, cognition, and other risks.    Side Effects:  Patient notes no cognitive/physical complaints    Exam:  Mood: sad  Affect: Congruent and restricted but reactive  Memory:  intact immediate, recent, remote and as evidenced by ability to present consistent  history  Concentration:   good and as assessed by  ability to concentrate on our conversation  Suicidal ideation: no suicidal ideation    Patient Monitored:  B/P, EKG, EEG, Pulse Ox, Left Ankle Cuff    Pre-ECT Medications:   Robinul 0.1 mg IV, Zofran 4 mg IV, and caffeine 125 mg IV       ECT Medications:   Anesthetic  Etomidate 10 mg IV followed by ketamine 25 mg IV and Succinylcholine 60 mg IV       Seizure Duration:  Motor: 38 seconds       EE seconds    Post-ECT Condition:  Treatment unremarkable    ECT Medications: Benadryl 25 mg nightly    Meli Adames    2024

## 2024-09-16 NOTE — PROGRESS NOTES
[facility] / [facility]  ECT History & Physical    Meli Antonio Patient Status:  Outpatient   Age/Gender 73 year old female MRN [id number]   Location [facility] POST ANESTHESIA CARE UNIT Attending Meli Adames MD   Hosp Day # 0 PCP SIRI TABOR     Date of Service: 2024    Diagnosis:  Major Depression Recurrent Severe Without Psychotic Features. F33.2    Procedure:  Unilateral, non-dominant D'Rustam    HPI: Mood has been more depressed with delaying ECT.  No physical complaints      Medical History:  Past Medical History:    Anxiety state    Back problem    COMPRESSION FX    Cataract    Depression    Esophageal reflux    GERD (gastroesophageal reflux disease)    Heart valve disease    MVP--congenital    Hematoma and contusion of liver    STATES HAS BEEN THERE FOR MANY YEARS    History of fractured kneecap    RIGHT    IBS (irritable bowel syndrome)    Mitral prolapse    Osteoarthritis    Visual impairment    glasses       Surgical History:  Past Surgical History:   Procedure Laterality Date          Correct bunion,othr methods      Correct bunion,simple      BILAT.     Dilation/curettage,diagnostic      FOR \"MOLE PREGNANCY\"    Other Right     ORIF RIGHT ANKLE    Spine surgery procedure unlisted      cervical spine 2020    Total knee replacement         Family History:  Family History   Problem Relation Age of Onset    Heart Disorder Father     Cancer Mother         ESOPHAGUS       Social History:  Social History     Socioeconomic History    Marital status:      Spouse name: Not on file    Number of children: Not on file    Years of education: Not on file    Highest education level: Not on file   Occupational History    Not on file   Tobacco Use    Smoking status: Former     Current packs/day: 2.00     Average packs/day: 2.0 packs/day for 15.0 years (30.0 ttl pk-yrs)     Types: Cigarettes    Smokeless tobacco: Never    Tobacco comments:     QUIT IN    Vaping Use     Vaping status: Never Used   Substance and Sexual Activity    Alcohol use: No    Drug use: No    Sexual activity: Not on file   Other Topics Concern    Not on file   Social History Narrative    Not on file     Social Determinants of Health     Financial Resource Strain: Low Risk  (8/16/2024)    Received from [facility]    Overall Financial Resource Strain (CARDIA)     Difficulty of Paying Living Expenses: Not hard at all   Food Insecurity: No Food Insecurity (8/16/2024)    Received from [facility]    Hunger Vital Sign     Worried About Running Out of Food in the Last Year: Never true     Ran Out of Food in the Last Year: Never true   Transportation Needs: No Transportation Needs (8/16/2024)    Received from [facility]    PRAPARE - Transportation     Lack of Transportation (Medical): No     Lack of Transportation (Non-Medical): No   Physical Activity: Not on file   Stress: Not on file   Social Connections: Unknown (3/13/2021)    [facility], [facility]    Social Connections     Conversations with friends/family/neighbors per week: Not on file   Housing Stability: Unknown (8/16/2024)    Received from [facility]    Housing Stability Vital Sign     Unable to Pay for Housing in the Last Year: No     Number of Places Lived in the Last Year: Not on file     Unstable Housing in the Last Year: No       ROS:  unremarkable    Physical Exam:   /78 (BP Location: Left arm)   Pulse 74   Temp 98.1 °F (36.7 °C) (Temporal)   Resp 10   Ht 62\"   Wt 53.5 kg (118 lb)   SpO2 95%   BMI 21.58 kg/m²     General Appearance:    Alert, cooperative, no distress, appears stated age   Head:    Normocephalic, without obvious abnormality, atraumatic   Eyes:    PERRL, conjunctiva/corneas clear, EOM's intact   Nose:   Nares normal, septum midline, mucosa normal, no drainage    or sinus tenderness    Throat:   Lips, mucosa, and tongue normal; teeth and gums normal   Neck:   Supple, symmetrical, trachea midline   Lungs:     Clear to auscultation bilaterally, respirations unlabored    Heart:    Regular rate and rhythm, S1 and S2 normal, no murmur, rub or gallop   Abdomen:     Soft, non-tender, bowel sounds active all four quadrants,     no masses, no organomegaly   Extremities:   Extremities normal, atraumatic, no cyanosis or edema   Pulses:   2+ and symmetric all extremities   Skin:   Skin color, texture, turgor normal, no rashes or lesions   Neurologic:   CNII-XII intact, normal strength, sensation and reflexes     throughout     Impressions & Plans:    Diagnosis:  Major Depression Recurrent Severe Without Psychotic Features. F33.2    Procedure:  Unilateral, non-dominant D'Rustam    I have discussed the risks and benefits and alternatives with the patient/family.  They understand and agree to proceed with plan of care.    Meli Adames MD  9/16/2024

## 2024-09-16 NOTE — ANESTHESIA PREPROCEDURE EVALUATION
PRE-OP EVALUATION    Patient Name: Meli Antonio    Admit Diagnosis: Major depressive disorder, recurrent episode, severe with catatonia (HCC) [F33.2, F06.1]    Pre-op Diagnosis: * No pre-op diagnosis entered *        Anesthesia Procedure: ECT(BEDSIDE)    * No surgeons found in log *    Pre-op vitals reviewed.  Temp: 98.1 °F (36.7 °C)  Pulse: 74  Resp: 10  BP: 104/78  SpO2: 95 %  Body mass index is 21.58 kg/m².    Current medications reviewed.  Hospital Medications:   [COMPLETED] diphenhydrAMINE (Benadryl) 50 mg/mL  injection        glucose (Dex4) 15 GM/59ML oral liquid 15 g  15 g Oral Q15 Min PRN    Or    glucose (Glutose) 40% oral gel 15 g  15 g Oral Q15 Min PRN    Or    glucose-vitamin C (Dex-4) chewable tab 4 tablet  4 tablet Oral Q15 Min PRN    Or    dextrose 50% injection 50 mL  50 mL Intravenous Q15 Min PRN    Or    glucose (Dex4) 15 GM/59ML oral liquid 30 g  30 g Oral Q15 Min PRN    Or    glucose (Glutose) 40% oral gel 30 g  30 g Oral Q15 Min PRN    Or    glucose-vitamin C (Dex-4) chewable tab 8 tablet  8 tablet Oral Q15 Min PRN    lactated ringers infusion   Intravenous Continuous    lactated ringers infusion   Intravenous Continuous    [COMPLETED] glycopyrrolate (Robinul) 0.2 MG/ML injection 0.1 mg  0.1 mg Intravenous Once    [COMPLETED] ondansetron (Zofran) 4 MG/2ML injection 4 mg  4 mg Intravenous Once    [COMPLETED] caffeine-sodium benzoate 125-125 mg/mL injection  125 mg Intravenous Once       Outpatient Medications:   (Not in a hospital admission)      Allergies: Radiology contrast iodinated dyes, Sulfa antibiotics, Methylprednisolone, Seroquel [quetiapine], Cinnamon, and Iodine (topical)      Anesthesia Evaluation    Patient summary reviewed.    Anesthetic Complications  (-) history of anesthetic complications         GI/Hepatic/Renal      (+) GERD                      (+) irritable bowel syndrome     Cardiovascular    Negative cardiovascular ROS.    Exercise tolerance: good     MET: >4                                   (-) FERREIRA  (-) orthopnea  (-) PND     Endo/Other      (+) diabetes and well controlled, type 2, not using insulin                         Pulmonary    Negative pulmonary ROS.           (-) shortness of breath  (-) recent URI          Neuro/Psych      (+) depression  (+) anxiety                              Past Surgical History:   Procedure Laterality Date          Correct bunion,othr methods      Correct bunion,simple      BILAT.     Dilation/curettage,diagnostic      FOR \"MOLE PREGNANCY\"    Other Right     ORIF RIGHT ANKLE    Spine surgery procedure unlisted      cervical spine 2020    Total knee replacement       Social History     Socioeconomic History    Marital status:    Tobacco Use    Smoking status: Former     Current packs/day: 2.00     Average packs/day: 2.0 packs/day for 15.0 years (30.0 ttl pk-yrs)     Types: Cigarettes    Smokeless tobacco: Never    Tobacco comments:     QUIT IN    Vaping Use    Vaping status: Never Used   Substance and Sexual Activity    Alcohol use: No    Drug use: No     History   Drug Use No     Available pre-op labs reviewed.  Lab Results   Component Value Date    WBC 7.0 2024    RBC 4.20 2024    HGB 12.7 2024    HCT 38.7 2024    MCV 87.7 2024    MCV 92.1 2024    MCH 30.2 2024    MCHC 34.9 2024    MCHC 32.8 2024    RDW 13.1 2024    .0 2024     Lab Results   Component Value Date     2024    K 4.6 2024     2024    CO2 25.0 2024    BUN 14 2024    CREATSERUM 1.06 (H) 2024     (H) 2024    CA 9.6 2024            Airway      Mallampati: I  Mouth opening: 3 FB  TM distance: < 4 cm  Neck ROM: full Cardiovascular    Cardiovascular exam normal.  Rhythm: regular  Rate: normal  (-) murmur   Dental    Dentition appears grossly intact         Pulmonary    Pulmonary exam normal.  Breath sounds clear to  auscultation bilaterally.          (-) rales     Other findings              ASA: 2   Plan: general  NPO status verified and patient meets guidelines.        Comment: discussed  Plan/risks discussed with: patient            We discussed GA w/mask or ETT and possible scratchy throat and rarely dental damage.  We discussed analgesic plan and PONV prophylaxis.  The patient's questions were answered and consent was attained.

## 2024-09-16 NOTE — ANESTHESIA POSTPROCEDURE EVALUATION
[facility]    Meliyin Antonio Patient Status:  Outpatient   Age/Gender 73 year old female MRN [id number]   Location [facility] POST ANESTHESIA CARE UNIT Attending Meli Adames MD   Hosp Day # 0 PCP SIRI TABOR       Anesthesia Post-op Note        Procedure Summary       Date: 09/16/24 Room / Location: [facility] Post Anesthesia Care Unit    Anesthesia Start: 0638 Anesthesia Stop: 0700    Procedure: ECT(BEDSIDE) Diagnosis: Major depressive disorder, recurrent episode, severe with catatonia (HCC)    Scheduled Providers:  Responsible Provider: Jimy Lara MD    Anesthesia Type: general ASA Status: 2            Anesthesia Type: general    Vitals Value Taken Time   /77 09/16/24 0702   Temp  09/16/24 0702   Pulse 78 09/16/24 0702   Resp 16 09/16/24 0702   SpO2 98 09/16/24 0702       Patient Location: PACU    Anesthesia Type: general    Airway Patency: patent    Postop Pain Control: adequate    Mental Status: preanesthetic baseline    Nausea/Vomiting: none    Cardiopulmonary/Hydration status: stable euvolemic    Complications: no apparent anesthesia related complications    Postop vital signs: stable    Dental Exam: Unchanged from Preop

## 2024-09-16 NOTE — DISCHARGE INSTRUCTIONS
Discharge Instructions  Electroconvulsive Therapy    Activities:  You MUST arrange to have a responsible adult drive you home and have a responsible adult stay with you the rest of the day and overnight.  Do not drive today.  Do not operate any machinery today. Use kitchen equipment with caution.  Rest and take it easy today.  Do not take public transportation without the presence of another responsible adult for 24 hours    Medications:  Resume your regular medications when you get home  The nurse will instruct you not to take any NSAIDS (Advil, Aleve, Motrin, Ibuprofen) before 1 pm today because you were given a certain medication during the procedure.      Diet:  You may resume your regular diet when you get home  Do not drink alcohol for 24 hours    Additional Instructions:  Someone should call you to schedule any upcoming ECT treatments. Call as needed to schedule or cancel your ECT appointments [phone].  If you have any questions or concerns, please call your own psychiatrist.  For your safety, please do not wear make-up to any future ECT appointments.  For any questions regarding your ECT appointment, please call [facility] [phone].  For cancellations after hours, call [phone] and leave a message.    Expected Recovery:  As you awaken, you may experience one or more of the following:  Headache, nausea, temporary confusion, or muscle stiffness.  If these symptoms increase, become severe or are accompanied by a fever of more than 101, please seek medical attention.  The ECT may affect memory.  Many patients report loss of memory for events that occurred in the days, weeks or months surrounding the ECT.  Many of these memories may return, but not always.  Short-term memory may also be affected for months, but this can also be a result of the disorder that you have.

## 2024-09-18 NOTE — PROGRESS NOTES
Mohs Case Number:  Patient is a 73 year old , , female with past medical history of anxiety, depression, GERD and back pain who was admitted to the hospital for altered mental status and confusion. The patient has been demonstrating confusion, restlessness, agitation. Patient indicated for psych consult for evaluation and advise.  Consult Duration   The patient seen for over 50-minute, follow-up evaluation, over 50% counseling and coordinating care addressing  confusion, restlessness, agitation.    Record reviewed, communication with attending, communication with RN and patient seen face to face evaluation.  History of Present Illness:   Communicating with the team, indicated patient has been spending most of her time in bed.  Encouraged the team to have her in a recliner.    Patient receiving Abilify 10 mg, klonopin 0.5 mg TID, lexapro 10 mg,    The patient seen today laying in her recliner and  at bedside who provide us a space to communicate.    The patient today have not communicating verbally at all demonstrating limited understanding.  Patient was slightly restless with effort to reposition her posture.  Otherwise limited eye contact.  Upon engaging her attention the patient blood in her head that she knows she is not often.  Patient did not demonstrate understanding to the reason of her admission.    The patient presented somewhat chronic and about the provide in her half milligram of Ativan IV she was able to verbalize the name of her .    RN indicated that the patient was more talkative yesterday afternoon.  Patient has not been demonstrating any focal neurological symptoms otherwise seem mostly severely depressed with catatonic presentation.    Concerned that the patient depression has becoming melancholic with intense social isolation, disconnecting and hopelessness.    Otherwise the patient denies any suicidal ideation.  Upon questioning if she needs any psychiatric inpatient admission patient  Date Of Previous Biopsy (Optional): 7/31/24; [id number] did not respond.    Communicating with  indicated that patient is not talkative today.    Patient continues to demonstrate alternation in her mood and cognition with episodes of increased anxiety and restlessness.    Collateral obtained from psychiatric liaison from patients .     reports that patient has had uncontrollable movements, is taking her clothes off, has been confused and does not know the names of anybody.     says that since Friday she has had these difficulties and it seemed to have gotten worse the last 2 days where now, in addition to those difficulties, she also cannot control her bowels.  She says she has been soiling her clothes and her bed.   said he wonders if it is could be attributed to the recent prescribing of meds with codeine that were meant to manage a cough that she had.   also reports that patient has \"been in bed over a year\".  He says that she does not say much and sleeps a lot.  He says she is grieving the loss of 2 dogs that  5 years ago as well as grieving the loss of their adopted son, who 7 yrs ago, at 33 y/o walked out of their lives.   He said that pt has friends and she does occasionally go to lunch with them and does attend AA meetings once a week.  He said that she will eat the food that he cooks for her.    states that he is power of  for his wife.  Counselor advised him to bring copies of that paperwork to the hospital so that staff can scan it into the clinic record.      Past Psychiatric/Medication History:  1. Prior diagnoses: anxiety, depression  2. Past psychiatric inpatient: 1 psychiatric admission for severe depression few years ago with [facility].  3. Past outpatient history: Patient seen by psychiatrist.  4. Past suicide history: Denied any  5. Medication history: Abilify, bupropion, Lexapro, hydroxyzine.    Social History:   Patient lives with her  and adult son. She is a retired  Biopsy Photograph Reviewed: Yes teacher.    Patient has been sober from alcohol for 9 years. Patient uses marijuana gummies at night.     Family History:  Unknown family history  Medical History:   Past Medical History  Past Medical History:    Anxiety state    Back problem    COMPRESSION FX    Cataract    Depression    Esophageal reflux    Hematoma and contusion of liver    STATES HAS BEEN THERE FOR MANY YEARS    History of fractured kneecap    RIGHT    IBS (irritable bowel syndrome)    Mitral prolapse    Osteoarthritis       Past Surgical History  Past Surgical History:   Procedure Laterality Date          Correct bunion,othr methods      Correct bunion,simple      BILAT.     Dilation/curettage,diagnostic      FOR \"MOLE PREGNANCY\"    Other Right     ORIF RIGHT ANKLE    Spine surgery procedure unlisted      cervical spine 2020    Total knee replacement         Family History  Family History   Problem Relation Age of Onset    Heart Disorder Father     Cancer Mother         ESOPHAGUS       Social History  Social History     Socioeconomic History    Marital status:    Tobacco Use    Smoking status: Former     Current packs/day: 2.00     Average packs/day: 2.0 packs/day for 15.0 years (30.0 ttl pk-yrs)     Types: Cigarettes    Smokeless tobacco: Never    Tobacco comments:     QUIT IN    Vaping Use    Vaping status: Never Used   Substance and Sexual Activity    Alcohol use: No    Drug use: No     Social Determinants of Health     Financial Resource Strain: Low Risk  (2022)    [facility], [facility]    Overall Financial Resource Strain (CARDIA)     Difficulty of Paying Living Expenses: Not hard at all   Food Insecurity: No Food Insecurity (2024)    Food Insecurity     Food Insecurity: Never true   Transportation Needs: No Transportation Needs (2024)    Transportation Needs     Lack of Transportation: No    Received from [facility],  Referring Physician (Optional): [facility]    Social Connections   Housing Stability: Low Risk  (4/11/2024)    Housing Stability     Housing Instability: No           Current Medications:  Current Facility-Administered Medications   Medication Dose Route Frequency    enoxaparin (Lovenox) 40 MG/0.4ML SUBQ injection 40 mg  40 mg Subcutaneous Daily    ascorbic acid (Vitamin C) tab 500 mg  500 mg Oral Daily    cetirizine (ZyrTEC) tab 5 mg  5 mg Oral Nightly    clonazePAM (KlonoPIN) tab 0.5 mg  0.5 mg Oral TID    ARIPiprazole (Abilify) tab 10 mg  10 mg Oral Daily    acetaminophen (Tylenol Extra Strength) tab 500 mg  500 mg Oral Q4H PRN    melatonin tab 3 mg  3 mg Oral Nightly PRN    ondansetron (Zofran) 4 MG/2ML injection 4 mg  4 mg Intravenous Q6H PRN    metoclopramide (Reglan) 5 mg/mL injection 5 mg  5 mg Intravenous Q8H PRN    polyethylene glycol (PEG 3350) (Miralax) 17 g oral packet 17 g  17 g Oral Daily PRN    sennosides (Senokot) tab 17.2 mg  17.2 mg Oral Nightly PRN    bisacodyl (Dulcolax) 10 MG rectal suppository 10 mg  10 mg Rectal Daily PRN    fleet enema (Fleet) 7-19 GM/118ML rectal enema 133 mL  1 enema Rectal Once PRN    LORazepam (Ativan) tab 1 mg  1 mg Oral Q6H PRN    fluticasone propionate (Flonase) 50 MCG/ACT nasal suspension 1 spray  1 spray Each Nare Daily    albuterol (Ventolin HFA) 108 (90 Base) MCG/ACT inhaler 2 puff  2 puff Inhalation Q4H PRN    cholecalciferol (Vitamin D3) tab 1,000 Units  1,000 Units Oral Daily    escitalopram (Lexapro) tab 10 mg  10 mg Oral Nightly    LORazepam (Ativan) 2 mg/mL injection 2 mg  2 mg Intramuscular Q6H PRN    haloperidol lactate (Haldol) 5 MG/ML injection 2 mg  2 mg Intramuscular Q6H PRN     Medications Prior to Admission   Medication Sig    cholecalciferol 1000 UNITS Oral Cap Take 1 capsule (1,000 Units total) by mouth daily.       Allergies  Allergies   Allergen Reactions    Radiology Contrast Iodinated Dyes HIVES     HIVES AND THROAT TIGHTENED WHILE HAVING AN MRI    Sulfa  Antibiotics HIVES     \"got sicker\"    Seroquel [Quetiapine] UNKNOWN    Cinnamon RASH       Review of Systems:   As by Admitting/Attending    Results:   Laboratory Data:  Lab Results   Component Value Date    WBC 12.1 (H) 04/13/2024    HGB 13.7 04/13/2024    HCT 40.6 04/13/2024    .0 04/13/2024    CREATSERUM 0.79 04/16/2024    BUN 16 04/16/2024     04/16/2024    K 3.6 04/16/2024     04/16/2024    CO2 24.0 04/16/2024     (H) 04/16/2024    CA 9.7 04/16/2024    ALB 4.1 04/13/2024    ALKPHO 78 04/13/2024    TP 6.4 04/13/2024    AST 22 04/13/2024    ALT 45 04/13/2024    T4F 1.7 04/12/2024    TSH 0.528 (L) 04/12/2024     01/19/2022    DDIMER <0.27 01/12/2023    CRP 8.33 (H) 09/04/2020    MG 2.2 04/16/2024    TROP <0.045 08/26/2020    B12 484 04/12/2024    ETOH <3 04/10/2024         Imaging:  No results found.    Vital Signs:   Blood pressure 114/78, pulse 109, temperature 98.2 °F (36.8 °C), temperature source Axillary, resp. rate 22, height 60\", weight 49.1 kg (108 lb 3.2 oz), SpO2 93%.    Mental Status Exam:   Appearance: Stated age female, in hospital gown, laying down in hospital recliner.  Psychomotor: The patient demonstrating slight restlessness with repositioning herself in the recliner otherwise no agitation but easily falling asleep.  Orientation: Alert and oriented to self, date and location but not to exact condition.  Gait: Not evaluated.  Attitude/Coorperation: Patient has limited cooperation due to her distractibility.  Behavior: Guarded behavior  Speech: Speech was very limited today.  Mood: Patient demonstrated apathy with lack of ability to express emotion.  Affect: Dysphoric but restricted affect.  Thought process: Blocking thought process.  Thought content: Patient repeatedly denied any suicidal or homicidal ideation.  Perceptions: Patient denying any auditory visual hallucination  Concentration: Grossly distracted  Memory: Grossly limited  Intellect: Average.  Judgment  Consent Type: Consent 1 (Standard) Eye Shield Used: No and Insight: Questionable.     Impression:     Catatonic excitable type.  Serotonin syndrome.  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  Delirium due to another medical condition.        Patient is a 73 year old , , female with past medical history of anxiety, depression who was admitted to the hospital for Altered mental status, unspecified altered mental status type: The patient has been demonstrating confusion, restlessness, agitation.    The patient is restless, irritable, unable to communicate, keep moving back-and-forth and aimless movement with inability to express her emotion and not communicating verbally.  Patient has been taking multiple antidepressant medication with recent cough medicine with codeine indicate the high possibility for serotonin syndrome versus excited catatonia.    4/12/2024: The patient who presented with catatonic anxiety type mixed with serotonin syndrome due to excessive antidepressant medication and mixed with codeine.  The patient has been demonstrating improvement in her cognition, ability to communicate verbally otherwise demonstrating severe depression with no safety concern and no agitation.    4/12/2024: Patient continues to demonstrate alternation in her mood and cognition with episodes of increased anxiety and restlessness.    4/15/2024: The patient continued demonstrating depression process and found withdrawn.  No restlessness or agitation has been observed or reported.    4/16/2024: The patient who has been demonstrating catatonic presentation with limited verbal communication continue demonstrate severe depressive episode would like for ability to care for self.    Discussed risk and benefit, acknowledging the current symptom and severity.  At this point, I would recommend the following approach:     Focus on safety.    Focus on education and support.  Encourage patient to be in her recliner.  Change Abilify to 5  Initial Size Of Lesion: 1.1 mg nightly.  Increase Lexapro to 15 mg nightly.  Change Klonopin to 0.5 mg nightly.  Utilize lorazepam 1 mg IV every 6 hours as needed for agitation and anxiety.  Coordinate plan with team    Orders This Visit:  Orders Placed This Encounter   Procedures    CBC With Differential With Platelet    Comp Metabolic Panel (14)    Urinalysis with Culture Reflex    Scan slide    Drug screen 7 w/out confirmation, urine    Ethyl Alcohol    Acetaminophen (Tylenol), S    Salicylate, Serum    Magnesium    Vitamin B12    TSH W Reflex To Free T4    T4, FREE (S)    Potassium    Free T3 (Triiodothryronine)    Potassium    Basic Metabolic Panel (8)       Meds This Visit:  Requested Prescriptions      No prescriptions requested or ordered in this encounter     Jason Mckenzie MD  4/16/2024    Note to Patient: The 21st Century Cures Act makes medical notes like these available to patients in the interest of transparency. However, be advised this is a medical document. It is intended as peer to peer communication. It is written in medical language and may contain abbreviations or verbiage that are unfamiliar. It may appear blunt or direct. Medical documents are intended to carry relevant information, facts as evident, and the clinical opinion of the practitioner. This note may have been transcribed using a voice dictation system. Voice recognition errors may occur. This should not be taken to alter the content or meaning of this note.       Number Of Stages: 1 Primary Defect Length In Cm (Final Defect Size - Required For Flaps/Grafts): 1.3 Primary Defect Width In Cm (Final Defect Size - Required For Flaps/Grafts): 1.4 Primary Defect Depth In Cm (Optional But Required For Some Insurers): 0 Repair Type: Intermediate Layered Repair Essential hypertension Which Instrument Did You Use For Dermabrasion?: Wire Brush Which Eyelid Repair Cpt Are You Using?: 93532 Oculoplastic Surgeon Procedure Text (A): After obtaining clear surgical margins the patient was sent to oculoplastics for surgical repair.  The patient understands they will receive post-surgical care and follow-up from the referring physician's office. Otolaryngologist Procedure Text (A): After obtaining clear surgical margins the patient was sent to otolaryngology for surgical repair.  The patient understands they will receive post-surgical care and follow-up from the referring physician's office. Plastic Surgeon Procedure Text (A): After obtaining clear surgical margins the patient was sent to plastics for surgical repair.  The patient understands they will receive post-surgical care and follow-up from the referring physician's office. Mid-Level Procedure Text (A): After obtaining clear surgical margins the patient was sent to a mid-level provider for surgical repair.  The patient understands they will receive post-surgical care and follow-up from the mid-level provider. Provider Procedure Text (A): After obtaining clear surgical margins the defect was repaired by another provider. Asc Procedure Text (A): After obtaining clear surgical margins the patient was sent to an ASC for surgical repair.  The patient understands they will receive post-surgical care and follow-up from the ASC physician. Simple / Intermediate / Complex Repair - Final Wound Length In Cm: 3.7 Suturegard Retention Suture: 2-0 Nylon Retention Suture Bite Size: 3 mm Length To Time In Minutes Device Was In Place: 10 Undermining Type: Entire Wound Debridement Text: The wound edges were debrided prior to proceeding with the closure to facilitate wound healing. Helical Rim Text: The closure involved the helical rim. Vermilion Border Text: The closure involved the vermilion border. Nostril Rim Text: The closure involved the nostril rim. Retention Suture Text: Retention sutures were placed to support the closure and prevent dehiscence. Area H Indication Text: Tumors in this location are included in Area H (eyelids, eyebrows, nose, lips, chin, ear, pre-auricular, post-auricular, temple, genitalia, hands, feet, ankles and areola).  Tissue conservation is critical in these anatomic locations. Area M Indication Text: Tumors in this location are included in Area M (cheek, forehead, scalp, neck, jawline and pretibial skin).  Mohs surgery is indicated for tumors in these anatomic locations. Area L Indication Text: Tumors in this location are included in Area L (trunk and extremities).  Mohs surgery is indicated for larger tumors, or tumors with aggressive histologic features, in these anatomic locations. Special Stains Stage 1 - Results: Base On Clearance Noted Above Stage 2: Additional Anesthesia Type: 1% lidocaine with 1:100,000 epinephrine and 408mcg clindamycin/ml and a 1:10 solution of 8.4% sodium bicarbonate Stage 3: Additional Anesthesia Type: 1% lidocaine with epinephrine Staging Info: By selecting yes to the question above you will include information on AJCC 8 tumor staging in your Mohs note. Information on tumor staging will be automatically added for SCCs on the head and neck. AJCC 8 includes tumor size, tumor depth, perineural involvement and bone invasion. Tumor Depth: Less than 6mm from granular layer and no invasion beyond the subcutaneous fat Was The Patient On Physician Recommended Anticoagulation Therapy?: Please Select the Appropriate Response Medical Necessity Statement: Based on my medical judgement, Mohs surgery is the most appropriate treatment for this cancer compared to other treatments. Alternatives Discussed Intro (Do Not Add Period): I discussed alternative treatments to Mohs surgery and specifically discussed the risks and benefits of Consent 1/Introductory Paragraph: The rationale for Mohs was explained to the patient and consent was obtained. The procedure was fully explained. The risks, benefits, and alternatives to therapy including but not limited to cryosurgery, curettage, excision, topical/intralesional chemotherapy, topical imiquimod, radiation, and foregoing treatment were discussed in detail. The risks of infection, scarring, bleeding, prolonged wound healing, incomplete removal, allergy to anesthesia, nerve injury and recurrence were addressed. Patient was allowed the opportunity to ask questions to the provider, and all questions were answered to patient's satisfaction. Prior to the procedure, the treatment site was clearly identified and confirmed by the patient. All components of Universal Protocol/PAUSE Rule completed. Consent 2/Introductory Paragraph: Mohs surgery was explained to the patient and consent was obtained. The risks, benefits and alternatives to therapy were discussed in detail. Specifically, the risks of infection, scarring, bleeding, prolonged wound healing, incomplete removal, allergy to anesthesia, nerve injury and recurrence were addressed. Prior to the procedure, the treatment site was clearly identified and confirmed by the patient. All components of Universal Protocol/PAUSE Rule completed. Consent 3/Introductory Paragraph: I gave the patient a chance to ask questions they had about the procedure.  Following this I explained the Mohs procedure and consent was obtained. The risks, benefits and alternatives to therapy were discussed in detail. Specifically, the risks of infection, scarring, bleeding, prolonged wound healing, incomplete removal, allergy to anesthesia, nerve injury and recurrence were addressed. Prior to the procedure, the treatment site was clearly identified and confirmed by the patient. All components of Universal Protocol/PAUSE Rule completed. Consent (Temporal Branch)/Introductory Paragraph: The rationale for Mohs was explained to the patient and consent was obtained. The risks, benefits and alternatives to therapy were discussed in detail. Specifically, the risks of damage to the temporal branch of the facial nerve, infection, scarring, bleeding, prolonged wound healing, incomplete removal, allergy to anesthesia, and recurrence were addressed. Prior to the procedure, the treatment site was clearly identified and confirmed by the patient. All components of Universal Protocol/PAUSE Rule completed. Consent (Marginal Mandibular)/Introductory Paragraph: The rationale for Mohs was explained to the patient and consent was obtained. The risks, benefits and alternatives to therapy were discussed in detail. Specifically, the risks of damage to the marginal mandibular branch of the facial nerve, infection, scarring, bleeding, prolonged wound healing, incomplete removal, allergy to anesthesia, and recurrence were addressed. Prior to the procedure, the treatment site was clearly identified and confirmed by the patient. All components of Universal Protocol/PAUSE Rule completed. Consent (Spinal Accessory)/Introductory Paragraph: The rationale for Mohs was explained to the patient and consent was obtained. The risks, benefits and alternatives to therapy were discussed in detail. Specifically, the risks of damage to the spinal accessory nerve, infection, scarring, bleeding, prolonged wound healing, incomplete removal, allergy to anesthesia, and recurrence were addressed. Prior to the procedure, the treatment site was clearly identified and confirmed by the patient. All components of Universal Protocol/PAUSE Rule completed. Consent (Near Eyelid Margin)/Introductory Paragraph: The rationale for Mohs was explained to the patient and consent was obtained. The risks, benefits and alternatives to therapy were discussed in detail. Specifically, the risks of ectropion or eyelid deformity, infection, scarring, bleeding, prolonged wound healing, incomplete removal, allergy to anesthesia, nerve injury and recurrence were addressed. Prior to the procedure, the treatment site was clearly identified and confirmed by the patient. All components of Universal Protocol/PAUSE Rule completed. Consent (Ear)/Introductory Paragraph: The rationale for Mohs was explained to the patient and consent was obtained. The risks, benefits and alternatives to therapy were discussed in detail. Specifically, the risks of ear deformity, infection, scarring, bleeding, prolonged wound healing, incomplete removal, allergy to anesthesia, nerve injury and recurrence were addressed. Prior to the procedure, the treatment site was clearly identified and confirmed by the patient. All components of Universal Protocol/PAUSE Rule completed. Consent (Nose)/Introductory Paragraph: The rationale for Mohs was explained to the patient and consent was obtained. The risks, benefits and alternatives to therapy were discussed in detail. Specifically, the risks of nasal deformity, changes in the flow of air through the nose, infection, scarring, bleeding, prolonged wound healing, incomplete removal, allergy to anesthesia, nerve injury and recurrence were addressed. Prior to the procedure, the treatment site was clearly identified and confirmed by the patient. All components of Universal Protocol/PAUSE Rule completed. Consent (Lip)/Introductory Paragraph: The rationale for Mohs was explained to the patient and consent was obtained. The risks, benefits and alternatives to therapy were discussed in detail. Specifically, the risks of lip deformity, changes in the oral aperture, infection, scarring, bleeding, prolonged wound healing, incomplete removal, allergy to anesthesia, nerve injury and recurrence were addressed. Prior to the procedure, the treatment site was clearly identified and confirmed by the patient. All components of Universal Protocol/PAUSE Rule completed. Consent (Scalp)/Introductory Paragraph: The rationale for Mohs was explained to the patient and consent was obtained. The risks, benefits and alternatives to therapy were discussed in detail. Specifically, the risks of changes in hair growth pattern secondary to repair, infection, scarring, bleeding, prolonged wound healing, incomplete removal, allergy to anesthesia, nerve injury and recurrence were addressed. Prior to the procedure, the treatment site was clearly identified and confirmed by the patient. All components of Universal Protocol/PAUSE Rule completed. Detail Level: Detailed Postop Diagnosis: same Surgeon: Martin Griggs MD Anesthesia Type: 1% lidocaine without epinephrine Anesthesia Volume In Cc: 6 Hemostasis: Electrocautery Estimated Blood Loss (Cc): minimal Repair Anesthesia Method: local infiltration Anesthesia Volume In Cc: 3 Brow Lift Text: A midfrontal incision was made medially to the defect to allow access to the tissues just superior to the left eyebrow. Following careful dissection inferiorly in a supraperiosteal plane to the level of the left eyebrow, several 3-0 monocryl sutures were used to resuspend the eyebrow orbicularis oculi muscular unit to the superior frontal bone periosteum. This resulted in an appropriate reapproximation of static eyebrow symmetry and correction of the left brow ptosis. Deep Sutures: 4-0 Monocryl Epidermal Closure: running Suturegard Intro: Intraoperative tissue expansion was performed, utilizing the SUTUREGARD device, in order to reduce wound tension. Suturegard Body: The suture ends were repeatedly re-tightened and re-clamped to achieve the desired tissue expansion. Hemigard Intro: Due to skin fragility and wound tension, it was decided to use HEMIGARD adhesive retention suture devices to permit a linear closure. The skin was cleaned and dried for a 6cm distance away from the wound. Excessive hair, if present, was removed to allow for adhesion. Hemigard Postcare Instructions: The HEMIGARD strips are to remain completely dry for at least 5-7 days. Donor Site Anesthesia Type: same as repair anesthesia Graft Donor Site Bandage (Optional-Leave Blank If You Don't Want In Note): Skin tapes with mastisol and a pressure bandage were applied to the donor site. Closure 2 Information: This tab is for additional flaps and grafts, including complex repair and grafts and complex repair and flaps. You can also specify a different location for the additional defect, if the location is the same you do not need to select a new one. We will insert the automated text for the repair you select below just as we do for solitary flaps and grafts. Please note that at this time if you select a location with a different insurance zone you will need to override the ICD10 and CPT if appropriate. Closure 3 Information: This tab is for additional flaps and grafts above and beyond our usual structured repairs.  Please note if you enter information here it will not currently bill and you will need to add the billing information manually. Wound Care: Petrolatum Dressing: pressure dressing Dressing (No Sutures): dry sterile dressing Suture Removal: 10 days Unna Boot Text: An Unna boot was placed to help immobilize the limb and facilitate more rapid healing. Home Suture Removal Text: Patient was provided instructions on removing sutures and will remove their sutures at home.  If they have any questions or difficulties they will call the office. Post-care instructions were reviewed in detail with the patient. A detailed post-care handout was also given. Patient is not to engage in any heavy lifting, vigorous exercise, or swimming for the next 14 days. Should the patient develop any fevers, chills, bleeding, severe pain, or the wound becomes increasingly tender, warm, swollen, red, drains a puss-like thick substance, or develops a strong odor, patient will contact the office immediately. Pain Refusal Text: I offered to prescribe pain medication but the patient refused to take this medication. Mauc Instructions: By selecting yes to the question below the MAUC number will be added into the note.  This will be calculated automatically based on the diagnosis chosen, the size entered, the body zone selected (H,M,L) and the specific indications you chose. You will also have the option to override the Mohs AUC if you disagree with the automatically calculated number and this option is found in the Case Summary tab. Where Do You Want The Question To Include Opioid Counseling Located?: Medication Tab Eye Protection Verbiage: Before proceeding with the stage, a plastic scleral shield was inserted. The globe was anesthetized with a few drops of 0.%5 tetracaine. Then, an appropriate sized scleral shield was chosen and coated with lacrilube ointment. The shield was gently inserted and left in place for the duration of each stage. After the stage was completed, the shield was gently removed. Mohs Method Verbiage: An incision at a 45 degree angle following the standard Mohs approach was done and the specimen was harvested as a microscopic controlled layer. Surgeon/Pathologist Verbiage (Will Incorporate Name Of Surgeon From Intro If Not Blank): operated in two distinct and integrated capacities as the surgeon and pathologist. Mohs Histo Method Verbiage: Each section was then chromacoded and processed in the Mohs lab using the Mohs protocol and submitted for frozen section. Subsequent Stages Histo Method Verbiage: Using a similar technique to that described above, a thin layer of tissue was removed from all areas where tumor was visible on the previous stage.  The tissue was again oriented, mapped, dyed, and processed as above. Mohs Rapid Report Verbiage: The area of clinically evident tumor was marked with skin marking ink and appropriately hatched.  The initial incision was made following the Mohs approach through the skin.  The specimen was taken to the lab, divided into the necessary number of pieces, chromacoded and processed according to the Mohs protocol.  This was repeated in successive stages until a tumor free defect was achieved. Complex Repair Preamble Text (Leave Blank If You Do Not Want): Extensive wide undermining was performed. Intermediate Repair Preamble Text (Leave Blank If You Do Not Want): Undermining was performed with blunt dissection. Graft Cartilage Fenestration Text: The cartilage was fenestrated with a 2mm punch biopsy to help facilitate graft survival and healing. Non-Graft Cartilage Fenestration Text: The cartilage was fenestrated with a 2mm punch biopsy to help facilitate healing. Secondary Intention Text (Leave Blank If You Do Not Want): The defect will heal with secondary intention. No Repair - Repaired With Adjacent Surgical Defect Text (Leave Blank If You Do Not Want): After obtaining clear surgical margins the defect was repaired concurrently with another surgical defect which was in close approximation. Adjacent Tissue Transfer Text: The defect edges were debeveled with a #15 scalpel blade.  With the objective of achieving maximal preservation of function and appearance, an adjacent tissue transfer was deemed most appropriate.  Using a sterile surgical marker, an appropriate flap was drawn incorporating the defect and placing the expected incisions within the relaxed skin tension lines where possible.    The area thus outlined was incised with a #15 scalpel blade.  The flap was elevated and skin margins were undermined to an appropriate distance in all directions.  After obtaining complete hemostasis, the flap was transferred into position. Advancement Flap (Single) Text: The defect edges were debeveled with a #15 scalpel blade.  With the objective of achieving maximal preservation of function and appearance,  a single advancement flap was deemed most appropriate.  Using a sterile surgical marker, an appropriate advancement flap was drawn incorporating the defect and placing the expected incisions within the relaxed skin tension lines where possible.    The area thus outlined was incised with a #15 scalpel blade.  The skin margins were undermined to an appropriate distance in all directions. \\nAfter obtaining complete hemostasis, the flap was advanced into position. Advancement Flap (Double) Text: The defect edges were debeveled with a #15 scalpel blade. With the objective of achieving maximal preservation of function and appearance, a double advancement flap was deemed most appropriate.  Using a sterile surgical marker, the appropriate advancement flaps were drawn incorporating the defect and placing the expected incisions within the relaxed skin tension lines where possible.    The area thus outlined was incised with a #15 scalpel blade.  The skin margins were undermined to an appropriate distance in all directions.  After obtaining complete hemostasis, the flaps were advanced into position. Advancement-Rotation Flap Text: The defect edges were debeveled with a #15 scalpel blade.  Given the location of the defect, shape of the defect and the proximity to free margins an advancement-rotation flap was deemed most appropriate.  Using a sterile surgical marker, an appropriate flap was drawn incorporating the defect and placing the expected incisions within the relaxed skin tension lines where possible. The area thus outlined was incised deep to adipose tissue with a #15 scalpel blade.  The skin margins were undermined to an appropriate distance in all directions utilizing iris scissors. Alar Island Pedicle Flap Text: The defect edges were debeveled with a #15 scalpel blade.  Given the location of the defect, shape of the defect and the proximity to the alar rim an island pedicle advancement flap was deemed most appropriate.  Using a sterile surgical marker, an appropriate advancement flap was drawn incorporating the defect, outlining the appropriate donor tissue and placing the expected incisions within the nasal ala running parallel to the alar rim. The area thus outlined was incised with a #15 scalpel blade.  The skin margins were undermined minimally to an appropriate distance in all directions around the primary defect and laterally outward around the island pedicle utilizing iris scissors.  There was minimal undermining beneath the pedicle flap. A-T Advancement Flap Text: The defect edges were debeveled with a #15 scalpel blade.  Given the location of the defect, shape of the defect and the proximity to free margins an A-T advancement flap was deemed most appropriate.  Using a sterile surgical marker, an appropriate advancement flap was drawn incorporating the defect and placing the expected incisions within the relaxed skin tension lines where possible.    The area thus outlined was incised deep to adipose tissue with a #15 scalpel blade.  The skin margins were undermined to an appropriate distance in all directions utilizing iris scissors. Banner Transposition Flap Text: The defect edges were debeveled with a #15 scalpel blade.  Given the location of the defect and the proximity to free margins a Banner transposition flap was deemed most appropriate.  Using a sterile surgical marker, an appropriate flap drawn around the defect. The area thus outlined was incised deep to adipose tissue with a #15 scalpel blade.  The skin margins were undermined to an appropriate distance in all directions utilizing iris scissors. Bilateral Helical Rim Advancement Flap Text: The defect edges were debeveled with a #15 blade scalpel.  Given the location of the defect and the proximity to free margins (helical rim) a bilateral helical rim advancement flap was deemed most appropriate.  Using a sterile surgical marker, the appropriate advancement flaps were drawn incorporating the defect and placing the expected incisions between the helical rim and antihelix where possible.  The area thus outlined was incised through and through with a #15 scalpel blade.  With a skin hook and iris scissors, the flaps were gently and sharply undermined and freed up. Bilateral Rotation Flap Text: The defect edges were debeveled with a #15 scalpel blade. Given the location of the defect, shape of the defect and the proximity to free margins a bilateral rotation flap was deemed most appropriate. Using a sterile surgical marker, an appropriate rotation flap was drawn incorporating the defect and placing the expected incisions within the relaxed skin tension lines where possible. The area thus outlined was incised deep to adipose tissue with a #15 scalpel blade. The skin margins were undermined to an appropriate distance in all directions utilizing iris scissors. Following this, the designed flap was carried over into the primary defect and sutured into place. Bilobed Flap Text: The defect edges were debeveled with a #15 scalpel blade.  Given the location of the defect and the proximity to free margins a bilobe flap was deemed most appropriate.  Using a sterile surgical marker, an appropriate bilobe flap drawn around the defect.    The area thus outlined was incised deep to adipose tissue with a #15 scalpel blade.  The skin margins were undermined to an appropriate distance in all directions utilizing iris scissors. Bilobed Transposition Flap Text: The defect edges were debeveled with a #15 scalpel blade.  Given the location of the defect and the proximity to free margins a bilobed transposition flap was deemed most appropriate.  Using a sterile surgical marker, an appropriate bilobe flap drawn around the defect.    The area thus outlined was incised deep to adipose tissue with a #15 scalpel blade.  The skin margins were undermined to an appropriate distance in all directions utilizing iris scissors. Bi-Rhombic Flap Text: The defect edges were debeveled with a #15 scalpel blade.  Given the location of the defect and the proximity to free margins a bi-rhombic flap was deemed most appropriate.  Using a sterile surgical marker, an appropriate rhombic flap was drawn incorporating the defect. The area thus outlined was incised deep to adipose tissue with a #15 scalpel blade.  The skin margins were undermined to an appropriate distance in all directions utilizing iris scissors. Burow's Advancement Flap Text: The defect edges were debeveled with a #15 scalpel blade.  Given the location of the defect and the proximity to free margins a Burow's advancement flap was deemed most appropriate.  Using a sterile surgical marker, the appropriate advancement flap was drawn incorporating the defect and placing the expected incisions within the relaxed skin tension lines where possible.    The area thus outlined was incised deep to adipose tissue with a #15 scalpel blade.  The skin margins were undermined to an appropriate distance in all directions utilizing iris scissors. Chonodrocutaneous Helical Advancement Flap Text: The defect edges were debeveled with a #15 scalpel blade.  Given the location of the defect and the proximity to free margins a chondrocutaneous helical advancement flap was deemed most appropriate.  Using a sterile surgical marker, the appropriate advancement flap was drawn incorporating the defect and placing the expected incisions within the relaxed skin tension lines where possible.    The area thus outlined was incised deep to adipose tissue with a #15 scalpel blade.  The skin margins were undermined to an appropriate distance in all directions utilizing iris scissors. Crescentic Advancement Flap Text: The defect edges were debeveled with a #15 scalpel blade.  Given the location of the defect and the proximity to free margins a crescentic advancement flap was deemed most appropriate.  Using a sterile surgical marker, the appropriate advancement flap was drawn incorporating the defect and placing the expected incisions within the relaxed skin tension lines where possible.    The area thus outlined was incised deep to adipose tissue with a #15 scalpel blade.  The skin margins were undermined to an appropriate distance in all directions utilizing iris scissors. Dorsal Nasal Flap Text: The defect edges were debeveled with a #15 scalpel blade.  Given the location of the defect and the proximity to free margins a dorsal nasal flap was deemed most appropriate.  Using a sterile surgical marker, an appropriate dorsal nasal flap was drawn around the defect.    The area thus outlined was incised deep to adipose tissue with a #15 scalpel blade.  The skin margins were undermined to an appropriate distance in all directions utilizing iris scissors. Double Island Pedicle Flap Text: The defect edges were debeveled with a #15 scalpel blade.  Given the location of the defect, shape of the defect and the proximity to free margins a double island pedicle advancement flap was deemed most appropriate.  Using a sterile surgical marker, an appropriate advancement flap was drawn incorporating the defect, outlining the appropriate donor tissue and placing the expected incisions within the relaxed skin tension lines where possible.    The area thus outlined was incised deep to adipose tissue with a #15 scalpel blade.  The skin margins were undermined to an appropriate distance in all directions around the primary defect and laterally outward around the island pedicle utilizing iris scissors.  There was minimal undermining beneath the pedicle flap. Double O-Z Flap Text: The defect edges were debeveled with a #15 scalpel blade.  Given the location of the defect, shape of the defect and the proximity to free margins a Double O-Z flap was deemed most appropriate.  Using a sterile surgical marker, an appropriate transposition flap was drawn incorporating the defect and placing the expected incisions within the relaxed skin tension lines where possible. The area thus outlined was incised deep to adipose tissue with a #15 scalpel blade.  The skin margins were undermined to an appropriate distance in all directions utilizing iris scissors. Double O-Z Plasty Text: The defect edges were debeveled with a #15 scalpel blade.  Given the location of the defect, shape of the defect and the proximity to free margins a Double O-Z plasty (double transposition flap) was deemed most appropriate.  Using a sterile surgical marker, the appropriate transposition flaps were drawn incorporating the defect and placing the expected incisions within the relaxed skin tension lines where possible. The area thus outlined was incised deep to adipose tissue with a #15 scalpel blade.  The skin margins were undermined to an appropriate distance in all directions utilizing iris scissors.  Hemostasis was achieved with electrocautery.  The flaps were then transposed into place, one clockwise and the other counterclockwise, and anchored with interrupted buried subcutaneous sutures. Double Z Plasty Text: The lesion was extirpated to the level of the fat with a #15 scalpel blade. Given the location of the defect, shape of the defect and the proximity to free margins a double Z-plasty was deemed most appropriate for repair. Using a sterile surgical marker, the appropriate transposition arms of the double Z-plasty were drawn incorporating the defect and placing the expected incisions within the relaxed skin tension lines where possible. The area thus outlined was incised deep to adipose tissue with a #15 scalpel blade. The skin margins were undermined to an appropriate distance in all directions utilizing iris scissors. The opposing transposition arms were then transposed and carried over into place in opposite direction and anchored with interrupted buried subcutaneous sutures. Ear Star Wedge Flap Text: The defect edges were debeveled with a #15 blade scalpel.  Given the location of the defect and the proximity to free margins (helical rim) an ear star wedge flap was deemed most appropriate.  Using a sterile surgical marker, the appropriate flap was drawn incorporating the defect and placing the expected incisions between the helical rim and antihelix where possible.  The area thus outlined was incised through and through with a #15 scalpel blade. Flip-Flop Flap Text: The defect edges were debeveled with a #15 blade scalpel.  Given the location of the defect and the proximity to free margins a flip-flop flap was deemed most appropriate. Using a sterile surgical marker, the appropriate flap was drawn incorporating the defect and placing the expected incisions between the helical rim and antihelix where possible.  The area thus outlined was incised through and through with a #15 scalpel blade. Following this, the designed flap was carried over into the primary defect and sutured into place. Hatchet Flap Text: The defect edges were debeveled with a #15 scalpel blade.  Given the location of the defect, shape of the defect and the proximity to free margins a hatchet flap was deemed most appropriate.  Using a sterile surgical marker, an appropriate hatchet flap was drawn incorporating the defect and placing the expected incisions within the relaxed skin tension lines where possible.    The area thus outlined was incised deep to adipose tissue with a #15 scalpel blade.  The skin margins were undermined to an appropriate distance in all directions utilizing iris scissors. Helical Rim Advancement Flap Text: The defect edges were debeveled with a #15 blade scalpel.  Given the location of the defect and the proximity to free margins (helical rim) a double helical rim advancement flap was deemed most appropriate.  Using a sterile surgical marker, the appropriate advancement flaps were drawn incorporating the defect and placing the expected incisions between the helical rim and antihelix where possible.  The area thus outlined was incised through and through with a #15 scalpel blade.  With a skin hook and iris scissors, the flaps were gently and sharply undermined and freed up. H Plasty Text: Given the location of the defect, shape of the defect and the proximity to free margins a H-plasty was deemed most appropriate for repair.  Using a sterile surgical marker, the appropriate advancement arms of the H-plasty were drawn incorporating the defect and placing the expected incisions within the relaxed skin tension lines where possible. The area thus outlined was incised deep to adipose tissue with a #15 scalpel blade. The skin margins were undermined to an appropriate distance in all directions utilizing iris scissors.  The opposing advancement arms were then advanced into place in opposite direction and anchored with interrupted buried subcutaneous sutures. Island Pedicle Flap Text: The defect edges were debeveled with a #15 scalpel blade.  Given the location of the defect, shape of the defect and the proximity to free margins an island pedicle advancement flap was deemed most appropriate.  Using a sterile surgical marker, an appropriate advancement flap was drawn incorporating the defect, outlining the appropriate donor tissue and placing the expected incisions within the relaxed skin tension lines where possible.    The area thus outlined was incised deep to adipose tissue with a #15 scalpel blade.  The skin margins were undermined to an appropriate distance in all directions around the primary defect and laterally outward around the island pedicle utilizing iris scissors.  There was minimal undermining beneath the pedicle flap. Island Pedicle Flap With Canthal Suspension Text: The defect edges were debeveled with a #15 scalpel blade.  Given the location of the defect, shape of the defect and the proximity to free margins an island pedicle advancement flap was deemed most appropriate.  Using a sterile surgical marker, an appropriate advancement flap was drawn incorporating the defect, outlining the appropriate donor tissue and placing the expected incisions within the relaxed skin tension lines where possible. The area thus outlined was incised deep to adipose tissue with a #15 scalpel blade.  The skin margins were undermined to an appropriate distance in all directions around the primary defect and laterally outward around the island pedicle utilizing iris scissors.  There was minimal undermining beneath the pedicle flap. A suspension suture was placed in the canthal tendon to prevent tension and prevent ectropion. Island Pedicle Flap-Requiring Vessel Identification Text: The defect edges were debeveled with a #15 scalpel blade.  Given the location of the defect, shape of the defect and the proximity to free margins an island pedicle advancement flap was deemed most appropriate.  Using a sterile surgical marker, an appropriate advancement flap was drawn, based on the axial vessel mentioned above, incorporating the defect, outlining the appropriate donor tissue and placing the expected incisions within the relaxed skin tension lines where possible.    The area thus outlined was incised deep to adipose tissue with a #15 scalpel blade.  The skin margins were undermined to an appropriate distance in all directions around the primary defect and laterally outward around the island pedicle utilizing iris scissors.  There was minimal undermining beneath the pedicle flap. Keystone Flap Text: The defect edges were debeveled with a #15 scalpel blade.  Given the location of the defect, shape of the defect a keystone flap was deemed most appropriate.  Using a sterile surgical marker, an appropriate keystone flap was drawn incorporating the defect, outlining the appropriate donor tissue and placing the expected incisions within the relaxed skin tension lines where possible. The area thus outlined was incised deep to adipose tissue with a #15 scalpel blade.  The skin margins were undermined to an appropriate distance in all directions around the primary defect and laterally outward around the flap utilizing iris scissors. Melolabial Transposition Flap Text: The defect edges were debeveled with a #15 scalpel blade.  Given the location of the defect and the proximity to free margins a melolabial flap was deemed most appropriate.  Using a sterile surgical marker, an appropriate melolabial transposition flap was drawn incorporating the defect.    The area thus outlined was incised deep to adipose tissue with a #15 scalpel blade.  The skin margins were undermined to an appropriate distance in all directions utilizing iris scissors. Mercedes Flap Text: The defect edges were debeveled with a #15 scalpel blade.  Given the location of the defect, shape of the defect and the proximity to free margins a Mercedes flap was deemed most appropriate.  Using a sterile surgical marker, an appropriate advancement flap was drawn incorporating the defect and placing the expected incisions within the relaxed skin tension lines where possible. The area thus outlined was incised deep to adipose tissue with a #15 scalpel blade.  The skin margins were undermined to an appropriate distance in all directions utilizing iris scissors. Modified Advancement Flap Text: The defect edges were debeveled with a #15 scalpel blade.  Given the location of the defect, shape of the defect and the proximity to free margins a modified advancement flap was deemed most appropriate.  Using a sterile surgical marker, an appropriate advancement flap was drawn incorporating the defect and placing the expected incisions within the relaxed skin tension lines where possible.    The area thus outlined was incised deep to adipose tissue with a #15 scalpel blade.  The skin margins were undermined to an appropriate distance in all directions utilizing iris scissors. Mucosal Advancement Flap Text: Given the location of the defect, shape of the defect and the proximity to free margins a mucosal advancement flap was deemed most appropriate. Incisions were made with a 15 blade scalpel in the appropriate fashion along the cutaneous vermilion border and the mucosal lip. The remaining actinically damaged mucosal tissue was excised.  The mucosal advancement flap was then elevated to the gingival sulcus with care taken to preserve the neurovascular structures and advanced into the primary defect. Care was taken to ensure that precise realignment of the vermilion border was achieved. Muscle Hinge Flap Text: The defect edges were debeveled with a #15 scalpel blade.  Given the size, depth and location of the defect and the proximity to free margins a muscle hinge flap was deemed most appropriate.  Using a sterile surgical marker, an appropriate hinge flap was drawn incorporating the defect. The area thus outlined was incised with a #15 scalpel blade.  The skin margins were undermined to an appropriate distance in all directions utilizing iris scissors. Mustarde Flap Text: The defect edges were debeveled with a #15 scalpel blade.  Given the size, depth and location of the defect and the proximity to free margins a Mustarde flap was deemed most appropriate.  Using a sterile surgical marker, an appropriate flap was drawn incorporating the defect. The area thus outlined was incised with a #15 scalpel blade.  The skin margins were undermined to an appropriate distance in all directions utilizing iris scissors. Nasal Turnover Hinge Flap Text: The defect edges were debeveled with a #15 scalpel blade.  Given the size, depth, location of the defect and the defect being full thickness a nasal turnover hinge flap was deemed most appropriate.  Using a sterile surgical marker, an appropriate hinge flap was drawn incorporating the defect. The area thus outlined was incised with a #15 scalpel blade. The flap was designed to recreate the nasal mucosal lining and the alar rim. The skin margins were undermined to an appropriate distance in all directions utilizing iris scissors. Nasalis-Muscle-Based Myocutaneous Island Pedicle Flap Text: Using a #15 blade, an incision was made around the donor flap to the level of the nasalis muscle. Wide lateral undermining was then performed in both the subcutaneous plane above the nasalis muscle, and in a submuscular plane just above periosteum. This allowed the formation of a free nasalis muscle axial pedicle (based on the angular artery) which was still attached to the actual cutaneous flap, increasing its mobility and vascular viability. Hemostasis was obtained with pinpoint electrocoagulation. The flap was mobilized into position and the pivotal anchor points positioned and stabilized with buried interrupted sutures. Subcutaneous and dermal tissues were closed in a multilayered fashion with sutures. Tissue redundancies were excised, and the epidermal edges were apposed without significant tension and sutured with sutures. Nasalis Myocutaneous Flap Text: Using a #15 blade, an incision was made around the donor flap to the level of the nasalis muscle. Wide lateral undermining was then performed in both the subcutaneous plane above the nasalis muscle, and in a submuscular plane just above periosteum. This allowed the formation of a free nasalis muscle axial pedicle which was still attached to the actual cutaneous flap, increasing its mobility and vascular viability. Hemostasis was obtained with pinpoint electrocoagulation. The flap was mobilized into position and the pivotal anchor points positioned and stabilized with buried interrupted sutures. Subcutaneous and dermal tissues were closed in a multilayered fashion with sutures. Tissue redundancies were excised, and the epidermal edges were apposed without significant tension and sutured with sutures. Nasolabial Transposition Flap Text: The defect edges were debeveled with a #15 scalpel blade.  Given the size, depth and location of the defect and the proximity to free margins a nasolabial transposition flap was deemed most appropriate. Using a sterile surgical marker, an appropriate flap was drawn incorporating the defect. The area thus outlined was incised with a #15 scalpel blade. The skin margins were undermined to an appropriate distance in all directions utilizing iris scissors. Following this, the designed flap was carried into the primary defect and sutured into place. Orbicularis Oris Muscle Flap Text: The defect edges were debeveled with a #15 scalpel blade.  Given that the defect affected the competency of the oral sphincter an orbicularis oris muscle flap was deemed most appropriate to restore this competency and normal muscle function.  Using a sterile surgical marker, an appropriate flap was drawn incorporating the defect. The area thus outlined was incised with a #15 scalpel blade. O-T Advancement Flap Text: The defect edges were debeveled with a #15 scalpel blade.  Given the location of the defect, shape of the defect and the proximity to free margins an O-T advancement flap was deemed most appropriate.  Using a sterile surgical marker, an appropriate advancement flap was drawn incorporating the defect and placing the expected incisions within the relaxed skin tension lines where possible.    The area thus outlined was incised deep to adipose tissue with a #15 scalpel blade.  The skin margins were undermined to an appropriate distance in all directions utilizing iris scissors. O-T Plasty Text: The defect edges were debeveled with a #15 scalpel blade.  Given the location of the defect, shape of the defect and the proximity to free margins an O-T plasty was deemed most appropriate.  Using a sterile surgical marker, an appropriate O-T plasty was drawn incorporating the defect and placing the expected incisions within the relaxed skin tension lines where possible.    The area thus outlined was incised deep to adipose tissue with a #15 scalpel blade.  The skin margins were undermined to an appropriate distance in all directions utilizing iris scissors. O-L Flap Text: The defect edges were debeveled with a #15 scalpel blade.  Given the location of the defect, shape of the defect and the proximity to free margins an O-L flap was deemed most appropriate.  Using a sterile surgical marker, an appropriate advancement flap was drawn incorporating the defect and placing the expected incisions within the relaxed skin tension lines where possible.    The area thus outlined was incised deep to adipose tissue with a #15 scalpel blade.  The skin margins were undermined to an appropriate distance in all directions utilizing iris scissors. O-Z Flap Text: The defect edges were debeveled with a #15 scalpel blade.  Given the location of the defect, shape of the defect and the proximity to free margins an O-Z flap was deemed most appropriate.  Using a sterile surgical marker, an appropriate transposition flap was drawn incorporating the defect and placing the expected incisions within the relaxed skin tension lines where possible. The area thus outlined was incised deep to adipose tissue with a #15 scalpel blade.  The skin margins were undermined to an appropriate distance in all directions utilizing iris scissors. O-Z Plasty Text: The defect edges were debeveled with a #15 scalpel blade.  Given the location of the defect, shape of the defect and the proximity to free margins an O-Z plasty (double transposition flap) was deemed most appropriate.  Using a sterile surgical marker, the appropriate transposition flaps were drawn incorporating the defect and placing the expected incisions within the relaxed skin tension lines where possible.    The area thus outlined was incised deep to adipose tissue with a #15 scalpel blade.  The skin margins were undermined to an appropriate distance in all directions utilizing iris scissors.  Hemostasis was achieved with electrocautery.  The flaps were then transposed into place, one clockwise and the other counterclockwise, and anchored with interrupted buried subcutaneous sutures. Peng Advancement Flap Text: The defect edges were debeveled with a #15 scalpel blade.  Given the location of the defect, shape of the defect and the proximity to free margins a Peng advancement flap was deemed most appropriate.  Using a sterile surgical marker, an appropriate advancement flap was drawn incorporating the defect and placing the expected incisions within the relaxed skin tension lines where possible. The area thus outlined was incised deep to adipose tissue with a #15 scalpel blade.  The skin margins were undermined to an appropriate distance in all directions utilizing iris scissors. Rectangular Flap Text: The defect edges were debeveled with a #15 scalpel blade. Given the location of the defect and the proximity to free margins a rectangular flap was deemed most appropriate. Using a sterile surgical marker, an appropriate rectangular flap was drawn incorporating the defect. The area thus outlined was incised deep to adipose tissue with a #15 scalpel blade. The skin margins were undermined to an appropriate distance in all directions utilizing iris scissors. Following this, the designed flap was carried over into the primary defect and sutured into place. Rhombic Flap Text: The defect edges were debeveled with a #15 scalpel blade.  Given the location of the defect and the proximity to free margins a rhombic flap was deemed most appropriate.  Using a sterile surgical marker, an appropriate rhombic flap was drawn incorporating the defect.    The area thus outlined was incised deep to adipose tissue with a #15 scalpel blade.  The skin margins were undermined to an appropriate distance in all directions utilizing iris scissors. Rhomboid Transposition Flap Text: The defect edges were debeveled with a #15 scalpel blade.  Given the location of the defect and the proximity to free margins a rhomboid transposition flap was deemed most appropriate.  Using a sterile surgical marker, an appropriate rhomboid flap was drawn incorporating the defect.    The area thus outlined was incised deep to adipose tissue with a #15 scalpel blade.  The skin margins were undermined to an appropriate distance in all directions utilizing iris scissors. Rotation Flap Text: The defect edges were debeveled with a #15 scalpel blade.  With the objective of achieving maximal preservation of function and appearance, a rotation flap was deemed most appropriate.  Using a sterile surgical marker, an appropriate rotation flap was drawn incorporating the defect and placing the expected incisions within the relaxed skin tension lines where possible.    The area thus outlined was incised with a #15 scalpel blade.  The flap was elevated and skin margins were undermined to an appropriate distance in all directions.  After obtaining complete hemostasis, the flap was rotated into position. Spiral Flap Text: The defect edges were debeveled with a #15 scalpel blade.  Given the location of the defect, shape of the defect and the proximity to free margins a spiral flap was deemed most appropriate.  Using a sterile surgical marker, an appropriate rotation flap was drawn incorporating the defect and placing the expected incisions within the relaxed skin tension lines where possible. The area thus outlined was incised deep to adipose tissue with a #15 scalpel blade.  The skin margins were undermined to an appropriate distance in all directions utilizing iris scissors. Staged Advancement Flap Text: The defect edges were debeveled with a #15 scalpel blade.  Given the location of the defect, shape of the defect and the proximity to free margins a staged advancement flap was deemed most appropriate.  Using a sterile surgical marker, an appropriate advancement flap was drawn incorporating the defect and placing the expected incisions within the relaxed skin tension lines where possible. The area thus outlined was incised deep to adipose tissue with a #15 scalpel blade.  The skin margins were undermined to an appropriate distance in all directions utilizing iris scissors. Star Wedge Flap Text: The defect edges were debeveled with a #15 scalpel blade.  Given the location of the defect, shape of the defect and the proximity to free margins a star wedge flap was deemed most appropriate.  Using a sterile surgical marker, an appropriate rotation flap was drawn incorporating the defect and placing the expected incisions within the relaxed skin tension lines where possible. The area thus outlined was incised deep to adipose tissue with a #15 scalpel blade.  The skin margins were undermined to an appropriate distance in all directions utilizing iris scissors. Transposition Flap Text: The defect edges were debeveled with a #15 scalpel blade. With the objective of achieving maximal preservation of function and appearance, a transposition flap was deemed most appropriate.  Using a sterile surgical marker, an appropriate transposition flap was drawn incorporating the defect and placing the expected incisions within relaxed skin tension lines where possible.   The area thus outlined was incised with a #15 scalpel blade.  The flap was elevated and skin margins were undermined to an appropriate distance in all directions.  After obtaining complete hemostasis, the flap was transposed into place. Trilobed Flap Text: The defect edges were debeveled with a #15 scalpel blade.  Given the location of the defect and the proximity to free margins a trilobed flap was deemed most appropriate.  Using a sterile surgical marker, an appropriate trilobed flap drawn around the defect.    The area thus outlined was incised deep to adipose tissue with a #15 scalpel blade.  The skin margins were undermined to an appropriate distance in all directions utilizing iris scissors. V-Y Flap Text: The defect edges were debeveled with a #15 scalpel blade.  Given the location of the defect, shape of the defect and the proximity to free margins a V-Y flap was deemed most appropriate.  Using a sterile surgical marker, an appropriate advancement flap was drawn incorporating the defect and placing the expected incisions within the relaxed skin tension lines where possible.    The area thus outlined was incised deep to adipose tissue with a #15 scalpel blade.  The skin margins were undermined to an appropriate distance in all directions utilizing iris scissors. V-Y Plasty Text: The defect edges were debeveled with a #15 scalpel blade.  Given the location of the defect, shape of the defect and the proximity to free margins an V-Y advancement flap was deemed most appropriate.  Using a sterile surgical marker, an appropriate advancement flap was drawn incorporating the defect and placing the expected incisions within the relaxed skin tension lines where possible.    The area thus outlined was incised deep to adipose tissue with a #15 scalpel blade.  The skin margins were undermined to an appropriate distance in all directions utilizing iris scissors. W Plasty Text: The lesion was extirpated to the level of the fat with a #15 scalpel blade.  Given the location of the defect, shape of the defect and the proximity to free margins a W-plasty was deemed most appropriate for repair.  Using a sterile surgical marker, the appropriate transposition arms of the W-plasty were drawn incorporating the defect and placing the expected incisions within the relaxed skin tension lines where possible.    The area thus outlined was incised deep to adipose tissue with a #15 scalpel blade.  The skin margins were undermined to an appropriate distance in all directions utilizing iris scissors.  The opposing transposition arms were then transposed into place in opposite direction and anchored with interrupted buried subcutaneous sutures. Z Plasty Text: The lesion was extirpated to the level of the fat with a #15 scalpel blade.  Given the location of the defect, shape of the defect and the proximity to free margins a Z-plasty was deemed most appropriate for repair.  Using a sterile surgical marker, the appropriate transposition arms of the Z-plasty were drawn incorporating the defect and placing the expected incisions within the relaxed skin tension lines where possible.    The area thus outlined was incised deep to adipose tissue with a #15 scalpel blade.  The skin margins were undermined to an appropriate distance in all directions utilizing iris scissors.  The opposing transposition arms were then transposed into place in opposite direction and anchored with interrupted buried subcutaneous sutures. Zygomaticofacial Flap Text: Given the location of the defect, shape of the defect and the proximity to free margins a zygomaticofacial flap was deemed most appropriate for repair.  Using a sterile surgical marker, the appropriate flap was drawn incorporating the defect and placing the expected incisions within the relaxed skin tension lines where possible. The area thus outlined was incised deep to adipose tissue with a #15 scalpel blade with preservation of a vascular pedicle.  The skin margins were undermined to an appropriate distance in all directions utilizing iris scissors.  The flap was then placed into the defect and anchored with interrupted buried subcutaneous sutures. Abbe Flap (Lower To Upper Lip) Text: The defect of the upper lip was assessed and measured.  Given the location and size of the defect, an Abbe flap was deemed most appropriate.  Using a sterile surgical marker, an appropriate Abbe flap was measured and drawn on the lower lip. Local anesthesia was then infiltrated. A scalpel was then used to incise the upper lip through and through the skin, vermilion, muscle and mucosa, leaving the flap pedicled on the opposite side.  The flap was then rotated and transferred to the lower lip defect.  The flap was then sutured into place with a three layer technique, closing the orbicularis oris muscle layer with subcutaneous buried sutures, followed by a mucosal layer and an epidermal layer. Abbe Flap (Upper To Lower Lip) Text: The defect of the lower lip was assessed and measured.  Given the location and size of the defect, an Abbe flap was deemed most appropriate.  Using a sterile surgical marker, an appropriate Abbe flap was measured and drawn on the upper lip. Local anesthesia was then infiltrated.  A scalpel was then used to incise the upper lip through and through the skin, vermilion, muscle and mucosa, leaving the flap pedicled on the opposite side.  The flap was then rotated and transferred to the lower lip defect.  The flap was then sutured into place with a three layer technique, closing the orbicularis oris muscle layer with subcutaneous buried sutures, followed by a mucosal layer and an epidermal layer. Cheek Interpolation Flap Text: A decision was made to reconstruct the defect utilizing an interpolation axial flap and a staged reconstruction.  A telfa template was made of the defect.  This telfa template was then used to outline the Cheek Interpolation flap.  The donor area for the pedicle flap was then injected with anesthesia.  The flap was excised through the skin and subcutaneous tissue down to the layer of the underlying musculature.  The interpolation flap was carefully excised within this deep plane to maintain its blood supply.  The edges of the donor site were undermined.   The donor site was closed in a primary fashion.  The pedicle was then rotated into position and sutured.  Once the tube was sutured into place, adequate blood supply was confirmed with blanching and refill.  The pedicle was then wrapped with xeroform gauze and dressed appropriately with a telfa and gauze bandage to ensure continued blood supply and protect the attached pedicle. Cheek-To-Nose Interpolation Flap Text: A decision was made to reconstruct the defect utilizing an interpolation axial flap and a staged reconstruction.  A telfa template was made of the defect.  This telfa template was then used to outline the Cheek-To-Nose Interpolation flap.  The donor area for the pedicle flap was then injected with anesthesia.  The flap was excised through the skin and subcutaneous tissue down to the layer of the underlying musculature.  The interpolation flap was carefully excised within this deep plane to maintain its blood supply.  The edges of the donor site were undermined.   The donor site was closed in a primary fashion.  The pedicle was then rotated into position and sutured.  Once the tube was sutured into place, adequate blood supply was confirmed with blanching and refill.  The pedicle was then wrapped with xeroform gauze and dressed appropriately with a telfa and gauze bandage to ensure continued blood supply and protect the attached pedicle. Estlander Flap (Lower To Upper Lip) Text: The defect of the lower lip was assessed and measured.  Given the location and size of the defect, an Estlander flap was deemed most appropriate.  Using a sterile surgical marker, an appropriate Estlander flap was measured and drawn on the upper lip. Local anesthesia was then infiltrated. A scalpel was then used to incise the lateral aspect of the flap, through skin, muscle and mucosa, leaving the flap pedicled medially.  The flap was then rotated and positioned to fill the lower lip defect.  The flap was then sutured into place with a three layer technique, closing the orbicularis oris muscle layer with subcutaneous buried sutures, followed by a mucosal layer and an epidermal layer. Interpolation Flap Text: A decision was made to reconstruct the defect utilizing an interpolation axial flap and a staged reconstruction.  A telfa template was made of the defect.  This telfa template was then used to outline the interpolation flap.  The donor area for the pedicle flap was then injected with anesthesia.  The flap was excised through the skin and subcutaneous tissue down to the layer of the underlying musculature.  The interpolation flap was carefully excised within this deep plane to maintain its blood supply.  The edges of the donor site were undermined.   The donor site was closed in a primary fashion.  The pedicle was then rotated into position and sutured.  Once the tube was sutured into place, adequate blood supply was confirmed with blanching and refill.  The pedicle was then wrapped with xeroform gauze and dressed appropriately with a telfa and gauze bandage to ensure continued blood supply and protect the attached pedicle. Melolabial Interpolation Flap Text: A decision was made to reconstruct the defect utilizing an interpolation axial flap and a staged reconstruction.  A telfa template was made of the defect.  This telfa template was then used to outline the melolabial interpolation flap.  The donor area for the pedicle flap was then injected with anesthesia.  The flap was excised through the skin and subcutaneous tissue down to the layer of the underlying musculature.  The pedicle flap was carefully excised within this deep plane to maintain its blood supply.  The edges of the donor site were undermined.   The donor site was closed in a primary fashion.  The pedicle was then rotated into position and sutured.  Once the tube was sutured into place, adequate blood supply was confirmed with blanching and refill.  The pedicle was then wrapped with xeroform gauze and dressed appropriately with a telfa and gauze bandage to ensure continued blood supply and protect the attached pedicle. Mastoid Interpolation Flap Text: A decision was made to reconstruct the defect utilizing an interpolation axial flap and a staged reconstruction.  A telfa template was made of the defect.  This telfa template was then used to outline the mastoid interpolation flap.  The donor area for the pedicle flap was then injected with anesthesia.  The flap was excised through the skin and subcutaneous tissue down to the layer of the underlying musculature.  The pedicle flap was carefully excised within this deep plane to maintain its blood supply.  The edges of the donor site were undermined.   The donor site was closed in a primary fashion.  The pedicle was then rotated into position and sutured.  Once the tube was sutured into place, adequate blood supply was confirmed with blanching and refill.  The pedicle was then wrapped with xeroform gauze and dressed appropriately with a telfa and gauze bandage to ensure continued blood supply and protect the attached pedicle. Paramedian Forehead Flap Text: A decision was made to reconstruct the defect utilizing an interpolation axial flap and a staged reconstruction.  A telfa template was made of the defect.  This telfa template was then used to outline the paramedian forehead pedicle flap.  The donor area for the pedicle flap was then injected with anesthesia.  The flap was excised through the skin and subcutaneous tissue down to the layer of the underlying musculature.  The pedicle flap was carefully excised within this deep plane to maintain its blood supply.  The edges of the donor site were undermined.   The donor site was closed in a primary fashion.  The pedicle was then rotated into position and sutured.  Once the tube was sutured into place, adequate blood supply was confirmed with blanching and refill.  The pedicle was then wrapped with xeroform gauze and dressed appropriately with a telfa and gauze bandage to ensure continued blood supply and protect the attached pedicle. Posterior Auricular Interpolation Flap Text: A decision was made to reconstruct the defect utilizing an interpolation axial flap and a staged reconstruction.  A telfa template was made of the defect.  This telfa template was then used to outline the posterior auricular interpolation flap.  The donor area for the pedicle flap was then injected with anesthesia.  The flap was excised through the skin and subcutaneous tissue down to the layer of the underlying musculature.  The pedicle flap was carefully excised within this deep plane to maintain its blood supply.  The edges of the donor site were undermined.   The donor site was closed in a primary fashion.  The pedicle was then rotated into position and sutured.  Once the tube was sutured into place, adequate blood supply was confirmed with blanching and refill.  The pedicle was then wrapped with xeroform gauze and dressed appropriately with a telfa and gauze bandage to ensure continued blood supply and protect the attached pedicle. Cheiloplasty (Complex) Text: A decision was made to reconstruct the defect with a  cheiloplasty.  The defect was undermined extensively.  Additional orbicularis oris muscle was excised with a 15 blade scalpel.  The defect was converted into a full thickness wedge to facilite a better cosmetic result.  Small vessels were then tied off with 5-0 monocyrl. The orbicularis oris, superficial fascia, adipose and dermis were then reapproximated.  After the deeper layers were approximated the epidermis was reapproximated with particular care given to realign the vermilion border. Cheiloplasty (Less Than 50%) Text: A decision was made to reconstruct the defect with a  cheiloplasty.  The defect was undermined extensively.  Additional orbicularis oris muscle was excised with a 15 blade scalpel.  The defect was converted into a full thickness wedge, of less than 50% of the vertical height of the lip, to facilite a better cosmetic result.  Small vessels were then tied off with 5-0 monocyrl. The orbicularis oris, superficial fascia, adipose and dermis were then reapproximated.  After the deeper layers were approximated the epidermis was reapproximated with particular care given to realign the vermilion border. Ear Wedge Repair Text: A wedge excision was completed by carrying down an excision through the full thickness of the ear and cartilage with an inward facing Burow's triangle. The wound was then closed in a layered fashion. Full Thickness Lip Wedge Repair (Flap) Text: Given the location of the defect and the proximity to free margins a full thickness wedge repair was deemed most appropriate.  Using a sterile surgical marker, the appropriate repair was drawn incorporating the defect and placing the expected incisions perpendicular to the vermilion border.  The vermilion border was also meticulously outlined to ensure appropriate reapproximation during the repair.  The area thus outlined was incised through and through with a #15 scalpel blade.  The muscularis and dermis were reaproximated with deep sutures following hemostasis. Care was taken to realign the vermilion border before proceeding with the superficial closure.  Once the vermilion was realigned the superfical and mucosal closure was finished. Burow's Graft Text: The defect edges were debeveled with a #15 scalpel blade.  Given the location of the defect, shape of the defect, the proximity to free margins and the presence of a standing cone deformity a Burow's skin graft was deemed most appropriate. The standing cone was removed and this tissue was then trimmed to the shape of the primary defect. The adipose tissue was also removed until only dermis and epidermis were left.  The skin margins of the secondary defect were undermined to an appropriate distance in all directions utilizing iris scissors.  The secondary defect was closed with interrupted buried subcutaneous sutures.  The skin edges were then re-apposed with running  sutures.  The skin graft was then placed in the primary defect and oriented appropriately. Cartilage Graft Text: The defect edges were debeveled with a #15 scalpel blade.  Given the location of the defect, shape of the defect, the fact the defect involved a full thickness cartilage defect a cartilage graft was deemed most appropriate.  An appropriate donor site was identified, cleansed, and anesthetized. The cartilage graft was then harvested and transferred to the recipient site, oriented appropriately and then sutured into place.  The secondary defect was then repaired using a primary closure. Composite Graft Text: The defect edges were debeveled with a #15 scalpel blade.  Given the location of the defect, shape of the defect, the proximity to free margins and the fact the defect was full thickness a composite graft was deemed most appropriate.  The defect was outline and then transferred to the donor site.  A full thickness graft was then excised from the donor site. The graft was then placed in the primary defect, oriented appropriately and then sutured into place.  The secondary defect was then repaired using a primary closure. Epidermal Autograft Text: The defect edges were debeveled with a #15 scalpel blade.  Given the location of the defect, shape of the defect and the proximity to free margins an epidermal autograft was deemed most appropriate.  Using a sterile surgical marker, the primary defect shape was transferred to the donor site. The epidermal graft was then harvested.  The skin graft was then placed in the primary defect and oriented appropriately. Dermal Autograft Text: The defect edges were debeveled with a #15 scalpel blade.  Given the location of the defect, shape of the defect and the proximity to free margins a dermal autograft was deemed most appropriate.  Using a sterile surgical marker, the primary defect shape was transferred to the donor site. The area thus outlined was incised deep to adipose tissue with a #15 scalpel blade.  The harvested graft was then trimmed of adipose and epidermal tissue until only dermis was left.  The skin graft was then placed in the primary defect and oriented appropriately. Ftsg Text: The defect edges were debeveled with a #15 scalpel blade.  With the objective of achieving maximal preservation of function and appearance, a full thickness skin graft was deemed most appropriate.  Using a sterile surgical marker, the primary defect shape was transferred to the donor site. The area thus outlined was incised deep to adipose tissue with a #15 scalpel blade.  The harvested graft was then trimmed of adipose tissue until only dermis and epidermis was left.  The skin margins of the secondary defect were undermined to an appropriate distance in all directions.  The secondary defect was closed with buried subcutaneous sutures.  The skin edges were then re-apposed with running sutures and/or skin tapes with mastisol.  The skin graft was then placed in the primary defect and oriented appropriately.   The graft was trimmed to fit the recipient site and sutured into place.  Petrolatum was applied and a bolster dressing consisting of xeroform gauze was sutured into place. Pinch Graft Text: The defect edges were debeveled with a #15 scalpel blade. Given the location of the defect, shape of the defect and the proximity to free margins a pinch graft was deemed most appropriate. Using a sterile surgical marker, the primary defect shape was transferred to the donor site. The area thus outlined was incised deep to adipose tissue with a #15 scalpel blade.  The harvested graft was then trimmed of adipose tissue until only dermis and epidermis was left. The skin margins of the secondary defect were undermined to an appropriate distance in all directions utilizing iris scissors.  The secondary defect was closed with interrupted buried subcutaneous sutures.  The skin edges were then re-apposed with running  sutures.  The skin graft was then placed in the primary defect and oriented appropriately. Skin Substitute Text: The defect edges were debeveled with a #15 scalpel blade.  Given the location of the defect, shape of the defect and the proximity to free margins a skin substitute graft was deemed most appropriate.  The graft material was trimmed to fit the size of the defect. The graft was then placed in the primary defect and oriented appropriately. Split-Thickness Skin Graft Text: The defect edges were debeveled with a #15 scalpel blade. With the objective of achieving maximal preservation of function and appearance, a split thickness skin graft was deemed most appropriate.  Using a sterile surgical marker, the primary defect shape was transferred to the donor site. The split thickness graft was then harvested.  The skin graft was then placed in the primary defect and oriented appropriately.  The graft was trimmed to fit the recipient site and sutured into place.  Petrolatum was applied and a bolster dressing of xeroform gauze was sutured into place. Tissue Cultured Epidermal Autograft Text: The defect edges were debeveled with a #15 scalpel blade.  Given the location of the defect, shape of the defect and the proximity to free margins a tissue cultured epidermal autograft was deemed most appropriate.  The graft was then trimmed to fit the size of the defect.  The graft was then placed in the primary defect and oriented appropriately. Xenograft Text: The defect edges were debeveled with a #15 scalpel blade.  Given the location of the defect, shape of the defect and the proximity to free margins a xenograft was deemed most appropriate.  The graft was then trimmed to fit the size of the defect.  The graft was then placed in the primary defect and oriented appropriately. Complex Repair And Flap Additional Text (Will Appearing After The Standard Complex Repair Text): The complex repair was not sufficient to completely close the primary defect. The remaining additional defect was repaired with the flap mentioned below. Complex Repair And Graft Additional Text (Will Appearing After The Standard Complex Repair Text): The complex repair was not sufficient to completely close the primary defect. The remaining additional defect was repaired with the graft mentioned below. Eyelid Full Thickness Repair - 16002: The eyelid defect was full thickness which required a wedge repair of the eyelid. Special care was taken to ensure that the eyelid margin was realligned when placing sutures. Eyelid Partial Thickness Repair - 05681: The eyelid defect was partial thickness which required a wedge repair of the eyelid. Special care was taken to ensure that the eyelid margin was realligned when placing sutures. Intermediate Repair And Flap Additional Text (Will Appearing After The Standard Complex Repair Text): The intermediate repair was not sufficient to completely close the primary defect. The remaining additional defect was repaired with the flap mentioned below. Intermediate Repair And Graft Additional Text (Will Appearing After The Standard Complex Repair Text): The intermediate repair was not sufficient to completely close the primary defect. The remaining additional defect was repaired with the graft mentioned below. Localized Dermabrasion With 15 Blade Text: The patient was draped in routine manner.  Localized dermabrasion using a 15 blade was performed in routine manner to papillary dermis. This spot dermabrasion is being performed to complete skin cancer reconstruction. It also will eliminate the other sun damaged precancerous cells that are known to be part of the regional effect of a lifetime's worth of sun exposure. This localized dermabrasion is therapeutic and should not be considered cosmetic in any regard. Localized Dermabrasion With Sand Papertext: The patient was draped in routine manner.  Localized dermabrasion using sterile sand paper was performed in routine manner to papillary dermis. This spot dermabrasion is being performed to complete skin cancer reconstruction. It also will eliminate the other sun damaged precancerous cells that are known to be part of the regional effect of a lifetime's worth of sun exposure. This localized dermabrasion is therapeutic and should not be considered cosmetic in any regard. Localized Dermabrasion With Wire Brush Text: The patient was draped in routine manner.  Localized dermabrasion using 3 x 17 mm wire brush was performed in routine manner to papillary dermis. This spot dermabrasion is being performed to complete skin cancer reconstruction. It also will eliminate the other sun damaged precancerous cells that are known to be part of the regional effect of a lifetime's worth of sun exposure. This localized dermabrasion is therapeutic and should not be considered cosmetic in any regard. Purse String (Simple) Text: Given the location of the defect and the characteristics of the surrounding skin a purse string closure was deemed most appropriate.  Undermining was performed circumfirentially around the surgical defect.  A purse string suture was then placed and tightened. Purse String (Intermediate) Text: Given the location of the defect and the characteristics of the surrounding skin a purse string intermediate closure was deemed most appropriate.  Undermining was performed circumfirentially around the surgical defect.  A purse string suture was then placed and tightened. Partial Purse String (Simple) Text: Given the location of the defect and the characteristics of the surrounding skin a simple purse string closure was deemed most appropriate.  Undermining was performed circumfirentially around the surgical defect.  A purse string suture was then placed and tightened. Wound tension only allowed a partial closure of the circular defect. Partial Purse String (Intermediate) Text: Given the location of the defect and the characteristics of the surrounding skin an intermediate purse string closure was deemed most appropriate.  Undermining was performed circumfirentially around the surgical defect.  A purse string suture was then placed and tightened. Wound tension only allowed a partial closure of the circular defect. Tarsorrhaphy Text: A tarsorrhaphy was performed using Frost sutures. Manual Repair Warning Statement: We plan on removing the manually selected variable below in favor of our much easier automatic structured text blocks found in the previous tab. We decided to do this to help make the flow better and give you the full power of structured data. Manual selection is never going to be ideal in our platform and I would encourage you to avoid using manual selection from this point on, especially since I will be sunsetting this feature. It is important that you do one of two things with the customized text below. First, you can save all of the text in a word file so you can have it for future reference. Second, transfer the text to the appropriate area in the Library tab. Lastly, if there is a flap or graft type which we do not have you need to let us know right away so I can add it in before the variable is hidden. No need to panic, we plan to give you roughly 6 months to make the change. Same Histology In Subsequent Stages Text: The pattern and morphology of the tumor is as described in the first stage. No Residual Tumor Seen Histology Text: There were no malignant cells seen in the sections examined. Inflammation Suggestive Of Cancer Camouflage Histology Text: There was a dense lymphocytic infiltrate which prevented adequate histologic evaluation of adjacent structures. Bcc Histology Text: There were numerous aggregates of basaloid cells. Bcc Infiltrative Histology Text: There were numerous aggregates of basaloid cells demonstrating an infiltrative pattern. Mart-1 - Positive Histology Text: MART-1 staining demonstrates areas of higher density and clustering of melanocytes with Pagetoid spread upwards within the epidermis. The surgical margins are positive for tumor cells. Mart-1 - Negative Histology Text: MART-1 staining demonstrates a normal density and pattern of melanocytes along the dermal-epidermal junction. The surgical margins are negative for tumor cells. Information: Selecting Yes will display possible errors in your note based on the variables you have selected. This validation is only offered as a suggestion for you. PLEASE NOTE THAT THE VALIDATION TEXT WILL BE REMOVED WHEN YOU FINALIZE YOUR NOTE. IF YOU WANT TO FAX A PRELIMINARY NOTE YOU WILL NEED TO TOGGLE THIS TO 'NO' IF YOU DO NOT WANT IT IN YOUR FAXED NOTE. Bill 59 Modifier?: No - Continue to Bill 79 Modifier

## 2024-09-20 ENCOUNTER — ORDER TRANSCRIPTION (OUTPATIENT)
Dept: ADMINISTRATIVE | Facility: HOSPITAL | Age: 74
End: 2024-09-20

## 2024-09-20 DIAGNOSIS — Z13.6 SCREENING FOR CARDIOVASCULAR CONDITION: Primary | ICD-10-CM

## 2024-09-23 ENCOUNTER — ANESTHESIA EVENT (OUTPATIENT)
Dept: POSTOP/PACU | Facility: HOSPITAL | Age: 74
End: 2024-09-23
Payer: MEDICARE

## 2024-09-23 ENCOUNTER — HOSPITAL ENCOUNTER (OUTPATIENT)
Dept: POSTOP/PACU | Facility: HOSPITAL | Age: 74
Discharge: HOME | End: 2024-09-23
Attending: Other
Payer: MEDICARE

## 2024-09-23 ENCOUNTER — ANESTHESIA (OUTPATIENT)
Dept: POSTOP/PACU | Facility: HOSPITAL | Age: 74
End: 2024-09-23
Payer: MEDICARE

## 2024-09-23 VITALS
OXYGEN SATURATION: 96 % | BODY MASS INDEX: 22 KG/M2 | TEMPERATURE: 98 F | DIASTOLIC BLOOD PRESSURE: 88 MMHG | HEART RATE: 88 BPM | SYSTOLIC BLOOD PRESSURE: 130 MMHG | HEIGHT: 62 IN | RESPIRATION RATE: 19 BRPM

## 2024-09-23 DIAGNOSIS — F33.2 MAJOR DEPRESSIVE DISORDER, RECURRENT EPISODE, SEVERE WITH CATATONIA (HCC): ICD-10-CM

## 2024-09-23 DIAGNOSIS — F06.1 MAJOR DEPRESSIVE DISORDER, RECURRENT EPISODE, SEVERE WITH CATATONIA (HCC): ICD-10-CM

## 2024-09-23 RX ORDER — ONDANSETRON 2 MG/ML
4 INJECTION INTRAMUSCULAR; INTRAVENOUS ONCE
Status: COMPLETED | OUTPATIENT
Start: 2024-09-23 | End: 2024-09-23

## 2024-09-23 RX ORDER — DEXTROSE MONOHYDRATE 25 G/50ML
50 INJECTION, SOLUTION INTRAVENOUS
Status: DISCONTINUED | OUTPATIENT
Start: 2024-09-23 | End: 2024-09-25

## 2024-09-23 RX ORDER — METOCLOPRAMIDE HYDROCHLORIDE 5 MG/ML
10 INJECTION INTRAMUSCULAR; INTRAVENOUS EVERY 8 HOURS PRN
Status: DISCONTINUED | OUTPATIENT
Start: 2024-09-23 | End: 2024-09-25

## 2024-09-23 RX ORDER — SODIUM CHLORIDE, SODIUM LACTATE, POTASSIUM CHLORIDE, CALCIUM CHLORIDE 600; 310; 30; 20 MG/100ML; MG/100ML; MG/100ML; MG/100ML
INJECTION, SOLUTION INTRAVENOUS CONTINUOUS
Status: DISCONTINUED | OUTPATIENT
Start: 2024-09-23 | End: 2024-09-25

## 2024-09-23 RX ORDER — ACETAMINOPHEN 500 MG
1000 TABLET ORAL ONCE AS NEEDED
Status: ACTIVE | OUTPATIENT
Start: 2024-09-23 | End: 2024-09-23

## 2024-09-23 RX ORDER — LABETALOL HYDROCHLORIDE 5 MG/ML
5 INJECTION, SOLUTION INTRAVENOUS EVERY 5 MIN PRN
Status: ACTIVE | OUTPATIENT
Start: 2024-09-23 | End: 2024-09-23

## 2024-09-23 RX ORDER — GLYCOPYRROLATE 0.2 MG/ML
INJECTION, SOLUTION INTRAMUSCULAR; INTRAVENOUS
Status: COMPLETED
Start: 2024-09-23 | End: 2024-09-23

## 2024-09-23 RX ORDER — MEPERIDINE HYDROCHLORIDE 25 MG/ML
12.5 INJECTION INTRAMUSCULAR; INTRAVENOUS; SUBCUTANEOUS AS NEEDED
Status: DISCONTINUED | OUTPATIENT
Start: 2024-09-23 | End: 2024-09-25

## 2024-09-23 RX ORDER — GLYCOPYRROLATE 0.2 MG/ML
0.1 INJECTION, SOLUTION INTRAMUSCULAR; INTRAVENOUS ONCE
Status: COMPLETED | OUTPATIENT
Start: 2024-09-23 | End: 2024-09-23

## 2024-09-23 RX ORDER — HYDROMORPHONE HYDROCHLORIDE 1 MG/ML
0.6 INJECTION, SOLUTION INTRAMUSCULAR; INTRAVENOUS; SUBCUTANEOUS EVERY 5 MIN PRN
Status: ACTIVE | OUTPATIENT
Start: 2024-09-23 | End: 2024-09-23

## 2024-09-23 RX ORDER — NICOTINE POLACRILEX 4 MG
15 LOZENGE BUCCAL
Status: DISCONTINUED | OUTPATIENT
Start: 2024-09-23 | End: 2024-09-25

## 2024-09-23 RX ORDER — ONDANSETRON 2 MG/ML
4 INJECTION INTRAMUSCULAR; INTRAVENOUS EVERY 6 HOURS PRN
Status: DISCONTINUED | OUTPATIENT
Start: 2024-09-23 | End: 2024-09-25

## 2024-09-23 RX ORDER — ETOMIDATE 2 MG/ML
INJECTION INTRAVENOUS AS NEEDED
Status: DISCONTINUED | OUTPATIENT
Start: 2024-09-23 | End: 2024-09-23 | Stop reason: SURG

## 2024-09-23 RX ORDER — KETAMINE HYDROCHLORIDE 50 MG/ML
INJECTION, SOLUTION INTRAMUSCULAR; INTRAVENOUS AS NEEDED
Status: DISCONTINUED | OUTPATIENT
Start: 2024-09-23 | End: 2024-09-23 | Stop reason: SURG

## 2024-09-23 RX ORDER — HYDROMORPHONE HYDROCHLORIDE 1 MG/ML
0.2 INJECTION, SOLUTION INTRAMUSCULAR; INTRAVENOUS; SUBCUTANEOUS EVERY 5 MIN PRN
Status: ACTIVE | OUTPATIENT
Start: 2024-09-23 | End: 2024-09-23

## 2024-09-23 RX ORDER — MIDAZOLAM HYDROCHLORIDE 1 MG/ML
1 INJECTION INTRAMUSCULAR; INTRAVENOUS EVERY 5 MIN PRN
Status: ACTIVE | OUTPATIENT
Start: 2024-09-23 | End: 2024-09-23

## 2024-09-23 RX ORDER — DIPHENHYDRAMINE HYDROCHLORIDE 50 MG/ML
INJECTION INTRAMUSCULAR; INTRAVENOUS
Status: COMPLETED
Start: 2024-09-23 | End: 2024-09-23

## 2024-09-23 RX ORDER — CAFFEINE AND SODIUM BENZOATE 125 MG/ML
125 INJECTION, SOLUTION INTRAMUSCULAR; INTRAVENOUS ONCE
Status: COMPLETED | OUTPATIENT
Start: 2024-09-23 | End: 2024-09-23

## 2024-09-23 RX ORDER — ONDANSETRON 2 MG/ML
INJECTION INTRAMUSCULAR; INTRAVENOUS
Status: COMPLETED
Start: 2024-09-23 | End: 2024-09-23

## 2024-09-23 RX ORDER — HYDROCODONE BITARTRATE AND ACETAMINOPHEN 10; 325 MG/1; MG/1
2 TABLET ORAL ONCE AS NEEDED
Status: ACTIVE | OUTPATIENT
Start: 2024-09-23 | End: 2024-09-23

## 2024-09-23 RX ORDER — NALOXONE HYDROCHLORIDE 0.4 MG/ML
80 INJECTION, SOLUTION INTRAMUSCULAR; INTRAVENOUS; SUBCUTANEOUS AS NEEDED
Status: ACTIVE | OUTPATIENT
Start: 2024-09-23 | End: 2024-09-23

## 2024-09-23 RX ORDER — NICOTINE POLACRILEX 4 MG
30 LOZENGE BUCCAL
Status: DISCONTINUED | OUTPATIENT
Start: 2024-09-23 | End: 2024-09-25

## 2024-09-23 RX ORDER — HYDROMORPHONE HYDROCHLORIDE 1 MG/ML
0.4 INJECTION, SOLUTION INTRAMUSCULAR; INTRAVENOUS; SUBCUTANEOUS EVERY 5 MIN PRN
Status: ACTIVE | OUTPATIENT
Start: 2024-09-23 | End: 2024-09-23

## 2024-09-23 RX ORDER — HYDROCODONE BITARTRATE AND ACETAMINOPHEN 10; 325 MG/1; MG/1
1 TABLET ORAL ONCE AS NEEDED
Status: ACTIVE | OUTPATIENT
Start: 2024-09-23 | End: 2024-09-23

## 2024-09-23 RX ORDER — DIPHENHYDRAMINE HYDROCHLORIDE 50 MG/ML
25 INJECTION INTRAMUSCULAR; INTRAVENOUS ONCE
Status: COMPLETED | OUTPATIENT
Start: 2024-09-23 | End: 2024-09-23

## 2024-09-23 RX ORDER — CAFFEINE AND SODIUM BENZOATE 125 MG/ML
INJECTION, SOLUTION INTRAMUSCULAR; INTRAVENOUS
Status: COMPLETED
Start: 2024-09-23 | End: 2024-09-23

## 2024-09-23 RX ADMIN — ONDANSETRON 4 MG: 2 INJECTION INTRAMUSCULAR; INTRAVENOUS at 06:40:00

## 2024-09-23 RX ADMIN — CAFFEINE AND SODIUM BENZOATE 125 MG: 125 INJECTION, SOLUTION INTRAMUSCULAR; INTRAVENOUS at 06:45:00

## 2024-09-23 RX ADMIN — SODIUM CHLORIDE, SODIUM LACTATE, POTASSIUM CHLORIDE, CALCIUM CHLORIDE: 600; 310; 30; 20 INJECTION, SOLUTION INTRAVENOUS at 06:02:00

## 2024-09-23 RX ADMIN — GLYCOPYRROLATE 0.1 MG: 0.2 INJECTION, SOLUTION INTRAMUSCULAR; INTRAVENOUS at 06:44:00

## 2024-09-23 RX ADMIN — ETOMIDATE 10 MG: 2 INJECTION INTRAVENOUS at 07:10:00

## 2024-09-23 RX ADMIN — DIPHENHYDRAMINE HYDROCHLORIDE 25 MG: 50 INJECTION INTRAMUSCULAR; INTRAVENOUS at 07:30:00

## 2024-09-23 RX ADMIN — KETAMINE HYDROCHLORIDE 25 MG: 50 INJECTION, SOLUTION INTRAMUSCULAR; INTRAVENOUS at 07:10:00

## 2024-09-23 NOTE — PROGRESS NOTES
[facility] / [facility]  ECT History & Physical    Meli Antonio Patient Status:  Outpatient   Age/Gender 73 year old female MRN [id number]   Location [facility] POST ANESTHESIA CARE UNIT Attending Meli Adames MD   Hosp Day # 0 PCP SIRI TABOR     Date of Service: 2024    Diagnosis:  Major Depression Recurrent Severe Without Psychotic Features. F33.2    Procedure:  Unilateral, non-dominant D'Rustam    HPI: Mood depressed.  No physical complaints      Medical History:  Past Medical History:    Anxiety state    Back problem    COMPRESSION FX    Cataract    Depression    Esophageal reflux    GERD (gastroesophageal reflux disease)    Heart valve disease    MVP--congenital    Hematoma and contusion of liver    STATES HAS BEEN THERE FOR MANY YEARS    History of fractured kneecap    RIGHT    IBS (irritable bowel syndrome)    Mitral prolapse    Osteoarthritis    Visual impairment    glasses       Surgical History:  Past Surgical History:   Procedure Laterality Date          Correct bunion,othr methods      Correct bunion,simple      BILAT.     Dilation/curettage,diagnostic      FOR \"MOLE PREGNANCY\"    Other Right     ORIF RIGHT ANKLE    Spine surgery procedure unlisted      cervical spine 2020    Total knee replacement         Family History:  Family History   Problem Relation Age of Onset    Heart Disorder Father     Cancer Mother         ESOPHAGUS       Social History:  Social History     Socioeconomic History    Marital status:      Spouse name: Not on file    Number of children: Not on file    Years of education: Not on file    Highest education level: Not on file   Occupational History    Not on file   Tobacco Use    Smoking status: Former     Current packs/day: 2.00     Average packs/day: 2.0 packs/day for 15.0 years (30.0 ttl pk-yrs)     Types: Cigarettes    Smokeless tobacco: Never    Tobacco comments:     QUIT IN    Vaping Use    Vaping status: Never Used    Substance and Sexual Activity    Alcohol use: No    Drug use: No    Sexual activity: Not on file   Other Topics Concern    Not on file   Social History Narrative    Not on file     Social Determinants of Health     Financial Resource Strain: Low Risk  (8/16/2024)    Received from [facility]    Overall Financial Resource Strain (CARDIA)     Difficulty of Paying Living Expenses: Not hard at all   Food Insecurity: No Food Insecurity (8/16/2024)    Received from [facility]    Hunger Vital Sign     Worried About Running Out of Food in the Last Year: Never true     Ran Out of Food in the Last Year: Never true   Transportation Needs: No Transportation Needs (8/16/2024)    Received from [facility]    PRAPARE - Transportation     Lack of Transportation (Medical): No     Lack of Transportation (Non-Medical): No   Physical Activity: Not on file   Stress: Not on file   Social Connections: Unknown (3/13/2021)    [facility]    Social Connections     Conversations with friends/family/neighbors per week: Not on file   Housing Stability: Unknown (8/16/2024)    Received from [facility]    Housing Stability Vital Sign     Unable to Pay for Housing in the Last Year: No     Number of Places Lived in the Last Year: Not on file     Unstable Housing in the Last Year: No       ROS:  unremarkable    Physical Exam:   Ht 62\"   BMI 21.58 kg/m²     General Appearance:    Alert, cooperative, no distress, appears stated age   Head:    Normocephalic, without obvious abnormality, atraumatic   Eyes:    PERRL, conjunctiva/corneas clear, EOM's intact   Nose:   Nares normal, septum midline, mucosa normal, no drainage    or sinus tenderness   Throat:   Lips, mucosa, and tongue normal; teeth and gums normal   Neck:   Supple, symmetrical, trachea midline   Lungs:     Clear to auscultation bilaterally,  respirations unlabored    Heart:    Regular rate and rhythm, S1 and S2 normal, no murmur, rub or gallop   Abdomen:     Soft, non-tender, bowel sounds active all four quadrants,     no masses, no organomegaly   Extremities:   Extremities normal, atraumatic, no cyanosis or edema   Pulses:   2+ and symmetric all extremities   Skin:   Skin color, texture, turgor normal, no rashes or lesions   Neurologic:   CNII-XII intact, normal strength, sensation and reflexes     throughout     Impressions & Plans:    Diagnosis:  Major Depression Recurrent Severe With Psychotic Features. F33.3    Procedure:  Unilateral, non-dominant D'Rustam    I have discussed the risks and benefits and alternatives with the patient/family.  They understand and agree to proceed with plan of care.    Meli Adames MD  9/23/2024

## 2024-09-23 NOTE — DISCHARGE INSTRUCTIONS
Discharge Instructions  Electroconvulsive Therapy    Activities:  You MUST arrange to have a responsible adult drive you home and have a responsible adult stay with you the rest of the day and overnight.  Do not drive today.  Do not operate any machinery today. Use kitchen equipment with caution.  Rest and take it easy today.  Do not take public transportation without the presence of another responsible adult for 24 hours    Medications:  Resume your regular medications when you get home  The nurse will instruct you not to take any NSAIDS (Advil, Aleve, Motrin, Ibuprofen) before 1 pm today because you were given a certain medication during the procedure.      Diet:  You may resume your regular diet when you get home  Do not drink alcohol for 24 hours    Additional Instructions:  Someone should call you to schedule any upcoming ECT treatments. Call as needed to schedule or cancel your ECT appointments [phone].  If you have any questions or concerns, please call your own psychiatrist.  For your safety, please do not wear make-up to any future ECT appointments.  For any questions regarding your ECT appointment, please call [facility] [phone].  For cancellations after hours, call [phone] and leave a message.    Expected Recovery:  As you awaken, you may experience one or more of the following:  Headache, nausea, temporary confusion, or muscle stiffness.  If these symptoms increase, become severe or are accompanied by a fever of more than 101, please seek medical attention.  The ECT may affect memory.  Many patients report loss of memory for events that occurred in the days, weeks or months surrounding the ECT.  Many of these memories may return, but not always.  Short-term memory may also be affected for months, but this can also be a result of the disorder that you have.

## 2024-09-23 NOTE — PROGRESS NOTES
[facility] / [facility]  ECT Procedure Note    Meli Antonio Patient Status:  Outpatient   Age/Gender 73 year old female   MRN [id number]    Location [facility] POST ANESTHESIA CARE UNIT Attending No att. providers found   Hosp Day # 0 PCP SIRI TABOR     ECT Number: #16 total-#6 unilateral-#2 in series    Diagnosis: Major Depression Recurrent Severe Without Psychotic Features. F33.2    Type of ECT:  Unilateral, non-dominant D'Rustam    Place of Service:  Outpatient    Settings:   1.  Energy Percentage: 75%    2.  Program:  Low 0.5    Pre-ECT Evaluation    Symptoms:      Prior to procedure, reviewed with treatment team correct patient, time of procedure and type of ECT.  Also reviewed with anesthesia pre-ECT medications    Patient mood remains depressed with little improvement since last week.  Patient continues to report little energy interest motivation or enjoyment.  She is also still reporting decreased function.  There are no suicidal thoughts.  We discussed increasing frequency of ECT and recommend patient schedule a second treatment for this week.  Staff spoke with patient's  and patient will have a second ECT on Thursday.    The patient continues to retain capacity to consent for ECT.    Risk/Benefits:  Discussed with patient side effects of ECT including headache, teeth, jaw, cardiac, pulmonary, NPO, aspiration, allergic reactions, anesthetic reactions, musculoskeletal, neurologic, morbidity/mortality, potential lack of efficacy, unilateral/bilateral ECT, relapse/maintenance issues, cognitive risks including memory, concentration, cognition, and other risks.    Side Effects:  Patient notes no cognitive/physical complaints    Exam:  Mood: depressed  Affect: Congruent and Restricted  Memory:  intact immediate, recent, remote and as evidenced by ability to present consistent history  Concentration:   focused and attentive and as assessed by  ability to concentrate on our  conversation  Suicidal ideation: no suicidal ideation    Patient Monitored:  B/P, EKG, EEG, Pulse Ox, Left Ankle Cuff    Pre-ECT Medications:  Robinul 0.1 mg IV, Zofran 4 mg IV, and caffeine 125 mg IV     ECT Medications:  Anesthetic Etomidate 10 mg IV followed by ketamine 25 mg IV and Succinylcholine 60 mg IV     Seizure Duration:  Motor: 29 seconds       EE seconds    Post-ECT Condition:  Treatment unremarkable    ECT Medications:  Benadryl 25 mg IV    Meli Adames    2024

## 2024-09-23 NOTE — ANESTHESIA PREPROCEDURE EVALUATION
PRE-OP EVALUATION    Patient Name: Meli Antonio    Admit Diagnosis: Major depressive disorder, recurrent episode, severe with catatonia (HCC) [F33.2, F06.1]    Pre-op Diagnosis: * No pre-op diagnosis entered *        Anesthesia Procedure: ECT(BEDSIDE)    * No surgeons found in log *    Pre-op vitals reviewed.        Body mass index is 21.58 kg/m².    Current medications reviewed.  Hospital Medications:   lactated ringers infusion   Intravenous Continuous    glycopyrrolate (Robinul) 0.2 MG/ML injection 0.1 mg  0.1 mg Intravenous Once    ondansetron (Zofran) 4 MG/2ML injection 4 mg  4 mg Intravenous Once    caffeine-sodium benzoate 125-125 mg/mL injection  125 mg Intravenous Once    diphenhydrAMINE (Benadryl) 50 mg/mL  injection 25 mg  25 mg Intravenous Once    [COMPLETED] ondansetron (Zofran) 4 MG/2ML injection        [COMPLETED] glycopyrrolate (Robinul) 0.2 MG/ML injection        [COMPLETED] caffeine-sodium benzoate 125-125 MG/ML injection        [COMPLETED] diphenhydrAMINE (Benadryl) 50 mg/mL  injection           Outpatient Medications:   (Not in a hospital admission)      Allergies: Radiology contrast iodinated dyes, Sulfa antibiotics, Methylprednisolone, Seroquel [quetiapine], Cinnamon, and Iodine (topical)      Anesthesia Evaluation    Patient summary reviewed.    Anesthetic Complications  (-) history of anesthetic complications         GI/Hepatic/Renal      (+) GERD                      (+) irritable bowel syndrome     Cardiovascular    Negative cardiovascular ROS.    Exercise tolerance: good     MET: >4                                  (-) FERREIRA  (-) orthopnea  (-) PND     Endo/Other      (+) diabetes and well controlled, type 2, not using insulin                         Pulmonary    Negative pulmonary ROS.           (-) shortness of breath  (-) recent URI          Neuro/Psych      (+) depression  (+) anxiety                              Past Surgical History:   Procedure Laterality Date           Correct bunion,othr methods      Correct bunion,simple      BILAT.     Dilation/curettage,diagnostic      FOR \"MOLE PREGNANCY\"    Other Right     ORIF RIGHT ANKLE    Spine surgery procedure unlisted      cervical spine february 2020    Total knee replacement       Social History     Socioeconomic History    Marital status:    Tobacco Use    Smoking status: Former     Current packs/day: 2.00     Average packs/day: 2.0 packs/day for 15.0 years (30.0 ttl pk-yrs)     Types: Cigarettes    Smokeless tobacco: Never    Tobacco comments:     QUIT IN 1983   Vaping Use    Vaping status: Never Used   Substance and Sexual Activity    Alcohol use: No    Drug use: No     History   Drug Use No     Available pre-op labs reviewed.  Lab Results   Component Value Date    WBC 7.0 07/24/2024    RBC 4.20 07/24/2024    HGB 12.7 07/24/2024    HCT 38.7 07/24/2024    MCV 87.7 08/31/2024    MCV 92.1 07/24/2024    MCH 30.2 07/24/2024    MCHC 34.9 08/31/2024    MCHC 32.8 07/24/2024    RDW 13.1 07/24/2024    .0 07/24/2024     Lab Results   Component Value Date     07/24/2024    K 4.6 07/24/2024     07/24/2024    CO2 25.0 07/24/2024    BUN 14 07/24/2024    CREATSERUM 1.06 (H) 07/24/2024     (H) 07/24/2024    CA 9.6 07/24/2024            Airway      Mallampati: I  Mouth opening: 3 FB  TM distance: < 4 cm  Neck ROM: full Cardiovascular    Cardiovascular exam normal.  Rhythm: regular  Rate: normal  (-) murmur   Dental    Dentition appears grossly intact         Pulmonary    Pulmonary exam normal.  Breath sounds clear to auscultation bilaterally.          (-) rales     Other findings              ASA: 3   Plan: general  NPO status verified and patient meets guidelines.        Comment: discussed  Plan/risks discussed with: patient            We discussed GA w/mask or ETT and possible scratchy throat and rarely dental damage.  We discussed analgesic plan and PONV prophylaxis.  The patient's questions were answered and  consent was attained.

## 2024-09-23 NOTE — ANESTHESIA POSTPROCEDURE EVALUATION
[facility]    Meli DANNIE Antonio Patient Status:  Outpatient   Age/Gender 73 year old female MRN [id number]   Location [facility] POST ANESTHESIA CARE UNIT Attending Meli Adames MD   Hosp Day # 0 PCP SIRI TABOR       Anesthesia Post-op Note        Procedure Summary       Date: 09/23/24 Room / Location: [facility] Post Anesthesia Care Unit    Anesthesia Start: 0706 Anesthesia Stop: 0718    Procedure: ECT(BEDSIDE) Diagnosis: Major depressive disorder, recurrent episode, severe with catatonia (HCC)    Scheduled Providers:  Anesthesiologist: Freddy Miller MD    Anesthesia Type: general ASA Status: 3            Anesthesia Type: general    Vitals Value Taken Time   /102 09/23/24 0718   Temp  09/23/24 0719   Pulse 67 09/23/24 0718   Resp 16 09/23/24 0718   SpO2 98 % 09/23/24 0718       Patient Location: PACU    Anesthesia Type: general    Airway Patency: patent    Postop Pain Control: adequate    Mental Status: mildly sedated but able to meaningfully participate in the post-anesthesia evaluation    Nausea/Vomiting: none    Cardiopulmonary/Hydration status: stable euvolemic    Complications: no apparent anesthesia related complications    Postop vital signs: stable    Dental Exam: Unchanged from Preop    Patient to be discharged home when criteria met.

## 2024-09-26 ENCOUNTER — ANESTHESIA EVENT (OUTPATIENT)
Dept: POSTOP/PACU | Facility: HOSPITAL | Age: 74
End: 2024-09-26
Payer: MEDICARE

## 2024-09-26 ENCOUNTER — ANESTHESIA (OUTPATIENT)
Dept: POSTOP/PACU | Facility: HOSPITAL | Age: 74
End: 2024-09-26
Payer: MEDICARE

## 2024-09-26 RX ORDER — KETAMINE HYDROCHLORIDE 50 MG/ML
INJECTION, SOLUTION INTRAMUSCULAR; INTRAVENOUS AS NEEDED
Status: DISCONTINUED | OUTPATIENT
Start: 2024-09-26 | End: 2024-09-26 | Stop reason: SURG

## 2024-09-26 RX ORDER — ETOMIDATE 2 MG/ML
INJECTION INTRAVENOUS AS NEEDED
Status: DISCONTINUED | OUTPATIENT
Start: 2024-09-26 | End: 2024-09-26 | Stop reason: SURG

## 2024-09-26 RX ADMIN — KETAMINE HYDROCHLORIDE 25 MG: 50 INJECTION, SOLUTION INTRAMUSCULAR; INTRAVENOUS at 06:45:00

## 2024-09-26 RX ADMIN — ETOMIDATE 10 MG: 2 INJECTION INTRAVENOUS at 06:45:00

## 2024-09-26 NOTE — ANESTHESIA POSTPROCEDURE EVALUATION
[facility]    Meliyin Antonio Patient Status:  Outpatient   Age/Gender 73 year old female MRN [id number]   Location [facility] POST ANESTHESIA CARE UNIT Attending Meli Adames MD   Hosp Day # 0 PCP SIRI TABOR       Anesthesia Post-op Note        Procedure Summary       Date: 09/26/24 Room / Location: [facility] Post Anesthesia Care Unit    Anesthesia Start: 0636 Anesthesia Stop: 0658    Procedure: ECT(BEDSIDE) Diagnosis: Major depressive disorder, recurrent episode, severe with catatonia (HCC)    Scheduled Providers:  Responsible Provider: Jimy Lara MD    Anesthesia Type: general ASA Status: 3            Anesthesia Type: general    Vitals Value Taken Time   /77 09/26/24 0706   Temp  09/26/24 0706   Pulse 82 09/26/24 0706   Resp 16 09/26/24 0706   SpO2 98 09/26/24 0706       Patient Location: PACU    Anesthesia Type: general    Airway Patency: patent    Postop Pain Control: adequate    Mental Status: Other    Nausea/Vomiting: none    Cardiopulmonary/Hydration status: stable euvolemic    Complications: no apparent anesthesia related complications    Postop vital signs: stable    Dental Exam: Unchanged from Preop

## 2024-09-26 NOTE — ANESTHESIA PREPROCEDURE EVALUATION
PRE-OP EVALUATION    Patient Name: Meli Antonio    Admit Diagnosis: Major depressive disorder, recurrent episode, severe with catatonia (HCC) [F33.2, F06.1]    Pre-op Diagnosis: * No pre-op diagnosis entered *        Anesthesia Procedure: ECT(BEDSIDE)    * No surgeons found in log *    Pre-op vitals reviewed.  Temp: 97.4 °F (36.3 °C)  Pulse: 65  Resp: 11  BP: 138/96     Body mass index is 21.58 kg/m².    Current medications reviewed.  Hospital Medications:   glucose (Dex4) 15 GM/59ML oral liquid 15 g  15 g Oral Q15 Min PRN    Or    glucose (Glutose) 40% oral gel 15 g  15 g Oral Q15 Min PRN    Or    glucose-vitamin C (Dex-4) chewable tab 4 tablet  4 tablet Oral Q15 Min PRN    Or    dextrose 50% injection 50 mL  50 mL Intravenous Q15 Min PRN    Or    glucose (Dex4) 15 GM/59ML oral liquid 30 g  30 g Oral Q15 Min PRN    Or    glucose (Glutose) 40% oral gel 30 g  30 g Oral Q15 Min PRN    Or    glucose-vitamin C (Dex-4) chewable tab 8 tablet  8 tablet Oral Q15 Min PRN    lactated ringers infusion   Intravenous Continuous    [COMPLETED] glycopyrrolate (Robinul) 0.2 MG/ML injection 0.1 mg  0.1 mg Intravenous Once    [COMPLETED] ondansetron (Zofran) 4 MG/2ML injection 4 mg  4 mg Intravenous Once    [COMPLETED] caffeine-sodium benzoate 125-125 mg/mL injection  125 mg Intravenous Once    diphenhydrAMINE (Benadryl) 50 mg/mL  injection 25 mg  25 mg Intravenous Once    [COMPLETED] diphenhydrAMINE (Benadryl) 50 mg/mL  injection           Outpatient Medications:   (Not in a hospital admission)      Allergies: Radiology contrast iodinated dyes, Sulfa antibiotics, Methylprednisolone, Seroquel [quetiapine], Cinnamon, and Iodine (topical)      Anesthesia Evaluation    Patient summary reviewed.    Anesthetic Complications  (-) history of anesthetic complications         GI/Hepatic/Renal      (+) GERD                      (+) irritable bowel syndrome     Cardiovascular    Negative cardiovascular ROS.    Exercise tolerance: good      MET: >4                                  (-) FERREIRA  (-) orthopnea  (-) PND     Endo/Other      (+) diabetes and well controlled, type 2, not using insulin                         Pulmonary    Negative pulmonary ROS.           (-) shortness of breath  (-) recent URI          Neuro/Psych      (+) depression  (+) anxiety                              Past Surgical History:   Procedure Laterality Date          Correct bunion,othr methods      Correct bunion,simple      BILAT.     Dilation/curettage,diagnostic      FOR \"MOLE PREGNANCY\"    Other Right     ORIF RIGHT ANKLE    Spine surgery procedure unlisted      cervical spine 2020    Total knee replacement       Social History     Socioeconomic History    Marital status:    Tobacco Use    Smoking status: Former     Current packs/day: 2.00     Average packs/day: 2.0 packs/day for 15.0 years (30.0 ttl pk-yrs)     Types: Cigarettes    Smokeless tobacco: Never    Tobacco comments:     QUIT IN    Vaping Use    Vaping status: Never Used   Substance and Sexual Activity    Alcohol use: No    Drug use: No     History   Drug Use No     Available pre-op labs reviewed.  Lab Results   Component Value Date    WBC 7.0 2024    RBC 4.20 2024    HGB 12.7 2024    HCT 38.7 2024    MCV 87.7 2024    MCV 92.1 2024    MCH 30.2 2024    MCHC 34.9 2024    MCHC 32.8 2024    RDW 13.1 2024    .0 2024     Lab Results   Component Value Date     2024    K 4.6 2024     2024    CO2 25.0 2024    BUN 14 2024    CREATSERUM 1.06 (H) 2024     (H) 2024    CA 9.6 2024            Airway      Mallampati: I  Mouth opening: 3 FB  TM distance: < 4 cm  Neck ROM: full Cardiovascular    Cardiovascular exam normal.  Rhythm: regular  Rate: normal  (-) murmur   Dental    Dentition appears grossly intact         Pulmonary    Pulmonary exam normal.  Breath  sounds clear to auscultation bilaterally.          (-) rales     Other findings              ASA: 3   Plan: general  NPO status verified and patient meets guidelines.        Comment: discussed  Plan/risks discussed with: patient            We discussed GA w/mask or ETT and possible scratchy throat and rarely dental damage.  We discussed analgesic plan and PONV prophylaxis.  The patient's questions were answered and consent was attained.

## 2024-09-27 VITALS — HEIGHT: 62 IN | BODY MASS INDEX: 22 KG/M2

## 2024-09-27 RX ORDER — NICOTINE POLACRILEX 4 MG
30 LOZENGE BUCCAL
Status: CANCELLED | OUTPATIENT
Start: 2024-09-27

## 2024-09-27 RX ORDER — NICOTINE POLACRILEX 4 MG
15 LOZENGE BUCCAL
Status: CANCELLED | OUTPATIENT
Start: 2024-09-27

## 2024-09-27 RX ORDER — DEXTROSE MONOHYDRATE 25 G/50ML
50 INJECTION, SOLUTION INTRAVENOUS
Status: CANCELLED | OUTPATIENT
Start: 2024-09-27

## 2024-09-29 RX ORDER — GLYCOPYRROLATE 0.2 MG/ML
0.1 INJECTION, SOLUTION INTRAMUSCULAR; INTRAVENOUS ONCE
Status: CANCELLED | OUTPATIENT
Start: 2024-09-29 | End: 2024-09-29

## 2024-09-29 RX ORDER — ONDANSETRON 2 MG/ML
4 INJECTION INTRAMUSCULAR; INTRAVENOUS ONCE
Status: CANCELLED | OUTPATIENT
Start: 2024-09-29 | End: 2024-09-29

## 2024-09-29 RX ORDER — CAFFEINE AND SODIUM BENZOATE 125 MG/ML
125 INJECTION, SOLUTION INTRAMUSCULAR; INTRAVENOUS ONCE
Status: CANCELLED | OUTPATIENT
Start: 2024-09-29 | End: 2024-09-29

## 2024-09-29 RX ORDER — SODIUM CHLORIDE, SODIUM LACTATE, POTASSIUM CHLORIDE, CALCIUM CHLORIDE 600; 310; 30; 20 MG/100ML; MG/100ML; MG/100ML; MG/100ML
INJECTION, SOLUTION INTRAVENOUS CONTINUOUS
Status: CANCELLED | OUTPATIENT
Start: 2024-09-29

## 2024-09-29 RX ORDER — DIPHENHYDRAMINE HYDROCHLORIDE 50 MG/ML
25 INJECTION INTRAMUSCULAR; INTRAVENOUS ONCE
Status: CANCELLED | OUTPATIENT
Start: 2024-09-29 | End: 2024-09-29

## 2024-09-30 ENCOUNTER — HOSPITAL ENCOUNTER (OUTPATIENT)
Dept: POSTOP/PACU | Facility: HOSPITAL | Age: 74
Discharge: HOME | End: 2024-09-30
Attending: Other
Payer: MEDICARE

## 2024-09-30 NOTE — PROGRESS NOTES
Patient did not arrive to ECT this morning.  Staff called patient.  Patient is safe but says that she was unaware of today's appointment.

## 2024-10-01 ENCOUNTER — ANESTHESIA EVENT (OUTPATIENT)
Dept: POSTOP/PACU | Facility: HOSPITAL | Age: 74
End: 2024-10-01
Payer: MEDICARE

## 2024-10-01 ENCOUNTER — HOSPITAL ENCOUNTER (OUTPATIENT)
Dept: POSTOP/PACU | Facility: HOSPITAL | Age: 74
Discharge: HOME | End: 2024-10-01
Attending: Other
Payer: MEDICARE

## 2024-10-01 ENCOUNTER — ANESTHESIA (OUTPATIENT)
Dept: POSTOP/PACU | Facility: HOSPITAL | Age: 74
End: 2024-10-01
Payer: MEDICARE

## 2024-10-01 VITALS
SYSTOLIC BLOOD PRESSURE: 147 MMHG | HEART RATE: 81 BPM | RESPIRATION RATE: 14 BRPM | TEMPERATURE: 99 F | DIASTOLIC BLOOD PRESSURE: 96 MMHG | BODY MASS INDEX: 22 KG/M2 | HEIGHT: 62 IN | OXYGEN SATURATION: 97 %

## 2024-10-01 DIAGNOSIS — F33.2 MDD (MAJOR DEPRESSIVE DISORDER), RECURRENT SEVERE, WITHOUT PSYCHOSIS (HCC): ICD-10-CM

## 2024-10-01 LAB
GLUCOSE BLD-MCNC: 113 MG/DL (ref 70–99)
GLUCOSE BLD-MCNC: 135 MG/DL (ref 70–99)

## 2024-10-01 RX ORDER — METOCLOPRAMIDE HYDROCHLORIDE 5 MG/ML
10 INJECTION INTRAMUSCULAR; INTRAVENOUS EVERY 8 HOURS PRN
Status: DISCONTINUED | OUTPATIENT
Start: 2024-10-01 | End: 2024-10-03

## 2024-10-01 RX ORDER — HYDROMORPHONE HYDROCHLORIDE 1 MG/ML
0.2 INJECTION, SOLUTION INTRAMUSCULAR; INTRAVENOUS; SUBCUTANEOUS EVERY 5 MIN PRN
Status: ACTIVE | OUTPATIENT
Start: 2024-10-01 | End: 2024-10-01

## 2024-10-01 RX ORDER — ONDANSETRON 2 MG/ML
4 INJECTION INTRAMUSCULAR; INTRAVENOUS ONCE
Status: COMPLETED | OUTPATIENT
Start: 2024-10-01 | End: 2024-10-01

## 2024-10-01 RX ORDER — CAFFEINE AND SODIUM BENZOATE 125 MG/ML
125 INJECTION, SOLUTION INTRAMUSCULAR; INTRAVENOUS ONCE
Status: COMPLETED | OUTPATIENT
Start: 2024-10-01 | End: 2024-10-01

## 2024-10-01 RX ORDER — NICOTINE POLACRILEX 4 MG
30 LOZENGE BUCCAL
Status: DISCONTINUED | OUTPATIENT
Start: 2024-10-01 | End: 2024-10-03

## 2024-10-01 RX ORDER — MIDAZOLAM HYDROCHLORIDE 1 MG/ML
1 INJECTION INTRAMUSCULAR; INTRAVENOUS EVERY 5 MIN PRN
Status: ACTIVE | OUTPATIENT
Start: 2024-10-01 | End: 2024-10-01

## 2024-10-01 RX ORDER — ONDANSETRON 2 MG/ML
4 INJECTION INTRAMUSCULAR; INTRAVENOUS EVERY 6 HOURS PRN
Status: DISCONTINUED | OUTPATIENT
Start: 2024-10-01 | End: 2024-10-03

## 2024-10-01 RX ORDER — HYDROCODONE BITARTRATE AND ACETAMINOPHEN 10; 325 MG/1; MG/1
1 TABLET ORAL ONCE AS NEEDED
Status: ACTIVE | OUTPATIENT
Start: 2024-10-01 | End: 2024-10-01

## 2024-10-01 RX ORDER — NICOTINE POLACRILEX 4 MG
15 LOZENGE BUCCAL
Status: DISCONTINUED | OUTPATIENT
Start: 2024-10-01 | End: 2024-10-03

## 2024-10-01 RX ORDER — HYDROMORPHONE HYDROCHLORIDE 1 MG/ML
0.6 INJECTION, SOLUTION INTRAMUSCULAR; INTRAVENOUS; SUBCUTANEOUS EVERY 5 MIN PRN
Status: ACTIVE | OUTPATIENT
Start: 2024-10-01 | End: 2024-10-01

## 2024-10-01 RX ORDER — LABETALOL HYDROCHLORIDE 5 MG/ML
5 INJECTION, SOLUTION INTRAVENOUS EVERY 5 MIN PRN
Status: ACTIVE | OUTPATIENT
Start: 2024-10-01 | End: 2024-10-01

## 2024-10-01 RX ORDER — KETAMINE HYDROCHLORIDE 50 MG/ML
INJECTION, SOLUTION INTRAMUSCULAR; INTRAVENOUS AS NEEDED
Status: DISCONTINUED | OUTPATIENT
Start: 2024-10-01 | End: 2024-10-01 | Stop reason: SURG

## 2024-10-01 RX ORDER — GLYCOPYRROLATE 0.2 MG/ML
0.1 INJECTION, SOLUTION INTRAMUSCULAR; INTRAVENOUS ONCE
Status: COMPLETED | OUTPATIENT
Start: 2024-10-01 | End: 2024-10-01

## 2024-10-01 RX ORDER — MEPERIDINE HYDROCHLORIDE 25 MG/ML
12.5 INJECTION INTRAMUSCULAR; INTRAVENOUS; SUBCUTANEOUS AS NEEDED
Status: DISCONTINUED | OUTPATIENT
Start: 2024-10-01 | End: 2024-10-03

## 2024-10-01 RX ORDER — DEXTROSE MONOHYDRATE 25 G/50ML
50 INJECTION, SOLUTION INTRAVENOUS
Status: DISCONTINUED | OUTPATIENT
Start: 2024-10-01 | End: 2024-10-03

## 2024-10-01 RX ORDER — HYDROMORPHONE HYDROCHLORIDE 1 MG/ML
0.4 INJECTION, SOLUTION INTRAMUSCULAR; INTRAVENOUS; SUBCUTANEOUS EVERY 5 MIN PRN
Status: ACTIVE | OUTPATIENT
Start: 2024-10-01 | End: 2024-10-01

## 2024-10-01 RX ORDER — GLYCOPYRROLATE 0.2 MG/ML
INJECTION, SOLUTION INTRAMUSCULAR; INTRAVENOUS
Status: COMPLETED
Start: 2024-10-01 | End: 2024-10-01

## 2024-10-01 RX ORDER — ETOMIDATE 2 MG/ML
INJECTION INTRAVENOUS AS NEEDED
Status: DISCONTINUED | OUTPATIENT
Start: 2024-10-01 | End: 2024-10-01 | Stop reason: SURG

## 2024-10-01 RX ORDER — DIPHENHYDRAMINE HYDROCHLORIDE 50 MG/ML
25 INJECTION INTRAMUSCULAR; INTRAVENOUS ONCE
Status: COMPLETED | OUTPATIENT
Start: 2024-10-01 | End: 2024-10-01

## 2024-10-01 RX ORDER — NALOXONE HYDROCHLORIDE 0.4 MG/ML
80 INJECTION, SOLUTION INTRAMUSCULAR; INTRAVENOUS; SUBCUTANEOUS AS NEEDED
Status: ACTIVE | OUTPATIENT
Start: 2024-10-01 | End: 2024-10-01

## 2024-10-01 RX ORDER — ONDANSETRON 2 MG/ML
INJECTION INTRAMUSCULAR; INTRAVENOUS
Status: COMPLETED
Start: 2024-10-01 | End: 2024-10-01

## 2024-10-01 RX ORDER — DIPHENHYDRAMINE HYDROCHLORIDE 50 MG/ML
INJECTION INTRAMUSCULAR; INTRAVENOUS
Status: COMPLETED
Start: 2024-10-01 | End: 2024-10-01

## 2024-10-01 RX ORDER — CAFFEINE AND SODIUM BENZOATE 125 MG/ML
INJECTION, SOLUTION INTRAMUSCULAR; INTRAVENOUS
Status: COMPLETED
Start: 2024-10-01 | End: 2024-10-01

## 2024-10-01 RX ORDER — SODIUM CHLORIDE, SODIUM LACTATE, POTASSIUM CHLORIDE, CALCIUM CHLORIDE 600; 310; 30; 20 MG/100ML; MG/100ML; MG/100ML; MG/100ML
INJECTION, SOLUTION INTRAVENOUS CONTINUOUS
Status: DISCONTINUED | OUTPATIENT
Start: 2024-10-01 | End: 2024-10-03

## 2024-10-01 RX ORDER — HYDROCODONE BITARTRATE AND ACETAMINOPHEN 10; 325 MG/1; MG/1
2 TABLET ORAL ONCE AS NEEDED
Status: ACTIVE | OUTPATIENT
Start: 2024-10-01 | End: 2024-10-01

## 2024-10-01 RX ORDER — ACETAMINOPHEN 500 MG
1000 TABLET ORAL ONCE AS NEEDED
Status: ACTIVE | OUTPATIENT
Start: 2024-10-01 | End: 2024-10-01

## 2024-10-01 RX ADMIN — ETOMIDATE 10 MG: 2 INJECTION INTRAVENOUS at 07:26:00

## 2024-10-01 RX ADMIN — DIPHENHYDRAMINE HYDROCHLORIDE 25 MG: 50 INJECTION INTRAMUSCULAR; INTRAVENOUS at 07:32:00

## 2024-10-01 RX ADMIN — SODIUM CHLORIDE, SODIUM LACTATE, POTASSIUM CHLORIDE, CALCIUM CHLORIDE: 600; 310; 30; 20 INJECTION, SOLUTION INTRAVENOUS at 07:45:00

## 2024-10-01 RX ADMIN — CAFFEINE AND SODIUM BENZOATE 125 MG: 125 INJECTION, SOLUTION INTRAMUSCULAR; INTRAVENOUS at 06:56:00

## 2024-10-01 RX ADMIN — ONDANSETRON 4 MG: 2 INJECTION INTRAMUSCULAR; INTRAVENOUS at 06:29:00

## 2024-10-01 RX ADMIN — SODIUM CHLORIDE, SODIUM LACTATE, POTASSIUM CHLORIDE, CALCIUM CHLORIDE: 600; 310; 30; 20 INJECTION, SOLUTION INTRAVENOUS at 06:29:00

## 2024-10-01 RX ADMIN — GLYCOPYRROLATE 0.1 MG: 0.2 INJECTION, SOLUTION INTRAMUSCULAR; INTRAVENOUS at 06:31:00

## 2024-10-01 RX ADMIN — KETAMINE HYDROCHLORIDE 25 MG: 50 INJECTION, SOLUTION INTRAMUSCULAR; INTRAVENOUS at 07:26:00

## 2024-10-01 NOTE — ANESTHESIA PREPROCEDURE EVALUATION
PRE-OP EVALUATION    Patient Name: Meli Antonio    Admit Diagnosis: Major depressive disorder, recurrent episode, severe with catatonia (HCC) [F33.2, F06.1]    Pre-op Diagnosis: * No pre-op diagnosis entered *        Anesthesia Procedure: ECT(BEDSIDE)    * No surgeons found in log *    Pre-op vitals reviewed.        Body mass index is 21.58 kg/m².    Current medications reviewed.  Hospital Medications:   glucose (Dex4) 15 GM/59ML oral liquid 15 g  15 g Oral Q15 Min PRN    Or    glucose (Glutose) 40% oral gel 15 g  15 g Oral Q15 Min PRN    Or    glucose-vitamin C (Dex-4) chewable tab 4 tablet  4 tablet Oral Q15 Min PRN    Or    dextrose 50% injection 50 mL  50 mL Intravenous Q15 Min PRN    Or    glucose (Dex4) 15 GM/59ML oral liquid 30 g  30 g Oral Q15 Min PRN    Or    glucose (Glutose) 40% oral gel 30 g  30 g Oral Q15 Min PRN    Or    glucose-vitamin C (Dex-4) chewable tab 8 tablet  8 tablet Oral Q15 Min PRN    glucose (Dex4) 15 GM/59ML oral liquid 15 g  15 g Oral Q15 Min PRN    Or    glucose (Glutose) 40% oral gel 15 g  15 g Oral Q15 Min PRN    Or    glucose-vitamin C (Dex-4) chewable tab 4 tablet  4 tablet Oral Q15 Min PRN    Or    dextrose 50% injection 50 mL  50 mL Intravenous Q15 Min PRN    Or    glucose (Dex4) 15 GM/59ML oral liquid 30 g  30 g Oral Q15 Min PRN    Or    glucose (Glutose) 40% oral gel 30 g  30 g Oral Q15 Min PRN    Or    glucose-vitamin C (Dex-4) chewable tab 8 tablet  8 tablet Oral Q15 Min PRN    lactated ringers infusion   Intravenous Continuous    glycopyrrolate (Robinul) 0.2 MG/ML injection 0.1 mg  0.1 mg Intravenous Once    [COMPLETED] ondansetron (Zofran) 4 MG/2ML injection 4 mg  4 mg Intravenous Once    caffeine-sodium benzoate 125-125 mg/mL injection  125 mg Intravenous Once    [COMPLETED] glycopyrrolate (Robinul) 0.2 MG/ML injection        [COMPLETED] caffeine-sodium benzoate 125-125 MG/ML injection        [COMPLETED] diphenhydrAMINE (Benadryl) 50 mg/mL  injection            Outpatient Medications:   (Not in a hospital admission)      Allergies: Radiology contrast iodinated dyes, Sulfa antibiotics, Methylprednisolone, Seroquel [quetiapine], Cinnamon, and Iodine (topical)      Anesthesia Evaluation    Patient summary reviewed.    Anesthetic Complications  (-) history of anesthetic complications         GI/Hepatic/Renal      (+) GERD                      (+) irritable bowel syndrome     Cardiovascular    Negative cardiovascular ROS.    Exercise tolerance: good     MET: >4                                  (-) FERREIRA  (-) orthopnea  (-) PND     Endo/Other      (+) diabetes and well controlled, type 2, not using insulin                         Pulmonary    Negative pulmonary ROS.           (-) shortness of breath  (-) recent URI          Neuro/Psych      (+) depression  (+) anxiety                              Past Surgical History:   Procedure Laterality Date          Correct bunion,othr methods      Correct bunion,simple      BILAT.     Dilation/curettage,diagnostic      FOR \"MOLE PREGNANCY\"    Ect provided Bilateral 2024    1st Tx. 24    Other Right     ORIF RIGHT ANKLE    Spine surgery procedure unlisted      cervical spine 2020    Total knee replacement       Social History     Socioeconomic History    Marital status:    Tobacco Use    Smoking status: Former     Current packs/day: 2.00     Average packs/day: 2.0 packs/day for 15.0 years (30.0 ttl pk-yrs)     Types: Cigarettes    Smokeless tobacco: Never    Tobacco comments:     QUIT IN    Vaping Use    Vaping status: Never Used   Substance and Sexual Activity    Alcohol use: No    Drug use: No     History   Drug Use No     Available pre-op labs reviewed.  Lab Results   Component Value Date    WBC 7.0 2024    RBC 4.20 2024    HGB 12.7 2024    HCT 38.7 2024    MCV 87.7 2024    MCV 92.1 2024    MCH 30.2 2024    MCHC 34.9 2024    MCHC 32.8 2024     RDW 13.1 07/24/2024    .0 07/24/2024     Lab Results   Component Value Date     07/24/2024    K 4.6 07/24/2024     07/24/2024    CO2 25.0 07/24/2024    BUN 14 07/24/2024    CREATSERUM 1.06 (H) 07/24/2024     (H) 07/24/2024    CA 9.6 07/24/2024            Airway      Mallampati: I  Mouth opening: 3 FB  TM distance: < 4 cm  Neck ROM: full Cardiovascular    Cardiovascular exam normal.  Rhythm: regular  Rate: normal  (-) murmur   Dental    Dentition appears grossly intact         Pulmonary    Pulmonary exam normal.  Breath sounds clear to auscultation bilaterally.          (-) rales     Other findings              ASA: 3   Plan: general  NPO status verified and patient meets guidelines.        Comment: discussed  Plan/risks discussed with: patient            We discussed GA w/mask or ETT and possible scratchy throat and rarely dental damage.  We discussed analgesic plan and PONV prophylaxis.  The patient's questions were answered and consent was attained.

## 2024-10-01 NOTE — ANESTHESIA POSTPROCEDURE EVALUATION
[facility]    Meli DANNIE Antonio Patient Status:  Outpatient   Age/Gender 73 year old female MRN [id number]   Location [facility] POST ANESTHESIA CARE UNIT Attending Meli Adames MD   Hosp Day # 0 PCP SIRI TABOR       Anesthesia Post-op Note        Procedure Summary       Date: 10/01/24 Room / Location: [facility] Post Anesthesia Care Unit    Anesthesia Start: 0720 Anesthesia Stop: 0732    Procedure: ECT(BEDSIDE) Diagnosis: MDD (major depressive disorder), recurrent severe, without psychosis (HCC)    Scheduled Providers:  Anesthesiologist: Freddy Miller MD    Anesthesia Type: general ASA Status: 3            Anesthesia Type: general    Vitals Value Taken Time   /99 10/01/24 0732   Temp  10/01/24 0733   Pulse 77 10/01/24 0732   Resp 16 10/01/24 0732   SpO2 96 % 10/01/24 0732       Patient Location: PACU    Anesthesia Type: general    Airway Patency: patent    Postop Pain Control: adequate    Mental Status: mildly sedated but able to meaningfully participate in the post-anesthesia evaluation    Nausea/Vomiting: none    Cardiopulmonary/Hydration status: stable euvolemic    Complications: no apparent anesthesia related complications    Postop vital signs: stable    Dental Exam: Unchanged from Preop    Patient to be discharged home when criteria met.

## 2024-10-01 NOTE — PROGRESS NOTES
[facility] / [facility]  ECT History & Physical    Meli Antonio Patient Status:  Outpatient   Age/Gender 73 year old female MRN [id number]   Location [facility] POST ANESTHESIA CARE UNIT Attending Meli Adames MD   Hosp Day # 0 PCP SIRI TABOR     Date of Service: 10/1/2024    Diagnosis:  Major Depression Recurrent Severe Without Psychotic Features. F33.2    Procedure:  Unilateral, non-dominant D'Rustam    HPI: Patient remains depressed.  No physical complaints      Medical History:  Past Medical History:    Anxiety state    Back problem    COMPRESSION FX    Cataract    Depression    Esophageal reflux    GERD (gastroesophageal reflux disease)    Heart valve disease    MVP--congenital    Hematoma and contusion of liver    STATES HAS BEEN THERE FOR MANY YEARS    History of fractured kneecap    RIGHT    IBS (irritable bowel syndrome)    Mitral prolapse    Osteoarthritis    Visual impairment    glasses       Surgical History:  Past Surgical History:   Procedure Laterality Date          Correct bunion,othr methods      Correct bunion,simple      BILAT.     Dilation/curettage,diagnostic      FOR \"MOLE PREGNANCY\"    Ect provided Bilateral 2024    1st Tx. 24    Other Right     ORIF RIGHT ANKLE    Spine surgery procedure unlisted      cervical spine 2020    Total knee replacement         Family History:  Family History   Problem Relation Age of Onset    Heart Disorder Father     Cancer Mother         ESOPHAGUS       Social History:  Social History     Socioeconomic History    Marital status:      Spouse name: Not on file    Number of children: Not on file    Years of education: Not on file    Highest education level: Not on file   Occupational History    Not on file   Tobacco Use    Smoking status: Former     Current packs/day: 2.00     Average packs/day: 2.0 packs/day for 15.0 years (30.0 ttl pk-yrs)     Types: Cigarettes    Smokeless tobacco: Never    Tobacco comments:      QUIT IN 1983   Vaping Use    Vaping status: Never Used   Substance and Sexual Activity    Alcohol use: No    Drug use: No    Sexual activity: Not on file   Other Topics Concern    Not on file   Social History Narrative    Not on file     Social Determinants of Health     Financial Resource Strain: Low Risk  (8/16/2024)    Received from [facility]    Overall Financial Resource Strain (CARDIA)     Difficulty of Paying Living Expenses: Not hard at all   Food Insecurity: No Food Insecurity (8/16/2024)    Received from [facility]    Hunger Vital Sign     Worried About Running Out of Food in the Last Year: Never true     Ran Out of Food in the Last Year: Never true   Transportation Needs: No Transportation Needs (8/16/2024)    Received from [facility]    PRAPARE - Transportation     Lack of Transportation (Medical): No     Lack of Transportation (Non-Medical): No   Physical Activity: Not on file   Stress: Not on file   Social Connections: Unknown (3/13/2021)    [facility]    Social Connections     Conversations with friends/family/neighbors per week: Not on file   Housing Stability: Unknown (8/16/2024)    Received from [facility]    Housing Stability Vital Sign     Unable to Pay for Housing in the Last Year: No     Number of Places Lived in the Last Year: Not on file     Unstable Housing in the Last Year: No       ROS:  unremarkable    Physical Exam:   Ht 62\"   BMI 21.58 kg/m²     General Appearance:    Alert, cooperative, no distress, appears stated age   Head:    Normocephalic, without obvious abnormality, atraumatic   Eyes:    PERRL, conjunctiva/corneas clear, EOM's intact   Nose:   Nares normal, septum midline, mucosa normal, no drainage    or sinus tenderness   Throat:   Lips, mucosa, and tongue normal; teeth and gums normal   Neck:   Supple, symmetrical,  trachea midline   Lungs:     Clear to auscultation bilaterally, respirations unlabored    Heart:    Regular rate and rhythm, S1 and S2 normal, no murmur, rub or gallop   Abdomen:     Soft, non-tender, bowel sounds active all four quadrants,     no masses, no organomegaly   Extremities:   Extremities normal, atraumatic, no cyanosis or edema   Pulses:   2+ and symmetric all extremities   Skin:   Skin color, texture, turgor normal, no rashes or lesions   Neurologic:   CNII-XII intact, normal strength, sensation and reflexes     throughout     Impressions & Plans:    Diagnosis:  Major Depression Recurrent Severe Without Psychotic Features. F33.2    Procedure:  Unilateral, non-dominant D'Rustam    I have discussed the risks and benefits and alternatives with the patient/family.  They understand and agree to proceed with plan of care.    Meli Adames MD  10/1/2024

## 2024-10-01 NOTE — PROGRESS NOTES
[facility] / [facility]  ECT Procedure Note    Meli Antonio Patient Status:  Outpatient   Age/Gender 73 year old female   MRN [id number]    Location [facility] POST ANESTHESIA CARE UNIT Attending No att. providers found   Hosp Day # 0 PCP SIRI TABOR     ECT Number: #[id number] total-#8 unilateral-#4 in series    Diagnosis: Major Depression Recurrent Severe Without Psychotic Features. F33.2    Type of ECT:  Unilateral, non-dominant D'Rustam    Place of Service:  Outpatient    Settings:   1.  Energy Percentage: 90%    2.  Program:  Low 0.5    Pre-ECT Evaluation    Symptoms:      Prior to procedure, reviewed with treatment team correct patient, time of procedure and type of ECT.  Also reviewed with anesthesia pre-ECT medications    Patient reports that she is still feeling depressed .  She has to force herself to function and to eat.  No SI. She is sleeping well.  She notes some mild cognitive SE with increased frequency of treatment.  She will have a second treatment this week and reevaluate.     The patient continues to retain capacity to consent for ECT.    Risk/Benefits:  Discussed with patient side effects of ECT including headache, teeth, jaw, cardiac, pulmonary, NPO, aspiration, allergic reactions, anesthetic reactions, musculoskeletal, neurologic, morbidity/mortality, potential lack of efficacy, unilateral/bilateral ECT, relapse/maintenance issues, cognitive risks including memory, concentration, cognition, and other risks.    Side Effects:  Mild memory complaint    Exam:  Mood: depressed and anxious  Affect: Congruent  Memory:  intact immediate, recent, remote and as evidenced by ability to present consistent history  Concentration:   focused and attentive and as assessed by  ability to concentrate on our conversation  Suicidal ideation: no suicidal ideation    Patient Monitored:  B/P, EKG, EEG, Pulse Ox, Left Ankle Cuff   Robinul 0.1 mg IV, Zofran 4 mg IV, and caffeine 125 mg IV     ECT  Medications:  Anesthetic  Etomidate 10 mg IV followed by ketamine 25 mg IV and Succinylcholine 60 mg IV    Seizure Duration:  Motor: 27 seconds       EE seconds    Post-ECT Condition:  Treatment unremarkable    ECT Medications: Benadryl 25 mg IV    Meli Adames    10/1/2024

## 2024-10-01 NOTE — DISCHARGE INSTRUCTIONS
Discharge Instructions  Electroconvulsive Therapy    Activities:  You MUST arrange to have a responsible adult drive you home and have a responsible adult stay with you the rest of the day and overnight.  Do not drive today.  Do not operate any machinery today. Use kitchen equipment with caution.  Rest and take it easy today.  Do not take public transportation without the presence of another responsible adult for 24 hours    Medications:  Resume your regular medications when you get home  The nurse will instruct you not to take any NSAIDS (Advil, Aleve, Motrin, Ibuprofen) before 1 pm today because you were given a certain medication during the procedure.    Diet:  You may resume your regular diet when you get home  Do not drink alcohol for 24 hours    Additional Instructions:  Someone should call you to schedule any upcoming ECT treatments. Call as needed to schedule or cancel your ECT appointments [phone].  If you have any questions or concerns, please call your own psychiatrist.  For your safety, please do not wear make-up to any future ECT appointments.  For any questions regarding your ECT appointment, please call [facility] [phone].  For cancellations after hours, call [phone] and leave a message.    Expected Recovery:  As you awaken, you may experience one or more of the following:  Headache, nausea, temporary confusion, or muscle stiffness.  If these symptoms increase, become severe or are accompanied by a fever of more than 101, please seek medical attention.  The ECT may affect memory.  Many patients report loss of memory for events that occurred in the days, weeks or months surrounding the ECT.  Many of these memories may return, but not always.  Short-term memory may also be affected for months, but this can also be a result of the disorder that you have.

## 2024-10-03 ENCOUNTER — ANESTHESIA (OUTPATIENT)
Dept: POSTOP/PACU | Facility: HOSPITAL | Age: 74
End: 2024-10-03
Payer: MEDICARE

## 2024-10-03 ENCOUNTER — ANESTHESIA EVENT (OUTPATIENT)
Dept: POSTOP/PACU | Facility: HOSPITAL | Age: 74
End: 2024-10-03
Payer: MEDICARE

## 2024-10-03 ENCOUNTER — HOSPITAL ENCOUNTER (OUTPATIENT)
Dept: POSTOP/PACU | Facility: HOSPITAL | Age: 74
Discharge: HOME | End: 2024-10-03
Attending: Other
Payer: MEDICARE

## 2024-10-03 VITALS
HEART RATE: 104 BPM | SYSTOLIC BLOOD PRESSURE: 143 MMHG | RESPIRATION RATE: 15 BRPM | TEMPERATURE: 98 F | OXYGEN SATURATION: 96 % | DIASTOLIC BLOOD PRESSURE: 84 MMHG

## 2024-10-03 DIAGNOSIS — F33.2 MDD (MAJOR DEPRESSIVE DISORDER), RECURRENT SEVERE, WITHOUT PSYCHOSIS (HCC): ICD-10-CM

## 2024-10-03 LAB — GLUCOSE BLD-MCNC: 132 MG/DL (ref 70–99)

## 2024-10-03 RX ORDER — ETOMIDATE 2 MG/ML
INJECTION INTRAVENOUS AS NEEDED
Status: DISCONTINUED | OUTPATIENT
Start: 2024-10-03 | End: 2024-10-03 | Stop reason: SURG

## 2024-10-03 RX ORDER — NICOTINE POLACRILEX 4 MG
30 LOZENGE BUCCAL
Status: DISCONTINUED | OUTPATIENT
Start: 2024-10-03 | End: 2024-10-05

## 2024-10-03 RX ORDER — SODIUM CHLORIDE, SODIUM LACTATE, POTASSIUM CHLORIDE, CALCIUM CHLORIDE 600; 310; 30; 20 MG/100ML; MG/100ML; MG/100ML; MG/100ML
INJECTION, SOLUTION INTRAVENOUS CONTINUOUS
Status: DISCONTINUED | OUTPATIENT
Start: 2024-10-03 | End: 2024-10-05

## 2024-10-03 RX ORDER — ONDANSETRON 2 MG/ML
INJECTION INTRAMUSCULAR; INTRAVENOUS
Status: COMPLETED
Start: 2024-10-03 | End: 2024-10-03

## 2024-10-03 RX ORDER — GLYCOPYRROLATE 0.2 MG/ML
INJECTION, SOLUTION INTRAMUSCULAR; INTRAVENOUS
Status: COMPLETED
Start: 2024-10-03 | End: 2024-10-03

## 2024-10-03 RX ORDER — NICOTINE POLACRILEX 4 MG
15 LOZENGE BUCCAL
Status: DISCONTINUED | OUTPATIENT
Start: 2024-10-03 | End: 2024-10-05

## 2024-10-03 RX ORDER — DIPHENHYDRAMINE HYDROCHLORIDE 50 MG/ML
25 INJECTION INTRAMUSCULAR; INTRAVENOUS ONCE
Status: COMPLETED | OUTPATIENT
Start: 2024-10-03 | End: 2024-10-03

## 2024-10-03 RX ORDER — ONDANSETRON 2 MG/ML
4 INJECTION INTRAMUSCULAR; INTRAVENOUS ONCE
Status: COMPLETED | OUTPATIENT
Start: 2024-10-03 | End: 2024-10-03

## 2024-10-03 RX ORDER — GLYCOPYRROLATE 0.2 MG/ML
0.1 INJECTION, SOLUTION INTRAMUSCULAR; INTRAVENOUS ONCE
Status: COMPLETED | OUTPATIENT
Start: 2024-10-03 | End: 2024-10-03

## 2024-10-03 RX ORDER — MIDAZOLAM HYDROCHLORIDE 1 MG/ML
1 INJECTION INTRAMUSCULAR; INTRAVENOUS EVERY 5 MIN PRN
Status: ACTIVE | OUTPATIENT
Start: 2024-10-03 | End: 2024-10-03

## 2024-10-03 RX ORDER — NALOXONE HYDROCHLORIDE 0.4 MG/ML
80 INJECTION, SOLUTION INTRAMUSCULAR; INTRAVENOUS; SUBCUTANEOUS AS NEEDED
Status: ACTIVE | OUTPATIENT
Start: 2024-10-03 | End: 2024-10-03

## 2024-10-03 RX ORDER — KETAMINE HYDROCHLORIDE 50 MG/ML
INJECTION, SOLUTION INTRAMUSCULAR; INTRAVENOUS AS NEEDED
Status: DISCONTINUED | OUTPATIENT
Start: 2024-10-03 | End: 2024-10-03 | Stop reason: SURG

## 2024-10-03 RX ORDER — CAFFEINE AND SODIUM BENZOATE 125 MG/ML
INJECTION, SOLUTION INTRAMUSCULAR; INTRAVENOUS
Status: COMPLETED
Start: 2024-10-03 | End: 2024-10-03

## 2024-10-03 RX ORDER — ACETAMINOPHEN 500 MG
1000 TABLET ORAL ONCE
Status: DISCONTINUED | OUTPATIENT
Start: 2024-10-03 | End: 2024-10-05

## 2024-10-03 RX ORDER — HYDROMORPHONE HYDROCHLORIDE 1 MG/ML
0.4 INJECTION, SOLUTION INTRAMUSCULAR; INTRAVENOUS; SUBCUTANEOUS EVERY 5 MIN PRN
Status: ACTIVE | OUTPATIENT
Start: 2024-10-03 | End: 2024-10-03

## 2024-10-03 RX ORDER — CAFFEINE AND SODIUM BENZOATE 125 MG/ML
125 INJECTION, SOLUTION INTRAMUSCULAR; INTRAVENOUS ONCE
Status: COMPLETED | OUTPATIENT
Start: 2024-10-03 | End: 2024-10-03

## 2024-10-03 RX ORDER — DEXTROSE MONOHYDRATE 25 G/50ML
50 INJECTION, SOLUTION INTRAVENOUS
Status: DISCONTINUED | OUTPATIENT
Start: 2024-10-03 | End: 2024-10-05

## 2024-10-03 RX ORDER — HYDROMORPHONE HYDROCHLORIDE 1 MG/ML
0.6 INJECTION, SOLUTION INTRAMUSCULAR; INTRAVENOUS; SUBCUTANEOUS EVERY 5 MIN PRN
Status: ACTIVE | OUTPATIENT
Start: 2024-10-03 | End: 2024-10-03

## 2024-10-03 RX ORDER — HYDROMORPHONE HYDROCHLORIDE 1 MG/ML
0.2 INJECTION, SOLUTION INTRAMUSCULAR; INTRAVENOUS; SUBCUTANEOUS EVERY 5 MIN PRN
Status: ACTIVE | OUTPATIENT
Start: 2024-10-03 | End: 2024-10-03

## 2024-10-03 RX ORDER — DIPHENHYDRAMINE HYDROCHLORIDE 50 MG/ML
INJECTION INTRAMUSCULAR; INTRAVENOUS
Status: COMPLETED
Start: 2024-10-03 | End: 2024-10-03

## 2024-10-03 RX ADMIN — SODIUM CHLORIDE, SODIUM LACTATE, POTASSIUM CHLORIDE, CALCIUM CHLORIDE: 600; 310; 30; 20 INJECTION, SOLUTION INTRAVENOUS at 06:32:00

## 2024-10-03 RX ADMIN — CAFFEINE AND SODIUM BENZOATE 125 MG: 125 INJECTION, SOLUTION INTRAMUSCULAR; INTRAVENOUS at 06:33:00

## 2024-10-03 RX ADMIN — DIPHENHYDRAMINE HYDROCHLORIDE 25 MG: 50 INJECTION INTRAMUSCULAR; INTRAVENOUS at 07:17:00

## 2024-10-03 RX ADMIN — ONDANSETRON 4 MG: 2 INJECTION INTRAMUSCULAR; INTRAVENOUS at 06:32:00

## 2024-10-03 RX ADMIN — SODIUM CHLORIDE, SODIUM LACTATE, POTASSIUM CHLORIDE, CALCIUM CHLORIDE: 600; 310; 30; 20 INJECTION, SOLUTION INTRAVENOUS at 07:17:00

## 2024-10-03 RX ADMIN — KETAMINE HYDROCHLORIDE 25 MG: 50 INJECTION, SOLUTION INTRAMUSCULAR; INTRAVENOUS at 07:09:00

## 2024-10-03 RX ADMIN — ETOMIDATE 10 MG: 2 INJECTION INTRAVENOUS at 07:09:00

## 2024-10-03 RX ADMIN — GLYCOPYRROLATE 0.1 MG: 0.2 INJECTION, SOLUTION INTRAMUSCULAR; INTRAVENOUS at 06:33:00

## 2024-10-03 NOTE — ANESTHESIA PREPROCEDURE EVALUATION
PRE-OP EVALUATION    Patient Name: Meli Antonio    Admit Diagnosis: Major depressive disorder, recurrent episode, severe with catatonia (HCC) [F33.2, F06.1]    Pre-op Diagnosis: * No pre-op diagnosis entered *        Anesthesia Procedure: ECT(BEDSIDE)    * No surgeons found in log *    Pre-op vitals reviewed.  Temp: 98.2 °F (36.8 °C)  Pulse: 80  Resp: 16  BP: 132/87  SpO2: 96 %  There is no height or weight on file to calculate BMI.    Current medications reviewed.  Hospital Medications:   acetaminophen (Tylenol Extra Strength) tab 1,000 mg  1,000 mg Oral Once    glucose (Dex4) 15 GM/59ML oral liquid 15 g  15 g Oral Q15 Min PRN    Or    glucose (Glutose) 40% oral gel 15 g  15 g Oral Q15 Min PRN    Or    glucose-vitamin C (Dex-4) chewable tab 4 tablet  4 tablet Oral Q15 Min PRN    Or    dextrose 50% injection 50 mL  50 mL Intravenous Q15 Min PRN    Or    glucose (Dex4) 15 GM/59ML oral liquid 30 g  30 g Oral Q15 Min PRN    Or    glucose (Glutose) 40% oral gel 30 g  30 g Oral Q15 Min PRN    Or    glucose-vitamin C (Dex-4) chewable tab 8 tablet  8 tablet Oral Q15 Min PRN    lactated ringers infusion   Intravenous Continuous    lactated ringers infusion   Intravenous Continuous    [COMPLETED] glycopyrrolate (Robinul) 0.2 MG/ML injection 0.1 mg  0.1 mg Intravenous Once    [COMPLETED] ondansetron (Zofran) 4 MG/2ML injection 4 mg  4 mg Intravenous Once    [COMPLETED] caffeine-sodium benzoate 125-125 mg/mL injection  125 mg Intravenous Once       Outpatient Medications:   (Not in a hospital admission)      Allergies: Radiology contrast iodinated dyes, Sulfa antibiotics, Methylprednisolone, Seroquel [quetiapine], Cinnamon, and Iodine (topical)      Anesthesia Evaluation    Patient summary reviewed.    Anesthetic Complications  (-) history of anesthetic complications         GI/Hepatic/Renal      (+) GERD                      (+) irritable bowel syndrome     Cardiovascular    Negative cardiovascular ROS.    Exercise  tolerance: good     MET: >4                                  (-) FERREIRA  (-) orthopnea  (-) PND     Endo/Other      (+) diabetes and well controlled, type 2, not using insulin                         Pulmonary    Negative pulmonary ROS.           (-) shortness of breath  (-) recent URI          Neuro/Psych      (+) depression  (+) anxiety                              Past Surgical History:   Procedure Laterality Date          Correct bunion,othr methods      Correct bunion,simple      BILAT.     Dilation/curettage,diagnostic      FOR \"MOLE PREGNANCY\"    Ect provided Bilateral 2024    1st Tx. 24    Other Right     ORIF RIGHT ANKLE    Spine surgery procedure unlisted      cervical spine 2020    Total knee replacement       Social History     Socioeconomic History    Marital status:    Tobacco Use    Smoking status: Former     Current packs/day: 2.00     Average packs/day: 2.0 packs/day for 15.0 years (30.0 ttl pk-yrs)     Types: Cigarettes    Smokeless tobacco: Never    Tobacco comments:     QUIT IN    Vaping Use    Vaping status: Never Used   Substance and Sexual Activity    Alcohol use: No    Drug use: No     History   Drug Use No     Available pre-op labs reviewed.  Lab Results   Component Value Date    WBC 7.0 2024    RBC 4.20 2024    HGB 12.7 2024    HCT 38.7 2024    MCV 87.7 2024    MCV 92.1 2024    MCH 30.2 2024    MCHC 34.9 2024    MCHC 32.8 2024    RDW 13.1 2024    .0 2024     Lab Results   Component Value Date     2024    K 4.6 2024     2024    CO2 25.0 2024    BUN 14 2024    CREATSERUM 1.06 (H) 2024     (H) 2024    CA 9.6 2024            Airway      Mallampati: I  Mouth opening: 3 FB  TM distance: < 4 cm  Neck ROM: full Cardiovascular    Cardiovascular exam normal.  Rhythm: regular  Rate: normal  (-) murmur   Dental    Dentition  appears grossly intact         Pulmonary    Pulmonary exam normal.  Breath sounds clear to auscultation bilaterally.          (-) rales     Other findings              ASA: 3   Plan: general  NPO status verified and patient meets guidelines.        Comment: discussed  Plan/risks discussed with: patient            We discussed GA w/mask or ETT and possible scratchy throat and rarely dental damage.  We discussed analgesic plan and PONV prophylaxis.  The patient's questions were answered and consent was attained.

## 2024-10-03 NOTE — DISCHARGE INSTRUCTIONS
Discharge Instructions  Electroconvulsive Therapy    NEXT ECT 10/17/24    Activities:  You MUST arrange to have a responsible adult drive you home and have a responsible adult stay with you the rest of the day and overnight.  Do not drive today.  Do not operate any machinery today. Use kitchen equipment with caution.  Rest and take it easy today.  Do not take public transportation without the presence of another responsible adult for 24 hours    Medications:  Resume your regular medications when you get home  The nurse will instruct you not to take any NSAIDS (Advil, Aleve, Motrin, Ibuprofen) before 1 pm today because you were given a certain medication during the procedure.    Diet:  You may resume your regular diet when you get home  Do not drink alcohol for 24 hours    Additional Instructions:  Someone should call you to schedule any upcoming ECT treatments. Call as needed to schedule or cancel your ECT appointments [phone].  If you have any questions or concerns, please call your own psychiatrist.  For your safety, please do not wear make-up to any future ECT appointments.  For any questions regarding your ECT appointment, please call [facility] [phone].  For cancellations after hours, call [phone] and leave a message.    Expected Recovery:  As you awaken, you may experience one or more of the following:  Headache, nausea, temporary confusion, or muscle stiffness.  If these symptoms increase, become severe or are accompanied by a fever of more than 101, please seek medical attention.  The ECT may affect memory.  Many patients report loss of memory for events that occurred in the days, weeks or months surrounding the ECT.  Many of these memories may return, but not always.  Short-term memory may also be affected for months, but this can also be a result of the disorder that you have.

## 2024-10-03 NOTE — ANESTHESIA POSTPROCEDURE EVALUATION
[facility]    Meli Antonio Patient Status:  Outpatient   Age/Gender 73 year old female MRN [id number]   Location [facility] POST ANESTHESIA CARE UNIT Attending Agapito Romero MD   Hosp Day # 0 PCP SIRI TABOR       Anesthesia Post-op Note        Procedure Summary       Date: 10/03/24 Room / Location: [facility] Post Anesthesia Care Unit    Anesthesia Start: 0702 Anesthesia Stop: 0716    Procedure: ECT(BEDSIDE) Diagnosis: MDD (major depressive disorder), recurrent severe, without psychosis (HCC)    Scheduled Providers:  Anesthesiologist: Asif Quintero MD    Anesthesia Type: general ASA Status: 3            Anesthesia Type: general    Vitals Value Taken Time   /95 10/03/24 0717   Temp   10/03/24 0717   Pulse 75 10/03/24 0717   Resp 16 10/03/24 0717   SpO2 100% 10/03/24 0717       Patient Location: PACU    Anesthesia Type: general    Airway Patency: patent    Postop Pain Control: adequate    Mental Status: mildly sedated but able to meaningfully participate in the post-anesthesia evaluation    Nausea/Vomiting: none    Cardiopulmonary/Hydration status: stable euvolemic    Complications: no apparent anesthesia related complications    Postop vital signs: stable    Dental Exam: Unchanged from Preop    Patient to be discharged home when criteria met.

## 2024-10-03 NOTE — PROGRESS NOTES
[facility] / [facility]  ECT History & Physical    Meli Antonio Patient Status:  Outpatient   Age/Gender 73 year old female MRN [id number]   Location [facility] POST ANESTHESIA CARE UNIT Attending Agapito Romero MD   Hosp Day # 0 PCP SIRI TABOR     Date: 10/3/2024    Diagnosis: Major depression recurrent severe    Procedure:  ECT    HPI:     Patient seen.  Patient reports a mild to moderate decrease in memory.  Patient noted no other cognitive or physical complaints.      Medical History:  Past Medical History:    Anxiety state    Back problem    COMPRESSION FX    Cataract    Depression    Esophageal reflux    GERD (gastroesophageal reflux disease)    Heart valve disease    MVP--congenital    Hematoma and contusion of liver    STATES HAS BEEN THERE FOR MANY YEARS    History of fractured kneecap    RIGHT    IBS (irritable bowel syndrome)    Mitral prolapse    Osteoarthritis    Visual impairment    glasses       Surgical History:  Past Surgical History:   Procedure Laterality Date          Correct bunion,othr methods      Correct bunion,simple      BILAT.     Dilation/curettage,diagnostic      FOR \"MOLE PREGNANCY\"    Ect provided Bilateral 2024    1st Tx. 24    Other Right     ORIF RIGHT ANKLE    Spine surgery procedure unlisted      cervical spine 2020    Total knee replacement         Family History:  Family History   Problem Relation Age of Onset    Heart Disorder Father     Cancer Mother         ESOPHAGUS       ROS:  As above    Physical Exam:   /84   Pulse 104   Temp 98.2 °F (36.8 °C) (Temporal)   Resp 15   SpO2 96%     General Appearance:    Alert, cooperative, no distress, appears stated age   Head:    Normocephalic, without obvious abnormality, atraumatic   Eyes:    PERRL, conjunctiva/corneas clear, EOM's intact       Nose:   Nares normal, septum midline, mucosa normal, no drainage    or sinus tenderness   Throat:   Lips, mucosa, and tongue normal;  teeth and gums normal   Neck:   Supple, symmetrical, trachea midline   Lungs:     Clear to auscultation bilaterally, respirations unlabored    Heart:    Regular rate and rhythm, S1 and S2 normal, no murmur, rub   or gallop   Abdomen:     Soft, non-tender, bowel sounds active all four quadrants,     no masses, no organomegaly   Extremities:   Extremities normal, atraumatic, no cyanosis or edema   Pulses:   2+ and symmetric all extremities   Skin:   Skin color, texture, turgor normal, no rashes or lesions   Neurologic:   CNII-XII intact, normal strength, sensation and reflexes     throughout     Impressions & Plans: Major depression recurrent severe.  Unilateral ECT    I have discussed the risks and benefits and alternatives with the patient/family.  They understand and agree to proceed with plan of care.    Agapito Romero MD

## 2024-10-03 NOTE — PROGRESS NOTES
[facility] / [facility]  ECT Procedure Note    Meli Antonio Patient Status:  Outpatient   Age/Gender 73 year old female MRN [id number]   Location [facility] POST ANESTHESIA CARE UNIT Attending Agapito Romero MD   Hosp Day # 0 PCP SIRI TABOR     10/3/2024    ECT Number: #19 total.  #9 unilateral.  #5 in series    Diagnosis: Major Depression Recurrent Severe Without Psychotic Features. F33.2    Type of ECT:  Unilateral non-dominant d'Rustam    Place of Service:  Outpatient    Settings:   1.  Energy Percentage: 90%    2.  Program:  Low 0.25    Pre-ECT Evaluation    Symptoms:  Patient seen.  Overall patient with variable course.  She reports with medication changes and ECT her mood has been much better.  However the patient also reports episodic mild to moderate memory decrease.  Patient reports no suicidal thoughts.  Patient reports continuing to want to try to keep her mood stable.  Patient reports a decrease in anxiety.  No adwoa or psychosis.  Patient shows capacity to make decisions.  Discussed risks and benefits.  Plan and changed to 2-week maintenance schedule.  Reevaluate at that point.  Continue unilateral ECT.  Decreased pulse width.    Risk/Benefits:  Discussed with patient side effects of ECT including headache, teeth, jaw, cardiac, pulmonary, NPO, aspiration, allergic reactions, anesthetic reactions, musculoskeletal, neurologic, morbidity/mortality, potential lack of efficacy, unilateral/bilateral ECT, relapse/maintenance issues, cognitive risks including memory, concentration, cognition, and other risks.    Side Effects:  Moderate memory complaint    Exam:  Mood: less depressed and less anxious  Affect: Congruent  Memory: Patient shows mild to moderate decrease in recent memory in certain events.  Immediate and remote memory intact.  Concentration:   good  Suicidal ideation: no suicidal ideation    Prior to procedure, reviewed with treatment team correct patient, time of  procedure and type of ECT.  Also reviewed with anesthesia pre-ECT medications.    Patient Monitored:  B/P, EKG, EEG, Pulse Ox, Left Ankle Cuff    Pre-ECT Medications: Robinul 0.1 mg IV, Zofran 4 mg IV, and caffeine 125 mg IV    ECT Medications:  Anesthetic  Etomidate 10 mg IV followed by ketamine 25 mg IV and Succinylcholine 60 mg IV    Seizure Duration:  Motor: 23 seconds       EE seconds    Post-ECT Condition:  Treatment unremarkable    Post-ECT Medications:   Benadryl 25 mg IV    Agapito Romero MD

## 2024-10-17 ENCOUNTER — HOSPITAL ENCOUNTER (OUTPATIENT)
Dept: POSTOP/PACU | Facility: HOSPITAL | Age: 74
Discharge: HOME | End: 2024-10-17
Attending: Other
Payer: MEDICARE

## 2024-10-17 ENCOUNTER — ANESTHESIA (OUTPATIENT)
Dept: POSTOP/PACU | Facility: HOSPITAL | Age: 74
End: 2024-10-17
Payer: MEDICARE

## 2024-10-17 ENCOUNTER — ANESTHESIA EVENT (OUTPATIENT)
Dept: POSTOP/PACU | Facility: HOSPITAL | Age: 74
End: 2024-10-17
Payer: MEDICARE

## 2024-10-17 VITALS
DIASTOLIC BLOOD PRESSURE: 98 MMHG | HEART RATE: 93 BPM | BODY MASS INDEX: 22 KG/M2 | HEIGHT: 62 IN | SYSTOLIC BLOOD PRESSURE: 154 MMHG | RESPIRATION RATE: 16 BRPM | TEMPERATURE: 97 F | OXYGEN SATURATION: 94 %

## 2024-10-17 DIAGNOSIS — F06.1 MAJOR DEPRESSIVE DISORDER, RECURRENT EPISODE, SEVERE WITH CATATONIA (HCC): ICD-10-CM

## 2024-10-17 DIAGNOSIS — F33.2 MAJOR DEPRESSIVE DISORDER, RECURRENT EPISODE, SEVERE WITH CATATONIA (HCC): ICD-10-CM

## 2024-10-17 RX ORDER — DEXTROSE MONOHYDRATE 25 G/50ML
50 INJECTION, SOLUTION INTRAVENOUS
Status: DISCONTINUED | OUTPATIENT
Start: 2024-10-17 | End: 2024-10-19

## 2024-10-17 RX ORDER — HYDROMORPHONE HYDROCHLORIDE 1 MG/ML
0.6 INJECTION, SOLUTION INTRAMUSCULAR; INTRAVENOUS; SUBCUTANEOUS EVERY 5 MIN PRN
Status: ACTIVE | OUTPATIENT
Start: 2024-10-17 | End: 2024-10-17

## 2024-10-17 RX ORDER — NICOTINE POLACRILEX 4 MG
15 LOZENGE BUCCAL
Status: DISCONTINUED | OUTPATIENT
Start: 2024-10-17 | End: 2024-10-19

## 2024-10-17 RX ORDER — ETOMIDATE 2 MG/ML
INJECTION INTRAVENOUS AS NEEDED
Status: DISCONTINUED | OUTPATIENT
Start: 2024-10-17 | End: 2024-10-17 | Stop reason: SURG

## 2024-10-17 RX ORDER — ONDANSETRON 2 MG/ML
INJECTION INTRAMUSCULAR; INTRAVENOUS
Status: COMPLETED
Start: 2024-10-17 | End: 2024-10-17

## 2024-10-17 RX ORDER — HYDROMORPHONE HYDROCHLORIDE 1 MG/ML
0.2 INJECTION, SOLUTION INTRAMUSCULAR; INTRAVENOUS; SUBCUTANEOUS EVERY 5 MIN PRN
Status: ACTIVE | OUTPATIENT
Start: 2024-10-17 | End: 2024-10-17

## 2024-10-17 RX ORDER — CAFFEINE AND SODIUM BENZOATE 125 MG/ML
INJECTION, SOLUTION INTRAMUSCULAR; INTRAVENOUS
Status: COMPLETED
Start: 2024-10-17 | End: 2024-10-17

## 2024-10-17 RX ORDER — DIPHENHYDRAMINE HYDROCHLORIDE 50 MG/ML
12.5 INJECTION INTRAMUSCULAR; INTRAVENOUS AS NEEDED
Status: ACTIVE | OUTPATIENT
Start: 2024-10-17 | End: 2024-10-17

## 2024-10-17 RX ORDER — MIDAZOLAM HYDROCHLORIDE 1 MG/ML
1 INJECTION INTRAMUSCULAR; INTRAVENOUS EVERY 5 MIN PRN
Status: ACTIVE | OUTPATIENT
Start: 2024-10-17 | End: 2024-10-17

## 2024-10-17 RX ORDER — CAFFEINE AND SODIUM BENZOATE 125 MG/ML
125 INJECTION, SOLUTION INTRAMUSCULAR; INTRAVENOUS ONCE
Status: COMPLETED | OUTPATIENT
Start: 2024-10-17 | End: 2024-10-17

## 2024-10-17 RX ORDER — GLYCOPYRROLATE 0.2 MG/ML
0.1 INJECTION, SOLUTION INTRAMUSCULAR; INTRAVENOUS ONCE
Status: COMPLETED | OUTPATIENT
Start: 2024-10-17 | End: 2024-10-17

## 2024-10-17 RX ORDER — DIPHENHYDRAMINE HYDROCHLORIDE 50 MG/ML
INJECTION INTRAMUSCULAR; INTRAVENOUS
Status: COMPLETED
Start: 2024-10-17 | End: 2024-10-17

## 2024-10-17 RX ORDER — DIPHENHYDRAMINE HYDROCHLORIDE 50 MG/ML
25 INJECTION INTRAMUSCULAR; INTRAVENOUS ONCE
Status: COMPLETED | OUTPATIENT
Start: 2024-10-17 | End: 2024-10-17

## 2024-10-17 RX ORDER — HYDROMORPHONE HYDROCHLORIDE 1 MG/ML
0.4 INJECTION, SOLUTION INTRAMUSCULAR; INTRAVENOUS; SUBCUTANEOUS EVERY 5 MIN PRN
Status: ACTIVE | OUTPATIENT
Start: 2024-10-17 | End: 2024-10-17

## 2024-10-17 RX ORDER — SODIUM CHLORIDE, SODIUM LACTATE, POTASSIUM CHLORIDE, CALCIUM CHLORIDE 600; 310; 30; 20 MG/100ML; MG/100ML; MG/100ML; MG/100ML
INJECTION, SOLUTION INTRAVENOUS CONTINUOUS
Status: DISCONTINUED | OUTPATIENT
Start: 2024-10-17 | End: 2024-10-19

## 2024-10-17 RX ORDER — MEPERIDINE HYDROCHLORIDE 25 MG/ML
12.5 INJECTION INTRAMUSCULAR; INTRAVENOUS; SUBCUTANEOUS AS NEEDED
Status: DISCONTINUED | OUTPATIENT
Start: 2024-10-17 | End: 2024-10-19

## 2024-10-17 RX ORDER — KETAMINE HYDROCHLORIDE 50 MG/ML
INJECTION, SOLUTION INTRAMUSCULAR; INTRAVENOUS AS NEEDED
Status: DISCONTINUED | OUTPATIENT
Start: 2024-10-17 | End: 2024-10-17 | Stop reason: SURG

## 2024-10-17 RX ORDER — NICOTINE POLACRILEX 4 MG
30 LOZENGE BUCCAL
Status: DISCONTINUED | OUTPATIENT
Start: 2024-10-17 | End: 2024-10-19

## 2024-10-17 RX ORDER — ONDANSETRON 2 MG/ML
4 INJECTION INTRAMUSCULAR; INTRAVENOUS ONCE
Status: COMPLETED | OUTPATIENT
Start: 2024-10-17 | End: 2024-10-17

## 2024-10-17 RX ORDER — GLYCOPYRROLATE 0.2 MG/ML
INJECTION, SOLUTION INTRAMUSCULAR; INTRAVENOUS
Status: COMPLETED
Start: 2024-10-17 | End: 2024-10-17

## 2024-10-17 RX ORDER — NALOXONE HYDROCHLORIDE 0.4 MG/ML
80 INJECTION, SOLUTION INTRAMUSCULAR; INTRAVENOUS; SUBCUTANEOUS AS NEEDED
Status: ACTIVE | OUTPATIENT
Start: 2024-10-17 | End: 2024-10-17

## 2024-10-17 RX ADMIN — GLYCOPYRROLATE 0.1 MG: 0.2 INJECTION, SOLUTION INTRAMUSCULAR; INTRAVENOUS at 06:32:00

## 2024-10-17 RX ADMIN — DIPHENHYDRAMINE HYDROCHLORIDE 25 MG: 50 INJECTION INTRAMUSCULAR; INTRAVENOUS at 07:25:00

## 2024-10-17 RX ADMIN — KETAMINE HYDROCHLORIDE 25 MG: 50 INJECTION, SOLUTION INTRAMUSCULAR; INTRAVENOUS at 07:15:00

## 2024-10-17 RX ADMIN — CAFFEINE AND SODIUM BENZOATE 125 MG: 125 INJECTION, SOLUTION INTRAMUSCULAR; INTRAVENOUS at 06:32:00

## 2024-10-17 RX ADMIN — ETOMIDATE 10 MG: 2 INJECTION INTRAVENOUS at 07:15:00

## 2024-10-17 RX ADMIN — SODIUM CHLORIDE, SODIUM LACTATE, POTASSIUM CHLORIDE, CALCIUM CHLORIDE: 600; 310; 30; 20 INJECTION, SOLUTION INTRAVENOUS at 06:32:00

## 2024-10-17 RX ADMIN — ONDANSETRON 4 MG: 2 INJECTION INTRAMUSCULAR; INTRAVENOUS at 06:32:00

## 2024-10-17 RX ADMIN — SODIUM CHLORIDE, SODIUM LACTATE, POTASSIUM CHLORIDE, CALCIUM CHLORIDE: 600; 310; 30; 20 INJECTION, SOLUTION INTRAVENOUS at 07:30:00

## 2024-10-17 NOTE — ANESTHESIA PREPROCEDURE EVALUATION
PRE-OP EVALUATION    Patient Name: Meli Antonio    Admit Diagnosis: Major depressive disorder, recurrent episode, severe with catatonia (HCC) [F33.2, F06.1]    Pre-op Diagnosis: * No pre-op diagnosis entered *        Anesthesia Procedure: ECT(BEDSIDE)    * No surgeons found in log *    Pre-op vitals reviewed.  Temp: 98 °F (36.7 °C)  Pulse: 71  Resp: 13  BP: 118/81  SpO2: 96 %  Body mass index is 21.58 kg/m².    Current medications reviewed.  Hospital Medications:   [COMPLETED] ondansetron (Zofran) 4 MG/2ML injection        [COMPLETED] glycopyrrolate (Robinul) 0.2 MG/ML injection        [COMPLETED] caffeine-sodium benzoate 125-125 MG/ML injection        [COMPLETED] diphenhydrAMINE (Benadryl) 50 mg/mL  injection           Outpatient Medications:   (Not in a hospital admission)      Allergies: Radiology contrast iodinated dyes, Sulfa antibiotics, Methylprednisolone, Seroquel [quetiapine], Cinnamon, and Iodine (topical)      Anesthesia Evaluation    Patient summary reviewed.    Anesthetic Complications  (-) history of anesthetic complications         GI/Hepatic/Renal      (+) GERD                      (+) irritable bowel syndrome     Cardiovascular    Negative cardiovascular ROS.    Exercise tolerance: good     MET: >4                                  (-) FERREIRA  (-) orthopnea  (-) PND     Endo/Other      (+) diabetes and well controlled, type 2, not using insulin                         Pulmonary    Negative pulmonary ROS.           (-) shortness of breath  (-) recent URI          Neuro/Psych      (+) depression  (+) anxiety                              Past Surgical History:   Procedure Laterality Date          Correct bunion,othr methods      Correct bunion,simple      BILAT.     Dilation/curettage,diagnostic      FOR \"MOLE PREGNANCY\"    Ect provided Bilateral 2024    1st Tx. 24    Other Right     ORIF RIGHT ANKLE    Spine surgery procedure unlisted      cervical spine 2020    Total knee  replacement       Social History     Socioeconomic History    Marital status:    Tobacco Use    Smoking status: Former     Current packs/day: 2.00     Average packs/day: 2.0 packs/day for 15.0 years (30.0 ttl pk-yrs)     Types: Cigarettes    Smokeless tobacco: Never    Tobacco comments:     QUIT IN 1983   Vaping Use    Vaping status: Never Used   Substance and Sexual Activity    Alcohol use: No    Drug use: No     History   Drug Use No     Available pre-op labs reviewed.  Lab Results   Component Value Date    WBC 7.0 07/24/2024    RBC 4.20 07/24/2024    HGB 12.7 07/24/2024    HCT 38.7 07/24/2024    MCV 87.7 08/31/2024    MCV 92.1 07/24/2024    MCH 30.2 07/24/2024    MCHC 34.9 08/31/2024    MCHC 32.8 07/24/2024    RDW 13.1 07/24/2024    .0 07/24/2024     Lab Results   Component Value Date     07/24/2024    K 4.6 07/24/2024     07/24/2024    CO2 25.0 07/24/2024    BUN 14 07/24/2024    CREATSERUM 1.06 (H) 07/24/2024     (H) 07/24/2024    CA 9.6 07/24/2024            Airway      Mallampati: I  Mouth opening: 3 FB  TM distance: < 4 cm  Neck ROM: full Cardiovascular    Cardiovascular exam normal.  Rhythm: regular  Rate: normal  (-) murmur   Dental    Dentition appears grossly intact         Pulmonary    Pulmonary exam normal.  Breath sounds clear to auscultation bilaterally.          (-) rales     Other findings              ASA: 3   Plan: general  NPO status verified and patient meets guidelines.        Comment: discussed  Plan/risks discussed with: patient            We discussed GA w/mask or ETT and possible scratchy throat and rarely dental damage.  We discussed analgesic plan and PONV prophylaxis.  The patient's questions were answered and consent was attained.

## 2024-10-17 NOTE — DISCHARGE INSTRUCTIONS
Discharge Instructions  Electroconvulsive Therapy    Activities:  You MUST arrange to have a responsible adult drive you home and have a responsible adult stay with you the rest of the day and overnight.  Do not drive today.  Do not operate any machinery today. Use kitchen equipment with caution.  Rest and take it easy today.  Do not take public transportation without the presence of another responsible adult for 24 hours    Medications:  Resume your regular medications when you get home  The nurse will instruct you not to take any NSAIDS (Advil, Aleve, Motrin, Ibuprofen) before 1 pm today because you were given a certain medication during the procedure.    Diet:  You may resume your regular diet when you get home  Do not drink alcohol for 24 hours    Additional Instructions:  Someone should call you to schedule any upcoming ECT treatments. Call as needed to schedule or cancel your ECT appointments [phone].  If you have any questions or concerns, please call your own psychiatrist.  For your safety, please do not wear make-up to any future ECT appointments.  For any questions regarding your ECT appointment, please call [facility] [phone].  For cancellations after hours, call [phone] and leave a message.    Expected Recovery:  As you awaken, you may experience one or more of the following:  Headache, nausea, temporary confusion, or muscle stiffness.  If these symptoms increase, become severe or are accompanied by a fever of more than 101, please seek medical attention.  The ECT may affect memory.  Many patients report loss of memory for events that occurred in the days, weeks or months surrounding the ECT.  Many of these memories may return, but not always.  Short-term memory may also be affected for months, but this can also be a result of the disorder that you have.

## 2024-10-17 NOTE — PROGRESS NOTES
[facility] / [facility]  ECT Procedure Note    Meli Antonio Patient Status:  Outpatient   Age/Gender 73 year old female MRN [id number]   Location [facility] POST ANESTHESIA CARE UNIT Attending Agapito Romero MD   Hosp Day # 0 PCP SIRI TABOR     10/17/2024    ECT Number: #20 total.  #10 unilateral.  #6 in series    Diagnosis: Major Depression Recurrent Severe Without Psychotic Features. F33.2    Type of ECT:  Unilateral non-dominant d'Rustam    Place of Service:  Outpatient    Settings:   1.  Energy Percentage: 90%    2.  Program:  Low 0.25    Pre-ECT Evaluation    Symptoms:  Patient seen.  Overall patient noted to have variable course.  She notes that while there continues to be some residual memory issues, it has been much better since we have spaced them out.  She also notes that her mood actually has remained stable.  Anxiety has been better.  Patient notes however main issue is been some issues with initial insomnia.  Discussed with patient treatment options and recommended using Benadryl 25 to 50 mg at night as tolerated for both sleep and to rule out any akathisia from medications.  Patient overall notes functioning better.  She reports no other side effects.  Patient shows capacity make decisions.  Plan on spreading out ECT to 3 weeks and reevaluate.    Risk/Benefits:  Discussed with patient side effects of ECT including headache, teeth, jaw, cardiac, pulmonary, NPO, aspiration, allergic reactions, anesthetic reactions, musculoskeletal, neurologic, morbidity/mortality, potential lack of efficacy, unilateral/bilateral ECT, relapse/maintenance issues, cognitive risks including memory, concentration, cognition, and other risks.    Side Effects:  Mild memory complaint    Exam:  Mood: less depressed and less anxious  Affect: Congruent  Memory:  intact immediate, recent, remote and as evidenced by ability to present consistent history on exam.  Memory significantly better this  week.  Concentration:   good  Suicidal ideation: no suicidal ideation    Prior to procedure, reviewed with treatment team correct patient, time of procedure and type of ECT.  Also reviewed with anesthesia pre-ECT medications.    Patient Monitored:  B/P, EKG, EEG, Pulse Ox, Left Ankle Cuff    Pre-ECT Medications: Robinul 0.1 mg IV, Zofran 4 mg IV, and caffeine 125 mg IV    ECT Medications:  Anesthetic  Etomidate 10 mg IV followed by ketamine 25 mg IV and Succinylcholine 60 mg IV    Seizure Duration:  Motor: 29 seconds       EE seconds    Post-ECT Condition:  Treatment unremarkable    Post-ECT Medications:   Benadryl 25 mg IV    Agapito Romero MD

## 2024-10-17 NOTE — ANESTHESIA POSTPROCEDURE EVALUATION
[facility]    Meli Antonio Patient Status:  Outpatient   Age/Gender 73 year old female MRN [id number]   Location [facility] POST ANESTHESIA CARE UNIT Attending Agapito Romero MD   Hosp Day # 0 PCP SIRI TABOR       Anesthesia Post-op Note        Procedure Summary       Date: 10/17/24 Room / Location: [facility] Post Anesthesia Care Unit    Anesthesia Start: 0712 Anesthesia Stop:     Procedure: ECT(BEDSIDE) Diagnosis: Major depressive disorder, recurrent episode, severe with catatonia (HCC)    Scheduled Providers:  Anesthesiologist: Frank Bull MD    Anesthesia Type: general ASA Status: 3            Anesthesia Type: general    Vitals Value Taken Time   /106 10/17/24 0721   Temp  10/17/24 0721   Pulse 78 10/17/24 0721   Resp 16 10/17/24 0721   SpO2 100 10/17/24 0721       Patient Location: PACU    Anesthesia Type: general    Airway Patency: patent    Postop Pain Control: adequate    Mental Status: mildly sedated but able to meaningfully participate in the post-anesthesia evaluation    Nausea/Vomiting: none    Cardiopulmonary/Hydration status: stable euvolemic    Complications: no apparent anesthesia related complications    Postop vital signs: stable    Dental Exam: Unchanged from Preop    Patient to be discharged from PACU when criteria met.

## 2024-10-17 NOTE — PROGRESS NOTES
[facility] / [facility]  ECT History & Physical    Meli Antonio Patient Status:  Outpatient   Age/Gender 73 year old female MRN [id number]   Location [facility] POST ANESTHESIA CARE UNIT Attending Agapito Romero MD   Hosp Day # 0 PCP SIRI TABOR     Date: 10/17/2024    Diagnosis: Major depression recurrent severe    Procedure:  ECT    HPI:     Patient seen.  Patient noted memory issues have been better versus last treatment.  However she continues to have some residual memory issues.  Patient also noted having some issues with sleep.      Medical History:  Past Medical History:    Anxiety state    Back problem    COMPRESSION FX    Cataract    Depression    Esophageal reflux    GERD (gastroesophageal reflux disease)    Heart valve disease    MVP--congenital    Hematoma and contusion of liver    STATES HAS BEEN THERE FOR MANY YEARS    History of fractured kneecap    RIGHT    IBS (irritable bowel syndrome)    Mitral prolapse    Osteoarthritis    Visual impairment    glasses       Surgical History:  Past Surgical History:   Procedure Laterality Date          Correct bunion,othr methods      Correct bunion,simple      BILAT.     Dilation/curettage,diagnostic      FOR \"MOLE PREGNANCY\"    Ect provided Bilateral 2024    1st Tx. 24    Other Right     ORIF RIGHT ANKLE    Spine surgery procedure unlisted      cervical spine 2020    Total knee replacement         Family History:  Family History   Problem Relation Age of Onset    Heart Disorder Father     Cancer Mother         ESOPHAGUS       ROS:  As above    Physical Exam:   BP (!) 154/98   Pulse 93   Temp 97.2 °F (36.2 °C) (Temporal)   Resp 16   Ht 62\"   SpO2 94%   BMI 21.58 kg/m²     General Appearance:    Alert, cooperative, no distress, appears stated age   Head:    Normocephalic, without obvious abnormality, atraumatic   Eyes:    PERRL, conjunctiva/corneas clear, EOM's intact       Nose:   Nares normal, septum midline,  mucosa normal, no drainage    or sinus tenderness   Throat:   Lips, mucosa, and tongue normal; teeth and gums normal   Neck:   Supple, symmetrical, trachea midline   Lungs:     Clear to auscultation bilaterally, respirations unlabored    Heart:    Regular rate and rhythm, S1 and S2 normal, no murmur, rub   or gallop   Abdomen:     Soft, non-tender, bowel sounds active all four quadrants,     no masses, no organomegaly   Extremities:   Extremities normal, atraumatic, no cyanosis or edema   Pulses:   2+ and symmetric all extremities   Skin:   Skin color, texture, turgor normal, no rashes or lesions   Neurologic:   CNII-XII intact, normal strength, sensation and reflexes     throughout     Impressions & Plans: Major depression recurrent severe.  Unilateral ECT    I have discussed the risks and benefits and alternatives with the patient/family.  They understand and agree to proceed with plan of care.    Agapito Romero MD

## 2024-11-07 ENCOUNTER — HOSPITAL ENCOUNTER (OUTPATIENT)
Dept: POSTOP/PACU | Facility: HOSPITAL | Age: 74
Discharge: HOME | End: 2024-11-07
Attending: Other
Payer: MEDICARE

## 2024-11-07 ENCOUNTER — ANESTHESIA EVENT (OUTPATIENT)
Dept: POSTOP/PACU | Facility: HOSPITAL | Age: 74
End: 2024-11-07
Payer: MEDICARE

## 2024-11-07 ENCOUNTER — ANESTHESIA (OUTPATIENT)
Dept: POSTOP/PACU | Facility: HOSPITAL | Age: 74
End: 2024-11-07
Payer: MEDICARE

## 2024-11-07 VITALS
OXYGEN SATURATION: 95 % | SYSTOLIC BLOOD PRESSURE: 132 MMHG | HEART RATE: 91 BPM | TEMPERATURE: 98 F | RESPIRATION RATE: 17 BRPM | HEIGHT: 60 IN | BODY MASS INDEX: 20.62 KG/M2 | DIASTOLIC BLOOD PRESSURE: 84 MMHG | WEIGHT: 105 LBS

## 2024-11-07 DIAGNOSIS — F06.1 MAJOR DEPRESSIVE DISORDER, RECURRENT EPISODE, SEVERE WITH CATATONIA (HCC): ICD-10-CM

## 2024-11-07 DIAGNOSIS — F33.2 MAJOR DEPRESSIVE DISORDER, RECURRENT EPISODE, SEVERE WITH CATATONIA (HCC): ICD-10-CM

## 2024-11-07 RX ORDER — DIPHENHYDRAMINE HYDROCHLORIDE 50 MG/ML
INJECTION INTRAMUSCULAR; INTRAVENOUS
Status: COMPLETED
Start: 2024-11-07 | End: 2024-11-07

## 2024-11-07 RX ORDER — GLYCOPYRROLATE 0.2 MG/ML
0.1 INJECTION, SOLUTION INTRAMUSCULAR; INTRAVENOUS ONCE
Status: COMPLETED | OUTPATIENT
Start: 2024-11-07 | End: 2024-11-07

## 2024-11-07 RX ORDER — ONDANSETRON 2 MG/ML
INJECTION INTRAMUSCULAR; INTRAVENOUS
Status: COMPLETED
Start: 2024-11-07 | End: 2024-11-07

## 2024-11-07 RX ORDER — SODIUM CHLORIDE, SODIUM LACTATE, POTASSIUM CHLORIDE, CALCIUM CHLORIDE 600; 310; 30; 20 MG/100ML; MG/100ML; MG/100ML; MG/100ML
INJECTION, SOLUTION INTRAVENOUS CONTINUOUS
Status: DISCONTINUED | OUTPATIENT
Start: 2024-11-07 | End: 2024-11-09

## 2024-11-07 RX ORDER — KETAMINE HYDROCHLORIDE 50 MG/ML
INJECTION, SOLUTION INTRAMUSCULAR; INTRAVENOUS AS NEEDED
Status: DISCONTINUED | OUTPATIENT
Start: 2024-11-07 | End: 2024-11-07 | Stop reason: SURG

## 2024-11-07 RX ORDER — ONDANSETRON 2 MG/ML
4 INJECTION INTRAMUSCULAR; INTRAVENOUS ONCE
Status: COMPLETED | OUTPATIENT
Start: 2024-11-07 | End: 2024-11-07

## 2024-11-07 RX ORDER — ETOMIDATE 2 MG/ML
INJECTION INTRAVENOUS AS NEEDED
Status: DISCONTINUED | OUTPATIENT
Start: 2024-11-07 | End: 2024-11-07 | Stop reason: SURG

## 2024-11-07 RX ORDER — HYDROMORPHONE HYDROCHLORIDE 1 MG/ML
0.4 INJECTION, SOLUTION INTRAMUSCULAR; INTRAVENOUS; SUBCUTANEOUS EVERY 5 MIN PRN
Status: ACTIVE | OUTPATIENT
Start: 2024-11-07 | End: 2024-11-07

## 2024-11-07 RX ORDER — HYDROMORPHONE HYDROCHLORIDE 1 MG/ML
0.2 INJECTION, SOLUTION INTRAMUSCULAR; INTRAVENOUS; SUBCUTANEOUS EVERY 5 MIN PRN
Status: ACTIVE | OUTPATIENT
Start: 2024-11-07 | End: 2024-11-07

## 2024-11-07 RX ORDER — DIPHENHYDRAMINE HYDROCHLORIDE 50 MG/ML
25 INJECTION INTRAMUSCULAR; INTRAVENOUS ONCE
Status: COMPLETED | OUTPATIENT
Start: 2024-11-07 | End: 2024-11-07

## 2024-11-07 RX ORDER — MIDAZOLAM HYDROCHLORIDE 1 MG/ML
1 INJECTION INTRAMUSCULAR; INTRAVENOUS EVERY 5 MIN PRN
Status: ACTIVE | OUTPATIENT
Start: 2024-11-07 | End: 2024-11-07

## 2024-11-07 RX ORDER — GLYCOPYRROLATE 0.2 MG/ML
INJECTION, SOLUTION INTRAMUSCULAR; INTRAVENOUS
Status: COMPLETED
Start: 2024-11-07 | End: 2024-11-07

## 2024-11-07 RX ORDER — HYDROMORPHONE HYDROCHLORIDE 1 MG/ML
0.6 INJECTION, SOLUTION INTRAMUSCULAR; INTRAVENOUS; SUBCUTANEOUS EVERY 5 MIN PRN
Status: ACTIVE | OUTPATIENT
Start: 2024-11-07 | End: 2024-11-07

## 2024-11-07 RX ORDER — CAFFEINE AND SODIUM BENZOATE 125 MG/ML
125 INJECTION, SOLUTION INTRAMUSCULAR; INTRAVENOUS ONCE
Status: COMPLETED | OUTPATIENT
Start: 2024-11-07 | End: 2024-11-07

## 2024-11-07 RX ORDER — CAFFEINE AND SODIUM BENZOATE 125 MG/ML
INJECTION, SOLUTION INTRAMUSCULAR; INTRAVENOUS
Status: COMPLETED
Start: 2024-11-07 | End: 2024-11-07

## 2024-11-07 RX ADMIN — CAFFEINE AND SODIUM BENZOATE 125 MG: 125 INJECTION, SOLUTION INTRAMUSCULAR; INTRAVENOUS at 06:18:00

## 2024-11-07 RX ADMIN — DIPHENHYDRAMINE HYDROCHLORIDE 25 MG: 50 INJECTION INTRAMUSCULAR; INTRAVENOUS at 07:00:00

## 2024-11-07 RX ADMIN — ONDANSETRON 4 MG: 2 INJECTION INTRAMUSCULAR; INTRAVENOUS at 06:08:00

## 2024-11-07 RX ADMIN — KETAMINE HYDROCHLORIDE 25 MG: 50 INJECTION, SOLUTION INTRAMUSCULAR; INTRAVENOUS at 06:54:00

## 2024-11-07 RX ADMIN — GLYCOPYRROLATE 0.1 MG: 0.2 INJECTION, SOLUTION INTRAMUSCULAR; INTRAVENOUS at 06:09:00

## 2024-11-07 RX ADMIN — ETOMIDATE 10 MG: 2 INJECTION INTRAVENOUS at 06:54:00

## 2024-11-07 RX ADMIN — SODIUM CHLORIDE, SODIUM LACTATE, POTASSIUM CHLORIDE, CALCIUM CHLORIDE: 600; 310; 30; 20 INJECTION, SOLUTION INTRAVENOUS at 06:08:00

## 2024-11-07 NOTE — DISCHARGE INSTRUCTIONS
Discharge Instructions  Electroconvulsive Therapy    Activities:  You MUST arrange to have a responsible adult drive you home and have a responsible adult stay with you the rest of the day and overnight.  Do not drive today.  Do not operate any machinery today. Use kitchen equipment with caution.  Rest and take it easy today.  Do not take public transportation without the presence of another responsible adult for 24 hours    Medications:  Resume your regular medications when you get home  The nurse will instruct you not to take any NSAIDS (Advil, Aleve, Motrin, Ibuprofen) before 1 pm today because you were given a certain medication during the procedure.      Diet:  You may resume your regular diet when you get home  Do not drink alcohol for 24 hours    Additional Instructions:  Someone should call you to schedule any upcoming ECT treatments. Call as needed to schedule or cancel your ECT appointments [phone].  If you have any questions or concerns, please call your own psychiatrist.  For your safety, please do not wear make-up to any future ECT appointments.  For any questions regarding your ECT appointment, please call [facility] [phone].  For cancellations after hours, call [phone] and leave a message.    Expected Recovery:  As you awaken, you may experience one or more of the following:  Headache, nausea, temporary confusion, or muscle stiffness.  If these symptoms increase, become severe or are accompanied by a fever of more than 101, please seek medical attention.  The ECT may affect memory.  Many patients report loss of memory for events that occurred in the days, weeks or months surrounding the ECT.  Many of these memories may return, but not always.  Short-term memory may also be affected for months, but this can also be a result of the disorder that you have.

## 2024-11-07 NOTE — ANESTHESIA POSTPROCEDURE EVALUATION
[facility]    Meli Antonio Patient Status:  Outpatient   Age/Gender 74 year old female MRN [id number]   Location [facility] POST ANESTHESIA CARE UNIT Attending Agapito Romero MD   Hosp Day # 0 PCP SIRI TABOR       Anesthesia Post-op Note        Procedure Summary       Date: 11/07/24 Room / Location: [facility] Post Anesthesia Care Unit    Anesthesia Start: 0650 Anesthesia Stop:     Procedure: ECT(BEDSIDE) Diagnosis: Major depressive disorder, recurrent episode, severe with catatonia (HCC)    Scheduled Providers:  Anesthesiologist: Alec Meeks MD    Anesthesia Type: general ASA Status: 3            Anesthesia Type: general    Vitals Value Taken Time   /113 11/07/24 0659   Temp available 11/07/24 0659   Pulse 76 11/07/24 0659   Resp 12 11/07/24 0659   SpO2 99% 11/07/24 0659       Patient Location: PACU    Anesthesia Type: general    Airway Patency: patent and extubated    Postop Pain Control: adequate    Mental Status: mildly sedated but able to meaningfully participate in the post-anesthesia evaluation    Nausea/Vomiting: none    Cardiopulmonary/Hydration status: stable euvolemic    Complications: no apparent anesthesia related complications    Postop vital signs: stable    Dental Exam: Unchanged from Preop    Patient to be discharged from PACU when criteria met.

## 2024-11-07 NOTE — ANESTHESIA PREPROCEDURE EVALUATION
PRE-OP EVALUATION    Patient Name: Meli Antonio    Admit Diagnosis: Major depressive disorder, recurrent episode, severe with catatonia (HCC) [F33.2, F06.1]    Pre-op Diagnosis: * No pre-op diagnosis entered *        Anesthesia Procedure: ECT(BEDSIDE)    * No surgeons found in log *    Pre-op vitals reviewed.  Temp: 98 °F (36.7 °C)  Pulse: 88  Resp: 10  BP: 122/79  SpO2: 96 %  Body mass index is 20.51 kg/m².    Current medications reviewed.  Hospital Medications:   lactated ringers infusion   Intravenous Continuous    [COMPLETED] glycopyrrolate (Robinul) 0.2 MG/ML injection 0.1 mg  0.1 mg Intravenous Once    [COMPLETED] ondansetron (Zofran) 4 MG/2ML injection 4 mg  4 mg Intravenous Once    [COMPLETED] caffeine-sodium benzoate 125-125 mg/mL injection  125 mg Intravenous Once    diphenhydrAMINE (Benadryl) 50 mg/mL  injection 25 mg  25 mg Intravenous Once    [COMPLETED] ondansetron (Zofran) 4 MG/2ML injection        [COMPLETED] glycopyrrolate (Robinul) 0.2 MG/ML injection        [COMPLETED] caffeine-sodium benzoate 125-125 MG/ML injection        [COMPLETED] diphenhydrAMINE (Benadryl) 50 mg/mL  injection           Outpatient Medications:   Prescriptions Prior to Admission[1]    Allergies: Radiology contrast iodinated dyes, Sulfa antibiotics, Methylprednisolone, Seroquel [quetiapine], Cinnamon, and Iodine (topical)      Anesthesia Evaluation    Patient summary reviewed.    Anesthetic Complications  (-) history of anesthetic complications         GI/Hepatic/Renal      (+) GERD                      (+) irritable bowel syndrome     Cardiovascular    Negative cardiovascular ROS.    Exercise tolerance: good     MET: >4                                  (-) FERREIRA  (-) orthopnea  (-) PND     Endo/Other      (+) diabetes and well controlled, type 2, not using insulin                         Pulmonary    Negative pulmonary ROS.           (-) shortness of breath  (-) recent URI          Neuro/Psych      (+) depression  (+)  anxiety                              Past Surgical History:   Procedure Laterality Date          Correct bunion,othr methods      Correct bunion,simple      BILAT.     Dilation/curettage,diagnostic      FOR \"MOLE PREGNANCY\"    Ect provided Bilateral 2024    1st Tx. 24    Other Right     ORIF RIGHT ANKLE    Spine surgery procedure unlisted      cervical spine 2020    Total knee replacement       Social History     Socioeconomic History    Marital status:    Tobacco Use    Smoking status: Former     Current packs/day: 2.00     Average packs/day: 2.0 packs/day for 15.0 years (30.0 ttl pk-yrs)     Types: Cigarettes    Smokeless tobacco: Never    Tobacco comments:     QUIT IN    Vaping Use    Vaping status: Never Used   Substance and Sexual Activity    Alcohol use: No    Drug use: No     History   Drug Use No     Available pre-op labs reviewed.  Lab Results   Component Value Date    MCV 87.7 2024    MCHC 34.9 2024               Airway      Mallampati: II  Mouth opening: 3 FB  TM distance: 4 - 6 cm  Neck ROM: full Cardiovascular      Rhythm: regular  Rate: normal     Dental             Pulmonary      Breath sounds clear to auscultation bilaterally.               Other findings              ASA: 3   Plan: general  NPO status verified and patient meets guidelines.    Post-procedure pain management plan discussed with surgeon and patient.      Plan/risks discussed with: patient                Present on Admission:  **None**             [1] (Not in a hospital admission)

## 2024-11-07 NOTE — PROGRESS NOTES
[facility] / [facility]  ECT History & Physical    Meli Antonio Patient Status:  Outpatient   Age/Gender 74 year old female MRN [id number]   Location [facility] POST ANESTHESIA CARE UNIT Attending Agapito Romero MD   Hosp Day # 0 PCP SIRI TABOR     Date: 2024    Diagnosis:  ***    Procedure:  ECT    HPI:     ***      Medical History:  Past Medical History:    Anxiety state    Back problem    COMPRESSION FX    Cataract    Depression    Esophageal reflux    GERD (gastroesophageal reflux disease)    Heart valve disease    MVP--congenital    Hematoma and contusion of liver    STATES HAS BEEN THERE FOR MANY YEARS    History of fractured kneecap    RIGHT    IBS (irritable bowel syndrome)    Mitral prolapse    Osteoarthritis    Visual impairment    glasses       Surgical History:  Past Surgical History:   Procedure Laterality Date          Correct bunion,othr methods      Correct bunion,simple      BILAT.     Dilation/curettage,diagnostic      FOR \"MOLE PREGNANCY\"    Ect provided Bilateral 2024    1st Tx. 24    Other Right     ORIF RIGHT ANKLE    Spine surgery procedure unlisted      cervical spine 2020    Total knee replacement         Family History:  Family History   Problem Relation Age of Onset    Heart Disorder Father     Cancer Mother         ESOPHAGUS       ROS:  ***    Physical Exam:   /84   Pulse 91   Temp 98.4 °F (36.9 °C)   Resp 17   Ht 60\"   Wt 47.6 kg (105 lb)   SpO2 95%   BMI 20.51 kg/m²     General Appearance:    Alert, cooperative, no distress, appears stated age   Head:    Normocephalic, without obvious abnormality, atraumatic   Eyes:    PERRL, conjunctiva/corneas clear, EOM's intact       Nose:   Nares normal, septum midline, mucosa normal, no drainage    or sinus tenderness   Throat:   Lips, mucosa, and tongue normal; teeth and gums normal   Neck:   Supple, symmetrical, trachea midline   Lungs:     Clear to auscultation bilaterally,  respirations unlabored    Heart:    Regular rate and rhythm, S1 and S2 normal, no murmur, rub   or gallop   Abdomen:     Soft, non-tender, bowel sounds active all four quadrants,     no masses, no organomegaly   Extremities:   Extremities normal, atraumatic, no cyanosis or edema   Pulses:   2+ and symmetric all extremities   Skin:   Skin color, texture, turgor normal, no rashes or lesions   Neurologic:   CNII-XII intact, normal strength, sensation and reflexes     throughout     Impressions & Plans:  ***    I have discussed the risks and benefits and alternatives with the patient/family.  They understand and agree to proceed with plan of care.    Agapito Romero MD

## 2024-11-07 NOTE — PROGRESS NOTES
[facility] / [facility]  ECT Procedure Note    Meli Antonio Patient Status:  Outpatient   Age/Gender 74 year old female MRN [id number]   Location [facility] POST ANESTHESIA CARE UNIT Attending Agapito Romero MD   Hosp Day # 0 PCP SIRI TABOR     2024    ECT Number: ***    Diagnosis: {SRIDEVI ECT DIAGNOSIS NEW:7200}    Type of ECT:  {ECT TYPE:7199}    Place of Service:  {inpatient / outpatient:}    Settings:   1.  Energy Percentage: { ECT SETTINGS:4416}    2.  Program:  { ECT PROGRAM:4417}    Pre-ECT Evaluation    Symptoms:  ***    Risk/Benefits:  {[facility] ECT RISK/BENEFITS:4580}    Side Effects:  {[facility] ECT SIDE EFFECTS:4581}    Exam:  Mood: {LOMG MSE MOOD 2:7203}  Affect: {LOMG MSE 2 AFFECT:362474::\"Congruent\"}  Memory:  {LOMG MSE MEMORY:195794::\"intact immediate, recent, remote\"}  Concentration:   {:533299}  Suicidal ideation: {Suicidal ideation:416438}    Prior to procedure, reviewed with treatment team correct patient, time of procedure and type of ECT.  Also reviewed with anesthesia pre-ECT medications.    Patient Monitored:  { ECT MONITORIN::\"B/P, EKG, EEG, Pulse Ox, Left Ankle Cuff\"}    Pre-ECT Medications: {Pre-ECT medications:2401}    ECT Medications:  {ECT Medications:7202::\"Succinylcholine ***\"}    Seizure Duration:  Motor: *** seconds       EEG: *** seconds    Post-ECT Condition:  { POST ECT CONDITION:4424::\"Treatment unremarkable\"}    Post-ECT Medications:  { POST ECT MEDICATIONS:4425::\"Propofol ***\"}    Agapito Romero MD           recent, remote and as evidenced by ability to present consistent history on formal exam  Concentration:   good  Suicidal ideation: no suicidal ideation    Prior to procedure, reviewed with treatment team correct patient, time of procedure and type of ECT.  Also reviewed with anesthesia pre-ECT medications.    Patient Monitored:  B/P, EKG, EEG, Pulse Ox, Left Ankle Cuff    Pre-ECT Medications: Robinul 0.1 mg IV, Zofran 4 mg IV, and caffeine 125 mg IV    ECT Medications:  Anesthetic  Etomidate 10 mg IV followed by ketamine 25 mg IV and Succinylcholine 60 mg IV    Seizure Duration:  Motor: 19 seconds       EE seconds    Post-ECT Condition:  Treatment unremarkable    Post-ECT Medications:   Benadryl 25 mg IV    Agapito Romero MD

## 2024-12-05 VITALS — BODY MASS INDEX: 19 KG/M2 | HEIGHT: 62 IN

## 2024-12-06 ENCOUNTER — HOSPITAL ENCOUNTER (OUTPATIENT)
Age: 74
Discharge: HOME | End: 2024-12-06
Payer: MEDICARE

## 2024-12-06 ENCOUNTER — APPOINTMENT (OUTPATIENT)
Dept: GENERAL RADIOLOGY | Age: 74
End: 2024-12-06
Attending: PHYSICIAN ASSISTANT
Payer: MEDICARE

## 2024-12-06 VITALS
SYSTOLIC BLOOD PRESSURE: 106 MMHG | RESPIRATION RATE: 17 BRPM | DIASTOLIC BLOOD PRESSURE: 66 MMHG | TEMPERATURE: 98 F | HEART RATE: 91 BPM | OXYGEN SATURATION: 97 %

## 2024-12-06 DIAGNOSIS — J06.9 VIRAL URI WITH COUGH: Primary | ICD-10-CM

## 2024-12-06 PROCEDURE — 99214 OFFICE O/P EST MOD 30 MIN: CPT

## 2024-12-06 PROCEDURE — 99213 OFFICE O/P EST LOW 20 MIN: CPT

## 2024-12-06 PROCEDURE — 71046 X-RAY EXAM CHEST 2 VIEWS: CPT | Performed by: PHYSICIAN ASSISTANT

## 2024-12-06 NOTE — ED PROVIDER NOTES
Patient Seen in: [facility]      History     Chief Complaint   Patient presents with    Cough/URI    Dyspnea     Stated Complaint: cold sym    Subjective:   HPI      Patient is a 74-year-old female with past medical history of diabetes that presents to immediate care due to cough x 3 days.  Associate symptoms include sinus congestion rhinorrhea sore throat.  Was seen at different immediate care 2 days ago for similar symptoms prescribed albuterol inhaler and Tessalon Perles.  Denying worsening cough chest pain shortness of breath or fever.  Patient contacted her PCP who instructed to come to immediate care today for chest x-ray.    Objective:     No pertinent past medical history.            No pertinent past surgical history.              No pertinent social history.            Review of Systems    Positive for stated complaint: cold sym  Other systems are as noted in HPI.  Constitutional and vital signs reviewed.      All other systems reviewed and negative except as noted above.    Physical Exam     ED Triage Vitals [12/06/24 1543]   /66   Pulse 91   Resp 17   Temp 97.9 °F (36.6 °C)   Temp src Oral   SpO2 97 %   O2 Device None (Room air)       Current Vitals:   Vital Signs  BP: 106/66  Pulse: 91  Resp: 17  Temp: 97.9 °F (36.6 °C)  Temp src: Oral    Oxygen Therapy  SpO2: 97 %  O2 Device: None (Room air)        Physical Exam  Vital signs reviewed. Nursing note reviewed.  Constitutional: Well-developed. Well-nourished. In no acute distress  HENT: Mucous membranes moist. TMs intact bilaterally. No trismus. Uvula midline. Mild posterior pharynx erythema.  No petechiae, exudates, or posterior pharynx edema.  EYES: No scleral icterus or conjunctival injection.  NECK: Full ROM. Supple.   CARDIAC: Normal rate. Normal S1/ S2. 2+ distal pulses. No edema  PULM/CHEST: Clear to auscultation bilaterally. No wheezes  Extremities: Full ROM  NEURO: Awake, alert, following commands, moving extremities, answering  questions.   SKIN: Warm and dry. No rash or lesions.  PSYCH: Normal judgment. Normal affect.             MDM      Patient is a 74-year-old female who presents to immediate care due to cough x 3 days.  Patient arrives with stable vitals speaking complete sentences in no respiratory distress.  Physical exam unremarkable with lungs clear to auscultation.  Most likely viral URI, acute cough, acute sinusitis.  Less likely pneumonia, chest x-ray performed, personally reviewed by me showing no consolidation.  Discussed supportive treatment including Tylenol and ibuprofen as needed.  Antihistamine such as Claritin or Zyrtec.  Return precautions including worsening cough, fever chest pain shortness of breath.  History given by patient.  Patient agreeable to plan all questions answered.          Medical Decision Making      Disposition and Plan     Clinical Impression:  1. Viral URI with cough         Disposition:  Discharge  12/6/2024  4:09 pm    Follow-up:  Birdie Valverde MD  [address] [address]  [phone]    Call             Medications Prescribed:  Discharge Medication List as of 12/6/2024  4:12 PM              Supplementary Documentation:

## 2024-12-06 NOTE — ED INITIAL ASSESSMENT (HPI)
Patient seen at  2 days ago and given inhaler and Tessalon for cough and SOB   States that her sx are still present   No recent fever  Using inhaler QID

## 2024-12-10 ENCOUNTER — HOSPITAL ENCOUNTER (OUTPATIENT)
Facility: HOSPITAL | Age: 74
Setting detail: OBSERVATION
Discharge: HOME | End: 2024-12-13
Attending: EMERGENCY MEDICINE
Payer: MEDICARE

## 2024-12-10 ENCOUNTER — APPOINTMENT (OUTPATIENT)
Dept: GENERAL RADIOLOGY | Facility: HOSPITAL | Age: 74
End: 2024-12-10
Attending: EMERGENCY MEDICINE
Payer: MEDICARE

## 2024-12-10 DIAGNOSIS — R19.7 NAUSEA VOMITING AND DIARRHEA: ICD-10-CM

## 2024-12-10 DIAGNOSIS — R11.2 NAUSEA VOMITING AND DIARRHEA: ICD-10-CM

## 2024-12-10 DIAGNOSIS — E87.20 METABOLIC ACIDOSIS: ICD-10-CM

## 2024-12-10 DIAGNOSIS — J40 BRONCHITIS: ICD-10-CM

## 2024-12-10 DIAGNOSIS — J18.0 BRONCHOPNEUMONIA: Primary | ICD-10-CM

## 2024-12-10 LAB
ALBUMIN SERPL-MCNC: 4.5 G/DL (ref 3.2–4.8)
ALBUMIN/GLOB SERPL: 1.8 {RATIO} (ref 1–2)
ALP LIVER SERPL-CCNC: 77 U/L
ALT SERPL-CCNC: 11 U/L
ANION GAP SERPL CALC-SCNC: 9 MMOL/L (ref 0–18)
AST SERPL-CCNC: 15 U/L (ref ?–34)
ATRIAL RATE: 99 BPM
BASE EXCESS BLD CALC-SCNC: -3.4 MMOL/L (ref ?–2)
BASOPHILS # BLD AUTO: 0.05 X10(3) UL (ref 0–0.2)
BASOPHILS NFR BLD AUTO: 0.5 %
BILIRUB SERPL-MCNC: 0.3 MG/DL (ref 0.2–1.1)
BUN BLD-MCNC: 15 MG/DL (ref 9–23)
BUN/CREAT SERPL: 14.2 (ref 10–20)
CALCIUM BLD-MCNC: 10 MG/DL (ref 8.7–10.4)
CHLORIDE SERPL-SCNC: 111 MMOL/L (ref 98–112)
CO2 SERPL-SCNC: 18 MMOL/L (ref 21–32)
CREAT BLD-MCNC: 1.06 MG/DL
DEPRECATED RDW RBC AUTO: 44.4 FL (ref 35.1–46.3)
EGFRCR SERPLBLD CKD-EPI 2021: 55 ML/MIN/1.73M2 (ref 60–?)
EOSINOPHIL # BLD AUTO: 0.03 X10(3) UL (ref 0–0.7)
EOSINOPHIL NFR BLD AUTO: 0.3 %
ERYTHROCYTE [DISTWIDTH] IN BLOOD BY AUTOMATED COUNT: 12.6 % (ref 11–15)
EST. AVERAGE GLUCOSE BLD GHB EST-MCNC: 134 MG/DL (ref 68–126)
FLUAV + FLUBV RNA SPEC NAA+PROBE: NEGATIVE
FLUAV + FLUBV RNA SPEC NAA+PROBE: NEGATIVE
GLOBULIN PLAS-MCNC: 2.5 G/DL (ref 2–3.5)
GLUCOSE BLD-MCNC: 199 MG/DL (ref 70–99)
HBA1C MFR BLD: 6.3 % (ref ?–5.7)
HCO3 BLDV-SCNC: 20.7 MEQ/L (ref 22–26)
HCT VFR BLD AUTO: 43.1 %
HGB BLD-MCNC: 13.9 G/DL
IMM GRANULOCYTES # BLD AUTO: 0.03 X10(3) UL (ref 0–1)
IMM GRANULOCYTES NFR BLD: 0.3 %
LACTATE SERPL-SCNC: 1.8 MMOL/L (ref 0.5–2)
LYMPHOCYTES # BLD AUTO: 2.15 X10(3) UL (ref 1–4)
LYMPHOCYTES NFR BLD AUTO: 21.2 %
MCH RBC QN AUTO: 30.8 PG (ref 26–34)
MCHC RBC AUTO-ENTMCNC: 32.3 G/DL (ref 31–37)
MCV RBC AUTO: 95.6 FL
MONOCYTES # BLD AUTO: 0.51 X10(3) UL (ref 0.1–1)
MONOCYTES NFR BLD AUTO: 5 %
NEUTROPHILS # BLD AUTO: 7.35 X10 (3) UL (ref 1.5–7.7)
NEUTROPHILS # BLD AUTO: 7.35 X10(3) UL (ref 1.5–7.7)
NEUTROPHILS NFR BLD AUTO: 72.7 %
O2/TOTAL GAS SETTING VFR VENT: 21 %
OSMOLALITY SERPL CALC.SUM OF ELEC: 292 MOSM/KG (ref 275–295)
P AXIS: 27 DEGREES
P-R INTERVAL: 150 MS
PCO2 BLDV: 31 MM HG (ref 38–50)
PH BLDV: 7.42 [PH] (ref 7.32–7.43)
PLATELET # BLD AUTO: 317 10(3)UL (ref 150–450)
PO2 BLDV: 22 MM HG (ref 35–40)
POTASSIUM SERPL-SCNC: 4.3 MMOL/L (ref 3.5–5.1)
PROCALCITONIN SERPL-MCNC: 0.04 NG/ML (ref ?–0.05)
PROT SERPL-MCNC: 7 G/DL (ref 5.7–8.2)
PUNCTURE CHARGE: NO
Q-T INTERVAL: 312 MS
QRS DURATION: 60 MS
QTC CALCULATION (BEZET): 400 MS
R AXIS: 28 DEGREES
RBC # BLD AUTO: 4.51 X10(6)UL
RSV RNA SPEC NAA+PROBE: NEGATIVE
SAO2 % BLDV: 42.7 % (ref 60–85)
SARS-COV-2 RNA RESP QL NAA+PROBE: NOT DETECTED
SODIUM SERPL-SCNC: 138 MMOL/L (ref 136–145)
T AXIS: 61 DEGREES
TROPONIN I SERPL HS-MCNC: <3 NG/L
VENTRICULAR RATE: 99 BPM
WBC # BLD AUTO: 10.1 X10(3) UL (ref 4–11)

## 2024-12-10 PROCEDURE — 99223 1ST HOSP IP/OBS HIGH 75: CPT | Performed by: INTERNAL MEDICINE

## 2024-12-10 PROCEDURE — 99223 1ST HOSP IP/OBS HIGH 75: CPT | Performed by: HOSPITALIST

## 2024-12-10 PROCEDURE — 71045 X-RAY EXAM CHEST 1 VIEW: CPT | Performed by: EMERGENCY MEDICINE

## 2024-12-10 RX ORDER — SODIUM CHLORIDE 9 MG/ML
INJECTION, SOLUTION INTRAVENOUS CONTINUOUS
Status: DISCONTINUED | OUTPATIENT
Start: 2024-12-10 | End: 2024-12-12

## 2024-12-10 RX ORDER — DOXYCYCLINE 100 MG/1
100 CAPSULE ORAL ONCE
Status: COMPLETED | OUTPATIENT
Start: 2024-12-10 | End: 2024-12-10

## 2024-12-10 RX ORDER — ONDANSETRON 2 MG/ML
4 INJECTION INTRAMUSCULAR; INTRAVENOUS ONCE
Status: COMPLETED | OUTPATIENT
Start: 2024-12-10 | End: 2024-12-10

## 2024-12-10 RX ORDER — METOCLOPRAMIDE HYDROCHLORIDE 5 MG/ML
5 INJECTION INTRAMUSCULAR; INTRAVENOUS EVERY 8 HOURS PRN
Status: DISCONTINUED | OUTPATIENT
Start: 2024-12-10 | End: 2024-12-13

## 2024-12-10 RX ORDER — ALBUTEROL SULFATE 90 UG/1
2 INHALANT RESPIRATORY (INHALATION)
COMMUNITY
Start: 2024-12-04

## 2024-12-10 RX ORDER — POLYETHYLENE GLYCOL 3350 17 G/17G
17 POWDER, FOR SOLUTION ORAL DAILY PRN
Status: CANCELLED | OUTPATIENT
Start: 2024-12-10

## 2024-12-10 RX ORDER — SODIUM CHLORIDE 450 MG/100ML
INJECTION, SOLUTION INTRAVENOUS CONTINUOUS
Status: DISCONTINUED | OUTPATIENT
Start: 2024-12-10 | End: 2024-12-10

## 2024-12-10 RX ORDER — HYDROCODONE BITARTRATE AND ACETAMINOPHEN 5; 325 MG/1; MG/1
1 TABLET ORAL EVERY 6 HOURS PRN
Status: DISCONTINUED | OUTPATIENT
Start: 2024-12-10 | End: 2024-12-13

## 2024-12-10 RX ORDER — VITAMIN B COMPLEX
1 TABLET ORAL DAILY
COMMUNITY

## 2024-12-10 RX ORDER — SODIUM PHOSPHATE, DIBASIC AND SODIUM PHOSPHATE, MONOBASIC 7; 19 G/230ML; G/230ML
1 ENEMA RECTAL ONCE AS NEEDED
Status: CANCELLED | OUTPATIENT
Start: 2024-12-10

## 2024-12-10 RX ORDER — SENNOSIDES 8.6 MG
17.2 TABLET ORAL NIGHTLY PRN
Status: CANCELLED | OUTPATIENT
Start: 2024-12-10

## 2024-12-10 RX ORDER — DIAZEPAM 5 MG/1
2.5 TABLET ORAL ONCE
Status: COMPLETED | OUTPATIENT
Start: 2024-12-10 | End: 2024-12-10

## 2024-12-10 RX ORDER — DEXTROSE MONOHYDRATE AND SODIUM CHLORIDE 5; .45 G/100ML; G/100ML
INJECTION, SOLUTION INTRAVENOUS CONTINUOUS
Status: ACTIVE | OUTPATIENT
Start: 2024-12-10 | End: 2024-12-10

## 2024-12-10 RX ORDER — IPRATROPIUM BROMIDE AND ALBUTEROL SULFATE 2.5; .5 MG/3ML; MG/3ML
3 SOLUTION RESPIRATORY (INHALATION) EVERY 6 HOURS PRN
Status: DISCONTINUED | OUTPATIENT
Start: 2024-12-10 | End: 2024-12-13

## 2024-12-10 RX ORDER — BENZONATATE 200 MG/1
200 CAPSULE ORAL 3 TIMES DAILY PRN
Status: DISCONTINUED | OUTPATIENT
Start: 2024-12-10 | End: 2024-12-13

## 2024-12-10 RX ORDER — DEXTROSE MONOHYDRATE AND SODIUM CHLORIDE 5; .45 G/100ML; G/100ML
INJECTION, SOLUTION INTRAVENOUS ONCE
Status: COMPLETED | OUTPATIENT
Start: 2024-12-10 | End: 2024-12-10

## 2024-12-10 RX ORDER — ACETAMINOPHEN 500 MG
500 TABLET ORAL EVERY 4 HOURS PRN
Status: DISCONTINUED | OUTPATIENT
Start: 2024-12-10 | End: 2024-12-13

## 2024-12-10 RX ORDER — HEPARIN SODIUM 5000 [USP'U]/ML
5000 INJECTION, SOLUTION INTRAVENOUS; SUBCUTANEOUS EVERY 12 HOURS SCHEDULED
Status: DISCONTINUED | OUTPATIENT
Start: 2024-12-10 | End: 2024-12-13

## 2024-12-10 RX ORDER — BENZONATATE 100 MG/1
100 CAPSULE ORAL 3 TIMES DAILY PRN
COMMUNITY
Start: 2024-12-08

## 2024-12-10 RX ORDER — DOXYCYCLINE 100 MG/1
100 CAPSULE ORAL EVERY 12 HOURS SCHEDULED
Status: DISCONTINUED | OUTPATIENT
Start: 2024-12-10 | End: 2024-12-13

## 2024-12-10 RX ORDER — ONDANSETRON 2 MG/ML
4 INJECTION INTRAMUSCULAR; INTRAVENOUS EVERY 4 HOURS PRN
Status: DISCONTINUED | OUTPATIENT
Start: 2024-12-10 | End: 2024-12-10

## 2024-12-10 RX ORDER — IPRATROPIUM BROMIDE AND ALBUTEROL SULFATE 2.5; .5 MG/3ML; MG/3ML
3 SOLUTION RESPIRATORY (INHALATION) ONCE
Status: COMPLETED | OUTPATIENT
Start: 2024-12-10 | End: 2024-12-10

## 2024-12-10 RX ORDER — ONDANSETRON 2 MG/ML
4 INJECTION INTRAMUSCULAR; INTRAVENOUS EVERY 6 HOURS PRN
Status: DISCONTINUED | OUTPATIENT
Start: 2024-12-10 | End: 2024-12-13

## 2024-12-10 RX ORDER — BISACODYL 10 MG
10 SUPPOSITORY, RECTAL RECTAL
Status: CANCELLED | OUTPATIENT
Start: 2024-12-10

## 2024-12-10 NOTE — H&P
[facility]  part of [facility]    History & Physical    Meli Antonio Patient Status:  Emergency    11/3/1950 MRN [id number]   Location [facility] EMERGENCY DEPARTMENT Attending Francisco Turner MD   Hosp Day # 0 PCP SIRI TABOR     Date:  12/10/2024  Date of Admission:  12/10/2024    History provided by:patient  Chief Complaint:     Chief Complaint   Patient presents with    Shortness Of Breath    Chest Pain    Cough/URI    Diarrhea       HPI:   Meli Antonio is a(n) 74 year old female with a  PMH of tobacco use (stopped in ), GERD and previous episode of bronchitis who presents with fevers/chills, body aches and cough.  She also reports n/v/diarrhea.  She denies any recent travel.  No sick contacts.  No recent abx use.  She denies abd pain.  She went to the   and had a cxr there that was negative.  Here in the ED she had a repeat cxr that showed RLL pneumonia.  She will be admitted.     History     Past Medical History:    Anxiety state    Back problem    COMPRESSION FX    Cataract    Depression    Esophageal reflux    GERD (gastroesophageal reflux disease)    Heart valve disease    MVP--congenital    Hematoma and contusion of liver    STATES HAS BEEN THERE FOR MANY YEARS    History of fractured kneecap    RIGHT    IBS (irritable bowel syndrome)    Mitral prolapse    Osteoarthritis    Visual impairment    glasses     Past Surgical History:   Procedure Laterality Date          Correct bunion,othr methods      Correct bunion,simple      BILAT.     Dilation/curettage,diagnostic      FOR \"MOLE PREGNANCY\"    Ect provided Bilateral 2024    1st Tx. 24    Other Right     ORIF RIGHT ANKLE    Spine surgery procedure unlisted      cervical spine 2020    Total knee replacement       Family History   Problem Relation Age of Onset    Heart Disorder Father     Cancer Mother         ESOPHAGUS     Social History:  Social History     Socioeconomic  History    Marital status:    Tobacco Use    Smoking status: Former     Current packs/day: 2.00     Average packs/day: 2.0 packs/day for 15.0 years (30.0 ttl pk-yrs)     Types: Cigarettes    Smokeless tobacco: Never    Tobacco comments:     QUIT IN 1983   Vaping Use    Vaping status: Never Used   Substance and Sexual Activity    Alcohol use: No    Drug use: No     Social Drivers of Health     Financial Resource Strain: Low Risk  (8/16/2024)    Received from [facility]    Overall Financial Resource Strain (CARDIA)     Difficulty of Paying Living Expenses: Not hard at all   Food Insecurity: No Food Insecurity (8/16/2024)    Received from [facility]    Hunger Vital Sign     Worried About Running Out of Food in the Last Year: Never true     Ran Out of Food in the Last Year: Never true   Transportation Needs: No Transportation Needs (8/16/2024)    Received from [facility]    PRAPARE - Transportation     Lack of Transportation (Medical): No     Lack of Transportation (Non-Medical): No    [facility]    Social Connections   Housing Stability: Unknown (8/16/2024)    Received from [facility]    Housing Stability Vital Sign     Unable to Pay for Housing in the Last Year: No     Unstable Housing in the Last Year: No     Allergies/Medications:   Allergies: Allergies[1]  (Not in a hospital admission)      Review of Systems:   Pertinent items are noted in HPI.  12 ROS completed and otherwise negative    Physical Exam:   Vital Signs:  Blood pressure 118/71, pulse 72, temperature 97.6 °F (36.4 °C), temperature source Oral, resp. rate 20, height 5' 2\" (1.575 m), weight 120 lb (54.4 kg), SpO2 99%.     Gen: uncomfortable  Pulm: rhonchi in the rll  CV: Heart with regular rate and rhythm  Abd: Abdomen soft, nontender, nondistended, bowel sounds present  Neuro: No acute focal  deficits  MSK: moves extremities  Skin: Warm and dry  Psych: Normal affect  Ext: no c/c/e      Cervical Papanicolaou to be done in MD's office    Results:     Lab Results   Component Value Date    WBC 10.1 12/10/2024    HGB 13.9 12/10/2024    HCT 43.1 12/10/2024    .0 12/10/2024    CREATSERUM 1.06 (H) 12/10/2024    BUN 15 12/10/2024     12/10/2024    K 4.3 12/10/2024     12/10/2024    CO2 18.0 (L) 12/10/2024     (H) 12/10/2024    CA 10.0 12/10/2024    ALB 4.5 12/10/2024    ALKPHO 77 12/10/2024    BILT 0.3 12/10/2024    TP 7.0 12/10/2024    AST 15 12/10/2024    ALT 11 12/10/2024    T4F 1.7 04/12/2024    TSH 0.528 (L) 04/12/2024     01/19/2022    DDIMER <0.27 01/12/2023    CRP 8.33 (H) 09/04/2020    MG 2.3 04/24/2024    TROP <0.045 08/26/2020    B12 484 04/12/2024    ETOH <3 04/10/2024       XR CHEST AP PORTABLE  (CPT=71045)    Result Date: 12/10/2024  CONCLUSION: Subtle peribronchial infiltrate at the right lung base    Dictated by (CST): Jairo Barker MD on 12/10/2024 at 12:07 PM     Finalized by (CST): Jairo Barker MD on 12/10/2024 at 12:07 PM         EKG 12 Lead    Result Date: 12/10/2024  Sinus rhythm with marked sinus arrythmia Nonspecific ST and T wave abnormality Abnormal ECG When compared with ECG of 31-AUG-2024 15:00, QT has shortened     Assessment/Plan:       Acute Bronchopneumonia, possible aspiration pneumonia  - started IV abx with unasyn and oral doxy for atypical pneumonia  - pulm on consult, appreciate recs  - check sputum cx  - check urine legionella  - duonebs prn    Hyperglycemia  - likely related to above, check hgb A1c    N/V/Diarrhea  - check stool studies  - could possibly be a viral gastro  - started ivfs  - diet as tolerated  - zofran prn nausea    SUPA and metabolic acidosis  - likely due to n/v/diarrhea  - start IVFs  - recheck bmp in am      Other PMH - currently stable    GERD  Mitral valve prolapse  Osteoarthritis  Depression      MDM: high                Nu Lee MD  12/10/2024    **Certification      PHYSICIAN Certification of Need for Inpatient Hospitalization - Initial Certification    Patient will require inpatient services that will reasonably be expected to span two midnight's based on the clinical documentation in H+P.   Based on patients current state of illness, I anticipate that, after discharge, patient will require TBD.    Greater than 75 minutes spent in preparation of this admission in examining patient, obtaining history, reviewing records and d/w patient/family/consultants.         [1]   Allergies  Allergen Reactions    Radiology Contrast Iodinated Dyes HIVES     HIVES AND THROAT TIGHTENED WHILE HAVING AN MRI    Sulfa Antibiotics HIVES     \"got sicker\"    Methylprednisolone OTHER (SEE COMMENTS)    Seroquel [Quetiapine] UNKNOWN    Cinnamon RASH    Iodine (Topical) RASH

## 2024-12-10 NOTE — ED PROVIDER NOTES
Patient Seen in: [facility] Emergency Department    History     Chief Complaint   Patient presents with    Shortness Of Breath    Chest Pain    Cough/URI    Diarrhea     Stated Complaint: cough, shortness of breath, body aches     HPI    73 yo F with PMH bronchitis, GERD, presenting with  with several weeks of vomiting/diarrhea, myalgias and cough associated with exertional dyspnea. No sick ocntacts/recent travel; vomiting/diarrhea noted to be every few hours without associated pain or new food ingestions/recent antibiotics.  No fevers or chills.  Evaluated at urgent care  with unremarkable CXR.    Past Medical History:    Anxiety state    Back problem    COMPRESSION FX    Cataract    Depression    Esophageal reflux    GERD (gastroesophageal reflux disease)    Heart valve disease    MVP--congenital    Hematoma and contusion of liver    STATES HAS BEEN THERE FOR MANY YEARS    History of fractured kneecap    RIGHT    IBS (irritable bowel syndrome)    Mitral prolapse    Osteoarthritis    Visual impairment    glasses       Past Surgical History:   Procedure Laterality Date          Correct bunion,othr methods      Correct bunion,simple      BILAT.     Dilation/curettage,diagnostic      FOR \"MOLE PREGNANCY\"    Ect provided Bilateral 2024    1st Tx. 24    Other Right     ORIF RIGHT ANKLE    Spine surgery procedure unlisted      cervical spine 2020    Total knee replacement              Family History   Problem Relation Age of Onset    Heart Disorder Father     Cancer Mother         ESOPHAGUS       Social History     Socioeconomic History    Marital status:    Tobacco Use    Smoking status: Former     Current packs/day: 2.00     Average packs/day: 2.0 packs/day for 15.0 years (30.0 ttl pk-yrs)     Types: Cigarettes    Smokeless tobacco: Never    Tobacco comments:     QUIT IN    Vaping Use    Vaping status: Never Used   Substance and Sexual Activity    Alcohol use:  No    Drug use: No     Social Drivers of Health     Financial Resource Strain: Low Risk  (8/16/2024)    Received from [facility]    Overall Financial Resource Strain (CARDIA)     Difficulty of Paying Living Expenses: Not hard at all   Food Insecurity: No Food Insecurity (8/16/2024)    Received from [facility]    Hunger Vital Sign     Worried About Running Out of Food in the Last Year: Never true     Ran Out of Food in the Last Year: Never true   Transportation Needs: No Transportation Needs (8/16/2024)    Received from [facility]    PRAPARE - Transportation     Lack of Transportation (Medical): No     Lack of Transportation (Non-Medical): No    [facility]    Social Connections   Housing Stability: Unknown (8/16/2024)    Received from [facility]    Housing Stability Vital Sign     Unable to Pay for Housing in the Last Year: No     Unstable Housing in the Last Year: No       Review of Systems :  Constitutional: As per HPI  Respiratory: (+) cough and shortness of breath.      Positive for stated complaint: cough, shortness of breath, body aches  Other systems are as noted in HPI.  Constitutional and vital signs reviewed.      All other systems reviewed and negative except as noted above.    PSFH elements reviewed from today and agreed except as otherwise stated in HPI.    Physical Exam     ED Triage Vitals [12/10/24 1040]   /75   Pulse 82   Resp 17   Temp 97.6 °F (36.4 °C)   Temp src Oral   SpO2 98 %   O2 Device None (Room air)       Current:/75   Pulse 82   Temp 97.6 °F (36.4 °C) (Oral)   Resp 17   Ht 157.5 cm (5' 2\")   Wt 54.4 kg   SpO2 98%   BMI 21.95 kg/m²         Physical Exam   Constitutional: No distress.   HEENT: MMM.  Head: Normocephalic.   Eyes: No injection.   Cardiovascular: RRR.   Pulmonary/Chest: Expiratory wheezing.  Abdominal: Soft.  Nontender.  Musculoskeletal: No gross deformity.  Neurological: Alert.   Skin: Skin is warm.   Psychiatric: Cooperative.  Nursing note and vitals reviewed.      ED Course     Labs Reviewed   COMP METABOLIC PANEL (14) - Abnormal; Notable for the following components:       Result Value    Glucose 199 (*)     CO2 18.0 (*)     Creatinine 1.06 (*)     eGFR-Cr 55 (*)     All other components within normal limits   TROPONIN I HIGH SENSITIVITY - Normal   SARS-COV-2/FLU A AND B/RSV BY PCR (GENEXPERT) - Normal    Narrative:     This test is intended for the qualitative detection and differentiation of SARS-CoV-2, influenza A, influenza B, and respiratory syncytial virus (RSV) viral RNA in nasopharyngeal or nares swabs from individuals suspected of respiratory viral infection consistent with COVID-19 by their healthcare provider. Signs and symptoms of respiratory viral infection due to SARS-CoV-2, influenza, and RSV can be similar.    Test performed using the Xpert Xpress SARS-CoV-2/FLU/RSV (real time RT-PCR)  assay on the [employer] instrument, [employer], [place] [address].   This test is being used under the Food and Drug Administration's Emergency Use Authorization.    The authorized Fact Sheet for Healthcare Providers for this assay is available upon request from the laboratory.   CBC WITH DIFFERENTIAL WITH PLATELET   LACTIC ACID, PLASMA   VENOUS BLOOD GAS   PROCALCITONIN   BLOOD CULTURE   BLOOD CULTURE     EKG    Rate, intervals and axes as noted on EKG Report.  Rate: 99  Rhythm: Sinus Rhythm  Reading: NSR 99 with inferolateral TWF unchanged from 8/31/2024 as independently interpreted by myself           XR CHEST AP PORTABLE  (CPT=71045)    Result Date: 12/10/2024  PROCEDURE: XR CHEST AP PORTABLE  (CPT=71045) TIME: 11 40.   COMPARISON: [facility], XR CHEST PA + LAT CHEST (QSR=29026), 12/06/2024, 3:46 PM.  INDICATIONS: cough, shortness of breath, body aches x 2 weeks.  TECHNIQUE:   Single  view.   FINDINGS:   Lung volumes are normal.  There is a subtle small peribronchial infiltrate at the right lung base.  The remainder of the lungs are clear.  The cardiac silhouette, mediastinal contour, and pulmonary vascularity appear grossly normal         CONCLUSION: Subtle peribronchial infiltrate at the right lung base    Dictated by (CST): Jairo Barker MD on 12/10/2024 at 12:07 PM     Finalized by (CST): Jairo Barker MD on 12/10/2024 at 12:07 PM          XR CHEST PA + LAT CHEST (CPT=71046)    Result Date: 12/6/2024  PROCEDURE: XR CHEST PA + LAT CHEST (CPT=71046)  COMPARISON: [facility], XR CHEST PA + LAT CHEST (CPT=71046), 8/31/2024, 3:01 PM.  INDICATIONS: Sore throat, dyspnea, body aches and cough x3 days. History of smoking.  TECHNIQUE:   Two views.   FINDINGS: CARDIAC/VASC: Normal.  No cardiac silhouette abnormality or cardiomegaly.  Unremarkable pulmonary vasculature.  MEDIAST/FORTINO: No visible mass or adenopathy. LUNGS/PLEURA: Normal.  No significant pulmonary parenchymal abnormalities.  No effusion or pleural thickening.  BONES: The patient is status post lower cervical fusion with orthopedic plate identified in expected configuration.  There are slight degenerative changes within the thoracic spine OTHER: Negative.          CONCLUSION: No acute cardiopulmonary process    Dictated by (CST): Jairo Barker MD on 12/06/2024 at 3:57 PM     Finalized by (CST): Jairo Barker MD on 12/06/2024 at 3:58 PM                MDM   DIFFERENTIAL DIAGNOSIS: After history and physical exam differential diagnosis includes but is not limited to bronchopneumonia, aspiration, atypical/MRSA/postviral pneumonia, COVID-19, influenza, electrolyte derangement.    Pulse ox: 98%:Normal on RA, as independently interpreted by myself    Medical Decision Making  Evaluation for several weeks of vomiting/diarrhea associate with cough/shortness of breath in setting of reported history of  bronchitis with wheezing noted without respiratory distress/hypoxia.  Prior immediate care evaluation/chest x-ray nonacute, repeat imaging notable for right basilar infiltrate for which blood cultures obtained and Unasyn/Doxy initiated for both anaerobic and atypical/MRSA coverage with patient to be admitted for ongoing management; case d/w on call medicine Dr. Montes for admission and pulmonary Dr. Reynolds in consultation.    Problems Addressed:  Bronchitis: acute illness or injury  Bronchopneumonia: acute illness or injury  Metabolic acidosis: acute illness or injury  Nausea vomiting and diarrhea: complicated acute illness or injury    Amount and/or Complexity of Data Reviewed  External Data Reviewed: labs, radiology, ECG and notes.     Details: IC notes/CXR from 12/6/2024, 8/31/2024 EKG, 11/2024 creatinine reviewed  Labs: ordered. Decision-making details documented in ED Course.  Radiology: ordered and independent interpretation performed. Decision-making details documented in ED Course.     Details: CXR without obvious pneumothorax as independently interpreted by myself    ECG/medicine tests: ordered and independent interpretation performed. Decision-making details documented in ED Course.  Discussion of management or test interpretation with external provider(s): Case d/w on call medicine Dr. Montes for admission, Dr. Reynolds in pulmonary consultation    Risk  Prescription drug management.  Decision regarding hospitalization.        I was wearing at minimum a facemask and eye protection throughout this encounter with handwashing performed prior and after patient evaluation without personal hand/facial/oropharyngeal contact and gloves worn throughout encounter. See note and/or contact this provider for further PPE details.      Disposition and Plan     Clinical Impression:  1. Bronchopneumonia    2. Bronchitis    3. Nausea vomiting and diarrhea    4. Metabolic acidosis        Disposition:  Admit    Follow-up:  No  follow-up provider specified.    Medications Prescribed:  Current Discharge Medication List

## 2024-12-10 NOTE — CONSULTS
[facility]  part of [facility]    Report of Consultation    Meli Antonio Patient Status:  Emergency    11/3/1950 MRN [id number]   Location [facility] EMERGENCY DEPARTMENT Attending Francisco Turner MD   Hosp Day # 0 PCP SIRI TABOR     Date of Admission:  12/10/2024  Date of Consult: 12/10/2024    Reason for Consultation:   Consults  Pneumonia    History provided by:patient  HPI:     Chief Complaint   Patient presents with    Shortness Of Breath    Chest Pain    Cough/URI    Diarrhea     HPI    74-year-old female with history of severe depression and ECT therapy, GERD .  Presented with 2 weeks nausea and vomiting and today with more cough and dyspnea and chest x-ray showed right lower lobe infiltrate  She had some chills but no fever  Denied abdominal pain with no melena or hematochezia  Denied any chest pain orthopnea or PND with no lower extremity edema or pain or calf tenderness  Generalized fatigue with no focal weakness.        History     Past Medical History:    Anxiety state    Back problem    COMPRESSION FX    Cataract    Depression    Esophageal reflux    GERD (gastroesophageal reflux disease)    Heart valve disease    MVP--congenital    Hematoma and contusion of liver    STATES HAS BEEN THERE FOR MANY YEARS    History of fractured kneecap    RIGHT    IBS (irritable bowel syndrome)    Mitral prolapse    Osteoarthritis    Visual impairment    glasses     Past Surgical History:   Procedure Laterality Date          Correct bunion,othr methods      Correct bunion,simple      BILAT.     Dilation/curettage,diagnostic      FOR \"MOLE PREGNANCY\"    Ect provided Bilateral 2024    1st Tx. 24    Other Right     ORIF RIGHT ANKLE    Spine surgery procedure unlisted      cervical spine 2020    Total knee replacement       Family History   Problem Relation Age of Onset    Heart Disorder Father     Cancer Mother         ESOPHAGUS     Social  History:  Social History     Socioeconomic History    Marital status:    Tobacco Use    Smoking status: Former     Current packs/day: 2.00     Average packs/day: 2.0 packs/day for 15.0 years (30.0 ttl pk-yrs)     Types: Cigarettes    Smokeless tobacco: Never    Tobacco comments:     QUIT IN 1983   Vaping Use    Vaping status: Never Used   Substance and Sexual Activity    Alcohol use: No    Drug use: No     Social Drivers of Health     Financial Resource Strain: Low Risk  (8/16/2024)    Received from [facility]    Overall Financial Resource Strain (CARDIA)     Difficulty of Paying Living Expenses: Not hard at all   Food Insecurity: No Food Insecurity (8/16/2024)    Received from [facility]    Hunger Vital Sign     Worried About Running Out of Food in the Last Year: Never true     Ran Out of Food in the Last Year: Never true   Transportation Needs: No Transportation Needs (8/16/2024)    Received from [facility]    PRAPARE - Transportation     Lack of Transportation (Medical): No     Lack of Transportation (Non-Medical): No    [facility]    Social Connections   Housing Stability: Unknown (8/16/2024)    Received from [facility]    Housing Stability Vital Sign     Unable to Pay for Housing in the Last Year: No     Unstable Housing in the Last Year: No     Allergies/Medications:   Allergies: Allergies[1]  (Not in a hospital admission)      Review of Systems:     Constitutional:  Positive for chills and fatigue. Negative for fever.   HENT:  Negative for congestion.    Respiratory:  Positive for cough and shortness of breath. Negative for wheezing.    Cardiovascular:  Negative for chest pain.   Gastrointestinal:  Positive for nausea, vomiting and diarrhea. Negative for abdominal pain and abdominal distention.   Musculoskeletal:  Positive for back pain.    Neurological:  Negative for seizures.   Psychiatric/Behavioral:  Negative for agitation.        Physical Exam:   Vital Signs:   height is 5' 2\" (1.575 m) and weight is 120 lb (54.4 kg). Her oral temperature is 97.6 °F (36.4 °C). Her blood pressure is 119/73 and her pulse is 83. Her respiration is 24 and oxygen saturation is 97%.   Physical Exam  Vitals and nursing note reviewed.   Constitutional:       General: She is not in acute distress.     Appearance: She is ill-appearing.   HENT:      Head: Normocephalic and atraumatic.      Nose: Nose normal.      Mouth/Throat:      Mouth: Mucous membranes are moist.   Eyes:      General: No scleral icterus.     Pupils: Pupils are equal, round, and reactive to light.   Cardiovascular:      Rate and Rhythm: Normal rate.      Heart sounds:      No gallop.   Pulmonary:      Effort: Pulmonary effort is normal. No respiratory distress.      Breath sounds: No stridor. No wheezing or rhonchi.      Comments: Good air exchange to both lungs  Minimal right basilar crackles otherwise clear with no wheezes or rhonchi  Abdominal:      General: Abdomen is flat. Bowel sounds are normal. There is no distension.      Palpations: Abdomen is soft.      Tenderness: There is no guarding.   Musculoskeletal:      Cervical back: Normal range of motion.      Right lower leg: No edema.      Left lower leg: No edema.   Skin:     General: Skin is dry.   Neurological:      General: No focal deficit present.      Mental Status: She is oriented to person, place, and time.         Results:     Lab Results   Component Value Date    WBC 10.1 12/10/2024    HGB 13.9 12/10/2024    HCT 43.1 12/10/2024    .0 12/10/2024    CREATSERUM 1.06 (H) 12/10/2024    BUN 15 12/10/2024     12/10/2024    K 4.3 12/10/2024     12/10/2024    CO2 18.0 (L) 12/10/2024     (H) 12/10/2024    CA 10.0 12/10/2024    ALB 4.5 12/10/2024    ALKPHO 77 12/10/2024    BILT 0.3 12/10/2024    TP 7.0 12/10/2024    AST 15  12/10/2024    ALT 11 12/10/2024    T4F 1.7 04/12/2024    TSH 0.528 (L) 04/12/2024     01/19/2022    DDIMER <0.27 01/12/2023    CRP 8.33 (H) 09/04/2020    MG 2.3 04/24/2024    TROP <0.045 08/26/2020    TROPHS <3 12/10/2024    B12 484 04/12/2024    ETOH <3 04/10/2024     XR CHEST AP PORTABLE  (CPT=71045)    Result Date: 12/10/2024  CONCLUSION: Subtle peribronchial infiltrate at the right lung base    Dictated by (CST): Jairo Barker MD on 12/10/2024 at 12:07 PM     Finalized by (CST): Jairo Barker MD on 12/10/2024 at 12:07 PM         EKG 12 Lead    Result Date: 12/10/2024  Sinus rhythm with Premature atrial complexes Nonspecific ST and T wave abnormality Abnormal ECG When compared with ECG of 31-AUG-2024 15:00, QT has shortened Confirmed by ANABELLA GAN, LAZARO ([id number]) on 12/10/2024 2:06:36 PM     Impression:      1-acute pneumonia right lower lobe  2-nausea and vomiting and diarrhea for 2 weeks  3-severe depression on chronic ECT therapy  4-metabolic acidosis     Chest x-ray minimal right basilar infiltrate  Normal procalcitonin with no significant leukocytosis  Possible aspiration with ongoing vomiting    Plan ;  Check Legionella and pneumococcal antigen  Respiratory panel  Antibiotics with Unasyn and doxycycline  Consider psych consult for severe depression on chronic ECT therapy  DVT prophylaxis with heparin subcu            Dania Reynolds MD  12/10/2024         [1]   Allergies  Allergen Reactions    Radiology Contrast Iodinated Dyes HIVES     HIVES AND THROAT TIGHTENED WHILE HAVING AN MRI    Sulfa Antibiotics HIVES     \"got sicker\"    Methylprednisolone OTHER (SEE COMMENTS)    Seroquel [Quetiapine] UNKNOWN    Cinnamon RASH    Iodine (Topical) RASH

## 2024-12-10 NOTE — ED QUICK NOTES
Orders for admission, patient is aware of plan and ready to go upstairs. Any questions, please call ED RN Bebe at extension [phone].     Patient Covid vaccination status: Unvaccinated     COVID Test Ordered in ED: SARS-CoV-2/Flu A and B/RSV by PCR (GeneXpert)    COVID Suspicion at Admission: N/A    Running Infusions:  Dextrose 5%-Sodium Chloride 0.45% 125ml/hr    Mental Status/LOC at time of transport: A & O x 4    Other pertinent information:   CIWA score: N/A   NIH score:  N/A

## 2024-12-10 NOTE — PLAN OF CARE
Pt alert and oriented, ambulated to the bathroom with staff. On room air, pain management plan discussed with pt. Pt and spouse updated on plan of care, frequent rounding by staff, call light within reach.  Problem: Patient Centered Care  Goal: Patient preferences are identified and integrated in the patient's plan of care  Description: Interventions:  - What would you like us to know as we care for you? I am from home with my   - Provide timely, complete, and accurate information to patient/family  - Incorporate patient and family knowledge, values, beliefs, and cultural backgrounds into the planning and delivery of care  - Encourage patient/family to participate in care and decision-making at the level they choose  - Honor patient and family perspectives and choices  Outcome: Progressing     Problem: RESPIRATORY - ADULT  Goal: Achieves optimal ventilation and oxygenation  Description: INTERVENTIONS:  - Assess for changes in respiratory status  - Assess for changes in mentation and behavior  - Position to facilitate oxygenation and minimize respiratory effort  - Oxygen supplementation based on oxygen saturation or ABGs  - Provide Smoking Cessation handout, if applicable  - Encourage broncho-pulmonary hygiene including cough, deep breathe, Incentive Spirometry  - Assess the need for suctioning and perform as needed  - Assess and instruct to report SOB or any respiratory difficulty  - Respiratory Therapy support as indicated  - Manage/alleviate anxiety  - Monitor for signs/symptoms of CO2 retention  Outcome: Progressing     Problem: PAIN - ADULT  Goal: Verbalizes/displays adequate comfort level or patient's stated pain goal  Description: INTERVENTIONS:  - Encourage pt to monitor pain and request assistance  - Assess pain using appropriate pain scale  - Administer analgesics based on type and severity of pain and evaluate response  - Implement non-pharmacological measures as appropriate and evaluate response  -  Consider cultural and social influences on pain and pain management  - Manage/alleviate anxiety  - Utilize distraction and/or relaxation techniques  - Monitor for opioid side effects  - Notify MD/LIP if interventions unsuccessful or patient reports new pain  - Anticipate increased pain with activity and pre-medicate as appropriate  Outcome: Progressing     Problem: Patient/Family Goals  Goal: Patient/Family Long Term Goal  Description: Patient's Long Term Goal: To go home    Interventions:  -Follow treatment plan  - See additional Care Plan goals for specific interventions  Outcome: Progressing  Goal: Patient/Family Short Term Goal  Description: Patient's Short Term Goal: To feel better    Interventions:   - follow treatment plan  - See additional Care Plan goals for specific interventions  Outcome: Progressing

## 2024-12-10 NOTE — ED INITIAL ASSESSMENT (HPI)
Pt came in for cough, constant mid chest pain, diarrhea,Shortness of breath, N/V x 2 weeks.  Pt is A/Ox 4, breathing unlabored.  Pt here with .

## 2024-12-11 PROBLEM — F41.0 GENERALIZED ANXIETY DISORDER WITH PANIC ATTACKS: Status: ACTIVE | Noted: 2024-12-11

## 2024-12-11 PROBLEM — F33.1 MAJOR DEPRESSIVE DISORDER, RECURRENT EPISODE, MODERATE DEGREE (HCC): Status: ACTIVE | Noted: 2024-12-11

## 2024-12-11 PROBLEM — F41.1 GENERALIZED ANXIETY DISORDER WITH PANIC ATTACKS: Status: ACTIVE | Noted: 2024-12-11

## 2024-12-11 LAB
ADENOVIRUS F 40/41 PCR: NEGATIVE
ANION GAP SERPL CALC-SCNC: 11 MMOL/L (ref 0–18)
ASTROVIRUS PCR: NEGATIVE
BASOPHILS # BLD AUTO: 0.05 X10(3) UL (ref 0–0.2)
BASOPHILS NFR BLD AUTO: 0.6 %
BUN BLD-MCNC: 18 MG/DL (ref 9–23)
BUN/CREAT SERPL: 20.7 (ref 10–20)
C CAYETANENSIS DNA SPEC QL NAA+PROBE: NEGATIVE
C DIFF GDH + TOXINS A+B STL QL IA.RAPID: NOT DETECTED
C DIFF TOX B STL QL: POSITIVE
CALCIUM BLD-MCNC: 9.4 MG/DL (ref 8.7–10.4)
CAMPY SP DNA.DIARRHEA STL QL NAA+PROBE: NEGATIVE
CHLORIDE SERPL-SCNC: 114 MMOL/L (ref 98–112)
CO2 SERPL-SCNC: 19 MMOL/L (ref 21–32)
CREAT BLD-MCNC: 0.87 MG/DL
CRYPTOSP DNA SPEC QL NAA+PROBE: NEGATIVE
DEPRECATED RDW RBC AUTO: 42.5 FL (ref 35.1–46.3)
EAEC PAA PLAS AGGR+AATA ST NAA+NON-PRB: NEGATIVE
EC STX1+STX2 + H7 FLIC SPEC NAA+PROBE: NEGATIVE
EGFRCR SERPLBLD CKD-EPI 2021: 70 ML/MIN/1.73M2 (ref 60–?)
ENTAMOEBA HISTOLYTICA PCR: NEGATIVE
EOSINOPHIL # BLD AUTO: 0.09 X10(3) UL (ref 0–0.7)
EOSINOPHIL NFR BLD AUTO: 1.2 %
EPEC EAE GENE STL QL NAA+NON-PROBE: NEGATIVE
ERYTHROCYTE [DISTWIDTH] IN BLOOD BY AUTOMATED COUNT: 12.5 % (ref 11–15)
ETEC LTA+ST1A+ST1B TOX ST NAA+NON-PROBE: NEGATIVE
GIARDIA LAMBLIA PCR: NEGATIVE
GLUCOSE BLD-MCNC: 135 MG/DL (ref 70–99)
HCT VFR BLD AUTO: 36.7 %
HGB BLD-MCNC: 12.4 G/DL
IMM GRANULOCYTES # BLD AUTO: 0.01 X10(3) UL (ref 0–1)
IMM GRANULOCYTES NFR BLD: 0.1 %
L PNEUMO AG UR QL: NEGATIVE
LYMPHOCYTES # BLD AUTO: 2.69 X10(3) UL (ref 1–4)
LYMPHOCYTES NFR BLD AUTO: 34.9 %
MAGNESIUM SERPL-MCNC: 2 MG/DL (ref 1.6–2.6)
MCH RBC QN AUTO: 31.6 PG (ref 26–34)
MCHC RBC AUTO-ENTMCNC: 33.8 G/DL (ref 31–37)
MCV RBC AUTO: 93.6 FL
MONOCYTES # BLD AUTO: 0.69 X10(3) UL (ref 0.1–1)
MONOCYTES NFR BLD AUTO: 8.9 %
NEUTROPHILS # BLD AUTO: 4.18 X10 (3) UL (ref 1.5–7.7)
NEUTROPHILS # BLD AUTO: 4.18 X10(3) UL (ref 1.5–7.7)
NEUTROPHILS NFR BLD AUTO: 54.3 %
NOROVIRUS GI/GII PCR: NEGATIVE
OSMOLALITY SERPL CALC.SUM OF ELEC: 302 MOSM/KG (ref 275–295)
P SHIGELLOIDES DNA STL QL NAA+PROBE: NEGATIVE
PLATELET # BLD AUTO: 286 10(3)UL (ref 150–450)
POTASSIUM SERPL-SCNC: 4 MMOL/L (ref 3.5–5.1)
RBC # BLD AUTO: 3.92 X10(6)UL
ROTAVIRUS A PCR: NEGATIVE
SALMONELLA DNA SPEC QL NAA+PROBE: NEGATIVE
SAPOVIRUS PCR: NEGATIVE
SHIGELLA SP+EIEC IPAH ST NAA+NON-PROBE: NEGATIVE
SODIUM SERPL-SCNC: 144 MMOL/L (ref 136–145)
V CHOLERAE DNA SPEC QL NAA+PROBE: NEGATIVE
VIBRIO DNA SPEC NAA+PROBE: NEGATIVE
WBC # BLD AUTO: 7.7 X10(3) UL (ref 4–11)
YERSINIA DNA SPEC NAA+PROBE: NEGATIVE

## 2024-12-11 PROCEDURE — 90792 PSYCH DIAG EVAL W/MED SRVCS: CPT | Performed by: OTHER

## 2024-12-11 PROCEDURE — 99233 SBSQ HOSP IP/OBS HIGH 50: CPT | Performed by: INTERNAL MEDICINE

## 2024-12-11 PROCEDURE — 99233 SBSQ HOSP IP/OBS HIGH 50: CPT | Performed by: HOSPITALIST

## 2024-12-11 RX ORDER — PREGABALIN 25 MG/1
50 CAPSULE ORAL 3 TIMES DAILY
Status: DISCONTINUED | OUTPATIENT
Start: 2024-12-11 | End: 2024-12-12

## 2024-12-11 RX ORDER — ESCITALOPRAM OXALATE 10 MG/1
10 TABLET ORAL DAILY
Status: DISCONTINUED | OUTPATIENT
Start: 2024-12-11 | End: 2024-12-13

## 2024-12-11 RX ORDER — HYDROXYZINE HYDROCHLORIDE 10 MG/1
10 TABLET, FILM COATED ORAL 3 TIMES DAILY
Status: DISCONTINUED | OUTPATIENT
Start: 2024-12-11 | End: 2024-12-13

## 2024-12-11 RX ORDER — LORAZEPAM 0.5 MG/1
0.5 TABLET ORAL ONCE
Status: COMPLETED | OUTPATIENT
Start: 2024-12-11 | End: 2024-12-11

## 2024-12-11 RX ORDER — VANCOMYCIN HYDROCHLORIDE 125 MG/1
125 CAPSULE ORAL 4 TIMES DAILY
Status: DISCONTINUED | OUTPATIENT
Start: 2024-12-11 | End: 2024-12-11

## 2024-12-11 RX ORDER — BUPROPION HYDROCHLORIDE 300 MG/1
300 TABLET ORAL DAILY
Status: DISCONTINUED | OUTPATIENT
Start: 2024-12-11 | End: 2024-12-13

## 2024-12-11 NOTE — PLAN OF CARE
Problem: Patient Centered Care  Goal: Patient preferences are identified and integrated in the patient's plan of care  Description: Interventions:  - What would you like us to know as we care for you?   - Provide timely, complete, and accurate information to patient/family  - Incorporate patient and family knowledge, values, beliefs, and cultural backgrounds into the planning and delivery of care  - Encourage patient/family to participate in care and decision-making at the level they choose  - Honor patient and family perspectives and choices  Outcome: Progressing     Problem: RESPIRATORY - ADULT  Goal: Achieves optimal ventilation and oxygenation  Description: INTERVENTIONS:  - Assess for changes in respiratory status  - Assess for changes in mentation and behavior  - Position to facilitate oxygenation and minimize respiratory effort  - Oxygen supplementation based on oxygen saturation or ABGs  - Provide Smoking Cessation handout, if applicable  - Encourage broncho-pulmonary hygiene including cough, deep breathe, Incentive Spirometry  - Assess the need for suctioning and perform as needed  - Assess and instruct to report SOB or any respiratory difficulty  - Respiratory Therapy support as indicated  - Manage/alleviate anxiety  - Monitor for signs/symptoms of CO2 retention  Outcome: Progressing     Problem: PAIN - ADULT  Goal: Verbalizes/displays adequate comfort level or patient's stated pain goal  Description: INTERVENTIONS:  - Encourage pt to monitor pain and request assistance  - Assess pain using appropriate pain scale  - Administer analgesics based on type and severity of pain and evaluate response  - Implement non-pharmacological measures as appropriate and evaluate response  - Consider cultural and social influences on pain and pain management  - Manage/alleviate anxiety  - Utilize distraction and/or relaxation techniques  - Monitor for opioid side effects  - Notify MD/LIP if interventions unsuccessful or  patient reports new pain  - Anticipate increased pain with activity and pre-medicate as appropriate  Outcome: Progressing

## 2024-12-11 NOTE — PROGRESS NOTES
Pulmonary Medicine Inpatient Progress Note                 Subjective:  On RA, afebrile.   Is anxious and hyperventilating.       ALLERGIES:  Allergies[1]       MEDS:  Home Medications:  Medications Taking[2]  Scheduled Medication:   pregabalin  50 mg Oral TID    escitalopram  10 mg Oral Daily    buPROPion ER  300 mg Oral Daily    heparin  5,000 Units Subcutaneous 2 times per day    ampicillin-sulbactam  3 g Intravenous q6h    doxycycline  100 mg Oral 2 times per day     Continuous Infusing Medication:   sodium chloride 100 mL/hr at 12/11/24 1348     PRN Medications:    acetaminophen    melatonin    ondansetron    metoclopramide    benzonatate    guaiFENesin    ipratropium-albuterol    HYDROcodone-acetaminophen       PHYSICAL EXAM:  /83 (BP Location: Right arm)   Pulse 81   Temp 97.9 °F (36.6 °C) (Oral)   Resp 22   Ht 5' 2\" (1.575 m)   Wt 117 lb 12.8 oz (53.4 kg)   SpO2 96%   BMI 21.55 kg/m²   CONSTITUTIONAL: alert, oriented, no apparent distress  HEENT: atraumatic normocephalic  MOUTH: mucous membranes are moist. No OP exudates  NECK/THROAT: no JVD. Trachea midline. No obvious thyromegaly  LUNG: tachy, clear b/l no wheezing, crackles. Chest symmetric with respiratory motion  HEART: tachy, regular rate and rhythm, no obvious murmers or gallops note  ABD: soft non tender. + bowel sounds. No organomegaly noted  EXT: no clubbing, cyanosis, or edema noted. Pulses intact grossly  NEURO/MUSCULOSKELETAL: tremulous, anxious  SKIN: warm, dry. No obvious lesions noted  LYMPH: no obvious LAD       IMAGES:  CXR 12/10/24  CONCLUSION: Subtle peribronchial infiltrate at the right lung base.       LABS:  Recent Labs   Lab 12/10/24  1134 12/11/24  0450   RBC 4.51 3.92   HGB 13.9 12.4   HCT 43.1 36.7   MCV 95.6 93.6   MCH 30.8 31.6   MCHC 32.3 33.8   RDW 12.6 12.5   NEPRELIM 7.35 4.18   WBC 10.1 7.7   .0 286.0       Recent Labs   Lab 12/10/24  1134 12/11/24  0450   * 135*   BUN 15 18   CREATSERUM 1.06* 0.87    EGFRCR 55* 70   CA 10.0 9.4   ALB 4.5  --     144   K 4.3 4.0    114*   CO2 18.0* 19.0*   ALKPHO 77  --    AST 15  --    ALT 11  --    BILT 0.3  --    TP 7.0  --        ASSESSMENT/PLAN:  CAP-RLL. Cannot r/o aspiration  -CXR with min infiltrates  -urine leg neg  -continue unasyn/doxy    Severe depression on chronic ECT tx  -pt is very anxious today and hyperventilating   -psych consult followng     Metabolic acidosis  -slowly improving   -LA normal  -reports she's hyperventilating d/t anxiety which maybe contributing     Proph:  -DVT: hep sq     Thank you for the opportunity to care for Meli PANDEY Marisela.     STACIE Duque DO, MPH  Pulmonary Critical Care Medicine  [facility]          [1]   Allergies  Allergen Reactions    Radiology Contrast Iodinated Dyes HIVES     HIVES AND THROAT TIGHTENED WHILE HAVING AN MRI    Sulfa Antibiotics HIVES     \"got sicker\"    Methylprednisolone OTHER (SEE COMMENTS)    Seroquel [Quetiapine] UNKNOWN    Cinnamon RASH    Iodine (Topical) RASH   [2]   Outpatient Medications Marked as Taking for the 12/10/24 encounter (Hospital Encounter)   Medication Sig Dispense Refill    Cholecalciferol (VITAMIN D) 25 MCG (1000 UT) Oral Tab Take 1 tablet by mouth daily.      benzonatate 100 MG Oral Cap Take 1 capsule (100 mg total) by mouth 3 (three) times daily as needed for cough.      albuterol 108 (90 Base) MCG/ACT Inhalation Aero Soln Inhale 2 puffs into the lungs every 4 to 6 hours as needed for Shortness of Breath.      LORazepam 1 MG Oral Tab May take 1 tablet (1 mg total) by mouth daily as needed for Anxiety (or insomnia). May also take 1 tablet (1 mg total) nightly as needed for Anxiety (or insomnia). 60 tablet 0    escitalopram 10 MG Oral Tab Take 1 tablet (10 mg total) by mouth daily. 30 tablet 1    buPROPion  MG Oral Tablet 24 Hr Take 1 tablet (300 mg total) by mouth daily. 30 tablet 1    PREGABALIN 50 MG Oral Cap Take 1 capsule  by mouth 3 times daily. Hold the afternoon and evening doses prior to ECT 90 capsule 0

## 2024-12-11 NOTE — PLAN OF CARE
Pt alert and oriented, ambulated to the bathroom with staff and the front wheel walker. On room air. Continuous fluids maintained. Pain management plan in place. Pt updated on plan of care, frequent rounding by staff, bed alarm in place and call light within reach.   Problem: Patient Centered Care  Goal: Patient preferences are identified and integrated in the patient's plan of care  Description: Interventions:  - What would you like us to know as we care for you? I am from home with my   - Provide timely, complete, and accurate information to patient/family  - Incorporate patient and family knowledge, values, beliefs, and cultural backgrounds into the planning and delivery of care  - Encourage patient/family to participate in care and decision-making at the level they choose  - Honor patient and family perspectives and choices  Outcome: Progressing     Problem: RESPIRATORY - ADULT  Goal: Achieves optimal ventilation and oxygenation  Description: INTERVENTIONS:  - Assess for changes in respiratory status  - Assess for changes in mentation and behavior  - Position to facilitate oxygenation and minimize respiratory effort  - Oxygen supplementation based on oxygen saturation or ABGs  - Provide Smoking Cessation handout, if applicable  - Encourage broncho-pulmonary hygiene including cough, deep breathe, Incentive Spirometry  - Assess the need for suctioning and perform as needed  - Assess and instruct to report SOB or any respiratory difficulty  - Respiratory Therapy support as indicated  - Manage/alleviate anxiety  - Monitor for signs/symptoms of CO2 retention  Outcome: Progressing     Problem: PAIN - ADULT  Goal: Verbalizes/displays adequate comfort level or patient's stated pain goal  Description: INTERVENTIONS:  - Encourage pt to monitor pain and request assistance  - Assess pain using appropriate pain scale  - Administer analgesics based on type and severity of pain and evaluate response  - Implement  non-pharmacological measures as appropriate and evaluate response  - Consider cultural and social influences on pain and pain management  - Manage/alleviate anxiety  - Utilize distraction and/or relaxation techniques  - Monitor for opioid side effects  - Notify MD/LIP if interventions unsuccessful or patient reports new pain  - Anticipate increased pain with activity and pre-medicate as appropriate  Outcome: Progressing     Problem: Patient/Family Goals  Goal: Patient/Family Long Term Goal  Description: Patient's Long Term Goal: To go home    Interventions:  - Continue treatment plans  - See additional Care Plan goals for specific interventions  Outcome: Progressing  Goal: Patient/Family Short Term Goal  Description: Patient's Short Term Goal: To control pain    Interventions:   -Pain medication  - See additional Care Plan goals for specific interventions  Outcome: Progressing

## 2024-12-11 NOTE — CONSULTS
[facility]  part of [facility]    Report of Consultation    Meli Antonio Patient Status:  Observation    11/3/1950 MRN [id number]   Location [facility]5W Attending Vesta Bloom DO   Hosp Day # 0 PCP SIRI TABOR     Date of Admission:  12/10/2024  Date of Consult:  2024   Reason for Consultation:   Patient presented with Depression, Vesta Stone DO requested psychiatric consult for evaluation and advice.    Consult Duration     The patient seen for initial psychiatric consult evaluation.   Record reviewed, communication with attending, communication with RN and patient seen face to face evaluation.    History of Present Illness:   Patient is a 74 year old female with past medical history of GERD, depression, anxiety who was admitted to the hospital for Bronchopneumonia: The patient has been demonstrating depression and anxiety  Patient indicated for psych consult for evaluation and advise.    Per chart review, the patient presented to the hospital with SOB, nausea, vomiting, and cough. She was admitted after CXR and CT demonstrated RLL PNA. Patient was started on abx. While in hospital patient was anxious and hyperventilation.    The patient received the following psychotropic medications Valium 2.5 mg, Ativan 0.5 mg,    Labs and imaging reviewed: Sodium 144, Potassium 4.0, magnesium 2.0  12/10/2024 CXR demonstrated: Subtle peribronchial infiltrate at the right lung base       The patient is well known to the psychiatry team from previous consult. The patient was last seen 4/10/2024 for confusion, restlessness, and agitation.    The patient seen today in hospital gown laying in hospital bed. Patient had increased work of breathing despite just receiving Duoneb treatment.    The patient is alert and oriented to person, place, date and condition. The patient answers questions and attempts to engages in appropriate conversation but was tearful and could not  speak in full sentences due to increased work of breathing.    When asked about her mood, patient stated that she felt \"fine\" but noted that she has felt depressed and anxious which has been worsened since being admitted.     Patient has a history of anxiety and depression and follows up with an outpatient therapist while she has been taking medication from her primary physician. She was supposed to see them this week but had to reschedule due to her medical condition.     Patient reports feeling anxious and worried due to her difficulty breathing. She was noticeably anxious in the room and was repeatedly asking if her  had reached out because he was not answering phone calls since he had a doctor's appointment.     She notes that her depression has been exacerbated prior to admission due to family issues. Her adopted son has recently cut off the family since he does not get along with her other son. Patient reports feeling stressed and sad that her family is not speaking with each other.    When asked why she takes Lyrica, patient stated that she was unsure. When asked about other medications that she takes, patient was also unable to answer.    The patient reporting feelings of hopelessness, helplessness, worthlessness, low mood, low energy, decreased motivation.    The patient reporting increased anxiety, worry, ruminations with impairment in sleep.   Patient admitted feeling panicky, restless and very fearful.    The patient is not demonstrating any adwoa or psychosis  The patient denies auditory or visual hallucinations  The patient denies suicidal or homicidal ideation.    The patient reporting that she has been taking Lexapro and Wellbutrin for years.  Patient report that for the most part that has been holding her anxiety and depression recently except recent deterioration due to the dynamic difficulty and now because she is in the hospital and very panicky.    The patient has been demonstrating  feelings of hopelessness, helplessness, worthlessness, low mood, low energy, decreased motivation with increased anxiety, worry, ruminations with impairment in sleep. The patient denies any suicidal ideations. The patient is agreeable to medications changes and to follow up with outpatient psychiatry providers.     Past Psychiatric/Medication History:  1. Prior diagnoses: Depression, anxiety  2. Past psychiatric inpatient: None  3. Past outpatient history: sees a psychiatrist  4. Past suicide history: None  5. Medication history: Abilify, Bupropion, Lexapro, Hydroxyzine    Social History:   Patient lives with  and adult son. She is a retired teacher.  Patient has been sober from alcohol for 9 years and attends AA. She uses marijuana gummies at night.    Family History:  None  Medical History:   Past Medical History  Past Medical History:    Anxiety state    Back problem    COMPRESSION FX    Cataract    Depression    Esophageal reflux    GERD (gastroesophageal reflux disease)    Heart valve disease    MVP--congenital    Hematoma and contusion of liver    STATES HAS BEEN THERE FOR MANY YEARS    History of fractured kneecap    RIGHT    IBS (irritable bowel syndrome)    Mitral prolapse    Nausea vomiting and diarrhea    Osteoarthritis    Visual impairment    glasses       Past Surgical History  Past Surgical History:   Procedure Laterality Date          Correct bunion,othr methods      Correct bunion,simple      BILAT.     Dilation/curettage,diagnostic      FOR \"MOLE PREGNANCY\"    Ect provided Bilateral 2024    1st Tx. 24    Other Right     ORIF RIGHT ANKLE    Spine surgery procedure unlisted      cervical spine 2020    Total knee replacement         Family History  Family History   Problem Relation Age of Onset    Heart Disorder Father     Cancer Mother         ESOPHAGUS       Social History  Social History     Socioeconomic History    Marital status:    Tobacco Use    Smoking  status: Former     Current packs/day: 2.00     Average packs/day: 2.0 packs/day for 15.0 years (30.0 ttl pk-yrs)     Types: Cigarettes    Smokeless tobacco: Never    Tobacco comments:     QUIT IN 1983   Vaping Use    Vaping status: Never Used   Substance and Sexual Activity    Alcohol use: No    Drug use: No     Social Drivers of Health     Financial Resource Strain: Low Risk  (8/16/2024)    Received from [facility]    Overall Financial Resource Strain (CARDIA)     Difficulty of Paying Living Expenses: Not hard at all   Food Insecurity: No Food Insecurity (12/10/2024)    Food Insecurity     Food Insecurity: Never true   Transportation Needs: No Transportation Needs (12/10/2024)    Transportation Needs     Lack of Transportation: No    [facility]    Social Connections   Housing Stability: Low Risk  (12/10/2024)    Housing Stability     Housing Instability: No           Current Medications:  Current Facility-Administered Medications   Medication Dose Route Frequency    pregabalin (Lyrica) cap 50 mg  50 mg Oral TID    escitalopram (Lexapro) tab 10 mg  10 mg Oral Daily    buPROPion ER (Wellbutrin XL) 24 hr tab 300 mg  300 mg Oral Daily    hydrOXYzine (Atarax) tab 10 mg  10 mg Oral TID    sodium chloride 0.9% infusion   Intravenous Continuous    heparin (Porcine) 5000 UNIT/ML injection 5,000 Units  5,000 Units Subcutaneous 2 times per day    acetaminophen (Tylenol Extra Strength) tab 500 mg  500 mg Oral Q4H PRN    melatonin tab 3 mg  3 mg Oral Nightly PRN    ondansetron (Zofran) 4 MG/2ML injection 4 mg  4 mg Intravenous Q6H PRN    metoclopramide (Reglan) 5 mg/mL injection 5 mg  5 mg Intravenous Q8H PRN    ampicillin-sulbactam (Unasyn) 3 g in sodium chloride 0.9% 100mL IVPB-ADD  3 g Intravenous q6h    doxycycline (Vibramycin) cap 100 mg  100 mg Oral 2 times per day    benzonatate (Tessalon) cap 200 mg  200 mg Oral TID PRN     guaiFENesin (Robitussin) 100 MG/5 ML oral liquid 200 mg  200 mg Oral Q4H PRN    ipratropium-albuterol (Duoneb) 0.5-2.5 (3) MG/3ML inhalation solution 3 mL  3 mL Nebulization Q6H PRN    HYDROcodone-acetaminophen (Norco) 5-325 MG per tab 1 tablet  1 tablet Oral Q6H PRN     Medications Prior to Admission   Medication Sig    Cholecalciferol (VITAMIN D) 25 MCG (1000 UT) Oral Tab Take 1 tablet by mouth daily.    benzonatate 100 MG Oral Cap Take 1 capsule (100 mg total) by mouth 3 (three) times daily as needed for cough.    albuterol 108 (90 Base) MCG/ACT Inhalation Aero Soln Inhale 2 puffs into the lungs every 4 to 6 hours as needed for Shortness of Breath.    LORazepam 1 MG Oral Tab May take 1 tablet (1 mg total) by mouth daily as needed for Anxiety (or insomnia). May also take 1 tablet (1 mg total) nightly as needed for Anxiety (or insomnia).    escitalopram 10 MG Oral Tab Take 1 tablet (10 mg total) by mouth daily.    buPROPion  MG Oral Tablet 24 Hr Take 1 tablet (300 mg total) by mouth daily.    PREGABALIN 50 MG Oral Cap Take 1 capsule by mouth 3 times daily. Hold the afternoon and evening doses prior to ECT       Allergies  Allergies[1]    Review of Systems:   As by Admitting/Attending    Results:   Laboratory Data:  Lab Results   Component Value Date    WBC 7.7 12/11/2024    HGB 12.4 12/11/2024    HCT 36.7 12/11/2024    .0 12/11/2024    CREATSERUM 0.87 12/11/2024    BUN 18 12/11/2024     12/11/2024    K 4.0 12/11/2024     (H) 12/11/2024    CO2 19.0 (L) 12/11/2024     (H) 12/11/2024    CA 9.4 12/11/2024    ALB 4.5 12/10/2024    ALKPHO 77 12/10/2024    TP 7.0 12/10/2024    AST 15 12/10/2024    ALT 11 12/10/2024    T4F 1.7 04/12/2024    TSH 0.528 (L) 04/12/2024     01/19/2022    DDIMER <0.27 01/12/2023    CRP 8.33 (H) 09/04/2020    MG 2.0 12/11/2024    TROP <0.045 08/26/2020    B12 484 04/12/2024    ETOH <3 04/10/2024         Imaging:  XR CHEST AP PORTABLE  (CPT=71045)    Result  Date: 12/10/2024  CONCLUSION: Subtle peribronchial infiltrate at the right lung base    Dictated by (CST): aJiro Barker MD on 12/10/2024 at 12:07 PM     Finalized by (RICHARD): Jairo Barker MD on 12/10/2024 at 12:07 PM           Vital Signs:   Blood pressure 130/83, pulse 81, temperature 97.9 °F (36.6 °C), temperature source Oral, resp. rate 22, height 62\", weight 53.4 kg (117 lb 12.8 oz), SpO2 96%.    Mental Status Exam:   Appearance: Stated age  female, in hospital gown, laying down in hospital bed with increased work of breathing  Psychomotor: Some psychomotor retardation  Orientation: Alert and oriented to person, place, time and condition.  Gait: Not evaluated.  Attitude/Coorperation: Cooperative  Behavior: Tearful  Speech: Soft voice and speaking in short sentences due to increased work of breathing  Mood: \"Nervous\" with depressed mood  Affect: Anxious and dysphoric affect congruent with mood, full range panicky and tearful.  Thought process: Linear and appropriate.  Thought content: Patient denies any suicidal or homicidal ideation.  Perceptions: Patient denies any auditory or visual hallucinations.  Concentration: Grossly intact.  Memory: Impaired  Intellect: Average.  Judgment and Insight: Appropriate.  Patient acknowledging her depression and anxiety and agreeable on management.    Impression:   Major Depression Disorder, recurrent moderate  Generalized Anxiety Disorder with panic attacks.  Alcohol dependence, remission.    Bronchopneumonia    Bronchitis    Nausea vomiting and diarrhea    Metabolic acidosis    Patient is a 74 year old female with past medical history of GERD, depression, anxiety who was admitted to the hospital for Bronchopneumonia: The patient has been demonstrating depression and anxiety    The patient has been demonstrating increase in anxiety and depression symptom due to family dynamic but intense exacerbation in her anxiety due to the fact that she is in the hospital and  she is sick.  Patient with history of alcoholic remission for 27 years.    We agree on controlling the anxiety today by utilizing hydroxyzine.  Patient invited to be open to have alternative discussion tomorrow to adjust her medication.    Discussed risk and benefit, acknowledging the current symptom and severity.  At this point, I would recommend the following approach:     Focus on safety  Focus on education and support.  Focus on insight orientation helping the patient understand diagnosis and treatment plan.  Start Atarax 20 mg TID  Start Lexapro 10 mg daily.  Continue Wellbutrin  mg daily.  Processed with patient at length, the initiation of the above psychotropic medications I advised the patient of the risks, benefits, alternatives and potential side effects. The patient consents to administration of the medications and understands the right to refuse medications at any time. The patient verbalized understanding.   Coordinate plan with team    Orders This Visit:  Orders Placed This Encounter   Procedures    CBC With Differential With Platelet    Troponin I (High Sensitivity)    Comp Metabolic Panel (14)    Lactic Acid, Plasma    Venous Blood Gas    Procalcitonin    Basic Metabolic Panel (8)    Magnesium    Legionella urine Ag serogrp 1    Hemoglobin A1C    CBC With Differential With Platelet    SARS-CoV-2/Flu A and B/RSV by PCR (GeneXpert)    Blood Culture    Sputum culture    GI Stool panel by PCR    Clostridium difficile(toxigenic)PCR    Clostridium difficile by EIA       Meds This Visit:  Requested Prescriptions      No prescriptions requested or ordered in this encounter       Jason Mckenzie MD  12/11/2024    Note to Patient: The 21st Century Cures Act makes medical notes like these available to patients in the interest of transparency. However, be advised this is a medical document. It is intended as peer to peer communication. It is written in medical language and may contain abbreviations or  verbiage that are unfamiliar. It may appear blunt or direct. Medical documents are intended to carry relevant information, facts as evident, and the clinical opinion of the practitioner. This note may have been transcribed using a voice dictation system. Voice recognition errors may occur. This should not be taken to alter the content or meaning of this note.           [1]   Allergies  Allergen Reactions    Radiology Contrast Iodinated Dyes HIVES     HIVES AND THROAT TIGHTENED WHILE HAVING AN MRI    Sulfa Antibiotics HIVES     \"got sicker\"    Methylprednisolone OTHER (SEE COMMENTS)    Seroquel [Quetiapine] UNKNOWN    Cinnamon RASH    Iodine (Topical) RASH

## 2024-12-11 NOTE — SLP NOTE
ADULT SWALLOWING EVALUATION    ASSESSMENT    ASSESSMENT/OVERALL IMPRESSION:    Per MD note:  \"74-year-old female with history of severe depression and ECT therapy, GERD .  Presented with 2 weeks nausea and vomiting and today with more cough and dyspnea and chest x-ray showed right lower lobe infiltrate. She had some chills but no fever  Denied abdominal pain with no melena or hematochezia. Denied any chest pain orthopnea or PND with no lower extremity edema or pain or calf tenderness. Generalized fatigue with no focal weakness. Chest x-ray minimal right basilar infiltrate. Normal procalcitonin with no significant leukocytosis. Possible aspiration with ongoing vomiting\"    SLP BSSE orders received and acknowledged. A swallow evaluation warranted as pt presents with new onset of right sided infiltrate on CXR.  The pt was admitted with diagnosis of bronchopneumonia.  Chart reviewed and collaborated with RNViolet.  Swallowing evaluation approved and RN reports pt has been tolerating current diet and medications without overt clinical signs of aspiration.  Pt seen at bedside and agreeable to participate in BSSE.  Pt reports pain on her left side through her rib cage and reports a pain level of 7 on the pain scale of 1-10.  RN aware of pain.  Pt is alert O x 4, afebrile with clear vocal quality, tolerating room air with oxygen saturation at 97% prior to the start of po trials. No family was present at the time of testing.  Pt with no hx of dysphagia at [facility]. The pt reports 3+ days of coughing, fever, chills, and vomiting.    Pt positioned upright to 90 degrees in bed. Oral [facility] examination completed.  Face symmetrical at rest with functional ROM/strength with labial, lingual, and buccal movements.  Assessed pt with po trials of puree, soft/hard solids, and thin liquids via open cup/straw. Pt presents with a dry cough prior to testing. Pt with functional oral acceptance and bilabial seal across all trials. Oral phase of  swallow appears timely with adequate bolus control and propulsion. Pt with intact bite, mastication of solids, and timely A-P transit with solid bolus.  No oral stasis was present post the swallow.  Pharyngeal phase of the swallow appears timely with adequate hyolaryngeal elevation/excursion.  Oxygen status remained >95% throughout the entire evaluation. No immediate overt clinical signs of aspiration observed with any consistency (no coughing, no throat clearing, and vocal quality remains dry).  Intermittently during the testing when the pt was answering question or talking between taking po trials, a dry cough was present.  Don't suspect the cough to be related to aspiration/food/liquid trials, but is the pt's baseline cough.  Suspect infiltrate at right base may be from vomiting and/or reflux. Pt denies any globus sensation post the swallow.  The pt was left at bedside resting in bed with call light in reach.    At this time, pt presents with a functional oropharyngeal phase of the swallow. Recommend a regular solid diet and thin liquids with adherence to safe swallowing compensatory strategies. Pt reports preferred learning style of education is via verbal discussion.  Results and recommendations reviewed with patient and RN. Provided education via verbal discussion. The patient acknowledged understanding of the education and was able to verbalize/demonstrate precautions.  SLP collaborated with RN for diet orders. Diet recommendations and swallowing precautions/strategies posted on white board at bedside.   FCM SCORE: 7/7     PLAN: Will follow up x 1-2 to ensure safety with diet and educate pt on compensatory strategies/swallow precautions. Video swallow study to be completed if CXR declines, increase in clinical signs of aspiration, and/or MD desires.                RECOMMENDATIONS   Diet Recommendations - Solids: Regular  Diet Recommendations - Liquids: Thin Liquids                           Aspiration  Precautions: Upright position;Slow rate;Small bites;Small sips  Medication Administration Recommendations: One pill at a time  Treatment Plan/Recommendations: Aspiration precautions    HISTORY   MEDICAL HISTORY  Reason for Referral: R/O aspiration    Problem List  Principal Problem:    Bronchopneumonia  Active Problems:    Bronchitis    Nausea vomiting and diarrhea    Metabolic acidosis      Past Medical History  Past Medical History:    Anxiety state    Back problem    COMPRESSION FX    Cataract    Depression    Esophageal reflux    GERD (gastroesophageal reflux disease)    Heart valve disease    MVP--congenital    Hematoma and contusion of liver    STATES HAS BEEN THERE FOR MANY YEARS    History of fractured kneecap    RIGHT    IBS (irritable bowel syndrome)    Mitral prolapse    Nausea vomiting and diarrhea    Osteoarthritis    Visual impairment    glasses       Prior Living Situation: Home with support  Diet Prior to Admission: Regular;Thin liquids  Precautions: Aspiration;Reflux    Patient/Family Goals: To eat     SWALLOWING HISTORY  Current Diet Consistency: Regular;Thin liquids  Dysphagia History: Pt with no hx of dysphagia at [facility]. The pt reports 3+ days of coughing, fever, chills, and vomiting.  Imaging Results:   CXR  CONCLUSION: Subtle peribronchial infiltrate at the right lung base      Dictated by (CST): Jairo Barker MD on 12/10/2024 at 12:07 PM       Finalized by (CST): Jairo Barker MD on 12/10/2024 at 12:07 PM     SUBJECTIVE   Pt is alert and O x 4.    OBJECTIVE   ORAL MOTOR EXAMINATION  Dentition: Natural;Functional  Symmetry: Within Functional Limits  Strength: Within Functional Limits  Tone: Within Functional Limits  Range of Motion: Within Functional Limits  Rate of Motion: Within Functional Limits    Voice Quality: Clear  Respiratory Status: Unlabored  Consistencies Trialed: Thin liquids;Soft solid;Puree;Hard solid  Method of Presentation: Self presentation;Spoon;Cup;Straw;Single  sips  Patient Positioned: Upright;Midline    Oral Phase of Swallow: Within Functional Limits                      Pharyngeal Phase of Swallow: Within Functional Limits           (Please note: Silent aspiration cannot be evaluated clinically. Videofluoroscopic Swallow Study is required to rule-out silent aspiration.)    Esophageal Phase of Swallow: No complaints consistent with possible esophageal involvement                GOALS  Goal #1 The patient will tolerate regular consistency and thin liquids without overt signs or symptoms of aspiration with 100 % accuracy over 1-2 session(s).  In Progress   Goal #2 The patient/family/caregiver will demonstrate understanding and implementation of aspiration precautions and swallow strategies independently over 1-2 session(s).    In Progress     FOLLOW UP  Treatment Plan/Recommendations: Aspiration precautions  Duration: 1-2 x  Follow Up Needed (Documentation Required): Yes  SLP Follow-up Date: 12/12/24    Thank you for your referral.   If you have any questions, please contact     Francine Chen MS/CCC-SLP  Speech Language Pathologist  [facility]  EXT. [id number]

## 2024-12-11 NOTE — PROGRESS NOTES
[facility]  part of [facility]    Progress Note    Meli Antonio Patient Status:  Observation    11/3/1950 MRN [id number]   Location [facility]5W Attending Vesta Bloom DO   Hosp Day # 0 PCP SIRI TABOR     Chief complaint pna, depression     Subjective:   Meli Antonio is a(n) 74 year old female pt doing better today. Asking for pain meds.     ROS:   No cp, sob   No c/d   No n/v     Objective:   Blood pressure 126/80, pulse 79, temperature 97.8 °F (36.6 °C), temperature source Oral, resp. rate (!) 28, height 5' 2\" (1.575 m), weight 117 lb 12.8 oz (53.4 kg), SpO2 96%.      Intake/Output Summary (Last 24 hours) at 2024 1746  Last data filed at 2024 1728  Gross per 24 hour   Intake 3951.23 ml   Output 125 ml   Net 3826.23 ml       Patient Weight(s) for the past 336 hrs:   Weight   12/10/24 1648 117 lb 12.8 oz (53.4 kg)   12/10/24 1040 120 lb (54.4 kg)           General appearance: alert, appears stated age and cooperative  Pulmonary:  clear to auscultation bilaterally  Cardiovascular: S1, S2 normal, no murmur, click, rub or gallop, regular rate and rhythm  Abdominal: soft, non-tender; bowel sounds normal; no masses,  no organomegaly  Extremities: extremities normal, atraumatic, no cyanosis or edema        Medicines:     Current Facility-Administered Medications   Medication Dose Route Frequency    pregabalin (Lyrica) cap 50 mg  50 mg Oral TID    escitalopram (Lexapro) tab 10 mg  10 mg Oral Daily    buPROPion ER (Wellbutrin XL) 24 hr tab 300 mg  300 mg Oral Daily    hydrOXYzine (Atarax) tab 10 mg  10 mg Oral TID    sodium chloride 0.9% infusion   Intravenous Continuous    heparin (Porcine) 5000 UNIT/ML injection 5,000 Units  5,000 Units Subcutaneous 2 times per day    acetaminophen (Tylenol Extra Strength) tab 500 mg  500 mg Oral Q4H PRN    melatonin tab 3 mg  3 mg Oral Nightly PRN    ondansetron (Zofran) 4 MG/2ML injection 4 mg  4 mg Intravenous Q6H PRN     metoclopramide (Reglan) 5 mg/mL injection 5 mg  5 mg Intravenous Q8H PRN    ampicillin-sulbactam (Unasyn) 3 g in sodium chloride 0.9% 100mL IVPB-ADD  3 g Intravenous q6h    doxycycline (Vibramycin) cap 100 mg  100 mg Oral 2 times per day    benzonatate (Tessalon) cap 200 mg  200 mg Oral TID PRN    guaiFENesin (Robitussin) 100 MG/5 ML oral liquid 200 mg  200 mg Oral Q4H PRN    ipratropium-albuterol (Duoneb) 0.5-2.5 (3) MG/3ML inhalation solution 3 mL  3 mL Nebulization Q6H PRN    HYDROcodone-acetaminophen (Norco) 5-325 MG per tab 1 tablet  1 tablet Oral Q6H PRN                        sodium chloride 100 mL/hr at 12/11/24 1348           Lab Results   Component Value Date    WBC 7.7 12/11/2024    HGB 12.4 12/11/2024    HCT 36.7 12/11/2024    .0 12/11/2024    CREATSERUM 0.87 12/11/2024    BUN 18 12/11/2024     12/11/2024    K 4.0 12/11/2024     (H) 12/11/2024    CO2 19.0 (L) 12/11/2024     (H) 12/11/2024    CA 9.4 12/11/2024    ALB 4.5 12/10/2024    ALKPHO 77 12/10/2024    BILT 0.3 12/10/2024    TP 7.0 12/10/2024    AST 15 12/10/2024    ALT 11 12/10/2024    T4F 1.7 04/12/2024    TSH 0.528 (L) 04/12/2024     01/19/2022    DDIMER <0.27 01/12/2023    CRP 8.33 (H) 09/04/2020    MG 2.0 12/11/2024    TROP <0.045 08/26/2020    B12 484 04/12/2024    ETOH <3 04/10/2024       XR CHEST AP PORTABLE  (CPT=71045)    Result Date: 12/10/2024  CONCLUSION: Subtle peribronchial infiltrate at the right lung base    Dictated by (CST): Jairo Barker MD on 12/10/2024 at 12:07 PM     Finalized by (CST): Jairo Barker MD on 12/10/2024 at 12:07 PM         EKG 12 Lead    Result Date: 12/10/2024  Sinus rhythm with Premature atrial complexes Nonspecific ST and T wave abnormality Abnormal ECG When compared with ECG of 31-AUG-2024 15:00, QT has shortened Confirmed by ANABELLA GAN, LAZARO ([id number]) on 12/10/2024 2:06:36 PM     Results:     CBC:    Lab Results   Component Value Date    WBC 7.7 12/11/2024    WBC  10.1 12/10/2024    WBC 7.0 07/24/2024     Lab Results   Component Value Date    HEMOGLOBIN 14.0 08/31/2024    HEMOGLOBIN 12.3 07/19/2024    HEMOGLOBIN 13.4 01/19/2022    HGB 12.4 12/11/2024    HGB 13.9 12/10/2024    HGB 12.7 07/24/2024      Lab Results   Component Value Date    .0 12/11/2024    .0 12/10/2024    .0 07/24/2024       Recent Labs   Lab 12/10/24  1134 12/11/24  0450   * 135*   BUN 15 18   CREATSERUM 1.06* 0.87   CA 10.0 9.4    144   K 4.3 4.0    114*   CO2 18.0* 19.0*           Assessment and Plan:      Acute Bronchopneumonia, possible aspiration pneumonia  - cont IV abx with unasyn and oral doxy for atypical pneumonia  - pulm on consult appreciated   - check sputum cx  - check urine legionella  - duonebs prn     Hyperglycemia  - likely related to above, check hgb A1c     N/V/Diarrhea - pos c diff, eia neg. Hold tx   - neg stool studies  - could possibly be a viral gastro  - cont  - diet as tolerated  - zofran prn nausea     SUPA and metabolic acidosis  - likely due to n/v/diarrhea  - cont IVFs  - recheck bmp in am        Other PMH - currently stable     GERD  Mitral valve prolapse  Osteoarthritis  Depression        MDM: high                    PHYSICIAN Certification of Need for Inpatient Hospitalization - Initial Certification     Patient will require inpatient services that will reasonably be expected to span two midnight's based on the clinical documentation in H+P.   Based on patients current state of illness, I anticipate that, after discharge, patient will require TBD.               Vesta Bloom DO         Chart reviewed, including current vitals, notes, labs and imaging  Labs ordered and medications adjusted as outlined above  Coordinate care with care team/consultants  Discussed with patient results of tests, management plan as outlined above, and the need for ongoing hospitalization  D/w RN     MDM high        12/11/2024             Supplementary  Documentation:   DVT Mechanical Prophylaxis:   SCDs,    DVT Pharmacologic Prophylaxis   Medication    heparin (Porcine) 5000 UNIT/ML injection 5,000 Units                Code Status: Full Code  Parrish: No urinary catheter in place  Parrish Duration (in days):   Central line:    LOGAN: 12/13/2024

## 2024-12-12 ENCOUNTER — HOSPITAL ENCOUNTER (OUTPATIENT)
Dept: POSTOP/PACU | Facility: HOSPITAL | Age: 74
Discharge: HOME | End: 2024-12-12
Attending: Other
Payer: MEDICARE

## 2024-12-12 DIAGNOSIS — Z01.818 PRE-OP TESTING: Primary | ICD-10-CM

## 2024-12-12 LAB
ANION GAP SERPL CALC-SCNC: 9 MMOL/L (ref 0–18)
BASOPHILS # BLD AUTO: 0.05 X10(3) UL (ref 0–0.2)
BASOPHILS NFR BLD AUTO: 0.6 %
BUN BLD-MCNC: 10 MG/DL (ref 9–23)
BUN/CREAT SERPL: 13.3 (ref 10–20)
CALCIUM BLD-MCNC: 8.6 MG/DL (ref 8.7–10.4)
CHLORIDE SERPL-SCNC: 115 MMOL/L (ref 98–112)
CO2 SERPL-SCNC: 21 MMOL/L (ref 21–32)
CREAT BLD-MCNC: 0.75 MG/DL
DEPRECATED RDW RBC AUTO: 42.8 FL (ref 35.1–46.3)
EGFRCR SERPLBLD CKD-EPI 2021: 83 ML/MIN/1.73M2 (ref 60–?)
EOSINOPHIL # BLD AUTO: 0.21 X10(3) UL (ref 0–0.7)
EOSINOPHIL NFR BLD AUTO: 2.6 %
ERYTHROCYTE [DISTWIDTH] IN BLOOD BY AUTOMATED COUNT: 12.5 % (ref 11–15)
GLUCOSE BLD-MCNC: 118 MG/DL (ref 70–99)
HCT VFR BLD AUTO: 36.4 %
HGB BLD-MCNC: 12.2 G/DL
IMM GRANULOCYTES # BLD AUTO: 0.03 X10(3) UL (ref 0–1)
IMM GRANULOCYTES NFR BLD: 0.4 %
LYMPHOCYTES # BLD AUTO: 2.89 X10(3) UL (ref 1–4)
LYMPHOCYTES NFR BLD AUTO: 36.3 %
MCH RBC QN AUTO: 31.6 PG (ref 26–34)
MCHC RBC AUTO-ENTMCNC: 33.5 G/DL (ref 31–37)
MCV RBC AUTO: 94.3 FL
MONOCYTES # BLD AUTO: 0.79 X10(3) UL (ref 0.1–1)
MONOCYTES NFR BLD AUTO: 9.9 %
NEUTROPHILS # BLD AUTO: 3.99 X10 (3) UL (ref 1.5–7.7)
NEUTROPHILS # BLD AUTO: 3.99 X10(3) UL (ref 1.5–7.7)
NEUTROPHILS NFR BLD AUTO: 50.2 %
OSMOLALITY SERPL CALC.SUM OF ELEC: 300 MOSM/KG (ref 275–295)
PLATELET # BLD AUTO: 270 10(3)UL (ref 150–450)
POTASSIUM SERPL-SCNC: 3.4 MMOL/L (ref 3.5–5.1)
RBC # BLD AUTO: 3.86 X10(6)UL
SODIUM SERPL-SCNC: 145 MMOL/L (ref 136–145)
WBC # BLD AUTO: 8 X10(3) UL (ref 4–11)

## 2024-12-12 PROCEDURE — 99233 SBSQ HOSP IP/OBS HIGH 50: CPT | Performed by: OTHER

## 2024-12-12 PROCEDURE — 99232 SBSQ HOSP IP/OBS MODERATE 35: CPT | Performed by: INTERNAL MEDICINE

## 2024-12-12 PROCEDURE — 99233 SBSQ HOSP IP/OBS HIGH 50: CPT | Performed by: HOSPITALIST

## 2024-12-12 RX ORDER — PREGABALIN 25 MG/1
25 CAPSULE ORAL 3 TIMES DAILY
Status: DISCONTINUED | OUTPATIENT
Start: 2024-12-12 | End: 2024-12-13

## 2024-12-12 RX ORDER — POTASSIUM CHLORIDE 1500 MG/1
40 TABLET, EXTENDED RELEASE ORAL ONCE
Status: COMPLETED | OUTPATIENT
Start: 2024-12-12 | End: 2024-12-12

## 2024-12-12 RX ORDER — DIAZEPAM 2 MG/1
5 TABLET ORAL ONCE
Status: COMPLETED | OUTPATIENT
Start: 2024-12-12 | End: 2024-12-12

## 2024-12-12 RX ORDER — HYDROXYZINE HYDROCHLORIDE 25 MG/1
25 TABLET, FILM COATED ORAL NIGHTLY
Status: DISCONTINUED | OUTPATIENT
Start: 2024-12-12 | End: 2024-12-13

## 2024-12-12 RX ORDER — ARIPIPRAZOLE 2 MG/1
2 TABLET ORAL DAILY
Status: DISCONTINUED | OUTPATIENT
Start: 2024-12-12 | End: 2024-12-13

## 2024-12-12 RX ORDER — DIAZEPAM 2 MG/1
5 TABLET ORAL EVERY 6 HOURS PRN
Status: DISCONTINUED | OUTPATIENT
Start: 2024-12-12 | End: 2024-12-12

## 2024-12-12 RX ORDER — VANCOMYCIN HYDROCHLORIDE 125 MG/1
125 CAPSULE ORAL DAILY
Status: DISCONTINUED | OUTPATIENT
Start: 2024-12-12 | End: 2024-12-13

## 2024-12-12 NOTE — PLAN OF CARE
A&Ox4. Pt ambulates stand by assist with rolling walker, 0.9 infusing, voiding independently, tolerating regualr diet, on room air, tylenol provided as needed for pain. Frequent rounding by nursing staff. Safety precautions maintained/call light within reach. Plan for discharge pending medical clearance.    Problem: Patient Centered Care  Goal: Patient preferences are identified and integrated in the patient's plan of care  Description: Interventions:  - What would you like us to know as we care for you? I'm from home with my   - Provide timely, complete, and accurate information to patient/family  - Incorporate patient and family knowledge, values, beliefs, and cultural backgrounds into the planning and delivery of care  - Encourage patient/family to participate in care and decision-making at the level they choose  - Honor patient and family perspectives and choices  Outcome: Progressing     Problem: RESPIRATORY - ADULT  Goal: Achieves optimal ventilation and oxygenation  Description: INTERVENTIONS:  - Assess for changes in respiratory status  - Assess for changes in mentation and behavior  - Position to facilitate oxygenation and minimize respiratory effort  - Oxygen supplementation based on oxygen saturation or ABGs  - Provide Smoking Cessation handout, if applicable  - Encourage broncho-pulmonary hygiene including cough, deep breathe, Incentive Spirometry  - Assess the need for suctioning and perform as needed  - Assess and instruct to report SOB or any respiratory difficulty  - Respiratory Therapy support as indicated  - Manage/alleviate anxiety  - Monitor for signs/symptoms of CO2 retention  Outcome: Progressing     Problem: Patient/Family Goals  Goal: Patient/Family Long Term Goal  Description: Patient's Long Term Goal: discharge    Interventions:  - abx as ordered  - See additional Care Plan goals for specific interventions  Outcome: Progressing  Goal: Patient/Family Short Term Goal  Description:  Patient's Short Term Goal: feel better    Interventions:   - manage pain and anxiety with PRN meds as needed  - See additional Care Plan goals for specific interventions  Outcome: Progressing     Problem: PAIN - ADULT  Goal: Verbalizes/displays adequate comfort level or patient's stated pain goal  Description: INTERVENTIONS:  - Encourage pt to monitor pain and request assistance  - Assess pain using appropriate pain scale  - Administer analgesics based on type and severity of pain and evaluate response  - Implement non-pharmacological measures as appropriate and evaluate response  - Consider cultural and social influences on pain and pain management  - Manage/alleviate anxiety  - Utilize distraction and/or relaxation techniques  - Monitor for opioid side effects  - Notify MD/LIP if interventions unsuccessful or patient reports new pain  - Anticipate increased pain with activity and pre-medicate as appropriate  Outcome: Not Progressing

## 2024-12-12 NOTE — PROGRESS NOTES
Pulmonary Medicine Inpatient Progress Note                 Subjective:  On RA, afebrile.   More calm now. Reports was very anxious earlier this am.        ALLERGIES:  Allergies[1]       MEDS:  Home Medications:  Medications Taking[2]  Scheduled Medication:   vancomycin  125 mg Oral Daily    pregabalin  25 mg Oral TID    ARIPiprazole  2 mg Oral Daily    hydrOXYzine  25 mg Oral Nightly    escitalopram  10 mg Oral Daily    buPROPion ER  300 mg Oral Daily    hydrOXYzine  10 mg Oral TID    heparin  5,000 Units Subcutaneous 2 times per day    ampicillin-sulbactam  3 g Intravenous q6h    doxycycline  100 mg Oral 2 times per day     Continuous Infusing Medication:   sodium chloride 100 mL/hr at 12/11/24 1348     PRN Medications:    acetaminophen    melatonin    ondansetron    metoclopramide    benzonatate    guaiFENesin    ipratropium-albuterol    HYDROcodone-acetaminophen       PHYSICAL EXAM:  /71 (BP Location: Right arm)   Pulse 84   Temp 98 °F (36.7 °C) (Oral)   Resp 26   Ht 5' 2\" (1.575 m)   Wt 117 lb 12.8 oz (53.4 kg)   SpO2 95%   BMI 21.55 kg/m²   CONSTITUTIONAL: alert, oriented, no apparent distress  HEENT: atraumatic normocephalic  MOUTH: mucous membranes are moist. No OP exudates  NECK/THROAT: no JVD. Trachea midline. No obvious thyromegaly  LUNG: clear b/l no wheezing, crackles. Chest symmetric with respiratory motion  HEART: tachy, regular rate and rhythm, no obvious murmers or gallops note  ABD: soft non tender. + bowel sounds. No organomegaly noted  EXT: no clubbing, cyanosis, or edema noted. Pulses intact grossly       IMAGES:  CXR 12/10/24  CONCLUSION: Subtle peribronchial infiltrate at the right lung base.       LABS:  Recent Labs   Lab 12/10/24  1134 12/11/24  0450 12/12/24  0443   RBC 4.51 3.92 3.86   HGB 13.9 12.4 12.2   HCT 43.1 36.7 36.4   MCV 95.6 93.6 94.3   MCH 30.8 31.6 31.6   MCHC 32.3 33.8 33.5   RDW 12.6 12.5 12.5   NEPRELIM 7.35 4.18 3.99   WBC 10.1 7.7 8.0   .0 286.0 270.0        Recent Labs   Lab 12/10/24  1134 12/11/24  0450 12/12/24  0443   * 135* 118*   BUN 15 18 10   CREATSERUM 1.06* 0.87 0.75   EGFRCR 55* 70 83   CA 10.0 9.4 8.6*   ALB 4.5  --   --     144 145   K 4.3 4.0 3.4*    114* 115*   CO2 18.0* 19.0* 21.0   ALKPHO 77  --   --    AST 15  --   --    ALT 11  --   --    BILT 0.3  --   --    TP 7.0  --   --        ASSESSMENT/PLAN:  CAP-RLL. Cannot r/o aspiration  -CXR with min infiltrates  -urine leg neg  -continue unasyn/doxy to complete 7 days total. Can switch unasyn to augmentin to complete as outpt  -recommend repeat CXR in 4 weeks    Severe depression on chronic ECT tx  -pt is very anxious today and hyperventilating   -psych consult followng     Metabolic acidosis  -slowly improving   -LA normal  -reports she's hyperventilating d/t anxiety which maybe contributing     Proph:  -DVT: hep subcutaneous    Dispo:  -full code  -pt's acute pulmonary and critical care issues have resolved. Therefore, our service will sign off at this time. Please don't hesitate to contact us if there are questions or concerns we can address or the patient's clinical status changes which requires our service's attention.       Thank you for the opportunity to care for Meli Antonio.     STACIE Duque DO, MPH  Pulmonary Critical Care Medicine  [facility]        [1]   Allergies  Allergen Reactions    Radiology Contrast Iodinated Dyes HIVES     HIVES AND THROAT TIGHTENED WHILE HAVING AN MRI    Sulfa Antibiotics HIVES     \"got sicker\"    Methylprednisolone OTHER (SEE COMMENTS)    Seroquel [Quetiapine] UNKNOWN    Cinnamon RASH    Iodine (Topical) RASH   [2]   Outpatient Medications Marked as Taking for the 12/10/24 encounter (Hospital Encounter)   Medication Sig Dispense Refill    Cholecalciferol (VITAMIN D) 25 MCG (1000 UT) Oral Tab Take 1 tablet by mouth daily.      benzonatate 100 MG Oral Cap Take 1 capsule (100 mg total) by mouth 3  (three) times daily as needed for cough.      albuterol 108 (90 Base) MCG/ACT Inhalation Aero Soln Inhale 2 puffs into the lungs every 4 to 6 hours as needed for Shortness of Breath.      LORazepam 1 MG Oral Tab May take 1 tablet (1 mg total) by mouth daily as needed for Anxiety (or insomnia). May also take 1 tablet (1 mg total) nightly as needed for Anxiety (or insomnia). 60 tablet 0    escitalopram 10 MG Oral Tab Take 1 tablet (10 mg total) by mouth daily. 30 tablet 1    buPROPion  MG Oral Tablet 24 Hr Take 1 tablet (300 mg total) by mouth daily. 30 tablet 1    PREGABALIN 50 MG Oral Cap Take 1 capsule by mouth 3 times daily. Hold the afternoon and evening doses prior to ECT 90 capsule 0

## 2024-12-12 NOTE — PROGRESS NOTES
[facility]  Progress Note     Meli Antonio  : 11/3/1950    Status: Observation  Day #: 0    Attending: Patel Dumont MD  PCP: SIRI TABOR     Assessment and Plan:    Acute Bronchopneumonia, possible aspiration pneumonia  - cont IV abx with unasyn and oral doxy for atypical pneumonia  - pulm on consult appreciated   - check urine legionella - negative  - duonebs prn     Hyperglycemia - prediabetes - A1c 6.3  -lifestyle modification     N/V/Diarrhea - pos c diff, eia neg. Hold tx.   - neg stool studies  - could possibly be a viral gastro  - still with very frequent BMs  - diet as tolerated  - zofran prn nausea  - OVP daily dosing     SUPA and metabolic acidosis  - likely due to n/v/diarrhea  - recheck bmp in am  - resolved. Stop IVF.     Other PMH - currently stable     GERD  Mitral valve prolapse  Osteoarthritis  Depression    Dispo:  discharge planning.        DVT Mechanical Prophylaxis:   SCDs,    DVT Pharmacologic Prophylaxis   Medication    heparin (Porcine) 5000 UNIT/ML injection 5,000 Units               Subjective:      Initial Chief Complaint:  SOB    C/o SOB and feels rather anxious. No CP. Ongoing frequent watery BMs    10 point ROS completed and was negative, except for pertinent positive and negatives stated in subjective.      Objective:      Temp:  [97.8 °F (36.6 °C)-98.2 °F (36.8 °C)] 98.2 °F (36.8 °C)  Pulse:  [79-86] 86  Resp:  [22-28] 22  BP: (126-141)/(71-84) 133/80  SpO2:  [94 %-96 %] 94 %  General:  Alert, no distress  HEENT:  Normocephalic, atraumatic  Cardiac:  Regular rate, regular rhythm  Pulmonary:  Clear to auscultation bilaterally, respirations unlabored  Gastrointestinal:  Soft, non-tender, normal bowel sounds  Musculoskeletal:  No joint swelling  Extremities:  No edema, no cyanosis, no clubbing  Neurologic:  nonfocal  Psychiatric:  Normal affect, calm and appropriate    Intake/Output Summary (Last 24 hours) at 2024 1433  Last data filed at 2024  1011  Gross per 24 hour   Intake 880 ml   Output --   Net 880 ml         Recent Labs   Lab 12/10/24  1134 12/11/24  0450 12/12/24  0443   WBC 10.1 7.7 8.0   HGB 13.9 12.4 12.2   HCT 43.1 36.7 36.4   .0 286.0 270.0   RBC 4.51 3.92 3.86   MCV 95.6 93.6 94.3   MCH 30.8 31.6 31.6   MCHC 32.3 33.8 33.5   RDW 12.6 12.5 12.5   NEPRELIM 7.35 4.18 3.99     Recent Labs   Lab 12/10/24  1134 12/11/24  0450 12/12/24  0443   BUN 15 18 10   CREATSERUM 1.06* 0.87 0.75   CA 10.0 9.4 8.6*   ALB 4.5  --   --     144 145   K 4.3 4.0 3.4*    114* 115*   CO2 18.0* 19.0* 21.0   * 135* 118*   MG  --  2.0  --    BILT 0.3  --   --    AST 15  --   --    ALT 11  --   --    ALKPHO 77  --   --    TP 7.0  --   --        No results found.      Medications:   vancomycin  125 mg Oral Daily    pregabalin  25 mg Oral TID    ARIPiprazole  2 mg Oral Daily    hydrOXYzine  25 mg Oral Nightly    escitalopram  10 mg Oral Daily    buPROPion ER  300 mg Oral Daily    hydrOXYzine  10 mg Oral TID    heparin  5,000 Units Subcutaneous 2 times per day    ampicillin-sulbactam  3 g Intravenous q6h    doxycycline  100 mg Oral 2 times per day      PRN Meds:   acetaminophen    melatonin    ondansetron    metoclopramide    benzonatate    guaiFENesin    ipratropium-albuterol    HYDROcodone-acetaminophen    Supplementary Documentation:   DVT Mechanical Prophylaxis:   SCDs,    DVT Pharmacologic Prophylaxis   Medication    heparin (Porcine) 5000 UNIT/ML injection 5,000 Units                Code Status: Full Code  Parrish: No urinary catheter in place  Parrish Duration (in days):   Central line:    LOGAN: 12/13/2024                    [facility]. Time spent on chart/note review, review of labs/imaging, discussion with patient, physical exam, discussion with staff, consultants, coordinating care, writing progress note, discussion of plan of care.      Patel Dumont MD

## 2024-12-12 NOTE — SLP NOTE
SPEECH DAILY NOTE - INPATIENT    ASSESSMENT & PLAN   ASSESSMENT  PPE REQUIRED. THIS THERAPIST WORE GLOVES, DROPLET MASK, AND GOGGLES FOR DURATION OF EVALUATION. HANDS WASHED UPON ENTRANCE/EXIT.    SLP f/u for ongoing dysphagia tx/meal assessment per recommendations of regular/thin per BSE. RN reports pt tolerates diet and medication well with no overt clinical s/s aspiration. Pt denies any swallowing challenges.     Pt positioned upright in bed. Pt afebrile, tolerating room air with oxygen status 96 prior to the start of oral trials. SLP reviewed aspiration precautions and safe swallowing compensatory strategies with the patient. Safe swallow guidelines remain written on the white board in purple. Diet recommendations remain on the whiteboard in the room. Patient v/u. Provided no assistance, pt tolerates regular and thin liquids via open cup with no overt clinical signs/symptoms of aspiration. Oxygen status remained >94 t/o the entire session. Oral/buccal cavities clear of residue following all trials.     PLAN: SLP to sign off at this time secondary to pt tolerating least restrictive diet with no CSA.     Diet Recommendations - Solids: Regular  Diet Recommendations - Liquids: Thin Liquids       Aspiration Precautions: Upright position;Slow rate;Small bites;Small sips  Medication Administration Recommendations: One pill at a time    Patient Experiencing Pain: Yes  Pain Ratin  Pain Location:  (left side and across rib cage)  Pain Control: PO meds  Nursing Aware of Pain?: Yes    Treatment Plan  Treatment Plan/Recommendations: No further inpatient SLP service warranted    Interdisciplinary Communication: Discussed with RN  Recommendations posted at bedside            GOALS  Goal #1 The patient will tolerate regular consistency and thin liquids without overt signs or symptoms of aspiration with 100 % accuracy over 1-2 session(s).  Goal met   Goal #2 The patient/family/caregiver will demonstrate understanding and  implementation of aspiration precautions and swallow strategies independently over 1-2 session(s).    Goal met     FOLLOW UP  Follow Up Needed (Documentation Required): No  SLP Follow-up Date:  (n/a)  Duration: 1 week    Session: 1 following BSSE    If you have any questions, please contact THANH Damon M.S., CCC-SLP  Speech Language Pathologist  Phone Number Ext. [phone]

## 2024-12-12 NOTE — PLAN OF CARE
Patient alert and oriented. Tele. Call light within reach. Frequent rounding by staff.  Problem: Patient Centered Care  Goal: Patient preferences are identified and integrated in the patient's plan of care  Description: Interventions:  - What would you like us to know as we care for you?   - Provide timely, complete, and accurate information to patient/family  - Incorporate patient and family knowledge, values, beliefs, and cultural backgrounds into the planning and delivery of care  - Encourage patient/family to participate in care and decision-making at the level they choose  - Honor patient and family perspectives and choices  Outcome: Progressing     Problem: RESPIRATORY - ADULT  Goal: Achieves optimal ventilation and oxygenation  Description: INTERVENTIONS:  - Assess for changes in respiratory status  - Assess for changes in mentation and behavior  - Position to facilitate oxygenation and minimize respiratory effort  - Oxygen supplementation based on oxygen saturation or ABGs  - Provide Smoking Cessation handout, if applicable  - Encourage broncho-pulmonary hygiene including cough, deep breathe, Incentive Spirometry  - Assess the need for suctioning and perform as needed  - Assess and instruct to report SOB or any respiratory difficulty  - Respiratory Therapy support as indicated  - Manage/alleviate anxiety  - Monitor for signs/symptoms of CO2 retention  Outcome: Progressing     Problem: PAIN - ADULT  Goal: Verbalizes/displays adequate comfort level or patient's stated pain goal  Description: INTERVENTIONS:  - Encourage pt to monitor pain and request assistance  - Assess pain using appropriate pain scale  - Administer analgesics based on type and severity of pain and evaluate response  - Implement non-pharmacological measures as appropriate and evaluate response  - Consider cultural and social influences on pain and pain management  - Manage/alleviate anxiety  - Utilize distraction and/or relaxation  techniques  - Monitor for opioid side effects  - Notify MD/LIP if interventions unsuccessful or patient reports new pain  - Anticipate increased pain with activity and pre-medicate as appropriate  Outcome: Progressing     Problem: Patient/Family Goals  Goal: Patient/Family Long Term Goal  Description: Patient's Long Term Goal: discharge    Interventions:  - - Monitor vitals  - Monitor appropriate labs  - Pain management  - Follow MD order  - Diagnostics per order  - Update/Informing patient and family on plan of care  - Discharge planning  - See additional Care Plan goals for specific interventions  Outcome: Progressing  Goal: Patient/Family Short Term Goal  Description: Patient's Short Term Goal: feel better    Interventions:   - - Monitor vitals  - Monitor appropriate labs  - Pain management  - Follow MD order  - Diagnostics per order  - Update/Informing patient and family on plan of care  - Discharge planning  - See additional Care Plan goals for specific interventions  Outcome: Progressing

## 2024-12-13 VITALS
BODY MASS INDEX: 21.68 KG/M2 | WEIGHT: 117.81 LBS | HEIGHT: 62 IN | SYSTOLIC BLOOD PRESSURE: 145 MMHG | DIASTOLIC BLOOD PRESSURE: 84 MMHG | RESPIRATION RATE: 20 BRPM | OXYGEN SATURATION: 96 % | TEMPERATURE: 100 F | HEART RATE: 82 BPM

## 2024-12-13 LAB
ANION GAP SERPL CALC-SCNC: 8 MMOL/L (ref 0–18)
BASOPHILS # BLD AUTO: 0.03 X10(3) UL (ref 0–0.2)
BASOPHILS NFR BLD AUTO: 0.5 %
BUN BLD-MCNC: 8 MG/DL (ref 9–23)
BUN/CREAT SERPL: 11.1 (ref 10–20)
CALCIUM BLD-MCNC: 9.3 MG/DL (ref 8.7–10.4)
CHLORIDE SERPL-SCNC: 111 MMOL/L (ref 98–112)
CO2 SERPL-SCNC: 24 MMOL/L (ref 21–32)
CREAT BLD-MCNC: 0.72 MG/DL
DEPRECATED RDW RBC AUTO: 41.5 FL (ref 35.1–46.3)
EGFRCR SERPLBLD CKD-EPI 2021: 88 ML/MIN/1.73M2 (ref 60–?)
EOSINOPHIL # BLD AUTO: 0.22 X10(3) UL (ref 0–0.7)
EOSINOPHIL NFR BLD AUTO: 3.6 %
ERYTHROCYTE [DISTWIDTH] IN BLOOD BY AUTOMATED COUNT: 12.4 % (ref 11–15)
GLUCOSE BLD-MCNC: 122 MG/DL (ref 70–99)
HCT VFR BLD AUTO: 34.5 %
HGB BLD-MCNC: 11.6 G/DL
IMM GRANULOCYTES # BLD AUTO: 0.01 X10(3) UL (ref 0–1)
IMM GRANULOCYTES NFR BLD: 0.2 %
LYMPHOCYTES # BLD AUTO: 2.64 X10(3) UL (ref 1–4)
LYMPHOCYTES NFR BLD AUTO: 43.3 %
MCH RBC QN AUTO: 30.9 PG (ref 26–34)
MCHC RBC AUTO-ENTMCNC: 33.6 G/DL (ref 31–37)
MCV RBC AUTO: 91.8 FL
MONOCYTES # BLD AUTO: 0.55 X10(3) UL (ref 0.1–1)
MONOCYTES NFR BLD AUTO: 9 %
NEUTROPHILS # BLD AUTO: 2.65 X10 (3) UL (ref 1.5–7.7)
NEUTROPHILS # BLD AUTO: 2.65 X10(3) UL (ref 1.5–7.7)
NEUTROPHILS NFR BLD AUTO: 43.4 %
OSMOLALITY SERPL CALC.SUM OF ELEC: 296 MOSM/KG (ref 275–295)
PLATELET # BLD AUTO: 263 10(3)UL (ref 150–450)
POTASSIUM SERPL-SCNC: 3.1 MMOL/L (ref 3.5–5.1)
POTASSIUM SERPL-SCNC: 3.1 MMOL/L (ref 3.5–5.1)
RBC # BLD AUTO: 3.76 X10(6)UL
SODIUM SERPL-SCNC: 143 MMOL/L (ref 136–145)
WBC # BLD AUTO: 6.1 X10(3) UL (ref 4–11)

## 2024-12-13 PROCEDURE — 99239 HOSP IP/OBS DSCHRG MGMT >30: CPT | Performed by: HOSPITALIST

## 2024-12-13 PROCEDURE — 99233 SBSQ HOSP IP/OBS HIGH 50: CPT | Performed by: OTHER

## 2024-12-13 RX ORDER — HYDROXYZINE HYDROCHLORIDE 25 MG/1
25 TABLET, FILM COATED ORAL NIGHTLY
Qty: 30 TABLET | Refills: 0 | Status: SHIPPED | OUTPATIENT
Start: 2024-12-13

## 2024-12-13 RX ORDER — VANCOMYCIN HYDROCHLORIDE 125 MG/1
125 CAPSULE ORAL DAILY
Qty: 10 CAPSULE | Refills: 0 | Status: SHIPPED | OUTPATIENT
Start: 2024-12-14

## 2024-12-13 RX ORDER — ALPRAZOLAM 0.5 MG
0.5 TABLET ORAL AS NEEDED
Status: DISCONTINUED | OUTPATIENT
Start: 2024-12-13 | End: 2024-12-13

## 2024-12-13 RX ORDER — ARIPIPRAZOLE 2 MG/1
2 TABLET ORAL DAILY
Qty: 30 TABLET | Refills: 0 | Status: SHIPPED | OUTPATIENT
Start: 2024-12-14

## 2024-12-13 RX ORDER — DOXYCYCLINE 100 MG/1
100 CAPSULE ORAL EVERY 12 HOURS SCHEDULED
Qty: 10 CAPSULE | Refills: 0 | Status: SHIPPED | OUTPATIENT
Start: 2024-12-13 | End: 2024-12-18

## 2024-12-13 RX ORDER — POTASSIUM CHLORIDE 1500 MG/1
40 TABLET, EXTENDED RELEASE ORAL ONCE
Status: COMPLETED | OUTPATIENT
Start: 2024-12-13 | End: 2024-12-13

## 2024-12-13 NOTE — PLAN OF CARE
Patient discharged to home. IV removed, discharge education provided, patient sent with all personal belongings, medications and discharge instructions. Addressed additional questions.   Problem: Patient Centered Care  Goal: Patient preferences are identified and integrated in the patient's plan of care  Description: Interventions:  - What would you like us to know as we care for you? I'm from home with my   - Provide timely, complete, and accurate information to patient/family  - Incorporate patient and family knowledge, values, beliefs, and cultural backgrounds into the planning and delivery of care  - Encourage patient/family to participate in care and decision-making at the level they choose  - Honor patient and family perspectives and choices  Outcome: Adequate for Discharge     Problem: RESPIRATORY - ADULT  Goal: Achieves optimal ventilation and oxygenation  Description: INTERVENTIONS:  - Assess for changes in respiratory status  - Assess for changes in mentation and behavior  - Position to facilitate oxygenation and minimize respiratory effort  - Oxygen supplementation based on oxygen saturation or ABGs  - Provide Smoking Cessation handout, if applicable  - Encourage broncho-pulmonary hygiene including cough, deep breathe, Incentive Spirometry  - Assess the need for suctioning and perform as needed  - Assess and instruct to report SOB or any respiratory difficulty  - Respiratory Therapy support as indicated  - Manage/alleviate anxiety  - Monitor for signs/symptoms of CO2 retention  Outcome: Adequate for Discharge     Problem: PAIN - ADULT  Goal: Verbalizes/displays adequate comfort level or patient's stated pain goal  Description: INTERVENTIONS:  - Encourage pt to monitor pain and request assistance  - Assess pain using appropriate pain scale  - Administer analgesics based on type and severity of pain and evaluate response  - Implement non-pharmacological measures as appropriate and evaluate  response  - Consider cultural and social influences on pain and pain management  - Manage/alleviate anxiety  - Utilize distraction and/or relaxation techniques  - Monitor for opioid side effects  - Notify MD/LIP if interventions unsuccessful or patient reports new pain  - Anticipate increased pain with activity and pre-medicate as appropriate  Outcome: Adequate for Discharge     Problem: Patient/Family Goals  Goal: Patient/Family Long Term Goal  Description: Patient's Long Term Goal: discharge    Interventions:  - abx as ordered  - See additional Care Plan goals for specific interventions  Outcome: Adequate for Discharge  Goal: Patient/Family Short Term Goal  Description: Patient's Short Term Goal: feel better    Interventions:   - manage pain with PRM meds as needed  - See additional Care Plan goals for specific interventions  Outcome: Adequate for Discharge

## 2024-12-13 NOTE — PROGRESS NOTES
Patient is a 74 year old female with past medical history of GERD, depression, anxiety who was admitted to the hospital for Bronchopneumonia: The patient has been demonstrating depression and anxiety  Patient indicated for psych consult for evaluation and advise.    Consult Duration     The patient seen for 50 minutes, follow-up evaluation, over 50% counseling and coordinating care addressing increased anxiety, restlessness and severe depression.    Record reviewed, communication with attending, communication with RN and patient seen face to face evaluation.    History of Present Illness:   Communicate with the team indicating that the patient continue treating severe anxiety and having shortness of breath and panicky feeling.    Patient seen today in the room.  Patient today more attentive and cooperative.  Patient reporting that she continue finding herself very restless and depressed although there is no medication, hydroxyzine possibly helping slightly.    The patient today to talk about management of medications at home and her son estranged himself from the family because he did not like his brother who has been living with them.    Patient reported that she has been feeling sad and anxious with frustration for unable to resolve such a problem and she missing the grandchildren.    Patient became tearful with her anxiety otherwise her breathing difficulties subsided while she is attentive to the discussion.      When asked why she takes Lyrica, patient stated that she was unsure. When asked about other medications that she takes, patient was also unable to answer.    The patient reporting feelings of hopelessness, helplessness, worthlessness, low mood, low energy, decreased motivation.    The patient reporting increased anxiety, worry, ruminations with impairment in sleep.   Patient admitted feeling panicky, restless and very fearful.    The patient is not demonstrating any adwoa or psychosis  The patient  denies auditory or visual hallucinations  The patient denies suicidal or homicidal ideation.    The patient reporting that she has been taking Lexapro and Wellbutrin for years.  Patient report that for the most part that has been holding her anxiety and depression recently except recent deterioration due to the dynamic difficulty and now because she is in the hospital and very panicky.    The patient has been demonstrating feelings of hopelessness, helplessness, worthlessness, low mood, low energy, decreased motivation with increased anxiety, worry, ruminations with impairment in sleep. The patient denies any suicidal ideations. The patient is agreeable to medications changes and to follow up with outpatient psychiatry providers.     Past Psychiatric/Medication History:  1. Prior diagnoses: Depression, anxiety  2. Past psychiatric inpatient: None  3. Past outpatient history: sees a psychiatrist  4. Past suicide history: None  5. Medication history: Abilify, Bupropion, Lexapro, Hydroxyzine    Social History:   Patient lives with  and adult son. She is a retired teacher.  Patient has been sober from alcohol for 9 years and attends . She uses marijuana gummies at night.    Family History:  None  Medical History:   Past Medical History  Past Medical History:    Anxiety state    Back problem    COMPRESSION FX    Cataract    Depression    Esophageal reflux    GERD (gastroesophageal reflux disease)    Heart valve disease    MVP--congenital    Hematoma and contusion of liver    STATES HAS BEEN THERE FOR MANY YEARS    History of fractured kneecap    RIGHT    IBS (irritable bowel syndrome)    Mitral prolapse    Nausea vomiting and diarrhea    Osteoarthritis    Visual impairment    glasses       Past Surgical History  Past Surgical History:   Procedure Laterality Date          Correct bunion,othr methods      Correct bunion,simple      BILAT.     Dilation/curettage,diagnostic      FOR \"MOLE PREGNANCY\"    Ect  provided Bilateral 09/16/2024    1st Tx. 9/16/24    Other Right     ORIF RIGHT ANKLE    Spine surgery procedure unlisted      cervical spine february 2020    Total knee replacement         Family History  Family History   Problem Relation Age of Onset    Heart Disorder Father     Cancer Mother         ESOPHAGUS       Social History  Social History     Socioeconomic History    Marital status:    Tobacco Use    Smoking status: Former     Current packs/day: 2.00     Average packs/day: 2.0 packs/day for 15.0 years (30.0 ttl pk-yrs)     Types: Cigarettes    Smokeless tobacco: Never    Tobacco comments:     QUIT IN 1983   Vaping Use    Vaping status: Never Used   Substance and Sexual Activity    Alcohol use: No    Drug use: No     Social Drivers of Health     Financial Resource Strain: Low Risk  (8/16/2024)    Received from [facility]    Overall Financial Resource Strain (CARDIA)     Difficulty of Paying Living Expenses: Not hard at all   Food Insecurity: No Food Insecurity (12/10/2024)    Food Insecurity     Food Insecurity: Never true   Transportation Needs: No Transportation Needs (12/10/2024)    Transportation Needs     Lack of Transportation: No    [facility]    Social Connections   Housing Stability: Low Risk  (12/10/2024)    Housing Stability     Housing Instability: No           Current Medications:  Current Facility-Administered Medications   Medication Dose Route Frequency    vancomycin (Vancocin) cap 125 mg  125 mg Oral Daily    pregabalin (Lyrica) cap 25 mg  25 mg Oral TID    ARIPiprazole (Abilify) tab 2 mg  2 mg Oral Daily    hydrOXYzine (Atarax) tab 25 mg  25 mg Oral Nightly    escitalopram (Lexapro) tab 10 mg  10 mg Oral Daily    buPROPion ER (Wellbutrin XL) 24 hr tab 300 mg  300 mg Oral Daily    hydrOXYzine (Atarax) tab 10 mg  10 mg Oral TID    heparin (Porcine) 5000 UNIT/ML injection 5,000 Units  5,000 Units  Subcutaneous 2 times per day    acetaminophen (Tylenol Extra Strength) tab 500 mg  500 mg Oral Q4H PRN    melatonin tab 3 mg  3 mg Oral Nightly PRN    ondansetron (Zofran) 4 MG/2ML injection 4 mg  4 mg Intravenous Q6H PRN    metoclopramide (Reglan) 5 mg/mL injection 5 mg  5 mg Intravenous Q8H PRN    ampicillin-sulbactam (Unasyn) 3 g in sodium chloride 0.9% 100mL IVPB-ADD  3 g Intravenous q6h    doxycycline (Vibramycin) cap 100 mg  100 mg Oral 2 times per day    benzonatate (Tessalon) cap 200 mg  200 mg Oral TID PRN    guaiFENesin (Robitussin) 100 MG/5 ML oral liquid 200 mg  200 mg Oral Q4H PRN    ipratropium-albuterol (Duoneb) 0.5-2.5 (3) MG/3ML inhalation solution 3 mL  3 mL Nebulization Q6H PRN    HYDROcodone-acetaminophen (Norco) 5-325 MG per tab 1 tablet  1 tablet Oral Q6H PRN     Medications Prior to Admission   Medication Sig    Cholecalciferol (VITAMIN D) 25 MCG (1000 UT) Oral Tab Take 1 tablet by mouth daily.    benzonatate 100 MG Oral Cap Take 1 capsule (100 mg total) by mouth 3 (three) times daily as needed for cough.    albuterol 108 (90 Base) MCG/ACT Inhalation Aero Soln Inhale 2 puffs into the lungs every 4 to 6 hours as needed for Shortness of Breath.    LORazepam 1 MG Oral Tab May take 1 tablet (1 mg total) by mouth daily as needed for Anxiety (or insomnia). May also take 1 tablet (1 mg total) nightly as needed for Anxiety (or insomnia).    escitalopram 10 MG Oral Tab Take 1 tablet (10 mg total) by mouth daily.    buPROPion  MG Oral Tablet 24 Hr Take 1 tablet (300 mg total) by mouth daily.    PREGABALIN 50 MG Oral Cap Take 1 capsule by mouth 3 times daily. Hold the afternoon and evening doses prior to ECT       Allergies  Allergies[1]    Review of Systems:   As by Admitting/Attending    Results:   Laboratory Data:  Lab Results   Component Value Date    WBC 8.0 12/12/2024    HGB 12.2 12/12/2024    HCT 36.4 12/12/2024    .0 12/12/2024    CREATSERUM 0.75 12/12/2024    BUN 10 12/12/2024    NA  145 12/12/2024    K 3.4 (L) 12/12/2024     (H) 12/12/2024    CO2 21.0 12/12/2024     (H) 12/12/2024    CA 8.6 (L) 12/12/2024    ALB 4.5 12/10/2024    ALKPHO 77 12/10/2024    TP 7.0 12/10/2024    AST 15 12/10/2024    ALT 11 12/10/2024    T4F 1.7 04/12/2024    TSH 0.528 (L) 04/12/2024     01/19/2022    DDIMER <0.27 01/12/2023    CRP 8.33 (H) 09/04/2020    MG 2.0 12/11/2024    TROP <0.045 08/26/2020    B12 484 04/12/2024    ETOH <3 04/10/2024         Imaging:  XR CHEST AP PORTABLE  (CPT=71045)    Result Date: 12/10/2024  CONCLUSION: Subtle peribronchial infiltrate at the right lung base    Dictated by (CST): Jairo Barker MD on 12/10/2024 at 12:07 PM     Finalized by (CST): Jairo Barker MD on 12/10/2024 at 12:07 PM           Vital Signs:   Blood pressure 106/82, pulse 81, temperature 98.1 °F (36.7 °C), temperature source Oral, resp. rate 22, height 62\", weight 53.4 kg (117 lb 12.8 oz), SpO2 97%.    Mental Status Exam:   Appearance: Stated age  female, in hospital gown, laying down in hospital bed with increased work of breathing with nasal oxygen.  Psychomotor: Noticeable restlessness.  Orientation: Alert and oriented to person, place, time and condition.  Gait: Not evaluated.  Attitude/Coorperation: Cooperative and attentive.  Behavior: Appropriate  Speech: Regular rate rhythm speech.  Mood: Patient reporting depressed and hopeless mood.  Affect: Anxious and dysphoric affect congruent with mood, full range panicky and tearful.  Thought process: Linear and appropriate.  Thought content: Patient denies any suicidal or homicidal ideation.  Perceptions: Patient denies any auditory or visual hallucinations.  Concentration: Grossly intact.  Memory: Impaired  Intellect: Average.  Judgment and Insight: Appropriate.  Patient acknowledging her depression and anxiety and agreeable on management.    Impression:   Major Depression Disorder, recurrent moderate  Generalized Anxiety Disorder with  panic attacks.  Alcohol dependence, remission.    Bronchopneumonia    Bronchitis    Nausea vomiting and diarrhea    Metabolic acidosis    Patient is a 74 year old female with past medical history of GERD, depression, anxiety who was admitted to the hospital for Bronchopneumonia: The patient has been demonstrating depression and anxiety    The patient has been demonstrating increase in anxiety and depression symptom due to family dynamic but intense exacerbation in her anxiety due to the fact that she is in the hospital and she is sick.  Patient with history of alcoholic remission for 27 years.    We agree on controlling the anxiety today by utilizing hydroxyzine.  Patient invited to be open to have alternative discussion tomorrow to adjust her medication.    12/12/2024: The patient has been demonstrating severe anxiety, depression and difficulty dealing with her emotion.  Patient has been indicating for appropriate medication management.    Discussed risk and benefit, acknowledging the current symptom and severity.  At this point, I would recommend the following approach:     Focus on safety  Focus on education and support.  Focus on insight orientation helping the patient understand diagnosis and treatment plan.  Continue Atarax 10 mg TID with additional 25 mg nightly.  Lower Lyrica to 25 mg twice daily.  Start Abilify 2 mg daily.  Continue Lexapro 10 mg daily.  Continue Wellbutrin  mg daily.  Processed with patient at length, the initiation of the above psychotropic medications I advised the patient of the risks, benefits, alternatives and potential side effects. The patient consents to administration of the medications and understands the right to refuse medications at any time. The patient verbalized understanding.   Coordinate plan with team    Orders This Visit:  Orders Placed This Encounter   Procedures    CBC With Differential With Platelet    Troponin I (High Sensitivity)    Comp Metabolic Panel (14)     Lactic Acid, Plasma    Venous Blood Gas    Procalcitonin    Basic Metabolic Panel (8)    Magnesium    Legionella urine Ag serogrp 1    Hemoglobin A1C    CBC With Differential With Platelet    Basic Metabolic Panel (8)    CBC With Differential With Platelet    Potassium    CBC With Differential With Platelet    Basic Metabolic Panel (8)    SARS-CoV-2/Flu A and B/RSV by PCR (GeneXpert)    Blood Culture    Sputum culture    GI Stool panel by PCR    Clostridium difficile(toxigenic)PCR    Clostridium difficile by EIA       Meds This Visit:  Requested Prescriptions      No prescriptions requested or ordered in this encounter       Jason Mckenzie MD  12/12/2024    Note to Patient: The 21st Century Cures Act makes medical notes like these available to patients in the interest of transparency. However, be advised this is a medical document. It is intended as peer to peer communication. It is written in medical language and may contain abbreviations or verbiage that are unfamiliar. It may appear blunt or direct. Medical documents are intended to carry relevant information, facts as evident, and the clinical opinion of the practitioner. This note may have been transcribed using a voice dictation system. Voice recognition errors may occur. This should not be taken to alter the content or meaning of this note.           [1]   Allergies  Allergen Reactions    Radiology Contrast Iodinated Dyes HIVES     HIVES AND THROAT TIGHTENED WHILE HAVING AN MRI    Sulfa Antibiotics HIVES     \"got sicker\"    Methylprednisolone OTHER (SEE COMMENTS)    Seroquel [Quetiapine] UNKNOWN    Cinnamon RASH    Iodine (Topical) RASH

## 2024-12-13 NOTE — DISCHARGE SUMMARY
[facility]  Discharge Summary     Meli Antonio  : 11/3/1950    Status: Observation  Day #: 0    Attending: Patel Dumont MD  PCP: SIRI TABOR     Date of Admission:  12/10/2024  Date of Discharge:  2024     Hospital Discharge Diagnoses:     Acute pneumonia, possible aspiration  Depression/anxiety  Diarrhea  SUPA  Metabolic acidosis  GERD  Mitral valve prolapse  OA  Depression      History of Present Illness:     Copied from Admission H&P:    Meli Antonio is a(n) 74 year old female with a  PMH of tobacco use (stopped in ), GERD and previous episode of bronchitis who presents with fevers/chills, body aches and cough.  She also reports n/v/diarrhea.  She denies any recent travel.  No sick contacts.  No recent abx use.  She denies abd pain.  She went to the   and had a cxr there that was negative.  Here in the ED she had a repeat cxr that showed RLL pneumonia.  She will be admitted.       Hospital Course:     Acute Bronchopneumonia, possible aspiration pneumonia  - cont IV abx with unasyn and oral doxy for atypical pneumonia - augmentin/doxy on discharge  - pulm on consult appreciated   - check urine legionella - negative     Hyperglycemia - prediabetes - A1c 6.3  -lifestyle modification     N/V/Diarrhea - pos c diff, eia neg. Hold tx.   - neg stool studies  - could possibly be a viral gastro  - BMs slowed down  - diet as tolerated  - OVP daily dosing while on abx     SUPA and metabolic acidosis  - likely due to n/v/diarrhea  - resolved. Stop IVF.    Severe anxiety/depression  -psychiatry consulted  -meds as below     Other PMH - currently stable     GERD  Mitral valve prolapse  Osteoarthritis       Consultants         Provider   Role Specialty     Jason Mckenzie MD      Consulting Physician Psychiatry     Dania Reynolds MD      Consulting Physician PULMONARY DISEASES             Physical Exam:   Blood pressure 145/84, pulse 82, temperature 99.5 °F (37.5 °C), temperature  source Oral, resp. rate 20, height 5' 2\" (1.575 m), weight 117 lb 12.8 oz (53.4 kg), SpO2 96%.  General:  Alert, no distress  HEENT:  Normocephalic, atraumatic  Cardiac:  Regular rate, regular rhythm  Pulmonary:  Clear to auscultation bilaterally, respirations unlabored  Gastrointestinal:  Soft, non-tender, normal bowel sounds  Musculoskeletal:  No joint swelling  Extremities:  No edema, no cyanosis, no clubbing  Neurologic:  nonfocal  Psychiatric:  Normal affect, calm and appropriate         Discharge Medications        START taking these medications        Instructions Prescription details   amoxicillin clavulanate 875-125 MG Tabs  Commonly known as: Augmentin      Take 1 tablet by mouth 2 (two) times daily for 5 days.   Stop taking on: December 18, 2024  Quantity: 10 tablet  Refills: 0     ARIPiprazole 2 MG Tabs  Commonly known as: Abilify  Start taking on: December 14, 2024      Take 1 tablet (2 mg total) by mouth daily.   Quantity: 30 tablet  Refills: 0     doxycycline 100 MG Caps  Commonly known as: Vibramycin      Take 1 capsule (100 mg total) by mouth every 12 (twelve) hours for 5 days.   Stop taking on: December 18, 2024  Quantity: 10 capsule  Refills: 0     hydrOXYzine 25 MG Tabs  Commonly known as: Atarax      Take 1 tablet (25 mg total) by mouth at bedtime.   Quantity: 30 tablet  Refills: 0     vancomycin 125 MG Caps  Commonly known as: Vancocin  Start taking on: December 14, 2024      Take 1 capsule (125 mg total) by mouth daily.   Quantity: 10 capsule  Refills: 0            CONTINUE taking these medications        Instructions Prescription details   albuterol 108 (90 Base) MCG/ACT Aers  Commonly known as: Ventolin HFA      Inhale 2 puffs into the lungs every 4 to 6 hours as needed for Shortness of Breath.   Refills: 0     benzonatate 100 MG Caps  Commonly known as: Tessalon      Take 1 capsule (100 mg total) by mouth 3 (three) times daily as needed for cough.   Refills: 0     buPROPion  MG  Tb24  Commonly known as: Wellbutrin XL      Take 1 tablet (300 mg total) by mouth daily.   Quantity: 30 tablet  Refills: 1     escitalopram 10 MG Tabs  Commonly known as: Lexapro      Take 1 tablet (10 mg total) by mouth daily.   Quantity: 30 tablet  Refills: 1     LORazepam 1 MG Tabs  Commonly known as: Ativan      May take 1 tablet (1 mg total) by mouth daily as needed for Anxiety (or insomnia). May also take 1 tablet (1 mg total) nightly as needed for Anxiety (or insomnia).   Quantity: 60 tablet  Refills: 0     pregabalin 50 MG Caps  Commonly known as: Lyrica      Take 1 capsule by mouth 3 times daily. Hold the afternoon and evening doses prior to ECT   Quantity: 90 capsule  Refills: 0     Vitamin D 25 MCG (1000 UT) Tabs      Take 1 tablet by mouth daily.   Refills: 0               Where to Get Your Medications        These medications were sent to [id number] - [place], [place] - [address] -[phone], [phone]  [facility], [place] [address]      Phone: [phone]   amoxicillin clavulanate 875-125 MG Tabs  ARIPiprazole 2 MG Tabs  doxycycline 100 MG Caps  hydrOXYzine 25 MG Tabs  vancomycin 125 MG Caps        Follow-up Information       Birdie Valverde MD Follow up.    Specialty: Internal Medicine  Contact information:  [id number] [address] [phone]                             Hospital Discharge Diagnoses:  pneumonia    Lace+ Score: 76  59-90 High Risk  29-58 Medium Risk  0-28   Low Risk.    TCM Follow-Up Recommendation:  LACE > 58: High Risk of readmission after discharge from the hospital.        I spent >30 minutes on this discharge. Discussed treatment and discharge plans.       Patel Dumont MD

## 2024-12-14 NOTE — PROGRESS NOTES
Patient is a 74 year old female with past medical history of GERD, depression, anxiety who was admitted to the hospital for Bronchopneumonia: The patient has been demonstrating depression and anxiety  Patient indicated for psych consult for evaluation and advise.    Consult Duration     The patient seen for 50 minutes, follow-up evaluation, over 50% counseling and coordinating care addressing increased anxiety, restlessness and severe depression.    Record reviewed, communication with attending, communication with RN and patient seen face to face evaluation.    History of Present Illness:   According to the team the patient has been doing fairly well and the patient has been calm with no anxiety, respiratory distress or shortness of breath.    The patient seen today in her room and the patient presented calm and cooperative smiling and pleasant.  \"What ever you gave me has been working\".    The patient reporting that she believes the medication has been working and that has been able to control her anxiety, subsided her thought process and able to sleep last night.    The patient has demonstrated improvement in her physical function with improvement in her sleep and breathing and was talking in clear sentences without exhausted breathing.    The patient denied any homicidal or suicidal ideation.  Patient denying any auditory or visual hallucination.  Patient will continue demonstrating anxiety and depression has been demonstrating significant improvement with the current regimen and his sleep on 25 mg of hydroxyzine.    Patient denied any side effect of the medication.    Patient has been struggling with severe anxiety, panic attack, hopelessness and helplessness and intense somatic manifestation resulted from her severe depression and fear.    Past Psychiatric/Medication History:  1. Prior diagnoses: Depression, anxiety  2. Past psychiatric inpatient: None  3. Past outpatient history: sees a psychiatrist  4. Past  suicide history: None  5. Medication history: Abilify, Bupropion, Lexapro, Hydroxyzine    Social History:   Patient lives with  and adult son. She is a retired teacher.  Patient has been sober from alcohol for 9 years and attends . She uses marijuana gummies at night.    Family History:  None  Medical History:   Past Medical History  Past Medical History:    Anxiety state    Back problem    COMPRESSION FX    Cataract    Depression    Esophageal reflux    GERD (gastroesophageal reflux disease)    Heart valve disease    MVP--congenital    Hematoma and contusion of liver    STATES HAS BEEN THERE FOR MANY YEARS    History of fractured kneecap    RIGHT    IBS (irritable bowel syndrome)    Mitral prolapse    Nausea vomiting and diarrhea    Osteoarthritis    Visual impairment    glasses       Past Surgical History  Past Surgical History:   Procedure Laterality Date          Correct bunion,othr methods      Correct bunion,simple      BILAT.     Dilation/curettage,diagnostic      FOR \"MOLE PREGNANCY\"    Ect provided Bilateral 2024    1st Tx. 24    Other Right     ORIF RIGHT ANKLE    Spine surgery procedure unlisted      cervical spine 2020    Total knee replacement         Family History  Family History   Problem Relation Age of Onset    Heart Disorder Father     Cancer Mother         ESOPHAGUS       Social History  Social History     Socioeconomic History    Marital status:    Tobacco Use    Smoking status: Former     Current packs/day: 2.00     Average packs/day: 2.0 packs/day for 15.0 years (30.0 ttl pk-yrs)     Types: Cigarettes    Smokeless tobacco: Never    Tobacco comments:     QUIT IN    Vaping Use    Vaping status: Never Used   Substance and Sexual Activity    Alcohol use: No    Drug use: No     Social Drivers of Health     Financial Resource Strain: Low Risk  (2024)    Received from [facility]    Overall Financial Resource Strain (CARDIA)      Difficulty of Paying Living Expenses: Not hard at all   Food Insecurity: No Food Insecurity (12/10/2024)    Food Insecurity     Food Insecurity: Never true   Transportation Needs: No Transportation Needs (12/10/2024)    Transportation Needs     Lack of Transportation: No    [facility]    Social Connections   Housing Stability: Low Risk  (12/10/2024)    Housing Stability     Housing Instability: No           Current Medications:  No current facility-administered medications for this encounter.     No medications prior to admission.       Allergies  Allergies[1]    Review of Systems:   As by Admitting/Attending    Results:   Laboratory Data:  Lab Results   Component Value Date    WBC 6.1 12/13/2024    HGB 11.6 (L) 12/13/2024    HCT 34.5 (L) 12/13/2024    .0 12/13/2024    CREATSERUM 0.72 12/13/2024    BUN 8 (L) 12/13/2024     12/13/2024    K 3.1 (L) 12/13/2024    K 3.1 (L) 12/13/2024     12/13/2024    CO2 24.0 12/13/2024     (H) 12/13/2024    CA 9.3 12/13/2024    ALB 4.5 12/10/2024    ALKPHO 77 12/10/2024    TP 7.0 12/10/2024    AST 15 12/10/2024    ALT 11 12/10/2024    T4F 1.7 04/12/2024    TSH 0.528 (L) 04/12/2024     01/19/2022    DDIMER <0.27 01/12/2023    CRP 8.33 (H) 09/04/2020    MG 2.0 12/11/2024    TROP <0.045 08/26/2020    B12 484 04/12/2024    ETOH <3 04/10/2024         Imaging:  No results found.    Vital Signs:   Blood pressure 145/84, pulse 82, temperature 99.5 °F (37.5 °C), temperature source Oral, resp. rate 20, height 62\", weight 53.4 kg (117 lb 12.8 oz), SpO2 96%.    Mental Status Exam:   Appearance: Stated age  female, in hospital gown, sitting in her bed without oxygen.  Psychomotor: No psychomotor agitation or retardation.  Orientation: Alert and oriented to person, place, time and condition.  Gait: Not evaluated.  Attitude/Coorperation: Cooperative and attentive.  Behavior: Appropriate.   Pleasant and cooperative.  Speech: Regular rate rhythm speech.  Mood: Patient reporting \"I am feeling much better\".  Affect: Patient presented with a smile with less dysphoric and less anxious affect.  Thought process: Linear and appropriate.  Thought content: Patient denies any suicidal or homicidal ideation.  Perceptions: Patient denies any auditory or visual hallucinations.  Concentration: Grossly intact.  Memory: Impaired  Intellect: Average.  Judgment and Insight: Appropriate.  Patient acknowledging her depression and anxiety and agreeable on management.    Impression:   Major Depression Disorder, recurrent moderate  Generalized Anxiety Disorder with panic attacks.  Alcohol dependence, remission.    Bronchopneumonia    Bronchitis    Nausea vomiting and diarrhea    Metabolic acidosis    Patient is a 74 year old female with past medical history of GERD, depression, anxiety who was admitted to the hospital for Bronchopneumonia: The patient has been demonstrating depression and anxiety    The patient has been demonstrating increase in anxiety and depression symptom due to family dynamic but intense exacerbation in her anxiety due to the fact that she is in the hospital and she is sick.  Patient with history of alcoholic remission for 27 years.    We agree on controlling the anxiety today by utilizing hydroxyzine.  Patient invited to be open to have alternative discussion tomorrow to adjust her medication.    12/12/2024: The patient has been demonstrating severe anxiety, depression and difficulty dealing with her emotion.  Patient has been indicating for appropriate medication management.    12/13/2024: The patient has been demonstrating significant improvement to the medication management and to adding of Abilify to the regimen.  Patient presented with a smile and agreeable that she need to continue to medications and follow-up as outpatient.    Discussed risk and benefit, acknowledging the current symptom and  severity.  At this point, I would recommend the following approach:     Focus on safety  Focus on education and support.  Focus on insight orientation helping the patient understand diagnosis and treatment plan.  Change hydroxyzine to 25 mg nightly only.  Continue Lyrica 25 mg twice daily.  Continue Abilify 2 mg daily.  Continue Lexapro 10 mg daily.  Continue Wellbutrin  mg daily.  Processed with patient at length, the initiation of the above psychotropic medications I advised the patient of the risks, benefits, alternatives and potential side effects. The patient consents to administration of the medications and understands the right to refuse medications at any time. The patient verbalized understanding.   Coordinate plan with team    Orders This Visit:  Orders Placed This Encounter   Procedures    CBC With Differential With Platelet    Troponin I (High Sensitivity)    Comp Metabolic Panel (14)    Lactic Acid, Plasma    Venous Blood Gas    Procalcitonin    Basic Metabolic Panel (8)    Magnesium    Legionella urine Ag serogrp 1    Hemoglobin A1C    CBC With Differential With Platelet    Basic Metabolic Panel (8)    CBC With Differential With Platelet    Potassium    CBC With Differential With Platelet    Basic Metabolic Panel (8)    SARS-CoV-2/Flu A and B/RSV by PCR (GeneXpert)    Blood Culture    Sputum culture    GI Stool panel by PCR    Clostridium difficile(toxigenic)PCR    Clostridium difficile by EIA       Meds This Visit:  Requested Prescriptions     Signed Prescriptions Disp Refills    ARIPiprazole 2 MG Oral Tab 30 tablet 0     Sig: Take 1 tablet (2 mg total) by mouth daily.    hydrOXYzine 25 MG Oral Tab 30 tablet 0     Sig: Take 1 tablet (25 mg total) by mouth at bedtime.    doxycycline 100 MG Oral Cap 10 capsule 0     Sig: Take 1 capsule (100 mg total) by mouth every 12 (twelve) hours for 5 days.    amoxicillin clavulanate 875-125 MG Oral Tab 10 tablet 0     Sig: Take 1 tablet by mouth 2 (two) times  daily for 5 days.    vancomycin 125 MG Oral Cap 10 capsule 0     Sig: Take 1 capsule (125 mg total) by mouth daily.       Jason Mckenzie MD  12/13/2024    Note to Patient: The 21st Century Cures Act makes medical notes like these available to patients in the interest of transparency. However, be advised this is a medical document. It is intended as peer to peer communication. It is written in medical language and may contain abbreviations or verbiage that are unfamiliar. It may appear blunt or direct. Medical documents are intended to carry relevant information, facts as evident, and the clinical opinion of the practitioner. This note may have been transcribed using a voice dictation system. Voice recognition errors may occur. This should not be taken to alter the content or meaning of this note.           [1]   Allergies  Allergen Reactions    Radiology Contrast Iodinated Dyes HIVES     HIVES AND THROAT TIGHTENED WHILE HAVING AN MRI    Sulfa Antibiotics HIVES     \"got sicker\"    Methylprednisolone OTHER (SEE COMMENTS)    Seroquel [Quetiapine] UNKNOWN    Cinnamon RASH    Iodine (Topical) RASH

## 2025-01-08 ENCOUNTER — APPOINTMENT (OUTPATIENT)
Dept: GENERAL RADIOLOGY | Age: 75
End: 2025-01-08
Attending: EMERGENCY MEDICINE
Payer: MEDICARE

## 2025-01-08 ENCOUNTER — HOSPITAL ENCOUNTER (OUTPATIENT)
Age: 75
Discharge: HOME | End: 2025-01-08
Attending: EMERGENCY MEDICINE
Payer: MEDICARE

## 2025-01-08 VITALS
TEMPERATURE: 98 F | RESPIRATION RATE: 14 BRPM | SYSTOLIC BLOOD PRESSURE: 121 MMHG | OXYGEN SATURATION: 96 % | HEART RATE: 100 BPM | DIASTOLIC BLOOD PRESSURE: 90 MMHG

## 2025-01-08 DIAGNOSIS — S42.211A UNSPECIFIED DISPLACED FRACTURE OF SURGICAL NECK OF RIGHT HUMERUS, INITIAL ENCOUNTER FOR CLOSED FRACTURE: Primary | ICD-10-CM

## 2025-01-08 PROCEDURE — 73030 X-RAY EXAM OF SHOULDER: CPT | Performed by: EMERGENCY MEDICINE

## 2025-01-08 PROCEDURE — 73060 X-RAY EXAM OF HUMERUS: CPT | Performed by: EMERGENCY MEDICINE

## 2025-01-08 PROCEDURE — 73110 X-RAY EXAM OF WRIST: CPT | Performed by: EMERGENCY MEDICINE

## 2025-01-08 PROCEDURE — 99214 OFFICE O/P EST MOD 30 MIN: CPT

## 2025-01-08 PROCEDURE — 73080 X-RAY EXAM OF ELBOW: CPT | Performed by: EMERGENCY MEDICINE

## 2025-01-08 RX ORDER — IBUPROFEN 400 MG/1
400 TABLET, FILM COATED ORAL ONCE
Status: COMPLETED | OUTPATIENT
Start: 2025-01-08 | End: 2025-01-08

## 2025-01-08 NOTE — ED PROVIDER NOTES
Patient Seen in: [facility]      History     Chief Complaint   Patient presents with    Arm Injury     Stated Complaint: arm pain,fall    Subjective:   HPI      Patient is a 74-year-old female with no significant past medical history presents now with injury to the right upper extremity.  The patient states she was at home earlier today when she stumbled and fell, injuring her right upper extremity.  The patient denies any head injury, chest/abdominal pain.  Patient states she has pain from her right shoulder to the tip of the right fingers.  The patient states pain worsens with any attempted range of motion of the shoulder.    Objective:     No pertinent past medical history.            No pertinent past surgical history.              No pertinent social history.            Review of Systems    Positive for stated complaint: arm pain,fall  Other systems are as noted in HPI.  Constitutional and vital signs reviewed.      All other systems reviewed and negative except as noted above.    Physical Exam     ED Triage Vitals [01/08/25 1436]   /90   Pulse 100   Resp 14   Temp 98.3 °F (36.8 °C)   Temp src Oral   SpO2 96 %   O2 Device None (Room air)       Current Vitals:   Vital Signs  BP: 121/90  Pulse: 100  Resp: 14  Temp: 98.3 °F (36.8 °C)  Temp src: Oral    Oxygen Therapy  SpO2: 96 %  O2 Device: None (Room air)        Physical Exam    Constitutional: Well-developed well-nourished in no acute distress  Head: Normocephalic, no swelling or tenderness  Eyes: Nonicteric sclera, no conjunctival injection  ENT: TMs are clear and flat bilaterally.  There is no posterior pharyngeal erythema  Chest: Clear to auscultation, no tenderness  Cardiovascular: Regular rate and rhythm without murmur  Abdomen: Soft, nontender and nondistended  Neurologic: Patient is awake, alert and oriented ×3.  The patient's motor strength is 5 out of 5 and symmetric in the upper and lower extremities bilaterally  Extremities: There  is tenderness to the right shoulder/proximal humerus.  There is mild tenderness of the distal humerus and right elbow.  There is no focal tenderness of the right forearm.  There is mild tenderness of the dorsal aspect of the right wrist  Skin: No pallor, no redness or warmth to the touch      ED Course   Labs Reviewed - No data to display  Patient's x-rays were independently reviewed by myself.  There is a minimally comminuted impacted fracture of the right humeral neck with extension into the greater tuberosity.  X-rays of the elbow and wrist are unremarkable.       Patient's x-ray results were discussed with the patient.  The patient prefers to follow-up with [facility] as her primary MD is at [employer].    Case was discussed with VIC Richardson on-call for [facility].  He recommends sling and follow-up with  Orthopedics, call for an appointment this plan was discussed with the patient and her family and they are agreeable     MDM      Right humeral/elbow/wrist contusion versus fracture        Medical Decision Making      Disposition and Plan     Clinical Impression:  1. Unspecified displaced fracture of surgical neck of right humerus, initial encounter for closed fracture         Disposition:  Discharge  1/8/2025  4:21 pm    Follow-up:  Ricky Steiner MD  [address] [facility]  [address] [place] [id number]  [phone]      Call tomorrow for an appointment          Medications Prescribed:  Current Discharge Medication List              Supplementary Documentation:

## 2025-01-08 NOTE — DISCHARGE INSTRUCTIONS
Going for comfort.  Tylenol for pain.  Call Dr. Steiner's office tomorrow to schedule an appointment

## 2025-01-08 NOTE — ED INITIAL ASSESSMENT (HPI)
Patient with fall today at 1230 while at home  She states she tripped on a rug and fell on her right arm on hardwood floor  No LOC, no head injury  No meds taken  Patient has a sling to arm   Pain to multiple areas of the arm

## 2025-01-09 ENCOUNTER — TELEPHONE (OUTPATIENT)
Dept: ORTHOPEDICS CLINIC | Facility: CLINIC | Age: 75
End: 2025-01-09

## 2025-01-09 NOTE — TELEPHONE ENCOUNTER
Spoke with patient and spouse. Gave number for [facility] as we had no consult openings. Per supervisor, called patient back and added into 15 minute spot at 11 am. Location and directions given.

## 2025-01-09 NOTE — TELEPHONE ENCOUNTER
Per patient's  calling requesting an appointment due to patient breaking humerus. Please call back when available.

## 2025-01-10 ENCOUNTER — OFFICE VISIT (OUTPATIENT)
Dept: ORTHOPEDICS CLINIC | Facility: CLINIC | Age: 75
End: 2025-01-10
Payer: MEDICARE

## 2025-01-10 DIAGNOSIS — S42.201A CLOSED FRACTURE OF PROXIMAL END OF RIGHT HUMERUS, UNSPECIFIED FRACTURE MORPHOLOGY, INITIAL ENCOUNTER: Primary | ICD-10-CM

## 2025-01-10 PROBLEM — J41.0 SMOKERS' COUGH (HCC): Chronic | Status: ACTIVE | Noted: 2025-01-10

## 2025-01-10 PROCEDURE — 1111F DSCHRG MED/CURRENT MED MERGE: CPT | Performed by: STUDENT IN AN ORGANIZED HEALTH CARE EDUCATION/TRAINING PROGRAM

## 2025-01-10 PROCEDURE — 1159F MED LIST DOCD IN RCRD: CPT | Performed by: STUDENT IN AN ORGANIZED HEALTH CARE EDUCATION/TRAINING PROGRAM

## 2025-01-10 PROCEDURE — 99204 OFFICE O/P NEW MOD 45 MIN: CPT | Performed by: STUDENT IN AN ORGANIZED HEALTH CARE EDUCATION/TRAINING PROGRAM

## 2025-01-10 PROCEDURE — 1125F AMNT PAIN NOTED PAIN PRSNT: CPT | Performed by: STUDENT IN AN ORGANIZED HEALTH CARE EDUCATION/TRAINING PROGRAM

## 2025-01-10 PROCEDURE — 1160F RVW MEDS BY RX/DR IN RCRD: CPT | Performed by: STUDENT IN AN ORGANIZED HEALTH CARE EDUCATION/TRAINING PROGRAM

## 2025-01-10 RX ORDER — DICLOFENAC SODIUM 75 MG/1
75 TABLET, DELAYED RELEASE ORAL EVERY 12 HOURS
COMMUNITY
Start: 2023-10-03

## 2025-01-10 RX ORDER — TRAMADOL HYDROCHLORIDE 50 MG/1
50 TABLET ORAL EVERY 6 HOURS PRN
Qty: 30 TABLET | Refills: 0 | Status: SHIPPED | OUTPATIENT
Start: 2025-01-10

## 2025-01-10 RX ORDER — NYSTATIN AND TRIAMCINOLONE ACETONIDE 100000; 1 [USP'U]/G; MG/G
OINTMENT TOPICAL 2 TIMES DAILY
COMMUNITY
Start: 2023-01-13

## 2025-01-10 RX ORDER — DICLOFENAC SODIUM 75 MG/1
1 TABLET, DELAYED RELEASE ORAL 2 TIMES DAILY
COMMUNITY
Start: 2024-08-16

## 2025-01-10 RX ORDER — ACETAMINOPHEN AND CODEINE PHOSPHATE 300; 30 MG/1; MG/1
1 TABLET ORAL EVERY 8 HOURS PRN
COMMUNITY
Start: 2023-05-28

## 2025-01-10 RX ORDER — MIRTAZAPINE 15 MG/1
7.5 TABLET, FILM COATED ORAL NIGHTLY
COMMUNITY
Start: 2023-09-13

## 2025-01-13 NOTE — PROGRESS NOTES
[place] - ORTHOPEDICS  [address] [facility]  [address]  [phone]     NURSING INTAKE COMMENTS:   Chief Complaint   Patient presents with    Shoulder Injury     Consult - Urgent Care Follow up. Right shoulder injury due to a fall at home yesterday. Patient rates her pain 6/10 at this time.        NEW PATIENT VISIT - HISTORY AND PHYSICAL EXAMINATION     Name: Meli Antonio   MRN: [id number]  Date: 1/10/2025     CC: Right shoulder pain    REFERRED BY: SIRI TABOR    HPI:   Meli Antonio is a very pleasant 74 year old right-hand dominant female who presents today for evaluation, consultation, and management of right shoulder pain. Patient sustained a mechanical fall one day prior to presentation. She reported to urgent care where radiographs were obtained demonstrating a right shoulder proximal humerus fracture. She describes significant pain and bruising. Denies any neurovascular deficits. She is alert, oriented, pleasant, cooperative and comfortable throughout exam and interview.     PMH:   Past Medical History:    Anxiety state    Back problem    COMPRESSION FX    Cataract    Depression    Esophageal reflux    GERD (gastroesophageal reflux disease)    Heart valve disease    MVP--congenital    Hematoma and contusion of liver    STATES HAS BEEN THERE FOR MANY YEARS    History of fractured kneecap    RIGHT    IBS (irritable bowel syndrome)    Mitral prolapse    Nausea vomiting and diarrhea    Osteoarthritis    Visual impairment    glasses       PAST SURGICAL HX:  Past Surgical History:   Procedure Laterality Date          Correct bunion,othr methods      Correct bunion,simple      BILAT.     Dilation/curettage,diagnostic      FOR \"MOLE PREGNANCY\"    Ect provided Bilateral 2024    1st Tx. 24    Other Right     ORIF RIGHT ANKLE    Spine surgery procedure unlisted      cervical spine 2020    Total knee replacement         FAMILY HX:  Family History   Problem Relation Age of Onset     Heart Disorder Father     Cancer Mother         ESOPHAGUS       ALLERGIES:  Radiology contrast iodinated dyes, Sulfa antibiotics, Methylprednisolone, Seroquel [quetiapine], Cinnamon, Cinnamon, and Iodine (topical)    MEDICATIONS:   Current Outpatient Medications   Medication Sig Dispense Refill    acetaminophen-codeine 300-30 MG Oral Tab Take 1 tablet by mouth every 8 (eight) hours as needed.      diclofenac 75 MG Oral Tab EC Take 1 tablet (75 mg total) by mouth Q12H.      diclofenac 75 MG Oral Tab EC Take 1 tablet (75 mg total) by mouth 2 (two) times daily.      mirtazapine 15 MG Oral Tab Take 0.5 tablets (7.5 mg total) by mouth nightly.      nystatin-triamcinolone 100,000-0.1 Units/g-% External Ointment Apply topically 2 (two) times daily.      traMADol 50 MG Oral Tab Take 1 tablet (50 mg total) by mouth every 6 (six) hours as needed for Pain. No alcohol or driving on this med. Stop if lethargic or hallucinating. 30 tablet 0    LORAZEPAM 1 MG Oral Tab May take 1 tablet by mouth daily as needed for Anxiety (or insomnia). May also take 1 tablet nightly as needed for Anxiety (or insomnia). 60 tablet 0    escitalopram 10 MG Oral Tab Take 1 tablet (10 mg total) by mouth daily. 30 tablet 1    buPROPion  MG Oral Tablet 24 Hr Take 1 tablet (300 mg total) by mouth daily. 30 tablet 1    pregabalin 50 MG Oral Cap Take 1 capsule (50 mg total) by mouth 3 (three) times daily. 90 capsule 0    ARIPiprazole 2 MG Oral Tab Take 1 tablet (2 mg total) by mouth daily. 30 tablet 0    hydrOXYzine 25 MG Oral Tab Take 1 tablet (25 mg total) by mouth at bedtime. 30 tablet 0    vancomycin 125 MG Oral Cap Take 1 capsule (125 mg total) by mouth daily. 10 capsule 0    Cholecalciferol (VITAMIN D) 25 MCG (1000 UT) Oral Tab Take 1 tablet by mouth daily.      benzonatate 100 MG Oral Cap Take 1 capsule (100 mg total) by mouth 3 (three) times daily as needed for cough.      albuterol 108 (90 Base) MCG/ACT Inhalation Aero Soln Inhale 2 puffs  into the lungs every 4 to 6 hours as needed for Shortness of Breath.         ROS: A comprehensive 14 point review of systems was performed and was negative aside from the aforementioned per history of present illness.    SOCIAL HX:  Social History     Occupational History    Not on file   Tobacco Use    Smoking status: Former     Current packs/day: 2.00     Average packs/day: 2.0 packs/day for 15.0 years (30.0 ttl pk-yrs)     Types: Cigarettes    Smokeless tobacco: Never    Tobacco comments:     QUIT IN 1983   Vaping Use    Vaping status: Never Used   Substance and Sexual Activity    Alcohol use: No    Drug use: No    Sexual activity: Not on file        PE:   There were no vitals filed for this visit.  Estimated body mass index is 21.55 kg/m² as calculated from the following:    Height as of 12/10/24: 5' 2\" (1.575 m).    Weight as of 12/10/24: 117 lb 12.8 oz (53.4 kg).    Physical Exam  Constitutional:       Appearance: Normal appearance.   HENT:      Head: Normocephalic and atraumatic.   Eyes:      Extraocular Movements: Extraocular movements intact.   Neck:      Musculoskeletal: Normal range of motion and neck supple.   Cardiovascular:      Pulses: Normal pulses.   Pulmonary:      Effort: Pulmonary effort is normal. No respiratory distress.   Abdominal:      General: There is no distension.   Skin:     General: Skin is warm.      Capillary Refill: Capillary refill takes less than 2 seconds.      Findings: No bruising.   Neurological:      General: No focal deficit present.      Mental Status: Alert.   Psychiatric:         Mood and Affect: Mood normal.     Examination of the right shoulder demonstrates:     Skin is intact, warm and dry.   Cervical:  Full ROM  Spurling's  Negative    Significant pain with any movement. Ecchymosis and erythema. Neuro intact distally    Neurovascular Upper Extremity (Bilateral)  Motor:    5/5 EPL, Finger Abduction, , Pinch, Deltoid  Sensation:   intact to light touch median,  ulnar, radial and axillary nerve  Circulation:   Normal, 2+ radial pulse    The contralateral upper extremity is without limitation in range of motion or strength, no positive provocative maneuvers.     Radiographic Examination/Diagnostics:    I personally viewed, independently interpreted and radiology report was reviewed.      XR SHOULDER, COMPLETE (MIN 2 VIEWS), RIGHT (CPT=73030)    Result Date: 1/8/2025  PROCEDURE: XR SHOULDER, COMPLETE (MIN 2 VIEWS), RIGHT (CPT=73030)  COMPARISON: None.  INDICATIONS: Pain of the right shoulder, right humerus, right elbow, and right wrist post fall today.  TECHNIQUE: 3 views were obtained.   FINDINGS:   There is a minimally comminuted impacted fracture of the right humeral neck with extension to the greater tuberosity.  There is a probable underlying joint effusion/hemarthrosis.  There is no dislocation.         CONCLUSION: Minimally comminuted impacted fracture of the right humeral neck with extension to the greater tuberosity    Dictated by (CST): Jairo Barker MD on 1/08/2025 at 3:44 PM     Finalized by (CST): Jairo Barker MD on 1/08/2025 at 3:48 PM          XR HUMERUS (MIN 2 VIEWS), RIGHT (CPT=73060)    Result Date: 1/8/2025  PROCEDURE: XR HUMERUS (MIN 2 VIEWS), RIGHT (CPT=73060)  COMPARISON: None.  INDICATIONS: Pain of the right shoulder, right humerus, right elbow, and right wrist post fall today.  TECHNIQUE: 3 views were obtained.   FINDINGS:    Bone mineralization is minimally diminished.  There is a slightly comminuted impacted acute appearing fracture of the right humeral neck with extension to the greater tuberosity.  There is no dislocation or other fracture.          CONCLUSION: Minimally comminuted impacted fracture of the right humeral neck with extension to the greater tuberosity    Dictated by (CST): Jairo Barker MD on 1/08/2025 at 3:41 PM     Finalized by (CST): Jairo Barker MD on 1/08/2025 at 3:43 PM          XR WRIST COMPLETE (MIN 3 VIEWS),  RIGHT (CPT=73110)    Result Date: 1/8/2025  PROCEDURE: XR WRIST COMPLETE (MIN 3 VIEWS), RIGHT (CPT=73110)  COMPARISON: None.  INDICATIONS: Pain of the right shoulder, right humerus, right elbow, and right wrist post fall today.  TECHNIQUE: 3 views were obtained.   FINDINGS:   Bone mineralization is slightly diminished.  There is no acute fracture/dislocation clearly demonstrated.  There are moderate to severe degenerative changes within the right wrist manifested by bony hypertrophy, articular space narrowing, and subarticular sclerosis.         CONCLUSION: No no acute fracture/dislocation.  Moderate to severe degenerative change    Dictated by (CST): Jairo Barker MD on 1/08/2025 at 3:38 PM     Finalized by (CST): Jairo Barker MD on 1/08/2025 at 3:41 PM          XR ELBOW, COMPLETE (MIN 3 VIEWS), RIGHT (CPT=73080)    Result Date: 1/8/2025  PROCEDURE: XR ELBOW, COMPLETE (MIN 3 VIEWS), RIGHT (CPT=73080)  COMPARISON: None.  INDICATIONS: Pain of the right shoulder, right humerus, right elbow, and right wrist post fall today.  TECHNIQUE: 3 views were obtained.   FINDINGS:   Bone mineralization is normal.  There is no acute fracture/dislocation.  There are slight to moderate degenerative changes within the right elbow manifested by bony hypertrophy, articular space narrowing, and subarticular sclerosis.         CONCLUSION: No acute fracture/dislocation.  Slight to moderate degenerative changes.    Dictated by (CST): Jairo Barker MD on 1/08/2025 at 3:36 PM     Finalized by (CST): Jairo Barker MD on 1/08/2025 at 3:38 PM            IMPRESSION: Meli Antonio is a 74 year old Right hand dominant female with right shoulder pain after a mechanical fall with a proximal humerus fracture.    PLAN:   We had a detailed discussion outlining the etiology, anatomy, pathophysiology, and natural history of the patient's findings. Imaging was reviewed in detail and correlated to a 3-dimensional model of the patient's  pathology.     We reviewed the treatment of this disease condition.  Fortunately, treatment is amenable to conservative treatment which we chose to optimize at today's visit.  She will undergo serial radiographs and we will monitor the status of her fracture. She will follow up in 1-2 weeks for repeat radiographs.    FOLLOW-UP:   Return to clinic in 1-2 weeks with new radiographs          Maciel Guardado MD Orthopedic Surgery / Sports Medicine Specialist  [facility]  [address] [place] [address]  [email or web address]    t: [phone] f: [phone]    This note was dictated using Dragon software.  While it was briefly proofread prior to completion, some grammatical, spelling, and word choice errors due to dictation may still occur.

## 2025-01-20 ENCOUNTER — TELEPHONE (OUTPATIENT)
Dept: ORTHOPEDICS CLINIC | Facility: CLINIC | Age: 75
End: 2025-01-20

## 2025-01-20 ENCOUNTER — TELEPHONE (OUTPATIENT)
Facility: CLINIC | Age: 75
End: 2025-01-20

## 2025-01-20 DIAGNOSIS — S42.201A CLOSED FRACTURE OF PROXIMAL END OF RIGHT HUMERUS, UNSPECIFIED FRACTURE MORPHOLOGY, INITIAL ENCOUNTER: Primary | ICD-10-CM

## 2025-01-20 NOTE — TELEPHONE ENCOUNTER
Patient calling to ask for a stronger pain medication. Patient states she is out of the Tramadol and that it didn't help anyway.   Please advise.

## 2025-01-20 NOTE — TELEPHONE ENCOUNTER
An X-ray has been ordered and scheduled in accordance with the provider's treatment plan for the patient's upcoming appointment.

## 2025-01-20 NOTE — TELEPHONE ENCOUNTER
Called patient number on file. It is spouses cell phone. Left messsage to call back. Did find a home number under spouse contact info and did reach patient. She states she is in a lot of pain. She had been prescribed tramadol by her primary doctor. She is now out of that, and does not want to get more because it did not help with the pain. She is using heat and taking tylenol with no real relief. I did advise to add in ice application to reduce inflammation. She is wanting another medication to help relieve pain. States she has no contraindication to NSAIDs. She did not specifically ask for a narcotic, but the message indicates something stronger than Tramadol.

## 2025-01-22 ENCOUNTER — HOSPITAL ENCOUNTER (EMERGENCY)
Facility: HOSPITAL | Age: 75
Discharge: HOME | End: 2025-01-22
Attending: EMERGENCY MEDICINE
Payer: MEDICARE

## 2025-01-22 ENCOUNTER — APPOINTMENT (OUTPATIENT)
Dept: GENERAL RADIOLOGY | Facility: HOSPITAL | Age: 75
End: 2025-01-22
Attending: EMERGENCY MEDICINE
Payer: MEDICARE

## 2025-01-22 VITALS
DIASTOLIC BLOOD PRESSURE: 93 MMHG | TEMPERATURE: 98 F | SYSTOLIC BLOOD PRESSURE: 142 MMHG | OXYGEN SATURATION: 95 % | RESPIRATION RATE: 18 BRPM | HEART RATE: 77 BPM

## 2025-01-22 DIAGNOSIS — S42.211D CLOSED FRACTURE OF NECK OF RIGHT HUMERUS WITH ROUTINE HEALING, SUBSEQUENT ENCOUNTER: Primary | ICD-10-CM

## 2025-01-22 PROCEDURE — 73030 X-RAY EXAM OF SHOULDER: CPT | Performed by: EMERGENCY MEDICINE

## 2025-01-22 PROCEDURE — 99283 EMERGENCY DEPT VISIT LOW MDM: CPT

## 2025-01-22 PROCEDURE — 99284 EMERGENCY DEPT VISIT MOD MDM: CPT

## 2025-01-22 PROCEDURE — 96372 THER/PROPH/DIAG INJ SC/IM: CPT

## 2025-01-22 RX ORDER — HYDROCODONE BITARTRATE AND ACETAMINOPHEN 5; 325 MG/1; MG/1
1 TABLET ORAL EVERY 6 HOURS PRN
Qty: 5 TABLET | Refills: 0 | Status: SHIPPED | OUTPATIENT
Start: 2025-01-22 | End: 2025-01-29

## 2025-01-22 RX ORDER — MORPHINE SULFATE 4 MG/ML
4 INJECTION, SOLUTION INTRAMUSCULAR; INTRAVENOUS ONCE
Status: COMPLETED | OUTPATIENT
Start: 2025-01-22 | End: 2025-01-22

## 2025-01-22 NOTE — ED QUICK NOTES
Rounding Completed    Plan of Care reviewed. Waiting for XR results.  Elimination needs assessed.  Provided repositioning.    Bed is locked and in lowest position. Call light within reach.

## 2025-01-22 NOTE — ED QUICK NOTES
Per ED provider patient ok to discharge. Discharge instructions/medications reviewed with patient and  at bedside. Patient and  verbalizes understanding. Patient in NAD, ABCs intact. Patient stable and ambulatory at discharge. Patient left department with all belongings with .

## 2025-01-22 NOTE — ED PROVIDER NOTES
Patient Seen in: [facility] Emergency Department    History     Chief Complaint   Patient presents with    Pain       HPI    History is provided by patient/independent historian: Patient   74 year old female with history of recent right-sided humeral head fracture, seen in immediate care, put in a sling, here with complaints of ongoing pain.  She is due to see the Ortho tomorrow, but she could not sleep for the last 2 days.  She ran out of her tramadol that her primary care doctor prescribed her.  She reports there is shooting pain down to her fingertips from the shoulder.  No numbness.  She has been taking Tylenol without improvement of her symptoms.  No new injury.    History reviewed.   Past Medical History:    Anxiety state    Back problem    COMPRESSION FX    Cataract    Depression    Esophageal reflux    GERD (gastroesophageal reflux disease)    Heart valve disease    MVP--congenital    Hematoma and contusion of liver    STATES HAS BEEN THERE FOR MANY YEARS    History of fractured kneecap    RIGHT    IBS (irritable bowel syndrome)    Mitral prolapse    Nausea vomiting and diarrhea    Osteoarthritis    Visual impairment    glasses         History reviewed.   Past Surgical History:   Procedure Laterality Date          Correct bunion,othr methods      Correct bunion,simple      BILAT.     Dilation/curettage,diagnostic      FOR \"MOLE PREGNANCY\"    Ect provided Bilateral 2024    1st Tx. 24    Other Right     ORIF RIGHT ANKLE    Spine surgery procedure unlisted      cervical spine 2020    Total knee replacement           Home Medications reviewed :  Prescriptions Prior to Admission[1]      History reviewed.   Social History     Socioeconomic History    Marital status:    Tobacco Use    Smoking status: Former     Current packs/day: 2.00     Average packs/day: 2.0 packs/day for 15.0 years (30.0 ttl pk-yrs)     Types: Cigarettes    Smokeless tobacco: Never    Tobacco  comments:     QUIT IN 1983   Vaping Use    Vaping status: Never Used   Substance and Sexual Activity    Alcohol use: No    Drug use: No         ROS  Review of Systems   Respiratory:  Negative for shortness of breath.    Cardiovascular:  Negative for chest pain.   Musculoskeletal:  Positive for arthralgias.   All other systems reviewed and are negative.     All other pertinent organ systems are reviewed and are negative.      Physical Exam     ED Triage Vitals [01/22/25 0907]   /78   Pulse 80   Resp 18   Temp 97.9 °F (36.6 °C)   Temp src Temporal   SpO2 100 %   O2 Device None (Room air)     Vital signs reviewed.      Physical Exam  Vitals and nursing note reviewed.   Cardiovascular:      Pulses: Normal pulses.   Pulmonary:      Effort: No respiratory distress.   Abdominal:      General: There is no distension.   Musculoskeletal:      Comments: R biceps area with ecchymosis, nontender, R shoulder mildly tender to palpation, 2+ radial pulse, strong hand grasp, R elbow/wrist unremarkable   Neurological:      Mental Status: She is alert.         ED Course       Labs:   Labs Reviewed - No data to display      My EKG Interpretation:   As reviewed and Interpreted by me      Imaging Results Available and Reviewed while in ED:   XR SHOULDER, COMPLETE (MIN 2 VIEWS), RIGHT (CPT=73030)    Result Date: 1/22/2025  CONCLUSION:   Mildly impacted and displaced humeral neck fracture is again seen.  The configuration of the fracture is similar to the prior exam.  No new fracture.  No acute dislocation.  Soft tissues and lungs are unremarkable.      Dictated by (CST): Brian Novoa MD on 1/22/2025 at 12:27 PM     Finalized by (CST): Brian Novoa MD on 1/22/2025 at 12:29 PM         My review and independent interpretation of XR images: humeral neck fracture. Radiology report corroborates this in addition to other details as reported by them.      Decision rules/scores evaluated: none      Diagnostic labs/tests considered but not  ordered: CBC, BMP, INR, shoulder XR    ED Medications Administered:   Medications   morphINE PF 4 MG/ML injection 4 mg (4 mg Intramuscular Given 1/22/25 1102)                MDM       Medical Decision Making      Differential Diagnosis: After obtaining the patient's history, performing the physical exam and reviewing the diagnostics, multiple initial diagnoses were considered based on the presenting problem including fracture, contusion, compartment syndrome    External document review: I personally reviewed available external medical records for any recent pertinent discharge summaries, testing, and procedures - the findings are as follows: 1/8/25 visit with Dr. Barbosa for arm pain    Complicating Factors: The patient already  has a past medical history of Anxiety state, Back problem, Cataract, Depression, Esophageal reflux, GERD (gastroesophageal reflux disease), Heart valve disease, Hematoma and contusion of liver, History of fractured kneecap (09/2016), IBS (irritable bowel syndrome), Mitral prolapse, Nausea vomiting and diarrhea (12/10/2024), Osteoarthritis, and Visual impairment. to contribute to the complexity of this ED evaluation.    Procedures performed:   - sling applied again    Discussed management with physician/appropriate source: none    Considered admission/deescalation of care for: none    Social determinants of health affecting patient care: none    Prescription medications considered: norco, discussed continuing current medication regimen    The patient requires continuous monitoring for: arm pain    Shared decision making: discussed possible admission      Disposition and Plan     Clinical Impression:  1. Closed fracture of neck of right humerus with routine healing, subsequent encounter        Disposition:  Discharge    Follow-up:  Maciel Guardado MD  [address] [place] [address] [phone]    Follow up        Medications Prescribed:  Current Discharge Medication  List        START taking these medications    Details   HYDROcodone-acetaminophen 5-325 MG Oral Tab Take 1 tablet by mouth every 6 (six) hours as needed.  Qty: 5 tablet, Refills: 0                            [1] (Not in a hospital admission)

## 2025-01-22 NOTE — ED INITIAL ASSESSMENT (HPI)
Patient to ed via private vehicle co of right arm pain s/p arm fracture x few weeks, took tramadol and tylenol without relief.

## 2025-01-23 ENCOUNTER — OFFICE VISIT (OUTPATIENT)
Dept: ORTHOPEDICS CLINIC | Facility: CLINIC | Age: 75
End: 2025-01-23
Payer: MEDICARE

## 2025-01-23 ENCOUNTER — TELEPHONE (OUTPATIENT)
Dept: ORTHOPEDICS CLINIC | Facility: CLINIC | Age: 75
End: 2025-01-23

## 2025-01-23 DIAGNOSIS — S42.201D CLOSED FRACTURE OF PROXIMAL END OF RIGHT HUMERUS WITH ROUTINE HEALING, UNSPECIFIED FRACTURE MORPHOLOGY, SUBSEQUENT ENCOUNTER: Primary | ICD-10-CM

## 2025-01-23 PROCEDURE — 1160F RVW MEDS BY RX/DR IN RCRD: CPT | Performed by: STUDENT IN AN ORGANIZED HEALTH CARE EDUCATION/TRAINING PROGRAM

## 2025-01-23 PROCEDURE — 1159F MED LIST DOCD IN RCRD: CPT | Performed by: STUDENT IN AN ORGANIZED HEALTH CARE EDUCATION/TRAINING PROGRAM

## 2025-01-23 PROCEDURE — 1125F AMNT PAIN NOTED PAIN PRSNT: CPT | Performed by: STUDENT IN AN ORGANIZED HEALTH CARE EDUCATION/TRAINING PROGRAM

## 2025-01-23 PROCEDURE — 99213 OFFICE O/P EST LOW 20 MIN: CPT | Performed by: STUDENT IN AN ORGANIZED HEALTH CARE EDUCATION/TRAINING PROGRAM

## 2025-01-23 RX ORDER — HYDROCODONE BITARTRATE AND ACETAMINOPHEN 5; 325 MG/1; MG/1
1 TABLET ORAL EVERY 6 HOURS PRN
Qty: 20 TABLET | Refills: 0 | Status: SHIPPED | OUTPATIENT
Start: 2025-01-23 | End: 2025-01-27

## 2025-01-23 RX ORDER — TRAMADOL HYDROCHLORIDE 50 MG/1
50 TABLET ORAL EVERY 6 HOURS PRN
Qty: 25 TABLET | Refills: 0 | Status: SHIPPED | OUTPATIENT
Start: 2025-01-23 | End: 2025-01-27

## 2025-01-23 NOTE — PROGRESS NOTES
Name: Meli Antonio   MRN: [id number]  Date: 1/23/2025     REASON FOR VISIT: Follow up for right shoulder pain.     INTERVAL HISTORY:   Meli Antonio is a very pleasant 74 year old female who returns today for continued evaluation, consultation, and management of right proximal humerus fracture.  Patient was seen in [facility] 1/12/2025.  Since that time the patient's been in sling.  We initially saw the patient 1/13/2025.  The patient continues to be in pain.  The patient did report the emergency department roughly 1 day prior to presentation due to severe pain.  She was given Norco which she reports that was relieve her pain.  Denies any subsequent traumas.  Presents with  who assist during exam and interview        ROS: ROS    PE:   There were no vitals filed for this visit.  Estimated body mass index is 21.55 kg/m² as calculated from the following:    Height as of 12/10/24: 5' 2\" (1.575 m).    Weight as of 12/10/24: 117 lb 12.8 oz (53.4 kg).    Physical Exam  Constitutional:       Appearance: Normal appearance.   HENT:      Head: Normocephalic and atraumatic.   Eyes:      Extraocular Movements: Extraocular movements intact.   Neck:      Musculoskeletal: Normal range of motion and neck supple.   Cardiovascular:      Pulses: Normal pulses.   Pulmonary:      Effort: Pulmonary effort is normal. No respiratory distress.   Abdominal:      General: There is no distension.   Skin:     General: Skin is warm.      Capillary Refill: Capillary refill takes less than 2 seconds.      Findings: No bruising.   Neurological:      General: No focal deficit present.      Mental Status: She is alert.   Psychiatric:         Mood and Affect: Mood normal.     Examination of the right shoulder demonstrates:     Persistent ecchymosis.  Exquisitely tender to palpation on the greater tuberosity.  Neurovascular she is intact distally    No obvious peripheral edema noted.   Distal neurovascular exam demonstrates normal  perfusion, intact sensation to light touch and full strength.     Examination of the contralateral knee demonstrates:  No significant atrophy, swelling or effusion. Full range of motion. Neurovascularly intact distally.      Radiographic Examination/Diagnostics:  I personally viewed, independently interpreted and radiology report was reviewed.    XR SHOULDER, COMPLETE (MIN 2 VIEWS), RIGHT (CPT=73030)    Result Date: 1/22/2025  PROCEDURE: XR SHOULDER, COMPLETE (MIN 2 VIEWS), RIGHT (CPT=73030)  COMPARISON: [facility], XR SHOULDER, COMPLETE (MIN 2 VIEWS), RIGHT (CPT=73030), 1/08/2025, 2:57 PM.  INDICATIONS: Generalized right shoulder pain post fall x3 weeks ago.  TECHNIQUE: 3 views were obtained.    FINDINGS: Combined with conclusion.         CONCLUSION:   Mildly impacted and displaced humeral neck fracture is again seen.  The configuration of the fracture is similar to the prior exam.  No new fracture.  No acute dislocation.  Soft tissues and lungs are unremarkable.      Dictated by (CST): Brian Novoa MD on 1/22/2025 at 12:27 PM     Finalized by (CST): Brian Novoa MD on 1/22/2025 at 12:29 PM          XR SHOULDER, COMPLETE (MIN 2 VIEWS), RIGHT (CPT=73030)    Result Date: 1/8/2025  PROCEDURE: XR SHOULDER, COMPLETE (MIN 2 VIEWS), RIGHT (CPT=73030)  COMPARISON: None.  INDICATIONS: Pain of the right shoulder, right humerus, right elbow, and right wrist post fall today.  TECHNIQUE: 3 views were obtained.   FINDINGS:   There is a minimally comminuted impacted fracture of the right humeral neck with extension to the greater tuberosity.  There is a probable underlying joint effusion/hemarthrosis.  There is no dislocation.         CONCLUSION: Minimally comminuted impacted fracture of the right humeral neck with extension to the greater tuberosity    Dictated by (CST): Jairo Barker MD on 1/08/2025 at 3:44 PM     Finalized by (CST): Jairo Barker MD on 1/08/2025 at 3:48 PM          XR  HUMERUS (MIN 2 VIEWS), RIGHT (CPT=73060)    Result Date: 1/8/2025  PROCEDURE: XR HUMERUS (MIN 2 VIEWS), RIGHT (CPT=73060)  COMPARISON: None.  INDICATIONS: Pain of the right shoulder, right humerus, right elbow, and right wrist post fall today.  TECHNIQUE: 3 views were obtained.   FINDINGS:    Bone mineralization is minimally diminished.  There is a slightly comminuted impacted acute appearing fracture of the right humeral neck with extension to the greater tuberosity.  There is no dislocation or other fracture.          CONCLUSION: Minimally comminuted impacted fracture of the right humeral neck with extension to the greater tuberosity    Dictated by (CST): Jairo Barker MD on 1/08/2025 at 3:41 PM     Finalized by (CST): Jairo Barker MD on 1/08/2025 at 3:43 PM          XR WRIST COMPLETE (MIN 3 VIEWS), RIGHT (CPT=73110)    Result Date: 1/8/2025  PROCEDURE: XR WRIST COMPLETE (MIN 3 VIEWS), RIGHT (CPT=73110)  COMPARISON: None.  INDICATIONS: Pain of the right shoulder, right humerus, right elbow, and right wrist post fall today.  TECHNIQUE: 3 views were obtained.   FINDINGS:   Bone mineralization is slightly diminished.  There is no acute fracture/dislocation clearly demonstrated.  There are moderate to severe degenerative changes within the right wrist manifested by bony hypertrophy, articular space narrowing, and subarticular sclerosis.         CONCLUSION: No no acute fracture/dislocation.  Moderate to severe degenerative change    Dictated by (CST): Jairo Barker MD on 1/08/2025 at 3:38 PM     Finalized by (CST): Jairo Barker MD on 1/08/2025 at 3:41 PM          XR ELBOW, COMPLETE (MIN 3 VIEWS), RIGHT (CPT=73080)    Result Date: 1/8/2025  PROCEDURE: XR ELBOW, COMPLETE (MIN 3 VIEWS), RIGHT (CPT=73080)  COMPARISON: None.  INDICATIONS: Pain of the right shoulder, right humerus, right elbow, and right wrist post fall today.  TECHNIQUE: 3 views were obtained.   FINDINGS:   Bone mineralization is normal.   There is no acute fracture/dislocation.  There are slight to moderate degenerative changes within the right elbow manifested by bony hypertrophy, articular space narrowing, and subarticular sclerosis.         CONCLUSION: No acute fracture/dislocation.  Slight to moderate degenerative changes.    Dictated by (CST): Jairo Barker MD on 1/08/2025 at 3:36 PM     Finalized by (CST): Jairo Barker MD on 1/08/2025 at 3:38 PM            IMPRESSION: Meli Antonio is a 74 year old female who presents with findings consistent with healing right proximal humerus fracture.     PLAN:   We had a detailed discussion outlining the etiology, anatomy, pathophysiology, and natural history of the patient's findings consistent with close right proximal humerus fracture.      The patient does have persistent pain.  She also does have some small changes in her radiographs.  For that reason would like to see her next week.  We also prescribed to strong pain medications.  She will follow-up in 1 week for repeat radiographs and clinical evaluation    FOLLOW-UP:   Return to clinic 1 week repeat x-rays and clinical evaluation

## 2025-01-26 ENCOUNTER — TELEPHONE (OUTPATIENT)
Dept: ORTHOPEDICS CLINIC | Facility: CLINIC | Age: 75
End: 2025-01-26

## 2025-01-26 DIAGNOSIS — S42.201D CLOSED FRACTURE OF PROXIMAL END OF RIGHT HUMERUS WITH ROUTINE HEALING, UNSPECIFIED FRACTURE MORPHOLOGY, SUBSEQUENT ENCOUNTER: ICD-10-CM

## 2025-01-27 RX ORDER — TRAMADOL HYDROCHLORIDE 50 MG/1
50 TABLET ORAL EVERY 6 HOURS PRN
Qty: 25 TABLET | Refills: 0 | Status: SHIPPED | OUTPATIENT
Start: 2025-01-27

## 2025-01-27 RX ORDER — HYDROCODONE BITARTRATE AND ACETAMINOPHEN 5; 325 MG/1; MG/1
1 TABLET ORAL EVERY 6 HOURS PRN
Qty: 20 TABLET | Refills: 0 | Status: SHIPPED | OUTPATIENT
Start: 2025-01-27

## 2025-01-27 NOTE — TELEPHONE ENCOUNTER
DOS: N/A  Last OV: 1/23/25    Tramadol 50 mg  Last refill date: 1/23/25     #/refills: 25/0    Hydrocodone-acetaminophen 5-325 mg  Last refill date: 1/23/25     #/refills: 20/0    Upcoming appt:   Future Appointments   Date Time Provider Department Center   1/30/2025 12:45 PM [facility] XR IC [facility] [place]   1/30/2025  1:15 PM Maciel Guardado MD [facility] [place]

## 2025-01-27 NOTE — TELEPHONE ENCOUNTER
Called patient and informed her that rx's have been sent to her pharmacy. She had no further concerns.

## 2025-01-29 ENCOUNTER — HOSPITAL ENCOUNTER (EMERGENCY)
Facility: HOSPITAL | Age: 75
Discharge: NURSING, REHAB OR LONG-TERM CARE | End: 2025-01-29
Attending: EMERGENCY MEDICINE
Payer: MEDICARE

## 2025-01-29 VITALS
OXYGEN SATURATION: 95 % | HEIGHT: 60 IN | HEART RATE: 77 BPM | SYSTOLIC BLOOD PRESSURE: 119 MMHG | DIASTOLIC BLOOD PRESSURE: 65 MMHG | RESPIRATION RATE: 16 BRPM | WEIGHT: 122 LBS | BODY MASS INDEX: 23.95 KG/M2 | TEMPERATURE: 98 F

## 2025-01-29 DIAGNOSIS — R45.851 SUICIDAL IDEATION: ICD-10-CM

## 2025-01-29 DIAGNOSIS — F32.A DEPRESSION, UNSPECIFIED DEPRESSION TYPE: Primary | ICD-10-CM

## 2025-01-29 LAB
ALBUMIN SERPL-MCNC: 4.4 G/DL (ref 3.2–4.8)
ALBUMIN/GLOB SERPL: 2 {RATIO} (ref 1–2)
ALP LIVER SERPL-CCNC: 115 U/L
ALT SERPL-CCNC: 12 U/L
AMPHET UR QL SCN: NEGATIVE
ANION GAP SERPL CALC-SCNC: 12 MMOL/L (ref 0–18)
APAP SERPL-MCNC: 2.3 UG/ML (ref 10–20)
AST SERPL-CCNC: 15 U/L (ref ?–34)
ATRIAL RATE: 74 BPM
BASOPHILS # BLD AUTO: 0.03 X10(3) UL (ref 0–0.2)
BASOPHILS NFR BLD AUTO: 0.5 %
BENZODIAZ UR QL SCN: NEGATIVE
BILIRUB SERPL-MCNC: 0.4 MG/DL (ref 0.2–1.1)
BILIRUB UR QL STRIP.AUTO: NEGATIVE
BUN BLD-MCNC: 10 MG/DL (ref 9–23)
CALCIUM BLD-MCNC: 9.5 MG/DL (ref 8.7–10.6)
CANNABINOIDS UR QL SCN: NEGATIVE
CHLORIDE SERPL-SCNC: 104 MMOL/L (ref 98–112)
CLARITY UR REFRACT.AUTO: CLEAR
CO2 SERPL-SCNC: 22 MMOL/L (ref 21–32)
COCAINE UR QL: NEGATIVE
CREAT BLD-MCNC: 0.81 MG/DL
CREAT UR-SCNC: 93.5 MG/DL
EGFRCR SERPLBLD CKD-EPI 2021: 76 ML/MIN/1.73M2 (ref 60–?)
EOSINOPHIL # BLD AUTO: 0.02 X10(3) UL (ref 0–0.7)
EOSINOPHIL NFR BLD AUTO: 0.3 %
ERYTHROCYTE [DISTWIDTH] IN BLOOD BY AUTOMATED COUNT: 12.8 %
ETHANOL SERPL-MCNC: <3 MG/DL (ref ?–3)
FENTANYL UR QL SCN: NEGATIVE
GLOBULIN PLAS-MCNC: 2.2 G/DL (ref 2–3.5)
GLUCOSE BLD-MCNC: 137 MG/DL (ref 70–99)
GLUCOSE UR STRIP.AUTO-MCNC: NORMAL MG/DL
HCT VFR BLD AUTO: 37.4 %
HGB BLD-MCNC: 12.9 G/DL
IMM GRANULOCYTES # BLD AUTO: 0.03 X10(3) UL (ref 0–1)
IMM GRANULOCYTES NFR BLD: 0.5 %
KETONES UR STRIP.AUTO-MCNC: 10 MG/DL
LEUKOCYTE ESTERASE UR QL STRIP.AUTO: NEGATIVE
LYMPHOCYTES # BLD AUTO: 2.02 X10(3) UL (ref 1–4)
LYMPHOCYTES NFR BLD AUTO: 30.8 %
MCH RBC QN AUTO: 31 PG (ref 26–34)
MCHC RBC AUTO-ENTMCNC: 34.5 G/DL (ref 31–37)
MCV RBC AUTO: 89.9 FL
MONOCYTES # BLD AUTO: 0.49 X10(3) UL (ref 0.1–1)
MONOCYTES NFR BLD AUTO: 7.5 %
NEUTROPHILS # BLD AUTO: 3.96 X10 (3) UL (ref 1.5–7.7)
NEUTROPHILS # BLD AUTO: 3.96 X10(3) UL (ref 1.5–7.7)
NEUTROPHILS NFR BLD AUTO: 60.4 %
NITRITE UR QL STRIP.AUTO: NEGATIVE
OSMOLALITY SERPL CALC.SUM OF ELEC: 287 MOSM/KG (ref 275–295)
OXYCODONE UR QL SCN: NEGATIVE
P AXIS: 48 DEGREES
P-R INTERVAL: 136 MS
PH UR STRIP.AUTO: 5.5 [PH] (ref 5–8)
PLATELET # BLD AUTO: 389 10(3)UL (ref 150–450)
POTASSIUM SERPL-SCNC: 3.6 MMOL/L (ref 3.5–5.1)
PROT SERPL-MCNC: 6.6 G/DL (ref 5.7–8.2)
PROT UR STRIP.AUTO-MCNC: NEGATIVE MG/DL
Q-T INTERVAL: 412 MS
QRS DURATION: 64 MS
QTC CALCULATION (BEZET): 457 MS
R AXIS: 3 DEGREES
RBC # BLD AUTO: 4.16 X10(6)UL
RBC UR QL AUTO: NEGATIVE
SALICYLATES SERPL-MCNC: <3 MG/DL (ref 3–20)
SARS-COV-2 RNA RESP QL NAA+PROBE: NOT DETECTED
SODIUM SERPL-SCNC: 138 MMOL/L (ref 136–145)
SP GR UR STRIP.AUTO: 1.02 (ref 1–1.03)
T AXIS: 62 DEGREES
UROBILINOGEN UR STRIP.AUTO-MCNC: NORMAL MG/DL
VENTRICULAR RATE: 74 BPM
WBC # BLD AUTO: 6.6 X10(3) UL (ref 4–11)

## 2025-01-29 PROCEDURE — 80143 DRUG ASSAY ACETAMINOPHEN: CPT | Performed by: EMERGENCY MEDICINE

## 2025-01-29 PROCEDURE — 99285 EMERGENCY DEPT VISIT HI MDM: CPT

## 2025-01-29 PROCEDURE — 93010 ELECTROCARDIOGRAM REPORT: CPT

## 2025-01-29 PROCEDURE — 80053 COMPREHEN METABOLIC PANEL: CPT | Performed by: EMERGENCY MEDICINE

## 2025-01-29 PROCEDURE — 82077 ASSAY SPEC XCP UR&BREATH IA: CPT | Performed by: EMERGENCY MEDICINE

## 2025-01-29 PROCEDURE — 81003 URINALYSIS AUTO W/O SCOPE: CPT | Performed by: EMERGENCY MEDICINE

## 2025-01-29 PROCEDURE — 36415 COLL VENOUS BLD VENIPUNCTURE: CPT

## 2025-01-29 PROCEDURE — 90791 PSYCH DIAGNOSTIC EVALUATION: CPT

## 2025-01-29 PROCEDURE — 85025 COMPLETE CBC W/AUTO DIFF WBC: CPT | Performed by: EMERGENCY MEDICINE

## 2025-01-29 PROCEDURE — 93005 ELECTROCARDIOGRAM TRACING: CPT

## 2025-01-29 PROCEDURE — 80307 DRUG TEST PRSMV CHEM ANLYZR: CPT | Performed by: EMERGENCY MEDICINE

## 2025-01-29 PROCEDURE — 80179 DRUG ASSAY SALICYLATE: CPT | Performed by: EMERGENCY MEDICINE

## 2025-01-29 RX ORDER — LORAZEPAM 1 MG/1
1 TABLET ORAL ONCE
Status: COMPLETED | OUTPATIENT
Start: 2025-01-29 | End: 2025-01-29

## 2025-01-29 RX ORDER — HYDROCODONE BITARTRATE AND ACETAMINOPHEN 5; 325 MG/1; MG/1
1 TABLET ORAL ONCE
Status: COMPLETED | OUTPATIENT
Start: 2025-01-29 | End: 2025-01-29

## 2025-01-29 NOTE — ED INITIAL ASSESSMENT (HPI)
Patient reports feeling depressed, having suicidal ideations with no plan. Patient states feeling anxious.
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
## 2025-01-29 NOTE — ED QUICK NOTES
Per patient request her watch and pair of earrings were placed in a small bag and given to family member at bedside.

## 2025-01-29 NOTE — ED PROVIDER NOTES
Patient Seen in: [facility] Emergency Department      History     Chief Complaint   Patient presents with    Eval-P     Stated Complaint: suicidal ideation    Subjective:   HPI      74-year-old female with a past medical history as below presents stating that she has been very depressed and anxious for the last week.  She reported suicidal ideations triage nurse.  Patient states she just wants to go to sleep and not wake up.  Denies any plan or thoughts of intentionally harming herself.  She reports having difficulty sleeping and does not feel like eating due to feeling depressed.  She has a psychiatrist but is not tried to reach out to them.  She denies any physical complaints such as fever, chills, cough or cold symptoms.  Denies chest pain or shortness of breath.  Denies vomiting or diarrhea.      Objective:     Past Medical History:    Anxiety state    Back problem    COMPRESSION FX    Cataract    Depression    Esophageal reflux    GERD (gastroesophageal reflux disease)    Heart valve disease    MVP--congenital    Hematoma and contusion of liver    STATES HAS BEEN THERE FOR MANY YEARS    History of fractured kneecap    RIGHT    IBS (irritable bowel syndrome)    Mitral prolapse    Nausea vomiting and diarrhea    Osteoarthritis    Visual impairment    glasses              Past Surgical History:   Procedure Laterality Date          Correct bunion,othr methods      Correct bunion,simple      BILAT.     Dilation/curettage,diagnostic      FOR \"MOLE PREGNANCY\"    Ect provided Bilateral 2024    1st Tx. 24    Other Right     ORIF RIGHT ANKLE    Spine surgery procedure unlisted      cervical spine 2020    Total knee replacement                  Social History     Socioeconomic History    Marital status:    Tobacco Use    Smoking status: Former     Current packs/day: 2.00     Average packs/day: 2.0 packs/day for 15.0 years (30.0 ttl pk-yrs)     Types: Cigarettes    Smokeless  tobacco: Never    Tobacco comments:     QUIT IN 1983   Vaping Use    Vaping status: Never Used   Substance and Sexual Activity    Alcohol use: No    Drug use: No     Social Drivers of Health     Financial Resource Strain: Low Risk  (1/15/2025)    Received from [facility]    Overall Financial Resource Strain (CARDIA)     Difficulty of Paying Living Expenses: Not hard at all   Food Insecurity: No Food Insecurity (1/15/2025)    Received from [facility]    Hunger Vital Sign     Worried About Running Out of Food in the Last Year: Never true     Ran Out of Food in the Last Year: Never true   Transportation Needs: No Transportation Needs (1/15/2025)    Received from [facility]    PRAPARE - Transportation     Lack of Transportation (Medical): No     Lack of Transportation (Non-Medical): No    [facility]    Social Connections   Housing Stability: Low Risk  (1/15/2025)    Received from [facility]    Housing Stability Vital Sign     Unable to Pay for Housing in the Last Year: No     Number of Times Moved in the Last Year: 1     Homeless in the Last Year: No                  Physical Exam     ED Triage Vitals [01/29/25 1014]   /82   Pulse 69   Resp 14   Temp 97.9 °F (36.6 °C)   Temp src Oral   SpO2 97 %   O2 Device None (Room air)       Current Vitals:   Vital Signs  BP: 121/82  Pulse: 69  Resp: 14  Temp: 97.9 °F (36.6 °C)  Temp src: Oral    Oxygen Therapy  SpO2: 97 %  O2 Device: None (Room air)        Physical Exam  Vitals and nursing note reviewed.   Constitutional:       Appearance: She is well-developed.   HENT:      Head: Normocephalic and atraumatic.      Mouth/Throat:      Mouth: Mucous membranes are moist.   Eyes:      General: No scleral icterus.  Cardiovascular:      Rate and Rhythm: Normal rate and regular rhythm.   Pulmonary:      Effort: Pulmonary effort is  normal.      Breath sounds: Normal breath sounds.   Musculoskeletal:      Comments: Right shoulder in sling   Skin:     General: Skin is warm and dry.   Neurological:      General: No focal deficit present.      Mental Status: She is alert and oriented to person, place, and time.      Cranial Nerves: No cranial nerve deficit.      Motor: No weakness.   Psychiatric:         Mood and Affect: Mood is depressed. Affect is tearful.         Behavior: Behavior normal. Behavior is cooperative.             ED Course     Labs Reviewed   COMP METABOLIC PANEL (14) - Abnormal; Notable for the following components:       Result Value    Glucose 137 (*)     All other components within normal limits   ACETAMINOPHEN (TYLENOL), S - Abnormal; Notable for the following components:    Acetaminophen 2.3 (*)     All other components within normal limits   SALICYLATE, SERUM - Abnormal; Notable for the following components:    Salicylate <3.0 (*)     All other components within normal limits   DRUG SCREEN 8 W/OUT CONFIRMATION, URINE - Abnormal; Notable for the following components:    Ecstasy Urine Presumed Positive (*)     Opiate Urine Presumed Positive (*)     All other components within normal limits    Narrative:     Results of the Urine Drug Screen should be used only for medical purposes.   URINALYSIS WITH CULTURE REFLEX - Abnormal; Notable for the following components:    Ketones Urine 10 (*)     Squamous Epi. Cells Few (*)     All other components within normal limits   ETHYL ALCOHOL - Normal   SARS-COV-2 BY PCR (GENEXPERT) - Normal   CBC WITH DIFFERENTIAL WITH PLATELET     EKG    Rate, intervals and axes as noted on EKG Report.  Rate: 74  Rhythm: Sinus Rhythm  Reading: Normal sinus rhythm, nonspecific T wave abnormality                         MDM      74-year-old female with a past medical history as below presents stating that she has been very depressed and anxious for the last week.     Differential includes but is not limited  to major depression, suicidal ideation, adjustment disorder    Labs are unremarkable.  Patient is medically cleared.    Awaiting evaluation by [facility] crisis worker.  He was started endorsed oncoming physician to monitor to final disposition.    Medical Decision Making  Amount and/or Complexity of Data Reviewed  Labs: ordered. Decision-making details documented in ED Course.  ECG/medicine tests: ordered and independent interpretation performed. Decision-making details documented in ED Course.  Discussion of management or test interpretation with external provider(s): [facility] crisis worker    Risk  Decision regarding hospitalization.        Disposition and Plan     Clinical Impression:  1. Depression, unspecified depression type    2. Suicidal ideation         Disposition:  Psychiatric transfer  1/29/2025  2:17 pm    Follow-up:  No follow-up provider specified.        Medications Prescribed:  Current Discharge Medication List              Supplementary Documentation:      Psychotropic medications were administered to the patient to prevent serious imminent harm to self or others. Behaviors, interventions, responses and restriction of rights are as documented in nursing notes.

## 2025-01-29 NOTE — ED PROVIDER NOTES
Assumed care from prior shift who evaluated patient for depression with suicidal ideation.  Medically cleared on prior shift, labs reviewed, awaiting evaluation by crisis worker at shift change.    ED course:  3:09 PM - Crisis worker evaluated patient, unable to contract for safety, plan for inpatient psychiatric admission.    Otherwise uneventful ED course    Disposition: Awaiting inpatient psychiatric placement at shift change.

## 2025-01-30 NOTE — ED QUICK NOTES
Received call from Carmen of [facility] for nurse to nurse report given at [phone]. Patient okay transfer. Will advised ETA of ambulance once transport is arranged.

## 2025-01-30 NOTE — PROGRESS NOTES
Left message to spouse phone number regarding patient transfer to [facility]

## 2025-01-30 NOTE — ED QUICK NOTES
Nurse to nurse report given to Kita from [facility]. At [phone], requesting to have a copy of EKG sent to their fax at [phone]

## 2025-02-06 DIAGNOSIS — S42.201D CLOSED FRACTURE OF PROXIMAL END OF RIGHT HUMERUS WITH ROUTINE HEALING, UNSPECIFIED FRACTURE MORPHOLOGY, SUBSEQUENT ENCOUNTER: ICD-10-CM

## 2025-02-06 NOTE — TELEPHONE ENCOUNTER
Norco 5-325  Last OV: 1/23/25  Last refill date: 1/27/25     #/refills: 20/0  Upcoming appt:   Future Appointments   Date Time Provider Department Center   2/19/2025 11:30 AM Florence Motta PA-C [id number]

## 2025-02-07 RX ORDER — HYDROCODONE BITARTRATE AND ACETAMINOPHEN 5; 325 MG/1; MG/1
1 TABLET ORAL EVERY 6 HOURS PRN
Qty: 20 TABLET | Refills: 0 | Status: SHIPPED | OUTPATIENT
Start: 2025-02-07

## 2025-02-11 ENCOUNTER — TELEPHONE (OUTPATIENT)
Facility: CLINIC | Age: 75
End: 2025-02-11

## 2025-02-11 DIAGNOSIS — S42.201D CLOSED FRACTURE OF PROXIMAL END OF RIGHT HUMERUS WITH ROUTINE HEALING, UNSPECIFIED FRACTURE MORPHOLOGY, SUBSEQUENT ENCOUNTER: ICD-10-CM

## 2025-02-11 RX ORDER — HYDROCODONE BITARTRATE AND ACETAMINOPHEN 5; 325 MG/1; MG/1
1 TABLET ORAL EVERY 6 HOURS PRN
Qty: 20 TABLET | Refills: 0 | Status: SHIPPED | OUTPATIENT
Start: 2025-02-11 | End: 2025-02-17

## 2025-02-11 NOTE — TELEPHONE ENCOUNTER
LOV 1/23/25, per office note patient to follow up in week. 1/30/25 appointment cancelled.called  patient/ernesto(POA) and Left message to call back. If patient call back please offer appointment for 2/12 with Dr Guerra there are current openings and patient in need of Follow up appointment.

## 2025-02-11 NOTE — TELEPHONE ENCOUNTER
Patient called asking for anther refill on her hydrocodone. Please advise.      Medication Quantity Refills Start End   HYDROCODONE-ACETAMINOPHEN 5-325 MG Oral Tab 20 tablet 0 2/7/2025 --   Sig:   Take 1 tablet by mouth every 6 (six) hours as needed for Pain. No alcohol or driving on this med. Stop if lethargic or hallucinating.     Route:   Oral     PRN Reason(s):   Pain     Earliest Fill Date:   2/7/2025     Order #:   [id number]

## 2025-02-11 NOTE — TELEPHONE ENCOUNTER
Hydrocodone-acetaminophen 5-325 mg  DOS: N/A  DOI: 01/9/25  Right proximal humerus fracture  Last OV: 01/23/25  Last refill date: 02/7/25     #/refills: 20/0  Upcoming appt:   Future Appointments   Date Time Provider Department Center   2/19/2025 11:30 AM Florence Motta PA-C [id number]

## 2025-02-12 NOTE — TELEPHONE ENCOUNTER
There were two encounters created for this refill request. The message below was not addressed when patient called back. Patient needs a follow up asap, was last seen on 1/23 and was instructed to follow up in a week to be reassessed and canceled that appointment, please try to reschedule patient for fracture follow up

## 2025-02-17 ENCOUNTER — TELEPHONE (OUTPATIENT)
Facility: CLINIC | Age: 75
End: 2025-02-17

## 2025-02-17 DIAGNOSIS — S42.201D CLOSED FRACTURE OF PROXIMAL END OF RIGHT HUMERUS WITH ROUTINE HEALING, UNSPECIFIED FRACTURE MORPHOLOGY, SUBSEQUENT ENCOUNTER: ICD-10-CM

## 2025-02-17 RX ORDER — HYDROCODONE BITARTRATE AND ACETAMINOPHEN 5; 325 MG/1; MG/1
1 TABLET ORAL EVERY 6 HOURS PRN
Qty: 20 TABLET | Refills: 0 | Status: SHIPPED | OUTPATIENT
Start: 2025-02-17

## 2025-02-17 NOTE — TELEPHONE ENCOUNTER
Patient called again for an update on a refill. She rescheduled her follow up for 2/24. She would like a call back from an RN. Please advise

## 2025-02-17 NOTE — TELEPHONE ENCOUNTER
LOV 1/23/25   Last rx given 2/11/25 for # 20  Patient overdue for follow up appointment. Patient had scheduled appointment today but was cancelled.   Called patient and she states she was feeling dizzy this morning so cancelled her appointment. She denies any chest pain or Shortness of breath. She states she is feeling better now. Advised if symptoms return she should be evaluated ER/UC or Follow up with pcp office. She verbalized understanding.   Patient requesting refill of Norco. She reports taking 2-3 tablets per day. Pain 8/10.She has tried Ibuprofen and tylenol before but doesn't help with the pain. She reports icing some, but has been applying heat as she feels that helps discomfort more. Patient still using sling but pain increased when arm dependent. Patient did reschedule her appointment to 2/24.

## 2025-02-18 ENCOUNTER — TELEPHONE (OUTPATIENT)
Dept: ORTHOPEDICS CLINIC | Facility: CLINIC | Age: 75
End: 2025-02-18

## 2025-02-18 DIAGNOSIS — S42.201D CLOSED FRACTURE OF PROXIMAL END OF RIGHT HUMERUS WITH ROUTINE HEALING, UNSPECIFIED FRACTURE MORPHOLOGY, SUBSEQUENT ENCOUNTER: ICD-10-CM

## 2025-02-18 NOTE — TELEPHONE ENCOUNTER
Dr. Boo for Dr. Moeller. Patient calling to ask if she would be able to move her right shoulder during physical therapy as the therapist would like to know. She is also requesting a refill on Norco 5-325mg. Please review and sign off if appropriate. Thank you.

## 2025-02-18 NOTE — TELEPHONE ENCOUNTER
Patient called and is wondering for Physical Therapy if she can move her Right Shoulder during Physical Therapy, Therapist wants to know if this is ok and would like a refill on the following medication:      HYDROCODONE-ACETAMINOPHEN 5-325 MG Oral Tab, Take 1 tablet by mouth every 6 (six) hours as needed for Pain. No alcohol or driving on this med. Stop if lethargic or hallucinating., Disp: 20 tablet, Rfl: 0    Please send to the [facility] in [place].     Please contact patient, [phone]

## 2025-02-19 RX ORDER — HYDROCODONE BITARTRATE AND ACETAMINOPHEN 5; 325 MG/1; MG/1
1 TABLET ORAL EVERY 6 HOURS PRN
Qty: 20 TABLET | Refills: 0 | OUTPATIENT
Start: 2025-02-19

## 2025-02-19 NOTE — TELEPHONE ENCOUNTER
Spoke with patient and confirmed she picked up the Norco prescription sent yesterday. I denied this one, as it is a duplicate. I let her know MD out for week, She did have Physical Therapy today and the therapist did not move her right shoulder and will wait for reply before doing so.

## 2025-02-21 ENCOUNTER — TELEPHONE (OUTPATIENT)
Dept: ORTHOPEDICS CLINIC | Facility: CLINIC | Age: 75
End: 2025-02-21

## 2025-02-21 DIAGNOSIS — S42.201D CLOSED FRACTURE OF PROXIMAL END OF RIGHT HUMERUS WITH ROUTINE HEALING, UNSPECIFIED FRACTURE MORPHOLOGY, SUBSEQUENT ENCOUNTER: ICD-10-CM

## 2025-02-21 NOTE — TELEPHONE ENCOUNTER
Patient is requesting a refill of Hydrocodone-acetaminophne to be sent to the [facility] in [place].  Patient would run out of medication over the weekend.  Please advise.

## 2025-02-24 ENCOUNTER — HOSPITAL ENCOUNTER (OUTPATIENT)
Dept: GENERAL RADIOLOGY | Age: 75
Discharge: HOME | End: 2025-02-24
Attending: STUDENT IN AN ORGANIZED HEALTH CARE EDUCATION/TRAINING PROGRAM
Payer: MEDICARE

## 2025-02-24 ENCOUNTER — TELEPHONE (OUTPATIENT)
Dept: ORTHOPEDICS CLINIC | Facility: CLINIC | Age: 75
End: 2025-02-24

## 2025-02-24 ENCOUNTER — OFFICE VISIT (OUTPATIENT)
Dept: ORTHOPEDICS CLINIC | Facility: CLINIC | Age: 75
End: 2025-02-24
Payer: MEDICARE

## 2025-02-24 DIAGNOSIS — S42.201D CLOSED FRACTURE OF PROXIMAL END OF RIGHT HUMERUS WITH ROUTINE HEALING, UNSPECIFIED FRACTURE MORPHOLOGY, SUBSEQUENT ENCOUNTER: ICD-10-CM

## 2025-02-24 DIAGNOSIS — Z47.89 ORTHOPEDIC AFTERCARE: ICD-10-CM

## 2025-02-24 DIAGNOSIS — S42.201D CLOSED FRACTURE OF PROXIMAL END OF RIGHT HUMERUS WITH ROUTINE HEALING, UNSPECIFIED FRACTURE MORPHOLOGY, SUBSEQUENT ENCOUNTER: Primary | ICD-10-CM

## 2025-02-24 PROCEDURE — 73060 X-RAY EXAM OF HUMERUS: CPT | Performed by: STUDENT IN AN ORGANIZED HEALTH CARE EDUCATION/TRAINING PROGRAM

## 2025-02-24 PROCEDURE — 1160F RVW MEDS BY RX/DR IN RCRD: CPT | Performed by: STUDENT IN AN ORGANIZED HEALTH CARE EDUCATION/TRAINING PROGRAM

## 2025-02-24 PROCEDURE — 99213 OFFICE O/P EST LOW 20 MIN: CPT | Performed by: STUDENT IN AN ORGANIZED HEALTH CARE EDUCATION/TRAINING PROGRAM

## 2025-02-24 PROCEDURE — 73030 X-RAY EXAM OF SHOULDER: CPT | Performed by: STUDENT IN AN ORGANIZED HEALTH CARE EDUCATION/TRAINING PROGRAM

## 2025-02-24 PROCEDURE — 1159F MED LIST DOCD IN RCRD: CPT | Performed by: STUDENT IN AN ORGANIZED HEALTH CARE EDUCATION/TRAINING PROGRAM

## 2025-02-24 RX ORDER — TRAMADOL HYDROCHLORIDE 50 MG/1
50 TABLET ORAL EVERY 6 HOURS PRN
Qty: 25 TABLET | Refills: 0 | Status: SHIPPED | OUTPATIENT
Start: 2025-02-24

## 2025-02-24 RX ORDER — HYDROCODONE BITARTRATE AND ACETAMINOPHEN 5; 325 MG/1; MG/1
1 TABLET ORAL EVERY 6 HOURS PRN
Qty: 20 TABLET | Refills: 0 | OUTPATIENT
Start: 2025-02-24

## 2025-02-24 NOTE — PROGRESS NOTES
NURSING INTAKE COMMENTS:   Chief Complaint   Patient presents with    Fracture     R shoulder f/u- rates pain 5/10 most of the time       HPI: This 74 year old female presents today for nonoperative treatment of a right proximal humerus fracture.  Patient was initially seen 2025.  Patient continues to be in pain.  Patient was given Norco.  Patient reports that she has been very active and that likely contributes to her pain.  She has been in a sling for roughly 2 days and has been managing her symptoms.  Presents in no acute distress.    Past Medical History:    Anxiety state    Back problem    COMPRESSION FX    Cataract    Depression    Esophageal reflux    GERD (gastroesophageal reflux disease)    Heart valve disease    MVP--congenital    Hematoma and contusion of liver    STATES HAS BEEN THERE FOR MANY YEARS    History of fractured kneecap    RIGHT    IBS (irritable bowel syndrome)    Mitral prolapse    Nausea vomiting and diarrhea    Osteoarthritis    Visual impairment    glasses     Past Surgical History:   Procedure Laterality Date          Correct bunion,othr methods      Correct bunion,simple      BILAT.     Dilation/curettage,diagnostic      FOR \"MOLE PREGNANCY\"    Ect provided Bilateral 2024    1st Tx. 24    Other Right     ORIF RIGHT ANKLE    Spine surgery procedure unlisted      cervical spine 2020    Total knee replacement       Current Outpatient Medications   Medication Sig Dispense Refill    traMADol 50 MG Oral Tab Take 1 tablet (50 mg total) by mouth every 6 (six) hours as needed for Pain. No alcohol or driving on this med. Stop if lethargic or hallucinating. 25 tablet 0    HYDROCODONE-ACETAMINOPHEN 5-325 MG Oral Tab Take 1 tablet by mouth every 6 (six) hours as needed for Pain. No alcohol or driving on this med. Stop if lethargic or hallucinating. 20 tablet 0    Naloxone HCl 4 MG/0.1ML Nasal Liquid 4 mg by Nasal route as needed. If patient remains unresponsive,  repeat dose in other nostril 2-5 minutes after first dose. 1 kit 0    diclofenac 75 MG Oral Tab EC Take 1 tablet (75 mg total) by mouth Q12H.      mirtazapine 15 MG Oral Tab Take 0.5 tablets (7.5 mg total) by mouth nightly.      nystatin-triamcinolone 100,000-0.1 Units/g-% External Ointment Apply topically 2 (two) times daily.      escitalopram 10 MG Oral Tab Take 1 tablet (10 mg total) by mouth daily. 30 tablet 1    buPROPion  MG Oral Tablet 24 Hr Take 1 tablet (300 mg total) by mouth daily. 30 tablet 1    ARIPiprazole 2 MG Oral Tab Take 1 tablet (2 mg total) by mouth daily. 30 tablet 0    hydrOXYzine 25 MG Oral Tab Take 1 tablet (25 mg total) by mouth at bedtime. 30 tablet 0    Cholecalciferol (VITAMIN D) 25 MCG (1000 UT) Oral Tab Take 1 tablet by mouth daily.      albuterol 108 (90 Base) MCG/ACT Inhalation Aero Soln Inhale 2 puffs into the lungs every 4 to 6 hours as needed for Shortness of Breath.       Allergies[1]  Family History   Problem Relation Age of Onset    Heart Disorder Father     Cancer Mother         ESOPHAGUS       Social History     Occupational History    Not on file   Tobacco Use    Smoking status: Former     Current packs/day: 2.00     Average packs/day: 2.0 packs/day for 15.0 years (30.0 ttl pk-yrs)     Types: Cigarettes    Smokeless tobacco: Never    Tobacco comments:     QUIT IN 1983   Vaping Use    Vaping status: Never Used   Substance and Sexual Activity    Alcohol use: No    Drug use: No    Sexual activity: Not on file        Review of Systems:  GENERAL: denies fevers, chills, night sweats, fatigue, unintentional weight loss/gain  SKIN: denies skin lesions, open sores, rash  HEENT:denies recent vision change, new nasal congestion,hearing loss, tinnitus, sore throat, headaches  RESPIRATORY: denies new shortness of breath, cough, asthma, wheezing  CARDIOVASCULAR: denies chest pain, leg cramps with exertion, palpitations, leg swelling  GI: denies abdominal pain, nausea, vomiting,  diarrhea, constipation, hematochezia, worsening heartburn or stomach ulcers  : denies dysuria, hematuria, incontinence, increased frequency, urgency, difficulty urinating  MUSCULOSKELETAL: denies musculoskeletal complaints other than in HPI  NEURO: denies numbness, tingling, weakness, balance issues, dizziness, memory loss  PSYCHIATRIC: denies Hx of depression, anxiety, other psychiatric disorders  HEMATOLOGIC: denies blood clots, anemia, blood clotting disorders, blood transfusion  ENDOCRINE: denies autoimmune disease, thyroid issues, or diabetes  ALLERGY: denies asthma, seasonal allergies    Physical Examination:    There were no vitals taken for this visit.  Constitutional: appears well hydrated, alert and responsive, no acute distress noted    Right shoulder exam    Forward elevation roughly 30 degrees internal rotation roughly 10 degrees full strength, neurovascular she is intact distally          Imaging:   Imaging was independently reviewed and interpreted by attending physician    3 views of the right shoulder were obtained today 2/24/2025 demonstrating progressive bony healing of the right proximal humerus fracture with evidence of callus formation    Labs:  Lab Results   Component Value Date    WBC 6.6 01/29/2025    HGB 12.9 01/29/2025    .0 01/29/2025      Lab Results   Component Value Date     (H) 01/29/2025    BUN 10 01/29/2025    CREATSERUM 0.81 01/29/2025    GFRNAA 92 01/19/2022    GFRAA 106 01/19/2022        Assessment and Plan:  Diagnoses and all orders for this visit:    Closed fracture of proximal end of right humerus with routine healing, unspecified fracture morphology, subsequent encounter  -     traMADol 50 MG Oral Tab; Take 1 tablet (50 mg total) by mouth every 6 (six) hours as needed for Pain. No alcohol or driving on this med. Stop if lethargic or hallucinating.        Assessment: Very pleasant 74-year-old female with right shoulder pain consistent with a healing proximal  humerus fracture    Plan: 74-year-old female with right proximal humerus fracture presents with evidence of routine healing now roughly 6 weeks after initial injury.  We explained the patient in detail that she has been utilizing opioids likely due to too much activity.  We explained the patient that we may prescribe tramadol but it should only be used for severe pain.  Would like to see the patient back in 4 to 6 weeks at that point we will obtain new radiographs and we expect her to have healed.  All questions answered today's visit    Follow Up: No follow-ups on file.    Maciel Guardado MD             [1]   Allergies  Allergen Reactions    Radiology Contrast Iodinated Dyes HIVES     HIVES AND THROAT TIGHTENED WHILE HAVING AN MRI    Sulfa Antibiotics HIVES     \"got sicker\"    Methylprednisolone OTHER (SEE COMMENTS)    Seroquel [Quetiapine] UNKNOWN    Cinnamon RASH    Cinnamon RASH    Iodine (Topical) RASH

## 2025-02-27 ENCOUNTER — TELEPHONE (OUTPATIENT)
Dept: ORTHOPEDICS CLINIC | Facility: CLINIC | Age: 75
End: 2025-02-27

## 2025-02-27 NOTE — TELEPHONE ENCOUNTER
S/w patient- Patient was seen for right shoulder fracture follow up- 2/24/25- patient states that she is currently taking the tramadol that Dr Guardado provided. She states that she is taking that with tylenol and states that pain really bad. She states that she was taking norco and is requesting that medication. I told her that norco may not be indicated at this point in recovery. She verbalized understanding but still requested that I ask Dr Sajan Barakat for norco refill. I asked if she tolerated oral anti-inflammatories and she states that she does.     Please advise if norco refill would be indicated and/or if prescription anti-inflammatory would be recommended for patient.

## 2025-02-27 NOTE — TELEPHONE ENCOUNTER
Patient called and stated that the Tramadol is not working and that she is in a lot pain, would like a different pain medication. Please advise.

## 2025-03-11 ENCOUNTER — APPOINTMENT (OUTPATIENT)
Dept: GENERAL RADIOLOGY | Age: 75
End: 2025-03-11
Attending: PHYSICIAN ASSISTANT
Payer: MEDICARE

## 2025-03-11 ENCOUNTER — HOSPITAL ENCOUNTER (OUTPATIENT)
Age: 75
Discharge: HOME | End: 2025-03-11
Payer: MEDICARE

## 2025-03-11 VITALS
HEART RATE: 101 BPM | OXYGEN SATURATION: 94 % | DIASTOLIC BLOOD PRESSURE: 61 MMHG | RESPIRATION RATE: 18 BRPM | TEMPERATURE: 98 F | SYSTOLIC BLOOD PRESSURE: 116 MMHG

## 2025-03-11 DIAGNOSIS — R52 BODY ACHES: ICD-10-CM

## 2025-03-11 DIAGNOSIS — U07.1 COVID-19: Primary | ICD-10-CM

## 2025-03-11 LAB
POCT INFLUENZA A: NEGATIVE
POCT INFLUENZA B: NEGATIVE
SARS-COV-2 RNA RESP QL NAA+PROBE: DETECTED

## 2025-03-11 PROCEDURE — 87502 INFLUENZA DNA AMP PROBE: CPT | Performed by: PHYSICIAN ASSISTANT

## 2025-03-11 PROCEDURE — 99214 OFFICE O/P EST MOD 30 MIN: CPT

## 2025-03-11 PROCEDURE — 71046 X-RAY EXAM CHEST 2 VIEWS: CPT | Performed by: PHYSICIAN ASSISTANT

## 2025-03-11 NOTE — ED PROVIDER NOTES
Patient Seen in: [facility]      History     Chief Complaint   Patient presents with    Cough/URI     Stated Complaint: Bodyache    Subjective:   HPI    74-year-old female with history as listed below presents to the immediate care for evaluation of bodyaches, fatigue and cough.  Symptom onset 24 hours ago.  Patient with subjective fevers.  She denies chest pain, shortness of breath.   was sick with similar symptoms last week.  Took Tylenol, OTC meds prior to coming.    Objective:     Past Medical History:    Anxiety state    Back problem    COMPRESSION FX    Cataract    Depression    Esophageal reflux    GERD (gastroesophageal reflux disease)    Heart valve disease    MVP--congenital    Hematoma and contusion of liver    STATES HAS BEEN THERE FOR MANY YEARS    History of fractured kneecap    RIGHT    IBS (irritable bowel syndrome)    Mitral prolapse    Nausea vomiting and diarrhea    Osteoarthritis    Visual impairment    glasses              Past Surgical History:   Procedure Laterality Date          Correct bunion,othr methods      Correct bunion,simple      BILAT.     Dilation/curettage,diagnostic      FOR \"MOLE PREGNANCY\"    Ect provided Bilateral 2024    1st Tx. 24    Other Right     ORIF RIGHT ANKLE    Spine surgery procedure unlisted      cervical spine 2020    Total knee replacement                  No pertinent social history.            Review of Systems    Positive for stated complaint: Bodyache  Other systems are as noted in HPI.  Constitutional and vital signs reviewed.      All other systems reviewed and negative except as noted above.    Physical Exam     ED Triage Vitals [25 0922]   /61   Pulse 108   Resp 18   Temp 97.7 °F (36.5 °C)   Temp src Oral   SpO2 94 %   O2 Device None (Room air)       Current Vitals:   Vital Signs  BP: 116/61  Pulse: 101  Resp: 18  Temp: 97.7 °F (36.5 °C)  Temp src: Oral    Oxygen Therapy  SpO2: 94 %  O2 Device:  None (Room air)        Physical Exam  Vitals and nursing note reviewed.   Constitutional:       General: She is not in acute distress.  HENT:      Head: Normocephalic and atraumatic.      Right Ear: External ear normal.      Left Ear: External ear normal.      Nose: Nose normal.      Mouth/Throat:      Mouth: Mucous membranes are moist.   Eyes:      Extraocular Movements: Extraocular movements intact.      Pupils: Pupils are equal, round, and reactive to light.   Cardiovascular:      Rate and Rhythm: Normal rate.   Pulmonary:      Effort: Pulmonary effort is normal.      Breath sounds: No wheezing or rhonchi.   Abdominal:      General: Abdomen is flat.   Musculoskeletal:         General: Normal range of motion.      Cervical back: Normal range of motion.   Skin:     General: Skin is warm.   Neurological:      General: No focal deficit present.      Mental Status: She is alert and oriented to person, place, and time.   Psychiatric:         Mood and Affect: Mood normal.         Behavior: Behavior normal.             ED Course     Labs Reviewed   RAPID SARS-COV-2 BY PCR - Abnormal; Notable for the following components:       Result Value    Rapid SARS-CoV-2 by PCR Detected (*)     All other components within normal limits   POCT FLU TEST - Normal    Narrative:     This assay is a rapid molecular in vitro test utilizing nucleic acid amplification of influenza A and B viral RNA.        74-year-old female presents to the immediate care with complaint of URI symptoms x 24 hours.  Patient is afebrile and overall nontoxic-appearing.  Mild tachycardia with a heart rate 108.  Oxygen saturation 94% on room air  Ddx-viral URI with cough, COVID, influenza, pneumonia    COVID test is positive.  Influenza test negative.  Chest x-ray negative for acute infiltrate.  Of note patient has a humeral fracture which is partially imaged on this exam.  This is known and she has established orthopedic follow-up.    We discussed supportive  care, isolation/quarantine guidelines as well as ER return precautions for any new or worsening symptoms or the development of any chest pain or shortness of breath.         MDM              Medical Decision Making      Disposition and Plan     Clinical Impression:  1. COVID-19    2. Body aches         Disposition:  Discharge  3/11/2025 10:57 am    Follow-up:  Birdie Valverde MD  [address] [id number]  [phone]                Medications Prescribed:  Discharge Medication List as of 3/11/2025 11:01 AM              Supplementary Documentation:

## 2025-03-14 ENCOUNTER — HOSPITAL ENCOUNTER (OUTPATIENT)
Age: 75
Discharge: HOME | End: 2025-03-14
Payer: MEDICARE

## 2025-03-14 ENCOUNTER — APPOINTMENT (OUTPATIENT)
Dept: GENERAL RADIOLOGY | Age: 75
End: 2025-03-14
Payer: MEDICARE

## 2025-03-14 VITALS
HEART RATE: 88 BPM | RESPIRATION RATE: 22 BRPM | TEMPERATURE: 98 F | SYSTOLIC BLOOD PRESSURE: 125 MMHG | DIASTOLIC BLOOD PRESSURE: 72 MMHG | OXYGEN SATURATION: 96 %

## 2025-03-14 DIAGNOSIS — U07.1 COVID-19: Primary | ICD-10-CM

## 2025-03-14 PROCEDURE — 99213 OFFICE O/P EST LOW 20 MIN: CPT

## 2025-03-14 PROCEDURE — 99214 OFFICE O/P EST MOD 30 MIN: CPT

## 2025-03-14 PROCEDURE — 71046 X-RAY EXAM CHEST 2 VIEWS: CPT

## 2025-03-14 RX ORDER — ACETAMINOPHEN 325 MG/1
650 TABLET ORAL ONCE
Status: COMPLETED | OUTPATIENT
Start: 2025-03-14 | End: 2025-03-14

## 2025-03-14 RX ORDER — BENZONATATE 100 MG/1
CAPSULE ORAL 3 TIMES DAILY PRN
Qty: 30 CAPSULE | Refills: 0 | Status: SHIPPED | OUTPATIENT
Start: 2025-03-14 | End: 2025-04-13

## 2025-03-14 NOTE — ED INITIAL ASSESSMENT (HPI)
Patient arrives ambulatory with c/o body aches, headache, cough continued. Reports she was here several days ago and diagnosed with COVID. One episode of vomiting. Reports taking robitussin and tylenol with minimal relief.

## 2025-03-14 NOTE — DISCHARGE INSTRUCTIONS
Your chest x-ray did not show any signs of pneumonia.    Regardless of your symptoms, you should wear a mask in public for 5 days.     Go to the emergency department if you develop any chest pain, shortness of breath, fever, vomiting, diarrhea or any other concerning complaints.    Use Flonase for nasal congestion, mucinex for chest congestion, tylenol or ibuprofen for fever or pain.  Increase PO fluid intake.    Follow up with PCP in 2-3 days.      Alternate tylenol and motrin - each medication can be given every 8 hours and should be alternated.  For example, if you give Ibuprofen at 12 pm, then tylenol would be given at 4 pm then ibuprofen at 8 pm and tylenol again at 12 am.      If you have any worsening symptoms you need to go to the emergency department.

## 2025-03-14 NOTE — ED PROVIDER NOTES
Patient Seen in: [facility]      History     Chief Complaint   Patient presents with    Body ache and/or chills     Stated Complaint: Covid Feeling Worse  Subjective:   Meli is a 74-year-old female presenting to the immediate care complaining of fatigue, body aches, cough and shortness of breath for the 4 days.  Patient was seen here on 3/11 and diagnosed with COVID along with her .  Patient states that she is not feeling any better so they came back today.  Patient states that she is short of breath only when she walks up a flight of stairs.  Denies any leg swelling.  Denies any chest pain, fever, abdominal pain, vomiting, dizziness or weakness. She does have fatigue and feels like she wants to sleep all the time.  Patient states her body aches are the worst of her complaints. No episodes of respiratory distress or severe difficulty breathing.  She is drinking well and is well-hydrated despite a decreased appetite.  She denies any other concerns or complaints.          Objective:   Past Medical History:    Anxiety state    Back problem    COMPRESSION FX    Cataract    Depression    Esophageal reflux    GERD (gastroesophageal reflux disease)    Heart valve disease    MVP--congenital    Hematoma and contusion of liver    STATES HAS BEEN THERE FOR MANY YEARS    History of fractured kneecap    RIGHT    IBS (irritable bowel syndrome)    Mitral prolapse    Nausea vomiting and diarrhea    Osteoarthritis    Visual impairment    glasses            Past Surgical History:   Procedure Laterality Date          Correct bunion,othr methods      Correct bunion,simple      BILAT.     Dilation/curettage,diagnostic      FOR \"MOLE PREGNANCY\"    Ect provided Bilateral 2024    1st Tx. 24    Other Right     ORIF RIGHT ANKLE    Spine surgery procedure unlisted      cervical spine 2020    Total knee replacement                Social History     Socioeconomic History    Marital status:     Tobacco Use    Smoking status: Former     Current packs/day: 2.00     Average packs/day: 2.0 packs/day for 15.0 years (30.0 ttl pk-yrs)     Types: Cigarettes    Smokeless tobacco: Never    Tobacco comments:     QUIT IN 1983   Vaping Use    Vaping status: Never Used   Substance and Sexual Activity    Alcohol use: No    Drug use: No     Social Drivers of Health     Food Insecurity: Low Risk  (1/30/2025)    Received from [facility] Food Security     Within the past 12 months, the food you bought just didn't last and you didn't have money to get more.: 3     Within the past 12 months, you worried that your food would run out before you got money to buy more.: 3   Transportation Needs: Not At Risk (1/30/2025)    Received from [facility] Transportation Needs     In the past 12 months, has lack of reliable transportation kept you from medical appointments, meetings, work or from getting things needed for daily living?: No   Housing Stability: Not At Risk (1/30/2025)    Received from [facility] Housing     What is your living situation today?: I have a steady place to live     Think about the place you live. Do you have problems with any of the following?: None of the above            Review of Systems    Positive for stated complaint: Body ache and/or chills    Other systems are as noted in HPI.  Constitutional and vital signs reviewed.      All other systems reviewed and negative except as noted above.    Physical Exam     ED Triage Vitals [03/14/25 1527]   /72   Pulse 88   Resp 22   Temp 98 °F (36.7 °C)   Temp src Oral   SpO2 96 %   O2 Device None (Room air)     Current:/72   Pulse 88   Temp 98 °F (36.7 °C) (Oral)   Resp 22   SpO2 96%     Physical Exam  Vitals and nursing note reviewed.   Constitutional:       General: She is not in acute distress.     Appearance: Normal appearance. She is not ill-appearing, toxic-appearing or diaphoretic.   HENT:      Head:  Normocephalic.      Right Ear: Tympanic membrane, ear canal and external ear normal.      Left Ear: Tympanic membrane, ear canal and external ear normal.      Nose: Congestion present.      Mouth/Throat:      Mouth: Mucous membranes are moist.      Pharynx: Oropharynx is clear.   Eyes:      Conjunctiva/sclera: Conjunctivae normal.   Cardiovascular:      Rate and Rhythm: Normal rate and regular rhythm.      Pulses: Normal pulses.      Heart sounds: Normal heart sounds.   Pulmonary:      Effort: Pulmonary effort is normal. No tachypnea, bradypnea, accessory muscle usage, prolonged expiration, respiratory distress or retractions.      Breath sounds: Normal breath sounds and air entry. No stridor, decreased air movement or transmitted upper airway sounds. No decreased breath sounds, wheezing, rhonchi or rales.   Abdominal:      General: Abdomen is flat.   Musculoskeletal:         General: Normal range of motion.      Cervical back: Normal range of motion.   Skin:     General: Skin is warm.      Capillary Refill: Capillary refill takes less than 2 seconds.   Neurological:      General: No focal deficit present.      Mental Status: She is alert and oriented to person, place, and time.   Psychiatric:         Mood and Affect: Mood normal.         Behavior: Behavior normal.         Thought Content: Thought content normal.         Judgment: Judgment normal.         ED Course   Radiology:    XR CHEST PA + LAT CHEST (CPT=71046)   Final Result   PROCEDURE: XR CHEST PA + LAT CHEST (CPT=71046)       COMPARISON: [facility], XR CHEST PA + LAT    CHEST (CPT=71046), 3/11/2025, 9:44 AM.       INDICATIONS: Worsening shortness of breath and body aches.  COVID+       TECHNIQUE:   Two views.         FINDINGS:    CARDIAC/VASC: No cardiac silhouette abnormality or cardiomegaly.     Unremarkable pulmonary vasculature.     MEDIAST/FORTINO: Atherosclerotic aorta with no visible aneurysm.     LUNGS/PLEURA: No  significant pulmonary parenchymal abnormalities.  No    effusion or pleural thickening.     BONES: Mild degenerative disc disease and spondylosis without visible    acute abnormalities.  Prior anterior cervical discectomy fusion.  Chronic    right humeral neck fracture deformity.   OTHER: Negative.                     =====   CONCLUSION: No acute cardiopulmonary abnormality.               Dictated by (CST): Navid Burdick MD on 3/14/2025 at 4:01 PM        Finalized by (CST): Navid Burdick MD on 3/14/2025 at 4:02 PM                 Labs Reviewed - No data to display    MDM     Medical Decision Making  Differential diagnoses reflecting the complexity of care include but are not limited to COVID, pneumonia or other secondary infection, less likely ACS or PE.    Comorbidities that add complexity to management include: Hypertension, hyperlipidemia  History obtained by an independent source was from: Patient  My independent interpretations of studies include: X-ray  Patient is well appearing, non-toxic and in no acute distress.  Vital signs are stable.     COVID test was positive on 3/11 visit.  Patient returned today because she is not feeling any better.  Denies feeling worse.  No leg swelling.  No shortness of breath at rest.  No chest pain.  Chest x-ray was done today, no pneumonia.  No other signs of secondary infection on physical exam.  Patient has been sporadically taking doses of Tylenol and ibuprofen.  I recommended that she get on a schedule of alternating Tylenol and Motrin for best pain control.  I also sent a prescription for Tessalon Perles for the cough.    Recommended that regardless of symptoms, the patient should wear a mask in public for 5 days.   Also advised to go to the emergency department if the patient develops any chest pain, shortness of breath, fever, vomiting, diarrhea or any other concerning complaints.  Discussed symptomatic management.  Recommended Flonase for nasal congestion, mucinex for  chest congestion, tylenol or ibuprofen for fever or pain.  Recommended increasing PO fluid intake.       ED precautions discussed.  Patient (guardian) advised to follow up with PCP in 2-3 days.  Patient (guardian) agrees with this plan of care.  Patient (guardian) verbalizes understanding of discharge instructions and plan of care.  Patient discussed with Dr. Lopez who agrees with this plan.    Amount and/or Complexity of Data Reviewed  Radiology: ordered and independent interpretation performed. Decision-making details documented in ED Course.    Risk  OTC drugs.  Prescription drug management.        Disposition and Plan     Clinical Impression:  1. COVID-19         Disposition:  Discharge  3/14/2025  4:12 pm    Follow-up:  Birdie Valverde MD  [address] [place] [address]  [phone]                Medications Prescribed:  Discharge Medication List as of 3/14/2025  4:13 PM        START taking these medications    Details   benzonatate 100 MG Oral Cap Take 1-2 capsules (100-200 mg total) by mouth 3 (three) times daily as needed for cough., Normal, Disp-30 capsule, R-0

## 2025-03-19 ENCOUNTER — HOSPITAL ENCOUNTER (OUTPATIENT)
Age: 75
Discharge: ANOTHER HOSPITAL | End: 2025-03-19
Attending: STUDENT IN AN ORGANIZED HEALTH CARE EDUCATION/TRAINING PROGRAM
Payer: MEDICARE

## 2025-03-19 ENCOUNTER — APPOINTMENT (OUTPATIENT)
Dept: GENERAL RADIOLOGY | Facility: HOSPITAL | Age: 75
End: 2025-03-19
Attending: EMERGENCY MEDICINE
Payer: MEDICARE

## 2025-03-19 ENCOUNTER — HOSPITAL ENCOUNTER (EMERGENCY)
Facility: HOSPITAL | Age: 75
Discharge: HOME | End: 2025-03-19
Attending: EMERGENCY MEDICINE
Payer: MEDICARE

## 2025-03-19 VITALS
SYSTOLIC BLOOD PRESSURE: 101 MMHG | DIASTOLIC BLOOD PRESSURE: 66 MMHG | OXYGEN SATURATION: 96 % | TEMPERATURE: 97 F | HEART RATE: 78 BPM | BODY MASS INDEX: 22.45 KG/M2 | HEIGHT: 62 IN | RESPIRATION RATE: 16 BRPM | WEIGHT: 122 LBS

## 2025-03-19 VITALS
TEMPERATURE: 98 F | DIASTOLIC BLOOD PRESSURE: 76 MMHG | RESPIRATION RATE: 22 BRPM | OXYGEN SATURATION: 98 % | SYSTOLIC BLOOD PRESSURE: 127 MMHG | HEART RATE: 84 BPM

## 2025-03-19 DIAGNOSIS — R07.9 CHEST PAIN OF UNCERTAIN ETIOLOGY: ICD-10-CM

## 2025-03-19 DIAGNOSIS — M79.18 MYALGIA, MULTIPLE SITES: ICD-10-CM

## 2025-03-19 DIAGNOSIS — R53.83 OTHER FATIGUE: ICD-10-CM

## 2025-03-19 DIAGNOSIS — J06.9 VIRAL URI WITH COUGH: Primary | ICD-10-CM

## 2025-03-19 DIAGNOSIS — R06.82 TACHYPNEA: Primary | ICD-10-CM

## 2025-03-19 LAB
ALBUMIN SERPL-MCNC: 4.1 G/DL (ref 3.2–4.8)
ALBUMIN/GLOB SERPL: 1.8 {RATIO} (ref 1–2)
ALP LIVER SERPL-CCNC: 75 U/L
ALT SERPL-CCNC: 25 U/L
ANION GAP SERPL CALC-SCNC: 12 MMOL/L (ref 0–18)
AST SERPL-CCNC: 21 U/L (ref ?–34)
BASOPHILS # BLD AUTO: 0.07 X10(3) UL (ref 0–0.2)
BASOPHILS NFR BLD AUTO: 0.7 %
BILIRUB SERPL-MCNC: 0.2 MG/DL (ref 0.2–1.1)
BUN BLD-MCNC: 19 MG/DL (ref 9–23)
BUN/CREAT SERPL: 19.6 (ref 10–20)
CALCIUM BLD-MCNC: 9 MG/DL (ref 8.7–10.4)
CHLORIDE SERPL-SCNC: 107 MMOL/L (ref 98–112)
CO2 SERPL-SCNC: 20 MMOL/L (ref 21–32)
CREAT BLD-MCNC: 0.97 MG/DL
D DIMER PPP FEU-MCNC: <0.27 UG/ML FEU (ref ?–0.74)
DEPRECATED RDW RBC AUTO: 44.5 FL (ref 35.1–46.3)
EGFRCR SERPLBLD CKD-EPI 2021: 61 ML/MIN/1.73M2 (ref 60–?)
EOSINOPHIL # BLD AUTO: 0.06 X10(3) UL (ref 0–0.7)
EOSINOPHIL NFR BLD AUTO: 0.6 %
ERYTHROCYTE [DISTWIDTH] IN BLOOD BY AUTOMATED COUNT: 13 % (ref 11–15)
FLUAV + FLUBV RNA SPEC NAA+PROBE: NEGATIVE
FLUAV + FLUBV RNA SPEC NAA+PROBE: NEGATIVE
GLOBULIN PLAS-MCNC: 2.3 G/DL (ref 2–3.5)
GLUCOSE BLD-MCNC: 170 MG/DL (ref 70–99)
HCT VFR BLD AUTO: 39.3 %
HGB BLD-MCNC: 13.3 G/DL
IMM GRANULOCYTES # BLD AUTO: 0.13 X10(3) UL (ref 0–1)
IMM GRANULOCYTES NFR BLD: 1.2 %
LYMPHOCYTES # BLD AUTO: 3.02 X10(3) UL (ref 1–4)
LYMPHOCYTES NFR BLD AUTO: 28.1 %
MCH RBC QN AUTO: 31.5 PG (ref 26–34)
MCHC RBC AUTO-ENTMCNC: 33.8 G/DL (ref 31–37)
MCV RBC AUTO: 93.1 FL
MONOCYTES # BLD AUTO: 0.72 X10(3) UL (ref 0.1–1)
MONOCYTES NFR BLD AUTO: 6.7 %
NEUTROPHILS # BLD AUTO: 6.76 X10 (3) UL (ref 1.5–7.7)
NEUTROPHILS # BLD AUTO: 6.76 X10(3) UL (ref 1.5–7.7)
NEUTROPHILS NFR BLD AUTO: 62.7 %
NT-PROBNP SERPL-MCNC: 57 PG/ML (ref ?–125)
OSMOLALITY SERPL CALC.SUM OF ELEC: 294 MOSM/KG (ref 275–295)
PLATELET # BLD AUTO: 453 10(3)UL (ref 150–450)
POTASSIUM SERPL-SCNC: 3.6 MMOL/L (ref 3.5–5.1)
PROT SERPL-MCNC: 6.4 G/DL (ref 5.7–8.2)
RBC # BLD AUTO: 4.22 X10(6)UL
RSV RNA SPEC NAA+PROBE: NEGATIVE
SARS-COV-2 RNA RESP QL NAA+PROBE: DETECTED
SODIUM SERPL-SCNC: 139 MMOL/L (ref 136–145)
TROPONIN I SERPL HS-MCNC: <3 NG/L
WBC # BLD AUTO: 10.8 X10(3) UL (ref 4–11)

## 2025-03-19 PROCEDURE — 99284 EMERGENCY DEPT VISIT MOD MDM: CPT

## 2025-03-19 PROCEDURE — 96360 HYDRATION IV INFUSION INIT: CPT

## 2025-03-19 PROCEDURE — 93010 ELECTROCARDIOGRAM REPORT: CPT

## 2025-03-19 PROCEDURE — 99214 OFFICE O/P EST MOD 30 MIN: CPT

## 2025-03-19 PROCEDURE — 93005 ELECTROCARDIOGRAM TRACING: CPT

## 2025-03-19 PROCEDURE — 71045 X-RAY EXAM CHEST 1 VIEW: CPT | Performed by: EMERGENCY MEDICINE

## 2025-03-19 PROCEDURE — 83880 ASSAY OF NATRIURETIC PEPTIDE: CPT | Performed by: EMERGENCY MEDICINE

## 2025-03-19 PROCEDURE — 85379 FIBRIN DEGRADATION QUANT: CPT | Performed by: EMERGENCY MEDICINE

## 2025-03-19 PROCEDURE — 80053 COMPREHEN METABOLIC PANEL: CPT | Performed by: EMERGENCY MEDICINE

## 2025-03-19 PROCEDURE — 84484 ASSAY OF TROPONIN QUANT: CPT | Performed by: EMERGENCY MEDICINE

## 2025-03-19 PROCEDURE — 85025 COMPLETE CBC W/AUTO DIFF WBC: CPT | Performed by: EMERGENCY MEDICINE

## 2025-03-19 PROCEDURE — 99285 EMERGENCY DEPT VISIT HI MDM: CPT

## 2025-03-19 PROCEDURE — 0241U SARS-COV-2/FLU A AND B/RSV BY PCR (GENEXPERT): CPT | Performed by: EMERGENCY MEDICINE

## 2025-03-19 RX ORDER — ALBUTEROL SULFATE 90 UG/1
2 INHALANT RESPIRATORY (INHALATION) EVERY 4 HOURS PRN
Qty: 1 EACH | Refills: 0 | Status: SHIPPED | OUTPATIENT
Start: 2025-03-19 | End: 2025-04-18

## 2025-03-19 RX ORDER — HYDROCODONE BITARTRATE AND HOMATROPINE METHYLBROMIDE ORAL SOLUTION 5; 1.5 MG/5ML; MG/5ML
2.5 LIQUID ORAL NIGHTLY PRN
Qty: 50 ML | Refills: 0 | Status: SHIPPED | OUTPATIENT
Start: 2025-03-19 | End: 2025-04-08

## 2025-03-19 NOTE — ED PROVIDER NOTES
Patient Seen in: [facility] Emergency Department      History     Chief Complaint   Patient presents with    Difficulty Breathing     Stated Complaint: body aches, SOB, CP    Subjective:   HPI      Patient presents the emergency department complaining of cough, body aches, shortness of breath, chest discomfort.  She states that all the symptoms have been progressive since  when she was diagnosed with COVID.  She presented to an immediate care center twice and today due to her tachypnea they recommended she come to the emergency department for further evaluation and treatment.  She describes some chest discomfort with breathing.  There is no longer fever.  She is not coughing anything up.  There is no other aggravating or alleviating factors.    Objective:     Past Medical History:    Anxiety state    Back problem    COMPRESSION FX    Cataract    Depression    Esophageal reflux    GERD (gastroesophageal reflux disease)    Heart valve disease    MVP--congenital    Hematoma and contusion of liver    STATES HAS BEEN THERE FOR MANY YEARS    History of fractured kneecap    RIGHT    IBS (irritable bowel syndrome)    Mitral prolapse    Nausea vomiting and diarrhea    Osteoarthritis    Visual impairment    glasses              Past Surgical History:   Procedure Laterality Date          Correct bunion,othr methods      Correct bunion,simple      BILAT.     Dilation/curettage,diagnostic      FOR \"MOLE PREGNANCY\"    Ect provided Bilateral 2024    1st Tx. 24    Other Right     ORIF RIGHT ANKLE    Spine surgery procedure unlisted      cervical spine 2020    Total knee replacement                  Social History     Socioeconomic History    Marital status:    Tobacco Use    Smoking status: Former     Current packs/day: 2.00     Average packs/day: 2.0 packs/day for 15.0 years (30.0 ttl pk-yrs)     Types: Cigarettes    Smokeless tobacco: Never    Tobacco comments:     QUIT IN     Vaping Use    Vaping status: Never Used   Substance and Sexual Activity    Alcohol use: No    Drug use: No     Social Drivers of Health     Food Insecurity: Low Risk  (1/30/2025)    Received from [facility]     Within the past 12 months, the food you bought just didn't last and you didn't have money to get more.: 3     Within the past 12 months, you worried that your food would run out before you got money to buy more.: 3   Transportation Needs: Not At Risk (1/30/2025)    Received from [facility] Transportation Needs     In the past 12 months, has lack of reliable transportation kept you from medical appointments, meetings, work or from getting things needed for daily living?: No   Housing Stability: Not At Risk (1/30/2025)    Received from [facility] Housing     What is your living situation today?: I have a steady place to live     Think about the place you live. Do you have problems with any of the following?: None of the above                  Physical Exam     ED Triage Vitals   BP 03/19/25 1703 110/73   Pulse 03/19/25 1703 96   Resp 03/19/25 1703 18   Temp 03/19/25 1703 97.4 °F (36.3 °C)   Temp src 03/19/25 1703 Temporal   SpO2 03/19/25 1703 98 %   O2 Device 03/19/25 1800 None (Room air)       Current Vitals:   No data recorded      Physical Exam  Vitals and nursing note reviewed.   Constitutional:       General: She is not in acute distress.     Appearance: She is well-developed.   HENT:      Head: Normocephalic.      Nose: Nose normal.      Mouth/Throat:      Mouth: Mucous membranes are moist.   Eyes:      Conjunctiva/sclera: Conjunctivae normal.   Cardiovascular:      Rate and Rhythm: Normal rate and regular rhythm.      Heart sounds: No murmur heard.  Pulmonary:      Effort: Pulmonary effort is normal. No respiratory distress.      Breath sounds: Normal breath sounds.   Abdominal:      General: There is no distension.      Palpations: Abdomen is soft.       Tenderness: There is no abdominal tenderness.   Musculoskeletal:         General: No tenderness. Normal range of motion.      Cervical back: Normal range of motion and neck supple.   Skin:     General: Skin is warm and dry.      Capillary Refill: Capillary refill takes less than 2 seconds.      Findings: No rash.   Neurological:      General: No focal deficit present.      Mental Status: She is alert and oriented to person, place, and time.      Cranial Nerves: No cranial nerve deficit.      Motor: No weakness.             ED Course     Labs Reviewed   CBC WITH DIFFERENTIAL WITH PLATELET - Abnormal; Notable for the following components:       Result Value    .0 (*)     All other components within normal limits   COMP METABOLIC PANEL (14) - Abnormal; Notable for the following components:    Glucose 170 (*)     CO2 20.0 (*)     All other components within normal limits   SARS-COV-2/FLU A AND B/RSV BY PCR (GENEXPERT) - Abnormal; Notable for the following components:    SARS-CoV-2 (COVID-19) - (GeneXpert) Detected (*)     All other components within normal limits    Narrative:     This test is intended for the qualitative detection and differentiation of SARS-CoV-2, influenza A, influenza B, and respiratory syncytial virus (RSV) viral RNA in nasopharyngeal or nares swabs from individuals suspected of respiratory viral infection consistent with COVID-19 by their healthcare provider. Signs and symptoms of respiratory viral infection due to SARS-CoV-2, influenza, and RSV can be similar.    Test performed using the Xpert Xpress SARS-CoV-2/FLU/RSV (real time RT-PCR)  assay on the GeneXpert instrument, [employer], [place] [address].   This test is being used under the Food and Drug Administration's Emergency Use Authorization.    The authorized Fact Sheet for Healthcare Providers for this assay is available upon request from the laboratory.   TROPONIN I HIGH SENSITIVITY - Normal   D-DIMER - Normal   PRO BETA NATRIURETIC  PEPTIDE - Normal   RAINBOW DRAW BLUE     EKG    Rate, intervals and axes as noted on EKG Report.  Rate: 73 bpm  Rhythm: Sinus Rhythm  Reading: Nonspecific T wave changes, abnormal but no acute injury pattern.                       MDM              Medical Decision Making  Differential diagnosis considered for pneumonia, pleurisy, PE, pleural effusion, heart failure.    Problems Addressed:  Viral URI with cough: acute illness or injury    Amount and/or Complexity of Data Reviewed  Labs: ordered. Decision-making details documented in ED Course.     Details: Labs unremarkable.  D-dimer normal.  Troponin normal.  Radiology: ordered and independent interpretation performed. Decision-making details documented in ED Course.     Details: Chest x-ray normal  ECG/medicine tests: ordered and independent interpretation performed. Decision-making details documented in ED Course.  Discussion of management or test interpretation with external provider(s): Pulse oximetry has been 97 to 99% during entire stay.  Labs all normal.  Explained to the patient that the cough following an infection such as COVID can last for weeks even months.  Her  has been sick for weeks with a cough following COVID.  She asked that a stronger cough medicine be prescribed and the Tessalon Perles she was given and she was given a short course of Hycodan.  She will follow-up with her primary care physician.    Risk  Prescription drug management.        Disposition and Plan     Clinical Impression:  1. Viral URI with cough         Disposition:  Discharge  3/19/2025  8:49 pm    Follow-up:  Birdie Valverde MD  [address] [place] [id number] [phone]    Schedule an appointment as soon as possible for a visit      We recommend that you schedule follow up care with a primary care provider within the next three months to obtain basic health screening including reassessment of your blood pressure.      Medications  Prescribed:  Discharge Medication List as of 3/19/2025  9:01 PM        START taking these medications    Details   !! albuterol 108 (90 Base) MCG/ACT Inhalation Aero Soln Inhale 2 puffs into the lungs every 4 (four) hours as needed., Normal, Disp-1 each, R-0      HYDROcodone-homatropine (HYCODAN) 5-1.5 MG/5ML Oral Solution Take 2.5 mL by mouth nightly as needed (cough)., Normal, Disp-50 mL, R-0       !! - Potential duplicate medications found. Please discuss with provider.              Supplementary Documentation:

## 2025-03-19 NOTE — ED PROVIDER NOTES
Patient Seen in: [facility]      History     Chief Complaint   Patient presents with    Cough/URI     Stated Complaint: COVID + 3/11/25 new flu like sym    Subjective:   HPI      74-year-old female with past medical history of depression, who presents with concern for ongoing and progressing symptoms, per chart review and confirmed by the patient she was diagnosed with COVID on 3/11/2025, chest x-ray was also done at that time which was unremarkable, she had had about 24 hours of symptoms prior to presentation at that time, she returned on 3/14/2025 for ongoing symptoms, repeat chest x-ray remained unremarkable, and presents today with her  with shortness of breath, intermittent chest pain since yesterday, fatigue, and diffuse muscle aches.    Objective:     Past Medical History:    Anxiety state    Back problem    COMPRESSION FX    Cataract    Depression    Esophageal reflux    GERD (gastroesophageal reflux disease)    Heart valve disease    MVP--congenital    Hematoma and contusion of liver    STATES HAS BEEN THERE FOR MANY YEARS    History of fractured kneecap    RIGHT    IBS (irritable bowel syndrome)    Mitral prolapse    Nausea vomiting and diarrhea    Osteoarthritis    Visual impairment    glasses              Past Surgical History:   Procedure Laterality Date          Correct bunion,othr methods      Correct bunion,simple      BILAT.     Dilation/curettage,diagnostic      FOR \"MOLE PREGNANCY\"    Ect provided Bilateral 2024    1st Tx. 24    Other Right     ORIF RIGHT ANKLE    Spine surgery procedure unlisted      cervical spine 2020    Total knee replacement                  Social History     Socioeconomic History    Marital status:    Tobacco Use    Smoking status: Former     Current packs/day: 2.00     Average packs/day: 2.0 packs/day for 15.0 years (30.0 ttl pk-yrs)     Types: Cigarettes    Smokeless tobacco: Never    Tobacco comments:     QUIT IN  1983   Vaping Use    Vaping status: Never Used   Substance and Sexual Activity    Alcohol use: No    Drug use: No     Social Drivers of Health     Food Insecurity: Low Risk  (1/30/2025)    [facility]     Within the past 12 months, the food you bought just didn't last and you didn't have money to get more.: 3     Within the past 12 months, you worried that your food would run out before you got money to buy more.: 3   Transportation Needs: Not At Risk (1/30/2025)    [facility]     In the past 12 months, has lack of reliable transportation kept you from medical appointments, meetings, work or from getting things needed for daily living?: No   Housing Stability: Not At Risk (1/30/2025)    [facility]     What is your living situation today?: I have a steady place to live     Think about the place you live. Do you have problems with any of the following?: None of the above              Review of Systems    Positive for stated complaint: COVID + 3/11/25 new flu like sym  Other systems are as noted in HPI.  Constitutional and vital signs reviewed.      All other systems reviewed and negative except as noted above.    Physical Exam     ED Triage Vitals [03/19/25 1535]   /76   Pulse 106   Resp 22   Temp 97.8 °F (36.6 °C)   Temp src Oral   SpO2 98 %   O2 Device None (Room air)       Current Vitals:   Vital Signs  BP: 127/76  Pulse: 84  Resp: 22  Temp: 97.8 °F (36.6 °C)  Temp src: Oral    Oxygen Therapy  SpO2: 98 %  O2 Device: None (Room air)        Physical Exam    General: Awake, alert, in no distress but uncomfortable appearing and fatigued appearing  Pulmonary: Lungs CTA B, no wheezing, mild conversational dyspnea, mild tachypnea, saturating 98% on RA  GI: Abdomen soft, nontender, nondistended, no rebound, no guarding  Neuro: Symmetrical facial expressions on gross observation  HEENT: No periorbital edema  or erythema, nonerythematous and nonedematous intact B/L TMs, no erythema or edema of the B/L ear canals, nonerythematous and nonedematous B/L tonsils, no tonsillar exudates, no peritonsillar edema, uvula midline, tolerating oral secretions, normal speech, no submandibular edema  Psych: Normal mood, normal affect    ED Course   Labs Reviewed - No data to display  EKG    Rate, intervals and axes as noted on EKG Report.  Rate: 73  Rhythm: Sinus Rhythm  Reading: Normal sinus rhythm, no ST elevation, no ST depression, possible T wave inversion in lead III with flattening of baseline in lead II and aVF, no STEMI, appears unchanged from 1/29/25, both EKGs with artifact which limits exam             MDM   Patient appears to have tachypnea and shortness of breath out of proportion to exam as lungs are CTAB with no wheezing, on my assessment on bedside pulse oximetry she is saturating 98% on room air with HR in the 80s, she initially denied chest pain to nursing, but states to me at bedside that she has had chest pain intermittently for the last 2 days, appears significantly fatigued, it has been 10 days since initial COVID symptoms and patient is worse/progressing as per pt, given tachypnea and dyspnea out of proportion to exam, did recommend assessment via higher level of care through the emergency department, EKG shows no STEMI, patient prefers the [facility] emergency department, prefers to have her  drive her, declines EMS, accepts and prefers wheelchair to the car, will go straight to the emergency department.      Disposition and Plan     Clinical Impression:  1. Tachypnea    2. Chest pain of uncertain etiology    3. Other fatigue    4. Myalgia, multiple sites         Disposition:  Ic to ed  3/19/2025  4:25 pm    Follow-up:  No follow-up provider specified.        Medications Prescribed:  Discharge Medication List as of 3/19/2025  4:27 PM          None

## 2025-03-19 NOTE — ED INITIAL ASSESSMENT (HPI)
Patient was dx with covid  here 3/11. Seen again 3/14 for worsening symptoms. Presents today for 3rd visit. States she does not feel she is improving. Reports body aches, headache, worse cough, and feeling lightheaded. Took Tylenol at 1:00pm. No chest pain.

## 2025-03-19 NOTE — ED INITIAL ASSESSMENT (HPI)
Patient arrives to the ER complaining of chest pain and SPENCER. Recent covid infection. Patient was seen at  and told to come here. EKG done at .

## 2025-03-19 NOTE — ED QUICK NOTES
Pt to ED from [facility] with c/o of dyspnea and chest pain. Pt denies fall or trauma. Pt states dx with COVID 3-. Pt states dry cough. +body aches without noted fever per patient. No lower leg edema noted. Bilateral lungs clear. No respiratory distress noted. Pt is alert and oriented x4. Cardiac monitor and continuous pulse oximetry on. Will continue to monitor.

## 2025-03-20 LAB
ATRIAL RATE: 73 BPM
P AXIS: 15 DEGREES
P-R INTERVAL: 126 MS
Q-T INTERVAL: 396 MS
QRS DURATION: 58 MS
QTC CALCULATION (BEZET): 436 MS
R AXIS: -5 DEGREES
T AXIS: 40 DEGREES
VENTRICULAR RATE: 73 BPM

## 2025-03-31 RX ORDER — MIRTAZAPINE 15 MG/1
TABLET, FILM COATED ORAL
Qty: 45 TABLET | Refills: 0 | OUTPATIENT
Start: 2025-03-31

## 2025-04-02 ENCOUNTER — TELEPHONE (OUTPATIENT)
Age: 75
End: 2025-04-02

## 2025-04-04 ENCOUNTER — HOSPITAL ENCOUNTER (OUTPATIENT)
Age: 75
Discharge: HOME | End: 2025-04-04
Payer: MEDICARE

## 2025-04-04 ENCOUNTER — E-ADVICE (OUTPATIENT)
Age: 75
End: 2025-04-04

## 2025-04-04 VITALS
OXYGEN SATURATION: 99 % | TEMPERATURE: 98 F | HEART RATE: 75 BPM | SYSTOLIC BLOOD PRESSURE: 125 MMHG | DIASTOLIC BLOOD PRESSURE: 80 MMHG | RESPIRATION RATE: 20 BRPM

## 2025-04-04 DIAGNOSIS — J18.9 ATYPICAL PNEUMONIA: Primary | ICD-10-CM

## 2025-04-04 LAB
#MXD IC: 0.8 X10ˆ3/UL (ref 0.1–1)
ATRIAL RATE: 74 BPM
BUN BLD-MCNC: 11 MG/DL (ref 7–18)
CHLORIDE BLD-SCNC: 105 MMOL/L (ref 98–112)
CO2 BLD-SCNC: 21 MMOL/L (ref 21–32)
CREAT BLD-MCNC: 0.9 MG/DL
DDIMER WHOLE BLOOD: <200 NG/ML DDU (ref ?–400)
EGFRCR SERPLBLD CKD-EPI 2021: 67 ML/MIN/1.73M2 (ref 60–?)
GLUCOSE BLD-MCNC: 144 MG/DL (ref 70–99)
HCT VFR BLD AUTO: 40.8 %
HCT VFR BLD CALC: 42 %
HGB BLD-MCNC: 13.3 G/DL
ISTAT IONIZED CALCIUM FOR CHEM 8: 1.22 MMOL/L (ref 1.12–1.32)
LYMPHOCYTES # BLD AUTO: 2.1 X10ˆ3/UL (ref 1–4)
LYMPHOCYTES NFR BLD AUTO: 27.3 %
MCH RBC QN AUTO: 31.3 PG (ref 26–34)
MCHC RBC AUTO-ENTMCNC: 32.6 G/DL (ref 31–37)
MCV RBC AUTO: 96 FL (ref 80–100)
MIXED CELL %: 9.7 %
NEUTROPHILS # BLD AUTO: 4.9 X10ˆ3/UL (ref 1.5–7.7)
NEUTROPHILS NFR BLD AUTO: 63 %
P AXIS: 47 DEGREES
P-R INTERVAL: 134 MS
PLATELET # BLD AUTO: 359 X10ˆ3/UL (ref 150–450)
POTASSIUM BLD-SCNC: 4 MMOL/L (ref 3.6–5.1)
Q-T INTERVAL: 388 MS
QRS DURATION: 56 MS
QTC CALCULATION (BEZET): 430 MS
R AXIS: 30 DEGREES
RBC # BLD AUTO: 4.25 X10ˆ6/UL
SODIUM BLD-SCNC: 139 MMOL/L (ref 136–145)
T AXIS: -12 DEGREES
TROPONIN I BLD-MCNC: <0.02 NG/ML
VENTRICULAR RATE: 74 BPM
WBC # BLD AUTO: 7.8 X10ˆ3/UL (ref 4–11)

## 2025-04-04 PROCEDURE — 99214 OFFICE O/P EST MOD 30 MIN: CPT

## 2025-04-04 PROCEDURE — 80047 BASIC METABLC PNL IONIZED CA: CPT

## 2025-04-04 PROCEDURE — 36415 COLL VENOUS BLD VENIPUNCTURE: CPT

## 2025-04-04 PROCEDURE — 84484 ASSAY OF TROPONIN QUANT: CPT

## 2025-04-04 PROCEDURE — 93005 ELECTROCARDIOGRAM TRACING: CPT

## 2025-04-04 PROCEDURE — 94640 AIRWAY INHALATION TREATMENT: CPT

## 2025-04-04 PROCEDURE — 85025 COMPLETE CBC W/AUTO DIFF WBC: CPT | Performed by: NURSE PRACTITIONER

## 2025-04-04 PROCEDURE — 85378 FIBRIN DEGRADE SEMIQUANT: CPT | Performed by: NURSE PRACTITIONER

## 2025-04-04 PROCEDURE — 93010 ELECTROCARDIOGRAM REPORT: CPT

## 2025-04-04 RX ORDER — IPRATROPIUM BROMIDE AND ALBUTEROL SULFATE 2.5; .5 MG/3ML; MG/3ML
3 SOLUTION RESPIRATORY (INHALATION) ONCE
Status: COMPLETED | OUTPATIENT
Start: 2025-04-04 | End: 2025-04-04

## 2025-04-04 RX ORDER — CODEINE PHOSPHATE AND GUAIFENESIN 10; 100 MG/5ML; MG/5ML
5 SOLUTION ORAL EVERY 6 HOURS PRN
Qty: 140 ML | Refills: 0 | Status: ON HOLD | OUTPATIENT
Start: 2025-04-04 | End: 2025-04-11

## 2025-04-04 RX ORDER — PREDNISONE 20 MG/1
20 TABLET ORAL DAILY
Qty: 5 TABLET | Refills: 0 | Status: ON HOLD | OUTPATIENT
Start: 2025-04-04 | End: 2025-04-09

## 2025-04-04 RX ORDER — PREDNISONE 20 MG/1
60 TABLET ORAL ONCE
Status: COMPLETED | OUTPATIENT
Start: 2025-04-04 | End: 2025-04-04

## 2025-04-04 RX ORDER — BENZONATATE 200 MG/1
200 CAPSULE ORAL 3 TIMES DAILY PRN
Qty: 30 CAPSULE | Refills: 0 | Status: ON HOLD | OUTPATIENT
Start: 2025-04-04 | End: 2025-04-14

## 2025-04-04 RX ORDER — MIRTAZAPINE 15 MG/1
TABLET, FILM COATED ORAL
Qty: 45 TABLET | Refills: 0 | OUTPATIENT
Start: 2025-04-04

## 2025-04-04 RX ORDER — AZITHROMYCIN 250 MG/1
TABLET, FILM COATED ORAL
Qty: 6 TABLET | Refills: 0 | Status: ON HOLD | OUTPATIENT
Start: 2025-04-04 | End: 2025-04-09

## 2025-04-04 NOTE — DISCHARGE INSTRUCTIONS
As discussed, blood work reassuring today.  Your cardiac enzymes are within normal limits.  There is no concern for blood clots.    EKG similar to previous EKGs performed.    We will treat you as a lower lobe atypical pneumonia with azithromycin.  Please start that medication today.  Take daily for 5 days.  Take with food and water.  Please start steroid tomorrow.  You will take daily in the morning with food and water.  As we talked about, steroids can increase your heart rate and give you lots of energy.  It is best taken in the morning to mitigate any insomnia side effects.    Also gave you a cough suppressant that you can take up to 3 times a day as needed.    Cough suppressant syrup with codeine at your request.  This will cause some dizziness and drowsiness, you should not work, drink, drive on this medication.  It is a controlled substance.    Please do not cancel your follow-up appointment with your primary care doctor.  I would also like you to see pulmonologist for your continued respiratory complaints.    If any new or worsening symptoms develop, please go to emergency room.

## 2025-04-04 NOTE — ED PROVIDER NOTES
Patient Seen in: [facility]      History     Chief Complaint   Patient presents with    Cough     Stated Complaint: cough, body pain    Subjective: This is a 74-year-old female with a past medical history of mitral valve prolapse, anxiety, osteoarthritis, GERD, IBS, COVID-19 illness on March 11 with 4 other visits to immediate care clinic and emergency room for continuation of COVID-19 symptoms.  This is her fifth visit today.  She was previously seen in the immediate care clinic on March 19 and sent to the emergency room for tachypnea and cardiac related symptoms.  Patient had negative workup and D-dimer while in emergency room.  Patient states she is due to see her primary care doctor on Wednesday.  Patient feels as if her shortness of breath, fatigue, is worse.  She remains with chest wall discomfort secondary to coughing.  She does report dizziness and lightheadedness.  She states symptoms been ongoing for about a month.  She has been on several medications for her COVID-19 symptoms including: Tessalon Perles, albuterol inhaler, Cheratussin.  Patient has with conversational dyspnea on exam but maintaining room oxygen saturation of 96%.  Patient is breathing at 24-26 times a minute.  She is able to be coached to slow her respirations.  Uncertain if there is anxiety component related to patient's respiratory rate.  Patient states she has been breathing like this for a month.   at bedside confirms.  Did discuss with patient if she feels as if her symptoms are truly worse and/or if they just have not improved and then ongoing for over a month.  Patient does states she feels as if they are worse.  However, these are chronic symptoms.  Despite conversational dyspnea, patient is otherwise well appearing.  AOx4  HPI          Objective:     Past Medical History:    Anxiety state    Back problem    COMPRESSION FX    Cataract    Depression    Esophageal reflux    GERD (gastroesophageal reflux disease)     Heart valve disease    MVP--congenital    Hematoma and contusion of liver    STATES HAS BEEN THERE FOR MANY YEARS    History of fractured kneecap    RIGHT    IBS (irritable bowel syndrome)    Mitral prolapse    Nausea vomiting and diarrhea    Osteoarthritis    Visual impairment    glasses              Past Surgical History:   Procedure Laterality Date          Correct bunion,othr methods      Correct bunion,simple      BILAT.     Dilation/curettage,diagnostic      FOR \"MOLE PREGNANCY\"    Ect provided Bilateral 2024    1st Tx. 24    Other Right     ORIF RIGHT ANKLE    Spine surgery procedure unlisted      cervical spine 2020    Total knee replacement                  Social History     Socioeconomic History    Marital status:    Tobacco Use    Smoking status: Former     Current packs/day: 2.00     Average packs/day: 2.0 packs/day for 15.0 years (30.0 ttl pk-yrs)     Types: Cigarettes    Smokeless tobacco: Never    Tobacco comments:     QUIT IN    Vaping Use    Vaping status: Never Used   Substance and Sexual Activity    Alcohol use: No    Drug use: No     Social Drivers of Health     Food Insecurity: Low Risk  (2025)    Received from [facility] Food [facility]     Within the past 12 months, the food you bought just didn't last and you didn't have money to get more.: 3     Within the past 12 months, you worried that your food would run out before you got money to buy more.: 3   Transportation Needs: Not At Risk (2025)    Received from [facility] Transportation Needs     In the past 12 months, has lack of reliable transportation kept you from medical appointments, meetings, work or from getting things needed for daily living?: No   Housing Stability: Not At Risk (2025)    Received from [facility] Housing     What is your living situation today?: I have a steady place to live     Think about the place you live. Do you have problems with  any of the following?: None of the above              Review of Systems   Constitutional:  Positive for activity change, appetite change, chills and fatigue. Negative for diaphoresis and fever.   Eyes: Negative.    Respiratory:  Positive for cough, chest tightness and shortness of breath. Negative for wheezing and stridor.    Cardiovascular:  Positive for chest pain. Negative for palpitations and leg swelling.   Gastrointestinal: Negative.    Genitourinary: Negative.    Musculoskeletal:  Positive for arthralgias and myalgias.   Skin: Negative.    Neurological:  Positive for dizziness, weakness, light-headedness and headaches. Negative for tremors, seizures, syncope and numbness.   Psychiatric/Behavioral:  The patient is nervous/anxious.        Positive for stated complaint: cough, body pain  Other systems are as noted in HPI.  Constitutional and vital signs reviewed.      All other systems reviewed and negative except as noted above.    Physical Exam     ED Triage Vitals [04/04/25 1042]   /74   Pulse 107   Resp 23   Temp 97.9 °F (36.6 °C)   Temp src Oral   SpO2 96 %   O2 Device None (Room air)       Current Vitals:   Vital Signs  BP: 125/80  Pulse: 75  Resp: 20  Temp: 97.6 °F (36.4 °C)  Temp src: Oral    Oxygen Therapy  SpO2: 99 %  O2 Device: None (Room air)        Physical Exam  Constitutional:       General: She is not in acute distress.     Appearance: Normal appearance. She is not ill-appearing or toxic-appearing.   HENT:      Head: Normocephalic.      Right Ear: Tympanic membrane, ear canal and external ear normal.      Left Ear: Tympanic membrane, ear canal and external ear normal.      Nose: Nose normal. No congestion or rhinorrhea.      Mouth/Throat:      Mouth: Mucous membranes are moist.      Pharynx: No oropharyngeal exudate or posterior oropharyngeal erythema.   Eyes:      General:         Right eye: No discharge.         Left eye: No discharge.      Extraocular Movements: Extraocular movements  intact.      Pupils: Pupils are equal, round, and reactive to light.   Cardiovascular:      Rate and Rhythm: Normal rate and regular rhythm.      Pulses: Normal pulses.      Heart sounds: Normal heart sounds.   Pulmonary:      Effort: Pulmonary effort is normal.      Breath sounds: Examination of the right-lower field reveals decreased breath sounds. Decreased breath sounds present. No wheezing, rhonchi or rales.   Musculoskeletal:         General: Normal range of motion.      Cervical back: Normal range of motion and neck supple.   Lymphadenopathy:      Cervical: No cervical adenopathy.   Skin:     General: Skin is warm.      Capillary Refill: Capillary refill takes less than 2 seconds.   Neurological:      General: No focal deficit present.      Mental Status: She is alert and oriented to person, place, and time.             ED Course     Labs Reviewed   POCT ISTAT CHEM8 CARTRIDGE - Abnormal; Notable for the following components:       Result Value    ISTAT Glucose 144 (*)     All other components within normal limits   D-DIMER (POC) - Normal   ISTAT TROPONIN - Normal   POCT CBC     EKG    Rate, intervals and axes as noted on EKG Report.  Rate: 74  Rhythm: Normal sinus rhythm,  Reading: Normal sinus rhythm, nonspecific ST and T wave abnormality.  When compared to previous EKG March 19, no acute changes           Heart Score:    HEART Score      Title      Criteria Score   Age: 65 and older Age Score: 2   History: Slightly Suspicious Hx Score: 0     EKG: Normal EKG Score: 0   HTN: No   Hypercholesterolemia: No   Atherosclerosis/PVD: No     DM: Yes   BMI>30kg/m2: No   Smoking: No   Family History: No         Other Risk Factor Score: 1               Lab Results   Component Value Date    ITROP <0.02 04/04/2025         HEART Score: 3        Risk of adverse cardiac event is 0.9-1.7%                        MDM      Differentials considered include: COVID-19 long hauler syndrome, atypical pneumonia, viral bronchitis,  pulmonary embolism.    This is patient's fifth visit for the same complaint.  Patient was last seen in immediate care on March 19 and sent to emergency room due to frequent visits with same complaint and worsening and concerning cardiac pulmonary symptoms.    Per providers note from [facility] ER, patient has a similar presentation today.  Conversational dyspnea without hypoxia.  Positive tachypnea.  There does appear to be some anxiety component.  Patient is able to be coached to lower and slow her respirations.  Lungs relatively clear on examination, however, slightly diminished/decreased breath sounds at right lower lobe.  Undiscernible Rales.    Will not repeat chest x-ray as patient has had multiple chest x-rays over the past month.  Will treat based on symptoms and physical exam findings.    Patient has not yet seen her primary care doctor despite multiple visits to the immediate care and emergency room over the past month.  However, she does state that she is due to see her primary care doctor on Wednesday.  Did discuss with patient I would recommend a pulmonologist follow-up as well due to respiratory and ongoing complaints with potential of long hauler COVID-19 syndrome.    Patient has similar complaints of chest pain, dizziness, lightheadedness, shortness of breath, fatigue, cough.  Patient continually requests cough medication \"that I can take in the daytime\".  Patient was prescribed cough syrup with hydrocodone and it from ER on March 19.  She is requesting refill.    Shared decision making with patient at bedside.  Did discuss with patient that we can perform similar ER workup in immediate care clinic today.  Will trial prednisone and DuoNeb inhaler.  Patient is aware that if her cardiac enzyme is abnormal she will need to go to ER as well as if her D-dimer is abnormal she will need to go to ER because of her allergy to contrast dye.  Patient verbalized understanding.      Patient is aware that if workup is  reassuring and negative in immediate care clinic, will treat for atypical pneumonia with azithromycin and steroids.  Would recommend pulmonology follow-up.    Patient had slight improvement of anxiety, tachypnea, conversational dyspnea with prednisone, DuoNeb, reassurance, rest.    Patient workup reassuring and negative in immediate care clinic today.  EKG was similar findings on EKG performed March 19.    Azithromycin and prednisone and Tessalon Perles and Cheratussin prescribed.  Patient is aware of medication safety and precautions regarding Cheratussin use.  She is aware to go see her primary care doctor on Monday.  Would also recommend pulmonology follow-up.  Patient is aware of signs symptoms that would warrant emergency room evaluation.    Did discuss case with supervising physician, Dr. Murrieta, she agrees with workup, plan of care, prompt follow-up, strict ER precautions.            Medical Decision Making  Amount and/or Complexity of Data Reviewed  External Data Reviewed: labs, radiology, ECG and notes.  Labs: ordered.  ECG/medicine tests: ordered and independent interpretation performed. Decision-making details documented in ED Course.        Disposition and Plan     Clinical Impression:  1. Atypical pneumonia         Disposition:  Discharge  4/4/2025 12:34 pm    Follow-up:  Michael James, DO  [address] [place] [address], [address]  [place] [address]  [phone]    Schedule an appointment as soon as possible for a visit   This is a pulmonologist I would like you to follow up with          Medications Prescribed:  Discharge Medication List as of 4/4/2025 12:35 PM        START taking these medications    Details   azithromycin (ZITHROMAX Z-MARCOS) 250 MG Oral Tab 500 mg once followed by 250 mg daily x 4 days, Normal, Disp-6 tablet, R-0      predniSONE 20 MG Oral Tab Take 1 tablet (20 mg total) by mouth daily for 5 days. Patient has tolerated prednisone in the past, Normal, Disp-5 tablet, R-0      !! benzonatate  200 MG Oral Cap Take 1 capsule (200 mg total) by mouth 3 (three) times daily as needed for cough., Normal, Disp-30 capsule, R-0      guaiFENesin-codeine 100-10 MG/5ML Oral Solution Take 5 mL by mouth every 6 (six) hours as needed for cough., Normal, Disp-140 mL, R-0Supervising Physician Dr. Paola Lazar       !! - Potential duplicate medications found. Please discuss with provider.              Supplementary Documentation:

## 2025-04-04 NOTE — ED INITIAL ASSESSMENT (HPI)
Dx with covid on 3/11, has been seen several times for cough, denies fever, occ sob, states cough medicine not helping

## 2025-04-09 PROBLEM — F33.2 MDD (MAJOR DEPRESSIVE DISORDER), RECURRENT SEVERE, WITHOUT PSYCHOSIS (HCC): Status: ACTIVE | Noted: 2025-04-09

## 2025-04-10 PROBLEM — F41.1 GENERALIZED ANXIETY DISORDER: Status: ACTIVE | Noted: 2024-12-11

## 2025-04-14 ENCOUNTER — TELEPHONE (OUTPATIENT)
Dept: PODIATRY CLINIC | Facility: CLINIC | Age: 75
End: 2025-04-14

## 2025-04-14 NOTE — TELEPHONE ENCOUNTER
Called patient # and her  Thiago answered ( power of ) and told me his wife is in the hospital and they called on Saturday to have the nat canceled. I will take care of cancellation. Oralia Levine

## 2025-04-22 NOTE — PROGRESS NOTES
PUSH LOTS OF CLEAR FLUIDS AND MINIMIZE DAIRY PRODUCTS TILL BETTER.        Patient is a 73 year old , , female with past medical history of anxiety, depression, GERD and back pain who was admitted to the hospital for altered mental status and confusion. The patient has been demonstrating confusion, restlessness, agitation. Patient indicated for psych consult for evaluation and advise.  Consult Duration   The patient seen for over 50-minute, follow-up evaluation, over 50% counseling and coordinating care addressing  confusion, restlessness, agitation.    Record reviewed, communication with attending, communication with RN and patient seen face to face evaluation.  History of Present Illness:   Communicating with the team, indicating the patient continued demonstrating limited interaction.  RN indicated that patient asked if she wanted to eat and the patient with her and and she ate.    Patient receiving Abilify 5 mg, Ativan 0.5 mg TID, lexapro 15  mg, lithium 150 twice daily    The patient seen today laying in hospital bed with  at bedside. The patient is still not communicating verbally.  The patient noticeably restless she cannot sit still.  The patient today looking at me and shortly closes her eyes or reposition her posture.  Patient did not communicate or say any word.    Her  reinforcing severe depressive episodes patient have lost her wife and the recent depression triggered mostly by her son indicating to them that he does not want to deal with them anymore year ago and never been seen.    According to  last year patient was barely functioning where she lost weight, appetite, energy and motivation and staying home most of the time laying down in bed and not functioning.    The patient has been demonstrating increased hopelessness, helplessness with melancholic depression, catatonia and lack of thrive.  Patient has been demonstrating impairment in her care for self.      The patient has not been acting bizarre or agitated.    Concerned that the  patient depression has becoming resistant to the treatment.  Discussed with patient and  the need for inpatient admission and the indication for possible ECT.  Patient providing response but has been highly agreeable.    MRI most likely indicate vascular ischemic changes with no acute abnormality.    Patient has been compliant on her medication.      Collateral obtained from psychiatric liaison from patients .     reports that patient has had uncontrollable movements, is taking her clothes off, has been confused and does not know the names of anybody.     says that since Friday she has had these difficulties and it seemed to have gotten worse the last 2 days where now, in addition to those difficulties, she also cannot control her bowels.  She says she has been soiling her clothes and her bed.   said he wonders if it is could be attributed to the recent prescribing of meds with codeine that were meant to manage a cough that she had.   also reports that patient has \"been in bed over a year\".  He says that she does not say much and sleeps a lot.  He says she is grieving the loss of 2 dogs that  5 years ago as well as grieving the loss of their adopted son, who 7 yrs ago, at 31 y/o walked out of their lives.   He said that pt has friends and she does occasionally go to lunch with them and does attend AA meetings once a week.  He said that she will eat the food that he cooks for her.    states that he is power of  for his wife.  Counselor advised him to bring copies of that paperwork to the hospital so that staff can scan it into the clinic record.      Past Psychiatric/Medication History:  1. Prior diagnoses: anxiety, depression  2. Past psychiatric inpatient: 1 psychiatric admission for severe depression few years ago with [facility].  3. Past outpatient history: Patient seen by psychiatrist.  4. Past suicide history: Denied any  5. Medication history:  Abilify, bupropion, Lexapro, hydroxyzine.    Social History:   Patient lives with her  and adult son (41 years old). She is a retired teacher.    Patient had an adopted son (34 year old) who left the family 7 years ago.     Patient has been sober from alcohol for 9 years. Patient uses marijuana gummies at night.     Family History:  Unknown family history  Medical History:   Past Medical History  Past Medical History:    Anxiety state    Back problem    COMPRESSION FX    Cataract    Depression    Esophageal reflux    Hematoma and contusion of liver    STATES HAS BEEN THERE FOR MANY YEARS    History of fractured kneecap    RIGHT    IBS (irritable bowel syndrome)    Mitral prolapse    Osteoarthritis       Past Surgical History  Past Surgical History:   Procedure Laterality Date          Correct bunion,othr methods      Correct bunion,simple      BILAT.     Dilation/curettage,diagnostic      FOR \"MOLE PREGNANCY\"    Other Right     ORIF RIGHT ANKLE    Spine surgery procedure unlisted      cervical spine 2020    Total knee replacement         Family History  Family History   Problem Relation Age of Onset    Heart Disorder Father     Cancer Mother         ESOPHAGUS       Social History  Social History     Socioeconomic History    Marital status:    Tobacco Use    Smoking status: Former     Current packs/day: 2.00     Average packs/day: 2.0 packs/day for 15.0 years (30.0 ttl pk-yrs)     Types: Cigarettes    Smokeless tobacco: Never    Tobacco comments:     QUIT IN    Vaping Use    Vaping status: Never Used   Substance and Sexual Activity    Alcohol use: No    Drug use: No     Social Determinants of Health     Financial Resource Strain: Low Risk  (2022)    [facility], [facility]    Overall Financial Resource Strain (CARDIA)     Difficulty of Paying Living Expenses: Not hard at all   Food Insecurity: No Food Insecurity  (4/11/2024)    Food Insecurity     Food Insecurity: Never true   Transportation Needs: No Transportation Needs (4/11/2024)    Transportation Needs     Lack of Transportation: No    Received from [facility], [facility]    Social Connections   Housing Stability: Low Risk  (4/11/2024)    Housing Stability     Housing Instability: No           Current Medications:  Current Facility-Administered Medications   Medication Dose Route Frequency    lithium carbonate cap 150 mg  150 mg Oral BID with meals    LORazepam (Ativan) tab 0.5 mg  0.5 mg Oral TID    ARIPiprazole (Abilify) tab 5 mg  5 mg Oral Nightly    escitalopram (Lexapro) tab 15 mg  15 mg Oral Nightly    enoxaparin (Lovenox) 40 MG/0.4ML SUBQ injection 40 mg  40 mg Subcutaneous Daily    ascorbic acid (Vitamin C) tab 500 mg  500 mg Oral Daily    cetirizine (ZyrTEC) tab 5 mg  5 mg Oral Nightly    acetaminophen (Tylenol Extra Strength) tab 500 mg  500 mg Oral Q4H PRN    melatonin tab 3 mg  3 mg Oral Nightly PRN    ondansetron (Zofran) 4 MG/2ML injection 4 mg  4 mg Intravenous Q6H PRN    polyethylene glycol (PEG 3350) (Miralax) 17 g oral packet 17 g  17 g Oral Daily PRN    sennosides (Senokot) tab 17.2 mg  17.2 mg Oral Nightly PRN    bisacodyl (Dulcolax) 10 MG rectal suppository 10 mg  10 mg Rectal Daily PRN    fleet enema (Fleet) 7-19 GM/118ML rectal enema 133 mL  1 enema Rectal Once PRN    LORazepam (Ativan) tab 1 mg  1 mg Oral Q6H PRN    fluticasone propionate (Flonase) 50 MCG/ACT nasal suspension 1 spray  1 spray Each Nare Daily    albuterol (Ventolin HFA) 108 (90 Base) MCG/ACT inhaler 2 puff  2 puff Inhalation Q4H PRN    cholecalciferol (Vitamin D3) tab 1,000 Units  1,000 Units Oral Daily    LORazepam (Ativan) 2 mg/mL injection 2 mg  2 mg Intramuscular Q6H PRN    haloperidol lactate (Haldol) 5 MG/ML injection 2 mg  2 mg Intramuscular Q6H PRN     Medications Prior to Admission   Medication Sig    cholecalciferol 1000 UNITS Oral  Cap Take 1 capsule (1,000 Units total) by mouth daily.       Allergies  Allergies   Allergen Reactions    Radiology Contrast Iodinated Dyes HIVES     HIVES AND THROAT TIGHTENED WHILE HAVING AN MRI    Sulfa Antibiotics HIVES     \"got sicker\"    Seroquel [Quetiapine] UNKNOWN    Cinnamon RASH       Review of Systems:   As by Admitting/Attending    Results:   Laboratory Data:  Lab Results   Component Value Date    WBC 10.6 04/18/2024    HGB 13.8 04/18/2024    HCT 42.3 04/18/2024    .0 (H) 04/18/2024    CREATSERUM 0.84 04/18/2024    BUN 13 04/18/2024     04/18/2024    K 4.4 04/18/2024     04/18/2024    CO2 26.0 04/18/2024     (H) 04/18/2024    CA 9.7 04/18/2024    ALB 4.1 04/13/2024    ALKPHO 78 04/13/2024    TP 6.4 04/13/2024    AST 22 04/13/2024    ALT 45 04/13/2024    T4F 1.7 04/12/2024    TSH 0.528 (L) 04/12/2024     01/19/2022    DDIMER <0.27 01/12/2023    CRP 8.33 (H) 09/04/2020    MG 2.3 04/18/2024    TROP <0.045 08/26/2020    B12 484 04/12/2024    ETOH <3 04/10/2024         Imaging:  MRI BRAIN (CPT=70551)    Result Date: 4/18/2024  CONCLUSION:  1. No acute intracranial process. 2. 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 3. Mild chronic microangiopathic ischemic changes.   A preliminary report was issued by the [facility] teleradiology service. There are no major discrepancies.  [facility]  Dictated by (CST): Boris Rodriguez MD on 4/18/2024 at 7:11 AM     Finalized by (CST): Boris Rodriguez MD on 4/18/2024 at 7:21 AM           Vital Signs:   Blood pressure 132/84, pulse 103, temperature 97.7 °F (36.5 °C), temperature source Axillary, resp. rate 18, height 60\", weight 49.1 kg (108 lb 3.2 oz), SpO2 94%.    Mental Status Exam:   Appearance: Stated age female, in hospital gown, laying down in hospital bed.  Psychomotor: The patient demonstrating slight restlessness with repositioning herself frequently, otherwise no agitation but  easily falling asleep.  Orientation: Alert and oriented to self, date and location but not to exact condition.  Gait: Not evaluated.  Attitude/Coorperation: Patient has limited cooperation due to her distractibility.  Behavior: Patient would look at me and not answering the questions.  Speech: Speech was completely absent today.  Mood: Patient demonstrated apathy with lack of ability to express emotion.  Affect: Dysphoric but restricted affect.  Thought process: Poverty of speech  Thought content: Patient repeatedly denied any suicidal or homicidal ideation.  Perceptions: Patient denying any auditory visual hallucination  Concentration: Grossly distracted  Memory: Grossly limited  Intellect: Average.  Judgment and Insight: Impaired due to the severity of her depression and lack of ability to care for self.    Impression:     Major depressive disorder, recurrent episode, severe with catatonia  Delirium due to another medical condition.    Patient is a 73 year old , , female with past medical history of anxiety, depression who was admitted to the hospital for Altered mental status, unspecified altered mental status type: The patient has been demonstrating confusion, restlessness, agitation.    The patient is restless, irritable, unable to communicate, keep moving back-and-forth and aimless movement with inability to express her emotion and not communicating verbally.  Patient has been taking multiple antidepressant medication with recent cough medicine with codeine indicate the high possibility for serotonin syndrome versus excited catatonia.    4/12/2024: The patient who presented with catatonic anxiety type mixed with serotonin syndrome due to excessive antidepressant medication and mixed with codeine.  The patient has been demonstrating improvement in her cognition, ability to communicate verbally otherwise demonstrating severe depression with no safety concern and no agitation.    4/12/2024:  Patient continues to demonstrate alternation in her mood and cognition with episodes of increased anxiety and restlessness.    4/15/2024: The patient continued demonstrating depression process and found withdrawn.  No restlessness or agitation has been observed or reported.    4/16/2024: The patient who has been demonstrating catatonic presentation with limited verbal communication continue demonstrate severe depressive episode would like for ability to care for self.    4/17/2024: The patient continues to demonstrate severe depression with catatonia, restlessness, and no verbal communication. Will order MRI brain and if negative transfer to inpatient psychiatric hospital for ECT.     4/18/2024: Patient continued demonstrating serious concern about her lack of ability to care for self and resistant to for depression with the development of catatonia.  Communicate with Sinan Guzmán to transfer patient to inpatient admission with possible ECT.    Discussed risk and benefit, acknowledging the current symptom and severity.  At this point, I would recommend the following approach:     Focus on safety.  Patient indicated for inpatient psych admission  Focus on education and support.  Encourage patient to be in her recliner.  Continue Abilify 5 mg nightly.  Continue Lexapro 15 mg nightly.  Continue Ativan 0.5 mg 3 times daily.  Continue lithium 150 mg twice daily.  Start vitamin E 400 international unit daily.  Psych liaison to communicate with Sinan Guzmán and process transfer  Coordinate plan with team    Orders This Visit:  Orders Placed This Encounter   Procedures    CBC With Differential With Platelet    Comp Metabolic Panel (14)    Urinalysis with Culture Reflex    Scan slide    Drug screen 7 w/out confirmation, urine    Ethyl Alcohol    Acetaminophen (Tylenol), S    Salicylate, Serum    Magnesium    Vitamin B12    TSH W Reflex To Free T4    T4, FREE (S)    Potassium    Free T3 (Triiodothryronine)    Potassium    Basic  Metabolic Panel (8)    T Pallidum Screening [place]ie    CBC, Platelet; No Differential    Basic Metabolic Panel (8)    REDRAW [facility]       Meds This Visit:  Requested Prescriptions      No prescriptions requested or ordered in this encounter     Jason Mckenzie MD  4/18/2024    Note to Patient: The 21st Century Cures Act makes medical notes like these available to patients in the interest of transparency. However, be advised this is a medical document. It is intended as peer to peer communication. It is written in medical language and may contain abbreviations or verbiage that are unfamiliar. It may appear blunt or direct. Medical documents are intended to carry relevant information, facts as evident, and the clinical opinion of the practitioner. This note may have been transcribed using a voice dictation system. Voice recognition errors may occur. This should not be taken to alter the content or meaning of this note.

## 2025-04-24 ENCOUNTER — HOSPITAL ENCOUNTER (OUTPATIENT)
Dept: GENERAL RADIOLOGY | Age: 75
Discharge: HOME | End: 2025-04-24
Attending: STUDENT IN AN ORGANIZED HEALTH CARE EDUCATION/TRAINING PROGRAM
Payer: MEDICARE

## 2025-04-24 ENCOUNTER — TELEPHONE (OUTPATIENT)
Dept: ORTHOPEDICS CLINIC | Facility: CLINIC | Age: 75
End: 2025-04-24

## 2025-04-24 ENCOUNTER — OFFICE VISIT (OUTPATIENT)
Dept: ORTHOPEDICS CLINIC | Facility: CLINIC | Age: 75
End: 2025-04-24
Payer: MEDICARE

## 2025-04-24 DIAGNOSIS — S42.201D CLOSED FRACTURE OF PROXIMAL END OF RIGHT HUMERUS WITH ROUTINE HEALING, UNSPECIFIED FRACTURE MORPHOLOGY, SUBSEQUENT ENCOUNTER: Primary | ICD-10-CM

## 2025-04-24 DIAGNOSIS — S42.201D CLOSED FRACTURE OF PROXIMAL END OF RIGHT HUMERUS WITH ROUTINE HEALING, UNSPECIFIED FRACTURE MORPHOLOGY, SUBSEQUENT ENCOUNTER: ICD-10-CM

## 2025-04-24 DIAGNOSIS — Z47.89 ORTHOPEDIC AFTERCARE: ICD-10-CM

## 2025-04-24 PROCEDURE — 1160F RVW MEDS BY RX/DR IN RCRD: CPT | Performed by: STUDENT IN AN ORGANIZED HEALTH CARE EDUCATION/TRAINING PROGRAM

## 2025-04-24 PROCEDURE — 73030 X-RAY EXAM OF SHOULDER: CPT | Performed by: STUDENT IN AN ORGANIZED HEALTH CARE EDUCATION/TRAINING PROGRAM

## 2025-04-24 PROCEDURE — 1159F MED LIST DOCD IN RCRD: CPT | Performed by: STUDENT IN AN ORGANIZED HEALTH CARE EDUCATION/TRAINING PROGRAM

## 2025-04-24 PROCEDURE — 99213 OFFICE O/P EST LOW 20 MIN: CPT | Performed by: STUDENT IN AN ORGANIZED HEALTH CARE EDUCATION/TRAINING PROGRAM

## 2025-04-24 PROCEDURE — 1111F DSCHRG MED/CURRENT MED MERGE: CPT | Performed by: STUDENT IN AN ORGANIZED HEALTH CARE EDUCATION/TRAINING PROGRAM

## 2025-04-28 NOTE — PROGRESS NOTES
[facility]  [address] [facility] [address]  [phone]     NURSING INTAKE COMMENTS:   Chief Complaint   Patient presents with    Fracture     R shoulder f/u- stated it's better but still has pain 6/10 all the time            The following individual(s) verbally consented to be recorded using ambient AI listening technology and understand that they can each withdraw their consent to this listening technology at any point by asking the clinician to turn off or pause the recording:    Patient name: Meli Antonio      HPI:   History of Present Illness  eMli Antonio is a 74 year old female who presents with right shoulder pain after a proximal humerus fracture.    The right shoulder pain has improved since the last visit but persists, especially when she raises her arm. She rates the pain as four out of ten, which is less severe than previously. The discomfort affects her sleep, as she wakes up when turning over due to the arm pain.    The fracture occurred over three months ago, resulting from a fall caused by her knee giving way, leading her to trip over a carpet. She has been taking Tylenol daily for pain management, although it does not provide significant relief.    No other symptoms were reported in the review of systems.        Past Medical History[1]  Past Surgical History[2]  Current Medications[3]  Allergies[4]  Family History[5]    Social History     Occupational History    Not on file   Tobacco Use    Smoking status: Former     Current packs/day: 2.00     Average packs/day: 2.0 packs/day for 15.0 years (30.0 ttl pk-yrs)     Types: Cigarettes    Smokeless tobacco: Never    Tobacco comments:     QUIT IN 1983   Vaping Use    Vaping status: Never Used   Substance and Sexual Activity    Alcohol use: No    Drug use: No    Sexual activity: Not on file        Review of Systems:  GENERAL: denies fevers, chills, night sweats, fatigue, unintentional weight loss/gain  SKIN: denies skin lesions, open  sores, rash  HEENT:denies recent vision change, new nasal congestion,hearing loss, tinnitus, sore throat, headaches  RESPIRATORY: denies new shortness of breath, cough, asthma, wheezing  CARDIOVASCULAR: denies chest pain, leg cramps with exertion, palpitations, leg swelling  GI: denies abdominal pain, nausea, vomiting, diarrhea, constipation, hematochezia, worsening heartburn or stomach ulcers  : denies dysuria, hematuria, incontinence, increased frequency, urgency, difficulty urinating  MUSCULOSKELETAL: denies musculoskeletal complaints other than in HPI  NEURO: denies numbness, tingling, weakness, balance issues, dizziness, memory loss  PSYCHIATRIC: denies Hx of depression, anxiety, other psychiatric disorders  HEMATOLOGIC: denies blood clots, anemia, blood clotting disorders, blood transfusion  ENDOCRINE: denies autoimmune disease, thyroid issues, or diabetes  ALLERGY: denies asthma, seasonal allergies    Physical Examination:    There were no vitals taken for this visit.    Physical Exam      Constitutional: appears well hydrated, alert and responsive, no acute distress noted    RIGHT Shoulder Exam    Tender to palpation to right shoulder. Forward elevation to 130, abduction 140, 5/5 strength with respect to supraspinatus and infraspinatus    Imaging:     Results  RADIOLOGY  Proximal humerus X-ray: Healed proximal humerus fracture (01/17/2025)      Imaging was independently reviewed and interpreted by attending physician  No results found.     Labs:  Lab Results   Component Value Date    WBC 11.3 (H) 04/09/2025    HGB 13.9 04/09/2025    .0 04/09/2025      Lab Results   Component Value Date     (H) 04/10/2025    BUN 13 04/10/2025    CREATSERUM 0.96 04/10/2025    GFRNAA 92 01/19/2022    GFRAA 106 01/19/2022        Assessment and Plan:  Assessment & Plan  Closed fracture of proximal end of right humerus  Fracture healing well, three months post-injury. Residual pain present but improved. X-rays  confirm healing.  - Continue acetaminophen as needed for pain.  - No further follow-up unless new symptoms develop.      There are no diagnoses linked to this encounter.        Follow Up: No follow-ups on file.          Maciel Guardado MD Orthopedic Surgery / Sports Medicine Specialist  [facility]  [address] [place] [address]  [email or web address]    t: [phone] f: [phone]    This note was dictated using Dragon software.  While it was briefly proofread prior to completion, some grammatical, spelling, and word choice errors due to dictation may still occur.       [1]   Past Medical History:   Anxiety state    Back problem    COMPRESSION FX    Cataract    Depression    Esophageal reflux    GERD (gastroesophageal reflux disease)    Heart valve disease    MVP--congenital    Hematoma and contusion of liver    STATES HAS BEEN THERE FOR MANY YEARS    History of fractured kneecap    RIGHT    IBS (irritable bowel syndrome)    Mitral prolapse    Nausea vomiting and diarrhea    Osteoarthritis    Visual impairment    glasses   [2]   Past Surgical History:  Procedure Laterality Date          Correct bunion,othr methods      Correct bunion,simple      BILAT.     Dilation/curettage,diagnostic      FOR \"MOLE PREGNANCY\"    Ect provided Bilateral 2024    1st Tx. 24    Other Right     ORIF RIGHT ANKLE    Spine surgery procedure unlisted      cervical spine 2020    Total knee replacement     [3]   Current Outpatient Medications   Medication Sig Dispense Refill    buPROPion  MG Oral Tablet 24 Hr Take 1 tablet (300 mg total) by mouth daily. 30 tablet 0    ARIPiprazole 2 MG Oral Tab Take 1 tablet (2 mg total) by mouth daily. 30 tablet 0    rosuvastatin 10 MG Oral Tab Take 1 tablet (10 mg total) by mouth daily.      diclofenac 75 MG Oral Tab EC Take 1 tablet (75 mg total) by mouth in the morning and 1 tablet (75 mg total) in the evening.      mirtazapine 15 MG Oral Tab  Take 0.5 tablets (7.5 mg total) by mouth nightly.      hydrOXYzine 25 MG Oral Tab Take 1 tablet (25 mg total) by mouth at bedtime. 30 tablet 0    Cholecalciferol (VITAMIN D) 25 MCG (1000 UT) Oral Tab Take 1 tablet by mouth in the morning.     [4]   Allergies  Allergen Reactions    Radiology Contrast Iodinated Dyes HIVES     HIVES AND THROAT TIGHTENED WHILE HAVING AN MRI    Sulfa Antibiotics HIVES     \"got sicker\"    Methylprednisolone OTHER (SEE COMMENTS)    Seroquel [Quetiapine] UNKNOWN    Cinnamon RASH    Cinnamon RASH    Iodine (Topical) RASH   [5]   Family History  Problem Relation Age of Onset    Heart Disorder Father     Cancer Mother         ESOPHAGUS

## 2025-05-28 ENCOUNTER — HOSPITAL ENCOUNTER (OUTPATIENT)
Dept: GENERAL RADIOLOGY | Facility: HOSPITAL | Age: 75
Discharge: HOME | End: 2025-05-28
Attending: STUDENT IN AN ORGANIZED HEALTH CARE EDUCATION/TRAINING PROGRAM
Payer: MEDICARE

## 2025-05-28 ENCOUNTER — OFFICE VISIT (OUTPATIENT)
Age: 75
End: 2025-05-28
Payer: MEDICARE

## 2025-05-28 DIAGNOSIS — S42.201D CLOSED FRACTURE OF PROXIMAL END OF RIGHT HUMERUS WITH ROUTINE HEALING, UNSPECIFIED FRACTURE MORPHOLOGY, SUBSEQUENT ENCOUNTER: Primary | ICD-10-CM

## 2025-05-28 DIAGNOSIS — S42.201D CLOSED FRACTURE OF PROXIMAL END OF RIGHT HUMERUS WITH ROUTINE HEALING, UNSPECIFIED FRACTURE MORPHOLOGY, SUBSEQUENT ENCOUNTER: ICD-10-CM

## 2025-05-28 PROCEDURE — 1160F RVW MEDS BY RX/DR IN RCRD: CPT | Performed by: STUDENT IN AN ORGANIZED HEALTH CARE EDUCATION/TRAINING PROGRAM

## 2025-05-28 PROCEDURE — 1125F AMNT PAIN NOTED PAIN PRSNT: CPT | Performed by: STUDENT IN AN ORGANIZED HEALTH CARE EDUCATION/TRAINING PROGRAM

## 2025-05-28 PROCEDURE — 73030 X-RAY EXAM OF SHOULDER: CPT | Performed by: STUDENT IN AN ORGANIZED HEALTH CARE EDUCATION/TRAINING PROGRAM

## 2025-05-28 PROCEDURE — 1159F MED LIST DOCD IN RCRD: CPT | Performed by: STUDENT IN AN ORGANIZED HEALTH CARE EDUCATION/TRAINING PROGRAM

## 2025-05-28 PROCEDURE — 99214 OFFICE O/P EST MOD 30 MIN: CPT | Performed by: STUDENT IN AN ORGANIZED HEALTH CARE EDUCATION/TRAINING PROGRAM

## 2025-05-28 RX ORDER — TRAMADOL HYDROCHLORIDE 50 MG/1
TABLET ORAL
Qty: 20 TABLET | Refills: 1 | Status: SHIPPED | OUTPATIENT
Start: 2025-05-28

## 2025-05-29 NOTE — PROGRESS NOTES
[facility]  [address] [facility] [address]  [phone]     NURSING INTAKE COMMENTS:   Chief Complaint   Patient presents with    Shoulder Pain     Right shoulder f/u- pain rated 5/10- at night 8/10, keeps pt up at night- throbbing pain            The following individual(s) verbally consented to be recorded using ambient AI listening technology and understand that they can each withdraw their consent to this listening technology at any point by asking the clinician to turn off or pause the recording:    Patient name: Meli Antonio      HPI:   History of Present Illness  Meli Antonio is a 74 year old female who presents with persistent right arm pain.    She has been experiencing persistent right arm pain since her last visit. The pain is severe, rated as eight to nine out of ten, and radiates down to her hand. It is exacerbated by movement, such as turning in bed, and disrupts her sleep. Tylenol has been ineffective in alleviating the pain.    She has a history of depression for the past year and a half and is a recovering alcoholic, which influences her medication options. She has previously used tramadol, which she finds somewhat helpful, but has not used it for this specific issue. Oxycodone and Norco have been effective for her pain, but her family member expresses concern about her use due to her history of substance abuse.    No abnormal sensation when touched.        Past Medical History[1]  Past Surgical History[2]  Current Medications[3]  Allergies[4]  Family History[5]    Social History     Occupational History    Not on file   Tobacco Use    Smoking status: Former     Current packs/day: 2.00     Average packs/day: 2.0 packs/day for 15.0 years (30.0 ttl pk-yrs)     Types: Cigarettes    Smokeless tobacco: Never    Tobacco comments:     QUIT IN 1983   Vaping Use    Vaping status: Never Used   Substance and Sexual Activity    Alcohol use: No    Drug use: No    Sexual activity: Not on file         Review of Systems:  GENERAL: denies fevers, chills, night sweats, fatigue, unintentional weight loss/gain  SKIN: denies skin lesions, open sores, rash  HEENT:denies recent vision change, new nasal congestion,hearing loss, tinnitus, sore throat, headaches  RESPIRATORY: denies new shortness of breath, cough, asthma, wheezing  CARDIOVASCULAR: denies chest pain, leg cramps with exertion, palpitations, leg swelling  GI: denies abdominal pain, nausea, vomiting, diarrhea, constipation, hematochezia, worsening heartburn or stomach ulcers  : denies dysuria, hematuria, incontinence, increased frequency, urgency, difficulty urinating  MUSCULOSKELETAL: denies musculoskeletal complaints other than in HPI  NEURO: denies numbness, tingling, weakness, balance issues, dizziness, memory loss  PSYCHIATRIC: denies Hx of depression, anxiety, other psychiatric disorders  HEMATOLOGIC: denies blood clots, anemia, blood clotting disorders, blood transfusion  ENDOCRINE: denies autoimmune disease, thyroid issues, or diabetes  ALLERGY: denies asthma, seasonal allergies    Physical Examination:    There were no vitals taken for this visit.    Physical Exam  MUSCULOSKELETAL: Normal range of motion in shoulder.    Constitutional: appears well hydrated, alert and responsive, no acute distress noted    Right Shoulder Exam:    Forward elevation 140, abduction 140, ER 45, IR 25 mildly tender to palpation           Imaging:     Results  RADIOLOGY  Right shoulder x-ray: Bone appears healed (05/28/2025)      Imaging was independently reviewed and interpreted by attending physician  No results found.     Labs:  Lab Results   Component Value Date    WBC 11.3 (H) 04/09/2025    HGB 13.9 04/09/2025    .0 04/09/2025      Lab Results   Component Value Date     (H) 04/10/2025    BUN 13 04/10/2025    CREATSERUM 0.96 04/10/2025    GFRNAA 92 01/19/2022    GFRAA 106 01/19/2022        Assessment and Plan:  Assessment & Plan  Closed fracture of  proximal end of right humerus  Persistent severe right shoulder pain post-fracture, bone appears healed, possible soft tissue injury.  - Order right shoulder x-ray to confirm bone healing.  - Order MRI of the right shoulder to assess for soft tissue injury.  - Discuss potential for physical therapy if MRI does not show a full tear.  - Discuss next steps if MRI shows a full tear.    Right shoulder pain  Severe pain post-fracture, ineffective response to Tylenol and tramadol, concerns about stronger pain medications due to substance use and depression history.  - Prescribe tramadol for pain management, avoiding stronger narcotics due to risk of dependency and exacerbation of depression.    Possibility of rotator cuff tear  Concern for rotator cuff tear due to persistent severe pain and functional limitations despite apparent bone healing.  - Order MRI of the right shoulder to assess for rotator cuff tear.    Depression  Depression for over a year and a half, concerns about exacerbation with narcotic pain medications.  - Avoid prescribing narcotic pain medications due to risk of worsening depression.      Diagnoses and all orders for this visit:    Closed fracture of proximal end of right humerus with routine healing, unspecified fracture morphology, subsequent encounter  -     XR SHOULDER, COMPLETE (MIN 2 VIEWS), RIGHT (CPT=73030); Future  -     MRI SHOULDER, RIGHT (CPT=73221); Future  -     traMADol 50 MG Oral Tab; Please take 1 to 2 tablets every 4 hours as needed for pain.    Other orders  -     Naloxone HCl 4 MG/0.1ML Nasal Liquid; 4 mg by Nasal route as needed. If patient remains unresponsive, repeat dose in other nostril 2-5 minutes after first dose.          Follow Up: No follow-ups on file.          Maciel Guardado MD Orthopedic Surgery / Sports Medicine Specialist  [facility]  [address] [place] [address]  [email or web address]    t: [phone] f: [phone]    This note was  dictated using Dragon software.  While it was briefly proofread prior to completion, some grammatical, spelling, and word choice errors due to dictation may still occur.       [1]   Past Medical History:   Anxiety state    Back problem    COMPRESSION FX    Cataract    Depression    Esophageal reflux    GERD (gastroesophageal reflux disease)    Heart valve disease    MVP--congenital    Hematoma and contusion of liver    STATES HAS BEEN THERE FOR MANY YEARS    History of fractured kneecap    RIGHT    IBS (irritable bowel syndrome)    Mitral prolapse    Nausea vomiting and diarrhea    Osteoarthritis    Visual impairment    glasses   [2]   Past Surgical History:  Procedure Laterality Date          Correct bunion,othr methods      Correct bunion,simple      BILAT.     Dilation/curettage,diagnostic      FOR \"MOLE PREGNANCY\"    Ect provided Bilateral 2024    1st Tx. 24    Other Right     ORIF RIGHT ANKLE    Spine surgery procedure unlisted      cervical spine 2020    Total knee replacement     [3]   Current Outpatient Medications   Medication Sig Dispense Refill    traMADol 50 MG Oral Tab Please take 1 to 2 tablets every 4 hours as needed for pain. 20 tablet 1    Naloxone HCl 4 MG/0.1ML Nasal Liquid 4 mg by Nasal route as needed. If patient remains unresponsive, repeat dose in other nostril 2-5 minutes after first dose. 1 kit 0    ARIPiprazole 5 MG Oral Tab Take 1 tablet (5 mg total) by mouth daily. 30 tablet 0    rosuvastatin 10 MG Oral Tab Take 1 tablet (10 mg total) by mouth daily.      diclofenac 75 MG Oral Tab EC Take 1 tablet (75 mg total) by mouth in the morning and 1 tablet (75 mg total) in the evening.      Cholecalciferol (VITAMIN D) 25 MCG (1000 UT) Oral Tab Take 1 tablet by mouth in the morning.      BUPROPION  MG Oral Tablet 24 Hr TAKE ONE TABLET BY MOUTH ONE TIME DAILY 30 tablet 0   [4]   Allergies  Allergen Reactions    Radiology Contrast Iodinated Dyes HIVES     HIVES AND  THROAT TIGHTENED WHILE HAVING AN MRI    Sulfa Antibiotics HIVES     \"got sicker\"    Methylprednisolone OTHER (SEE COMMENTS)    Seroquel [Quetiapine] UNKNOWN    Cinnamon RASH    Cinnamon RASH    Iodine (Topical) RASH   [5]   Family History  Problem Relation Age of Onset    Heart Disorder Father     Cancer Mother         ESOPHAGUS

## 2025-06-11 DIAGNOSIS — S42.201D CLOSED FRACTURE OF PROXIMAL END OF RIGHT HUMERUS WITH ROUTINE HEALING, UNSPECIFIED FRACTURE MORPHOLOGY, SUBSEQUENT ENCOUNTER: ICD-10-CM

## 2025-06-12 ENCOUNTER — HOSPITAL ENCOUNTER (OUTPATIENT)
Dept: MRI IMAGING | Facility: HOSPITAL | Age: 75
Discharge: HOME | End: 2025-06-12
Attending: STUDENT IN AN ORGANIZED HEALTH CARE EDUCATION/TRAINING PROGRAM
Payer: MEDICARE

## 2025-06-12 DIAGNOSIS — S42.201D CLOSED FRACTURE OF PROXIMAL END OF RIGHT HUMERUS WITH ROUTINE HEALING, UNSPECIFIED FRACTURE MORPHOLOGY, SUBSEQUENT ENCOUNTER: ICD-10-CM

## 2025-06-12 PROCEDURE — 73221 MRI JOINT UPR EXTREM W/O DYE: CPT | Performed by: STUDENT IN AN ORGANIZED HEALTH CARE EDUCATION/TRAINING PROGRAM

## 2025-06-12 NOTE — TELEPHONE ENCOUNTER
Tramadol    DOS: n/a  Last OV: 5/28/25  Closed fracture of proximal end of right humerus with routine healing, unspecified fracture morphology   Last refill date: 6/6/25 #/refills: 20/0  Upcoming appt: none

## 2025-06-17 RX ORDER — TRAMADOL HYDROCHLORIDE 50 MG/1
TABLET ORAL EVERY 4 HOURS PRN
Qty: 20 TABLET | Refills: 0 | Status: SHIPPED | OUTPATIENT
Start: 2025-06-17

## 2025-06-27 DIAGNOSIS — S42.201D CLOSED FRACTURE OF PROXIMAL END OF RIGHT HUMERUS WITH ROUTINE HEALING, UNSPECIFIED FRACTURE MORPHOLOGY, SUBSEQUENT ENCOUNTER: ICD-10-CM

## 2025-06-27 NOTE — TELEPHONE ENCOUNTER
Tramadol 50mg    DOS: n/a  Last OV: 5/28/25  Closed fracture of proximal end of right humerus with routine healing,   Last refill date: 6/17/25 #/refills: 20/0  Upcoming appt: none

## 2025-07-01 RX ORDER — TRAMADOL HYDROCHLORIDE 50 MG/1
TABLET ORAL EVERY 4 HOURS PRN
Qty: 20 TABLET | Refills: 0 | Status: SHIPPED | OUTPATIENT
Start: 2025-07-01

## 2025-07-13 DIAGNOSIS — S42.201D CLOSED FRACTURE OF PROXIMAL END OF RIGHT HUMERUS WITH ROUTINE HEALING, UNSPECIFIED FRACTURE MORPHOLOGY, SUBSEQUENT ENCOUNTER: ICD-10-CM

## 2025-07-14 RX ORDER — TRAMADOL HYDROCHLORIDE 50 MG/1
TABLET ORAL EVERY 4 HOURS PRN
Qty: 20 TABLET | Refills: 0 | Status: SHIPPED | OUTPATIENT
Start: 2025-07-14

## 2025-07-14 NOTE — TELEPHONE ENCOUNTER
Tramadol    DOI: 1/9/25  Fall  Last OV: 5/28/25  Closed fracture of proximal end of right humerus   Last refill date: 7/1/25 #/refills: 20/0  Upcoming appt: none     LOV  5/28/25      Dr Guardado  \"She has a history of depression for the past year and a half and is a recovering alcoholic, which influences her medication options. She has previously used tramadol, which she finds somewhat helpful, but has not used it for this specific issue. Oxycodone and Norco have been effective for her pain, but her family member expresses concern about her use due to her history of substance abuse\"

## (undated) DEVICE — SUTURE VLOC 90 3-0 9\" 2044

## (undated) DEVICE — Device: Brand: JELCO

## (undated) DEVICE — TRAY SRGPRP PVP IOD WT SCRB SM

## (undated) DEVICE — CONTAINER SPEC STR 4OZ GRY LID

## (undated) DEVICE — SUTURE PDS II 2-0 CP

## (undated) DEVICE — 3M™ TEGADERM™ TRANSPARENT FILM DRESSING, 1626W, 4 IN X 4-3/4 IN (10 CM X 12 CM), 50 EACH/CARTON, 4 CARTON/CASE: Brand: 3M™ TEGADERM™

## (undated) DEVICE — Device: Brand: POWER-FLO®

## (undated) DEVICE — BANDAGE ROLL,100% COTTON, 6 PLY, LARGE: Brand: KERLIX

## (undated) DEVICE — GOWN SURG AERO BLUE PERF XLG

## (undated) DEVICE — ZIMMER® STERILE DISPOSABLE TOURNIQUET CUFF WITH PLC, DUAL PORT, SINGLE BLADDER, 30 IN. (76 CM)

## (undated) DEVICE — KIT DRN 1/8IN PVC 3 SPRG EVAC

## (undated) DEVICE — BATTERY

## (undated) DEVICE — PROXIMATE SKIN STAPLERS (35 WIDE) CONTAINS 35 STAINLESS STEEL STAPLES (FIXED HEAD): Brand: PROXIMATE

## (undated) DEVICE — DRESSING SILVERLON ISLAND 13IN

## (undated) DEVICE — BIPOLAR SEALER 23-112-1 AQM 6.0: Brand: AQUAMANTYS™

## (undated) DEVICE — FAN SPRAY KIT: Brand: PULSAVAC®

## (undated) DEVICE — Device

## (undated) DEVICE — DUAL CUT SAGITTAL BLADE

## (undated) DEVICE — T5 HOOD WITH PEEL AWAY FACE SHIELD

## (undated) DEVICE — SOL  .9 3000ML

## (undated) DEVICE — CHLORAPREP 26ML APPLICATOR

## (undated) DEVICE — GAUZE SPONGES,12 PLY: Brand: CURITY

## (undated) DEVICE — DERMABOND LIQUID ADHESIVE

## (undated) DEVICE — SUTURE MONOCRYL 2-0 SH

## (undated) DEVICE — SOL  .9 1000ML BTL

## (undated) DEVICE — BLADE SAW SAGITTAL 19.5

## (undated) DEVICE — DRAPE SHEET LG

## (undated) DEVICE — SOL  .9 1000ML BAG

## (undated) DEVICE — TOTAL KNEE: Brand: MEDLINE INDUSTRIES, INC.

## (undated) DEVICE — 20 ML SYRINGE LUER-LOCK TIP: Brand: MONOJECT

## (undated) DEVICE — STERILE LATEX POWDER-FREE SURGICAL GLOVESWITH NITRILE COATING: Brand: PROTEXIS

## (undated) DEVICE — NEEDLE HPO 18GA 1.5IN ECLPS

## (undated) DEVICE — POUCH: SSEAL TYVEK 2000/CS: Brand: MEDICAL ACTION INDUSTRIES

## (undated) NOTE — IP AVS SNAPSHOT
[address]  [address] [facility] [phone]                Discharge Summary   4/10/2017    Rui Stable           Admission Information        Provider Department    4/10/2017 Early PayorMD Pérez 4w/Sw/Se Take 10 mg by mouth nightly as needed for Sleep.                               STOP taking these medications     celecoxib 200 MG Caps   Commonly known as:  CeleBREX           TraMADol HCl 50 MG Tabs   Commonly known as:  ULTRAM                     Patient OxyContin are two types of medications that require a paper prescription copy handed to pharmacist. This means that you will have to come to the office to pick-up another prescription.  We do not want you to be without medication so please count the number · Multi-vitamin and/or iron supplements  · Gauze and paper tape for hip replacements    Additional Resources    [email or web address]    www.[employer]. com    www. arthritis. org    www. deput. com    www.[email or web address]. [employer]    Direct Anterior Hip Replacement Video: htt [email or web address] Glucose BUN Creatinine Calcium Alkaline Phosph AST    (04/11/17)  5.8 (04/11/17)  124 (H) (04/11/17)  8 (04/11/17)  0.55 (04/11/17)  8.3 (L) -- --      Metabolic Lab Results  (Last result in the past 90 days)    ALT Bilirubin,Total Total Protein Al Your unique [facility] Access Code: [email or web address]  Expires: 6/10/2017 11:46 AM    If you have questions, you can call [phone] to talk to our [facility] Staff. Remember, [facility] is NOT to be used for urgent needs. For medical emergencies, dial 911. Zolpidem Tartrate 10 MG Oral Tab       Use: Treat agitation and difficulty sleeping   Most common side effects: Drowsiness, slows breathing   What to report to your healthcare team: Problems staying awake, confusion, memory problems

## (undated) NOTE — IP AVS SNAPSHOT
Patient Demographics     Address  [address] [place] [address] Phone  [phone] (Home) *Preferred*  [phone] (Mobile) E-mail Address  [email or web address]      Patient Contacts     Name Relation Home Work Mobile    Thiago Antonio Spouse [phone]      Allergies as of 4/18/2024  Review status set to Review Complete on 4/11/2024       Noted Reaction Type Reactions    Radiology Contrast Iodinated Dyes 04/03/2017    HIVES    HIVES AND THROAT TIGHTENED WHILE HAVING AN MRI    Sulfa Antibiotics 08/26/2020    HIVES    \"got sicker\"    Seroquel [quetiapine] 08/26/2020    UNKNOWN    Cinnamon 08/26/2020    RASH      Code Status Information     Code Status    Full Code        Patient Instructions       At this time, it is recommended that you follow up with the following referrals for psychiatry and therapy. Please call 911 or go to the nearest emergency room if symptoms persist or worsen.    Dr. Jason Mckenzie MD:  [address], [place] [address]  [facility]: [address] [place] [address]  Phone: [phone]    [facility]: [address] [place] [address]  Phone: [phone]    Dr. Christiana Madison MD: [address] [place] [address]  Phone: [phone]    [facility]: [address] [place] [address]  Phone: [phone]    Dr. Alma La MD: [address] [place] [address]  Phone: [phone]    [facility] - therapy and psychiatry: [address] [place] [address]  Phone: [phone]    Diet Instruction: Use of  Ensure plus  or equivalent Oral Nutrition Supplement 1 can  /day to maximize nutrition intake.          Your Home Meds List      TAKE these medications       Instructions Authorizing Provider Morning Afternoon Evening As Needed   albuterol 108 (90 Base) MCG/ACT Aers  Commonly known as: Ventolin  HFA      Inhale 2 puffs into the lungs every 4 (four) hours as needed for Wheezing.   Drea Yeboah   [    ]   [    ]   [    ]   [    ]     ARIPiprazole 5 MG Tabs  Commonly known as: Abilify      Take 1 tablet (5 mg total) by mouth at bedtime.   Drea Yeboah   [    ]   [    ]   [    ]   [    ]     cholecalciferol 1000 UNITS Caps  Commonly known as: Vitamin D3      Take 1 capsule (1,000 Units total) by mouth daily.    [    ]   [    ]   [    ]   [    ]     escitalopram 5 MG Tabs  Commonly known as: Lexapro      Take 3 tablets (15 mg total) by mouth nightly.   Drea Yeboah   [    ]   [    ]   [    ]   [    ]     lithium carbonate 150 MG Caps      Take 1 capsule (150 mg total) by mouth 2 (two) times daily with meals.   Dera Yeboah   [    ]   [    ]   [    ]   [    ]     LORazepam 0.5 MG Tabs  Commonly known as: Ativan      Take 1 tablet (0.5 mg total) by mouth in the morning, at noon, and at bedtime.   Drea Yeboah   [    ]   [    ]   [    ]   [    ]              [phone] - MAR ACTION REPORT  (last 48 hrs)    ** SITE UNKNOWN **     Order ID Medication Name Action Time Action Reason Comments    [id number] ARIPiprazole (Abilify) tab 5 mg 04/17/24 2004 Given      [id number] LORazepam (Ativan) tab 0.5 mg 04/18/24 0942 Given      [id number] LORazepam (Ativan) tab 0.5 mg 04/18/24 1533 Given      [id number] ascorbic acid (Vitamin C) tab 500 mg 04/17/24 0856 Given      [id number] ascorbic acid (Vitamin C) tab 500 mg 04/18/24 0942 Given      [id number] cetirizine (ZyrTEC) tab 5 mg 04/16/24 2039 Given      [id number] cetirizine (ZyrTEC) tab 5 mg 04/17/24 1954 Given      [id number] cholecalciferol (Vitamin D3) tab 1,000 Units 04/17/24 0856 Given      [id number] cholecalciferol (Vitamin D3) tab 1,000 Units 04/18/24 0942 Given      [id number] escitalopram (Lexapro) tab 15 mg 04/17/24 2000 Given      [id number] lithium carbonate cap 150 mg 04/18/24 0943 Given      [id number] lithium carbonate cap 150 mg 04/18/24 1758 Given             LEFT LOWER ABDOMEN     Order ID Medication Name Action Time Action Reason Comments    [id number] enoxaparin (Lovenox) 40 MG/0.4ML SUBQ injection 40 mg 04/17/24 0856 Given      [id number] enoxaparin (Lovenox) 40 MG/0.4ML SUBQ injection 40 mg 04/18/24 0943 Given            RIGHT ARM     Order ID Medication Name Action Time Action Reason Comments    [id number] LORazepam (Ativan) 2 mg/mL injection 2 mg 04/17/24 2134 Given      [id number] haloperidol lactate (Haldol) 5 MG/ML injection 5 mg 04/17/24 2134 Given              Recent Vital Signs    Flowsheet Row Most Recent Value   /82 Filed at 04/18/2024 1530   Pulse 99 Filed at 04/18/2024 1530   Resp 18 Filed at 04/18/2024 1530   Temp 97.5 °F (36.4 °C) Filed at 04/18/2024 1530   SpO2 92 % Filed at 04/18/2024 1530      Patient's Most Recent Weight    Flowsheet Row Most Recent Value   Patient Weight 49.1 kg (108 lb 3.2 oz)         Lab Results Last 24 Hours      REDRAW BMP [[id number]] (Normal)  Resulted: 04/18/24 0814, Result status: Final result   Ordering provider: Drea Yeboah MD  04/18/24 0651 Resulting lab: [facility] ([facility])    Specimen Information    Type Source Collected On   Blood — 04/18/24 0726          Components    Component Value Reference Range Flag Lab   Potassium 4.4 3.5 - 5.1 mmol/L — [facility] ([facility])   BUN 13 9 - 23 mg/dL — [facility] ([facility])            T Pallidum Screening [place] [[id number]] (Normal)  Resulted: 04/18/24 0655, Result status: Final result   Ordering provider: Drea Yeboah MD  04/17/24 2300 Resulting lab: [facility] ([facility])    Specimen Information    Type Source Collected On   Blood — 04/18/24 0546          Components    Component Value Reference Range Flag Lab   Treponemal Antibodies Nonreactive Nonreactive  — [facility] ([facility])   Comment:        If a high clinical suspicion exists for a recent infection, suggest repeat testing at a later interval.             Basic Metabolic Panel (8) [[id number]] (Abnormal)  Resulted: 04/18/24 0652, Result status: Final result   Ordering provider: Drea Yeboah MD  04/17/24 2300 Resulting lab: [facility] ([facility])    Specimen Information    Type Source Collected On   Blood — 04/18/24 0546          Components    Component Value Reference Range Flag Lab   Glucose 152 70 - 99 mg/dL [facility] ([facility])   Sodium 141 136 - 145 mmol/L — [facility] ([facility])   Potassium — — — [facility] ([facility])   Comment: Test not reported due to hemolysis.  Test reordered by laboratory.       Chloride 108 98 - 112 mmol/L — [facility] ([facility])   CO2 26.0 21.0 - 32.0 mmol/L — [facility])   Anion Gap 7 0 - 18 mmol/L — [facility] ([facility])   BUN — — — [facility] ([facility])   Comment: Test not reported due to hemolysis.  Test reordered by laboratory.       Creatinine 0.84 0.55 - 1.02 mg/dL — [facility] ([facility])   BUN/CREA Ratio — — — —   Calcium, Total 9.7 8.7 - 10.4 mg/dL — [facility] ([facility])   Calculated Osmolality — — — —   eGFR-Cr 73 >=60 mL/min/1.73m2 — [facility] ([facility])            Magnesium [[id number]] (Normal)  Resulted: 04/18/24 0648, Result status: Final result   Ordering provider: Antonino Jung MD  04/17/24 2300 Resulting lab: [facility] ([facility])    Specimen Information    Type Source Collected On   Blood — 04/18/24 0546          Components    Component Value Reference Range Flag Lab   Magnesium 2.3 1.6 - 2.6 mg/dL — [facility] ([facility])            CBC, Platelet; No Differential [[id number]] (Abnormal)  Resulted: 04/18/24 0636, Result status: Final result   Ordering provider: Drea Yeboah MD  04/17/24 2300 Resulting lab: [facility] ([facility])    Specimen Information    Type Source Collected On   Blood — 04/18/24 0546          Components    Component Value Reference Range [facility]   WBC 10.6 4.0 - 11.0 x10(3) uL — [facility] ([facility])   RBC 4.97 3.80 - 5.30 x10(6)uL —  Health system)   HGB 13.8 12.0 - 16.0 g/dL — [facility] ([facility])   HCT 42.3 35.0 - 48.0 % — Health system)   MCV 85.1 80.0 - 100.0 fL — Health system)   MCH 27.8 26.0 - 34.0 pg — Health system)   MCHC 32.6 31.0 - 37.0 g/dL — Health system)   RDW 13.5 11.0 - 15.0 % — Health system)   RDW-SD 41.1 35.1 - 46.3 fL — [facility])   .0 150.0 - 450.0 10(3)uL H Health system)            Testing Performed By     Lab - Abbreviation Name Director Address Valid Date Range    162 - [facility] Loco Kaur M.D. [facility] SISSY Robb [facility] [address] 03/19/20 1442 - Present            Microbiology Results (All)     Procedure Component Value Units Date/Time    Rapid SARS-CoV-2 by PCR [[id number]]  (Normal) Collected: 04/18/24 1539    Order Status: Completed Lab Status: Final result Updated: 04/18/24 1622    Specimen: Other from [place]      Rapid SARS-CoV-2 by PCR Not Detected         H&P - H&P Note      H&P signed by Antonino Jung MD at 4/11/2024  1:16 PM  Version 1 of 1    Author: Antonino Jung MD Service: Hospitalist Author Type: Physician    Filed: 4/11/2024  1:16 PM Date of Service: 4/11/2024  6:25 AM Status: Signed    : Antonino Jung MD (Physician)         [facility] Hospitalist H&P       CC:   Chief Complaint   Patient presents with    Altered Mental Status        PCP: SIRI TABOR    History of Present Illness: Patient is a 73 year old female with PMH sig for MVP, EDEL/MDD, GERD, IBS, who presents with AMS.  Patient's  who is the POA brought her in because she was acting different.  She has a history of depression and mood changes but this seemed a little more concerning to him.  2 days ago patient did have a cough which seems to be better at this time.  Patient was put on abx and steroids.   at bedside states one of their adopted sons  moved away and another son is going through a divorce and this has put  a lot of stress on her.  She had some lose stools 1-2 times.  Patient denies CP, SOB, abd pain, n/v, f/c, dysuria.       In the ER she was afebrile and normotensive.  Leukocytosis with bicarb 19, elevated AST ALT  Urine positive for cannabis and ecstasy    PMH  Past Medical History:    Anxiety state    Back problem    COMPRESSION FX    Cataract    Depression    Esophageal reflux    Hematoma and contusion of liver    STATES HAS BEEN THERE FOR MANY YEARS    History of fractured kneecap    RIGHT    IBS (irritable bowel syndrome)    Mitral prolapse    Osteoarthritis        PSH  Past Surgical History:   Procedure Laterality Date          Correct bunion,othr methods      Correct bunion,simple      BILAT.     Dilation/curettage,diagnostic      FOR \"MOLE PREGNANCY\"    Other Right     ORIF RIGHT ANKLE    Spine surgery procedure unlisted      cervical spine 2020    Total knee replacement          ALL:  Allergies   Allergen Reactions    Radiology Contrast Iodinated Dyes HIVES     HIVES AND THROAT TIGHTENED WHILE HAVING AN MRI    Sulfa Antibiotics HIVES     \"got sicker\"    Seroquel [Quetiapine] UNKNOWN    Cinnamon RASH        Home Medications:  No outpatient medications have been marked as taking for the 4/10/24 encounter (Hospital Encounter).         Soc Hx  Social History     Tobacco Use    Smoking status: Former     Current packs/day: 2.00     Average packs/day: 2.0 packs/day for 15.0 years (30.0 ttl pk-yrs)     Types: Cigarettes    Smokeless tobacco: Never    Tobacco comments:     QUIT IN    Substance Use Topics    Alcohol use: No        Fam Hx  Family History   Problem Relation Age of Onset    Heart Disorder Father     Cancer Mother         ESOPHAGUS       Review of Systems  Comprehensive ROS reviewed and negative except for what's stated above.     OBJECTIVE:  BP (!) 124/104 (BP Location: Right arm)   Pulse 109   Temp 97.5 °F (36.4 °C) (Axillary)   Resp 20   Ht 5' (1.524 m)   Wt 108 lb 3.2 oz  (49.1 kg)   SpO2 94%   BMI 21.13 kg/m²   General: Alert, no acute distress, restless   HEENT: oral mucosa normal   Neck: non tender, no adenopathy   Lungs: clear to ausculation bilaterally  Heart: Regular rate and rhythm  Abdomen: soft, non tender, non distended   Extremities: No edema  Skin: no new rash, normal color  Neuro: 5/5 strength in bilateral extremities, normal sensations  Psych: appropriate affect   Diagnostic Data:    CBC/Chem  Recent Labs   Lab 04/08/24  1217 04/10/24  1726   WBC 8.0 16.4*   HGB 14.2 13.8   MCV 81.6 82.9   .0 334.0       Recent Labs   Lab 04/08/24  1217 04/10/24  1726    138   K 3.7 3.9   * 106   CO2 20.0* 19.0*   BUN 18 33*   CREATSERUM 0.96 1.03*   * 124*   CA 9.7 10.2       Recent Labs   Lab 04/10/24  1726   ALT 68*   AST 93*   ALB 4.8       No results for input(s): \"TROP\" in the last 168 hours.    Additional Diagnostics: ECG: NSR    Radiology: CT BRAIN OR HEAD ([address])    Result Date: 4/8/2024  CONCLUSION:  1. No acute intracranial finding. 2. Mild changes of chronic small vessel disease in cerebral white matter. 3. Large vessel intracranial atherosclerosis.    Dictated by (CST): Piotr Delgado MD on 4/08/2024 at 1:02 PM     Finalized by (CST): Piotr Delgado MD on 4/08/2024 at 1:04 PM          XR CHEST AP PORTABLE  (CPT=71045)    Result Date: 4/8/2024  CONCLUSION:  1. No acute disease in the chest.    Dictated by (CST): Sy Gauthier MD on 4/08/2024 at 12:52 PM     Finalized by (CST): Sy Gauthier MD on 4/08/2024 at 12:53 PM             ASSESSMENT / PLAN:   Patient is a 73 year old female with PMH sig for MVP, EDEL/MDD, GERD, IBS, who presents with AMS.    AMS  EDEL/MDD  Behavioral changes with nervous breakdown   - afebrile and normotensive  - Leukocytosis with bicarb 19, elevated AST ALT  - Urine positive for cannabis and ecstasy  - will check b12, Folate  - Imaging was done 2 days ago for ER visit due to cough  - CT head with no acute process, chest  x-ray with no acute process  - Recent steroids use for URI symptoms   - will check MRI brain   - Psych consulted   - polypharmacy  - will try to reset medication   - Certification for inpatient psych admit     FN:  - IVF: none  - Diet: general     DVT Prophy: SCDs lovenox   Atrophy: Ambulate   Lines: Piv    Dispo: pending clinical course    Outpatient records or previous hospital records reviewed.     Further recommendations pending patient's clinical course.  hospitalsist to continue to follow patient while in house    Patient and/or patient's family given opportunity to ask questions and note understanding and agreeing with therapeutic plan as outlined    Thank You,  Antonino Jung MD    [facility] ERist  Answering Service number: [phone]      Electronically signed by Antonino Jung MD on 2024  1:16 PM              Consults - MD Consult Notes      Consults signed by Jason Mckenzie MD at 2024  9:46 PM     Author: Jason Mckenzie MD Service: Psychiatry Author Type: Physician    Filed: 2024  9:46 PM Date of Service: 2024  4:54 PM Status: Signed    : Jason Mckenzie MD (Physician)     Consult Orders    1. Inpatient consult to PsychIATRY [[id number]] ordered by Antonino Jung MD at [address] [place] [address]  part of [facility]    Report of Consultation    Meli Antonio Patient Status:  Inpatient    11/3/1950 MRN [id number]   Location [facility] 5SW/SE Attending Antonino Jung MD   Hosp Day # 1 PCP SIRI TABOR     Date of Admission:  4/10/2024  Date of Consult:  2024   Reason for Consultation:   Patient presented with increased confusion, restlessness, agitation, Antonino Merritt MD requested psychiatric consult for evaluation and advice.    Consult Duration     The patient seen for initial psychiatric consult evaluation.   Record reviewed, communication with attending, communication with RN and patient  seen face to face evaluation.    History of Present Illness:   Patient is a 73 year old , , female with past medical history of anxiety, depression, GERD and back pain who was admitted to the hospital for altered mental status and confusion. The patient has been demonstrating confusion, restlessness, agitation. Patient indicated for psych consult for evaluation and advise.    Per chart review, the patient presented to the hospital with  who reported that since Tuesday the patient has been agitated and confused.     Labs and imaging reviewed: BAL negative and UDS positive for cannabis and ecstasy. TSH 0.683,   CT head demonstrated     The patient seen today laying in hospital bed. Patient presents restless and anxious.  Patient is awake and alert otherwise very restless and having difficulty to communicate.  Patient presented with expressive aphasia otherwise demonstrating significant response to internal stimuli.       at bedside reporting that patient with a chronic history of depression and anxiety but she is a private about her treatment.  He reported that she has been taking multiple medication.  Has been reporting that patient has been exhibiting some cough recently and take cough medicine with codeine.  He indicated that she has been acting bizarre sitting up and laying back down and addressing herself and has been very restless and disheveled.    Has been reporting that patient never had such an episode before.    Otherwise looking at her medication indicated the patient on multiple psychiatric medication including Wellbutrin, Abilify, Prozac, Lexapro, lorazepam and Klonopin.    The patient is alert and oriented to person. The patient has difficulty answering questions and engaging in appropriate conversation due to confused and disorganized thought process.    Patient is not able to cooperate with interview to due confusion, restlessness, agitation and response to internal  stimuli.     Report of suicidal or homicidal ideation.    The patient has been demonstrating alternation in mood and cognition with episodes of  increased confusion, restlessness, agitation and response to internal stimuli.     Collateral obtained from psychiatric liaison from patients .     reports that patient has had uncontrollable movements, is taking her clothes off, has been confused and does not know the names of anybody.     says that since Friday she has had these difficulties and it seemed to have gotten worse the last 2 days where now, in addition to those difficulties, she also cannot control her bowels.  She says she has been soiling her clothes and her bed.   said he wonders if it is could be attributed to the recent prescribing of meds with codeine that were meant to manage a cough that she had.   also reports that patient has \"been in bed over a year\".  He says that she does not say much and sleeps a lot.  He says she is grieving the loss of 2 dogs that  5 years ago as well as grieving the loss of their adopted son, who 7 yrs ago, at 31 y/o walked out of their lives.   He said that pt has friends and she does occasionally go to lunch with them and does attend AA meetings once a week.  He said that she will eat the food that he cooks for her.    states that he is power of  for his wife.  Counselor advised him to bring copies of that paperwork to the hospital so that staff can scan it into the clinic record.      Past Psychiatric/Medication History:  1. Prior diagnoses: anxiety, depression  2. Past psychiatric inpatient: Denied any  3. Past outpatient history: Patient seen by psychiatrist.  4. Past suicide history: Denied any  5. Medication history: Abilify, bupropion, Lexapro, hydroxyzine.    Social History:   Patient lives with her  and adult son. She is a retired teacher.    Patient has been sober from alcohol for 9 years. Patient uses  marijuana gummies at night.     Family History:  Unknown family history  Medical History:   Past Medical History  Past Medical History:    Anxiety state    Back problem    COMPRESSION FX    Cataract    Depression    Esophageal reflux    Hematoma and contusion of liver    STATES HAS BEEN THERE FOR MANY YEARS    History of fractured kneecap    RIGHT    IBS (irritable bowel syndrome)    Mitral prolapse    Osteoarthritis       Past Surgical History  Past Surgical History:   Procedure Laterality Date          Correct bunion,othr methods      Correct bunion,simple      BILAT.     Dilation/curettage,diagnostic      FOR \"MOLE PREGNANCY\"    Other Right     ORIF RIGHT ANKLE    Spine surgery procedure unlisted      cervical spine 2020    Total knee replacement         Family History  Family History   Problem Relation Age of Onset    Heart Disorder Father     Cancer Mother         ESOPHAGUS       Social History  Social History     Socioeconomic History    Marital status:    Tobacco Use    Smoking status: Former     Current packs/day: 2.00     Average packs/day: 2.0 packs/day for 15.0 years (30.0 ttl pk-yrs)     Types: Cigarettes    Smokeless tobacco: Never    Tobacco comments:     QUIT IN    Vaping Use    Vaping status: Never Used   Substance and Sexual Activity    Alcohol use: No    Drug use: No     Social Determinants of Health     Financial Resource Strain: Low Risk  (2022)    [facility] from [facility]    Overall Financial Resource Strain (CARDIA)     Difficulty of Paying Living Expenses: Not hard at all   Food Insecurity: No Food Insecurity (2024)    Food Insecurity     Food Insecurity: Never true   Transportation Needs: No Transportation Needs (2024)    Transportation Needs     Lack of Transportation: No    Received from [facility]    Social Connections   Housing Stability: Low Risk  (2024)     Housing Stability     Housing Instability: No           Current Medications:  Current Facility-Administered Medications   Medication Dose Route Frequency    enoxaparin (Lovenox) 30 MG/0.3ML SUBQ injection 30 mg  30 mg Subcutaneous Daily    acetaminophen (Tylenol Extra Strength) tab 500 mg  500 mg Oral Q4H PRN    melatonin tab 3 mg  3 mg Oral Nightly PRN    ondansetron (Zofran) 4 MG/2ML injection 4 mg  4 mg Intravenous Q6H PRN    metoclopramide (Reglan) 5 mg/mL injection 5 mg  5 mg Intravenous Q8H PRN    polyethylene glycol (PEG 3350) (Miralax) 17 g oral packet 17 g  17 g Oral Daily PRN    sennosides (Senokot) tab 17.2 mg  17.2 mg Oral Nightly PRN    bisacodyl (Dulcolax) 10 MG rectal suppository 10 mg  10 mg Rectal Daily PRN    fleet enema (Fleet) 7-19 GM/118ML rectal enema 133 mL  1 enema Rectal Once PRN    LORazepam (Ativan) tab 1 mg  1 mg Oral Q6H PRN    fluticasone propionate (Flonase) 50 MCG/ACT nasal suspension 1 spray  1 spray Each Nare Daily    cetirizine (ZyrTEC) tab 5 mg  5 mg Oral Daily    albuterol (Ventolin HFA) 108 (90 Base) MCG/ACT inhaler 2 puff  2 puff Inhalation Q4H PRN    cholecalciferol (Vitamin D3) tab 1,000 Units  1,000 Units Oral Daily     Medications Prior to Admission   Medication Sig    ARIPiprazole 2 MG Oral Tab Take 1 tablet (2 mg total) by mouth at bedtime.    buPROPion  MG Oral Tablet 24 Hr Take 1 tablet (300 mg total) by mouth daily.    diclofenac 75 MG Oral Tab EC Take 1 tablet (75 mg total) by mouth 2 (two) times daily.    escitalopram 10 MG Oral Tab Take 1 tablet (10 mg total) by mouth daily.    pregabalin 100 MG Oral Cap Take 1 capsule (100 mg total) by mouth in the morning, at noon, and at bedtime.    Meloxicam 15 MG Oral Tab Take 0.5 tablets (7.5 mg total) by mouth in the morning and 0.5 tablets (7.5 mg total) before bedtime.    mirtazapine 15 MG Oral Tab Take 0.5 tablets (7.5 mg total) by mouth nightly.    cholecalciferol 1000 UNITS Oral Cap Take 1 capsule (1,000 Units  total) by mouth daily.    guaiFENesin  MG Oral Tablet 12 Hr Take 2 tablets (1,200 mg total) by mouth 2 (two) times daily for 7 days.    oxyCODONE-acetaminophen  MG Oral Tab Take 1 tablet by mouth 3 (three) times daily as needed for Pain.    tiZANidine HCl 4 MG Oral Tab Take 1 tablet (4 mg total) by mouth in the morning and 1 tablet (4 mg total) before bedtime.    hydrOXYzine HCl 50 MG Oral Tab Take 1 tablet (50 mg total) by mouth 3 (three) times daily.    traZODone HCl 50 MG Oral Tab Take 50 mg by mouth nightly. (Patient not taking: Reported on 4/11/2024)    pregabalin 25 MG Oral Cap Take 100 mg by mouth 2 (two) times daily.   (Patient not taking: Reported on 4/11/2024)    celecoxib 50 MG Oral Cap Take 100 mg by mouth daily. *does not know dosage   (Patient not taking: Reported on 4/11/2024)    OLANZapine 5 MG Oral Tab Take 5 mg by mouth nightly. New medication (Patient not taking: Reported on 4/11/2024)    ClonazePAM 0.5 MG Oral Tab Take 0.5 mg by mouth 3 (three) times daily as needed for Anxiety.   (Patient not taking: Reported on 4/11/2024)    FLUoxetine HCl 60 MG Oral Tab Take 1 tablet by mouth daily. (Patient not taking: Reported on 4/11/2024)       Allergies  Allergies   Allergen Reactions    Radiology Contrast Iodinated Dyes HIVES     HIVES AND THROAT TIGHTENED WHILE HAVING AN MRI    Sulfa Antibiotics HIVES     \"got sicker\"    Seroquel [Quetiapine] UNKNOWN    Cinnamon RASH       Review of Systems:   As by Admitting/Attending    Results:   Laboratory Data:  Lab Results   Component Value Date    WBC 16.4 (H) 04/10/2024    HGB 13.8 04/10/2024    HCT 42.1 04/10/2024    .0 04/10/2024    CREATSERUM 1.03 (H) 04/10/2024    BUN 33 (H) 04/10/2024     04/10/2024    K 3.9 04/10/2024     04/10/2024    CO2 19.0 (L) 04/10/2024     (H) 04/10/2024    CA 10.2 04/10/2024    ALB 4.8 04/10/2024    ALKPHO 91 04/10/2024    TP 7.6 04/10/2024    AST 93 (H) 04/10/2024    ALT 68 (H) 04/10/2024     TSH 0.683 04/08/2024     01/19/2022    DDIMER <0.27 01/12/2023    CRP 8.33 (H) 09/04/2020    MG 2.3 08/31/2020    TROP <0.045 08/26/2020    ETOH <3 04/10/2024         Imaging:  CT BRAIN OR HEAD (62000)    Result Date: 4/8/2024  CONCLUSION:  1. No acute intracranial finding. 2. Mild changes of chronic small vessel disease in cerebral white matter. 3. Large vessel intracranial atherosclerosis.    Dictated by (CST): Piotr Delgado MD on 4/08/2024 at 1:02 PM     Finalized by (CST): Piotr Delagdo MD on 4/08/2024 at 1:04 PM          XR CHEST AP PORTABLE  (CPT=71045)    Result Date: 4/8/2024  CONCLUSION:  1. No acute disease in the chest.    Dictated by (CST): Sy Gauthier MD on 4/08/2024 at 12:52 PM     Finalized by (CST): Sy Gauthier MD on 4/08/2024 at 12:53 PM           Vital Signs:   Blood pressure (!) 125/100, pulse 81, temperature 99.1 °F (37.3 °C), temperature source Oral, resp. rate 20, height 60\", weight 49.1 kg (108 lb 3.2 oz), SpO2 93%.    Mental Status Exam:   Appearance: Stated age female, in hospital gown, laying down in hospital bed.  Psychomotor: Patient has been demonstrating restlessness and agitation.  Orientation: Alert and oriented to person. Patient confused  Gait: Not evaluated.  Attitude/Coorperation: limited cooperation due to confusion, restlessness, agitation  Behavior: episodes of restlessness and agitation.  Speech: Patient demonstrating expressive aphasia and difficulty expressing her emotion  Mood: Apathy  Affect: Irritable and restless affect  Thought process: Confused, disorganized  Thought content: No reports of  suicidal or homicidal ideation.  Perceptions: Patient has been demonstrating response to internal stimuli.  Concentration: Grossly impaired  Memory: Grossly impaired  Intellect: Average.  Judgment and Insight: Questionable.     Impression:     Catatonic excitable type.  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  Delirium due to another medical condition.        Patient is a 73 year old , , female with past medical history of anxiety, depression who was admitted to the hospital for Altered mental status, unspecified altered mental status type: The patient has been demonstrating confusion, restlessness, agitation.    The patient is restless, irritable, unable to communicate, keep moving back-and-forth and aimless movement with inability to express her emotion and not communicating verbally.  Patient has been taking multiple antidepressant medication with recent cough medicine with codeine indicate the high possibility for serotonin syndrome versus excited catatonia.    Discussed risk and benefit, acknowledging the current symptom and severity.  At this point, I would recommend the following approach:     Focus on safety  Focus on education and support.  Focus on insight orientation helping the patient understand diagnosis and treatment plan.  Utilize 2 mg of Haldol with 2 mg of Ativan IM.  Discontinue Wellbutrin and Prozac.  Continue Abilify 15 mg daily.  Continue Lexapro 10 mg nightly.  Restart the Klonopin 0.5 mg twice daily.  Utilize lorazepam 1 mg IV every 6 hours as needed for agitation and anxiety.  Processed with patient at length, the initiation of the above psychotropic medications I advised the patient of the risks, benefits, alternatives and potential side effects. The patient consents to administration of the medications and understands the right to refuse medications at any time. The patient verbalized understanding.   Coordinate plan with team    Orders This Visit:  Orders Placed This Encounter   Procedures    CBC With Differential With Platelet    Comp Metabolic Panel (14)    Urinalysis with Culture Reflex    Scan slide    Drug screen 7 w/out confirmation, urine    Ethyl Alcohol    Acetaminophen (Tylenol), S    Salicylate, Serum    CBC With Differential With Platelet    Comp Metabolic  Panel (14)    Magnesium       Meds This Visit:  Requested Prescriptions      No prescriptions requested or ordered in this encounter       Jason Mckenzie MD  4/11/2024    Note to Patient: The 21st Century Cures Act makes medical notes like these available to patients in the interest of transparency. However, be advised this is a medical document. It is intended as peer to peer communication. It is written in medical language and may contain abbreviations or verbiage that are unfamiliar. It may appear blunt or direct. Medical documents are intended to carry relevant information, facts as evident, and the clinical opinion of the practitioner. This note may have been transcribed using a voice dictation system. Voice recognition errors may occur. This should not be taken to alter the content or meaning of this note.        Electronically signed by Jason Mckenzie MD on 4/11/2024  9:46 PM              Discharge Summary - D/C Summary      Discharge Summary signed by Drea Yeboah MD at 4/18/2024  5:05 PM  Version 1 of 1    Author: Drea Yeboah MD Service: Hospitalist Author Type: Physician    Filed: 4/18/2024  5:05 PM Date of Service: 4/18/2024  5:02 PM Status: Signed    : Drea Yeboah MD (Physician)       General Medicine Discharge Summary     Patient ID:  Meli Antonio  73 year old  11/3/1950    Admit date: 4/10/2024    Discharge date and time: 04/18/24    Attending Physician: Drea Yeboah MD     Primary Care Physician: SIRI TABOR     Reason for admission: AMS    Discharge Diagnoses: Altered mental status, unspecified altered mental status type [R41.82]  Depression, unspecified depression type [F32.A]    Discharged Condition: stable    Disposition:  inpatient psych    Consults:   Consultants         Provider   Role Specialty     Jason Mckenzie MD      Consulting Physician Psychiatry              HPI:  Per Dr. Jung    Patient is a 73 year old female with PMH sig for MVP,  EDEL/MDD, GERD, IBS, who presents with AMS.  Patient's  who is the POA brought her in because she was acting different.  She has a history of depression and mood changes but this seemed a little more concerning to him.  2 days ago patient did have a cough which seems to be better at this time.  Patient was put on abx and steroids.   at bedside states one of their adopted sons  moved away and another son is going through a divorce and this has put a lot of stress on her.  She had some lose stools 1-2 times.  Patient denies CP, SOB, abd pain, n/v, f/c, dysuria.        In the ER she was afebrile and normotensive.  Leukocytosis with bicarb 19, elevated AST ALT  Urine positive for cannabis and ecstasy    Hospital Course:       Ms. Antonio is a 73 year old female with PMH sig for MVP, EDEL/MDD, GERD, IBS, who presents with AMS from depressive disorder. Psych consulted. Less likely 2/2 serotonin syndrome. MRI brain without acute findings. Plan for inpatient psych on discharge.      AMS  EDEL/MDD  Behavioral changes with nervous breakdown   Rule out Serotonin Syndrome?   - afebrile and normotensive  - Leukocytosis with bicarb 19, elevated AST ALT  - Urine positive for cannabis and ecstasy  - b12 normal, TSH borderline low   - Imaging was done 2 days before admit at ER visit due to cough  - CT head with no acute process, chest x-ray with no acute process  - Recent steroids use for URI symptoms   - Psych consulted   - polypharmacy  - will try to reset medication   - Certificate, petition, sitter dc'd   - MRI brain without acute findings  - plan for inpatient psych on discharge     Exam   GEN: elderly female in NAD, opens eyes to voice, says no to some questions  HEENT: EOMI  Pulm: CTAB, no crackles or wheezes  CV: tachycardic, regular rhythm, no murmurs  ABD: Soft, non-tender, non-distended, +BS  SKIN: warm, dry  EXT: no edema    Operative Procedures:      Imaging: MRI BRAIN (CPT=70551)    Result Date:  4/18/2024  CONCLUSION:  1. No acute intracranial process. 2. 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 3. Mild chronic microangiopathic ischemic changes.   A preliminary report was issued by the [facility] teleradiology service. There are no major discrepancies.  [facility]  Dictated by (CST): Boris Rodriguez MD on 4/18/2024 at 7:11 AM     Finalized by (CST): Boris Rodriguez MD on 4/18/2024 at 7:21 AM               Home Medication Changes:     I reconciled current and discharge medications on day of discharge. These medication changes have been made as below         Medication List        START taking these medications      ARIPiprazole 5 MG Tabs  Commonly known as: Abilify     escitalopram 5 MG Tabs  Commonly known as: Lexapro     lithium carbonate 150 MG Caps     LORazepam 0.5 MG Tabs  Commonly known as: Ativan            CONTINUE taking these medications      albuterol 108 (90 Base) MCG/ACT Aers  Commonly known as: Ventolin HFA     cholecalciferol 1000 UNITS Caps  Commonly known as: Vitamin D3            STOP taking these medications      azithromycin 250 MG Tabs  Commonly known as: Zithromax Z-Alok     benzonatate 100 MG Caps  Commonly known as: Tessalon     guaiFENesin-codeine 100-10 MG/5ML Soln  Commonly known as: Robitussin AC     predniSONE 20 MG Tabs  Commonly known as: Deltasone              Activity: activity as tolerated  Diet: regular diet  Wound Care: none needed  Code Status: Full Code  O2: n/a    Follow-up with:    PCP   Specialist psych       FU      DC instructions:      Other Discharge Instructions:         At this time, it is recommended that you follow up with the following referrals for psychiatry and therapy. Please call 911 or go to the nearest emergency room if symptoms persist or worsen.    Dr. Jason Mckenzie MD:  [address], [place] [address]  [facility]: [address] [place], [place]  [address]  Phone: [phone]    [facility]: [address] [place] [address]  Phone: [phone]    Dr. Christiana Madison MD: [address] [place] [address]  Phone: [phone]    [facility]: [address] [place] [address]  Phone: [phone]    Dr. Alma La MD: [address] [place] [address]  Phone: [phone]    [facility] and [facility]ry: [address] [place] [address]  Phone: [phone]          Follow-up with labs: none    Total Time Coordinating Care: 31 minutes    Patient had opportunity to ask questions and state understand and agree with therapeutic plan as outlined      Drea Yeboah MD  [facility] Hospitalist            Electronically signed by Drea Yeboah MD on 4/18/2024  5:05 PM              Physical Therapy Notes (last 72 hours)      Physical Therapy Note signed by Rosalee Durand PT at 4/16/2024  9:11 AM  Version 1 of 1    Author: Rosalee Durand PT Service: Rehab Author Type: Physical Therapist    Filed: 4/16/2024  9:11 AM Date of Service: 4/16/2024  9:05 AM Status: Signed    : Rosalee Durand PT (Physical Therapist)       PHYSICAL THERAPY EVALUATION - INPATIENT     Room Number: 546/546-A  Evaluation Date: 4/16/2024  Type of Evaluation: Initial   Physician Order: PT Eval and Treat    Presenting Problem: AMS  Co-Morbidities : serotonin syndrome vs catatonia per psych  Reason for Therapy: Mobility Dysfunction and Discharge Planning    PHYSICAL THERAPY ASSESSMENT   Patient is a 73 year old female admitted 4/10/2024 for AMS, suspected serotonin syndrome vs catatonia per psych.  Prior to admission, patient's baseline is independent, lives with spouse in a two story house.  Patient is currently functioning below baseline with bed mobility, transfers, and gait.  Patient is requiring minimal assist as a result of the following  impairments: decreased functional strength, decreased endurance/aerobic capacity, medical status, and decreased compliance/participation.  Physical Therapy will continue to follow for duration of hospitalization.    Patient will benefit from continued skilled PT Services to promote return to prior level of function and safety with continuous assistance and gradual rehabilitative therapy  pending progress - limited by mentation this date as pt with severely flat affect and inconsistent participation.     PLAN  PT Treatment Plan: Bed mobility;Body mechanics;Endurance;Energy conservation;Family education;Gait training;Range of motion;Stoop training;Stair training;Transfer training;Balance training  Rehab Potential : Good  Frequency (Obs): 5x/week    PHYSICAL THERAPY MEDICAL/SOCIAL HISTORY   History related to current admission: AMS     Problem List  Principal Problem:    Altered mental status, unspecified altered mental status type  Active Problems:    Altered mental status    Depression, unspecified depression type    Catatonic excited type    Serotonin syndrome    Severe episode of recurrent major depressive disorder, without psychotic features (HCC)      HOME SITUATION  Home Situation  Type of Home: House  Home Layout: Two level  Stairs to Enter : 3  Railing: Yes  Lives With: Spouse     Prior Level of [place]: independent     SUBJECTIVE  \"Yes.\" - only stating yes/no very intermittently     PHYSICAL THERAPY EXAMINATION   OBJECTIVE  Precautions: Bed/chair alarm  Fall Risk: High fall risk    WEIGHT BEARING RESTRICTION  Weight Bearing Restriction: None                PAIN ASSESSMENT     Location: no complaints of pain       COGNITION  Flat affect, inconsistent command following, unable to answer any orientation questions    BALANCE  Static Sitting: Fair +  Dynamic Sitting: Fair  Static Standing: Fair  Dynamic Standing: Fair -    AM-PAC '6-Clicks' INPATIENT SHORT FORM - BASIC MOBILITY  How much difficulty does the  patient currently have...  Patient Difficulty: Turning over in bed (including adjusting bedclothes, sheets and blankets)?: A Little   Patient Difficulty: Sitting down on and standing up from a chair with arms (e.g., wheelchair, bedside commode, etc.): A Little   Patient Difficulty: Moving from lying on back to sitting on the side of the bed?: A Lot   How much help from another person does the patient currently need...   Help from Another: Moving to and from a bed to a chair (including a wheelchair)?: A Little   Help from Another: Need to walk in hospital room?: A Lot   Help from Another: Climbing 3-5 steps with a railing?: Total     AM-PAC Score:  Raw Score: 14   Approx Degree of Impairment: 61.29%   Standardized Score (AM-PAC Scale): 38.1   CMS Modifier (G-Code): CL    FUNCTIONAL ABILITY STATUS  Functional Mobility/Gait Assessment  Gait Assistance: Minimum assistance (HHA)  Distance (ft): 3'  Pattern:  (shuffle)  Rolling: minimal assist  Supine to Sit: minimal assist  Sit to Supine: minimal assist  Sit to Stand: minimal assist    Exercise/Education Provided:  Bed mobility  Body mechanics  Functional activity tolerated  Gait training  Transfer training    The patient's Approx Degree of Impairment: 61.29% has been calculated based on documentation in the [facility] '6 clicks' Inpatient Basic Mobility Short Form.  Research supports that patients with this level of impairment may benefit from TANK.  Final disposition will be made by interdisciplinary medical team.    Patient End of Session: Needs met;Call light within reach;RN aware of session/findings;Up in chair;With  staff;Alarm set    CURRENT GOALS  Goals to be met by: 5/1  Patient Goal Patient's self-stated goal is: unstated    Goal #1 Patient is able to demonstrate supine - sit EOB @ level: independent     Goal #1   Current Status    Goal #2 Patient is able to demonstrate transfers Sit to/from Stand at assistance level: independent with none     Goal #2  Current  Status    Goal #3 Patient is able to ambulate 150 feet with assist device: none at assistance level: independent   Goal #3   Current Status    Goal #4 Patient will negotiate 3 stairs/one curb w/ assistive device and supervision   Goal #4   Current Status    Goal #5 Patient to demonstrate independence with home activity/exercise instructions provided to patient in preparation for discharge.   Goal #5   Current Status    Goal #6    Goal #6  Current Status      Patient Evaluation Complexity Level:  History Moderate - 1 or 2 personal factors and/or co-morbidities   Examination of body systems Moderate - addressing a total of 3 or more elements   Clinical Presentation  Moderate - Evolving   Clinical Decision Making  Moderate Complexity     Therapeutic Activity:  12 minutes               Occupational Therapy Notes (last 72 hours)  Notes from 4/15/2024  6:24 PM through 4/18/2024  6:24 PM   No notes of this type exist for this encounter.     Video Swallow Study Notes    No notes of this type exist for this encounter.     SLP Notes    No notes of this type exist for this encounter.     Multidisciplinary Problems     Active Goals        Problem: Patient/Family Goals    Goal Priority Disciplines Outcome Interventions   Patient/Family Long Term Goal     Interdisciplinary Progressing    Description: Patient's Long Term Goal: ***    Interventions:  - ***  - See additional Care Plan goals for specific interventions   Patient/Family Short Term Goal     Interdisciplinary Progressing    Description: Patient's Short Term Goal: ***    Interventions:   - ***  - See additional Care Plan goals for specific interventions